# Patient Record
Sex: FEMALE | Race: WHITE | NOT HISPANIC OR LATINO | Employment: UNEMPLOYED | URBAN - METROPOLITAN AREA
[De-identification: names, ages, dates, MRNs, and addresses within clinical notes are randomized per-mention and may not be internally consistent; named-entity substitution may affect disease eponyms.]

---

## 2017-01-16 ENCOUNTER — ALLSCRIPTS OFFICE VISIT (OUTPATIENT)
Dept: OTHER | Facility: OTHER | Age: 47
End: 2017-01-16

## 2017-01-23 ENCOUNTER — ALLSCRIPTS OFFICE VISIT (OUTPATIENT)
Dept: OTHER | Facility: OTHER | Age: 47
End: 2017-01-23

## 2017-01-30 ENCOUNTER — HOSPITAL ENCOUNTER (EMERGENCY)
Facility: HOSPITAL | Age: 47
Discharge: HOME | End: 2017-01-30
Attending: EMERGENCY MEDICINE | Admitting: EMERGENCY MEDICINE
Payer: COMMERCIAL

## 2017-01-30 ENCOUNTER — GENERIC CONVERSION - ENCOUNTER (OUTPATIENT)
Dept: OTHER | Facility: OTHER | Age: 47
End: 2017-01-30

## 2017-01-30 VITALS
WEIGHT: 176.8 LBS | OXYGEN SATURATION: 97 % | DIASTOLIC BLOOD PRESSURE: 70 MMHG | RESPIRATION RATE: 18 BRPM | TEMPERATURE: 98.7 F | SYSTOLIC BLOOD PRESSURE: 119 MMHG | HEART RATE: 93 BPM

## 2017-01-30 DIAGNOSIS — S76.211A INGUINAL STRAIN, RIGHT, INITIAL ENCOUNTER: Primary | ICD-10-CM

## 2017-01-30 PROCEDURE — 99283 EMERGENCY DEPT VISIT LOW MDM: CPT

## 2017-01-30 RX ORDER — CYCLOBENZAPRINE HCL 10 MG
10 TABLET ORAL 3 TIMES DAILY
Qty: 30 TABLET | Refills: 0 | Status: SHIPPED | OUTPATIENT
Start: 2017-01-30 | End: 2018-02-14 | Stop reason: ALTCHOICE

## 2017-01-30 RX ORDER — ONDANSETRON 4 MG/1
4 TABLET, ORALLY DISINTEGRATING ORAL ONCE
Status: COMPLETED | OUTPATIENT
Start: 2017-01-30 | End: 2017-01-30

## 2017-01-30 RX ORDER — CYCLOBENZAPRINE HCL 10 MG
10 TABLET ORAL ONCE
Status: COMPLETED | OUTPATIENT
Start: 2017-01-30 | End: 2017-01-30

## 2017-01-30 RX ORDER — ONDANSETRON 4 MG/1
4 TABLET, FILM COATED ORAL EVERY 6 HOURS
Qty: 12 TABLET | Refills: 0 | Status: SHIPPED | OUTPATIENT
Start: 2017-01-30 | End: 2017-02-06

## 2017-01-30 RX ADMIN — ONDANSETRON 4 MG: 4 TABLET, ORALLY DISINTEGRATING ORAL at 18:49

## 2017-01-30 RX ADMIN — CYCLOBENZAPRINE HYDROCHLORIDE 10 MG: 10 TABLET, FILM COATED ORAL at 18:49

## 2017-02-24 ENCOUNTER — TRANSCRIBE ORDERS (OUTPATIENT)
Dept: ADMINISTRATIVE | Age: 47
End: 2017-02-24

## 2017-02-24 ENCOUNTER — APPOINTMENT (OUTPATIENT)
Dept: LAB | Age: 47
End: 2017-02-24
Payer: COMMERCIAL

## 2017-02-24 DIAGNOSIS — J45.50 UNCOMPLICATED SEVERE PERSISTENT ASTHMA: ICD-10-CM

## 2017-02-24 DIAGNOSIS — E55.9 UNSPECIFIED VITAMIN D DEFICIENCY: Primary | ICD-10-CM

## 2017-02-24 DIAGNOSIS — E55.9 UNSPECIFIED VITAMIN D DEFICIENCY: ICD-10-CM

## 2017-02-24 LAB
25(OH)D3 SERPL-MCNC: 10.7 NG/ML (ref 30–100)
ANION GAP SERPL CALCULATED.3IONS-SCNC: 6 MMOL/L (ref 4–13)
BASOPHILS # BLD MANUAL: 0 THOUSAND/UL (ref 0–0.1)
BASOPHILS NFR MAR MANUAL: 0 % (ref 0–1)
BUN SERPL-MCNC: 17 MG/DL (ref 5–25)
BURR CELLS BLD QL SMEAR: PRESENT
CALCIUM SERPL-MCNC: 8.5 MG/DL (ref 8.3–10.1)
CHLORIDE SERPL-SCNC: 114 MMOL/L (ref 100–108)
CO2 SERPL-SCNC: 24 MMOL/L (ref 21–32)
CREAT SERPL-MCNC: 0.91 MG/DL (ref 0.6–1.3)
EOSINOPHIL # BLD MANUAL: 0 THOUSAND/UL (ref 0–0.4)
EOSINOPHIL NFR BLD MANUAL: 0 % (ref 0–6)
ERYTHROCYTE [DISTWIDTH] IN BLOOD BY AUTOMATED COUNT: 13 % (ref 11.6–15.1)
GFR SERPL CREATININE-BSD FRML MDRD: >60 ML/MIN/1.73SQ M
GLUCOSE SERPL-MCNC: 82 MG/DL (ref 65–140)
HCT VFR BLD AUTO: 37.9 % (ref 34.8–46.1)
HGB BLD-MCNC: 12.3 G/DL (ref 11.5–15.4)
LG PLATELETS BLD QL SMEAR: PRESENT
LYMPHOCYTES # BLD AUTO: 1.26 THOUSAND/UL (ref 0.6–4.47)
LYMPHOCYTES # BLD AUTO: 30 % (ref 14–44)
MAGNESIUM SERPL-MCNC: 2.2 MG/DL (ref 1.6–2.6)
MCH RBC QN AUTO: 30.5 PG (ref 26.8–34.3)
MCHC RBC AUTO-ENTMCNC: 32.5 G/DL (ref 31.4–37.4)
MCV RBC AUTO: 94 FL (ref 82–98)
MONOCYTES # BLD AUTO: 0.63 THOUSAND/UL (ref 0–1.22)
MONOCYTES NFR BLD: 15 % (ref 4–12)
NEUTROPHILS # BLD MANUAL: 2.02 THOUSAND/UL (ref 1.85–7.62)
NEUTS BAND NFR BLD MANUAL: 4 % (ref 0–8)
NEUTS SEG NFR BLD AUTO: 44 % (ref 43–75)
NRBC BLD AUTO-RTO: 0 /100 WBCS
PLATELET # BLD AUTO: 173 THOUSANDS/UL (ref 149–390)
PLATELET BLD QL SMEAR: ADEQUATE
PMV BLD AUTO: 10.7 FL (ref 8.9–12.7)
POIKILOCYTOSIS BLD QL SMEAR: PRESENT
POTASSIUM SERPL-SCNC: 4.4 MMOL/L (ref 3.5–5.3)
RBC # BLD AUTO: 4.03 MILLION/UL (ref 3.81–5.12)
SODIUM SERPL-SCNC: 144 MMOL/L (ref 136–145)
TOTAL CELLS COUNTED SPEC: 100
VARIANT LYMPHS # BLD AUTO: 7 %
WBC # BLD AUTO: 4.2 THOUSAND/UL (ref 4.31–10.16)

## 2017-02-24 PROCEDURE — 85007 BL SMEAR W/DIFF WBC COUNT: CPT

## 2017-02-24 PROCEDURE — 80048 BASIC METABOLIC PNL TOTAL CA: CPT

## 2017-02-24 PROCEDURE — 82306 VITAMIN D 25 HYDROXY: CPT

## 2017-02-24 PROCEDURE — 85027 COMPLETE CBC AUTOMATED: CPT

## 2017-02-24 PROCEDURE — 36415 COLL VENOUS BLD VENIPUNCTURE: CPT

## 2017-02-24 PROCEDURE — 83735 ASSAY OF MAGNESIUM: CPT

## 2017-03-06 ENCOUNTER — ALLSCRIPTS OFFICE VISIT (OUTPATIENT)
Dept: OTHER | Facility: OTHER | Age: 47
End: 2017-03-06

## 2017-03-21 ENCOUNTER — ALLSCRIPTS OFFICE VISIT (OUTPATIENT)
Dept: OTHER | Facility: OTHER | Age: 47
End: 2017-03-21

## 2017-03-28 ENCOUNTER — HOSPITAL ENCOUNTER (EMERGENCY)
Facility: HOSPITAL | Age: 47
Discharge: HOME | End: 2017-03-28
Attending: EMERGENCY MEDICINE
Payer: COMMERCIAL

## 2017-03-28 VITALS
HEART RATE: 88 BPM | DIASTOLIC BLOOD PRESSURE: 66 MMHG | RESPIRATION RATE: 18 BRPM | SYSTOLIC BLOOD PRESSURE: 117 MMHG | WEIGHT: 178.57 LBS | TEMPERATURE: 98 F | OXYGEN SATURATION: 100 %

## 2017-03-28 DIAGNOSIS — G43.909 MIGRAINE: Primary | ICD-10-CM

## 2017-03-28 PROCEDURE — 96361 HYDRATE IV INFUSION ADD-ON: CPT

## 2017-03-28 PROCEDURE — 96374 THER/PROPH/DIAG INJ IV PUSH: CPT

## 2017-03-28 PROCEDURE — 99283 EMERGENCY DEPT VISIT LOW MDM: CPT

## 2017-03-28 PROCEDURE — 96375 TX/PRO/DX INJ NEW DRUG ADDON: CPT

## 2017-03-28 RX ORDER — SUMATRIPTAN 100 MG/1
100 TABLET, FILM COATED ORAL ONCE AS NEEDED
COMMUNITY
End: 2018-04-02 | Stop reason: ALTCHOICE

## 2017-03-28 RX ORDER — SUMATRIPTAN AND NAPROXEN SODIUM 85; 500 MG/1; MG/1
1 TABLET, FILM COATED ORAL EVERY 2 HOUR PRN
COMMUNITY
End: 2018-03-26

## 2017-03-28 RX ORDER — ONDANSETRON 2 MG/ML
4 INJECTION INTRAMUSCULAR; INTRAVENOUS ONCE
Status: COMPLETED | OUTPATIENT
Start: 2017-03-28 | End: 2017-03-28

## 2017-03-28 RX ADMIN — HYDROMORPHONE HYDROCHLORIDE 1 MG: 1 INJECTION, SOLUTION INTRAMUSCULAR; INTRAVENOUS; SUBCUTANEOUS at 17:41

## 2017-03-28 RX ADMIN — ONDANSETRON 4 MG: 2 INJECTION INTRAMUSCULAR; INTRAVENOUS at 17:41

## 2017-03-28 RX ADMIN — SODIUM CHLORIDE 1000 ML: 0.9 INJECTION, SOLUTION INTRAVENOUS at 17:40

## 2017-03-29 ENCOUNTER — HOSPITAL ENCOUNTER (EMERGENCY)
Facility: HOSPITAL | Age: 47
Discharge: HOME | End: 2017-03-29
Attending: EMERGENCY MEDICINE
Payer: COMMERCIAL

## 2017-03-29 VITALS
RESPIRATION RATE: 16 BRPM | TEMPERATURE: 98.4 F | OXYGEN SATURATION: 100 % | DIASTOLIC BLOOD PRESSURE: 69 MMHG | HEART RATE: 67 BPM | WEIGHT: 178.57 LBS | SYSTOLIC BLOOD PRESSURE: 114 MMHG

## 2017-03-29 DIAGNOSIS — G43.909 MIGRAINE HEADACHE: Primary | ICD-10-CM

## 2017-03-29 LAB
ANION GAP BLD CALC-SCNC: 17 MMOL/L (ref 4–13)
BUN BLD-MCNC: 16 MG/DL (ref 5–25)
CA-I BLD-SCNC: 1.2 MMOL/L (ref 1.12–1.32)
CHLORIDE BLD-SCNC: 111 MMOL/L (ref 100–108)
CREAT BLD-MCNC: 0.9 MG/DL (ref 0.6–1.3)
GFR SERPL CREATININE-BSD FRML MDRD: >60 ML/MIN/1.73SQ M
GLUCOSE SERPL-MCNC: 90 MG/DL (ref 65–140)
HCT VFR BLD CALC: 35 % (ref 34.8–46.1)
HGB BLDA-MCNC: 11.9 G/DL (ref 11.5–15.4)
PCO2 BLD: 19 MMOL/L (ref 21–32)
POTASSIUM BLD-SCNC: 3.9 MMOL/L (ref 3.5–5.3)
SODIUM BLD-SCNC: 143 MMOL/L (ref 136–145)
SPECIMEN SOURCE: ABNORMAL

## 2017-03-29 PROCEDURE — 96376 TX/PRO/DX INJ SAME DRUG ADON: CPT

## 2017-03-29 PROCEDURE — 96375 TX/PRO/DX INJ NEW DRUG ADDON: CPT

## 2017-03-29 PROCEDURE — 80047 BASIC METABLC PNL IONIZED CA: CPT

## 2017-03-29 PROCEDURE — 99284 EMERGENCY DEPT VISIT MOD MDM: CPT

## 2017-03-29 PROCEDURE — 96365 THER/PROPH/DIAG IV INF INIT: CPT

## 2017-03-29 PROCEDURE — 96361 HYDRATE IV INFUSION ADD-ON: CPT

## 2017-03-29 PROCEDURE — 85014 HEMATOCRIT: CPT

## 2017-03-29 RX ORDER — BUTALBITAL, ACETAMINOPHEN AND CAFFEINE 50; 325; 40 MG/1; MG/1; MG/1
1 TABLET ORAL ONCE
Status: COMPLETED | OUTPATIENT
Start: 2017-03-29 | End: 2017-03-29

## 2017-03-29 RX ORDER — DIPHENHYDRAMINE HYDROCHLORIDE 50 MG/ML
25 INJECTION INTRAMUSCULAR; INTRAVENOUS ONCE
Status: COMPLETED | OUTPATIENT
Start: 2017-03-29 | End: 2017-03-29

## 2017-03-29 RX ORDER — PROMETHAZINE HYDROCHLORIDE 25 MG/ML
12.5 INJECTION, SOLUTION INTRAMUSCULAR; INTRAVENOUS ONCE
Status: COMPLETED | OUTPATIENT
Start: 2017-03-29 | End: 2017-03-29

## 2017-03-29 RX ORDER — METOCLOPRAMIDE HYDROCHLORIDE 5 MG/ML
10 INJECTION INTRAMUSCULAR; INTRAVENOUS ONCE
Status: COMPLETED | OUTPATIENT
Start: 2017-03-29 | End: 2017-03-29

## 2017-03-29 RX ORDER — KETOROLAC TROMETHAMINE 30 MG/ML
15 INJECTION, SOLUTION INTRAMUSCULAR; INTRAVENOUS ONCE
Status: COMPLETED | OUTPATIENT
Start: 2017-03-29 | End: 2017-03-29

## 2017-03-29 RX ADMIN — SODIUM CHLORIDE 1000 ML: 0.9 INJECTION, SOLUTION INTRAVENOUS at 18:11

## 2017-03-29 RX ADMIN — MAGNESIUM SULFATE HEPTAHYDRATE 1 G: 1 INJECTION, SOLUTION INTRAVENOUS at 19:40

## 2017-03-29 RX ADMIN — KETOROLAC TROMETHAMINE 15 MG: 30 INJECTION, SOLUTION INTRAMUSCULAR at 18:14

## 2017-03-29 RX ADMIN — DIPHENHYDRAMINE HYDROCHLORIDE 25 MG: 50 INJECTION, SOLUTION INTRAMUSCULAR; INTRAVENOUS at 18:13

## 2017-03-29 RX ADMIN — PROMETHAZINE HYDROCHLORIDE 12.5 MG: 25 INJECTION INTRAMUSCULAR; INTRAVENOUS at 19:28

## 2017-03-29 RX ADMIN — METOCLOPRAMIDE 10 MG: 5 INJECTION, SOLUTION INTRAMUSCULAR; INTRAVENOUS at 18:12

## 2017-03-29 RX ADMIN — BUTALBITAL, ACETAMINOPHEN, AND CAFFEINE 1 TABLET: 50; 325; 40 TABLET ORAL at 21:22

## 2017-03-29 RX ADMIN — KETOROLAC TROMETHAMINE 15 MG: 30 INJECTION, SOLUTION INTRAMUSCULAR at 19:25

## 2017-05-01 ENCOUNTER — ALLSCRIPTS OFFICE VISIT (OUTPATIENT)
Dept: OTHER | Facility: OTHER | Age: 47
End: 2017-05-01

## 2017-05-30 ENCOUNTER — ALLSCRIPTS OFFICE VISIT (OUTPATIENT)
Dept: OTHER | Facility: OTHER | Age: 47
End: 2017-05-30

## 2017-07-27 ENCOUNTER — ALLSCRIPTS OFFICE VISIT (OUTPATIENT)
Dept: OTHER | Facility: OTHER | Age: 47
End: 2017-07-27

## 2017-07-27 DIAGNOSIS — E03.9 HYPOTHYROIDISM: ICD-10-CM

## 2017-07-27 DIAGNOSIS — R53.82 CHRONIC FATIGUE: ICD-10-CM

## 2017-09-11 ENCOUNTER — GENERIC CONVERSION - ENCOUNTER (OUTPATIENT)
Dept: BEHAVIORAL HEALTH UNIT | Facility: HOSPITAL | Age: 47
End: 2017-09-11

## 2017-09-21 ENCOUNTER — ALLSCRIPTS OFFICE VISIT (OUTPATIENT)
Dept: OTHER | Facility: OTHER | Age: 47
End: 2017-09-21

## 2017-09-29 ENCOUNTER — ALLSCRIPTS OFFICE VISIT (OUTPATIENT)
Dept: OTHER | Facility: OTHER | Age: 47
End: 2017-09-29

## 2017-10-02 ENCOUNTER — GENERIC CONVERSION - ENCOUNTER (OUTPATIENT)
Dept: OTHER | Facility: OTHER | Age: 47
End: 2017-10-02

## 2017-10-18 ENCOUNTER — TRANSCRIBE ORDERS (OUTPATIENT)
Dept: ADMINISTRATIVE | Facility: HOSPITAL | Age: 47
End: 2017-10-18

## 2017-10-18 ENCOUNTER — GENERIC CONVERSION - ENCOUNTER (OUTPATIENT)
Dept: OTHER | Facility: OTHER | Age: 47
End: 2017-10-18

## 2017-10-18 ENCOUNTER — HOSPITAL ENCOUNTER (OUTPATIENT)
Dept: RADIOLOGY | Facility: HOSPITAL | Age: 47
Discharge: HOME | End: 2017-10-18
Payer: COMMERCIAL

## 2017-10-18 DIAGNOSIS — M25.551 PAIN IN RIGHT HIP: ICD-10-CM

## 2017-10-18 DIAGNOSIS — Z11.4 ENCOUNTER FOR SCREENING FOR HIV: ICD-10-CM

## 2017-10-18 DIAGNOSIS — Z11.3 ENCOUNTER FOR SCREENING FOR INFECTIONS WITH PREDOMINANTLY SEXUAL MODE OF TRANSMISSION: ICD-10-CM

## 2017-10-18 DIAGNOSIS — Z11.59 ENCOUNTER FOR SCREENING FOR OTHER VIRAL DISEASES (CODE): ICD-10-CM

## 2017-10-18 PROCEDURE — 73502 X-RAY EXAM HIP UNI 2-3 VIEWS: CPT

## 2017-10-25 ENCOUNTER — GENERIC CONVERSION - ENCOUNTER (OUTPATIENT)
Dept: OTHER | Facility: OTHER | Age: 47
End: 2017-10-25

## 2017-10-26 NOTE — PROGRESS NOTES
Assessment  1  Trochanteric bursitis of right hip (726 5) (M70 61)   2  Sacroiliitis (720 2) (M46 1)    Plan  Trochanteric bursitis of right hip    · Flector 1 3 % Transdermal Patch; APPLY PATCH TO RIGTH HIP q 12 HOURS   Rx By: Bonnita Ganser; Dispense: 30 Days ; #:60 Patch; Refill: 1;For: Trochanteric bursitis of right hip; CIELO = N; Verified Transmission to [facility] #[id number] Last Updated By: System, SureScripts; 9/21/2017 8:15:54 AM   · Joint Bursa Aspiration &/or Injection Major - POC; Status:Complete;   Done: 75Toj8334   Perform: In Office; Order Comments:right troch bursa; Due:21Sep2018; Ordered; For:Trochanteric bursitis of right hip; Ordered By:Loki Gar; Discussion/Summary    Patient is a 71-year-old female with a history of sacroiliitis and piriformis syndrome, who presents today for a follow-up appointment  The patient is experiencing right thigh pain which is consistent with trochanteric bursitis  To help decrease the inflammatory component of her pain, a right trochanteric bursa injection can be performed under ultrasound guidance  The injection and risks were reviewed the patient and she was scheduled for the procedure  help with the pain until the injection can be performed, I will give her a prescription for Flector patch along with a sample  She was instructed to apply one patch to the right hip and leave in place for 12 hours  She was also given a discount card to make the patches more affordable  The patient has the current Goals: Decreased pain and improved quality of life  The patent has the current Barriers: None  Patient is able to Self-Care  Chief Complaint  1  Leg Pain    History of Present Illness  Patient is a 71-year-old female with a history of sacroiliitis and piriformis syndrome  She was last in the office on August 3 2016, in which she was having new onset of neck pain  She presents today for a followup appointment   At this time, the patient states the neck pain has been stable  She is currently experiencing right thigh pain which occasionally radiates into the hip and down to the knee  She does have low back pain, but it is mild in comparison  The pain is constant and increases with walking, or lying on her side  She describes as pressure-like  She is currently rating her pain a 7/10 on the numeric rating scale  Marylou Calvo presents with complaints of gradual onset of constant episodes of moderate right anterior upper and right posterior upper leg pain  On a scale of 1 to 10, the patient rates the pain as 7  Symptoms are unchanged  Review of Systems    Constitutional: no fever,-- no recent weight gain-- and-- no recent weight loss  Eyes: no double vision-- and-- no blurry vision  Cardiovascular: no chest pain,-- no palpitations-- and-- no lower extremity edema  Respiratory: no complaints of shortness of breath-- and-- no wheezing  Musculoskeletal: difficulty walking, but-- no muscle weakness,-- no joint stiffness,-- no joint swelling,-- no limb swelling,-- no pain in extremity-- and-- no decreased range of motion  Neurological: no dizziness,-- no difficulty swallowing,-- no memory loss,-- no loss of consciousness-- and-- no seizures  Gastrointestinal: no nausea,-- no vomiting,-- no constipation-- and-- no diarrhea  Genitourinary: no difficulty initiating urine stream,-- no genital pain-- and-- no frequent urination  Integumentary: no complaints of skin rash  Psychiatric: no depression  Endocrine: no excessive thirst,-- no adrenal disease,-- no hypothyroidism-- and-- no hyperthyroidism  Hematologic/Lymphatic: no tendency for easy bruising-- and-- no tendency for easy bleeding  Active Problems  1  Akathisia (781 0)   2  Amenorrhea (626 0) (N91 2)   3  Asthma, persistent (493 90) (J97 909)   4  History of Chondromalacia of patella, unspecified laterality (717 7) (M22 40)   5  Chronic fatigue (780 79) (R53 82)   6   Chronic GERD (980 81) (K21 9)   7  Chronic migraine without aura (346 70) (G43 709)   8  Dysmenorrhea (625 3) (N94 6)   9  Encounter for IUD insertion (V25 11) (Z30 430)   10  Encounter for routine gynecological examination (V72 31) (Z01 419)   11  Encounter for screening mammogram for malignant neoplasm of breast (V76 12)    (Z12 31)   12  KYLE (generalized anxiety disorder) (300 02) (F41 1)   13  Hypothyroidism (244 9) (E03 9)   14  Impulse control disorder (312 30) (F63 9)   15  Intractable chronic migraine without aura and without status migrainosus (346 71)    (G43 719)   16  Lumbar radiculopathy (724 4) (M54 16)   17  Major depressive disorder, recurrent severe without psychotic features (296 33) (F33 2)   18  Migraine, unspecified, not intractable, without status migrainosus (346 90) (G43 909)   19  Myofascial pain syndrome (729 1) (M79 1)   20  Neck pain on left side (723 1) (M54 2)   21  Other insomnia (780 52) (G47 09)   22  Panic disorder without agoraphobia (300 01) (F41 0)   23  Piriformis syndrome, unspecified laterality (355 0) (G57 00)   24  Sacroiliitis (720 2) (M46 1)   25  Sciatica (724 3) (M54 30)   26  Spondylosis of lumbar region without myelopathy or radiculopathy (721 3) (M47 816)   27  Vitamin D deficiency (268 9) (E55 9)    Past Medical History  1  History of Anxiety (300 00) (F41 9)   2  History of Asthma (493 90) (J45 909)   3  History of Chondromalacia of patella, unspecified laterality (717 7) (M22 40)   4  History of Chronic migraine (346 70) (G43 709)   5  History of Contraceptives (V25 02)   6  History of Counseling for birth control, intrauterine device (V25 09) (Z30 09)   7  History of Deep vein thrombophlebitis of leg, unspecified laterality (451 19) (I80 209)   8  History of Depression (311) (F32 9)   9  History of DEXA Body Composition Study   10  History of Encounter for contraceptive surveillance (V25 40) (Z30 40)   11  History of candidal vulvovaginitis (V13 29) (Z86 19)   12   History of cystitis (V13 02) (Z87 440)   13  History of gastroesophageal reflux (GERD) (V12 79) (Z87 19)   14  Denied: History of head injury   15  History of human papillomavirus infection (V12 09) (Z86 19)   16  History of hypothyroidism (V12 29) (Z86 39)   17  History of osteopenia (V13 59) (Z87 39)   18  History of thyroid disease (V12 29) (Z86 39)   19  History of Mammogram   20  History of Moderate dysplasia of cervix (ESAU II) (622 12) (N87 1)   21  History of Previously Pregnant With 1  Normal Vaginal Deliveries   22  History of Reported Pap Smear   23  History of Routine Gynecological Exam With Cervical Pap Smear (V72 31)   24  History of Screening for HPV (human papillomavirus) (V73 81) (Z11 51)   25  Denied: History of Seizures   26  History of Summary Of Previous Pregnancies  2  (Total No )   27  History of Vaginal yeast infection (112 1) (B37 3)    The active problems and past medical history were reviewed and updated today  Surgical History  1  History of Cervix Cryosurgery   2  History of  Section   3  History of Colposcopy Cervix With Biopsy(S)   4  History of Complete Colonoscopy   5  History of Contraceptives (V25 02)   6  History of Laparoscopy (Diagnostic)   7  History of Oral Surgery Tooth Extraction    The surgical history was reviewed and updated today  Family History  Mother    1  No family history of mental disorder  Father    2  Family history of Colon Cancer (V16 0)   3  No family history of mental disorder  Maternal Grandfather    4  Family history of Colon Cancer (V16 0)   5  Family history of Prostate Cancer (V16 42)  Maternal Aunt    6  Family history of Colon Cancer (V16 0)  Family History    7  Family history of Lung Cancer (V16 1)    The family history was reviewed and updated today         Social History   · Denied: History of Caffeine Use   · Denied: History of Drug Use (305 90)   · Marital History - Currently    · Never A Smoker   · Occasional alcohol use   · Occupation   · Sedentary Lifestyle (V69 0)   · Sexually Active  The social history was reviewed and updated today  The social history was reviewed and is unchanged  Current Meds   1  ALPRAZolam 1 MG Oral Tablet; TAKE 1 TABLET 3 TIMES DAILY AS NEEDED; Therapy: 39FBF8818 to (Evaluate:25Jan2018); Last Rx:60Svj5689 Ordered   2  ARIPiprazole 30 MG Oral Tablet; TAKE 1 TABLET AT BEDTIME; Therapy: 09MOS5977 to ([phone])  Requested for: 31Xki1531; Last   Rx:98Qzr8956 Ordered   3  Botox 100 UNIT Injection Solution Reconstituted; To be injected by physician as   indicated; Therapy: 44RPB6868 to (Last LY:35TFM4007) Ordered   4  BuPROPion HCl ER (XL) 150 MG Oral Tablet Extended Release 24 Hour; TAKE 1   TABLET DAILY; Therapy: 40Ekg5249 to (Evaluate:09Jan2018)  Requested for: 31Ijx2771; Last   Rx:77Tag6039 Ordered   5  Gabapentin 300 MG Oral Capsule; 2 CAPS 3 TIMES A DAY; Therapy: 95TZX8774 to (Evaluate:28Feb2018)  Requested for: 65Hft3670; Last   Rx:17Igb2595 Ordered   6  Mirena IUD; Therapy: (Recorded:30Jan2017) to Recorded   7  Naltrexone HCl - 50 MG Oral Tablet; Take 1/2 tablet once daily; Therapy: 84Gmb0781 to (Evaluate:09Jan2018)  Requested for: 55Ncz5713; Last   Rx:91Fnn0603 Ordered   8  Naratriptan HCl - 2 5 MG Oral Tablet; TAKE 1 TABLET AT ONSET OF HEADACHE, MAY   REPEAT ONCE IN 2 HOURS (MAX 2TABS/DAY 3DAYS/WK); Therapy: 11WRZ8920 to (Evaluate:43Knp8169)  Requested for: 14AEX8290; Last   Rx:30Axo5895 Ordered   9  Omeprazole 20 MG Oral Capsule Delayed Release; TAKE 1 CAPSULE Daily; Therapy: 96XGR3123 to (RGRDYRMF:78JIJ5731)  Requested for: 62Unx7322 Recorded   10  Ondansetron HCl - 4 MG Oral Tablet; 1 tab q8h prn nausea; Therapy: 50VSZ3157 to (Last BJ:45NOD7952)  Requested for: 31CVP7529 Ordered   11  ProAir  (90 Base) MCG/ACT Inhalation Aerosol Solution; Therapy: 59OHA5787 to (Last Avani Paz)  Requested for: 60Aul2359 Ordered   12   Sertraline HCl - 100 MG Oral Tablet; take 2 tablets at bedtime; Therapy: 15FDG4554 to ([phone])  Requested for: 11Jnh7242; Last    Rx:70Iyh8233 Ordered   13  Spiriva HandiHaler 18 MCG Inhalation Capsule; Therapy: (Recorded:34Tjr2132) to Recorded   14  Symbicort 160-4 5 MCG/ACT Inhalation Aerosol; Therapy: 31JXG9736 to (Last Mari Brake)  Requested for: 95Cgf7248 Ordered   15  Topiramate 100 MG Oral Tablet; 1 BID  Requested for: 18AIN7864; Last BH:10IGU8162    Ordered   16  Venlafaxine HCl  MG Oral Capsule Extended Release 24 Hour; TAKE 2    CAPSULES DAILY; Therapy: 48LWS6226 to ([phone])  Requested for: 19Kdn0606; Last    Rx:87Nbk9815 Ordered   17  Vitamin B-2 100 MG Oral Tablet; Take two in am daily; Therapy: 98MJR5269 to (Last PX:65YVM8294)  Requested for: 28Oct2013 Ordered   18  Vitamin D CAPS; Therapy: (Recorded:08Uvw2095) to Recorded   19  Xopenex 1 25 MG/3ML Inhalation Nebulization Solution; Therapy: 64CZO4054 to (Last Rx:65Bvw5312)  Requested for: 73IVK3032 Ordered   20  Zolpidem Tartrate 10 MG Oral Tablet; TAKE 1 TABLET AT BEDTIME AS NEEDED; Therapy: 12XPV0991 to (Evaluate:25Jan2018); Last Rx:57Jjn6993 Ordered    The medication list was reviewed and updated today  Allergies  1  azithromycin   2  Erythromycin TABS   3  Diflucan TABS  4  Shellfish    Vitals  Vital Signs    Recorded: 62ATH0583 08:04AM   Temperature 97 9 F   Heart Rate 76   Respiration 16   Systolic 934   Diastolic 74   Height 5 ft 6 in   Weight 148 lb    BMI Calculated 23 89   BSA Calculated 1 76   O2 Saturation 98   Pain Scale 7     Physical Exam    Constitutional   General appearance: Well developed, well nourished, alert, in no distress, non-toxic and no overt pain behavior  Eyes   Sclera: anicteric   HEENT   Hearing grossly intact  Neck   Neck: Supple, symmetric, trachea midline, no masses  Pulmonary   Respiratory effort: Even and unlabored        Cardiovascular   Examination of extremities: No edema or pitting edema present  Skin   Skin and subcutaneous tissue: Normal without rashes or lesions, well hydrated  Psychiatric   Mood and affect: Mood and affect appropriate  Musculoskeletal   Gait and station: Normal     Thoracic Spine examination demonstrates Thoracic Spine:   Appearance: Normal     Tenderness:  left paraspinal tenderness-- and-- right paraspinal tenderness  Muscle spasms throughout paraspinal musculature  Lumbar/Sacral Spine examination demonstrates Lumbosacral Spine:   Appearance: Normal  Spinal alignment exhibits normal lordosis  Tenderness: the right sacroiliac joint  Tenderness right troch bursa  Throughout paraspinal muscluature  Lumbosacral Spine Sensory: intact to light touch and pinprick in the lower extremities  ROM Lumbosacral Spine: Full  Flexion was painless  Extension was painless  Foot and ankle strength was normal bilaterally  Knee strength was normal bilaterally  Hip strength was normal bilaterally  Special Tests: negative Iron's Maneuver on right-- and-- negative Iron's Maneuver on lef  Results/Data  Results Free Text Form [facility]:   Results    I personally reviewed the films/images in the office today  MRI:  MRI Lumbar spine dated 8/11/2012Normal alignment of the lumbar spine  No compressionNo spondylolysis or spondylolisthesis  No scoliosis  SIGNAL- Normal marrow signal is identified within the visualizedstructures  No discrete marrow lesion  CORD AND CONUS- Normal size and signal within the distal cordconus  The conus ends at the L1 level  SOFT TISSUES- Paraspinal soft tissues are unremarkable  Normal signal within the sacrum  No evidence of insufficiencystress fracture  THORACIC DISC SPACES- Normal disc height and signal  No disccanal stenosis or foraminal narrowing  DISC SPACES-central annular tear without significant displacement of discsacralization left transverse process  Moderate facet arthrosis  noncompressive degenerative changes   Conus intrinsicallyno intraspinal mass  Attending Note  Collaborating Physician: I discussed the case with the Advanced Practitioner and reviewed the note-- and-- I agree with the Advanced Practitioner note  Future Appointments    Date/Time Provider Specialty Site   11/01/2017 04:30 PM NICKOLAS Pierson   Psychiatry [address] [facility]   12/21/2017 05:30 PM Sami Coleman,  Internal Medicine [facility] INTERNAL MED   10/23/2017 07:00 PM SARAH Carbajal, [facility]     Signatures   Electronically signed by : REBEKA Christopher; Sep 21 2017  8:26AM EST                       (Author)    Electronically signed by : Nadeem Bowden MD; Sep 21 2017  9:31AM EST                       (Author)

## 2017-10-30 ENCOUNTER — TRANSCRIBE ORDERS (OUTPATIENT)
Dept: ADMINISTRATIVE | Facility: HOSPITAL | Age: 47
End: 2017-10-30

## 2017-10-30 DIAGNOSIS — M54.16 LUMBAR RADICULOPATHY: Primary | ICD-10-CM

## 2017-11-01 ENCOUNTER — ALLSCRIPTS OFFICE VISIT (OUTPATIENT)
Dept: OTHER | Facility: OTHER | Age: 47
End: 2017-11-01

## 2017-11-14 ENCOUNTER — HOSPITAL ENCOUNTER (OUTPATIENT)
Dept: MRI IMAGING | Facility: HOSPITAL | Age: 47
Discharge: HOME | End: 2017-11-14
Payer: COMMERCIAL

## 2017-11-14 DIAGNOSIS — M54.16 LUMBAR RADICULOPATHY: ICD-10-CM

## 2017-11-14 PROCEDURE — 72148 MRI LUMBAR SPINE W/O DYE: CPT

## 2017-11-15 ENCOUNTER — APPOINTMENT (OUTPATIENT)
Dept: LAB | Age: 47
End: 2017-11-15
Payer: COMMERCIAL

## 2017-11-15 ENCOUNTER — TRANSCRIBE ORDERS (OUTPATIENT)
Dept: ADMINISTRATIVE | Age: 47
End: 2017-11-15

## 2017-11-15 DIAGNOSIS — Z11.59 ENCOUNTER FOR SCREENING FOR OTHER VIRAL DISEASES (CODE): ICD-10-CM

## 2017-11-15 DIAGNOSIS — E03.9 HYPOTHYROIDISM: ICD-10-CM

## 2017-11-15 DIAGNOSIS — E55.9 AVITAMINOSIS D: Primary | ICD-10-CM

## 2017-11-15 DIAGNOSIS — Z11.4 ENCOUNTER FOR SCREENING FOR HIV: ICD-10-CM

## 2017-11-15 DIAGNOSIS — R53.82 CHRONIC FATIGUE: ICD-10-CM

## 2017-11-15 DIAGNOSIS — E55.9 AVITAMINOSIS D: ICD-10-CM

## 2017-11-15 DIAGNOSIS — Z11.3 ENCOUNTER FOR SCREENING FOR INFECTIONS WITH PREDOMINANTLY SEXUAL MODE OF TRANSMISSION: ICD-10-CM

## 2017-11-15 LAB
HIV 1+2 AB+HIV1 P24 AG SERPL QL IA: NORMAL
HIV1 P24 AG SER QL: NORMAL
TSH SERPL DL<=0.05 MIU/L-ACNC: 3.69 UIU/ML (ref 0.36–3.74)

## 2017-11-15 PROCEDURE — 36415 COLL VENOUS BLD VENIPUNCTURE: CPT

## 2017-11-15 PROCEDURE — 87340 HEPATITIS B SURFACE AG IA: CPT

## 2017-11-15 PROCEDURE — 86803 HEPATITIS C AB TEST: CPT

## 2017-11-15 PROCEDURE — 82306 VITAMIN D 25 HYDROXY: CPT

## 2017-11-15 PROCEDURE — 86695 HERPES SIMPLEX TYPE 1 TEST: CPT

## 2017-11-15 PROCEDURE — 86694 HERPES SIMPLEX NES ANTBDY: CPT

## 2017-11-15 PROCEDURE — 84443 ASSAY THYROID STIM HORMONE: CPT

## 2017-11-15 PROCEDURE — 87806 HIV AG W/HIV1&2 ANTB W/OPTIC: CPT

## 2017-11-15 PROCEDURE — 86696 HERPES SIMPLEX TYPE 2 TEST: CPT

## 2017-11-16 LAB
HBV SURFACE AG SER QL: NORMAL
HCV AB SER QL: NORMAL

## 2017-11-17 LAB
HSV1 IGG SER IA-ACNC: 16.5 INDEX (ref 0–0.9)
HSV2 IGG SER IA-ACNC: <0.91 INDEX (ref 0–0.9)

## 2017-11-18 LAB — HSV1+2 IGM SER IA-ACNC: <0.91 RATIO (ref 0–0.9)

## 2017-11-20 LAB
25(OH)D2 SERPL-MCNC: 32 NG/ML
25(OH)D3 SERPL-MCNC: 57 NG/ML
25(OH)D3+25(OH)D2 SERPL-MCNC: 89 NG/ML

## 2017-11-21 ENCOUNTER — HOSPITAL ENCOUNTER (EMERGENCY)
Facility: HOSPITAL | Age: 47
Discharge: HOME | End: 2017-11-21
Attending: EMERGENCY MEDICINE
Payer: COMMERCIAL

## 2017-11-21 VITALS
SYSTOLIC BLOOD PRESSURE: 122 MMHG | OXYGEN SATURATION: 100 % | HEART RATE: 60 BPM | DIASTOLIC BLOOD PRESSURE: 74 MMHG | WEIGHT: 143.96 LBS | TEMPERATURE: 97.8 F | RESPIRATION RATE: 16 BRPM

## 2017-11-21 DIAGNOSIS — G43.909 MIGRAINE: Primary | ICD-10-CM

## 2017-11-21 PROCEDURE — 99283 EMERGENCY DEPT VISIT LOW MDM: CPT

## 2017-11-21 PROCEDURE — 96375 TX/PRO/DX INJ NEW DRUG ADDON: CPT

## 2017-11-21 PROCEDURE — 96365 THER/PROPH/DIAG IV INF INIT: CPT

## 2017-11-21 RX ORDER — PROMETHAZINE HYDROCHLORIDE 25 MG/ML
25 INJECTION, SOLUTION INTRAMUSCULAR; INTRAVENOUS ONCE
Status: COMPLETED | OUTPATIENT
Start: 2017-11-21 | End: 2017-11-21

## 2017-11-21 RX ORDER — KETOROLAC TROMETHAMINE 30 MG/ML
15 INJECTION, SOLUTION INTRAMUSCULAR; INTRAVENOUS ONCE
Status: COMPLETED | OUTPATIENT
Start: 2017-11-21 | End: 2017-11-21

## 2017-11-21 RX ORDER — DIPHENHYDRAMINE HCL 25 MG
25 TABLET ORAL EVERY 6 HOURS PRN
Qty: 20 TABLET | Refills: 0 | Status: SHIPPED | OUTPATIENT
Start: 2017-11-21 | End: 2019-07-28 | Stop reason: HOSPADM

## 2017-11-21 RX ORDER — DIPHENHYDRAMINE HYDROCHLORIDE 50 MG/ML
25 INJECTION INTRAMUSCULAR; INTRAVENOUS ONCE
Status: COMPLETED | OUTPATIENT
Start: 2017-11-21 | End: 2017-11-21

## 2017-11-21 RX ORDER — MAGNESIUM SULFATE HEPTAHYDRATE 40 MG/ML
2 INJECTION, SOLUTION INTRAVENOUS ONCE
Status: COMPLETED | OUTPATIENT
Start: 2017-11-21 | End: 2017-11-21

## 2017-11-21 RX ORDER — PROMETHAZINE HYDROCHLORIDE 25 MG/1
25 TABLET ORAL EVERY 6 HOURS PRN
Qty: 20 TABLET | Refills: 0 | Status: SHIPPED | OUTPATIENT
Start: 2017-11-21 | End: 2018-03-26

## 2017-11-21 RX ADMIN — MAGNESIUM SULFATE HEPTAHYDRATE 2 G: 40 INJECTION, SOLUTION INTRAVENOUS at 12:30

## 2017-11-21 RX ADMIN — SODIUM CHLORIDE 1000 ML: 0.9 INJECTION, SOLUTION INTRAVENOUS at 12:22

## 2017-11-21 RX ADMIN — PROMETHAZINE HYDROCHLORIDE 25 MG: 25 INJECTION INTRAMUSCULAR; INTRAVENOUS at 12:26

## 2017-11-21 RX ADMIN — KETOROLAC TROMETHAMINE 15 MG: 30 INJECTION, SOLUTION INTRAMUSCULAR at 12:29

## 2017-11-21 RX ADMIN — DIPHENHYDRAMINE HYDROCHLORIDE 25 MG: 50 INJECTION, SOLUTION INTRAMUSCULAR; INTRAVENOUS at 12:27

## 2017-11-21 NOTE — DISCHARGE INSTRUCTIONS
Migraine Headache   WHAT YOU SHOULD KNOW:   A migraine is a severe headache  The pain can be so severe that it interferes with your daily activities  A migraine can last a few hours up to several days  The exact cause of migraines is not known  It may be caused by changes in your body chemicals and extra sensitive nerves in your brain  AFTER YOU LEAVE:   Medicines:  Take medicine as soon as you feel a migraine begin  · Pain medicine: You may need medicine to take away or decrease pain  You may need a doctor's order for this medicine  Do not wait until the pain is severe before you take your medicine  · Migraine medicines: These are used to help prevent a migraine or stop it once it starts  · Antinausea medicine: This medicine may be given to calm your stomach and to help prevent vomiting  They can also help relieve pain  · Take your medicine as directed  Call your healthcare provider if you think your medicine is not helping or if you have side effects  Tell him if you are allergic to any medicine  Keep a list of the medicines, vitamins, and herbs you take  Include the amounts, and when and why you take them  Bring the list or the pill bottles to follow-up visits  Carry your medicine list with you in case of an emergency  Manage your symptoms:   · Rest:  Rest in a dark, quiet room  This will help decrease your pain  · Ice:  Ice helps decrease pain  Use an ice pack or put crushed ice in a plastic bag  Cover the ice pack with a towel and place it on your head where it hurts for 15 to 20 minutes every hour  · Heat:  Heat helps decrease pain and muscle spasms  Use a small towel dampened with warm water or a heating pad, or sit in a warm bath  Apply heat on the area for 20 to 30 minutes every 2 hours  You may alternate heat and ice  Keep a headache diary:  Write down when your migraines start and stop  Include your symptoms and what you were doing when a migraine began   Record what you ate or drank for 24 hours before the migraine started  Describe the pain and where it hurts  Keep track of what you did to treat your migraine and whether it worked  Follow up with your primary healthcare provider or neurologist as directed:  Bring your headache diary with you when you see your primary healthcare provider  Write down your questions so you remember to ask them during your visits  Prevent another migraine:   · Do not smoke: If you smoke, it is never too late to quit  Tobacco smoke can trigger a migraine  It can also cause heart disease, lung disease, cancer, and other health problems  Quitting smoking will improve your health and the health of those around you  If you smoke, ask for information about how to stop  · Do not drink alcohol:  Alcohol can trigger a migraine  It can also interfere with the medicines used to treat your migraine  · Get regular exercise:  Exercise may help prevent migraines  Talk to your primary healthcare provider about the best exercise plan for you  · Manage stress:  Stress may trigger a migraine  Learn new ways to relax, such as deep breathing  · Stick to a sleep schedule:  Go to bed and get up at the same time each day  · Eat regular meals:  Include healthy foods such as include fruit, vegetables, whole-grain breads, low-fat dairy products, beans, lean meat, and fish  Avoid trigger foods like chocolate, hard cheese, and red wine  Foods that contain gluten, nitrates, MSG, or artificial sweeteners may also trigger migraines  Caffeine, which is often used to treat migraines, can also trigger them  Contact your primary healthcare provider or neurologist if:   · You have a fever  · Your migraines interfere with your daily activities  · Your medicines or treatments stop working  · You have questions about your condition or care    Seek care immediately or call 911 if:   · You have a headache that seems different or much worse than your usual migraine headache  · You have a severe headache with a fever or a stiff neck  · You have new problems with speech, vision, balance, or movement  · You feel like you are going to faint, you become confused, or you have a seizure  © 2014 [address] is for End User's use only and may not be sold, redistributed or otherwise used for commercial purposes  All illustrations and images included in CareNotes® are the copyrighted property of A D A M , Inc  or Jose Luis Blanco  The above information is an  only  It is not intended as medical advice for individual conditions or treatments  Talk to your doctor, nurse or pharmacist before following any medical regimen to see if it is safe and effective for you  Migraine Headache, Ambulatory Care   Minerva T: Migraine  In: Cory FJ, ed  The 5-Minute Clinical Consult 2012, 20th ed  [address] [facility] [place], [place], 2012  Aislinn ROSA, Ramy Thornton RB et al[de-identified] Premonitory symptoms in migraine: an electronic diary study    Neurology, 2003; 60:935-40  Nichole Song RS, Donita RM et al (eds):: Trent's Emergency Medicine: Concepts and Clinical Practice, 5th ed  [place], [place], [place] Kash Jeffers; , 2002  Medline Plus Health Information[de-identified] Classic migraine    January 17, 2002  [facility]  Available at: BroadcastRealtime com ee, (cited 12/5/03)  National Guideline Clearinghouse [facility]):: Migraine headache    [place] [address]; July 2002  Available at: Reed martinez, (cited 11/17/03)  Automatic Data of Neurological Disorders and Stroke: : Headache -- hope through research    July 2001  James ([facility])  Available at: [email or web address]/, (cited 11/17/03)  Uday Kent ET (eds):: Samson's Current Therapy, 55 ed  Indira Simmons, 2003    Gini PACKER et al[de-identified] Pharmacologic management of acute attacks of migraine and prevention of migraine headache    Annals of Internal Medicine , 2002; 137(10): A0417704  © 2014 [address] is for End User's use only and may not be sold, redistributed or otherwise used for commercial purposes  All illustrations and images included in CareNotes® are the copyrighted property of A D A M , Inc  or Jose Luis Blanco  The above information is an  only  It is not intended as medical advice for individual conditions or treatments  Talk to your doctor, nurse or pharmacist before following any medical regimen to see if it is safe and effective for you

## 2017-11-21 NOTE — ED PROVIDER NOTES
History  Chief Complaint   Patient presents with    Headache - Recurrent or Known Dx Migraines     c/o frontal migraine with photosensitive and nausea x 3 days  pt stated "I took everything and my migraine medicine and it wont go away"  History provided by:  Patient and spouse  Headache - Recurrent or Known Dx Migraines   Pain location:  Frontal  Quality:  Dull  Radiates to:  Does not radiate  Severity currently:  7/10  Severity at highest:  9/10  Onset quality:  Gradual  Duration:  3 days  Timing:  Constant  Progression:  Worsening  Chronicity:  Recurrent  Similar to prior headaches: yes    Context: bright light    Context: not straining    Relieved by:  Nothing  Worsened by: Activity, light and sound  Ineffective treatments:  NSAIDs and prescription medications  Associated symptoms: no abdominal pain, no back pain, no blurred vision, no congestion, no cough, no diarrhea, no dizziness, no eye pain, no fever, no focal weakness, no nausea, no neck pain, no neck stiffness, no numbness, no sinus pressure, no sore throat, no vomiting and no weakness        Prior to Admission Medications   Prescriptions Last Dose Informant Patient Reported? Taking? ALPRAZolam (XANAX) 1 mg tablet   Yes No   Sig: Take 1 mg by mouth daily at bedtime as needed for anxiety  ARIPiprazole (ABILIFY) 10 mg tablet   Yes No   Sig: Take 50 mg by mouth daily  SUMAtriptan (IMITREX) 100 mg tablet   Yes No   Sig: Take 100 mg by mouth once as needed for migraine   SUMAtriptan-naproxen (TREXIMET)  MG per tablet   Yes No   Sig: Take 1 tablet by mouth every 2 (two) hours as needed for migraine   cyclobenzaprine (FLEXERIL) 10 mg tablet   No No   Sig: Take 1 tablet by mouth 3 (three) times a day for 10 days   gabapentin (NEURONTIN) 300 mg capsule   Yes No   Sig: Take 300 mg by mouth 4 (four) times a day  levocetirizine (XYZAL) 5 MG tablet   Yes No   Sig: Take 5 mg by mouth every evening     ondansetron (ZOFRAN) 4 mg tablet   No No Sig: Take 1 tablet (4 mg total) by mouth every 8 (eight) hours as needed for nausea or vomiting  ondansetron (ZOFRAN) 4 mg tablet   No No   Sig: Take 1 tablet by mouth every 6 (six) hours for 7 days   oxyCODONE-acetaminophen (PERCOCET) 5-325 mg per tablet   No No   Sig: Take 1 tablet by mouth every 4 (four) hours as needed for moderate pain  Max Daily Amount: 6 tablets   venlafaxine (EFFEXOR) 37 5 mg tablet   Yes No   Sig: Take 37 5 mg by mouth daily  Facility-Administered Medications: None       Past Medical History:   Diagnosis Date    Asthma     depression     Migraine        Past Surgical History:   Procedure Laterality Date    LAPAROSCOPIC ENDOMETRIOSIS FULGURATION         History reviewed  No pertinent family history  I have reviewed and agree with the history as documented  Social History   Substance Use Topics    Smoking status: Never Smoker    Smokeless tobacco: Never Used    Alcohol use No        Review of Systems   Constitutional: Negative for activity change, chills, diaphoresis and fever  HENT: Negative for congestion, sinus pressure and sore throat  Eyes: Negative for blurred vision, pain and visual disturbance  Respiratory: Negative for cough, chest tightness, shortness of breath, wheezing and stridor  Cardiovascular: Negative for chest pain and palpitations  Gastrointestinal: Negative for abdominal distention, abdominal pain, constipation, diarrhea, nausea and vomiting  Genitourinary: Negative for dysuria and frequency  Musculoskeletal: Negative for back pain, neck pain and neck stiffness  Skin: Negative for rash  Neurological: Negative for dizziness, focal weakness, speech difficulty, weakness, light-headedness, numbness and headaches         Physical Exam  ED Triage Vitals [11/21/17 1150]   Temperature Pulse Respirations Blood Pressure SpO2   97 8 °F (36 6 °C) 65 17 120/66 100 %      Temp Source Heart Rate Source Patient Position - Orthostatic VS BP Location FiO2 (%)   Oral Monitor Sitting Right arm --      Pain Score       7           Orthostatic Vital Signs  Vitals:    11/21/17 1150 11/21/17 1200 11/21/17 1245   BP: 120/66 120/66    Pulse: 65 66 68   Patient Position - Orthostatic VS: Sitting         Physical Exam   Constitutional: She is oriented to person, place, and time  She appears well-developed  No distress  HENT:   Head: Normocephalic and atraumatic  Eyes: Pupils are equal, round, and reactive to light  Neck: Normal range of motion  Neck supple  No tracheal deviation present  Cardiovascular: Normal rate, regular rhythm, normal heart sounds and intact distal pulses  No murmur heard  Pulmonary/Chest: Effort normal and breath sounds normal  No stridor  No respiratory distress  Abdominal: Soft  She exhibits no distension  There is no tenderness  There is no rebound and no guarding  Musculoskeletal: Normal range of motion  Neurological: She is alert and oriented to person, place, and time  Skin: Skin is warm and dry  She is not diaphoretic  No erythema  No pallor  Psychiatric: She has a normal mood and affect  Vitals reviewed        ED Medications  Medications   sodium chloride 0 9 % bolus 1,000 mL (0 mL Intravenous Stopped 11/21/17 1316)   ketorolac (TORADOL) 30 mg/mL injection 15 mg (15 mg Intravenous Given 11/21/17 1229)   magnesium sulfate 2 g/50 mL IVPB (premix) 2 g (0 g Intravenous Stopped 11/21/17 1316)   promethazine (PHENERGAN) injection 25 mg (25 mg Intravenous Given 11/21/17 1226)   diphenhydrAMINE (BENADRYL) injection 25 mg (25 mg Intravenous Given 11/21/17 1227)       Diagnostic Studies  Results Reviewed     None                 No orders to display              Procedures  Procedures       Phone Contacts  ED Phone Contact    ED Course  ED Course                                MDM  Number of Diagnoses or Management Options  Migraine: new and requires workup     Amount and/or Complexity of Data Reviewed  Decide to obtain previous medical records or to obtain history from someone other than the patient: yes  Obtain history from someone other than the patient: yes  Review and summarize past medical records: yes      CritCare Time    Disposition  Final diagnoses:   Migraine     Time reflects when diagnosis was documented in both MDM as applicable and the Disposition within this note     Time User Action Codes Description Comment    11/21/2017  1:39 PM Allyson Warren Add [G43 909] Migraine       ED Disposition     ED Disposition Condition Comment    Discharge  Darien Yates discharge to home/self care  Condition at discharge: Good        Follow-up Information    None       Patient's Medications   Discharge Prescriptions    DIPHENHYDRAMINE (BENADRYL) 25 MG TABLET    Take 1 tablet by mouth every 6 (six) hours as needed (for headache, take with phenergan)       Start Date: 11/21/2017End Date: --       Order Dose: 25 mg       Quantity: 20 tablet    Refills: 0    PROMETHAZINE (PHENERGAN) 25 MG TABLET    Take 1 tablet by mouth every 6 (six) hours as needed (headache)       Start Date: 11/21/2017End Date: --       Order Dose: 25 mg       Quantity: 20 tablet    Refills: 0     No discharge procedures on file      ED Provider  Electronically Signed by           Kalli Landeros DO  11/21/17 4980

## 2017-12-11 ENCOUNTER — GENERIC CONVERSION - ENCOUNTER (OUTPATIENT)
Dept: OTHER | Facility: OTHER | Age: 47
End: 2017-12-11

## 2017-12-12 ENCOUNTER — GENERIC CONVERSION - ENCOUNTER (OUTPATIENT)
Dept: OTHER | Facility: OTHER | Age: 47
End: 2017-12-12

## 2018-01-09 NOTE — MISCELLANEOUS
Message   Recorded as Task   Date: 06/13/2016 10:05 AM, Created By: Lebron Christianson   Task Name: Medical Complaint Callback   Assigned To: Hiram Melara   Regarding Patient: Michaelyn Phalen, Status: In Progress   Comment:    Olena Herron - 13 Jun 2016 10:05 AM     TASK CREATED  Caller: Self; [phone] (Home)  pt has yeast inf  from medication she was taking please [facility] 10:31 AM     TASK IN PROGRESS   Kate Calabrese - 13 Jun 2016 10:31 AM     TASK IN PROGRESS   Bula Gaucher - 13 Jun 2016 10:34 AM     TASK EDITED  pt just finished augmentin, is having [place] itching, very little discharge, white discharge very little  RX sent [facility] on [address]  Active Problems    1  Akathisia (781 0)   2  Amenorrhea (626 0) (N91 2)   3  History of Chondromalacia of patella, unspecified laterality (717 7) (M22 40)   4  Chronic migraine (346 70) (G43 709)   5  Contraceptives (V25 02)   6  Counseling for birth control, intrauterine device (V25 09) (Z30 09)   7  Dysmenorrhea (625 3) (N94 6)   8  Encounter for IUD insertion (V25 11) (Z30 430)   9  Encounter for routine gynecological examination (V72 31) (Z01 419)   10  Encounter for screening mammogram for malignant neoplasm of breast (V76 12)    (Z12 31)   11  KYLE (generalized anxiety disorder) (300 02) (F41 1)   12  Impulse control disorder (312 30) (F63 9)   13  Insomnia (780 52) (G47 00)   14  Intractable chronic migraine without aura and without status migrainosus (346 71)    (G43 719)   15  Lumbar radiculopathy (724 4) (M54 16)   16  Major depressive disorder, recurrent severe without psychotic features (296 33) (F33 2)   17  Migraine headache (346 90) (G43 909)   18  Myofascial pain syndrome (729 1) (M79 1)   19  Panic disorder without agoraphobia (300 01) (F41 0)   20  Piriformis syndrome, unspecified laterality (355 0) (G57 00)   21   Routine Gynecological Exam With Cervical Pap Smear (V72 31)   22  Sacroiliitis (720 2) (M46 1)   23  Sciatica (724 3) (M54 30)   24  Screening for HPV (human papillomavirus) (V73 81) (Z11 51)   25  Spondylosis of lumbar region without myelopathy or radiculopathy (721 3) (M47 816)   26  Vaginal yeast infection (112 1) (B37 3)    Current Meds   1  ALPRAZolam 1 MG Oral Tablet; Take 1/2 to 1 tablet 3 tmes daily as needed for anxiety; Therapy: 05ZGJ2619 to (Evaluate:92Cmx4235); Last Rx:26May2016 Ordered   2  ARIPiprazole 30 MG Oral Tablet (Abilify); TAKE 1 TABLET AT BEDTIME; Therapy: 56LYA3963 to (Evaluate:23Sep2016)  Requested for: 47DSM2065; Last   Rx:26May2016 Ordered   3  Botox 100 UNIT Injection Solution Reconstituted; To be injected by physician as   indicated; Therapy: 84PWY7229 to (Last QA:40EWH1593) Ordered   4  Gabapentin 300 MG Oral Capsule; take 2 tab in the morning 1 tab in the afternoon and 2   tabs at night x 5 days then 2 tabs tid; Therapy: 78WNJ5706 to (Evaluate:55Utb7884)  Requested for: 30HGT5469; Last   Rx:03Mar2016 Ordered   5  Ibuprofen 800 MG Oral Tablet; Take 1 tablet 3 times daily as needed; Therapy: 66JIU2962 to (Evaluate:11Mar2017)  Requested for: 34AAG3119; Last   Rx:16Mar2016 Ordered   6  MedroxyPROGESTERone Acetate 150 MG/ML Intramuscular Suspension   (Depo-Provera); INJECT 1 ML INTRAMUSCULARLY ONCE EVERY 3   MONTHS; Therapy: 94Dyn7487 to (Jennie Rios)  Requested for: 49WFW5609; Last   Rx:22Jan2015 Ordered   7  MedroxyPROGESTERone Acetate 150 MG/ML Intramuscular Suspension; inject   intramuscularly every 12 weeks as directed; Therapy: 29KQS7685 to (Evaluate:17Jan2016)  Requested for: 63LNU2461; Last   Rx:22Jan2015 Ordered   8  Naproxen 500 MG Oral Tablet; TAKE 1 TABLET AS NEEDED PRN PAIN  (TAKE WITH   IMITREX); Therapy: 87LXY2755 to (Tom Record)  Requested for: 66IYQ4336; Last   Rx:14Jan2015 Ordered   9  OLANZapine 5 MG Oral Tablet; TAKE 1 TABLET AT BEDTIME;    Therapy: 11TME6819 to (Evaluate:20Apr2016) Requested for: 21Mar2016; Last   Rx:21Mar2016 Ordered   10  OLANZapine 7 5 MG Oral Tablet; TAKE 1 TABLET DAILY; Therapy: 52NPR1794 to (Saúllilo Osei)  Requested for: 91SIC7145; Last    Rx:03Mar2016 Ordered   11  Omeprazole 20 MG Oral Capsule Delayed Release; Therapy: 19BRU4463 to (Last Rx:28Jan2011)  Requested for: 46NRX3132 Ordered   12  ProAir  (90 Base) MCG/ACT Inhalation Aerosol Solution; Therapy: 08IPR9416 to (Last [place])  Requested for: 27Apr2011 Ordered   13  Relpax 40 MG Oral Tablet; TAKE 1 TABLET AT ONSET OF MIGRAINE  MAY REPEAT IN     2 HOURS  MAX 2 DOSES/24 HOURS, NO MORE THAN 3 DAYS PER WEEK  9    TABS/MONTHLY; Therapy: 56POJ5045 to (Evaluate:42Qng4437)  Requested for: 41HUS6123; Last    Rx:11Mar2016 Ordered   14  Sertraline HCl - 100 MG Oral Tablet; TAKE 1 AND 1/2 TABLETS DAILY; Therapy: 09DAU2053 to (Evaluate:16Fxm8450)  Requested for: 18CSM7887; Last    Rx:26May2016 Ordered   15  Sulfamethoxazole-Trimethoprim 800-160 MG Oral Tablet (Bactrim DS); Take 1 tablet    twice daily; Therapy: 15SNE5035 to (Kalin Jara)  Requested for: 98QKU5572; Last    Rx:16Mar2016 Ordered   16  SUMAtriptan Succinate 100 MG Oral Tablet (Imitrex); TAKE ONE (1) TABLET(S)at onset    of migraine MAY REPEAT ONCE AFTER 2 HOURS  MAX 2 DOSES IN 24HOURS; Therapy: 60NMM8734 to (Evaluate:11Jun2016)  Requested for: 35PGY2491; Last    Rx:58Rzk5768 Ordered   17  Symbicort 160-4 5 MCG/ACT Inhalation Aerosol; Therapy: 31KQK5339 to (Last [place])  Requested for: 27Apr2011 Ordered   18  Topiramate 25 MG Oral Tablet; 1 tab qhs x 5 days, then 2 tabs qhs x 5 days, then 3    tablets qhs x 5 days, then 4 tab qhs;    Therapy: 87VGZ8435 to (Evaluate:56Fjn5830)  Requested for: 63CKF6733; Last    Rx:26Jan2016 Ordered   19  TraMADol HCl - 50 MG Oral Tablet; Therapy: 21Apr2016 to Recorded   20   Venlafaxine HCl  MG Oral Capsule Extended Release 24 Hour (Effexor XR);    TAKE 2 CAPSULES DAILY; Therapy: 97VCP1931 to (Evaluate:75Kin8723)  Requested for: 76CUA8997; Last    Rx:35Ncg6522 Ordered   21  Vitamin B-2 100 MG Oral Tablet; Take two in am daily; Therapy: 85ROQ8881 to (Last WW:60QMH7053)  Requested for: 28Oct2013 Ordered   22  Xopenex 1 25 MG/3ML Inhalation Nebulization Solution (Levalbuterol HCl); Therapy: 68OTU6418 to (Last Rx:08Oct2010)  Requested for: 74UML6670 Ordered   23  Zolpidem Tartrate 10 MG Oral Tablet; TAKE 1 TABLET AT BEDTIME AS NEEDED; Therapy: 62NAM0986 to (Evaluate:38Kkk0521); Last Rx:58Rzm3066 Ordered    Allergies    1  Erythromycin TABS   2  Diflucan TABS   3  Zithromax TABS    4   Shellfish    Plan  Vaginal yeast infection    · Terconazole 0 4 % Vaginal Cream; INSERT 1 APPLICATORFUL  INTRAVAGINALLY AT BEDTIME    Signatures   Electronically signed by : Avel Favre, LPN; Jun 13 4437 87:56BB EST                       (Author)

## 2018-01-10 NOTE — MISCELLANEOUS
Message   Recorded as Task   Date: 01/30/2017 02:03 PM, Created By: Markos Fernando   Task Name: Medical Complaint Callback   Assigned To: Jo Dupree   Regarding Patient: Lyle Montanez, Status: In Progress   Comment:    Jovanna Barrett - 30 Jan 2017 2:03 PM     TASK CREATED  Caller: Self; Medical Complaint; [phone] (Home)  Pt called w very bad R groin pain  Pt states she had mirena inserted on 04/20/16  Pt made appt w [facility] for 7:20 tomorrow morning (01/31)  Pt would like to speak w a nurse  Pt is @ [address] Jan 2017 2:19 PM     TASK IN PROGRESS   Kapil Ramirez - 30 Jan 2017 2:24 PM     TASK EDITED  Pt has severe groin pain - 7 of 10  Began about 6 last night and she was up all night with it  No lump - nothing there - but deep pain  She is seing you tomorrow AM - I told her if it is that bad - ED  She wanted your advice  Rebecca Montano - 30 Jan 2017 2:29 PM     TASK REPLIED TO: Previously Assigned To Rebecca Montano  this is not related to her IUD  I can see her tomorrow  Will likely order an ultrasound to rule out GYN cause  Kapil Ramirez - 30 Jan 2017 2:33 PM     TASK EDITED  Not sure what pt will do   ED or ov? Active Problems    1  Akathisia (781 0)   2  Amenorrhea (626 0) (N91 2)   3  History of Chondromalacia of patella, unspecified laterality (717 7) (M22 40)   4  Chronic migraine (346 70) (G43 709)   5  Contraceptives (V25 02)   6  Dysmenorrhea (625 3) (N94 6)   7  Encounter for IUD insertion (V25 11) (Z30 430)   8  Encounter for routine gynecological examination (V72 31) (Z01 419)   9  Encounter for screening mammogram for malignant neoplasm of breast (V76 12)   (Z12 31)   10  KYLE (generalized anxiety disorder) (300 02) (F41 1)   11  Impulse control disorder (312 30) (F63 9)   12  Insomnia (780 52) (G47 00)   13  Intractable chronic migraine without aura and without status migrainosus (346 71)    (G43 719)   14   Lumbar radiculopathy (724 4) (M54 16)   15  Major depressive disorder, recurrent severe without psychotic features (296 33) (F33 2)   16  Medication overuse headache (339 3) (G44 40)   17  Migraine, unspecified, not intractable, without status migrainosus (346 90) (G43 909)   18  Myofascial pain syndrome (729 1) (M79 1)   19  Neck pain on left side (723 1) (M54 2)   20  Panic disorder without agoraphobia (300 01) (F41 0)   21  Piriformis syndrome, unspecified laterality (355 0) (G57 00)   22  Sacroiliitis (720 2) (M46 1)   23  Sciatica (724 3) (M54 30)   24  Spondylosis of lumbar region without myelopathy or radiculopathy (721 3) (M47 816)    Current Meds   1  ALPRAZolam 1 MG Oral Tablet; TAKE 1 TABLET 3 TIMES DAILY AS NEEDED; Therapy: 56FLK5457 to (Evaluate:12Jun2017); Last Rx:16Jan2017 Ordered   2  ARIPiprazole 30 MG Oral Tablet (Abilify); TAKE 1 TABLET AT BEDTIME; Therapy: 60IWE3298 to (Evaluate:24Xtb8590)  Requested for: 06DDZ5775; Last   Rx:16Jan2017 Ordered   3  Botox 100 UNIT Injection Solution Reconstituted; To be injected by physician as   indicated; Therapy: 20HRT8561 to (Last MA:48TGY5527) Ordered   4  Dexamethasone 2 MG Oral Tablet; 1 daily x 5 days; Therapy: 17PLG1287 to (Last Rx:10Oct2016)  Requested for: 44LXA6080 Ordered   5  Gabapentin 300 MG Oral Capsule; 2 CAPS IN THE MORNING 1 TAB IN THE   AFTERNOON AND 2 CAPS AT NIGHT X 5 DAYS THEN 2CAPS 3 TIMES A DAY; Therapy: 90VWG9175 to (Evaluate:30Apr2017)  Requested for: 49MJY5880; Last   Rx:01Nov2016 Ordered   6  Omeprazole 20 MG Oral Capsule Delayed Release; Therapy: 23CNA3961 to (Last Rx:28Jan2011)  Requested for: 82OYS5735 Ordered   7  Ondansetron HCl - 4 MG Oral Tablet; 1 tab q8h prn nausea; Therapy: 38HXT2588 to (Last Rx:13Oct2016)  Requested for: 13Oct2016 Ordered   8  ProAir  (90 Base) MCG/ACT Inhalation Aerosol Solution; Therapy: 51IIR0989 to (Last [facility])  Requested for: 27Apr2011 Ordered   9   Sertraline HCl - 100 MG Oral Tablet; TAKE 2 TABLETS DAILY; Therapy: 28EGL3187 to (Evaluate:16May2017)  Requested for: 48LJV5793; Last   Rx:16Jan2017 Ordered   10  SUMAtriptan Succinate 100 MG Oral Tablet (Imitrex); TAKE ONE (1) TABLET(S)at onset    of migraine MAY REPEAT ONCE AFTER 2 HOURS  MAX 2 DOSES IN 24HOURS; Therapy: 20ZUK7400 to (Evaluate:11Mar2017)  Requested for: 50RBG5940; Last    Rx:16Jan2017 Ordered   11  Symbicort 160-4 5 MCG/ACT Inhalation Aerosol; Therapy: 03GCZ0296 to (Last [place] Chalk)  Requested for: 06Wea2618 Ordered   12  Topiramate 100 MG Oral Tablet; 1 BID  Requested for: 06YKD8874; Last Rx:21Oct2016    Ordered   13  Topiramate 50 MG Oral Tablet; 1 tab qhs with 100mg tab x 7 days then increase to 1 tab    bid with 100mg tab if needed; Therapy: 26BFV8474 to (Evaluate:31Mar2017)  Requested for: 38SWG0639; Last    Rx:12Wfo4762 Ordered   14  Venlafaxine HCl  MG Oral Capsule Extended Release 24 Hour (Effexor XR);    TAKE 2 CAPSULES DAILY; Therapy: 00ZTA3698 to (Evaluate:16May2017)  Requested for: 16LAD3250; Last    Rx:16Jan2017 Ordered   15  Vitamin B-2 100 MG Oral Tablet; Take two in am daily; Therapy: 89KOA1622 to (Last WC:37RHL6422)  Requested for: 28Oct2013 Ordered   16  Xopenex 1 25 MG/3ML Inhalation Nebulization Solution (Levalbuterol HCl); Therapy: 25NAF5256 to (Last Rx:08Oct2010)  Requested for: 43DWB4532 Ordered   17  Zolpidem Tartrate 10 MG Oral Tablet; TAKE 1 TABLET AT BEDTIME AS NEEDED; Therapy: 45YJP3146 to (Evaluate:12Jun2017); Last Rx:16Jan2017 Ordered    Allergies    1  Erythromycin TABS   2  Diflucan TABS   3  Zithromax TABS    4  Shellfish    Signatures   Electronically signed by :  Paige Llanos, ; Jan 30 2017  2:33PM EST                       (Author)
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
## 2018-01-10 NOTE — MISCELLANEOUS
Provider Comments  Provider Comments:   1st no show in the last year for neurology departement  No call or message was received  Maranda Da Silva, called and left a message for the patient asking her to return our call so that we can get her rescheduled  I will send out 1st no show letter to the address on file        Signatures   Electronically signed by : Tobi Galarza, [facility]; Jun 7 2016  9:42AM EST                       (Author)    Electronically signed by : Edilberto Singh MD; Jun 7 2016  9:58AM EST                       (Author)

## 2018-01-10 NOTE — MISCELLANEOUS
Message   Recorded as Task   Date: 04/21/2016 08:31 AM, Created By: Panchito Hurtado   Task Name: Follow Up   Assigned To: Panchito Gallagher   Regarding Patient: Avel Guerrero, Status: In Progress   Ajit Devon - 21 Apr 2016 8:31 AM     TASK CREATED  Caller: Self; [phone] (Home); [phone] x,,,,, (Work)  got mirena yesterday,been taking 800 mg ibuprophen but still having bad cramping asking for something stronger to be called into her pharm her # [phone] - 21 Apr 2016 8:32 AM     TASK REASSIGNED: Previously Assigned To Zandra Sparks - 21 Apr 2016 8:48 AM     TASK [address],[facility]  [address] - 21 Apr 2016 8:50 AM     TASK EDITED  Pt said she has severe cramps  Unable to sleep last night  Wants something stronger for pain  Rebecca Rogel - 21 Apr 2016 9:18 AM     TASK REPLIED TO: Previously Assigned To Rebecca Montano  when was she seen last by pain management? She has had med-seeking behavior in the past and this concerns me  I am not able to prescribe narcotics to her  I can give her 10 tramadol 50mg to use one po q 6 hours if she wants it  Princess Cabrera - 21 Apr 2016 9:51 AM     TASK EDITED  lmtcb   Princess Cabrera - 21 Apr 2016 10:58 AM     TASK EDITED  Pt wants the tramadol  She never went to pain management  I called in rx to [facility]  Tramadol 50 mg, #10, sig 1 po q6hrs, prn, pain  No refills        Active Problems    1  Akathisia (781 0)   2  Amenorrhea (626 0) (N91 2)   3  History of Chondromalacia of patella, unspecified laterality (717 7) (M22 40)   4  Chronic migraine (346 70) (G43 709)   5  Contraceptives (V25 02)   6  Counseling for birth control, intrauterine device (V25 09) (Z30 09)   7  Dysmenorrhea (625 3) (N94 6)   8  Encounter for IUD insertion (V25 11) (Z30 430)   9  Encounter for routine gynecological examination (V72 31) (Z01 419)   10   Encounter for screening mammogram for malignant neoplasm of breast (V76 12)    (Z12 31)   11  KYLE (generalized anxiety disorder) (300 02) (F41 1)   12  Impulse control disorder (312 30) (F63 9)   13  Insomnia (780 52) (G47 00)   14  Intractable chronic migraine without aura and without status migrainosus (346 71)    (G43 719)   15  Lumbar radiculopathy (724 4) (M54 16)   16  Major depressive disorder, recurrent severe without psychotic features (296 33) (F33 2)   17  Migraine headache (346 90) (G43 909)   18  Myofascial pain syndrome (729 1) (M79 1)   19  Panic disorder without agoraphobia (300 01) (F41 0)   20  Piriformis syndrome, unspecified laterality (355 0) (G57 00)   21  Routine Gynecological Exam With Cervical Pap Smear (V72 31)   22  Sacroiliitis (720 2) (M46 1)   23  Sciatica (724 3) (M54 30)   24  Screening for HPV (human papillomavirus) (V73 81) (Z11 51)   25  Spondylosis of lumbar region without myelopathy or radiculopathy (721 3) (M47 816)    Current Meds   1  ALPRAZolam 1 MG Oral Tablet; Take 1/2 to 1 tablet 3 tmes daily as needed for anxiety; Therapy: 08OUD4266 to (Evaluate:09Jun2016); Last Rx:10Feb2016 Ordered   2  ARIPiprazole 30 MG Oral Tablet (Abilify); TAKE 1 TABLET AT BEDTIME; Therapy: 83JFR0455 to (Evaluate:09Jun2016)  Requested for: 07KSZ5461; Last   Rx:10Feb2016 Ordered   3  Gabapentin 300 MG Oral Capsule; take 2 tab in the morning 1 tab in the afternoon and 2   tabs at night x 5 days then 2 tabs tid; Therapy: 65HMC6074 to (Evaluate:27Wck9699)  Requested for: 56MNI2723; Last   Rx:03Mar2016 Ordered   4  Ibuprofen 800 MG Oral Tablet; Take 1 tablet 3 times daily as needed; Therapy: 48GHV1746 to (Evaluate:11Mar2017)  Requested for: 48THH7454; Last   Rx:16Mar2016 Ordered   5  MedroxyPROGESTERone Acetate 150 MG/ML Intramuscular Suspension   (Depo-Provera); INJECT 1 ML INTRAMUSCULARLY ONCE EVERY 3   MONTHS; Therapy: 18Apr2012 to (Bess Vang)  Requested for: 20LHR2223; Last   Rx:22Jan2015 Ordered   6   MedroxyPROGESTERone Acetate 150 MG/ML Intramuscular Suspension; inject   intramuscularly every 12 weeks as directed; Therapy: 10WAJ1388 to (Evaluate:17Jan2016)  Requested for: 86QKC1606; Last   Rx:22Jan2015 Ordered   7  Naproxen 500 MG Oral Tablet; TAKE 1 TABLET AS NEEDED PRN PAIN  (TAKE WITH   IMITREX); Therapy: 85PFV3514 to (Patti Jones)  Requested for: 31RYV4595; Last   Rx:14Jan2015 Ordered   8  OLANZapine 5 MG Oral Tablet; TAKE 1 TABLET AT BEDTIME; Therapy: 42GGS2523 to (Evaluate:20Apr2016)  Requested for: 21Mar2016; Last   Rx:21Mar2016 Ordered   9  OLANZapine 7 5 MG Oral Tablet; TAKE 1 TABLET DAILY; Therapy: 18ZSX6302 to (Milagros Deleon)  Requested for: 83KEW4676; Last   Rx:03Mar2016 Ordered   10  Omeprazole 20 MG Oral Capsule Delayed Release; Therapy: 51ZPM7809 to (Last Rx:28Jan2011)  Requested for: 80VOX1474 Ordered   11  ProAir  (90 Base) MCG/ACT Inhalation Aerosol Solution; Therapy: 23ETF8409 to (Last Shakira Grumbling)  Requested for: 27Apr2011 Ordered   12  Relpax 40 MG Oral Tablet; TAKE 1 TABLET AT ONSET OF MIGRAINE  MAY REPEAT IN     2 HOURS  MAX 2 DOSES/24 HOURS, NO MORE THAN 3 DAYS PER WEEK  9    TABS/MONTHLY; Therapy: 63BLL5996 to (Evaluate:10Hhd0862)  Requested for: 86GQR1911; Last    Rx:11Mar2016 Ordered   13  Sertraline HCl - 100 MG Oral Tablet; TAKE 1 TABLET DAILY; Therapy: 83GYL6180 to (Evaluate:29Jun2016)  Requested for: 09AHL4779; Last    Rx:01Mar2016 Ordered   14  Sulfamethoxazole-Trimethoprim 800-160 MG Oral Tablet (Bactrim DS); Take 1 tablet    twice daily; Therapy: 47JUW8655 to (Clearance Armida)  Requested for: 67YOO0843; Last    Rx:16Mar2016 Ordered   15  SUMAtriptan Succinate 100 MG Oral Tablet (Imitrex); TAKE ONE (1) TABLET(S)at onset    of migraine MAY REPEAT ONCE AFTER 2 HOURS  MAX 2 DOSES IN 24HOURS; Therapy: 82VGC5492 to (Evaluate:11Jun2016)  Requested for: 05JPK5231; Last    Rx:19Snq0059 Ordered   16  Symbicort 160-4 5 MCG/ACT Inhalation Aerosol;     Therapy: 27Apr2011 to (Last Grecia Dietrich)  Requested for: 00SFY8779 Ordered   17  Topiramate 25 MG Oral Tablet; 1 tab qhs x 5 days, then 2 tabs qhs x 5 days, then 3    tablets qhs x 5 days, then 4 tab qhs;    Therapy: 93DNP7015 to (Evaluate:34Zlu3767)  Requested for: 62BNE8105; Last    Rx:26Jan2016 Ordered   18  Venlafaxine HCl  MG Oral Capsule Extended Release 24 Hour (Effexor XR);    TAKE 2 CAPSULES DAILY; Therapy: 90Jtk0049 to (Evaluate:09Jun2016)  Requested for: 26MBQ8022; Last    Rx:22Aoc8729 Ordered   19  Vitamin B-2 100 MG Oral Tablet; Take two in am daily; Therapy: 03BUT3948 to (Last SI:26NOO4980)  Requested for: 28Oct2013 Ordered   20  Xopenex 1 25 MG/3ML Inhalation Nebulization Solution (Levalbuterol HCl); Therapy: 20STS0859 to (Last Rx:08Oct2010)  Requested for: 69WXS5238 Ordered   21  Zolpidem Tartrate 10 MG Oral Tablet; TAKE 1 TABLET AT BEDTIME AS NEEDED; Therapy: 14LBJ9398 to (Evaluate:09Jun2016); Last Rx:11Pgs9838 Ordered    Allergies    1  Erythromycin TABS   2  Diflucan TABS   3  Zithromax TABS    4  Shellfish    Signatures   Electronically signed by :  Katy Kingsley, ; Apr 21 2016 11:00AM EST                       (Author)

## 2018-01-11 NOTE — MISCELLANEOUS
Message   Recorded as Task   Date: 10/02/2017 12:16 PM, Created By: Nadege Bowman   Task Name: Miscellaneous   Assigned To: Rufino Castellanos clinical,Team   Regarding Patient: March Search, Status: Active   Comment:    Vivian Lima - 02 Oct 2017 12:16 PM     TASK CREATED  Pt called stating Dr Ayana Solis was going to prescribe a new medication but it was not at the pharmacy (pt uses [facility] on file in [employer])  Pt did not know the name of the medication  She said it starts with a "d "  Pt can be reached at [phone]  Diana 26 Oct 2017 12:28 PM     TASK EDITED  Did you discuss prescribing a new med at pt's inj last Friday  Rice Lines - 04 Oct 2017 2:30 PM     TASK REPLIED TO: Previously Assigned To Xplenty Samm  i'm sorry i'm totally blanking on this   i honestly don't remember discussing a medication with her  can you ask her if it was for muscles spasms or inflammation? Diana 26 Oct 2017 2:47 PM     TASK IN PROGRESS   Clair Gonzalez - 02 Oct 2017 2:59 PM     TASK EDITED  S/W pt she said Dr Ayana Solis was going to prescribe something for the pain she was having all over  She doesn't recall if you said it would be an anti-inflammatory or a muscle relaxer  She thought it started with a "D" When I said diclofenac she thought that might be it  She said she had told you she had tried Tylenol, Aleve and Ibuprofen and non of them had worked  She was having sharp shooting pain going up her Rt buttocks today  [facility] uses [facility], they will text her once RX is ready  I told pt if diclofenac is ordered then she should not take any OTC Aleve, Ibuprofen, Motrin or Advil while on the diclofenac  Pt asked if it was normal to still have pain even though she had the inj Friday  I told pt it takes 3-4 days for the steroid to start to work and up to 2 wks to see full effect  Told pt she will receive a procedure f/u call 7 days after the inj     Rice Lines - 56 Oct 2017 3:44 PM TASK REPLIED TO: Previously Assigned To Loidaadelia Mccurdyroe  yes that's it!  e-rx sent        Active Problems    1  Akathisia (781 0)   2  Amenorrhea (626 0) (N91 2)   3  Asthma, persistent (493 90) (J45 909)   4  History of Chondromalacia of patella, unspecified laterality (717 7) (M22 40)   5  Chronic fatigue (780 79) (R53 82)   6  Chronic GERD (530 81) (K21 9)   7  Chronic migraine without aura (346 70) (G43 709)   8  Dysmenorrhea (625 3) (N94 6)   9  Encounter for IUD insertion (V25 11) (Z30 430)   10  Encounter for routine gynecological examination (V72 31) (Z01 419)   11  Encounter for screening mammogram for malignant neoplasm of breast (V76 12)    (Z12 31)   12  KYLE (generalized anxiety disorder) (300 02) (F41 1)   13  Hypothyroidism (244 9) (E03 9)   14  Impulse control disorder (312 30) (F63 9)   15  Intractable chronic migraine without aura and without status migrainosus (346 71)    (G43 719)   16  Lumbar radiculopathy (724 4) (M54 16)   17  Major depressive disorder, recurrent severe without psychotic features (296 33) (F33 2)   18  Migraine, unspecified, not intractable, without status migrainosus (346 90) (G43 909)   19  Myofascial pain syndrome (729 1) (M79 1)   20  Neck pain on left side (723 1) (M54 2)   21  Other insomnia (780 52) (G47 09)   22  Panic disorder without agoraphobia (300 01) (F41 0)   23  Piriformis syndrome, unspecified laterality (355 0) (G57 00)   24  Sacroiliitis (720 2) (M46 1)   25  Sciatica (724 3) (M54 30)   26  Spondylosis of lumbar region without myelopathy or radiculopathy (721 3) (M47 816)   27  Trochanteric bursitis of right hip (726 5) (M70 61)   28  Vitamin D deficiency (268 9) (E55 9)    Current Meds   1  ALPRAZolam 1 MG Oral Tablet; TAKE 1 TABLET 3 TIMES DAILY AS NEEDED; Therapy: 00ZUX8420 to (Evaluate:25Jan2018); Last Rx:11Sep2017 Ordered   2  ARIPiprazole 30 MG Oral Tablet (Abilify); TAKE 1 TABLET AT BEDTIME;    Therapy: 42JYN7740 to (916 016 404)  Requested for: 71Piw0970; Last   Rx:66Zkj3487 Ordered   3  Botox 100 UNIT Injection Solution Reconstituted; To be injected by physician as   indicated; Therapy: 50PIR8436 to (Last OU:89SDA6241) Ordered   4  BuPROPion HCl ER (XL) 150 MG Oral Tablet Extended Release 24 Hour; TAKE 1   TABLET DAILY; Therapy: 44Bpk3837 to (Evaluate:09Jan2018)  Requested for: 76Wbn4138; Last   Rx:70Mjq9827 Ordered   5  Diclofenac Sodium 75 MG Oral Tablet Delayed Release; TAKE 1 TABLET 2 TIMES DAILY   AFTER MEALS; Therapy: 25CVB5918 to (Andrew Bruno)  Requested for: 07XOB5388; Last   Rx:02Oct2017; Status: ACTIVE - Transmit to Pharmacy - Awaiting Verification Ordered   6  Flector 1 3 % Transdermal Patch; APPLY PATCH TO RIGTH HIP q 12 HOURS; Therapy: 95TZR0660 to (Vasquez Ramires)  Requested for: 03CRK6427; Last   Rx:60Vah9015 Ordered   7  Gabapentin 300 MG Oral Capsule; 2 CAPS 3 TIMES A DAY; Therapy: 46PED8515 to (Evaluate:52Rif1670)  Requested for: 27Fhq5167; Last   Rx:05Zuk0498 Ordered   8  Mirena IUD; Therapy: (Recorded:30Jan2017) to Recorded   9  Naltrexone HCl - 50 MG Oral Tablet; Take 1/2 tablet once daily; Therapy: 36Idf2458 to (Evaluate:09Jan2018)  Requested for: 40Rhl1190; Last   Rx:90Ulz1175 Ordered   10  Naratriptan HCl - 2 5 MG Oral Tablet; TAKE 1 TABLET AT ONSET OF HEADACHE, MAY    REPEAT ONCE IN 2 HOURS (MAX 2TABS/DAY 3DAYS/WK); Therapy: 00BUV8582 to (Evaluate:06Had0148)  Requested for: 99PHD1819; Last    Rx:61Xjz6799 Ordered   11  Omeprazole 20 MG Oral Capsule Delayed Release; TAKE 1 CAPSULE Daily; Therapy: 68DQK3921 to (DIBMYOJM:97HTC1452)  Requested for: 78Cpl8011 Recorded   12  Ondansetron HCl - 4 MG Oral Tablet; 1 tab q8h prn nausea; Therapy: 54HUF8221 to (Last QA:71UPV9070)  Requested for: 10WOO3464 Ordered   13  ProAir  (90 Base) MCG/ACT Inhalation Aerosol Solution; Therapy: 15LHC8053 to (Last Janetta Closs)  Requested for: 27Apr2011 Ordered   14   Sertraline HCl - 100 MG Oral Tablet; take 2 tablets at bedtime; Therapy: 22KHE9257 to (96 983207)  Requested for: 11Fwa0288; Last    Rx:64Nxw7508 Ordered   15  Spiriva HandiHaler 18 MCG Inhalation Capsule; Therapy: (Recorded:22Wuz9895) to Recorded   16  Symbicort 160-4 5 MCG/ACT Inhalation Aerosol; Therapy: 42KMI9120 to (Last Vani Areas)  Requested for: 36Wpj7485 Ordered   17  Topiramate 100 MG Oral Tablet; 1 BID  Requested for: 18ZIV2347; Last GV:35AMO8701    Ordered   18  Venlafaxine HCl  MG Oral Capsule Extended Release 24 Hour (Effexor XR);    TAKE 2 CAPSULES DAILY; Therapy: 13KBY1903 to (96 335216)  Requested for: 91Qhm0722; Last    Rx:79Dsc8423 Ordered   19  Vitamin B-2 100 MG Oral Tablet; Take two in am daily; Therapy: 32BAA6518 to (Last J67ATJ0285)  Requested for: 13Whf6583 Ordered   20  Vitamin D CAPS; Therapy: (Recorded:81Yyt4784) to Recorded   21  Xopenex 1 25 MG/3ML Inhalation Nebulization Solution (Levalbuterol HCl); Therapy: 26BHL9173 to (Last Rx:33Jge6015)  Requested for: 75UAX0859 Ordered   22  Zolpidem Tartrate 10 MG Oral Tablet; TAKE 1 TABLET AT BEDTIME AS NEEDED; Therapy: 20MLR4957 to (Evaluate:2018); Last Rx:74Nzv3938 Ordered    Allergies    1  azithromycin   2  Erythromycin TABS   3  Diflucan TABS    4   Shellfish    Signatures   Electronically signed by : Tri Ayala, ; Oct  2 2017  3:46PM EST                       (Author)

## 2018-01-12 VITALS
TEMPERATURE: 97.9 F | DIASTOLIC BLOOD PRESSURE: 74 MMHG | WEIGHT: 148 LBS | RESPIRATION RATE: 16 BRPM | HEART RATE: 76 BPM | HEIGHT: 66 IN | OXYGEN SATURATION: 98 % | BODY MASS INDEX: 23.78 KG/M2 | SYSTOLIC BLOOD PRESSURE: 114 MMHG
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
## 2018-01-12 NOTE — MISCELLANEOUS
Message   Recorded as Task   Date: 06/13/2016 10:05 AM, Created By: Joann Hampton   Task Name: Medical Complaint Callback   Assigned To: Diane Cisneros   Regarding Patient: Ester Zaidi, Status: In Progress   Comment:    Olena Herron - 13 Jun 2016 10:05 AM     TASK CREATED  Caller: Self; [phone] (Home)  pt has yeast inf  from medication she was taking please advise rx [facility] [place] [employer] in [employer] 13 Jun 2016 10:31 AM     TASK IN PROGRESS   [facility] - 13 Jun 2016 10:31 AM     TASK IN PROGRESS   Kristan Porsche - 13 Jun 2016 10:34 AM     TASK EDITED  pt just finished augmentin, is having Latvian itching, very little discharge, white discharge very little  [facility] on [address]  Active Problems    1  Akathisia (781 0)   2  Amenorrhea (626 0) (N91 2)   3  History of Chondromalacia of patella, unspecified laterality (717 7) (M22 40)   4  Chronic migraine (346 70) (G43 709)   5  Contraceptives (V25 02)   6  Counseling for birth control, intrauterine device (V25 09) (Z30 09)   7  Dysmenorrhea (625 3) (N94 6)   8  Encounter for IUD insertion (V25 11) (Z30 430)   9  Encounter for routine gynecological examination (V72 31) (Z01 419)   10  Encounter for screening mammogram for malignant neoplasm of breast (V76 12)    (Z12 31)   11  KYLE (generalized anxiety disorder) (300 02) (F41 1)   12  Impulse control disorder (312 30) (F63 9)   13  Insomnia (780 52) (G47 00)   14  Intractable chronic migraine without aura and without status migrainosus (346 71)    (G43 719)   15  Lumbar radiculopathy (724 4) (M54 16)   16  Major depressive disorder, recurrent severe without psychotic features (296 33) (F33 2)   17  Migraine headache (346 90) (G43 909)   18  Myofascial pain syndrome (729 1) (M79 1)   19  Panic disorder without agoraphobia (300 01) (F41 0)   20  Piriformis syndrome, unspecified laterality (355 0) (G57 00)   21   Routine Gynecological Exam With Cervical Pap Smear (V72 31)   22  Sacroiliitis (720 2) (M46 1)   23  Sciatica (724 3) (M54 30)   24  Screening for HPV (human papillomavirus) (V73 81) (Z11 51)   25  Spondylosis of lumbar region without myelopathy or radiculopathy (721 3) (M47 816)    Current Meds   1  ALPRAZolam 1 MG Oral Tablet; Take 1/2 to 1 tablet 3 tmes daily as needed for anxiety; Therapy: 66UJK2563 to (Evaluate:23Sep2016); Last Rx:26May2016 Ordered   2  ARIPiprazole 30 MG Oral Tablet (Abilify); TAKE 1 TABLET AT BEDTIME; Therapy: 06JZH7758 to (Evaluate:23Sep2016)  Requested for: 71JYF0270; Last   Rx:26May2016 Ordered   3  Botox 100 UNIT Injection Solution Reconstituted; To be injected by physician as   indicated; Therapy: 67MAH5205 to (Last TW:58ZGT2138) Ordered   4  Gabapentin 300 MG Oral Capsule; take 2 tab in the morning 1 tab in the afternoon and 2   tabs at night x 5 days then 2 tabs tid; Therapy: 72LZQ3226 to (Evaluate:53Jjl3357)  Requested for: 82PDB1091; Last   Rx:03Mar2016 Ordered   5  Ibuprofen 800 MG Oral Tablet; Take 1 tablet 3 times daily as needed; Therapy: 52CQU1037 to (Evaluate:11Mar2017)  Requested for: 36VIA9330; Last   Rx:16Mar2016 Ordered   6  MedroxyPROGESTERone Acetate 150 MG/ML Intramuscular Suspension   (Depo-Provera); INJECT 1 ML INTRAMUSCULARLY ONCE EVERY 3   MONTHS; Therapy: 10Sih4787 to (Ambika Kaba)  Requested for: 21ZJS5564; Last   Rx:22Jan2015 Ordered   7  MedroxyPROGESTERone Acetate 150 MG/ML Intramuscular Suspension; inject   intramuscularly every 12 weeks as directed; Therapy: 57UQG1042 to (Evaluate:17Jan2016)  Requested for: 57BQX9556; Last   Rx:22Jan2015 Ordered   8  Naproxen 500 MG Oral Tablet; TAKE 1 TABLET AS NEEDED PRN PAIN  (TAKE WITH   IMITREX); Therapy: 31RCI4762 to (Bonita Fisher)  Requested for: 76QXQ9545; Last   Rx:14Jan2015 Ordered   9  OLANZapine 5 MG Oral Tablet; TAKE 1 TABLET AT BEDTIME;    Therapy: 17USA6024 to (Evaluate:20Apr2016)  Requested for: 21Mar2016; Last   Rx:21Mar2016 Ordered   10  OLANZapine 7 5 MG Oral Tablet; TAKE 1 TABLET DAILY; Therapy: 68LEL5878 to ([phone])  Requested for: 64KDF9412; Last    Rx:03Mar2016 Ordered   11  Omeprazole 20 MG Oral Capsule Delayed Release; Therapy: 24LPV8980 to (Last Rx:28Jan2011)  Requested for: 75PVB6119 Ordered   12  ProAir  (90 Base) MCG/ACT Inhalation Aerosol Solution; Therapy: 12CBQ7563 to (Last Sabiha Perez)  Requested for: 27Apr2011 Ordered   13  Relpax 40 MG Oral Tablet; TAKE 1 TABLET AT ONSET OF MIGRAINE  MAY REPEAT IN     2 HOURS  MAX 2 DOSES/24 HOURS, NO MORE THAN 3 DAYS PER WEEK  9    TABS/MONTHLY; Therapy: 31SFC6029 to (Evaluate:54Saj6047)  Requested for: 30PTN9390; Last    Rx:11Mar2016 Ordered   14  Sertraline HCl - 100 MG Oral Tablet; TAKE 1 AND 1/2 TABLETS DAILY; Therapy: 32ADX9224 to (Evaluate:33Naw5017)  Requested for: 38DXG5358; Last    Rx:26May2016 Ordered   15  Sulfamethoxazole-Trimethoprim 800-160 MG Oral Tablet (Bactrim DS); Take 1 tablet    twice daily; Therapy: 35NNY0646 to (Lyn Stewart)  Requested for: 07DRY3721; Last    Rx:16Mar2016 Ordered   16  SUMAtriptan Succinate 100 MG Oral Tablet (Imitrex); TAKE ONE (1) TABLET(S)at onset    of migraine MAY REPEAT ONCE AFTER 2 HOURS  MAX 2 DOSES IN 24HOURS; Therapy: 05MKV8698 to (Evaluate:11Jun2016)  Requested for: 39NBB9549; Last    Rx:98Gvh9242 Ordered   17  Symbicort 160-4 5 MCG/ACT Inhalation Aerosol; Therapy: 43VGJ4717 to (Keenan Jamesne Ana)  Requested for: 27Apr2011 Ordered   18  Topiramate 25 MG Oral Tablet; 1 tab qhs x 5 days, then 2 tabs qhs x 5 days, then 3    tablets qhs x 5 days, then 4 tab qhs;    Therapy: 51YMK5087 to (Evaluate:51Pax3832)  Requested for: 98MBV2598; Last    Rx:26Jan2016 Ordered   19  TraMADol HCl - 50 MG Oral Tablet; Therapy: 94Lwr0304 to Recorded   20  Venlafaxine HCl  MG Oral Capsule Extended Release 24 Hour (Effexor XR);    TAKE 2 CAPSULES DAILY;     Therapy: 21LPQ1050 to (Evaluate:23Sep2016) Requested for: ; Last    Rx:97Vel7603 Ordered   21  Vitamin B-2 100 MG Oral Tablet; Take two in am daily; Therapy: 36OQW8411 to (Last T55LSG5721)  Requested for: 2013 Ordered   22  Xopenex 1 25 MG/3ML Inhalation Nebulization Solution (Levalbuterol HCl); Therapy: 48CHD3251 to (Last Rx:2010)  Requested for: 02NKC2045 Ordered   23  Zolpidem Tartrate 10 MG Oral Tablet; TAKE 1 TABLET AT BEDTIME AS NEEDED; Therapy: 14VUF0992 to (Evaluate:73Rvx6003); Last Rx:47Hex7646 Ordered    Allergies    1  Erythromycin TABS   2  Diflucan TABS   3  Zithromax TABS    4   Shellfish    Signatures   Electronically signed by : Preet Antunez LPN; [id number] EST                       (Author)

## 2018-01-13 VITALS
HEIGHT: 66 IN | TEMPERATURE: 98.1 F | OXYGEN SATURATION: 97 % | RESPIRATION RATE: 16 BRPM | SYSTOLIC BLOOD PRESSURE: 105 MMHG | HEART RATE: 88 BPM | WEIGHT: 159 LBS | BODY MASS INDEX: 25.55 KG/M2 | DIASTOLIC BLOOD PRESSURE: 70 MMHG

## 2018-01-13 VITALS
HEART RATE: 82 BPM | BODY MASS INDEX: 23.14 KG/M2 | SYSTOLIC BLOOD PRESSURE: 103 MMHG | DIASTOLIC BLOOD PRESSURE: 68 MMHG | WEIGHT: 144 LBS | HEIGHT: 66 IN

## 2018-01-13 VITALS — DIASTOLIC BLOOD PRESSURE: 64 MMHG | TEMPERATURE: 98.4 F | SYSTOLIC BLOOD PRESSURE: 106 MMHG

## 2018-01-13 NOTE — MISCELLANEOUS
Message   Recorded as Task   Date: 10/20/2017 04:58 PM, Created By: Lorretta Dance   Task Name: Care Coordination   Assigned To: [facility]   Regarding Patient: Mike Alford, Status: Active   Comment:    Tracey Corona - 20 Oct 2017 4:58 PM     TASK CREATED  Can you look at this x-ray? what do you think that small foreign body could be in her right leg? Kamlesh Perez - 22 Oct 2017 9:35 PM     TASK REPLIED TO: Previously Assigned To Kamlesh Perez  i don't know   Tracey Corona - 23 Oct 2017 4:13 PM     TASK REPLIED TO: Previously Assigned To [facility]  I have tried to call this patient on friday and today and her voice mail is full and is not accepting voice mail  I wanted to inform her that her bilateral hip and pelvic x-ray is WNL  She does have a random foreign body noted in her leg that was present on a previous x-ray, can you ask the patient if she had any injury in that right leg, where she could have a remaining foreign body in her right leg  I have ordered a MRI of her Lumbar spine, can you ask her if she would like the prescription mailed or she can pick it up here  Clair Gonzalez - 24 Oct 2017 9:05 AM     TASK EDITED  Left detailed vm on home/cell that that Ines Fortune tried calling Fri and Mon but her mailbox was full  Pls ca/b so we can provide the xray results, we have a questions that Ines Fortune would like us to ask her and Ines Fortune is ordering a MRI that we need to d/w her  C/B # and office hrs provided  Vivian Lima - 24 Oct 2017 11:07 AM     TASK EDITED  Pt returned call and can be reached at [phone]  She said her mailbox is no longer full  Clair Gonzalez - 24 Oct 2017 1:23 PM     TASK EDITED  Left vm on number provided for pt to c/b  Office hrs and c/b # provided  **PLS GET NURSE ON THE PH WHEN PT CALLS BACK  **   Clair Gonzalez - 24 Oct 2017 1:31 PM     TASK EDITED  S/W pt and informed of her xray results    Pt ask where on the leg the foreign was and I told her the Rt proximal thigh  Pt said that would be a piece of pencil lead that she has in her leg  Told pt I would make Tracey aware  Pt would like script for the MRI mailed to her home  Pt told not to schedule MRI until our office calls her with the pre-cert # for the MRI then she can call central scheduling to make appt  Lumbar spine script mailed to pt  Tracey Corona - 25 Oct 2017 2:30 PM     TASK EDITED  Thanks        Active Problems    1  Akathisia (781 0)   2  Amenorrhea (626 0) (N91 2)   3  Asthma, persistent (493 90) (J45 909)   4  History of Chondromalacia of patella, unspecified laterality (717 7) (M22 40)   5  Chronic fatigue (780 79) (R53 82)   6  Chronic GERD (530 81) (K21 9)   7  Chronic migraine without aura (346 70) (G43 709)   8  Dysmenorrhea (625 3) (N94 6)   9  Encounter for IUD insertion (V25 11) (Z30 430)   10  Encounter for routine gynecological examination (V72 31) (Z01 419)   11  Encounter for screening mammogram for malignant neoplasm of breast (V76 12)    (Z12 31)   12  KYLE (generalized anxiety disorder) (300 02) (F41 1)   13  Hypothyroidism (244 9) (E03 9)   14  Impulse control disorder (312 30) (F63 9)   15  Intractable chronic migraine without aura and without status migrainosus (346 71)    (G43 719)   16  Lumbar radiculopathy (724 4) (M54 16)   17  Major depressive disorder, recurrent severe without psychotic features (296 33) (F33 2)   18  Migraine, unspecified, not intractable, without status migrainosus (346 90) (G43 909)   19  Myofascial pain syndrome (729 1) (M79 1)   20  Neck pain on left side (723 1) (M54 2)   21  Other insomnia (780 52) (G47 09)   22  Panic disorder without agoraphobia (300 01) (F41 0)   23  Piriformis syndrome, unspecified laterality (355 0) (G57 00)   24  Right hip pain (719 45) (M25 551)   25  Sacroiliitis (720 2) (M46 1)   26  Sciatica (724 3) (M54 30)   27   Spondylosis of lumbar region without myelopathy or radiculopathy (721 3) (M47 816)   28  Trochanteric bursitis of right hip (726 5) (M70 61)   29  Vitamin D deficiency (268 9) (E55 9)    Current Meds   1  ALPRAZolam 1 MG Oral Tablet; TAKE 1 TABLET 3 TIMES DAILY AS NEEDED; Therapy: 81ZMQ9983 to (Evaluate:25Jan2018); Last Rx:15Zkq1593 Ordered   2  ARIPiprazole 30 MG Oral Tablet (Abilify); TAKE 1 TABLET AT BEDTIME; Therapy: 92OCH7707 to ([phone])  Requested for: 11Sep2017; Last   Rx:73Rlo0431 Ordered   3  Botox 100 UNIT Injection Solution Reconstituted; To be injected by physician as   indicated; Therapy: 00MVC6372 to (Last FS:31IIC6106) Ordered   4  BuPROPion HCl ER (XL) 150 MG Oral Tablet Extended Release 24 Hour; TAKE 1   TABLET DAILY; Therapy: 38Rwa0463 to (Evaluate:09Jan2018)  Requested for: 59Pxr1069; Last   Rx:69Czz1033 Ordered   5  Diclofenac Sodium 75 MG Oral Tablet Delayed Release; TAKE 1 TABLET 2 TIMES DAILY   AFTER MEALS; Therapy: 36KPY4180 to ([employer])  Requested for: 02Oct2017; Last   Rx:02Oct2017 Ordered   6  Gabapentin 300 MG Oral Capsule; 2 CAPS 3 TIMES A DAY; Therapy: 13QQI7661 to (Evaluate:28Feb2018)  Requested for: 01Sep2017; Last   Rx:47Icx7424 Ordered   7  Mirena IUD; Therapy: (Recorded:30Jan2017) to Recorded   8  Naltrexone HCl - 50 MG Oral Tablet; Take 1/2 tablet once daily; Therapy: 14Aef1211 to (Evaluate:09Jan2018)  Requested for: 72Gzm3097; Last   Rx:52Bko2943 Ordered   9  Naratriptan HCl - 2 5 MG Oral Tablet; TAKE 1 TABLET AT ONSET OF HEADACHE, MAY   REPEAT ONCE IN 2 HOURS (MAX 2TABS/DAY 3DAYS/WK); Therapy: 13STP7833 to (Evaluate:45Bxc1366)  Requested for: 82DBG2921; Last   Rx:74Gzw2694 Ordered   10  Omeprazole 20 MG Oral Capsule Delayed Release; TAKE 1 CAPSULE Daily; Therapy: 19QUN7858 to (PFLMSEQM:05AND5695)  Requested for: 26Fxe6897 Recorded   11  Ondansetron HCl - 4 MG Oral Tablet; 1 tab q8h prn nausea; Therapy: 10ZEP2843 to (Last LB:03QVO8000)  Requested for: 59VDL4059 Ordered   12   ProAir  (90 Base) MCG/ACT Inhalation Aerosol Solution; Therapy: 11DNI4076 to (Last Bunny Coad)  Requested for: 65Ftl8234 Ordered   13  Sertraline HCl - 100 MG Oral Tablet; take 2 tablets at bedtime; Therapy: 90YBU2627 to ([phone])  Requested for: 24Oct2017; Last    Rx:80Pgv4093; Status: ACTIVE - Renewal Denied Ordered   14  Spiriva HandiHaler 18 MCG Inhalation Capsule; Therapy: (Recorded:50Chq7196) to Recorded   15  Symbicort 160-4 5 MCG/ACT Inhalation Aerosol; Therapy: 74TCC1770 to (Last Bunny Coad)  Requested for: 33Xfg9199 Ordered   16  Topiramate 100 MG Oral Tablet; 1 BID  Requested for: 57QIR8811; Last OL:66QPV3422    Ordered   17  Venlafaxine HCl  MG Oral Capsule Extended Release 24 Hour (Effexor XR);    TAKE 2 CAPSULES DAILY; Therapy: 17OKZ5944 to ([phone])  Requested for: 10Fnp8838; Last    Rx:46Ggn5475 Ordered   18  Vitamin D CAPS; Therapy: (Recorded:22Sld2731) to Recorded   19  Xopenex 1 25 MG/3ML Inhalation Nebulization Solution (Levalbuterol HCl); Therapy: 17HIA8398 to (Last Rx:08Oct2010)  Requested for: 60EGN2529 Ordered   20  Zolpidem Tartrate 10 MG Oral Tablet; TAKE 1 TABLET AT BEDTIME AS NEEDED; Therapy: 92JCF4998 to (Evaluate:25Jan2018); Last Rx:19Mwx2549 Ordered    Allergies    1  azithromycin   2  Erythromycin TABS   3  Diflucan TABS    4   Shellfish    Signatures   Electronically signed by : Marvin Stafford, ; Oct 25 2017  2:33PM EST                       (Author)
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
## 2018-01-14 VITALS — TEMPERATURE: 98.3 F | SYSTOLIC BLOOD PRESSURE: 105 MMHG | DIASTOLIC BLOOD PRESSURE: 56 MMHG

## 2018-01-14 VITALS
DIASTOLIC BLOOD PRESSURE: 78 MMHG | HEIGHT: 66 IN | BODY MASS INDEX: 26.68 KG/M2 | WEIGHT: 166 LBS | SYSTOLIC BLOOD PRESSURE: 102 MMHG

## 2018-01-14 VITALS
WEIGHT: 170 LBS | DIASTOLIC BLOOD PRESSURE: 77 MMHG | HEART RATE: 83 BPM | SYSTOLIC BLOOD PRESSURE: 116 MMHG | BODY MASS INDEX: 27.32 KG/M2 | HEIGHT: 66 IN
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
## 2018-01-14 NOTE — MISCELLANEOUS
Message   Recorded as Task   Date: 01/28/2016 12:56 PM, Created By: Mckayla Nicholson   Task Name: Med Renewal Request   Assigned To: Jose Notice   Regarding Patient: Guadalupe Hanna, Status: Active   Comment:    Maribeth Leyva - 28 Jan 2016 12:56 PM     TASK CREATED  Caller: Self; [phone] (Home); [phone] x,,,,, (Work)  Needs script ran out    gabapentin 100mg     [facility]    [phone]  Patience Mike also needs to talk with you before you order the gabapentin  Something happened today and she needs to tell you about it before its ordered    [phone]   Spoke with patient - she stated she was feeling too tired on Klonopin 1 mg tid when driving; was asking to be switched to Xanax  Also wanted renewal of past prescription for Neurontin 100 mg tid - but has been already on Neurontin 300 mg bid and 600 mg at bedtime by her neurologist   Plan: patient was instructed not to take Klonopin before driving and not to drive if she felt impaired  Patient was also instructed to decrease Klonopin to 0 5 mg tid; she was informed that I would need to see her in the office before any major medication changes since she has not been seen since June 2015  Patient was informed she should follow up withe her neurologist for Neurontin prescription        Signatures   Electronically signed by : NICKOLAS Tang ; Jan 28 2016  5:15PM EST                       (Author)

## 2018-01-15 ENCOUNTER — ALLSCRIPTS OFFICE VISIT (OUTPATIENT)
Dept: OTHER | Facility: OTHER | Age: 48
End: 2018-01-15

## 2018-01-15 DIAGNOSIS — F63.9 IMPULSE CONTROL DISORDER: ICD-10-CM

## 2018-01-15 DIAGNOSIS — G47.09 OTHER INSOMNIA: ICD-10-CM

## 2018-01-15 DIAGNOSIS — F33.2 MAJOR DEPRESSIVE DISORDER, RECURRENT SEVERE WITHOUT PSYCHOTIC FEATURES (HCC): Primary | ICD-10-CM

## 2018-01-15 DIAGNOSIS — F41.1 GAD (GENERALIZED ANXIETY DISORDER): ICD-10-CM

## 2018-01-15 DIAGNOSIS — F41.0 PANIC DISORDER WITHOUT AGORAPHOBIA: ICD-10-CM
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
## 2018-01-15 NOTE — MISCELLANEOUS
Message   Recorded as Task   Date: 09/14/2016 10:45 AM, Created By: [facility] / [facility]   Task Name: Follow Up   Assigned To: [address] ,Team   Regarding Patient: Jose Gleason, Status: In Progress   Melissa Tilley - 14 Sep 2016 10:45 AM     TASK CREATED  Pt called wanted xray results also wanted to know if doctor can give her medication for her knot in her neck pt can be reached [phone]   Clair Gonzalez - 14 Sep 2016 12:25 PM     TASK EDITED  [facility] for pt to c/b to provide more info  Provided c/b number for [facility] triage line  *Pt's ov for 9/12/16 was bumped b/c the Dr was out and they R/S her povs to 10/10/16 w/ [facility]  *   Clair Gonzalez - 14 Sep 2016 12:25 PM     TASK IN PROGRESS   Clair Gonzalez - 14 Sep 2016 12:55 PM     TASK EDITED  Pt returned our call  Pt said the tizandine 4mg QHS is not helping for her neck asking for something else  She knows she's not suppose to did it but she's been taking her husb Tyl #3 and those seem to help and was going to also ask for a Rx for Tyl #3  Pt told that we do not approve of her taking her 's Tyl #3, we don't usually prescribe it b/c it is very addicting  I asked pt if she is doing PT that was ordered? She said she wasn't going b/c each session would cost $40  She did not go to any PT session at all  Pt asking for c-spine xray results, done 8/12/16, results are in allscripts  Told pt I would forward info to Dr Sarah Bangura then someone would c/b with his rec     Reji Flores - 14 Sep 2016 4:23 PM     TASK REPLIED TO: Previously Assigned To Reji Dry  x-rays showed bone spurs and disc degeneration as well as straightening indicative of muscle spasm    she has to try PT or else MRI won't get approved    will d/c tizanidine and switch her to methocarbamol for the muscle spasms    don't use 's tylenol # 3    will see [facility] next month who will re-eval her   Efrain Graham - 15 Sep 2016 9:25 AM     TASK EDITED    [facility] for pt to return call  Clair Gonzalez - 19 Sep 2016 8:13 AM     TASK EDITED   Augusta Tomi - 19 Sep 2016 9:30 AM     TASK EDITED  2nd attempt to reach pt, left vm on pt's home/cell to c/b and s/w nurse about Dr Shanthi Chen rec and her xray results  Clair Gonzalez - 19 Sep 2016 9:30 AM     TASK IN PROGRESS   Shruthi Campos - 20 Sep 2016 9:27 AM     TASK EDITED  Would you like us to send a can't reach you letter? Analy Cole - 20 Sep 2016 12:08 PM     TASK REPLIED TO: Previously Assigned To Shruthi Ansari - 20 Sep 2016 3:27 PM     TASK EDITED   Letter sent      Active Problems    1  Akathisia (781 0)   2  Amenorrhea (626 0) (N91 2)   3  History of Chondromalacia of patella, unspecified laterality (717 7) (M22 40)   4  Chronic migraine (346 70) (G43 709)   5  Contraceptives (V25 02)   6  Dysmenorrhea (625 3) (N94 6)   7  Encounter for IUD insertion (V25 11) (Z30 430)   8  Encounter for routine gynecological examination (V72 31) (Z01 419)   9  Encounter for screening mammogram for malignant neoplasm of breast (V76 12)   (Z12 31)   10  KYLE (generalized anxiety disorder) (300 02) (F41 1)   11  Impulse control disorder (312 30) (F63 9)   12  Insomnia (780 52) (G47 00)   13  Intractable chronic migraine without aura and without status migrainosus (346 71)    (G43 719)   14  Lumbar radiculopathy (724 4) (M54 16)   15  Major depressive disorder, recurrent severe without psychotic features (296 33) (F33 2)   16  Medication overuse headache (339 3) (G44 40)   17  Migraine headache (346 90) (G43 909)   18  Myofascial pain syndrome (729 1) (M79 1)   19  Neck pain on left side (723 1) (M54 2)   20  Panic disorder without agoraphobia (300 01) (F41 0)   21  Piriformis syndrome, unspecified laterality (355 0) (G57 00)   22  Sacroiliitis (720 2) (M46 1)   23  Sciatica (724 3) (M54 30)   24   Spondylosis of lumbar region without myelopathy or radiculopathy (721 3) (M44 816)    Current Meds 1  ALPRAZolam 1 MG Oral Tablet; Take 1/2 to 1 tablet 3 tmes daily as needed for anxiety; Therapy: 61VHH3905 to (Evaluate:47Oew4302); Last Rx:26May2016 Ordered   2  ARIPiprazole 30 MG Oral Tablet (Abilify); TAKE 1 TABLET AT BEDTIME; Therapy: 78XGQ4327 to (Evaluate:95Rbn3134)  Requested for: 42GWT2868; Last   Rx:26May2016 Ordered   3  Botox 100 UNIT Injection Solution Reconstituted; To be injected by physician as   indicated; Therapy: 68QKB4468 to (Last UA:59QPH8107) Ordered   4  Gabapentin 300 MG Oral Capsule; TAKE 1 CAPSULE BY MOUTH IN THE MORNING, 1   AT NOON, AND 2 CAPS AT NIGHT; Therapy: 79BJM2340 to (Evaluate:26Jan2017)  Requested for: 05Xft8937; Last   Rx:96Nri0044 Ordered   5  Medrol 4 MG Oral Tablet Therapy Pack (MethylPREDNISolone); TAKE AS PRESRIBED; Therapy: 28VBO0251 to (Evaluate:44Xzl4014)  Requested for: 39Awo6416; Last   Rx:11Lez7183 Ordered   6  Methocarbamol 750 MG Oral Tablet; TAKE 1 TABLET Bedtime PRN muscle spasm; Therapy: 21Rin6956 to (Evaluate:95Uez1281)  Requested for: 59Zfn7933; Last   Rx:56Xax3835 Ordered   7  Omeprazole 20 MG Oral Capsule Delayed Release; Therapy: 92GBF3973 to (Last Rx:28Jan2011)  Requested for: 49MVT0077 Ordered   8  Ondansetron HCl - 4 MG Oral Tablet; 1 tab q8h prn nausea; Therapy: 80EDO9339 to (Last [facility])  Requested for: 30XLF3670 Ordered   9  ProAir  (90 Base) MCG/ACT Inhalation Aerosol Solution; Therapy: 12XYJ5744 to (Last [facility])  Requested for: 27Apr2011 Ordered   10  Sertraline HCl - 100 MG Oral Tablet; TAKE 1 AND 1/2 TABLETS DAILY; Therapy: 96NRA4786 to (Evaluate:59Jcw0384)  Requested for: 46Afs3715; Last    Rx:76Cny8329; Status: ACTIVE - Renewal Denied Ordered   11  SUMAtriptan Succinate 100 MG Oral Tablet (Imitrex); TAKE ONE (1) TABLET(S)at onset    of migraine MAY REPEAT ONCE AFTER 2 HOURS  MAX 2 DOSES IN 24HOURS; Therapy: 31CWW7511 to (Evaluate:27Oct2016)  Requested for: 05CWE8177; Last    Rx:29Jun2016 Ordered   12  Symbicort 160-4 5 MCG/ACT Inhalation Aerosol; Therapy: 44ADU5361 to (Last Avani Spicerey)  Requested for: 55Xhn6050 Ordered   13  Terconazole 0 4 % Vaginal Cream; INSERT 1 APPLICATORFUL INTRAVAGINALLY AT    BEDTIME; Therapy: 13NRN1213 to (Evaluate:16Jun2016)  Requested for: 74SZZ9818; Last    Rx:48Ywg1916 Ordered   14  Topiramate 50 MG Oral Tablet; TAKE 1 5 TABLET Twice daily  Requested for: 67SOO0237;    Last Rx:65Xxb1917 Ordered   15  Venlafaxine HCl  MG Oral Capsule Extended Release 24 Hour (Effexor XR);    TAKE 2 CAPSULES DAILY; Therapy: 20Mar2012 to (Evaluate:98Kpy3725)  Requested for: 07Kvv3292; Last    Rx:37Wej1196; Status: ACTIVE - Renewal Denied Ordered   16  Vitamin B-2 100 MG Oral Tablet; Take two in am daily; Therapy: 69YRV1243 to (Last LT:41PGM4054)  Requested for: 28Oct2013 Ordered   17  Xopenex 1 25 MG/3ML Inhalation Nebulization Solution (Levalbuterol HCl); Therapy: 72EAF6260 to (Last Rx:62Wku5707)  Requested for: 01ZIW6802 Ordered   18  Zolpidem Tartrate 10 MG Oral Tablet; TAKE 1 TABLET AT BEDTIME AS NEEDED; Therapy: 33FEM4353 to (Evaluate:86Tgd0672); Last Rx:35Jbz7880 Ordered    Allergies    1  Erythromycin TABS   2  Diflucan TABS   3  Zithromax TABS    4   Shellfish    Signatures   Electronically signed by : Nitin Ford, ; Sep 22 2016 11:39AM EST                       (Author)
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
## 2018-01-16 NOTE — MISCELLANEOUS
Message   Recorded as Task   Date: 01/13/2016 02:20 PM, Created By: Chevy Elizabeth   Task Name: Call Back   Assigned To: Sarina Sánchez   Regarding Patient: Michaelyn Phalen, Status: Active   CommentMason Ambika - 13 Jan 2016 2:20 PM     TASK CREATED  Caller: Self; Other; [phone] (Home); [phone] x,,,,, (Work)  would like to speak with you about her medications  Put on your cancellation list   Spoke with patient - had no insurance and could not come here since June 2015  Has an appointment pending March 1, 2016 as she is getting her insurance back in February  Has been only taking Effexor XR (could not afford other meds)  Run out of Klonopin 2 months ago  Very anxious  Plan: Renew Effexor XR  Restart Klonopin 1 mg tid prn        Plan  Panic disorder without agoraphobia    · From  ClonazePAM 2 MG Oral Tablet TAKE 1 TABLET 3 TIMES DAILY AS  NEEDED To ClonazePAM 1 MG Oral Tablet Take 1 tablet 3 times daily as needed  Recurrent major depression in full remission    · Venlafaxine HCl  MG Oral Capsule Extended Release 24 Hour (Effexor  XR); TAKE 2 CAPSULES DAILY    Signatures   Electronically signed by : NICKOLAS Sullivan ; Jan 14 2016  5:27PM EST                       (Author)

## 2018-01-17 NOTE — MISCELLANEOUS
Message   Recorded as Task   Date: 04/26/2016 01:50 PM, Created By: Jaun Nash   Task Name: Medical Complaint Callback   Assigned To: Jus Ko   Regarding Patient: Guadalupe Hanna, Status: In Progress   Comment:    Melissa Zhang - 26 Apr 2016 1:50 PM     TASK CREATED  Caller: Self; Medical Complaint; [phone] (Home)  had mirena placed 1 wk ago; having bad cramps pain going down into her leg  Doesn't understand why  Has used Tramadol, needs something for pain  Sharon Rcie - 26 Apr 2016 2:13 PM     TASK IN PROGRESS   Kashif Yoon - 27 Apr 2016 8:25 AM     TASK EDITED  lmtcb   Sally Watson - 27 Apr 2016 2:14 PM     TASK EDITED  Arlyn Mathews, but    Pt said since mirena inserted she has lo abd pain and r groin pain - going [facility]  down thigh  She said tramadol no help  She has not been to pain management  Wants this documented on chart also  Do anything? Rebecca Montano - 27 Apr 2016 3:01 PM     TASK REPLIED TO: Previously Assigned To Rebecca Montano  Sounds normal   Can have cramping off and on for over a week post-insertion  Kashif Yoon - 86 OLM 7732 3:08 PM     TASK EDITED  pT Arianne Pinon - 27 Apr 2016 3:08 PM     TASK EDITED  Done by RAMÓN gentile        Active Problems    1  Akathisia (781 0)   2  Amenorrhea (626 0) (N91 2)   3  History of Chondromalacia of patella, unspecified laterality (717 7) (M22 40)   4  Chronic migraine (346 70) (G43 709)   5  Contraceptives (V25 02)   6  Counseling for birth control, intrauterine device (V25 09) (Z30 09)   7  Dysmenorrhea (625 3) (N94 6)   8  Encounter for IUD insertion (V25 11) (Z30 430)   9  Encounter for routine gynecological examination (V72 31) (Z01 419)   10  Encounter for screening mammogram for malignant neoplasm of breast (V76 12)    (Z12 31)   11  KYLE (generalized anxiety disorder) (300 02) (F41 1)   12  Impulse control disorder (312 30) (F63 9)   13  Insomnia (780 52) (G47 00)   14   Intractable chronic migraine without aura and without status migrainosus (346 71)    (G43 719)   15  Lumbar radiculopathy (724 4) (M54 16)   16  Major depressive disorder, recurrent severe without psychotic features (296 33) (F33 2)   17  Migraine headache (346 90) (G43 909)   18  Myofascial pain syndrome (729 1) (M79 1)   19  Panic disorder without agoraphobia (300 01) (F41 0)   20  Piriformis syndrome, unspecified laterality (355 0) (G57 00)   21  Routine Gynecological Exam With Cervical Pap Smear (V72 31)   22  Sacroiliitis (720 2) (M46 1)   23  Sciatica (724 3) (M54 30)   24  Screening for HPV (human papillomavirus) (V73 81) (Z11 51)   25  Spondylosis of lumbar region without myelopathy or radiculopathy (721 3) (M47 816)    Current Meds   1  ALPRAZolam 1 MG Oral Tablet; Take 1/2 to 1 tablet 3 tmes daily as needed for anxiety; Therapy: 68EQR0694 to (Evaluate:09Jun2016); Last Rx:10Feb2016 Ordered   2  ARIPiprazole 30 MG Oral Tablet (Abilify); TAKE 1 TABLET AT BEDTIME; Therapy: 46IAX0240 to (Evaluate:09Jun2016)  Requested for: 61IZY8217; Last   Rx:10Feb2016 Ordered   3  Gabapentin 300 MG Oral Capsule; take 2 tab in the morning 1 tab in the afternoon and 2   tabs at night x 5 days then 2 tabs tid; Therapy: 61KTI9815 to (Evaluate:55Vwy7302)  Requested for: 86VOL3324; Last   Rx:03Mar2016 Ordered   4  Ibuprofen 800 MG Oral Tablet; Take 1 tablet 3 times daily as needed; Therapy: 29YLW4956 to (Evaluate:11Mar2017)  Requested for: 97MPX8562; Last   Rx:16Mar2016 Ordered   5  MedroxyPROGESTERone Acetate 150 MG/ML Intramuscular Suspension   (Depo-Provera); INJECT 1 ML INTRAMUSCULARLY ONCE EVERY 3   MONTHS; Therapy: 18Apr2012 to (Emmanuel Overton)  Requested for: 57MUR8494; Last   Rx:22Jan2015 Ordered   6  MedroxyPROGESTERone Acetate 150 MG/ML Intramuscular Suspension; inject   intramuscularly every 12 weeks as directed; Therapy: 02HWQ5097 to (Evaluate:17Jan2016)  Requested for: 48OAJ2257; Last   Rx:22Jan2015 Ordered   7   Naproxen 500 MG Oral Tablet; TAKE 1 TABLET AS NEEDED PRN PAIN  (TAKE WITH   IMITREX); Therapy: 32VJZ9734 to (Ana Laura Anderson)  Requested for: 56MHE5420; Last   Rx:14Jan2015 Ordered   8  OLANZapine 5 MG Oral Tablet; TAKE 1 TABLET AT BEDTIME; Therapy: 21UJE9262 to (Evaluate:20Apr2016)  Requested for: 21Mar2016; Last   Rx:21Mar2016 Ordered   9  OLANZapine 7 5 MG Oral Tablet; TAKE 1 TABLET DAILY; Therapy: 15NOA2016 to (26 100188)  Requested for: 38XWR1426; Last   Rx:03Mar2016 Ordered   10  Omeprazole 20 MG Oral Capsule Delayed Release; Therapy: 50AAJ3767 to (Last Rx:28Jan2011)  Requested for: 62WCK2165 Ordered   11  ProAir  (90 Base) MCG/ACT Inhalation Aerosol Solution; Therapy: 19PDE9289 to (Last Svetlana Radar)  Requested for: 27Apr2011 Ordered   12  Relpax 40 MG Oral Tablet; TAKE 1 TABLET AT ONSET OF MIGRAINE  MAY REPEAT IN     2 HOURS  MAX 2 DOSES/24 HOURS, NO MORE THAN 3 DAYS PER WEEK  9    TABS/MONTHLY; Therapy: 53DIN0705 to (Evaluate:05Pan2301)  Requested for: 82MJQ6619; Last    Rx:11Mar2016 Ordered   13  Sertraline HCl - 100 MG Oral Tablet; TAKE 1 TABLET DAILY; Therapy: 79KRX1328 to (Evaluate:29Jun2016)  Requested for: 35IGJ5050; Last    Rx:01Mar2016 Ordered   14  Sulfamethoxazole-Trimethoprim 800-160 MG Oral Tablet (Bactrim DS); Take 1 tablet    twice daily; Therapy: 33URP9266 to (Venida Bye)  Requested for: 89OSZ3401; Last    Rx:16Mar2016 Ordered   15  SUMAtriptan Succinate 100 MG Oral Tablet (Imitrex); TAKE ONE (1) TABLET(S)at onset    of migraine MAY REPEAT ONCE AFTER 2 HOURS  MAX 2 DOSES IN 24HOURS; Therapy: 15WFH9801 to (Evaluate:11Jun2016)  Requested for: 46MOO8963; Last    Rx:89Jzk8569 Ordered   16  Symbicort 160-4 5 MCG/ACT Inhalation Aerosol; Therapy: 02QLH0272 to (Last Svetlana Radar)  Requested for: 27Apr2011 Ordered   17   Topiramate 25 MG Oral Tablet; 1 tab qhs x 5 days, then 2 tabs qhs x 5 days, then 3    tablets qhs x 5 days, then 4 tab qhs;    Therapy: 84PBR7174 to (Evaluate:14Mai0895) Requested for: 47TGA5009; Last    Rx:26Jan2016 Ordered   18  TraMADol HCl - 50 MG Oral Tablet; Therapy: 21Apr2016 to Recorded   19  Venlafaxine HCl  MG Oral Capsule Extended Release 24 Hour (Effexor XR);    TAKE 2 CAPSULES DAILY; Therapy: 95XKF4241 to (Evaluate:09Jun2016)  Requested for: 34LZL7036; Last    Rx:38Wpu3229 Ordered   20  Vitamin B-2 100 MG Oral Tablet; Take two in am daily; Therapy: 83PKJ9927 to (Last KT:40PFO2808)  Requested for: 28Oct2013 Ordered   21  Xopenex 1 25 MG/3ML Inhalation Nebulization Solution (Levalbuterol HCl); Therapy: 25TOS8169 to (Last Rx:08Oct2010)  Requested for: 41MYN9520 Ordered   22  Zolpidem Tartrate 10 MG Oral Tablet; TAKE 1 TABLET AT BEDTIME AS NEEDED; Therapy: 81KKU7544 to (Evaluate:09Jun2016); Last Rx:36Loj3145 Ordered    Allergies    1  Erythromycin TABS   2  Diflucan TABS   3  Zithromax TABS    4   Shellfish    Signatures   Electronically signed by : Avel Favre, LPN; Apr 27 9095  5:04JU EST                       (Author)
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
## 2018-01-18 NOTE — PROGRESS NOTES
Discussion/Summary  Counseling Documentation With Imm: total time of encounter was 20 minutes and 19 minutes was spent counseling  Chief Complaint  Chief Complaint Free Text Note Form: Pt is here for string check  History of Present Illness  HPI: 41year old white female here for IUD check, s/p Mirena insertion on 4/20/16  She had significant discomfort post-placement which has since resolved  She had a week of light spotting following insertion but no bleeding since  She is overall very happy with this  Active Problems    1  Akathisia (781 0)   2  Amenorrhea (626 0) (N91 2)   3  History of Chondromalacia of patella, unspecified laterality (717 7) (M22 40)   4  Chronic migraine (346 70) (G43 709)   5  Contraceptives (V25 02)   6  Dysmenorrhea (625 3) (N94 6)   7  Encounter for IUD insertion (V25 11) (Z30 430)   8  Encounter for routine gynecological examination (V72 31) (Z01 419)   9  Encounter for screening mammogram for malignant neoplasm of breast (V76 12)   (Z12 31)   10  KYLE (generalized anxiety disorder) (300 02) (F41 1)   11  Impulse control disorder (312 30) (F63 9)   12  Insomnia (780 52) (G47 00)   13  Intractable chronic migraine without aura and without status migrainosus (346 71)    (G43 719)   14  Lumbar radiculopathy (724 4) (M54 16)   15  Major depressive disorder, recurrent severe without psychotic features (296 33) (F33 2)   16  Medication overuse headache (339 3) (G44 40)   17  Migraine headache (346 90) (G43 909)   18  Myofascial pain syndrome (729 1) (M79 1)   19  Panic disorder without agoraphobia (300 01) (F41 0)   20  Piriformis syndrome, unspecified laterality (355 0) (G57 00)   21  Sacroiliitis (720 2) (M46 1)   22  Sciatica (724 3) (M54 30)   23  Spondylosis of lumbar region without myelopathy or radiculopathy (721 3) (M47 816)    Past Medical History    1  History of Anxiety (300 00) (F41 9)   2  History of Asthma (493 90) (J45 909)   3   History of Chondromalacia of patella, unspecified laterality (717 7) (M22 40)   4  History of Chronic migraine (346 70) (G43 709)   5  History of Deep vein thrombophlebitis of leg, unspecified laterality (451 19) (I80 209)   6  History of Depression (311) (F32 9)   7  History of DEXA Body Composition Study   8  History of Encounter for contraceptive surveillance (V25 40) (Z30 40)   9  History of candidal vulvovaginitis (V13 29) (Z86 19)   10  History of cystitis (V13 02) (Z87 440)   11  History of gastroesophageal reflux (GERD) (V12 79) (Z87 19)   12  Denied: History of head injury   13  History of human papillomavirus infection (V12 09) (Z86 19)   14  History of hypothyroidism (V12 29) (Z86 39)   15  History of osteopenia (V13 59) (Z87 39)   16  History of thyroid disease (V12 29) (Z86 39)   17  History of Mammogram   18  History of Moderate dysplasia of cervix (ESAU II) (622 12) (N87 1)   19  History of Previously Pregnant With 1  Normal Vaginal Deliveries   20  History of Reported Pap Smear   21  Denied: History of Seizures   22  History of Summary Of Previous Pregnancies  2  (Total No )    Surgical History    1  History of Cervix Cryosurgery   2  History of  Section   3  History of Colposcopy Cervix With Biopsy(S)   4  History of Complete Colonoscopy   5  History of Contraceptives (V25 02)   6  Contraceptives (V25 02)   7  History of Laparoscopy (Diagnostic)   8  History of Oral Surgery Tooth Extraction    Family History  Mother    1  No family history of mental disorder  Father    2  Family history of Colon Cancer (V16 0)   3  No family history of mental disorder  Maternal Grandfather    4  Family history of Colon Cancer (V16 0)   5  Family history of Prostate Cancer (V16 42)  Maternal Aunt    6  Family history of Colon Cancer (V16 0)  Family History    7   Family history of Lung Cancer (V16 1)    Social History    · Denied: History of Alcohol Use (History)   · Denied: History of Caffeine Use   · Denied: History of Drug Use (305 90)   · Marital History - Currently    · Never A Smoker   · Sedentary Lifestyle (V69 0)   · Sexually Active    Current Meds   1  ALPRAZolam 1 MG Oral Tablet; Take 1/2 to 1 tablet 3 tmes daily as needed for anxiety; Therapy: 37UZT9861 to (Evaluate:37Xtx1098); Last Rx:26May2016 Ordered   2  ARIPiprazole 30 MG Oral Tablet; TAKE 1 TABLET AT BEDTIME; Therapy: 82MMD7964 to (Evaluate:45Dbt4964)  Requested for: 10DLI9688; Last   Rx:26May2016 Ordered   3  Botox 100 UNIT Injection Solution Reconstituted; To be injected by physician as   indicated; Therapy: 90BBE6154 to (Last ZK:33EAR8652) Ordered   4  Cyclobenzaprine HCl - 10 MG Oral Tablet; TAKE 1 TABLET TWICE DAILY AS NEEDED   FOR MUSCLE SPASM; Therapy: 62Cef7094 to (Evaluate:64Lww3282)  Requested for: 68Ppa6256; Last   Rx:46Gcm5857 Ordered   5  Gabapentin 300 MG Oral Capsule; take 2 tab in the morning 1 tab in the afternoon and 2   tabs at night x 5 days then 2 tabs tid; Therapy: 55MOC6729 to (Evaluate:38Qqu0270)  Requested for: 12Yyu6546; Last   Rx:03Mar2016; Status: ACTIVE - Renewal Denied Ordered   6  Ibuprofen 800 MG Oral Tablet; Take 1 tablet 3 times daily as needed; Therapy: 17ENC0573 to (Evaluate:11Mar2017)  Requested for: 97AVE7242; Last   Rx:16Mar2016 Ordered   7  Naproxen 500 MG Oral Tablet; TAKE 1 TABLET AS NEEDED PRN PAIN  (TAKE WITH   IMITREX); Therapy: 38LKD9070 to (Maryruth Ganser)  Requested for: 69TFP4174; Last   Rx:14Jan2015 Ordered   8  Omeprazole 20 MG Oral Capsule Delayed Release; Therapy: 13TDF6268 to (Last Rx:28Jan2011)  Requested for: 03ONF4123 Ordered   9  Ondansetron HCl - 4 MG Oral Tablet; 1 tab q8h prn nausea; Therapy: 91PRI0098 to (Last Leki Benitez)  Requested for: 02KNB2379 Ordered   10  ProAir  (90 Base) MCG/ACT Inhalation Aerosol Solution; Therapy: 90TEW5033 to (Last Mat Anne)  Requested for: 27Apr2011 Ordered   11  Sertraline HCl - 100 MG Oral Tablet; TAKE 1 AND 1/2 TABLETS DAILY;     Therapy: 65KHB0289 to (Evaluate:08Auk0946)  Requested for: 21Clq9383; Last    Rx:06Hfu0431; Status: ACTIVE - Renewal Denied Ordered   12  SUMAtriptan Succinate 100 MG Oral Tablet; TAKE ONE (1) TABLET(S)at onset of    migraine MAY REPEAT ONCE AFTER 2 HOURS  MAX 2 DOSES IN 24HOURS; Therapy: 45OGL6519 to (Evaluate:27Oct2016)  Requested for: 15NUQ1870; Last    Rx:63Dpd6870 Ordered   13  Symbicort 160-4 5 MCG/ACT Inhalation Aerosol; Therapy: 32BIK1642 to (Last Cathern So)  Requested for: 26Yym7039 Ordered   14  Terconazole 0 4 % Vaginal Cream; INSERT 1 APPLICATORFUL INTRAVAGINALLY AT    BEDTIME; Therapy: 10ECL4470 to (Evaluate:16Jun2016)  Requested for: 39QNH8054; Last    Rx:52Wmb4118 Ordered   15  Topiramate 50 MG Oral Tablet; TAKE 1 5 TABLET Twice daily  Requested for: 24ZUI1566;    Last Rx:48Nfy8527 Ordered   16  TraMADol HCl - 50 MG Oral Tablet; Therapy: 95Afo3925 to Recorded   17  Venlafaxine HCl  MG Oral Capsule Extended Release 24 Hour; TAKE 2    CAPSULES DAILY; Therapy: 34UTO8316 to (Evaluate:43Zed1750)  Requested for: 66WJZ0770; Last    Rx:81Syh7176 Ordered   18  Vitamin B-2 100 MG Oral Tablet; Take two in am daily; Therapy: 51FEC2914 to (Last DE:28LWR9018)  Requested for: 35Icn9729 Ordered   19  Xopenex 1 25 MG/3ML Inhalation Nebulization Solution; Therapy: 69YJE3568 to (Last Rx:08Oct2010)  Requested for: 89HCD9840 Ordered   20  Zolpidem Tartrate 10 MG Oral Tablet; TAKE 1 TABLET AT BEDTIME AS NEEDED; Therapy: 16UVF1070 to (Evaluate:58Hha0848); Last Rx:75Hos9845 Ordered    Allergies    1  Erythromycin TABS   2  Diflucan TABS   3  Zithromax TABS    4  Shellfish    Vitals  Vital Signs    Recorded: 09GSU5436 10:78BR   Systolic 212, RUE, Sitting   Diastolic 70, RUE, Sitting   Height 5 ft 6 in   Weight 188 lb    BMI Calculated 30 34   BSA Calculated 1 95     Physical Exam    Constitutional   General appearance: No acute distress, well appearing and well nourished      Genitourinary   External genitalia: Normal and no lesions appreciated  Vagina: Normal, no lesions or dryness appreciated  Urethra: Normal     Urethral meatus: Normal     Bladder: Normal, soft, non-tender and no prolapse or masses appreciated  Cervix: Normal, no palpable masses  IUD strings are normal       Future Appointments    Date/Time Provider Specialty Site   10/04/2016 07:30 AM [facility] Neurology [facility] NEUROLOGY [facility]   01/16/2017 07:30 AM [facility] Neurology [facility] NEUROLOGY [facility]   09/01/2016 02:40 PM Linda Anne [facility] Obstetrics/Gynecology [facility] OB   08/03/2016 08:00 AM Luisa Gregg MD Pain Management [place] [address]     Signatures  Electronically signed by : Mary Portillo [facility];  Aug  2 2016  3:23PM EST                       (Author)

## 2018-01-22 VITALS
DIASTOLIC BLOOD PRESSURE: 73 MMHG | WEIGHT: 179 LBS | SYSTOLIC BLOOD PRESSURE: 108 MMHG | BODY MASS INDEX: 28.77 KG/M2 | HEIGHT: 66 IN | HEART RATE: 87 BPM

## 2018-01-22 VITALS
HEART RATE: 76 BPM | DIASTOLIC BLOOD PRESSURE: 68 MMHG | WEIGHT: 149 LBS | RESPIRATION RATE: 12 BRPM | BODY MASS INDEX: 23.95 KG/M2 | SYSTOLIC BLOOD PRESSURE: 112 MMHG | HEIGHT: 66 IN

## 2018-01-22 VITALS
SYSTOLIC BLOOD PRESSURE: 112 MMHG | BODY MASS INDEX: 24.27 KG/M2 | WEIGHT: 151 LBS | HEIGHT: 66 IN | DIASTOLIC BLOOD PRESSURE: 72 MMHG | HEART RATE: 77 BPM

## 2018-01-22 VITALS
DIASTOLIC BLOOD PRESSURE: 71 MMHG | SYSTOLIC BLOOD PRESSURE: 111 MMHG | BODY MASS INDEX: 26.52 KG/M2 | WEIGHT: 165 LBS | HEIGHT: 66 IN | HEART RATE: 87 BPM

## 2018-01-23 ENCOUNTER — ALLSCRIPTS OFFICE VISIT (OUTPATIENT)
Dept: OTHER | Facility: OTHER | Age: 48
End: 2018-01-23

## 2018-01-23 VITALS
BODY MASS INDEX: 23.14 KG/M2 | DIASTOLIC BLOOD PRESSURE: 69 MMHG | HEIGHT: 66 IN | SYSTOLIC BLOOD PRESSURE: 106 MMHG | WEIGHT: 144 LBS | HEART RATE: 85 BPM
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
## 2018-01-23 NOTE — MISCELLANEOUS
Message     Recorded as Task   Date: 11/21/2017 09:49 AM, Created By: Aristeo Rogers   Task Name: Miscellaneous   Assigned To: Mindy Paige clinical,Team   Regarding Patient: Alexsander Antonio, Status: In Progress   Comment:    Kortney Harding - 21 Nov 2017 9:49 AM     TASK CREATED  phone call from patient returning your call regarding mri results  unable to reactivate previous task  please call patient at [phone]  per patient she will be available  Dominic Kenney - 21 Nov 2017 4:36 PM     TASK REPLIED TO: Previously Assigned To SPA liset clinical,Team  Called the patient and discussed her lumbar MRI results with her  I offered her an injection  However she would like to think it over and call the office back tomorrow  Clair Gonzalez - 21 Nov 2017 4:45 PM     TASK IN PROGRESS   Awais Blue - 01 Dec 2017 1:48 PM     TASK EDITED  Attempted to reach pt  LVMMOM with c/b #, office hours and location  Claudia Collins - 05 Dec 2017 12:08 PM     TASK EDITED  Haven't heard back from pt  , what injection would we give this pt when I call her back if she would like to schedule an injection? Can not find it in your OV note  thanks  Tracey Corona - 05 Dec 2017 3:51 PM     TASK REPLIED TO: Previously Assigned To Tracey Corona  I called the Patient and left message asking if she would like to move forward with a injection or make a follow up appointment  Claudia Collins - 05 Dec 2017 4:07 PM     TASK EDITED        Active Problems    1  Akathisia (781 0)   2  Amenorrhea (626 0) (N91 2)   3  Asthma, persistent (493 90) (J45 909)   4  History of Chondromalacia of patella, unspecified laterality (717 7) (M22 40)   5  Chronic fatigue (780 79) (R53 82)   6  Chronic GERD (530 81) (K21 9)   7  Chronic migraine without aura (346 70) (G43 709)   8  Dysmenorrhea (625 3) (N94 6)   9  Encounter for IUD insertion (V25 11) (Z30 430)   10  Encounter for routine gynecological examination (V72 31) (Z01 419)   11  Encounter for screening for HIV (V73 89) (Z11 4)   12  Encounter for screening mammogram for malignant neoplasm of breast (V76 12)    (Z12 31)   13  KYLE (generalized anxiety disorder) (300 02) (F41 1)   14  Hypothyroidism (244 9) (E03 9)   15  Impulse control disorder (312 30) (F63 9)   16  Intractable chronic migraine without aura and without status migrainosus (346 71)    (G43 719)   17  Lumbar radiculopathy (724 4) (M54 16)   18  Major depressive disorder, recurrent severe without psychotic features (296 33) (F33 2)   19  Migraine, unspecified, not intractable, without status migrainosus (346 90) (G43 909)   20  Myofascial pain syndrome (729 1) (M79 1)   21  Neck pain on left side (723 1) (M54 2)   22  Need for hepatitis B screening test (V73 89) (Z11 59)   23  Need for hepatitis C screening test (V73 89) (Z11 59)   24  Other insomnia (780 52) (G47 09)   25  Panic disorder without agoraphobia (300 01) (F41 0)   26  Piriformis syndrome, unspecified laterality (355 0) (G57 00)   27  Right hip pain (719 45) (M25 551)   28  Sacroiliitis (720 2) (M46 1)   29  Sciatica (724 3) (M54 30)   30  Screening for STD (sexually transmitted disease) (V74 5) (Z11 3)   31  Spondylosis of lumbar region without myelopathy or radiculopathy (721 3) (M47 816)   32  Trochanteric bursitis of right hip (726 5) (M70 61)   33  Vitamin D deficiency (268 9) (E55 9)    Current Meds   1  ALPRAZolam 1 MG Oral Tablet; TAKE 1 TABLET 3 TIMES DAILY AS NEEDED; Therapy: 09WPH0079 to (Evaluate:04Tky2444); Last Rx:29Pdj9461 Ordered   2  ARIPiprazole 30 MG Oral Tablet (Abilify); TAKE 1 TABLET AT BEDTIME; Therapy: 81GLV0073 to ([phone])  Requested for: 52Jae1383; Last   Rx:26Zmc9143 Ordered   3  Botox 100 UNIT Injection Solution Reconstituted; To be injected by physician as   indicated; Therapy: 93SES0602 to (Last FY:74VXD1633) Ordered   4   BuPROPion HCl ER (XL) 300 MG Oral Tablet Extended Release 24 Hour; TAKE 1   TABLET DAILY; Therapy: 22Jlg1293 to (Evaluate:01Mar2018)  Requested for: 70BXK0482; Last   Rx:01Nov2017 Ordered   5  Diclofenac Sodium 75 MG Oral Tablet Delayed Release; TAKE 1 TABLET 2 TIMES DAILY   AFTER MEALS; Therapy: 52DEB8481 to ([address])  Requested for: 02Oct2017; Last   Rx:02Oct2017 Ordered   6  Gabapentin 300 MG Oral Capsule; 2 CAPS 3 TIMES A DAY; Therapy: 26XBA6833 to (Evaluate:56Xrl6793)  Requested for: 01Sep2017; Last   Rx:01Sep2017 Ordered   7  Mirena IUD; Therapy: (Recorded:30Jan2017) to Recorded   8  Naltrexone HCl - 50 MG Oral Tablet; TAKE 1 TABLET ONCE DAILY; Therapy: 24Ryn4920 to (Evaluate:01Mar2018)  Requested for: 19JCM4058; Last   Rx:01Nov2017 Ordered   9  Naratriptan HCl - 2 5 MG Oral Tablet; TAKE 1 TABLET AT ONSET OF HEADACHE, MAY   REPEAT ONCE IN 2 HOURS (MAX 2TABS/DAY 3DAYS/WK); Therapy: 24QGH9130 to (Evaluate:20Jul2017)  Requested for: 22FSO4364; Last   Rx:08Jul2017 Ordered   10  Omeprazole 20 MG Oral Capsule Delayed Release; TAKE 1 CAPSULE Daily; Therapy: 49ZFH1889 to ([facility]:07LXT8438)  Requested for: 27Jul2017 Recorded   11  Ondansetron HCl - 4 MG Oral Tablet; 1 tab q8h prn nausea; Therapy: 88VBG8651 to (Last KS:27DBD2080)  Requested for: 13UXJ5948 Ordered   12  ProAir  (90 Base) MCG/ACT Inhalation Aerosol Solution; Therapy: 66ONH6527 to (Last Chidi Fus)  Requested for: 27Apr2011 Ordered   13  Sertraline HCl - 100 MG Oral Tablet; take 2 tablets at bedtime; Therapy: 82FFV6521 to ([phone])  Requested for: 24Oct2017; Last    Rx:06Waq8138; Status: ACTIVE - Renewal Denied Ordered   14  Spiriva HandiHaler 18 MCG Inhalation Capsule; Therapy: (Recorded:95Dwh3765) to Recorded   15  SUMAtriptan Succinate 100 MG Oral Tablet (Imitrex); TAKE ONE (1) TABLET(S)at onset    of migraine MAY REPEAT ONCE AFTER 2 HOURS  MAX 2 DOSES IN 24HOURS; Therapy: 96XRI1139 to (Evaluate:86Kpn2920)  Requested for: 50UCJ1743; Last    Rx:21Nov2017 Ordered   16   Symbicort 160-4 5 MCG/ACT Inhalation Aerosol; Therapy: 90LMY1524 to (Last Sanjanarebeca Dai)  Requested for: 85Txr3412 Ordered   17  Topiramate 100 MG Oral Tablet; 1 BID  Requested for: 67OAX7495; Last WC:54VFA2537    Ordered   18  Topiramate 50 MG Oral Tablet; take 1 tab bid with 100mg tablets; Therapy: 84PAZ2804 to (Evaluate:13Gxy2058)  Requested for: 77QNR4737; Last    Rx:20Nov2017 Ordered   19  Venlafaxine HCl  MG Oral Capsule Extended Release 24 Hour (Effexor XR);    TAKE 2 CAPSULES DAILY; Therapy: 25QOH7461 to ([phone])  Requested for: 26Blz3212; Last    Rx:35Ysf3944 Ordered   20  Vitamin D CAPS; Therapy: (Recorded:49Ibs5542) to Recorded   21  Xopenex 1 25 MG/3ML Inhalation Nebulization Solution (Levalbuterol HCl); Therapy: 34EHY5779 to (Last Rx:08Oct2010)  Requested for: 09FJZ2985 Ordered   22  Zolpidem Tartrate 10 MG Oral Tablet; TAKE 1 TABLET AT BEDTIME AS NEEDED; Therapy: 08OFH7955 to (Evaluate:25Jan2018); Last Rx:89Sgs9652 Ordered    Allergies    1  azithromycin   2  Erythromycin TABS   3  Diflucan TABS    4   Shellfish    Signatures   Electronically signed by : Yolanda Boo, ; Dec 11 2017  3:36PM EST                       (Author)

## 2018-01-24 VITALS
BODY MASS INDEX: 22.76 KG/M2 | HEART RATE: 78 BPM | WEIGHT: 141 LBS | DIASTOLIC BLOOD PRESSURE: 66 MMHG | SYSTOLIC BLOOD PRESSURE: 101 MMHG

## 2018-01-24 NOTE — MISCELLANEOUS
Provider Comments  Provider Comments: The patient did not come to her Botox procedure visit today  Signatures   Electronically signed by :  An Kyle, [facility]; Jan 23 2018  4:28PM EST                       (Author)

## 2018-02-01 ENCOUNTER — TELEPHONE (OUTPATIENT)
Dept: NEUROLOGY | Facility: CLINIC | Age: 48
End: 2018-02-01

## 2018-02-01 DIAGNOSIS — G43.709 CHRONIC MIGRAINE WITHOUT AURA WITHOUT STATUS MIGRAINOSUS, NOT INTRACTABLE: Primary | ICD-10-CM

## 2018-02-01 RX ORDER — DEXAMETHASONE 1 MG
1 TABLET ORAL 2 TIMES DAILY WITH MEALS
Qty: 5 TABLET | Refills: 0 | Status: SHIPPED | OUTPATIENT
Start: 2018-02-01 | End: 2018-04-13

## 2018-02-01 RX ORDER — DIVALPROEX SODIUM 250 MG/1
250 TABLET, DELAYED RELEASE ORAL
Qty: 8 TABLET | Refills: 0 | Status: SHIPPED | OUTPATIENT
Start: 2018-02-01 | End: 2018-08-16 | Stop reason: SDUPTHER

## 2018-02-01 NOTE — TELEPHONE ENCOUNTER
Pt called stating she no longer uses [facility]  I called [facility] to cancel both scripts and then called them in to [facility] on file per pt's requests

## 2018-02-01 NOTE — TELEPHONE ENCOUNTER
Decadron 5 days in the am and depakote 5 nights (2 tabs for 3 days and 1 tab for 2 days)  Sent to pharm  No zomig until tomorrow  And no imitrex at all  She needs to pick one or the other  Zofran is going to remain at 10 a month and if she needs more than that she needs to see a GI doctor to determine why she needs 30 pills a month    I discussed with Dr Martine Trotter

## 2018-02-01 NOTE — TELEPHONE ENCOUNTER
pt called  migraine started yesterday morning  she does not have any zomig left and unable to refill until tomommow  she states that she talked to the on call dr and he was going to send in another script   i do not see anything ordered  she was requesting him to send in imitrex and zofran  in the past she has had 30 tabs of zofran and last time she was only given 10 tabs  she is requesting 30 tabs  8/10   + nausea/light sensitivity  gabapentin 300mg 2 tabs tid  topamax 150mg 2 tabs bid  mag 300mg daily  yesterday took excederine, tylenol and ibuprofen and not effective  took decadron and depakote in the past and this was effective    please advise

## 2018-02-14 ENCOUNTER — APPOINTMENT (EMERGENCY)
Dept: CT IMAGING | Facility: HOSPITAL | Age: 48
End: 2018-02-14
Payer: COMMERCIAL

## 2018-02-14 ENCOUNTER — HOSPITAL ENCOUNTER (EMERGENCY)
Facility: HOSPITAL | Age: 48
Discharge: HOME | End: 2018-02-14
Attending: EMERGENCY MEDICINE | Admitting: EMERGENCY MEDICINE
Payer: COMMERCIAL

## 2018-02-14 VITALS
BODY MASS INDEX: 24.69 KG/M2 | OXYGEN SATURATION: 99 % | TEMPERATURE: 98.2 F | DIASTOLIC BLOOD PRESSURE: 59 MMHG | WEIGHT: 153 LBS | RESPIRATION RATE: 16 BRPM | HEART RATE: 92 BPM | SYSTOLIC BLOOD PRESSURE: 109 MMHG

## 2018-02-14 DIAGNOSIS — G43.709 CHRONIC MIGRAINE WITHOUT AURA WITHOUT STATUS MIGRAINOSUS, NOT INTRACTABLE: Primary | ICD-10-CM

## 2018-02-14 DIAGNOSIS — E86.0 MILD DEHYDRATION: ICD-10-CM

## 2018-02-14 DIAGNOSIS — R55 NEAR SYNCOPE: ICD-10-CM

## 2018-02-14 DIAGNOSIS — N39.0 UTI (URINARY TRACT INFECTION): Primary | ICD-10-CM

## 2018-02-14 LAB
ALBUMIN SERPL BCP-MCNC: 3.8 G/DL (ref 3.5–5)
ALP SERPL-CCNC: 61 U/L (ref 46–116)
ALT SERPL W P-5'-P-CCNC: 20 U/L (ref 12–78)
AMPHETAMINES SERPL QL SCN: NEGATIVE
ANION GAP SERPL CALCULATED.3IONS-SCNC: 8 MMOL/L (ref 4–13)
APTT PPP: 26 SECONDS (ref 23–35)
AST SERPL W P-5'-P-CCNC: 12 U/L (ref 5–45)
ATRIAL RATE: 114 BPM
BACTERIA UR QL AUTO: ABNORMAL /HPF
BARBITURATES UR QL: NEGATIVE
BASOPHILS # BLD AUTO: 0.02 THOUSANDS/ΜL (ref 0–0.1)
BASOPHILS NFR BLD AUTO: 0 % (ref 0–1)
BENZODIAZ UR QL: NEGATIVE
BILIRUB SERPL-MCNC: 0.3 MG/DL (ref 0.2–1)
BILIRUB UR QL STRIP: NEGATIVE
BUN SERPL-MCNC: 15 MG/DL (ref 5–25)
CALCIUM SERPL-MCNC: 9.2 MG/DL (ref 8.3–10.1)
CHLORIDE SERPL-SCNC: 106 MMOL/L (ref 100–108)
CLARITY UR: ABNORMAL
CO2 SERPL-SCNC: 29 MMOL/L (ref 21–32)
COCAINE UR QL: NEGATIVE
COLOR UR: YELLOW
CREAT SERPL-MCNC: 1.3 MG/DL (ref 0.6–1.3)
EOSINOPHIL # BLD AUTO: 0.05 THOUSAND/ΜL (ref 0–0.61)
EOSINOPHIL NFR BLD AUTO: 1 % (ref 0–6)
ERYTHROCYTE [DISTWIDTH] IN BLOOD BY AUTOMATED COUNT: 12.3 % (ref 11.6–15.1)
EXT PREG TEST URINE: NEGATIVE
GFR SERPL CREATININE-BSD FRML MDRD: 49 ML/MIN/1.73SQ M
GLUCOSE SERPL-MCNC: 86 MG/DL (ref 65–140)
GLUCOSE UR STRIP-MCNC: NEGATIVE MG/DL
HCT VFR BLD AUTO: 38.6 % (ref 34.8–46.1)
HGB BLD-MCNC: 12.6 G/DL (ref 11.5–15.4)
HGB UR QL STRIP.AUTO: ABNORMAL
INR PPP: 0.97 (ref 0.86–1.16)
KETONES UR STRIP-MCNC: NEGATIVE MG/DL
LEUKOCYTE ESTERASE UR QL STRIP: ABNORMAL
LYMPHOCYTES # BLD AUTO: 1.79 THOUSANDS/ΜL (ref 0.6–4.47)
LYMPHOCYTES NFR BLD AUTO: 22 % (ref 14–44)
MAGNESIUM SERPL-MCNC: 2.3 MG/DL (ref 1.6–2.6)
MCH RBC QN AUTO: 29.9 PG (ref 26.8–34.3)
MCHC RBC AUTO-ENTMCNC: 32.6 G/DL (ref 31.4–37.4)
MCV RBC AUTO: 92 FL (ref 82–98)
METHADONE UR QL: NEGATIVE
MONOCYTES # BLD AUTO: 0.76 THOUSAND/ΜL (ref 0.17–1.22)
MONOCYTES NFR BLD AUTO: 9 % (ref 4–12)
NEUTROPHILS # BLD AUTO: 5.5 THOUSANDS/ΜL (ref 1.85–7.62)
NEUTS SEG NFR BLD AUTO: 68 % (ref 43–75)
NITRITE UR QL STRIP: NEGATIVE
NON-SQ EPI CELLS URNS QL MICRO: ABNORMAL /HPF
OPIATES UR QL SCN: NEGATIVE
P AXIS: 53 DEGREES
PCP UR QL: NEGATIVE
PH UR STRIP.AUTO: 7 [PH] (ref 4.5–8)
PLATELET # BLD AUTO: 207 THOUSANDS/UL (ref 149–390)
PMV BLD AUTO: 9.4 FL (ref 8.9–12.7)
POTASSIUM SERPL-SCNC: 4 MMOL/L (ref 3.5–5.3)
PR INTERVAL: 146 MS
PROT SERPL-MCNC: 6.7 G/DL (ref 6.4–8.2)
PROT UR STRIP-MCNC: NEGATIVE MG/DL
PROTHROMBIN TIME: 13.2 SECONDS (ref 12.1–14.4)
QRS AXIS: 251 DEGREES
QRSD INTERVAL: 98 MS
QT INTERVAL: 342 MS
QTC INTERVAL: 461 MS
RBC # BLD AUTO: 4.22 MILLION/UL (ref 3.81–5.12)
RBC #/AREA URNS AUTO: ABNORMAL /HPF
SODIUM SERPL-SCNC: 143 MMOL/L (ref 136–145)
SP GR UR STRIP.AUTO: 1.02 (ref 1–1.03)
T WAVE AXIS: 60 DEGREES
THC UR QL: NEGATIVE
TSH SERPL DL<=0.05 MIU/L-ACNC: 6.31 UIU/ML (ref 0.36–3.74)
UROBILINOGEN UR QL STRIP.AUTO: 0.2 E.U./DL
VENTRICULAR RATE: 109 BPM
WBC # BLD AUTO: 8.12 THOUSAND/UL (ref 4.31–10.16)
WBC #/AREA URNS AUTO: ABNORMAL /HPF

## 2018-02-14 PROCEDURE — 80053 COMPREHEN METABOLIC PANEL: CPT | Performed by: EMERGENCY MEDICINE

## 2018-02-14 PROCEDURE — 84443 ASSAY THYROID STIM HORMONE: CPT | Performed by: EMERGENCY MEDICINE

## 2018-02-14 PROCEDURE — 99284 EMERGENCY DEPT VISIT MOD MDM: CPT

## 2018-02-14 PROCEDURE — 85025 COMPLETE CBC W/AUTO DIFF WBC: CPT | Performed by: EMERGENCY MEDICINE

## 2018-02-14 PROCEDURE — 93005 ELECTROCARDIOGRAM TRACING: CPT

## 2018-02-14 PROCEDURE — 83735 ASSAY OF MAGNESIUM: CPT | Performed by: EMERGENCY MEDICINE

## 2018-02-14 PROCEDURE — 36415 COLL VENOUS BLD VENIPUNCTURE: CPT | Performed by: EMERGENCY MEDICINE

## 2018-02-14 PROCEDURE — 87086 URINE CULTURE/COLONY COUNT: CPT

## 2018-02-14 PROCEDURE — 96360 HYDRATION IV INFUSION INIT: CPT

## 2018-02-14 PROCEDURE — 81001 URINALYSIS AUTO W/SCOPE: CPT

## 2018-02-14 PROCEDURE — 80307 DRUG TEST PRSMV CHEM ANLYZR: CPT | Performed by: EMERGENCY MEDICINE

## 2018-02-14 PROCEDURE — 70450 CT HEAD/BRAIN W/O DYE: CPT

## 2018-02-14 PROCEDURE — 81025 URINE PREGNANCY TEST: CPT | Performed by: EMERGENCY MEDICINE

## 2018-02-14 PROCEDURE — 85730 THROMBOPLASTIN TIME PARTIAL: CPT | Performed by: EMERGENCY MEDICINE

## 2018-02-14 PROCEDURE — 87077 CULTURE AEROBIC IDENTIFY: CPT

## 2018-02-14 PROCEDURE — 93010 ELECTROCARDIOGRAM REPORT: CPT | Performed by: INTERNAL MEDICINE

## 2018-02-14 PROCEDURE — 96361 HYDRATE IV INFUSION ADD-ON: CPT

## 2018-02-14 PROCEDURE — 85610 PROTHROMBIN TIME: CPT | Performed by: EMERGENCY MEDICINE

## 2018-02-14 PROCEDURE — 87186 SC STD MICRODIL/AGAR DIL: CPT

## 2018-02-14 RX ORDER — SULFAMETHOXAZOLE AND TRIMETHOPRIM 800; 160 MG/1; MG/1
1 TABLET ORAL 2 TIMES DAILY
Qty: 6 TABLET | Refills: 0 | Status: SHIPPED | OUTPATIENT
Start: 2018-02-14 | End: 2018-02-17

## 2018-02-14 RX ORDER — ZOLMITRIPTAN 5 MG/1
TABLET, FILM COATED ORAL
Qty: 8 TABLET | Refills: 0 | Status: SHIPPED | OUTPATIENT
Start: 2018-02-14 | End: 2018-03-15 | Stop reason: SDUPTHER

## 2018-02-14 RX ORDER — ZOLMITRIPTAN 5 MG/1
1 TABLET, FILM COATED ORAL
COMMUNITY
Start: 2017-12-21 | End: 2018-02-14 | Stop reason: SDUPTHER

## 2018-02-14 RX ORDER — SULFAMETHOXAZOLE AND TRIMETHOPRIM 800; 160 MG/1; MG/1
1 TABLET ORAL ONCE
Status: COMPLETED | OUTPATIENT
Start: 2018-02-14 | End: 2018-02-14

## 2018-02-14 RX ADMIN — SULFAMETHOXAZOLE AND TRIMETHOPRIM 1 TABLET: 800; 160 TABLET ORAL at 16:41

## 2018-02-14 RX ADMIN — SODIUM CHLORIDE 1000 ML: 0.9 INJECTION, SOLUTION INTRAVENOUS at 15:46

## 2018-02-14 NOTE — DISCHARGE INSTRUCTIONS
Near Syncope   WHAT YOU NEED TO KNOW:   Near syncope, also called presyncope, is the feeling that you may faint (lose consciousness), but you do not  You can control some health conditions that cause near syncope  Your healthcare providers can help you create a plan to manage near syncope and prevent episodes  DISCHARGE INSTRUCTIONS:   Return to the emergency department if:   · You have sudden chest pain  · You have trouble breathing or shortness of breath  · You have vision changes, are sweating, and have nausea while you are sitting or lying down  · You feel dizzy or flushed and your heart is fluttering  · You lose consciousness  · You cannot use your arm, hand, foot, or leg, or it feels weak  · You have trouble speaking or understanding others when they speak  Contact your healthcare provider if:   · You have new or worsening symptoms  · Your heart beats faster or slower than usual      · You have questions or concerns about your condition or care  Follow up with your healthcare provider as directed: You may need more tests to help find the cause of your near syncope episodes  The tests will help healthcare providers plan the best treatment for you  Write down your questions so you remember to ask them during your visits  Manage near syncope:   · Sit or lie down when needed  This includes when you feel dizzy, your throat is getting tight, and your vision changes  Raise your legs above the level of your heart  · Take slow, deep breaths if you start to breathe faster with anxiety or fear  This can help decrease dizziness and the feeling that you might faint  · Keep a record of your near syncope episodes  Include your symptoms and your activity before and after the episode  The record can help your healthcare provider find the cause of your near syncope and help you manage episodes    Prevent a near syncope episode:   · Move slowly and let yourself get used to one position before you move to another position  This is very important when you change from a lying or sitting position to a standing position  Take some deep breaths before you stand up from a lying position  Stand up slowly  Sudden movements may cause a fainting spell  Sit on the side of the bed or couch for a few minutes before you stand up  If you are on bedrest, try to be upright for about 2 hours each day, or as directed  Do not lock your legs if you are standing for a long period of time  Move your legs and bend your knees to keep blood flowing  · Follow your healthcare provider's recommendations  Your provider may  recommend that you drink more liquids to prevent dehydration  You may also need to have more salt to keep your blood pressure from dropping too low and causing syncope  Your provider will tell you how much liquid and sodium to have each day  · Watch for signs of low blood sugar  These include hunger, nervousness, sweating, and fast or fluttery heartbeats  Talk with your healthcare provider about ways to keep your blood sugar level steady  · Check your blood pressure often  This is important if you take medicine to lower your blood pressure  Check your blood pressure when you are lying down and when you are standing  Ask how often to check during the day  Keep a record of your blood pressure numbers  Your healthcare provider may use the record to help plan your treatment  · Do not strain if you are constipated  You may faint if you strain to have a bowel movement  Walking is the best way to get your bowels moving  Eat foods high in fiber to make it easier to have a bowel movement  Good examples are high-fiber cereals, beans, vegetables, and whole-grain breads  Prune juice may help make bowel movements softer  · Do not exercise outside on a hot day  The combination of physical activity and heat can lead to dehydration  This can cause syncope    © 2017 [employer] [facility] is for End User's use only and may not be sold, redistributed or otherwise used for commercial purposes  All illustrations and images included in CareNotes® are the copyrighted property of [employer]  or [employer]  The above information is an  only  It is not intended as medical advice for individual conditions or treatments  Talk to your doctor, nurse or pharmacist before following any medical regimen to see if it is safe and effective for you  Urinary Tract Infection in Women   WHAT YOU NEED TO KNOW:   A urinary tract infection (UTI) is caused by bacteria that get inside your urinary tract  Most bacteria that enter your urinary tract come out when you urinate  If the bacteria stay in your urinary tract, you may get an infection  Your urinary tract includes your kidneys, ureters, bladder, and urethra  Urine is made in your kidneys, and it flows from the ureters to the bladder  Urine leaves the bladder through the urethra  A UTI is more common in your lower urinary tract, which includes your bladder and urethra  DISCHARGE INSTRUCTIONS:   Return to the emergency department if:   · You are urinating very little or not at all  · You have a high fever with shaking chills  · You have side or back pain that gets worse  Contact your healthcare provider if:   · You have a fever  · You do not feel better after 2 days of taking antibiotics  · You are vomiting  · You have questions or concerns about your condition or care  Medicines:   · Antibiotics  help fight a bacterial infection  · Medicines  may be given to decrease pain and burning when you urinate  They will also help decrease the feeling that you need to urinate often  These medicines will make your urine orange or red  · Take your medicine as directed  Contact your healthcare provider if you think your medicine is not helping or if you have side effects   Tell him or her if you are allergic to any medicine  Keep a list of the medicines, vitamins, and herbs you take  Include the amounts, and when and why you take them  Bring the list or the pill bottles to follow-up visits  Carry your medicine list with you in case of an emergency  Follow up with your healthcare provider as directed:  Write down your questions so you remember to ask them during your visits  Prevent another UTI:   · Empty your bladder often  Urinate and empty your bladder as soon as you feel the need  Do not hold your urine for long periods of time  · Wipe from front to back after you urinate or have a bowel movement  This will help prevent germs from getting into your urinary tract through your urethra  · Drink liquids as directed  Ask how much liquid to drink each day and which liquids are best for you  You may need to drink more liquids than usual to help flush out the bacteria  Do not drink alcohol, caffeine, or citrus juices  These can irritate your bladder and increase your symptoms  Your healthcare provider may recommend cranberry juice to help prevent a UTI  · Urinate after you have sex  This can help flush out bacteria passed during sex  · Do not douche or use feminine deodorants  These can change the chemical balance in your vagina  · Change sanitary pads or tampons often  This will help prevent germs from getting into your urinary tract  · Do pelvic muscle exercises often  Pelvic muscle exercises may help you start and stop urinating  Strong pelvic muscles may help you empty your bladder easier  Squeeze these muscles tightly for 5 seconds like you are trying to hold back urine  Then relax for 5 seconds  Gradually work up to squeezing for 10 seconds  Do 3 sets of 15 repetitions a day, or as directed  © 2017 [address] [employer] Information is for End User's use only and may not be sold, redistributed or otherwise used for commercial purposes   All illustrations and images included in Pool are the copyrighted property of [employer]  or [address]  The above information is an  only  It is not intended as medical advice for individual conditions or treatments  Talk to your doctor, nurse or pharmacist before following any medical regimen to see if it is safe and effective for you

## 2018-02-14 NOTE — ED PROVIDER NOTES
History  Chief Complaint   Patient presents with    Syncope     report that patient "collapsed" at work after having BP taken for unknown reason  stated per EMS "possible seizure with tremors, dilated pupils, and post ictyl"  patient without seizure history  per staff co-workers patient had "recent suicide attempt and is taking new medications"  pt  at bedside  patient w/o complaints  unsure "why i am here, but i know i am at the hospital"  patient tearful  History provided by:  Patient and EMS personnel   used: No    Syncope   Associated symptoms: no dizziness, no fever, no headaches, no nausea, no shortness of breath and no vomiting    57 y/o female sent by EMS for eval after apparent syncopal episode at work  Details unclear  Patient does not recall incident  EMS had limited info  Possible seizure-like activity  Patient denies hx of syncope, feeling unwell recently or prior to work, recent fever, chills, headache, nausea, vomiting  States she feels somewhat numb all over but has no other complaints  Tachycardic on arrival but this resolved spontaneously prior to my evaluation  Patient has normal neurologic exam   Her vitals are otherwise normal  Per records he has a recent history of Ambien/oxycodone overdose requiring intubation at [facility] a few months ago  Had follow-up with Psychiatry and medications were changed  Will attempt to get additional information from the patient's place of employment  Will check EKG, labs, CT head and re-evaluate  --- Discussed with patient's co-worker  Noted that the patient had been eating lunch and she had become limp, unable to speak  States the she never seemed to lose consciousness  Soon after, she was able to blink and follow commands as in squeeze with both her hands and shortly after she began to speak  No apparent tremors  Prior to Admission Medications   Prescriptions Last Dose Informant Patient Reported? Taking? ALPRAZolam (XANAX) 1 mg tablet   Yes No   Sig: Take 1 mg by mouth daily at bedtime as needed for anxiety  ARIPiprazole (ABILIFY) 10 mg tablet   Yes No   Sig: Take 50 mg by mouth daily  SUMAtriptan (IMITREX) 100 mg tablet   Yes No   Sig: Take 100 mg by mouth once as needed for migraine   SUMAtriptan-naproxen (TREXIMET)  MG per tablet   Yes No   Sig: Take 1 tablet by mouth every 2 (two) hours as needed for migraine   ZOLMitriptan (ZOMIG) 5 MG tablet   No No   Sig: Take 1 tab at first sign of headache, may repeat in 2 hours if needed  Max of 2 tabs in 24hrs/3days a week  dexamethasone (DECADRON) 1 mg tablet   No No   Sig: Take 1 tablet (1 mg total) by mouth 2 (two) times a day with meals 1 tab in the am for 5 days   diphenhydrAMINE (BENADRYL) 25 mg tablet   No No   Sig: Take 1 tablet by mouth every 6 (six) hours as needed (for headache, take with phenergan)   divalproex sodium (DEPAKOTE) 250 mg EC tablet   No No   Sig: Take 1 tablet (250 mg total) by mouth daily at bedtime 2 tabs at bedtime for 3 nights and 1 tab at bedtime for 2 nights   gabapentin (NEURONTIN) 300 mg capsule   Yes No   Sig: Take 300 mg by mouth 4 (four) times a day  levocetirizine (XYZAL) 5 MG tablet   Yes No   Sig: Take 5 mg by mouth every evening  ondansetron (ZOFRAN) 4 mg tablet   No No   Sig: Take 1 tablet (4 mg total) by mouth every 8 (eight) hours as needed for nausea or vomiting  promethazine (PHENERGAN) 25 mg tablet   No No   Sig: Take 1 tablet by mouth every 6 (six) hours as needed (headache)   venlafaxine (EFFEXOR) 37 5 mg tablet   Yes No   Sig: Take 37 5 mg by mouth daily  Facility-Administered Medications: None       Past Medical History:   Diagnosis Date    Asthma     depression     "anxiety and depression"     Migraine        Past Surgical History:   Procedure Laterality Date    LAPAROSCOPIC ENDOMETRIOSIS FULGURATION         History reviewed  No pertinent family history    I have reviewed and agree with the history as documented  Social History   Substance Use Topics    Smoking status: Never Smoker    Smokeless tobacco: Never Used    Alcohol use No        Review of Systems   Constitutional: Negative for activity change, appetite change, fatigue and fever  Respiratory: Negative for chest tightness and shortness of breath  Cardiovascular: Positive for syncope  Gastrointestinal: Negative for abdominal pain, nausea and vomiting  Musculoskeletal: Negative for back pain and neck pain  Skin: Negative for color change and pallor  Neurological: Positive for syncope and numbness  Negative for dizziness and headaches  All other systems reviewed and are negative  Physical Exam  ED Triage Vitals [02/14/18 1407]   Temperature Pulse Respirations Blood Pressure SpO2   98 2 °F (36 8 °C) (!) 117 16 117/70 95 %      Temp Source Heart Rate Source Patient Position - Orthostatic VS BP Location FiO2 (%)   Oral Monitor Sitting Right arm --      Pain Score       No Pain           Orthostatic Vital Signs  Vitals:    02/14/18 1641 02/14/18 1645 02/14/18 1700 02/14/18 1715   BP: 109/59 109/59     Pulse: 97 96 96 92   Patient Position - Orthostatic VS:           Physical Exam   Constitutional: She is oriented to person, place, and time  She appears well-developed and well-nourished  No distress  HENT:   Head: Normocephalic  Mouth/Throat: Oropharynx is clear and moist    Eyes: Conjunctivae are normal  Pupils are equal, round, and reactive to light  Neck: Normal range of motion  Neck supple  Cardiovascular: Normal rate and regular rhythm  Exam reveals no friction rub  No murmur heard  Pulmonary/Chest: Effort normal and breath sounds normal    Abdominal: Soft  She exhibits no distension  There is no tenderness  Musculoskeletal: Normal range of motion  She exhibits no edema  Neurological: She is alert and oriented to person, place, and time  No cranial nerve deficit or sensory deficit   She exhibits normal muscle tone  Coordination normal    Skin: Skin is warm and dry  Psychiatric: She has a normal mood and affect  Her behavior is normal    Nursing note and vitals reviewed  ED Medications  Medications   sodium chloride 0 9 % bolus 1,000 mL (1,000 mL Intravenous New Bag 2/14/18 1546)   sulfamethoxazole-trimethoprim (BACTRIM DS) 800-160 mg per tablet 1 tablet (1 tablet Oral Given 2/14/18 1641)       Diagnostic Studies  Results Reviewed     Procedure Component Value Units Date/Time    TSH [[id number]]  (Abnormal) Collected:  02/14/18 1525    Lab Status:  Final result Specimen:  Blood from Arm, Left Updated:  02/14/18 1606     TSH 3RD GENERATON 6 310 (H) uIU/mL     Narrative:         Patients undergoing fluorescein dye angiography may retain small amounts of fluorescein in the body for 48-72 hours post procedure  Samples containing fluorescein can produce falsely depressed TSH values  If the patient had this procedure,a specimen should be resubmitted post fluorescein clearance  The recommended reference ranges for TSH during pregnancy are as follows:  First trimester 0 1 to 2 5 uIU/mL  Second trimester  0 2 to 3 0 uIU/mL  Third trimester 0 3 to 3 0 uIU/m      Magnesium [[id number]]  (Normal) Collected:  02/14/18 1525    Lab Status:  Final result Specimen:  Blood from Arm, Left Updated:  02/14/18 1606     Magnesium 2 3 mg/dL     Urine Microscopic [[id number]]  (Abnormal) Collected:  02/14/18 1542    Lab Status:  Final result Specimen:  Urine from Urine, Clean Catch Updated:  02/14/18 1604     RBC, UA 0-1 (A) /hpf      WBC, UA 10-20 (A) /hpf      Epithelial Cells Occasional /hpf      Bacteria, UA Moderate (A) /hpf     Urine culture [[id number]] Collected:  02/14/18 1542    Lab Status:   In process Specimen:  Urine from Urine, Clean Catch Updated:  02/14/18 1603    Rapid drug screen, urine [[id number]]  (Normal) Collected:  02/14/18 1543    Lab Status:  Final result Specimen:  Urine Updated:  02/14/18 1600 Amph/Meth UR Negative     Barbiturate Ur Negative     Benzodiazepine Urine Negative     Cocaine Urine Negative     Methadone Urine Negative     Opiate Urine Negative     PCP Ur Negative     THC Urine Negative    Narrative:         FOR MEDICAL PURPOSES ONLY  IF CONFIRMATION NEEDED PLEASE CONTACT THE LAB WITHIN 5 DAYS  Drug Screen Cutoff Levels:  AMPHETAMINE/METHAMPHETAMINES  1000 ng/mL  BARBITURATES     200 ng/mL  BENZODIAZEPINES     200 ng/mL  COCAINE      300 ng/mL  METHADONE      300 ng/mL  OPIATES      300 ng/mL  PHENCYCLIDINE     25 ng/mL  THC       50 ng/mL    Comprehensive metabolic panel [[id number]] Collected:  02/14/18 1525    Lab Status:  Final result Specimen:  Blood from Arm, Left Updated:  02/14/18 1554     Sodium 143 mmol/L      Potassium 4 0 mmol/L      Chloride 106 mmol/L      CO2 29 mmol/L      Anion Gap 8 mmol/L      BUN 15 mg/dL      Creatinine 1 30 mg/dL      Glucose 86 mg/dL      Calcium 9 2 mg/dL      AST 12 U/L      ALT 20 U/L      Alkaline Phosphatase 61 U/L      Total Protein 6 7 g/dL      Albumin 3 8 g/dL      Total Bilirubin 0 30 mg/dL      eGFR 49 ml/min/1 73sq m     Narrative:         National Kidney Disease Education Program recommendations are as follows:  GFR calculation is accurate only with a steady state creatinine  Chronic Kidney disease less than 60 ml/min/1 73 sq  meters  Kidney failure less than 15 ml/min/1 73 sq  meters  Rachel Raphael [[id number]]  (Normal) Collected:  02/14/18 1525    Lab Status:  Final result Specimen:  Blood from Arm, Left Updated:  02/14/18 1547     Protime 13 2 seconds      INR 0 97    APTT [[id number]]  (Normal) Collected:  02/14/18 1525    Lab Status:  Final result Specimen:  Blood from Arm, Left Updated:  02/14/18 1547     PTT 26 seconds     Narrative:          Therapeutic Heparin Range = 60-90 seconds    POCT pregnancy, urine [[id number]]  (Normal) Resulted:  02/14/18 1544    Lab Status:  Final result Updated:  02/14/18 1544     EXT PREG TEST UR (Ref: Negative) negative    ED Urine Macroscopic [[id number]]  (Abnormal) Collected:  02/14/18 1542    Lab Status:  Final result Specimen:  Urine Updated:  02/14/18 1542     Color, UA Yellow     Clarity, UA Slightly Cloudy     pH, UA 7 0     Leukocytes, UA Small (A)     Nitrite, UA Negative     Protein, UA Negative mg/dl      Glucose, UA Negative mg/dl      Ketones, UA Negative mg/dl      Urobilinogen, UA 0 2 E U /dl      Bilirubin, UA Negative     Blood, UA Trace (A)     Specific [place], UA 1 020    Narrative:       CLINITEK RESULT    CBC and differential [[id number]]  (Normal) Collected:  02/14/18 1525    Lab Status:  Final result Specimen:  Blood from Arm, Left Updated:  02/14/18 1533     WBC 8 12 Thousand/uL      RBC 4 22 Million/uL      Hemoglobin 12 6 g/dL      Hematocrit 38 6 %      MCV 92 fL      MCH 29 9 pg      MCHC 32 6 g/dL      RDW 12 3 %      MPV 9 4 fL      Platelets 432 Thousands/uL      Neutrophils Relative 68 %      Lymphocytes Relative 22 %      Monocytes Relative 9 %      Eosinophils Relative 1 %      Basophils Relative 0 %      Neutrophils Absolute 5 50 Thousands/µL      Lymphocytes Absolute 1 79 Thousands/µL      Monocytes Absolute 0 76 Thousand/µL      Eosinophils Absolute 0 05 Thousand/µL      Basophils Absolute 0 02 Thousands/µL                  CT head without contrast   Final Result by Anju Garber MD (02/14 1604)      Normal head CT           Workstation performed: [id number]                    Procedures  ECG 12 Lead Documentation  Date/Time: 2/14/2018 2:42 PM  Performed by: Catalina Da Silva  Authorized by: Catalina Da Silva     Indications / Diagnosis:  Syncope  ECG reviewed by me, the ED Provider: yes    Patient location:  ED  Previous ECG:     Previous ECG:  Compared to current    Similarity:  No change  Rate:     ECG rate:  109  Rhythm:     Rhythm: sinus tachycardia    Ectopy:     Ectopy: none    QRS:     QRS axis:  Left  Conduction:     Conduction: abnormal      Abnormal conduction: incomplete RBBB    ST segments:     ST segments:  Normal  T waves:     T waves: normal             Phone Contacts  ED Phone Contact    ED Course  ED Course                                MDM  Number of Diagnoses or Management Options  Mild dehydration:   Near syncope:   UTI (urinary tract infection):   Diagnosis management comments: 71-year-old female sent from work after likely near syncopal episode  Normal vitals on arrival   They had initially reported seizure-like activity but when I discussed with 1 of her coworkers this was not true  Her EKG and labs or remarkable only for mild acute kidney injury  CT head unremarkable  Patient felt a little bit lightheaded and somewhat twitchy   states that she has been under a lot of stress lately  This may be related  Given IV fluids and reassessed and feeling okay  Patient also noted some dysuria over the last 2 days with the positive urine dip  Will discharge on Bactrim and have return if any symptoms worsen  Otherwise follow up with PCP         Amount and/or Complexity of Data Reviewed  Clinical lab tests: ordered and reviewed  Tests in the radiology section of CPT®: ordered and reviewed  Obtain history from someone other than the patient: yes  Discuss the patient with other providers: yes  Independent visualization of images, tracings, or specimens: yes    Patient Progress  Patient progress: improved    CritCare Time    Disposition  Final diagnoses:   UTI (urinary tract infection)   Mild dehydration   Near syncope     Time reflects when diagnosis was documented in both MDM as applicable and the Disposition within this note     Time User Action Codes Description Comment    2/14/2018  4:28 PM Jonathon PHILIP Add [R55] Syncope     2/14/2018  4:28 PM Vicky Chaidez Add [N39 0] UTI (urinary tract infection)     2/14/2018  4:28 PM Vicky Chaidez Add [E86 0] Mild dehydration     2/14/2018  5:39 PM Charles Chaidez Modify [N39 0] UTI (urinary tract infection)     2/14/2018  5:39 PM Yuriy Chaidez Remove [R55] Syncope     2/14/2018  5:40 PM Susan Chaidez Add [R55] Near syncope       ED Disposition     ED Disposition Condition Comment    Discharge  Brooke Falling discharge to home/self care  Condition at discharge: Stable        Follow-up Information     Follow up With Specialties Details Why Contact Info Additional [id number] [facility],  Internal Medicine   [address]  [address] Floor, [address] [facility],[address]  [address] [facility],[address] Emergency Department Emergency Medicine  If symptoms worsen [address] [facility] [address] [phone] [facility] ED, [address], [place], [place], [address]        Patient's Medications   Discharge Prescriptions    SULFAMETHOXAZOLE-TRIMETHOPRIM (BACTRIM DS) 800-160 MG PER TABLET    Take 1 tablet by mouth 2 (two) times a day for 3 days       Start Date: 2/14/2018 End Date: 2/17/2018       Order Dose: 1 tablet       Quantity: 6 tablet    Refills: 0     No discharge procedures on file      ED Provider  Electronically Signed by           Manolo Ogden MD  02/14/18 6741

## 2018-02-16 LAB
BACTERIA UR CULT: ABNORMAL
BACTERIA UR CULT: ABNORMAL

## 2018-03-07 NOTE — PROCEDURES
Procedure    Pre-procedure Diagnosis: Trochanteric Bursitis  Post-procedure Diagnosis: Trochanteric Bursitis  Operation Title(s):  Right trochanteric bursa injection under ultrasound guidance  Attending Surgeon:   Kaya Stevenson MD  Anesthesia:   Local    Indications: The patient is a 40year-old female with a diagnosis of trochanteric bursitis  The patient's history and physical exam were reviewed  The risks, benefits and alternatives to the procedure were discussed, and all questions were answered to the patient's satisfaction  The patient agreed to proceed, and written informed consent was obtained  Procedure in Detail: The patient was brought into the procedure room and placed in the left lateral decubitus position on the fluoroscopy table  The right hip(s) was prepped with chloraprep and draped in a sterile manner  A sterile ultrasound probe cover and gel was placed over a 3 75 MHz curvilinear transducer probe  The probe was placed over the lateral thigh to visualize the peritrochanteric bursal region  Optimal needle path was determined and the skin and subcutaneous tissues in the area was anesthetized with 1% lidocaine  Then, under ultrasound guidance, a 2 inch ultrasound needle was advanced until the needle entered the peritrochanteric bursa region  After visualization of the tip in the target area and negative aspiration for blood, a mixture of bupivacaine 0 25% 3 mL and Depo-Medrol 40 mg/mL 1 mL was injected into hip peritrochanteric bursal region  The patient's hip(s) was cleaned and a bandage was placed over the sites of needle insertion  Disposition: The patient tolerated the procedure well and there were no apparent complications  The patient was taken to the recovery area where written discharge instructions for the procedure were given          Signatures   Electronically signed by : Evan Lundberg MD; Sep 29 2017  2:44PM EST                       (Author)

## 2018-03-14 DIAGNOSIS — G43.709 CHRONIC MIGRAINE WITHOUT AURA WITHOUT STATUS MIGRAINOSUS, NOT INTRACTABLE: Primary | ICD-10-CM

## 2018-03-14 RX ORDER — GABAPENTIN 300 MG/1
CAPSULE ORAL
Qty: 180 CAPSULE | Refills: 5 | Status: SHIPPED | OUTPATIENT
Start: 2018-03-14 | End: 2018-10-10

## 2018-03-15 DIAGNOSIS — G43.709 CHRONIC MIGRAINE WITHOUT AURA WITHOUT STATUS MIGRAINOSUS, NOT INTRACTABLE: ICD-10-CM

## 2018-03-15 RX ORDER — ZOLMITRIPTAN 5 MG/1
TABLET, FILM COATED ORAL
Qty: 8 TABLET | Refills: 0 | Status: SHIPPED | OUTPATIENT
Start: 2018-03-15 | End: 2018-04-02 | Stop reason: SDUPTHER

## 2018-03-26 ENCOUNTER — TELEPHONE (OUTPATIENT)
Dept: OBGYN CLINIC | Facility: CLINIC | Age: 48
End: 2018-03-26

## 2018-03-26 ENCOUNTER — OFFICE VISIT (OUTPATIENT)
Dept: OBGYN CLINIC | Facility: CLINIC | Age: 48
End: 2018-03-26
Payer: COMMERCIAL

## 2018-03-26 VITALS
BODY MASS INDEX: 23.14 KG/M2 | SYSTOLIC BLOOD PRESSURE: 100 MMHG | WEIGHT: 144 LBS | HEIGHT: 66 IN | DIASTOLIC BLOOD PRESSURE: 64 MMHG

## 2018-03-26 DIAGNOSIS — R10.2 PELVIC PAIN: ICD-10-CM

## 2018-03-26 DIAGNOSIS — N76.0 BV (BACTERIAL VAGINOSIS): ICD-10-CM

## 2018-03-26 DIAGNOSIS — Z97.5 IUD (INTRAUTERINE DEVICE) IN PLACE: ICD-10-CM

## 2018-03-26 DIAGNOSIS — B96.89 BV (BACTERIAL VAGINOSIS): ICD-10-CM

## 2018-03-26 DIAGNOSIS — Z12.39 ENCOUNTER FOR SCREENING FOR MALIGNANT NEOPLASM OF BREAST: Primary | ICD-10-CM

## 2018-03-26 PROBLEM — E03.9 HYPOTHYROIDISM: Status: ACTIVE | Noted: 2017-07-27

## 2018-03-26 PROBLEM — K21.9 CHRONIC GERD: Status: ACTIVE | Noted: 2017-07-27

## 2018-03-26 PROBLEM — F33.2 MAJOR DEPRESSIVE DISORDER, RECURRENT SEVERE WITHOUT PSYCHOTIC FEATURES (HCC): Status: ACTIVE | Noted: 2017-09-11

## 2018-03-26 PROBLEM — G43.709 CHRONIC MIGRAINE WITHOUT AURA: Status: ACTIVE | Noted: 2017-05-01

## 2018-03-26 PROBLEM — R53.82 CHRONIC FATIGUE: Status: ACTIVE | Noted: 2017-07-27

## 2018-03-26 PROCEDURE — 99213 OFFICE O/P EST LOW 20 MIN: CPT | Performed by: NURSE PRACTITIONER

## 2018-03-26 RX ORDER — ARIPIPRAZOLE 30 MG/1
30 TABLET ORAL
Refills: 3 | COMMUNITY
Start: 2018-01-24 | End: 2018-04-12 | Stop reason: SDUPTHER

## 2018-03-26 RX ORDER — IBUPROFEN 800 MG/1
800 TABLET ORAL EVERY 8 HOURS PRN
Qty: 30 TABLET | Refills: 1 | Status: SHIPPED | OUTPATIENT
Start: 2018-03-26 | End: 2019-07-28 | Stop reason: HOSPADM

## 2018-03-26 RX ORDER — EPINEPHRINE 0.3 MG/.3ML
INJECTION SUBCUTANEOUS
COMMUNITY
Start: 2010-11-15 | End: 2022-01-05 | Stop reason: SDUPTHER

## 2018-03-26 RX ORDER — TOPIRAMATE 100 MG/1
TABLET, FILM COATED ORAL 2 TIMES DAILY
COMMUNITY
End: 2018-10-10

## 2018-03-26 RX ORDER — MOMETASONE FUROATE 50 UG/1
SPRAY, METERED NASAL
COMMUNITY
Start: 2018-03-12

## 2018-03-26 RX ORDER — OMEPRAZOLE 20 MG/1
1 CAPSULE, DELAYED RELEASE ORAL DAILY
COMMUNITY
Start: 2011-01-28 | End: 2019-11-12 | Stop reason: SDUPTHER

## 2018-03-26 RX ORDER — BUDESONIDE AND FORMOTEROL FUMARATE DIHYDRATE 160; 4.5 UG/1; UG/1
AEROSOL RESPIRATORY (INHALATION)
COMMUNITY
Start: 2011-04-27 | End: 2019-04-15 | Stop reason: HOSPADM

## 2018-03-26 RX ORDER — TOPIRAMATE 50 MG/1
50 TABLET, FILM COATED ORAL 2 TIMES DAILY
COMMUNITY
Start: 2016-12-01 | End: 2018-10-10

## 2018-03-26 RX ORDER — ALPRAZOLAM 1 MG/1
1 TABLET ORAL 3 TIMES DAILY PRN
COMMUNITY
Start: 2016-02-10 | End: 2018-08-16 | Stop reason: SDUPTHER

## 2018-03-26 RX ORDER — METRONIDAZOLE 500 MG/1
500 TABLET ORAL EVERY 8 HOURS SCHEDULED
Qty: 14 TABLET | Refills: 0 | Status: SHIPPED | OUTPATIENT
Start: 2018-03-26 | End: 2018-03-26 | Stop reason: DRUGHIGH

## 2018-03-26 RX ORDER — SERTRALINE HYDROCHLORIDE 100 MG/1
TABLET, FILM COATED ORAL
COMMUNITY
Start: 2018-03-04 | End: 2018-08-16 | Stop reason: SDUPTHER

## 2018-03-26 RX ORDER — ERGOCALCIFEROL 1.25 MG/1
50000 CAPSULE ORAL WEEKLY
Refills: 12 | COMMUNITY
Start: 2018-01-29 | End: 2019-04-05 | Stop reason: SDUPTHER

## 2018-03-26 RX ORDER — NARATRIPTAN 2.5 MG/1
TABLET ORAL
COMMUNITY
Start: 2017-05-01 | End: 2018-04-02 | Stop reason: ALTCHOICE

## 2018-03-26 RX ORDER — NALTREXONE HYDROCHLORIDE 50 MG/1
TABLET, FILM COATED ORAL
COMMUNITY
Start: 2018-03-04 | End: 2018-08-16 | Stop reason: SDUPTHER

## 2018-03-26 RX ORDER — METRONIDAZOLE 500 MG/1
500 TABLET ORAL EVERY 12 HOURS SCHEDULED
Qty: 14 TABLET | Refills: 0 | Status: SHIPPED | OUTPATIENT
Start: 2018-03-26 | End: 2018-04-02

## 2018-03-26 RX ORDER — ZOLPIDEM TARTRATE 10 MG/1
1 TABLET ORAL
COMMUNITY
Start: 2016-02-10 | End: 2018-08-16 | Stop reason: SDUPTHER

## 2018-03-26 RX ORDER — DEXAMETHASONE 2 MG/1
TABLET ORAL DAILY
COMMUNITY
Start: 2017-12-15 | End: 2018-08-16 | Stop reason: ALTCHOICE

## 2018-03-26 RX ORDER — BUPROPION HYDROCHLORIDE 150 MG/1
150 TABLET ORAL DAILY
Refills: 3 | COMMUNITY
Start: 2018-01-25 | End: 2018-04-13

## 2018-03-26 RX ORDER — TIOTROPIUM BROMIDE INHALATION SPRAY 3.12 UG/1
SPRAY, METERED RESPIRATORY (INHALATION)
COMMUNITY
Start: 2018-03-05 | End: 2022-04-27 | Stop reason: SDUPTHER

## 2018-03-26 RX ORDER — DEXAMETHASONE 0.5 MG/1
TABLET ORAL
Refills: 1 | COMMUNITY
Start: 2018-02-01 | End: 2018-03-26 | Stop reason: SDUPTHER

## 2018-03-26 RX ORDER — ALBUTEROL SULFATE 2.5 MG/3ML
SOLUTION RESPIRATORY (INHALATION)
COMMUNITY
Start: 2013-04-19

## 2018-03-26 RX ORDER — LEVALBUTEROL INHALATION SOLUTION 1.25 MG/3ML
SOLUTION RESPIRATORY (INHALATION)
COMMUNITY
Start: 2010-10-08 | End: 2019-07-28 | Stop reason: HOSPADM

## 2018-03-26 RX ORDER — OLOPATADINE HYDROCHLORIDE 1 MG/ML
SOLUTION/ DROPS OPHTHALMIC
COMMUNITY
Start: 2018-03-12 | End: 2019-08-07 | Stop reason: SDUPTHER

## 2018-03-26 RX ORDER — ALBUTEROL SULFATE 90 UG/1
POWDER, METERED RESPIRATORY (INHALATION)
Refills: 3 | COMMUNITY
Start: 2017-12-17 | End: 2019-04-02 | Stop reason: SDUPTHER

## 2018-03-26 RX ORDER — CHOLECALCIFEROL (VITAMIN D3) 125 MCG
5 CAPSULE ORAL
COMMUNITY
Start: 2017-11-27 | End: 2018-11-30 | Stop reason: SDDI

## 2018-03-26 RX ORDER — LORATADINE 10 MG
10 TABLET ORAL DAILY
Refills: 10 | COMMUNITY
Start: 2018-01-29

## 2018-03-26 RX ORDER — DIVALPROEX SODIUM 250 MG/1
TABLET, DELAYED RELEASE ORAL
COMMUNITY
Start: 2017-12-15 | End: 2018-03-26 | Stop reason: SDUPTHER

## 2018-03-26 RX ORDER — BUPROPION HYDROCHLORIDE 300 MG/1
300 TABLET ORAL DAILY
Refills: 3 | COMMUNITY
Start: 2018-01-28 | End: 2018-08-16 | Stop reason: SDUPTHER

## 2018-03-26 NOTE — ASSESSMENT & PLAN NOTE
Exam c/w same  Reviewed natural h/o Bv; discussed prognosis and management recommendations   Advised this may be the cause of her current c/o pelvic pain   Advised Flagyl BID; reviewed risks/benefits/SE's/AE's  Reviewed antabuse effects

## 2018-03-26 NOTE — ASSESSMENT & PLAN NOTE
Mirena IUD strings present x2, measuring ~3cm in length   Exam with d/c consistent with BV - exam is otherwise WNL   Recommended pelvic US to confirm proper IUD position given sudden onset of pelvic pain  Pt agrees to plan

## 2018-03-26 NOTE — PROGRESS NOTES
Assessment/Plan:    IUD (intrauterine device) in place  Mirena IUD strings present x2, measuring ~3cm in length   Exam with d/c consistent with BV - exam is otherwise WNL   Recommended pelvic US to confirm proper IUD position given sudden onset of pelvic pain  Pt agrees to plan     BV (bacterial vaginosis)  Exam c/w same  Reviewed natural h/o Bv; discussed prognosis and management recommendations   Advised this may be the cause of her current c/o pelvic pain   Advised Flagyl BID; reviewed risks/benefits/SE's/AE's  Reviewed antabuse effects  Diagnoses and all orders for this visit:    Encounter for screening for malignant neoplasm of breast  -     Mammo screening bilateral w cad; Future    Pelvic pain  -     US pelvis limited non OB; Future  -     ibuprofen (MOTRIN) 800 mg tablet; Take 1 tablet (800 mg total) by mouth every 8 (eight) hours as needed for moderate pain    BV (bacterial vaginosis)  -     metroNIDAZOLE (FLAGYL) 500 mg tablet; Take 1 tablet (500 mg total) by mouth every 8 (eight) hours for 7 days    IUD (intrauterine device) in place    Other orders  -     albuterol (2 5 mg/3 mL) 0 083 % nebulizer solution; Inhale  -     PROAIR RESPICLICK 891 (90 Base) MCG/ACT AEPB; 2 PUFFS EVERY 4 HOURS AS NEEDED  -     ARIPiprazole (ABILIFY) 30 mg tablet; Take 30 mg by mouth daily at bedtime  -     onabotulinumtoxin A (BOTOX) 100 units; Inject as directed  -     buPROPion (WELLBUTRIN XL) 300 mg 24 hr tablet; Take 300 mg by mouth daily  -     buPROPion (WELLBUTRIN XL) 150 mg 24 hr tablet; Take 150 mg by mouth daily  -     [employer] INDOOR/OUTDOOR ALLERGY RLF 10 MG tablet;  Take 10 mg by mouth daily  -     Discontinue: dexamethasone (DECADRON) 0 5 mg tablet; TAKE 2 TABLETS (1MG) BY MOUTH IN THE MORNING FOR 5 DAYS  -     EPINEPHrine (EPIPEN) 0 3 mg/0 3 mL SOAJ; as directed  -     ergocalciferol (VITAMIN D2) 50,000 units; 50,000 Units once a week  -     BREO ELLIPTA 200-25 MCG/INH inhaler;   -     Melatonin 5 MG TABS; Take 5 mg by mouth  -     levonorgestrel (MIRENA, 52 MG,) 20 MCG/24HR IUD; by Intrauterine route  -     mometasone (NASONEX) 50 mcg/act nasal spray;   -     naltrexone (REVIA) 50 mg tablet;   -     naratriptan (AMERGE) 2 5 MG tablet; Take by mouth  -     olopatadine (PATANOL) 0 1 % ophthalmic solution;   -     omeprazole (PriLOSEC) 20 mg delayed release capsule; Take 1 capsule by mouth daily  -     sertraline (ZOLOFT) 100 mg tablet;   -     dexamethasone (DECADRON) 2 mg tablet; Take by mouth daily  -     Discontinue: divalproex sodium (DEPAKOTE) 250 mg EC tablet; Take by mouth  -     budesonide-formoterol (SYMBICORT) 160-4 5 mcg/act inhaler; Inhale  -     SPIRIVA RESPIMAT 2 5 MCG/ACT AERS;   -     topiramate (TOPAMAX) 100 mg tablet; Take by mouth 2 (two) times a day  -     topiramate (TOPAMAX) 50 MG tablet; Take by mouth  -     levalbuterol (XOPENEX) 1 25 mg/3 mL nebulizer solution; Inhale  -     zolpidem (AMBIEN) 10 mg tablet; Take 1 tablet by mouth  -     ALPRAZolam (XANAX) 1 mg tablet; Take 1 tablet by mouth 3 (three) times a day as needed          Subjective:      Patient ID: Brooke Flores is a 56 y o  female  This patient presents with c/o pelvic pain  Present x3 days  Feels similar to prior dysmenorrhea sx  Mirena IUD present for management of dysmenorrhea per pt  She has been amenorrheic on this product since insertion approx 2 yrs ago  She denies abn bleeding, abn discharge  She does note an odor at times  She has not taken any OTC meds  She requests refill on ibuprofen 800mg  Denies STI concerns   and monog  The following portions of the patient's history were reviewed and updated as appropriate: allergies, current medications, past family history, past medical history, past social history, past surgical history and problem list     Review of Systems   Constitutional: Negative  HENT: Negative  Eyes: Negative  Respiratory: Negative  Cardiovascular: Negative  Gastrointestinal: Negative  Endocrine: Negative  Genitourinary: Positive for pelvic pain and vaginal discharge  Negative for dysuria, frequency and menstrual problem  Musculoskeletal: Negative  Skin: Negative  Allergic/Immunologic: Negative  Neurological: Negative  Hematological: Negative  Psychiatric/Behavioral: Negative  Objective:      /64 (BP Location: Right arm)   Ht 5' 6" (1 676 m)   Wt 65 3 kg (144 lb)   BMI 23 24 kg/m²          Physical Exam   Constitutional: She is oriented to person, place, and time  She appears well-developed and well-nourished  HENT:   Head: Normocephalic and atraumatic  Eyes: Pupils are equal, round, and reactive to light  Neck: Normal range of motion  Pulmonary/Chest: Effort normal    Abdominal: Hernia confirmed negative in the right inguinal area and confirmed negative in the left inguinal area  Genitourinary: Rectum normal and uterus normal        There is no rash, tenderness, lesion or injury on the right labia  There is no rash, tenderness, lesion or injury on the left labia  Cervix exhibits no motion tenderness, no discharge and no friability  Right adnexum displays no mass, no tenderness and no fullness  Left adnexum displays no mass, no tenderness and no fullness  No erythema, tenderness or bleeding in the vagina  Vaginal discharge found  Musculoskeletal: Normal range of motion  Lymphadenopathy:        Right: No inguinal adenopathy present  Left: No inguinal adenopathy present  Neurological: She is alert and oriented to person, place, and time  Skin: Skin is warm and dry  Psychiatric: She has a normal mood and affect   Her behavior is normal  Judgment and thought content normal

## 2018-04-02 ENCOUNTER — TELEPHONE (OUTPATIENT)
Dept: PSYCHIATRY | Facility: CLINIC | Age: 48
End: 2018-04-02

## 2018-04-02 ENCOUNTER — OFFICE VISIT (OUTPATIENT)
Dept: NEUROLOGY | Facility: CLINIC | Age: 48
End: 2018-04-02
Payer: COMMERCIAL

## 2018-04-02 VITALS
WEIGHT: 144 LBS | HEIGHT: 66 IN | HEART RATE: 93 BPM | DIASTOLIC BLOOD PRESSURE: 54 MMHG | BODY MASS INDEX: 23.14 KG/M2 | SYSTOLIC BLOOD PRESSURE: 90 MMHG

## 2018-04-02 DIAGNOSIS — G43.709 CHRONIC MIGRAINE WITHOUT AURA WITHOUT STATUS MIGRAINOSUS, NOT INTRACTABLE: Primary | ICD-10-CM

## 2018-04-02 DIAGNOSIS — R55 SYNCOPE AND COLLAPSE: ICD-10-CM

## 2018-04-02 PROCEDURE — 99215 OFFICE O/P EST HI 40 MIN: CPT | Performed by: PHYSICIAN ASSISTANT

## 2018-04-02 RX ORDER — ONDANSETRON 4 MG/1
4 TABLET, FILM COATED ORAL EVERY 8 HOURS PRN
Qty: 30 TABLET | Refills: 2 | Status: SHIPPED | OUTPATIENT
Start: 2018-04-02 | End: 2018-11-20 | Stop reason: SDUPTHER

## 2018-04-02 RX ORDER — ZOLMITRIPTAN 5 MG/1
TABLET, FILM COATED ORAL
Qty: 8 TABLET | Refills: 2 | Status: SHIPPED | OUTPATIENT
Start: 2018-04-02 | End: 2018-07-20 | Stop reason: SDUPTHER

## 2018-04-02 RX ORDER — BACLOFEN 10 MG/1
TABLET ORAL
Qty: 30 TABLET | Refills: 2 | Status: SHIPPED | OUTPATIENT
Start: 2018-04-02 | End: 2018-04-13 | Stop reason: SDUPTHER

## 2018-04-02 NOTE — PROGRESS NOTES
Patient ID: Germania Siu is a 56 y o  female  Assessment/Plan:    No problem-specific Assessment & Plan notes found for this encounter  Problem List Items Addressed This Visit        Cardiovascular and Mediastinum    Chronic migraine without aura - Primary    Relevant Medications    baclofen 10 mg tablet    ZOLMitriptan (ZOMIG) 5 MG tablet    ondansetron (ZOFRAN) 4 mg tablet    Other Relevant Orders    MRI brain without contrast    Syncope and collapse    Relevant Orders    MRI brain without contrast            She used to get botox injections for chronic migraine q 3 months and this helped significantly  She states she needs to look into the botox on "her end" with the specialty pharmacy and let us know when she does this  She was urged to limit the use of ibuprofen and excedrin migraine and all other OTC analgesics as there is a significant MOH component with her now  She will try acupuncture for migraines  She will try baclofen qhs and up to TID prn headache and back pain  If this does not help, will consider other abortive such as medrol or depakote to break cycle  I am hesitant to prescribe more meds since she is on quite a bit at this time  Continue effexor, gabapentin and topamax which are helpful in prevention of migraines  Continue Zofran and/or Zomig PO at onset of nausea/ migraine, resp , but no more than 1-2 per week to avoid [facility]  Brain MRI ordered and appropriate since she has not had imaging and considering migraines are worsening, and neurological exam slightly abnormal     She had an episode of syncope at work in the setting of worsening anxiety/ depression/ worsening personal stressors, UTI, elevated TSH and recent history of SI  She currently denies SI/ HI  Will order brain MRI to address this and then EEG and carotid usd appropriate after that   She will need to continue to work on eliminating some medications since polypharmacy I suspect is contributing to syncope however is a diagnosis of exclusion  Of note, TSH elevated in February so I asked her to address this with her family provider  Subjective:    DEBORAH     Cat Moss 55 yo female who presents to the office today in neurological follow up of her migraine headaches  She works in a data processing position  She has not been working for several weeks since she had a syncope episode at work  She presented to [facility] ED 2/14/18 for syncopal event which occurred at work  INcidentally she was treated for a UTI while she was there  States she did have urinary sxs and abx did successfully resolve the sxs  She was sitting at her desk eating lunch and just lost consciousness without clear warning   states there were witnesses and she was noted to have possible convulsions for a few seconds  In the record it was noted by a coworker who witnessed the event ( was not there) that she did not actually lose consciousness, just became limp and unable to speak, and several minutes later was able to squeeze hands and blink on command  Several minutes later she was able to speak  No apparent tremors that the coworker saw  She did not have urinary or bowel incontinence  EMS witnessed these sxs and states she was "postictal " She denies seizure history  She has very little recollection of the event, but was conscious and alert on exam in the ED after arrival  It was noted that she had a recent "suicide attempt" and was started on a new medication  Per records she has a recent history of Ambien/oxycodone overdose requiring intubation at [facility] a few months ago  Had follow-up with Psychiatry and medications were changed  She denies SI/HI currently on questioning today  Since last seen headaches are worse  She is overdue for botox   Since starting botox, the patient reports greater than 7 days of migraine relief from baseline, correlated with headache diary, decreased abortive medication use and decreased ER visits  She finds zomig helpful, as well as ibuprofen and excedrin migraine, however she is taking these quite a bit, almost QD  She also continues a combination of prevention meds: gabapentin 600 TID, effexor 150 mg qd and topamax 150 BID  Occur- daily  Last- constant, usually all day  Severity- Average pain level 8-10/10  Location- bifrontal, b/l parietal region, will become diffuse/ global  Quality- throbbing/pounding, dull/nagging  Associated sxs: nausea  Aura- none   Trigger- sunlight  Preventative: Gabapentin, Venlafaxine, Lyrica, Magnesium, Vitmamin B2, verapamil, zoloft, olanzapine, topamax, botox  Abortive: Rizatriptan, Sumatriptan, buprofen, Toradol, Dilaudid, Fioricet, compazine, excedrin (daily use), zomig PO, decadron, depakote taper  ---    The following portions of the patient's history were reviewed and updated as appropriate:   She  has a past medical history of Asthma; depression; and Migraine  She   Patient Active Problem List    Diagnosis Date Noted    Syncope and collapse 2018    Pelvic pain 2018    IUD (intrauterine device) in place 2018    BV (bacterial vaginosis) 2018    [facility], recurrent [facility] 2017    Chronic fatigue 2017    Chronic GERD 2017    Hypothyroidism 2017    Chronic migraine without aura 2017    KYLE (generalized anxiety disorder) 02/10/2016    Allergic rhinitis 2015    Depression 2015    Impulse control disorder 2015    Amenorrhea 2014    Abnormal involuntary movements 2013    Panic disorder without agoraphobia 2013    Sciatica 2013    Asthma 2013    Esophageal reflux 2013    Goiter 06/10/2010     She  has a past surgical history that includes Laparoscopic endometriosis fulguration and  section    Her family history includes Asthma in her daughter and daughter; Heart disease in her mother; Lung cancer in her paternal grandfather  She  reports that she has never smoked  She has never used smokeless tobacco  She reports that she does not drink alcohol or use drugs  Current Outpatient Prescriptions   Medication Sig Dispense Refill    topiramate (TOPAMAX) 100 mg tablet Take by mouth 2 (two) times a day      topiramate (TOPAMAX) 50 MG tablet Take 50 mg by mouth 2 (two) times a day With 100 mg BID dose       venlafaxine (EFFEXOR) 37 5 mg tablet Take 150 mg by mouth daily       albuterol (2 5 mg/3 mL) 0 083 % nebulizer solution Inhale      ALPRAZolam (XANAX) 1 mg tablet Take 1 mg by mouth daily at bedtime as needed for anxiety   ALPRAZolam (XANAX) 1 mg tablet Take 1 tablet by mouth 3 (three) times a day as needed      ARIPiprazole (ABILIFY) 10 mg tablet Take 50 mg by mouth daily   ARIPiprazole (ABILIFY) 30 mg tablet Take 30 mg by mouth daily at bedtime  3    baclofen 10 mg tablet 1 qhs prn pain, and up to TID if tolerated   30 tablet 2    BREO ELLIPTA 200-25 MCG/INH inhaler       budesonide-formoterol (SYMBICORT) 160-4 5 mcg/act inhaler Inhale      buPROPion (WELLBUTRIN XL) 150 mg 24 hr tablet Take 150 mg by mouth daily  3    buPROPion (WELLBUTRIN XL) 300 mg 24 hr tablet Take 300 mg by mouth daily  3    [employer] INDOOR/OUTDOOR ALLERGY RLF 10 MG tablet Take 10 mg by mouth daily  10    dexamethasone (DECADRON) 1 mg tablet Take 1 tablet (1 mg total) by mouth 2 (two) times a day with meals 1 tab in the am for 5 days 5 tablet 0    dexamethasone (DECADRON) 2 mg tablet Take by mouth daily      diphenhydrAMINE (BENADRYL) 25 mg tablet Take 1 tablet by mouth every 6 (six) hours as needed (for headache, take with phenergan) 20 tablet 0    divalproex sodium (DEPAKOTE) 250 mg EC tablet Take 1 tablet (250 mg total) by mouth daily at bedtime 2 tabs at bedtime for 3 nights and 1 tab at bedtime for 2 nights 8 tablet 0    EPINEPHrine (EPIPEN) 0 3 mg/0 3 mL SOAJ as directed      ergocalciferol (VITAMIN D2) 50,000 units 50,000 Units once a week  12    gabapentin (NEURONTIN) 300 mg capsule 2 CAPS 3 TIMES A  capsule 5    ibuprofen (MOTRIN) 800 mg tablet Take 1 tablet (800 mg total) by mouth every 8 (eight) hours as needed for moderate pain 30 tablet 1    levalbuterol (XOPENEX) 1 25 mg/3 mL nebulizer solution Inhale      levocetirizine (XYZAL) 5 MG tablet Take 5 mg by mouth every evening   levonorgestrel (MIRENA, 52 MG,) 20 MCG/24HR IUD by Intrauterine route      Melatonin 5 MG TABS Take 5 mg by mouth      mometasone (NASONEX) 50 mcg/act nasal spray       naltrexone (REVIA) 50 mg tablet       olopatadine (PATANOL) 0 1 % ophthalmic solution       omeprazole (PriLOSEC) 20 mg delayed release capsule Take 1 capsule by mouth daily      onabotulinumtoxin A (BOTOX) 100 units Inject as directed      ondansetron (ZOFRAN) 4 mg tablet Take 1 tablet (4 mg total) by mouth every 8 (eight) hours as needed for nausea or vomiting 30 tablet 2    PROAIR RESPICLICK 772 (90 Base) MCG/ACT AEPB 2 PUFFS EVERY 4 HOURS AS NEEDED  3    sertraline (ZOLOFT) 100 mg tablet       SPIRIVA RESPIMAT 2 5 MCG/ACT AERS       ZOLMitriptan (ZOMIG) 5 MG tablet Take 1 tab at first sign of headache, may repeat in 2 hours if needed  Max of 2 tabs in 24hrs/3days a week  8 tablet 2    zolpidem (AMBIEN) 10 mg tablet Take 1 tablet by mouth       No current facility-administered medications for this visit  Current Outpatient Prescriptions on File Prior to Visit   Medication Sig    topiramate (TOPAMAX) 100 mg tablet Take by mouth 2 (two) times a day    topiramate (TOPAMAX) 50 MG tablet Take 50 mg by mouth 2 (two) times a day With 100 mg BID dose     venlafaxine (EFFEXOR) 37 5 mg tablet Take 150 mg by mouth daily     albuterol (2 5 mg/3 mL) 0 083 % nebulizer solution Inhale    ALPRAZolam (XANAX) 1 mg tablet Take 1 mg by mouth daily at bedtime as needed for anxiety      ALPRAZolam (XANAX) 1 mg tablet Take 1 tablet by mouth 3 (three) times a day as needed    ARIPiprazole (ABILIFY) 10 mg tablet Take 50 mg by mouth daily   ARIPiprazole (ABILIFY) 30 mg tablet Take 30 mg by mouth daily at bedtime    BREO ELLIPTA 200-25 MCG/INH inhaler     budesonide-formoterol (SYMBICORT) 160-4 5 mcg/act inhaler Inhale    buPROPion (WELLBUTRIN XL) 150 mg 24 hr tablet Take 150 mg by mouth daily    buPROPion (WELLBUTRIN XL) 300 mg 24 hr tablet Take 300 mg by mouth daily    [employer]S INDOOR/OUTDOOR ALLERGY RLF 10 MG tablet Take 10 mg by mouth daily    dexamethasone (DECADRON) 1 mg tablet Take 1 tablet (1 mg total) by mouth 2 (two) times a day with meals 1 tab in the am for 5 days    dexamethasone (DECADRON) 2 mg tablet Take by mouth daily    diphenhydrAMINE (BENADRYL) 25 mg tablet Take 1 tablet by mouth every 6 (six) hours as needed (for headache, take with phenergan)    divalproex sodium (DEPAKOTE) 250 mg EC tablet Take 1 tablet (250 mg total) by mouth daily at bedtime 2 tabs at bedtime for 3 nights and 1 tab at bedtime for 2 nights    EPINEPHrine (EPIPEN) 0 3 mg/0 3 mL SOAJ as directed    ergocalciferol (VITAMIN D2) 50,000 units 50,000 Units once a week    gabapentin (NEURONTIN) 300 mg capsule 2 CAPS 3 TIMES A DAY    ibuprofen (MOTRIN) 800 mg tablet Take 1 tablet (800 mg total) by mouth every 8 (eight) hours as needed for moderate pain    levalbuterol (XOPENEX) 1 25 mg/3 mL nebulizer solution Inhale    levocetirizine (XYZAL) 5 MG tablet Take 5 mg by mouth every evening      levonorgestrel (MIRENA, 52 MG,) 20 MCG/24HR IUD by Intrauterine route    Melatonin 5 MG TABS Take 5 mg by mouth    [] metroNIDAZOLE (FLAGYL) 500 mg tablet Take 1 tablet (500 mg total) by mouth every 12 (twelve) hours for 7 days    mometasone (NASONEX) 50 mcg/act nasal spray     naltrexone (REVIA) 50 mg tablet     olopatadine (PATANOL) 0 1 % ophthalmic solution     omeprazole (PriLOSEC) 20 mg delayed release capsule Take 1 capsule by mouth daily    onabotulinumtoxin A (BOTOX) 100 units Inject as directed    PROAIR RESPICLICK 058 (90 Base) MCG/ACT AEPB 2 PUFFS EVERY 4 HOURS AS NEEDED    sertraline (ZOLOFT) 100 mg tablet     SPIRIVA RESPIMAT 2 5 MCG/ACT AERS     zolpidem (AMBIEN) 10 mg tablet Take 1 tablet by mouth     No current facility-administered medications on file prior to visit  She is allergic to nuts; erythromycin; fluconazole; iodine; shellfish allergy; shellfish-derived products; and zithromax [azithromycin]            Objective:    Blood pressure 90/54, pulse 93, height 5' 6" (1 676 m), weight 65 3 kg (144 lb)  Physical Exam   Eyes: EOM are normal  Pupils are equal, round, and reactive to light  Neurological: She has normal strength  Gait normal    Reflex Scores:       Tricep reflexes are 4+ on the right side and 4+ on the left side  Bicep reflexes are 4+ on the right side and 4+ on the left side  Brachioradialis reflexes are 4+ on the right side and 4+ on the left side  Patellar reflexes are 3+ on the right side and 4+ on the left side  Achilles reflexes are 3+ on the right side and 3+ on the left side  Neurological Exam    Mental Status  The patient is alert and oriented to person, place, time, and situation  She has no dysarthria  Her language is fluent with no aphasia  She has normal attention span and concentration  She follows multi-step commands without prompting  She has a normal fund of knowledge  Cranial Nerves  CN I: The patient has not tested  CN II: The patient's visual acuity and visual fields are normal   CN III, IV, VI: The patient's pupils are equally round and reactive to light and ocular movements are normal   CN V: The patient has normal facial sensation  CN VII:  The patient has symmetric facial movement  CN VIII:  The patient's hearing is normal   CN IX, X: The patient has symmetric palate movement and normal gag reflex    CN XI: The patient's shoulder shrug strength is normal   CN XII: The patient's tongue is midline without atrophy or fasciculations  Motor  The patient has normal muscle bulk throughout  There are no fasciculations present  Her overall muscle tone is normal throughout  She has normal movement  Her strength is 5/5 throughout all four extremities  Sensory  The patient's sensation is normal in all four extremities to light touch  Proprioception slightly diminished in the UEs b/l  Reflexes  Deep tendon reflexes are 2+ and symmetric except as noted  Right                     Left  Brachioradialis                      4+                         4+  Biceps                                   4+                         4+  Triceps                                  4+                         4+  Patellar                                 3+                         4+  Achilles                                3+                         3+  Plantar                               Downgoing            Downgoing  She has right ankle clonus present, left ankle clonus present, right crossed adductor reflex present, left crossed adductor reflex present, right suprapatellar reflex present and left suprapatellar reflex present  Gait and Coordination  The patient has normal gait and station  ROS:    Review of Systems   Constitutional: Negative  HENT: Negative  Eyes: Negative  Respiratory: Negative  Cardiovascular: Negative  Gastrointestinal: Negative  Endocrine: Negative  Genitourinary: Negative  Musculoskeletal: Negative  Skin: Negative  Allergic/Immunologic: Negative  Neurological: Positive for syncope  Hematological: Negative  Psychiatric/Behavioral: Negative  Review of systems, Past medical history, Surgical history, Family history, Social history and Medication history were reviewed and otherwise unremarkable from a neurological perspective

## 2018-04-02 NOTE — PATIENT INSTRUCTIONS
botox  Baclofen- every night and up to TID if tolerated  Acupuncture- Judy Estevez, group acupuncture, [facility];  Cami Aguirre,  in [facility] [place]  Try to limit ibuprofen and excedrin migraine  Brain mri  TSH - repeat

## 2018-04-03 PROBLEM — R55 SYNCOPE AND COLLAPSE: Status: ACTIVE | Noted: 2018-04-03

## 2018-04-03 NOTE — TELEPHONE ENCOUNTER
Prior auth completed and submitted to [facility] via [address] [place] [address] phone number [phone]

## 2018-04-03 NOTE — TELEPHONE ENCOUNTER
For Dr Adelia Coffey review:    Sertraline added 06/02/2015 (after venlafaxine)  Need to include previous med trials with first alternative drug  Has Franko Rivera taken other medication for anxiety prior to venlafaxine?

## 2018-04-06 ENCOUNTER — TELEPHONE (OUTPATIENT)
Dept: PSYCHIATRY | Facility: CLINIC | Age: 48
End: 2018-04-06

## 2018-04-06 NOTE — TELEPHONE ENCOUNTER
Please appeal - she has been on that dose for some time as lower dose was not effective  She has severe depression and is likely to decompensate if unable to continue current dose   Please let her know we will try to appeal

## 2018-04-06 NOTE — TELEPHONE ENCOUNTER
Anjali Jacob called to speak to a nurse, her venlafaxine was denied by her insurance company, she would like a callback to discuss alternatives

## 2018-04-06 NOTE — TELEPHONE ENCOUNTER
Letter of denial from [facility] notes the dose exceeds 's recommended dose for diagnosis  MDD was listed as primary diagnosis  Can send appeal    For Dr David Gregory review   (will call patient after review)

## 2018-04-09 ENCOUNTER — HOSPITAL ENCOUNTER (EMERGENCY)
Facility: HOSPITAL | Age: 48
Discharge: HOME | End: 2018-04-09
Attending: EMERGENCY MEDICINE | Admitting: EMERGENCY MEDICINE
Payer: COMMERCIAL

## 2018-04-09 VITALS
TEMPERATURE: 98.5 F | DIASTOLIC BLOOD PRESSURE: 58 MMHG | HEART RATE: 68 BPM | RESPIRATION RATE: 16 BRPM | OXYGEN SATURATION: 100 % | SYSTOLIC BLOOD PRESSURE: 119 MMHG

## 2018-04-09 DIAGNOSIS — G43.909 MIGRAINE HEADACHE: ICD-10-CM

## 2018-04-09 DIAGNOSIS — M54.9 EXACERBATION OF CHRONIC BACK PAIN: Primary | ICD-10-CM

## 2018-04-09 DIAGNOSIS — G89.29 EXACERBATION OF CHRONIC BACK PAIN: Primary | ICD-10-CM

## 2018-04-09 PROCEDURE — 96365 THER/PROPH/DIAG IV INF INIT: CPT

## 2018-04-09 PROCEDURE — 99283 EMERGENCY DEPT VISIT LOW MDM: CPT

## 2018-04-09 PROCEDURE — 96375 TX/PRO/DX INJ NEW DRUG ADDON: CPT

## 2018-04-09 RX ORDER — DIPHENHYDRAMINE HYDROCHLORIDE 50 MG/ML
12.5 INJECTION INTRAMUSCULAR; INTRAVENOUS ONCE
Status: COMPLETED | OUTPATIENT
Start: 2018-04-09 | End: 2018-04-09

## 2018-04-09 RX ORDER — METOCLOPRAMIDE HYDROCHLORIDE 5 MG/ML
10 INJECTION INTRAMUSCULAR; INTRAVENOUS ONCE
Status: COMPLETED | OUTPATIENT
Start: 2018-04-09 | End: 2018-04-09

## 2018-04-09 RX ORDER — KETOROLAC TROMETHAMINE 30 MG/ML
30 INJECTION, SOLUTION INTRAMUSCULAR; INTRAVENOUS ONCE
Status: COMPLETED | OUTPATIENT
Start: 2018-04-09 | End: 2018-04-09

## 2018-04-09 RX ORDER — OXYCODONE HYDROCHLORIDE AND ACETAMINOPHEN 5; 325 MG/1; MG/1
1 TABLET ORAL ONCE
Status: COMPLETED | OUTPATIENT
Start: 2018-04-09 | End: 2018-04-09

## 2018-04-09 RX ORDER — MAGNESIUM SULFATE HEPTAHYDRATE 40 MG/ML
2 INJECTION, SOLUTION INTRAVENOUS ONCE
Status: COMPLETED | OUTPATIENT
Start: 2018-04-09 | End: 2018-04-09

## 2018-04-09 RX ADMIN — KETOROLAC TROMETHAMINE 30 MG: 30 INJECTION, SOLUTION INTRAMUSCULAR at 21:04

## 2018-04-09 RX ADMIN — OXYCODONE HYDROCHLORIDE AND ACETAMINOPHEN 1 TABLET: 5; 325 TABLET ORAL at 23:07

## 2018-04-09 RX ADMIN — HYDROMORPHONE HYDROCHLORIDE 1 MG: 1 INJECTION, SOLUTION INTRAMUSCULAR; INTRAVENOUS; SUBCUTANEOUS at 20:58

## 2018-04-09 RX ADMIN — DIPHENHYDRAMINE HYDROCHLORIDE 12.5 MG: 50 INJECTION, SOLUTION INTRAMUSCULAR; INTRAVENOUS at 20:52

## 2018-04-09 RX ADMIN — METOCLOPRAMIDE 10 MG: 5 INJECTION, SOLUTION INTRAMUSCULAR; INTRAVENOUS at 20:54

## 2018-04-09 RX ADMIN — MAGNESIUM SULFATE HEPTAHYDRATE 2 G: 40 INJECTION, SOLUTION INTRAVENOUS at 21:01

## 2018-04-09 NOTE — ED PROVIDER NOTES
History  Chief Complaint   Patient presents with    Headache - Recurrent or Known Dx Migraines     Pt ambulatory on unit C/O migraine and low back pain for about 3 days  Denies back injury  History provided by:  Patient   used: No     15-year-old female with a history of recurrent migraines as well as chronic back pain presented for a migraine over the last few days as well as lower back pain typical of her usual chronic pain but more intense and radiating across her back  She notes that it radiates down the right leg which is usual   Has been using NSAIDs, baclofen at home without relief  She is currently seeing pain management  She notes having multiple injections in the past, some which have helped and some which have not  No the complaints of neurologic deficits  On exam she has normal strength, sensation in the lower extremities  No fevers  Had an MRI within the last few months which was notable for mostly degenerative changes only without any direct nerve compression  Plan pain control for both migraine and back pain  Will ultimately have follow-up with her pain management physician  She is also following with Neurology for migraines  Prior to Admission Medications   Prescriptions Last Dose Informant Patient Reported? Taking? ALPRAZolam (XANAX) 1 mg tablet  Self Yes No   Sig: Take 1 mg by mouth daily at bedtime as needed for anxiety  ALPRAZolam (XANAX) 1 mg tablet  Self Yes No   Sig: Take 1 tablet by mouth 3 (three) times a day as needed   ARIPiprazole (ABILIFY) 10 mg tablet  Self Yes No   Sig: Take 50 mg by mouth daily     ARIPiprazole (ABILIFY) 30 mg tablet  Self Yes No   Sig: Take 30 mg by mouth daily at bedtime   BREO ELLIPTA 200-25 MCG/INH inhaler  Self Yes No   [employer] INDOOR/OUTDOOR ALLERGY RLF 10 MG tablet  Self Yes No   Sig: Take 10 mg by mouth daily   EPINEPHrine (EPIPEN) 0 3 mg/0 3 mL SOAJ  Self Yes No   Sig: as directed   Melatonin 5 MG TABS  Self Yes No Sig: Take 5 mg by mouth   PROAIR RESPICLICK 087 (90 Base) MCG/ACT AEPB  Self Yes No   Si PUFFS EVERY 4 HOURS AS NEEDED   SPIRIVA RESPIMAT 2 5 MCG/ACT AERS  Self Yes No   ZOLMitriptan (ZOMIG) 5 MG tablet   No No   Sig: Take 1 tab at first sign of headache, may repeat in 2 hours if needed  Max of 2 tabs in 24hrs/3days a week  albuterol (2 5 mg/3 mL) 0 083 % nebulizer solution  Self Yes No   Sig: Inhale   baclofen 10 mg tablet   No No   Si qhs prn pain, and up to TID if tolerated  buPROPion (WELLBUTRIN XL) 150 mg 24 hr tablet  Self Yes No   Sig: Take 150 mg by mouth daily   buPROPion (WELLBUTRIN XL) 300 mg 24 hr tablet  Self Yes No   Sig: Take 300 mg by mouth daily   budesonide-formoterol (SYMBICORT) 160-4 5 mcg/act inhaler  Self Yes No   Sig: Inhale   dexamethasone (DECADRON) 1 mg tablet  Self No No   Sig: Take 1 tablet (1 mg total) by mouth 2 (two) times a day with meals 1 tab in the am for 5 days   dexamethasone (DECADRON) 2 mg tablet  Self Yes No   Sig: Take by mouth daily   diphenhydrAMINE (BENADRYL) 25 mg tablet  Self No No   Sig: Take 1 tablet by mouth every 6 (six) hours as needed (for headache, take with phenergan)   divalproex sodium (DEPAKOTE) 250 mg EC tablet  Self No No   Sig: Take 1 tablet (250 mg total) by mouth daily at bedtime 2 tabs at bedtime for 3 nights and 1 tab at bedtime for 2 nights   ergocalciferol (VITAMIN D2) 50,000 units  Self Yes No   Si,000 Units once a week   gabapentin (NEURONTIN) 300 mg capsule  Self No No   Si CAPS 3 TIMES A DAY   ibuprofen (MOTRIN) 800 mg tablet   No No   Sig: Take 1 tablet (800 mg total) by mouth every 8 (eight) hours as needed for moderate pain   levalbuterol (XOPENEX) 1 25 mg/3 mL nebulizer solution  Self Yes No   Sig: Inhale   levocetirizine (XYZAL) 5 MG tablet  Self Yes No   Sig: Take 5 mg by mouth every evening     levonorgestrel (MIRENA, 52 MG,) 20 MCG/24HR IUD  Self Yes No   Sig: by Intrauterine route   mometasone (NASONEX) 50 mcg/act nasal spray  Self Yes No   naltrexone (REVIA) 50 mg tablet  Self Yes No   olopatadine (PATANOL) 0 1 % ophthalmic solution  Self Yes No   omeprazole (PriLOSEC) 20 mg delayed release capsule  Self Yes No   Sig: Take 1 capsule by mouth daily   onabotulinumtoxin A (BOTOX) 100 units  Self Yes No   Sig: Inject as directed   ondansetron (ZOFRAN) 4 mg tablet   No No   Sig: Take 1 tablet (4 mg total) by mouth every 8 (eight) hours as needed for nausea or vomiting   sertraline (ZOLOFT) 100 mg tablet  Self Yes No   topiramate (TOPAMAX) 100 mg tablet  Self Yes No   Sig: Take by mouth 2 (two) times a day   topiramate (TOPAMAX) 50 MG tablet  Self Yes No   Sig: Take 50 mg by mouth 2 (two) times a day With 100 mg BID dose    venlafaxine (EFFEXOR) 37 5 mg tablet  Self Yes No   Sig: Take 150 mg by mouth daily    zolpidem (AMBIEN) 10 mg tablet  Self Yes No   Sig: Take 1 tablet by mouth      Facility-Administered Medications: None       Past Medical History:   Diagnosis Date    Asthma     depression     "anxiety and depression"     Migraine        Past Surgical History:   Procedure Laterality Date     SECTION      LAPAROSCOPIC ENDOMETRIOSIS FULGURATION         Family History   Problem Relation Age of Onset    Heart disease Mother     Asthma Daughter     Lung cancer Paternal Grandfather     Asthma Daughter      I have reviewed and agree with the history as documented  Social History   Substance Use Topics    Smoking status: Never Smoker    Smokeless tobacco: Never Used    Alcohol use No        Review of Systems   Constitutional: Negative for activity change, appetite change and fever  Respiratory: Negative for chest tightness and shortness of breath  Gastrointestinal: Negative for abdominal pain, nausea and vomiting  Musculoskeletal: Positive for back pain  Negative for neck pain  Skin: Negative for color change  Neurological: Positive for headaches  Negative for dizziness and weakness     All other systems reviewed and are negative  Physical Exam  ED Triage Vitals [04/09/18 1745]   Temperature Pulse Respirations Blood Pressure SpO2   98 5 °F (36 9 °C) 77 18 121/67 100 %      Temp Source Heart Rate Source Patient Position - Orthostatic VS BP Location FiO2 (%)   Oral Monitor Sitting Left arm --      Pain Score       8           Orthostatic Vital Signs  Vitals:    04/09/18 1745 04/09/18 2054 04/09/18 2141 04/09/18 2307   BP: 121/67 122/69 122/72 119/58   Pulse: 77 72 69 68   Patient Position - Orthostatic VS: Sitting Sitting Lying Sitting       Physical Exam   Constitutional: She is oriented to person, place, and time  She appears well-developed and well-nourished  No distress  HENT:   Head: Normocephalic  Mouth/Throat: Oropharynx is clear and moist    Neck: Normal range of motion  Neck supple  Cardiovascular: Normal rate and regular rhythm  Pulmonary/Chest: Effort normal and breath sounds normal    Abdominal: Soft  She exhibits no distension  There is no tenderness  Musculoskeletal: Normal range of motion  She exhibits no edema or deformity  Mild tenderness of the bilateral lumbar musculature  Neurological: She is alert and oriented to person, place, and time  No cranial nerve deficit or sensory deficit  She exhibits normal muscle tone  Coordination normal    Skin: Skin is warm and dry  Psychiatric: She has a normal mood and affect  Her behavior is normal    Nursing note and vitals reviewed        ED Medications  Medications   diphenhydrAMINE (BENADRYL) injection 12 5 mg (12 5 mg Intravenous Given 4/9/18 2052)   metoclopramide (REGLAN) injection 10 mg (10 mg Intravenous Given 4/9/18 2054)   ketorolac (TORADOL) injection 30 mg (30 mg Intravenous Given 4/9/18 2104)   HYDROmorphone (DILAUDID) injection 1 mg (1 mg Intravenous Given 4/9/18 2058)   magnesium sulfate 2 g/50 mL IVPB (premix) 2 g (0 g Intravenous Stopped 4/9/18 2201)   oxyCODONE-acetaminophen (PERCOCET) 5-325 mg per tablet 1 tablet (1 tablet Oral Given 4/9/18 3781)       Diagnostic Studies  Results Reviewed     None                 No orders to display              Procedures  Procedures       Phone Contacts  ED Phone Contact    ED Course  ED Course                                MDM  Number of Diagnoses or Management Options  Exacerbation of chronic back pain:   Migraine headache:   Diagnosis management comments: 69-year-old female presented with exacerbation of chronic lower back pain without any new symptoms as well as a migraine  Pain controlled in the ED  No neurologic deficits on exam   Stable for discharge  To follow up with her pain management physician and her neurologist     Patient Progress  Patient progress: improved    CritCare Time    Disposition  Final diagnoses:   Exacerbation of chronic back pain   Migraine headache     Time reflects when diagnosis was documented in both MDM as applicable and the Disposition within this note     Time User Action Codes Description Comment    4/9/2018 10:18 PM Macon Dollar Add [M54 9,  G89 29] Exacerbation of chronic back pain     4/9/2018 10:18 PM Phil PHILIP Add [G43 909] Migraine headache       ED Disposition     ED Disposition Condition Comment    Discharge  Breanna Talamantes discharge to home/self care  Condition at discharge: Good        Follow-up Information     Follow up With Specialties Details Why Contact Info    Yen Pascual MD Pain Medicine   [address] [place] [address]  [phone]          Discharge Medication List as of 4/9/2018 10:19 PM      CONTINUE these medications which have NOT CHANGED    Details   albuterol (2 5 mg/3 mL) 0 083 % nebulizer solution Inhale, Starting Fri 4/19/2013, Historical Med      !! ALPRAZolam (XANAX) 1 mg tablet Take 1 mg by mouth daily at bedtime as needed for anxiety  , Historical Med      !! ALPRAZolam (XANAX) 1 mg tablet Take 1 tablet by mouth 3 (three) times a day as needed, Starting Wed 2/10/2016, Historical Med !! ARIPiprazole (ABILIFY) 10 mg tablet Take 50 mg by mouth daily  , Historical Med      !! ARIPiprazole (ABILIFY) 30 mg tablet Take 30 mg by mouth daily at bedtime, Starting Wed 1/24/2018, Historical Med      baclofen 10 mg tablet 1 qhs prn pain, and up to TID if tolerated , Normal      BREO ELLIPTA 200-25 MCG/INH inhaler Starting Mon 3/5/2018, Historical Med      budesonide-formoterol (SYMBICORT) 160-4 5 mcg/act inhaler Inhale, Starting Wed 4/27/2011, Historical Med      !! buPROPion (WELLBUTRIN XL) 150 mg 24 hr tablet Take 150 mg by mouth daily, Starting Thu 1/25/2018, Historical Med      !! buPROPion (WELLBUTRIN XL) 300 mg 24 hr tablet Take 300 mg by mouth daily, Starting Sun 1/28/2018, Historical Med      [employer] INDOOR/OUTDOOR ALLERGY RLF 10 MG tablet Take 10 mg by mouth daily, Starting Mon 1/29/2018, Historical Med      !! dexamethasone (DECADRON) 1 mg tablet Take 1 tablet (1 mg total) by mouth 2 (two) times a day with meals 1 tab in the am for 5 days, Starting Thu 2/1/2018, Normal      !! dexamethasone (DECADRON) 2 mg tablet Take by mouth daily, Starting Fri 12/15/2017, Historical Med      diphenhydrAMINE (BENADRYL) 25 mg tablet Take 1 tablet by mouth every 6 (six) hours as needed (for headache, take with phenergan), Starting Tue 11/21/2017, Print      divalproex sodium (DEPAKOTE) 250 mg EC tablet Take 1 tablet (250 mg total) by mouth daily at bedtime 2 tabs at bedtime for 3 nights and 1 tab at bedtime for 2 nights, Starting Thu 2/1/2018, Normal      EPINEPHrine (EPIPEN) 0 3 mg/0 3 mL SOAJ as directed, Historical Med      ergocalciferol (VITAMIN D2) 50,000 units 50,000 Units once a week, Starting Mon 1/29/2018, Historical Med      gabapentin (NEURONTIN) 300 mg capsule 2 CAPS 3 TIMES A DAY, Normal      ibuprofen (MOTRIN) 800 mg tablet Take 1 tablet (800 mg total) by mouth every 8 (eight) hours as needed for moderate pain, Starting Mon 3/26/2018, Normal      levalbuterol (XOPENEX) 1 25 mg/3 mL nebulizer solution Inhale, Starting Fri 10/8/2010, Historical Med      levocetirizine (XYZAL) 5 MG tablet Take 5 mg by mouth every evening , Historical Med      levonorgestrel (MIRENA, 52 MG,) 20 MCG/24HR IUD by Intrauterine route, Historical Med      Melatonin 5 MG TABS Take 5 mg by mouth, Starting Mon 11/27/2017, Historical Med      mometasone (NASONEX) 50 mcg/act nasal spray Starting Mon 3/12/2018, Historical Med      naltrexone (REVIA) 50 mg tablet Starting Sun 3/4/2018, Historical Med      olopatadine (PATANOL) 0 1 % ophthalmic solution Starting Mon 3/12/2018, Historical Med      omeprazole (PriLOSEC) 20 mg delayed release capsule Take 1 capsule by mouth daily, Starting Fri 1/28/2011, Historical Med      onabotulinumtoxin A (BOTOX) 100 units Inject as directed, Starting Fri 5/20/2016, Historical Med      ondansetron (ZOFRAN) 4 mg tablet Take 1 tablet (4 mg total) by mouth every 8 (eight) hours as needed for nausea or vomiting, Starting Mon 4/2/2018, Normal      PROAIR RESPICLICK 148 (90 Base) MCG/ACT AEPB 2 PUFFS EVERY 4 HOURS AS NEEDED, Historical Med      sertraline (ZOLOFT) 100 mg tablet Starting Sun 3/4/2018, Historical Med      SPIRIVA RESPIMAT 2 5 MCG/ACT AERS Starting Mon 3/5/2018, Historical Med      !! topiramate (TOPAMAX) 100 mg tablet Take by mouth 2 (two) times a day, Historical Med      !! topiramate (TOPAMAX) 50 MG tablet Take 50 mg by mouth 2 (two) times a day With 100 mg BID dose , Starting Thu 12/1/2016, Historical Med      venlafaxine (EFFEXOR) 37 5 mg tablet Take 150 mg by mouth daily , Historical Med      ZOLMitriptan (ZOMIG) 5 MG tablet Take 1 tab at first sign of headache, may repeat in 2 hours if needed  Max of 2 tabs in 24hrs/3days a week , Normal      zolpidem (AMBIEN) 10 mg tablet Take 1 tablet by mouth, Starting Wed 2/10/2016, Historical Med       !! - Potential duplicate medications found  Please discuss with provider  No discharge procedures on file      ED Provider  Electronically Signed by           Brandon Martin MD  04/10/18 5597

## 2018-04-10 NOTE — DISCHARGE INSTRUCTIONS
Chronic Back Pain   WHAT YOU NEED TO KNOW:   Chronic back pain is back pain that lasts 3 months or longer  This may include pain that has not been controlled or does not improve with treatment  Your back pain may cause weakness or pain that spreads to your arms or legs  DISCHARGE INSTRUCTIONS:   Return to the emergency department if:   · You have severe pain  · You have new signs of numbness or weakness, especially in your lower back, legs, arms, or genital area  · You lose control of your bladder or bowel movements  · You have a fever or sudden weight loss  Contact your healthcare provider if:   · You have new or worsened pain  · You have questions or concerns about your condition or care  Medicines:   · NSAIDs  help decrease swelling and pain  This medicine can be bought with or without a doctor's order  This medicine can cause stomach bleeding or kidney problems in certain people  If you take blood thinner medicine, always ask your healthcare provider if NSAIDs are safe for you  Always read the medicine label and follow the directions on it before using this medicine  · Acetaminophen  decreases pain  It is available without a doctor's order  Ask how much to take and how often to take it  Follow directions  Acetaminophen can cause liver damage if not taken correctly  · Prescription pain medicine  may also be given to decrease pain  Do not wait until the pain is severe before you take this medicine  · Muscle relaxers  help decrease muscle spasms and back pain  · Take your medicine as directed  Contact your healthcare provider if you think your medicine is not helping or if you have side effects  Tell him if you are allergic to any medicine  Keep a list of the medicines, vitamins, and herbs you take  Include the amounts, and when and why you take them  Bring the list or the pill bottles to follow-up visits  Carry your medicine list with you in case of an emergency    Follow up with your healthcare provider as directed: You may be referred to a sports medicine or spine specialist  Write down your questions so you remember to ask them during your visits  Manage your chronic back pain:   · Heat  helps decrease pain and muscle spasms  Apply heat on your back for 15 to 20 minutes every 2 hours or as often as directed  · Stay active  as much as you can without causing more pain  Ask your healthcare provider what exercises are right for you  Do not sit or lie down for long periods  This could make your back pain worse  Avoid heavy lifting until your pain is gone  · A physical therapist  can teach you exercises to help improve movement and strength, and to decrease pain  © 2017 [facility] Information is for End User's use only and may not be sold, redistributed or otherwise used for commercial purposes  All illustrations and images included in CareNotes® are the copyrighted property of [employer]  or [employer] Blanco  The above information is an  only  It is not intended as medical advice for individual conditions or treatments  Talk to your doctor, nurse or pharmacist before following any medical regimen to see if it is safe and effective for you

## 2018-04-11 ENCOUNTER — TELEPHONE (OUTPATIENT)
Dept: PAIN MEDICINE | Facility: MEDICAL CENTER | Age: 48
End: 2018-04-11

## 2018-04-11 NOTE — TELEPHONE ENCOUNTER
Pt has an appt this Friday but pt states she is in a lot of pain and would like a nurse to give her a call back to see if they can prescribe anything to her for her pain in the meantime   Pt can be reached at [phone]

## 2018-04-11 NOTE — TELEPHONE ENCOUNTER
S/w pt, she has had sever back problems and had an MRI done at the end of the year which showed arthritis, her pain has increased over the past week in her b/l back down to her knee  Pt ended up in the ER on Monday and they gave her IV pain medications which took away her pain temporarily then the severe pain returned  Pt is scheduled to see SHIPMAN on Friday but is requesting something for pain to hold her off until then so she does not end up back in the ER  Pt is currently on Baclofen and Ibuprofen with no relief and has tried multiple medications in the past  Please advise

## 2018-04-11 NOTE — TELEPHONE ENCOUNTER
I talked to the patient and discussed with her multiple medication options including diclofenac, Mobic, Medrol Dosepak, and many additional other options  However, the patient reports that none of these medications helped in the past with her pain and she would not like to try any of these medications at this time  Patient does report that heat has been helping her pain and she is taking baclofen, and 800 mg of ibuprofen  Patient would like follow up on Friday to discuss further options

## 2018-04-12 ENCOUNTER — TELEPHONE (OUTPATIENT)
Dept: NEUROLOGY | Facility: CLINIC | Age: 48
End: 2018-04-12

## 2018-04-12 ENCOUNTER — TELEPHONE (OUTPATIENT)
Dept: PSYCHIATRY | Facility: CLINIC | Age: 48
End: 2018-04-12

## 2018-04-12 ENCOUNTER — HOSPITAL ENCOUNTER (EMERGENCY)
Facility: HOSPITAL | Age: 48
Discharge: HOME | End: 2018-04-13
Attending: EMERGENCY MEDICINE | Admitting: EMERGENCY MEDICINE
Payer: COMMERCIAL

## 2018-04-12 DIAGNOSIS — G43.909 MIGRAINE HEADACHE: Primary | ICD-10-CM

## 2018-04-12 DIAGNOSIS — M54.50 CHRONIC LOW BACK PAIN: ICD-10-CM

## 2018-04-12 DIAGNOSIS — G89.29 EXACERBATION OF CHRONIC BACK PAIN: ICD-10-CM

## 2018-04-12 DIAGNOSIS — G43.709 CHRONIC MIGRAINE WITHOUT AURA WITHOUT STATUS MIGRAINOSUS, NOT INTRACTABLE: ICD-10-CM

## 2018-04-12 DIAGNOSIS — M54.9 EXACERBATION OF CHRONIC BACK PAIN: ICD-10-CM

## 2018-04-12 DIAGNOSIS — G89.29 CHRONIC LOW BACK PAIN: ICD-10-CM

## 2018-04-12 PROCEDURE — 96374 THER/PROPH/DIAG INJ IV PUSH: CPT

## 2018-04-12 PROCEDURE — 96375 TX/PRO/DX INJ NEW DRUG ADDON: CPT

## 2018-04-12 PROCEDURE — 96361 HYDRATE IV INFUSION ADD-ON: CPT

## 2018-04-12 RX ORDER — OXYCODONE HYDROCHLORIDE AND ACETAMINOPHEN 5; 325 MG/1; MG/1
1 TABLET ORAL ONCE
Status: COMPLETED | OUTPATIENT
Start: 2018-04-12 | End: 2018-04-12

## 2018-04-12 RX ORDER — DIPHENHYDRAMINE HYDROCHLORIDE 50 MG/ML
25 INJECTION INTRAMUSCULAR; INTRAVENOUS ONCE
Status: COMPLETED | OUTPATIENT
Start: 2018-04-12 | End: 2018-04-12

## 2018-04-12 RX ORDER — METOCLOPRAMIDE HYDROCHLORIDE 5 MG/ML
10 INJECTION INTRAMUSCULAR; INTRAVENOUS ONCE
Status: COMPLETED | OUTPATIENT
Start: 2018-04-12 | End: 2018-04-12

## 2018-04-12 RX ORDER — KETOROLAC TROMETHAMINE 30 MG/ML
30 INJECTION, SOLUTION INTRAMUSCULAR; INTRAVENOUS ONCE
Status: COMPLETED | OUTPATIENT
Start: 2018-04-12 | End: 2018-04-12

## 2018-04-12 RX ADMIN — DIPHENHYDRAMINE HYDROCHLORIDE 25 MG: 50 INJECTION, SOLUTION INTRAMUSCULAR; INTRAVENOUS at 22:36

## 2018-04-12 RX ADMIN — OXYCODONE HYDROCHLORIDE AND ACETAMINOPHEN 1 TABLET: 5; 325 TABLET ORAL at 23:32

## 2018-04-12 RX ADMIN — METOCLOPRAMIDE 10 MG: 5 INJECTION, SOLUTION INTRAMUSCULAR; INTRAVENOUS at 22:38

## 2018-04-12 RX ADMIN — KETOROLAC TROMETHAMINE 30 MG: 30 INJECTION, SOLUTION INTRAMUSCULAR at 22:37

## 2018-04-12 RX ADMIN — SODIUM CHLORIDE 1000 ML: 0.9 INJECTION, SOLUTION INTRAVENOUS at 22:23

## 2018-04-12 NOTE — TELEPHONE ENCOUNTER
I spoke with Malick Laws at the end of the day, 04/11 and reviewed appeal submitted  Malick Laws has not more venlafaxine  Coud a lower dose be ordered until appeal decision received? For Dr Aicha Chavez to review

## 2018-04-12 NOTE — TELEPHONE ENCOUNTER
Pt called requesting refill of baclofen 10mg  States she has been taking 10mg TID and has run out of meds  Rx filled 4/2  I made pt aware that this is a PRN med w/ 30 day supply  Pt states "I must have been confused " States this med is effective in controlling migraines  Has not need to use any other abortives  Please advise      [phone]

## 2018-04-13 ENCOUNTER — OFFICE VISIT (OUTPATIENT)
Dept: PAIN MEDICINE | Facility: CLINIC | Age: 48
End: 2018-04-13
Payer: COMMERCIAL

## 2018-04-13 VITALS
TEMPERATURE: 98.6 F | WEIGHT: 150 LBS | DIASTOLIC BLOOD PRESSURE: 58 MMHG | BODY MASS INDEX: 27.6 KG/M2 | HEART RATE: 79 BPM | SYSTOLIC BLOOD PRESSURE: 100 MMHG | HEIGHT: 62 IN | RESPIRATION RATE: 18 BRPM

## 2018-04-13 VITALS
HEART RATE: 88 BPM | DIASTOLIC BLOOD PRESSURE: 59 MMHG | HEIGHT: 62 IN | BODY MASS INDEX: 26.5 KG/M2 | OXYGEN SATURATION: 96 % | RESPIRATION RATE: 16 BRPM | TEMPERATURE: 98.5 F | WEIGHT: 144 LBS | SYSTOLIC BLOOD PRESSURE: 100 MMHG

## 2018-04-13 DIAGNOSIS — M51.16 INTERVERTEBRAL DISC DISORDERS WITH RADICULOPATHY, LUMBAR REGION: Primary | ICD-10-CM

## 2018-04-13 PROCEDURE — 96361 HYDRATE IV INFUSION ADD-ON: CPT

## 2018-04-13 PROCEDURE — 99283 EMERGENCY DEPT VISIT LOW MDM: CPT

## 2018-04-13 PROCEDURE — 99214 OFFICE O/P EST MOD 30 MIN: CPT | Performed by: NURSE PRACTITIONER

## 2018-04-13 RX ORDER — BACLOFEN 10 MG/1
TABLET ORAL
Qty: 90 TABLET | Refills: 1 | Status: SHIPPED | OUTPATIENT
Start: 2018-04-13 | End: 2018-12-31 | Stop reason: SDUPTHER

## 2018-04-13 NOTE — PROGRESS NOTES
Assessment:  1  Intervertebral disc disorders with radiculopathy, lumbar region        Plan:  Ida Deal is a 56 y o  female  with a history of sacroiliitis, piriformis syndrome, and greater trochanteric bursa of the right hip  Patient presents today with right-sided low back pain  Patient's exam and symptoms are consistent with right-sided radiculopathy most likely stemming from a bulging annulus, and annulus tear at L4-L5  Therefore at this time I discussed with the patient about moving forward with a right L4-L5 transforaminal epidural steroid injection, to decrease inflammation within her lumbar spine, to reduce pain and radicular symptoms  Patient was educated on procedures most common risks, and was given a brochure on the procedure  Patient verbalized understanding, and would like to proceed with the procedure  Therefore the patient will be scheduled for an upcoming Tuesday or Thursday  For patient's pain Initially  I was planning on prescribing the patient a small prescription of oxycodone/acetminophen 5/325 prior to her undergoing a lumbar epidural injection  I even had the patient sign a opioid contract and completed a baseline urine drug screen  However, after reviewing the patient's chart for evaluation for why the patient is on long term naltrexone  I found a note from [facility] where the patient was admitted for possible overdose on Ambien  The patient was intubated for respiratory depression due to the overdose  During her stay at [facility] on 11/25/2017 another note stated that the patient had "drug overdose with opioids  Her drug screen was positive for oxycodone and negative for benzos" A urine drug screen conducted on 11/25/2017 by [facility] confirmed the patient was positive for Oxycodone and negative for benzodiazepines    I did check the patient's [place] drug monitoring report, and it did not show any prescriptions for oxycodone for the past 2 years   Therefore I discussed with the patient that I would not be able to provide her with any opioids, due to her history of opioid overdose  Patient and her  became very angry and stated that they were going to [facility] for falsely saying that the patient overdosed on Oxycodone, when she overdosed on Ambien  They also stated that they would continue to go to the emergency room to obtain opioids to manage her pain  I did discuss with the patient multiple other medication alternatives such as a  Medrol dosepak, NSAIDs , topical creams, and interventions such as heat and ice or a TENS unit  However, the patient refused and stated that the only thing that will help her pain is oxycodone  The patient will follow up in 4 weeks, or sooner with the worsening of symptoms  Complete risks and benefits including bleeding, infection, tissue reaction, nerve injury and allergic reaction were discussed  The approach was demonstrated using models and literature was provided  Verbal and written consent was obtained  My impressions and treatment recommendations were discussed in detail with the patient who verbalized understanding and had no further questions  Discharge instructions were provided  I personally saw and examined the patient and I agree with the above discussed plan of care  Orders Placed This Encounter   Procedures    FL spine and pain procedure     Standing Status:   Future     Standing Expiration Date:   4/13/2022     Order Specific Question:   Reason for Exam:     Answer:   Right L4-L5     Order Specific Question:   Is the patient pregnant? Answer:   No     Order Specific Question:   Anticoagulant hold needed? Answer:   No     No orders of the defined types were placed in this encounter  History of Present Illness:  Masha Yan is a 56 y o  female  with a history of sacroiliitis, piriformis syndrome, and greater trochanteric bursa of the right hip    Patient was last seen office on 10/18/2017 where she was ordered an MRI of her lumbar spine  Patient presents for a follow up office visit in regards to Back Pain and Leg Pain  The patients current symptoms include right sided low back into her hip and down the lateral aspect of her right thigh  Patient reports that her pain worsened over the last month  she denies predisposing injury or trauma  Patient denies bilateral leg weakness or bowel or bladder issues  She describes  her pain as throbbing, pressure-like pain that is constant nature occurring throughout the day  Patient reports that her pain is symptoms have worsened since last visit  Patient currently rates her pain 8 out 10 numeric pain scale  Current pain medications includes:  Ibuprofen 800 mg 1 tablet q 8 hours     The patient reports that this regimen is providing No pain relief  The patient is reporting no side effects from this pain medication regimen  I have personally reviewed and/or updated the patient's past medical history, past surgical history, family history, social history, current medications, allergies, and vital signs today  Review of Systems   Respiratory: Negative for shortness of breath  Cardiovascular: Negative for chest pain  Gastrointestinal: Negative for constipation, diarrhea, nausea and vomiting  Musculoskeletal: Positive for gait problem (difficulty walking/ decreased range of motion)  Negative for arthralgias, joint swelling and myalgias  Skin: Negative for rash  Neurological: Negative for dizziness, seizures and weakness  All other systems reviewed and are negative        Patient Active Problem List   Diagnosis    Abnormal involuntary movements    Allergic rhinitis    Amenorrhea    Asthma    Chronic fatigue    Chronic GERD    Chronic migraine without aura    Depression    Esophageal reflux    KYLE (generalized anxiety disorder)    Goiter    Hypothyroidism    Impulse control disorder    Major depressive disorder, recurrent severe without psychotic features ([facility] [address] )    Panic disorder without agoraphobia    Sciatica    Pelvic pain    IUD (intrauterine device) in place    BV (bacterial vaginosis)    Syncope and collapse       Past Medical History:   Diagnosis Date    Anxiety     Asthma     depression     "anxiety and depression"     Migraine        Past Surgical History:   Procedure Laterality Date     SECTION      LAPAROSCOPIC ENDOMETRIOSIS FULGURATION         Family History   Problem Relation Age of Onset    Heart disease Mother     Asthma Daughter     Lung cancer Paternal Grandfather     Asthma Daughter        Social History     Occupational History    Not on file  Social History Main Topics    Smoking status: Never Smoker    Smokeless tobacco: Never Used    Alcohol use No    Drug use: No    Sexual activity: Yes     Partners: Male     Birth control/ protection: IUD       Current Outpatient Prescriptions on File Prior to Visit   Medication Sig    albuterol (2 5 mg/3 mL) 0 083 % nebulizer solution Inhale    ALPRAZolam (XANAX) 1 mg tablet Take 1 tablet by mouth 3 (three) times a day as needed    ARIPiprazole (ABILIFY) 10 mg tablet Take 50 mg by mouth daily   baclofen 10 mg tablet 1 qhs prn pain, and up to TID if tolerated      BREO ELLIPTA 200-25 MCG/INH inhaler     budesonide-formoterol (SYMBICORT) 160-4 5 mcg/act inhaler Inhale    buPROPion (WELLBUTRIN XL) 300 mg 24 hr tablet Take 300 mg by mouth daily    [employer] INDOOR/OUTDOOR ALLERGY RLF 10 MG tablet Take 10 mg by mouth daily    dexamethasone (DECADRON) 2 mg tablet Take by mouth daily    diphenhydrAMINE (BENADRYL) 25 mg tablet Take 1 tablet by mouth every 6 (six) hours as needed (for headache, take with phenergan)    divalproex sodium (DEPAKOTE) 250 mg EC tablet Take 1 tablet (250 mg total) by mouth daily at bedtime 2 tabs at bedtime for 3 nights and 1 tab at bedtime for 2 nights    EPINEPHrine (EPIPEN) 0 3 mg/0 3 mL SOAJ as directed    ergocalciferol (VITAMIN D2) 50,000 units 50,000 Units once a week    gabapentin (NEURONTIN) 300 mg capsule 2 CAPS 3 TIMES A DAY    ibuprofen (MOTRIN) 800 mg tablet Take 1 tablet (800 mg total) by mouth every 8 (eight) hours as needed for moderate pain    levalbuterol (XOPENEX) 1 25 mg/3 mL nebulizer solution Inhale    levonorgestrel (MIRENA, 52 MG,) 20 MCG/24HR IUD by Intrauterine route    Melatonin 5 MG TABS Take 5 mg by mouth    mometasone (NASONEX) 50 mcg/act nasal spray     naltrexone (REVIA) 50 mg tablet     olopatadine (PATANOL) 0 1 % ophthalmic solution     omeprazole (PriLOSEC) 20 mg delayed release capsule Take 1 capsule by mouth daily    onabotulinumtoxin A (BOTOX) 100 units Inject as directed    ondansetron (ZOFRAN) 4 mg tablet Take 1 tablet (4 mg total) by mouth every 8 (eight) hours as needed for nausea or vomiting    PROAIR RESPICLICK 943 (90 Base) MCG/ACT AEPB 2 PUFFS EVERY 4 HOURS AS NEEDED    sertraline (ZOLOFT) 100 mg tablet     SPIRIVA RESPIMAT 2 5 MCG/ACT AERS     topiramate (TOPAMAX) 100 mg tablet Take by mouth 2 (two) times a day    topiramate (TOPAMAX) 50 MG tablet Take 50 mg by mouth 2 (two) times a day With 100 mg BID dose     venlafaxine (EFFEXOR) 37 5 mg tablet Take 150 mg by mouth daily     ZOLMitriptan (ZOMIG) 5 MG tablet Take 1 tab at first sign of headache, may repeat in 2 hours if needed  Max of 2 tabs in 24hrs/3days a week   zolpidem (AMBIEN) 10 mg tablet Take 1 tablet by mouth    [DISCONTINUED] ALPRAZolam (XANAX) 1 mg tablet Take 1 mg by mouth daily at bedtime as needed for anxiety   [DISCONTINUED] buPROPion (WELLBUTRIN XL) 150 mg 24 hr tablet Take 150 mg by mouth daily    [DISCONTINUED] dexamethasone (DECADRON) 1 mg tablet Take 1 tablet (1 mg total) by mouth 2 (two) times a day with meals 1 tab in the am for 5 days    [DISCONTINUED] levocetirizine (XYZAL) 5 MG tablet Take 5 mg by mouth every evening      [DISCONTINUED] baclofen 10 mg tablet 1 qhs prn pain, and up to TID if tolerated  Current Facility-Administered Medications on File Prior to Visit   Medication    [COMPLETED] diphenhydrAMINE (BENADRYL) injection 25 mg    [COMPLETED] ketorolac (TORADOL) injection 30 mg    [COMPLETED] metoclopramide (REGLAN) injection 10 mg    [COMPLETED] oxyCODONE-acetaminophen (PERCOCET) 5-325 mg per tablet 1 tablet    [COMPLETED] sodium chloride 0 9 % bolus 1,000 mL       Allergies   Allergen Reactions    Nuts      Other reaction(s): WALNUTS    Erythromycin Diarrhea, GI Intolerance and Vomiting    Fluconazole      Annotation - [id number]: makes her yeast infection worse    Iodine Hives    Shellfish Allergy     Shellfish-Derived Products     Zithromax [Azithromycin] Diarrhea     Annotation - [id number]: can take brand name  Other reaction(s): Nausea/vomiting/diarrhea       Physical Exam:    /58 (BP Location: Right arm, Patient Position: Sitting, Cuff Size: Standard)   Pulse 79   Temp 98 6 °F (37 °C) (Oral)   Resp 18   Ht 5' 2" (1 575 m)   Wt 68 kg (150 lb)   BMI 27 44 kg/m²     Constitutional:normal, well developed, well nourished, alert, in no distress and non-toxic and no overt pain behavior  Eyes:anicteric  HEENT:grossly intact  Neck:supple, symmetric, trachea midline and no masses   Pulmonary:even and unlabored  Cardiovascular:No edema or pitting edema present  Skin:Normal without rashes or lesions and well hydrated  Psychiatric:Mood and affect appropriate  Neurologic:Cranial Nerves II-XII grossly intact  Musculoskeletal:normal  Lumbar Spine Exam    Appearance:  Normal lordosis  Palpation/Tenderness:  left lumbar paraspinal tenderness  right lumbar paraspinal tenderness  Sensory:  no sensory deficits noted  Range of Motion:  Flexion:   Moderately limited  with pain  Extension:  Moderately limited  with pain  Lateral Flexion - Left:  Moderately limited  with pain  Lateral Flexion - Right:  Moderately limited  with pain  Rotation - Left:  Moderately limited  with pain  Rotation - Right:  Moderately limited  with pain  Motor Strength:  Left hip flexion:  5/5  Right hip flexion:  5/5  Left knee extension:  5/5  Right knee extension:  5/5  Left foot dorsiflexion:  5/5  Left foot plantar flexion:  5/5  Right foot dorsiflexion:  5/5  Right foot plantar flexion:  5/5      Imaging  MRI LUMBAR SPINE WITHOUT CONTRAST     INDICATION:  45-year-old female, progressive low back pain, right leg pain     COMPARISON:  8/11/2012 MRI     TECHNIQUE:  Sagittal T1, sagittal T2, sagittal inversion recovery, axial T1 and axial T2, coronal T2        IMAGE QUALITY:  Diagnostic     FINDINGS:     ALIGNMENT:    No compression fracture  No spondylolysis or spondylolisthesis  Mild levoscoliosis, apex L3-4, unchanged      MARROW SIGNAL:  Normal marrow signal is identified within the visualized bony structures  No discrete marrow lesion      DISTAL CORD AND CONUS:  Normal size and signal within the distal cord and conus  The conus ends at the L1 level      PARASPINAL SOFT TISSUES:  Persistent right extrarenal pelvis      SACRUM:  Normal signal within the sacrum  No evidence of insufficiency or stress fracture      LOWER THORACIC DISC SPACES:  Normal disc height and signal   No disc herniation, canal stenosis or foraminal narrowing      LUMBAR DISC SPACES:          L1-L2:  Normal      L2-L3:  Normal      L3-L4:  Development of mild degenerative disc and facet disease, no stenosis     L4-L5:  Persistent mild degenerative disc and facet disease, bulging annulus, central annular tear, no overt neural element impingement      L5-S1:  Partially sacralized left transverse process  Moderate degenerative facet arthrosis    No neural element impingement      IMPRESSION:  Mild multilevel degenerative spondylosis, mild levoscoliosis     Bulging annulus, central annular tear L4-5     Transitional lumbosacral junction configuration     No neural element impingement identified     Similar to previous study

## 2018-04-13 NOTE — TELEPHONE ENCOUNTER
Expedited appeal for Venlafaxine ER total dose of 300mg (150mg ER capsules) was denied  Comment received from [facility] fax said in addition to the information we submitted they need a documentation of treatment failure of Venlafaxine ER 225mg capsule/tablet  How would you like to proceed?

## 2018-04-13 NOTE — DISCHARGE INSTRUCTIONS
Chronic Back Pain   WHAT YOU NEED TO KNOW:   Chronic back pain is back pain that lasts 3 months or longer  This may include pain that has not been controlled or does not improve with treatment  Your back pain may cause weakness or pain that spreads to your arms or legs  DISCHARGE INSTRUCTIONS:   Return to the emergency department if:   · You have severe pain  · You have new signs of numbness or weakness, especially in your lower back, legs, arms, or genital area  · You lose control of your bladder or bowel movements  · You have a fever or sudden weight loss  Contact your healthcare provider if:   · You have new or worsened pain  · You have questions or concerns about your condition or care  Medicines:   · NSAIDs  help decrease swelling and pain  This medicine can be bought with or without a doctor's order  This medicine can cause stomach bleeding or kidney problems in certain people  If you take blood thinner medicine, always ask your healthcare provider if NSAIDs are safe for you  Always read the medicine label and follow the directions on it before using this medicine  · Acetaminophen  decreases pain  It is available without a doctor's order  Ask how much to take and how often to take it  Follow directions  Acetaminophen can cause liver damage if not taken correctly  · Prescription pain medicine  may also be given to decrease pain  Do not wait until the pain is severe before you take this medicine  · Muscle relaxers  help decrease muscle spasms and back pain  · Take your medicine as directed  Contact your healthcare provider if you think your medicine is not helping or if you have side effects  Tell him if you are allergic to any medicine  Keep a list of the medicines, vitamins, and herbs you take  Include the amounts, and when and why you take them  Bring the list or the pill bottles to follow-up visits  Carry your medicine list with you in case of an emergency    Follow up with your healthcare provider as directed: You may be referred to a sports medicine or spine specialist  Write down your questions so you remember to ask them during your visits  Manage your chronic back pain:   · Heat  helps decrease pain and muscle spasms  Apply heat on your back for 15 to 20 minutes every 2 hours or as often as directed  · Stay active  as much as you can without causing more pain  Ask your healthcare provider what exercises are right for you  Do not sit or lie down for long periods  This could make your back pain worse  Avoid heavy lifting until your pain is gone  · A physical therapist  can teach you exercises to help improve movement and strength, and to decrease pain  © 2017 [address]  Information is for End User's use only and may not be sold, redistributed or otherwise used for commercial purposes  All illustrations and images included in CareNotes® are the copyrighted property of [employer]  or [address]  The above information is an  only  It is not intended as medical advice for individual conditions or treatments  Talk to your doctor, nurse or pharmacist before following any medical regimen to see if it is safe and effective for you  Migraine Headache   WHAT YOU NEED TO KNOW:   A migraine is a severe headache  The pain can be so severe that it interferes with your daily activities  A migraine can last a few hours up to several days  The exact cause of migraines is not known  DISCHARGE INSTRUCTIONS:   Return to the emergency department if:   · You have a headache that seems different or much worse than your usual migraine headache  · You have a severe headache with a fever or a stiff neck  · You have new problems with speech, vision, balance, or movement  · You feel like you are going to faint, you become confused, or you have a seizure    Contact your healthcare provider or neurologist if:   · Your migraines interfere with your daily activities  · Your medicines or treatments stop working  · You have questions or concerns about your condition or care  Medicines: You may need any of the following  Take medicine as soon as you feel a migraine begin  · Prescription pain medicine  may be given  Do not wait until the pain is severe before you take your medicine  · Migraine medicines  are used to help prevent a migraine or stop it once it starts  · Antinausea medicine  may be given to calm your stomach and to help prevent vomiting  This medicine can also help relieve pain  · Take your medicine as directed  Contact your healthcare provider if you think your medicine is not helping or if you have side effects  Tell him or her if you are allergic to any medicine  Keep a list of the medicines, vitamins, and herbs you take  Include the amounts, and when and why you take them  Bring the list or the pill bottles to follow-up visits  Carry your medicine list with you in case of an emergency  Manage your symptoms:   · Rest in a dark, quiet room  This will help decrease your pain  Sleep may also help relieve the pain  · Apply ice to decrease pain  Use an ice pack, or put crushed ice in a plastic bag  Cover the ice pack with a towel and place it on your head  Apply ice for 15 to 20 minutes every hour  · Apply heat to decrease pain and muscle spasms  Use a small towel dampened with warm water or a heating pad, or sit in a warm bath  Apply heat on the area for 20 to 30 minutes every 2 hours  You may alternate heat and ice  · Keep a migraine record  Write down when your migraines start and stop  Include your symptoms and what you were doing when a migraine began  Record what you ate or drank for 24 hours before the migraine started  Keep track of what you did to treat your migraine and if it worked  Bring the migraine record with you to visits with your healthcare provider    Follow up with your healthcare provider or neurologist as directed:  Bring your migraine record with you  Write down your questions so you remember to ask them during your visits  Prevent another migraine:   · Do not smoke  Nicotine and other chemicals in cigarettes and cigars can trigger a migraine or make it worse  Ask your healthcare provider for information if you currently smoke and need help to quit  E-cigarettes or smokeless tobacco still contain nicotine  Talk to your healthcare provider before you use these products  · Do not drink alcohol  Alcohol can trigger a migraine  It can also keep medicines used to treat your migraines from working  · Get regular exercise  Exercise may help prevent migraines  Talk to your healthcare provider about the best exercise plan for you  Try to get at least 30 minutes of exercise on most days  · Manage stress  Stress may trigger a migraine  Learn new ways to relax, such as deep breathing  · Create a sleep schedule  Go to bed and get up at the same times each day  Do not watch television before bed  · Eat regular meals  Include healthy foods such as include fruit, vegetables, whole-grain breads, low-fat dairy products, beans, lean meat, and fish  Do not have food or drinks that trigger your migraines  © 2017 [address] [facility] Information is for End User's use only and may not be sold, redistributed or otherwise used for commercial purposes  All illustrations and images included in CareNotes® are the copyrighted property of [employer]  or [employer]  The above information is an  only  It is not intended as medical advice for individual conditions or treatments  Talk to your doctor, nurse or pharmacist before following any medical regimen to see if it is safe and effective for you

## 2018-04-13 NOTE — TELEPHONE ENCOUNTER
I am ok with her using it TID  Prefer she use only if needed, but if she needs it TID that is ok  Will refill for that #  Thanks

## 2018-04-13 NOTE — ED PROVIDER NOTES
History  Chief Complaint   Patient presents with    Headache - Recurrent or Known Dx Migraines     Pt  c/o migraine for two days with chronic lower back pain that radiates to right hip and knee  80-year-old female presents today with multiple complaints, primarily an exacerbation of her chronic low back pain, as well as a migraine for 2 days  States she ran out of her Percocet, and is due to see her spine doctor tomorrow for refill, but needs something to get her through the night tonight  Also states she was seen and evaluated here 4 days ago for the same complaints and would ever they gave her that night helped  History provided by:  Patient  Headache - Recurrent or Known Dx Migraines   Pain location:  Generalized  Quality:  Dull  Radiates to:  Does not radiate  Severity currently:  8/10  Severity at highest:  8/10  Onset quality:  Gradual  Duration:  2 days  Timing:  Constant  Chronicity:  Recurrent  Similar to prior headaches: yes    Context comment:  Patient has a history of migraines  Relieved by:  None tried  Worsened by:  Nothing  Ineffective treatments:  None tried  Associated symptoms: back pain (Complains of an exacerbation of her chronic low back pain )    Associated symptoms: no abdominal pain, no blurred vision, no congestion, no cough, no diarrhea, no dizziness, no drainage, no ear pain, no eye pain, no facial pain, no fatigue, no fever, no focal weakness, no hearing loss, no loss of balance, no myalgias, no nausea, no near-syncope, no neck pain, no neck stiffness, no numbness, no paresthesias, no photophobia, no seizures, no sinus pressure, no sore throat, no swollen glands, no syncope, no tingling, no URI, no visual change, no vomiting and no weakness    Risk factors: no anger, no family hx of SAH, does not have insomnia and lifestyle not sedentary        Prior to Admission Medications   Prescriptions Last Dose Informant Patient Reported? Taking?    ALPRAZolam (XANAX) 1 mg tablet 2018 at Unknown time Self Yes Yes   Sig: Take 1 mg by mouth daily at bedtime as needed for anxiety  ALPRAZolam (XANAX) 1 mg tablet 2018 at Unknown time Self Yes Yes   Sig: Take 1 tablet by mouth 3 (three) times a day as needed   ARIPiprazole (ABILIFY) 10 mg tablet 2018 at Unknown time Self Yes Yes   Sig: Take 50 mg by mouth daily  BREO ELLIPTA 200-25 MCG/INH inhaler 2018 at Unknown time Self Yes Yes   [employer] INDOOR/OUTDOOR ALLERGY RLF 10 MG tablet 2018 at Unknown time Self Yes Yes   Sig: Take 10 mg by mouth daily   EPINEPHrine (EPIPEN) 0 3 mg/0 3 mL SOAJ Unknown at Unknown time Self Yes No   Sig: as directed   Melatonin 5 MG TABS 2018 at Unknown time Self Yes Yes   Sig: Take 5 mg by mouth   PROAIR RESPICLICK 096 (90 Base) MCG/ACT AEPB Unknown at Unknown time Self Yes No   Si PUFFS EVERY 4 HOURS AS NEEDED   SPIRIVA RESPIMAT 2 5 MCG/ACT AERS 2018 at Unknown time Self Yes Yes   ZOLMitriptan (ZOMIG) 5 MG tablet 2018 at Unknown time  No Yes   Sig: Take 1 tab at first sign of headache, may repeat in 2 hours if needed  Max of 2 tabs in 24hrs/3days a week  albuterol (2 5 mg/3 mL) 0 083 % nebulizer solution 2018 at Unknown time Self Yes Yes   Sig: Inhale   baclofen 10 mg tablet 2018 at Unknown time  No Yes   Si qhs prn pain, and up to TID if tolerated     buPROPion (WELLBUTRIN XL) 150 mg 24 hr tablet 2018 at Unknown time Self Yes Yes   Sig: Take 150 mg by mouth daily   buPROPion (WELLBUTRIN XL) 300 mg 24 hr tablet 2018 at Unknown time Self Yes Yes   Sig: Take 300 mg by mouth daily   budesonide-formoterol (SYMBICORT) 160-4 5 mcg/act inhaler 2018 at Unknown time Self Yes Yes   Sig: Inhale   dexamethasone (DECADRON) 1 mg tablet Past Month at Unknown time Self No Yes   Sig: Take 1 tablet (1 mg total) by mouth 2 (two) times a day with meals 1 tab in the am for 5 days   dexamethasone (DECADRON) 2 mg tablet Past Month at Unknown time Self Yes Yes   Sig: Take by mouth daily   diphenhydrAMINE (BENADRYL) 25 mg tablet Past Month at Unknown time Self No Yes   Sig: Take 1 tablet by mouth every 6 (six) hours as needed (for headache, take with phenergan)   divalproex sodium (DEPAKOTE) 250 mg EC tablet Past Month at Unknown time Self No Yes   Sig: Take 1 tablet (250 mg total) by mouth daily at bedtime 2 tabs at bedtime for 3 nights and 1 tab at bedtime for 2 nights   ergocalciferol (VITAMIN D2) 50,000 units 2018 at Unknown time Self Yes Yes   Si,000 Units once a week   gabapentin (NEURONTIN) 300 mg capsule 2018 at Unknown time Self No Yes   Si CAPS 3 TIMES A DAY   ibuprofen (MOTRIN) 800 mg tablet Past Week at Unknown time  No Yes   Sig: Take 1 tablet (800 mg total) by mouth every 8 (eight) hours as needed for moderate pain   levalbuterol (XOPENEX) 1 25 mg/3 mL nebulizer solution 2018 at Unknown time Self Yes Yes   Sig: Inhale   levocetirizine (XYZAL) 5 MG tablet 2018 at Unknown time Self Yes Yes   Sig: Take 5 mg by mouth every evening     levonorgestrel (MIRENA, 52 MG,) 20 MCG/24HR IUD 2018 at Unknown time Self Yes Yes   Sig: by Intrauterine route   mometasone (NASONEX) 50 mcg/act nasal spray 2018 at Unknown time Self Yes Yes   naltrexone (REVIA) 50 mg tablet 2018 at Unknown time Self Yes Yes   olopatadine (PATANOL) 0 1 % ophthalmic solution 2018 at Unknown time Self Yes Yes   omeprazole (PriLOSEC) 20 mg delayed release capsule 2018 at Unknown time Self Yes Yes   Sig: Take 1 capsule by mouth daily   onabotulinumtoxin A (BOTOX) 100 units Unknown at Unknown time Self Yes No   Sig: Inject as directed   ondansetron (ZOFRAN) 4 mg tablet Past Month at Unknown time  No Yes   Sig: Take 1 tablet (4 mg total) by mouth every 8 (eight) hours as needed for nausea or vomiting   sertraline (ZOLOFT) 100 mg tablet 2018 at Unknown time Self Yes Yes   topiramate (TOPAMAX) 100 mg tablet 2018 at Unknown time Self Yes Yes   Sig: Take by mouth 2 (two) times a day   topiramate (TOPAMAX) 50 MG tablet 2018 at Unknown time Self Yes Yes   Sig: Take 50 mg by mouth 2 (two) times a day With 100 mg BID dose    venlafaxine (EFFEXOR) 37 5 mg tablet 2018 at Unknown time Self Yes Yes   Sig: Take 150 mg by mouth daily    zolpidem (AMBIEN) 10 mg tablet Unknown at Unknown time Self Yes No   Sig: Take 1 tablet by mouth      Facility-Administered Medications: None       Past Medical History:   Diagnosis Date    Anxiety     Asthma     depression     "anxiety and depression"     Migraine        Past Surgical History:   Procedure Laterality Date     SECTION      LAPAROSCOPIC ENDOMETRIOSIS FULGURATION         Family History   Problem Relation Age of Onset    Heart disease Mother     Asthma Daughter     Lung cancer Paternal Grandfather     Asthma Daughter      I have reviewed and agree with the history as documented  Social History   Substance Use Topics    Smoking status: Never Smoker    Smokeless tobacco: Never Used    Alcohol use No        Review of Systems   Constitutional: Negative for fatigue and fever  HENT: Negative for congestion, ear pain, hearing loss, postnasal drip, sinus pressure and sore throat  Eyes: Negative for blurred vision, photophobia and pain  Respiratory: Negative for cough  Cardiovascular: Negative for syncope and near-syncope  Gastrointestinal: Negative for abdominal pain, diarrhea, nausea and vomiting  Genitourinary: Negative for difficulty urinating  Musculoskeletal: Positive for back pain (Complains of an exacerbation of her chronic low back pain  )  Negative for myalgias, neck pain and neck stiffness  Skin: Negative for pallor, rash and wound  Neurological: Positive for headaches  Negative for dizziness, focal weakness, seizures, weakness, numbness, paresthesias and loss of balance  Psychiatric/Behavioral: Negative for confusion         Physical Exam  ED Triage Vitals   Temperature Pulse Respirations Blood Pressure SpO2   04/12/18 2110 04/12/18 2110 04/12/18 2110 04/12/18 2110 04/12/18 2110   98 5 °F (36 9 °C) 84 20 119/63 98 %      Temp Source Heart Rate Source Patient Position - Orthostatic VS BP Location FiO2 (%)   04/12/18 2110 04/12/18 2320 04/12/18 2320 04/12/18 2320 --   Oral Monitor Lying Left arm       Pain Score       04/12/18 2110       8           Orthostatic Vital Signs  Vitals:    04/12/18 2110 04/12/18 2320 04/12/18 2333 04/13/18 0124   BP: 119/63 102/56 107/59 100/59   Pulse: 84 86 86 88   Patient Position - Orthostatic VS:  Lying Lying Lying       Physical Exam   Constitutional: She is oriented to person, place, and time  She appears well-developed and well-nourished  HENT:   Head: Normocephalic and atraumatic  Mouth/Throat: Uvula is midline, oropharynx is clear and moist and mucous membranes are normal  No tonsillar exudate  Eyes: Pupils are equal, round, and reactive to light  Neck: Normal range of motion  Neck supple  Cardiovascular: Normal rate and regular rhythm  Pulmonary/Chest: Effort normal and breath sounds normal    Abdominal: Soft  Bowel sounds are normal  There is no tenderness  There is no rebound and no guarding  Musculoskeletal: Normal range of motion  Neurological: She is alert and oriented to person, place, and time  Patient moving all extremities equally, no focal neuro deficits noted  Skin: Skin is warm and dry  Psychiatric: She has a normal mood and affect  Nursing note and vitals reviewed        ED Medications  Medications   diphenhydrAMINE (BENADRYL) injection 25 mg (25 mg Intravenous Given 4/12/18 2236)   metoclopramide (REGLAN) injection 10 mg (10 mg Intravenous Given 4/12/18 2238)   ketorolac (TORADOL) injection 30 mg (30 mg Intravenous Given 4/12/18 2237)   sodium chloride 0 9 % bolus 1,000 mL (0 mL Intravenous Stopped 4/13/18 0119)   oxyCODONE-acetaminophen (PERCOCET) 5-325 mg per tablet 1 tablet (1 tablet Oral Given 4/12/18 0082)       Diagnostic Studies  Results Reviewed     None                 No orders to display              Procedures  Procedures       Phone Contacts  ED Phone Contact    ED Course  ED Course                                MDM  Number of Diagnoses or Management Options  Chronic low back pain: established and worsening  Exacerbation of chronic back pain:   Migraine headache: established and worsening  Diagnosis management comments: 10:28 PM  Patient presents today with complaints of an exacerbation of her chronic pain  States 'whatever they gave me on Monday really helped  '  She received the migraine cocktail as well as IV dilaudid  I told her that I do not prescribe narcotics for migraine headaches but I would be happy to give her the migraine cocktail again  With regards to her chronic back pain, I offered to give her one percocet here upon discharge but she needs to see her spine doctor tomorrow because we do not manage chronic pain in the emergency department  I believe this patient is displaying drug seeking behavior  She states she is unable to sit, stand or lie down, however during our exam she was sitting comfortably until she started asking for pain medications  11:27 PM  Patient states she feels no better after the medication  I told her that I would give her a percocet PO but I won't be ordering any IV narcotics  1:28 AM  States she feels better and is requesting discharge          Amount and/or Complexity of Data Reviewed  Review and summarize past medical records: yes    Risk of Complications, Morbidity, and/or Mortality  Presenting problems: moderate  Diagnostic procedures: moderate  Management options: moderate      CritCare Time    Disposition  Final diagnoses:   Migraine headache   Chronic low back pain   Exacerbation of chronic back pain     Time reflects when diagnosis was documented in both MDM as applicable and the Disposition within this note     Time User Action Codes Description Comment    4/12/2018 10:36 PM [facility] Add [D82 615] Migraine headache     4/12/2018 10:36 PM Dewar, Darol Olszewski Add [M54 5,  G89 29] Chronic low back pain     4/12/2018 11:51 PM Dewar, Darol Olszewski Add [M54 9,  G89 29] Exacerbation of chronic back pain       ED Disposition     ED Disposition Condition Comment    Discharge  Germaniaalexis Siu discharge to home/self care  Condition at discharge: Stable        Follow-up Information     Follow up With Specialties Details Why 2605 Breana Christianson DO Internal Medicine Schedule an appointment as soon as possible for a visit  [address]  [address] [place] [address], [address] [place] [address],[address]  1432 REBEKA Gupta Pain Medicine, Nurse Practitioner In 1 day  [address]  [phone]          Patient's Medications   Discharge Prescriptions    No medications on file     No discharge procedures on file      ED Provider  Electronically Signed by           Terrance Nance DO  04/13/18 0128

## 2018-04-15 ENCOUNTER — HOSPITAL ENCOUNTER (EMERGENCY)
Facility: HOSPITAL | Age: 48
Discharge: HOME | End: 2018-04-15
Attending: EMERGENCY MEDICINE | Admitting: EMERGENCY MEDICINE
Payer: COMMERCIAL

## 2018-04-15 VITALS
WEIGHT: 149.8 LBS | DIASTOLIC BLOOD PRESSURE: 61 MMHG | HEART RATE: 80 BPM | HEIGHT: 62 IN | OXYGEN SATURATION: 100 % | BODY MASS INDEX: 27.57 KG/M2 | RESPIRATION RATE: 18 BRPM | SYSTOLIC BLOOD PRESSURE: 111 MMHG | TEMPERATURE: 98.6 F

## 2018-04-15 DIAGNOSIS — Z76.5 DRUG-SEEKING BEHAVIOR: ICD-10-CM

## 2018-04-15 DIAGNOSIS — M54.9 CHRONIC BACK PAIN: Primary | ICD-10-CM

## 2018-04-15 DIAGNOSIS — G89.29 CHRONIC BACK PAIN: Primary | ICD-10-CM

## 2018-04-15 PROCEDURE — 99283 EMERGENCY DEPT VISIT LOW MDM: CPT

## 2018-04-15 RX ORDER — LIDOCAINE 50 MG/G
1 PATCH TOPICAL ONCE
Status: DISCONTINUED | OUTPATIENT
Start: 2018-04-15 | End: 2018-04-16 | Stop reason: HOSPADM

## 2018-04-15 RX ORDER — METHOCARBAMOL 500 MG/1
500 TABLET, FILM COATED ORAL ONCE
Status: DISCONTINUED | OUTPATIENT
Start: 2018-04-15 | End: 2018-04-16 | Stop reason: HOSPADM

## 2018-04-15 RX ORDER — KETOROLAC TROMETHAMINE 30 MG/ML
15 INJECTION, SOLUTION INTRAMUSCULAR; INTRAVENOUS ONCE
Status: DISCONTINUED | OUTPATIENT
Start: 2018-04-15 | End: 2018-04-16 | Stop reason: HOSPADM

## 2018-04-16 ENCOUNTER — HOSPITAL ENCOUNTER (OUTPATIENT)
Dept: MRI IMAGING | Facility: HOSPITAL | Age: 48
Discharge: HOME | End: 2018-04-16
Payer: COMMERCIAL

## 2018-04-16 ENCOUNTER — TELEPHONE (OUTPATIENT)
Dept: PAIN MEDICINE | Facility: MEDICAL CENTER | Age: 48
End: 2018-04-16

## 2018-04-16 DIAGNOSIS — R55 SYNCOPE AND COLLAPSE: ICD-10-CM

## 2018-04-16 DIAGNOSIS — G43.709 CHRONIC MIGRAINE WITHOUT AURA WITHOUT STATUS MIGRAINOSUS, NOT INTRACTABLE: ICD-10-CM

## 2018-04-16 DIAGNOSIS — M51.16 INTERVERTEBRAL DISC DISORDER WITH RADICULOPATHY OF LUMBAR REGION: Primary | ICD-10-CM

## 2018-04-16 PROCEDURE — 70551 MRI BRAIN STEM W/O DYE: CPT

## 2018-04-16 NOTE — DISCHARGE INSTRUCTIONS
Acute Low Back Pain   WHAT YOU NEED TO KNOW:   Acute low back pain is sudden discomfort in your lower back area that lasts for up to 6 weeks  The discomfort makes it difficult to tolerate activity  DISCHARGE INSTRUCTIONS:   Return to the emergency department if:   · You have severe pain  · You have sudden stiffness and heaviness on both buttocks down to both legs  · You have numbness or weakness in one leg, or pain in both legs  · You have numbness in your genital area or across your lower back  · You cannot control your urine or bowel movements  Contact your healthcare provider if:   · You have a fever  · You have pain at night or when you rest     · Your pain does not get better with treatment  · You have pain that worsens when you cough or sneeze  · You suddenly feel something pop or snap in your back  · You have questions or concerns about your condition or care  Medicines: The following medicines may be ordered by your healthcare provider:  · Acetaminophen  decreases pain  It is available without a doctor's order  Ask how much to take and how often to take it  Follow directions  Acetaminophen can cause liver damage if not taken correctly  · NSAIDs  help decrease swelling and pain  This medicine is available with or without a doctor's order  NSAIDs can cause stomach bleeding or kidney problems in certain people  If you take blood thinner medicine, always ask your healthcare provider if NSAIDs are safe for you  Always read the medicine label and follow directions  · Prescription pain medicine  may be given  Ask your healthcare provider how to take this medicine safely  · Muscle relaxers  decrease pain by relaxing the muscles in your lower spine  · Take your medicine as directed  Contact your healthcare provider if you think your medicine is not helping or if you have side effects  Tell him of her if you are allergic to any medicine   Keep a list of the medicines, vitamins, and herbs you take  Include the amounts, and when and why you take them  Bring the list or the pill bottles to follow-up visits  Carry your medicine list with you in case of an emergency  Self-care:   · Stay active  as much as you can without causing more pain  Bed rest could make your back pain worse  Start with some light exercises such as walking  Avoid heavy lifting until your pain is gone  Ask for more information about the activities or exercises that are right for you  · Ice  helps decrease swelling, pain, and muscle spams  Put crushed ice in a plastic bag  Cover it with a towel  Place it on your lower back for 20 to 30 minutes every 2 hours  Do this for about 2 to 3 days after your pain starts, or as directed  · Heat  helps decrease pain and muscle spasms  Start to use heat after treatment with ice has stopped  Use a small towel dampened with warm water or a heating pad, or sit in a warm bath  Apply heat on the area for 20 to 30 minutes every 2 hours for as many days as directed  Alternate heat and ice  Prevent acute low back pain:   · Use proper body mechanics  ¨ Bend at the hips and knees when you  objects  Do not bend from the waist  Use your leg muscles as you lift the load  Do not use your back  Keep the object close to your chest as you lift it  Try not to twist or lift anything above your waist     ¨ Change your position often when you stand for long periods of time  Rest one foot on a small box or footrest, and then switch to the other foot often  ¨ Try not to sit for long periods of time  When you do, sit in a straight-backed chair with your feet flat on the floor  Never reach, pull, or push while you are sitting  · Do exercises that strengthen your back muscles  Warm up before you exercise  Ask your healthcare provider the best exercises for you  · Maintain a healthy weight  Ask your healthcare provider how much you should weigh   Ask him to help you create a weight loss plan if you are overweight  Follow up with your healthcare provider as directed:  Return for a follow-up visit if you still have pain after 1 to 3 weeks of treatment  You may need to visit an orthopedist if your back pain lasts more than 12 weeks  Write down your questions so you remember to ask them during your visits  © 2017 [employer]  Information is for End User's use only and may not be sold, redistributed or otherwise used for commercial purposes  All illustrations and images included in CareNotes® are the copyrighted property of [employer]  or [employer]  The above information is an  only  It is not intended as medical advice for individual conditions or treatments  Talk to your doctor, nurse or pharmacist before following any medical regimen to see if it is safe and effective for you

## 2018-04-16 NOTE — TELEPHONE ENCOUNTER
Patient overdosed and had to be intubated so we can't prescribe her any opiates   ever  Can try her on diclofenac 75mg BID

## 2018-04-16 NOTE — TELEPHONE ENCOUNTER
S/W pt who said she wanted to speak w/ Dr Raj Estrella b/c she has a big problem! I explained that he has pt's the rest of the afternoon and asked if I could take a message  Pt was angry through out the entire conversation  She said she was in a lot of pain and "I want something to hold me over until the inj on 4/26  I can't function and it's not fair to my family " She said she's not even optimistic that the inj is going to even help  Pt said she was seen on Friday by Addison Rabago, it was "a horrible visit " She said that Addison Rabago was stereotyping and asking her what rehab she was in and what she was in for  "Who is she to accuse her of this!"    She was in a lot of pain and Addison Rabago was going to give her something that wasn't even going to work and she told Addison Rabago that  Addison Rabago was going to prescribe a small amt of Percocet, 2 per day to take until her inj and she had to do a UDS  She said she doesn't know if she passed or failed the UDS but if she failed it's b/c she was given a Percocet in the ER the night before and Addison Rabago knew that  She said she was in the ER again last night and they also wanted to give her stuff that doesn't work  She said there was a mix up in her medical records from another hospital and she is working on getting that corrected but that will take some time  Addison Rabago was going to order a patch and steroid pack but she doesn't want them b/c they don't work! Pt stated "she does not like Tracey, I hate her, I'm furious with her and she is biased!" Pt doesn't want to see her ever again  Pt said she has no problem with the other [facility] walter or Dr Raj Estrella  Pt said this is why I wanted to speak only with Dr Raj Estrella  Pt said this needs to be corrected and she needs something for her pain  Told pt I would forward all this info to Dr Raj Estrella

## 2018-04-16 NOTE — ED NOTES
Provider aware pt refused meds stating "That shit does not work for me  Percocet works for me " No new orders at this time       Augustina Reeves RN  04/15/18 5917

## 2018-04-16 NOTE — ED PROVIDER NOTES
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  Pain is worse on the right side  Her pain is 10/10  She denies any bowel or bladder incontinence, saddle anesthesias, total leg numbness weakness  No fevers or chills chest pain or shortness of breath or urinary symptoms  She has not taken anything for symptoms  She is seen by pain management, was recently last Friday on 4/13 and she states that the appointment "did not go well " She is scheduled for injections in a few weeks  She is currently not on any prescribed pain medications or pain regimen  Patient states she was seen in the emergency department approximately 1 week ago and was given a medicine through the IV which very much helped her back pain  She states she came again a few days later for back pain and did not get that see medication therefore her pain was unrelieved  No history of kidney stones  Prior to Admission Medications   Prescriptions Last Dose Informant Patient Reported? Taking? ALPRAZolam (XANAX) 1 mg tablet  Self Yes Yes   Sig: Take 1 tablet by mouth 3 (three) times a day as needed   ARIPiprazole (ABILIFY) 10 mg tablet  Self Yes Yes   Sig: Take 50 mg by mouth daily     BREO ELLIPTA 200-25 MCG/INH inhaler  Self Yes Yes   [facility] INDOOR/OUTDOOR ALLERGY RLF 10 MG tablet  Self Yes Yes   Sig: Take 10 mg by mouth daily   EPINEPHrine (EPIPEN) 0 3 mg/0 3 mL SOAJ  Self Yes Yes   Sig: as directed   Melatonin 5 MG TABS  Self Yes Yes   Sig: Take 5 mg by mouth daily at bedtime as needed     PROAIR RESPICLICK 808 (90 Base) MCG/ACT AEPB  Self Yes Yes   Si PUFFS EVERY 4 HOURS AS NEEDED   SPIRIVA RESPIMAT 2 5 MCG/ACT AERS  Self Yes Yes   ZOLMitriptan (ZOMIG) 5 MG tablet   No Yes   Sig: Take 1 tab at first sign of headache, may repeat in 2 hours if needed  Max of 2 tabs in 24hrs/3days a week  albuterol (2 5 mg/3 mL) 0 083 % nebulizer solution  Self Yes Yes   Sig: Inhale   baclofen 10 mg tablet   No Yes   Si qhs prn pain, and up to TID if tolerated     buPROPion (WELLBUTRIN XL) 300 mg 24 hr tablet  Self Yes Yes   Sig: Take 300 mg by mouth daily   budesonide-formoterol (SYMBICORT) 160-4 5 mcg/act inhaler  Self Yes Yes   Sig: Inhale   dexamethasone (DECADRON) 2 mg tablet  Self Yes Yes   Sig: Take by mouth daily   diphenhydrAMINE (BENADRYL) 25 mg tablet  Self No Yes   Sig: Take 1 tablet by mouth every 6 (six) hours as needed (for headache, take with phenergan)   divalproex sodium (DEPAKOTE) 250 mg EC tablet  Self No Yes   Sig: Take 1 tablet (250 mg total) by mouth daily at bedtime 2 tabs at bedtime for 3 nights and 1 tab at bedtime for 2 nights   Patient taking differently: Take 250 mg by mouth daily at bedtime as needed 2 tabs at bedtime for 3 nights and 1 tab at bedtime for 2 nights    ergocalciferol (VITAMIN D2) 50,000 units  Self Yes Yes   Si,000 Units once a week   gabapentin (NEURONTIN) 300 mg capsule  Self No Yes   Si CAPS 3 TIMES A DAY   ibuprofen (MOTRIN) 800 mg tablet   No Yes   Sig: Take 1 tablet (800 mg total) by mouth every 8 (eight) hours as needed for moderate pain   levalbuterol (XOPENEX) 1 25 mg/3 mL nebulizer solution  Self Yes Yes   Sig: Inhale   levonorgestrel (MIRENA, 52 MG,) 20 MCG/24HR IUD  Self Yes Yes   Sig: by Intrauterine route   mometasone (NASONEX) 50 mcg/act nasal spray  Self Yes Yes   naltrexone (REVIA) 50 mg tablet  Self Yes Yes   olopatadine (PATANOL) 0 1 % ophthalmic solution  Self Yes Yes   omeprazole (PriLOSEC) 20 mg delayed release capsule  Self Yes Yes   Sig: Take 1 capsule by mouth daily   onabotulinumtoxin A (BOTOX) 100 units  Self Yes Yes   Sig: Inject as directed   ondansetron (ZOFRAN) 4 mg tablet   No Yes   Sig: Take 1 tablet (4 mg total) by mouth every 8 (eight) hours as needed for nausea or vomiting   sertraline (ZOLOFT) 100 mg tablet  Self Yes Yes   topiramate (TOPAMAX) 100 mg tablet  Self Yes Yes   Sig: Take by mouth 2 (two) times a day   topiramate (TOPAMAX) 50 MG tablet  Self Yes Yes   Sig: Take 50 mg by mouth 2 (two) times a day With 100 mg BID dose    venlafaxine (EFFEXOR) 37 5 mg tablet  Self Yes Yes   Sig: Take 150 mg by mouth daily    zolpidem (AMBIEN) 10 mg tablet  Self Yes Yes   Sig: Take 1 tablet by mouth      Facility-Administered Medications: None       Past Medical History:   Diagnosis Date    Anxiety     Asthma     Back pain     depression     "anxiety and depression"     Migraine        Past Surgical History:   Procedure Laterality Date     SECTION      LAPAROSCOPIC ENDOMETRIOSIS FULGURATION         Family History   Problem Relation Age of Onset    Heart disease Mother     Asthma Daughter     Lung cancer Paternal Grandfather     Asthma Daughter      I have reviewed and agree with the history as documented  Social History   Substance Use Topics    Smoking status: Never Smoker    Smokeless tobacco: Never Used    Alcohol use No        Review of Systems   Constitutional: Negative for chills and fever  HENT: Negative for congestion and sore throat  Respiratory: Negative for cough, shortness of breath and wheezing  Cardiovascular: Negative for chest pain and palpitations  Gastrointestinal: Negative for abdominal pain, diarrhea, nausea and vomiting  Genitourinary: Negative for dysuria, frequency, hematuria and urgency  Musculoskeletal: Positive for back pain  Skin: Negative for rash     Neurological: Negative for dizziness, weakness, light-headedness, numbness and headaches  Physical Exam  ED Triage Vitals [04/15/18 1934]   Temperature Pulse Respirations Blood Pressure SpO2   98 6 °F (37 °C) 81 18 121/72 100 %      Temp Source Heart Rate Source Patient Position - Orthostatic VS BP Location FiO2 (%)   Oral Monitor Sitting Left arm --      Pain Score       8           Orthostatic Vital Signs  Vitals:    04/15/18 1934 04/15/18 1945 04/15/18 2117   BP: 121/72 111/72 111/61   Pulse: 81 82 80   Patient Position - Orthostatic VS: Sitting  Lying       Physical Exam   Constitutional: She is oriented to person, place, and time  She appears well-developed and well-nourished  Non-toxic appearance  She does not have a sickly appearance  She does not appear ill  No distress  HENT:   Head: Normocephalic and atraumatic  Right Ear: External ear normal    Left Ear: External ear normal    Nose: Nose normal    Mouth/Throat: Oropharynx is clear and moist    Eyes: Conjunctivae and EOM are normal  Pupils are equal, round, and reactive to light  Neck: Normal range of motion  Neck supple  Cardiovascular: Normal rate, regular rhythm and normal heart sounds  Exam reveals no gallop and no friction rub  No murmur heard  Pulmonary/Chest: Effort normal and breath sounds normal  No respiratory distress  She has no decreased breath sounds  She has no wheezes  She has no rhonchi  She has no rales  Musculoskeletal: Normal range of motion  Lumbar back: She exhibits tenderness and pain  She exhibits normal range of motion, no bony tenderness, no swelling, no edema, no deformity, no laceration, no spasm and normal pulse  Back:    Normal ROM of lower extremities with 5/5 strength, normal DF/PF  Distal sensation and circulation is intact and equal bilaterally  Neg SLR bilaterally  Patellar reflexes 2+ bilaterally  Neurological: She is alert and oriented to person, place, and time     Reflex Scores:       Patellar reflexes are 2+ on the right side and 2+ on the left side  Skin: Skin is warm and dry  Capillary refill takes less than 2 seconds  She is not diaphoretic  Psychiatric: She has a normal mood and affect  Nursing note and vitals reviewed  ED Medications  Medications   methocarbamol (ROBAXIN) tablet 500 mg (500 mg Oral Not Given 4/15/18 2121)   lidocaine (LIDODERM) 5 % patch 1 patch (0 patches Transdermal Hold 4/15/18 2121)   ketorolac (TORADOL) injection 15 mg (15 mg Intramuscular Not Given 4/15/18 2124)       Diagnostic Studies  Results Reviewed     None                 No orders to display              Procedures  Procedures       Phone Contacts  ED Phone Contact    ED Course  ED Course                                MDM  Number of Diagnoses or Management Options  Chronic back pain: established and worsening  Drug-seeking behavior:   Diagnosis management comments:   55 yo F with acute on chronic low back pain  She continues to reference a medication that worked well for her when she was seen here one week ago - upon review of patients chart this was dilaudid  She was here 3 days after and states they did not give her that medicine again and her back pain was not relieved  Patient is seen by pain management, was seen by them last Friday on 4/13/18 and states the appointment "did not go well" and they are scheduling her for injections  This note was reviewed, see chart review, and per the note: "I discussed with the patient that I would not be able to provide her with any opioids, due to her history of opioid overdose  Patient and her  became very angry and stated that they were going to [facility] for falsely saying that the patient overdosed on Oxycodone, when she overdosed on Ambien   They also stated that they would continue to go to the emergency room to obtain opioids to manage her pain "  Alternative pain regimens were discussed with patient to which per pain management noted, "the patient refused and stated that the only thing that will help her pain is oxycodone "    Patient was informed that we do not treat chronic pain in the ED with narcotics  She is refusing any other pain medications and states none of them work for her  She continues to make references to dilaudid and that it seemed to help with her pain  Patient was instructed to f/u with her PCP or pain management for her back pain  Upon discharge patient is very angry and states "I hope to god you suffer like I do " Patient flagged for DSB  Amount and/or Complexity of Data Reviewed  Decide to obtain previous medical records or to obtain history from someone other than the patient: yes  Review and summarize past medical records: yes    Patient Progress  Patient progress: stable    CritCare Time    Disposition  Final diagnoses:   Chronic back pain     Time reflects when diagnosis was documented in both MDM as applicable and the Disposition within this note     Time User Action Codes Description Comment    4/15/2018  9:39 PM Jose Roberto Allison Add [M54 9,  G89 29] Chronic back pain       ED Disposition     ED Disposition Condition Comment    Discharge  Breanna Talamantes discharge to home/self care      Condition at discharge: Good        Follow-up Information     Follow up With Specialties Details Why Contact Info Additional Information    Cait Patiño DO Internal Medicine Schedule an appointment as soon as possible for a visit  [address]  [address] Floor, [address] Emergency Department Emergency Medicine  If symptoms worsen - incontinence or urin or stool, numbness in groin, total leg numbness or weakness [address] [facility] [address], [address], [place], [place], [address]        Discharge Medication List as of 4/15/2018  9:40 PM      CONTINUE these medications which have NOT CHANGED    Details   albuterol (2 5 mg/3 mL) 0 083 % nebulizer solution Inhale, Starting Fri 4/19/2013, Historical Med      ALPRAZolam (XANAX) 1 mg tablet Take 1 tablet by mouth 3 (three) times a day as needed, Starting Wed 2/10/2016, Historical Med      ARIPiprazole (ABILIFY) 10 mg tablet Take 50 mg by mouth daily  , Historical Med      baclofen 10 mg tablet 1 qhs prn pain, and up to TID if tolerated , Normal      BREO ELLIPTA 200-25 MCG/INH inhaler Starting Mon 3/5/2018, Historical Med      budesonide-formoterol (SYMBICORT) 160-4 5 mcg/act inhaler Inhale, Starting Wed 4/27/2011, Historical Med      buPROPion (WELLBUTRIN XL) 300 mg 24 hr tablet Take 300 mg by mouth daily, Starting Sun 1/28/2018, Historical Med      [facility] INDOOR/OUTDOOR ALLERGY RLF 10 MG tablet Take 10 mg by mouth daily, Starting Mon 1/29/2018, Historical Med      dexamethasone (DECADRON) 2 mg tablet Take by mouth daily, Starting Fri 12/15/2017, Historical Med      diphenhydrAMINE (BENADRYL) 25 mg tablet Take 1 tablet by mouth every 6 (six) hours as needed (for headache, take with phenergan), Starting Tue 11/21/2017, Print      divalproex sodium (DEPAKOTE) 250 mg EC tablet Take 1 tablet (250 mg total) by mouth daily at bedtime 2 tabs at bedtime for 3 nights and 1 tab at bedtime for 2 nights, Starting Thu 2/1/2018, Normal      EPINEPHrine (EPIPEN) 0 3 mg/0 3 mL SOAJ as directed, Historical Med      ergocalciferol (VITAMIN D2) 50,000 units 50,000 Units once a week, Starting Mon 1/29/2018, Historical Med      gabapentin (NEURONTIN) 300 mg capsule 2 CAPS 3 TIMES A DAY, Normal      ibuprofen (MOTRIN) 800 mg tablet Take 1 tablet (800 mg total) by mouth every 8 (eight) hours as needed for moderate pain, Starting Mon 3/26/2018, Normal      levalbuterol (XOPENEX) 1 25 mg/3 mL nebulizer solution Inhale, Starting Fri 10/8/2010, Historical Med      levonorgestrel (MIRENA, 52 MG,) 20 MCG/24HR IUD by Intrauterine route, Historical Med      Melatonin 5 MG TABS Take 5 mg by mouth daily at bedtime as needed  , Starting Mon 11/27/2017, Historical Med      mometasone (NASONEX) 50 mcg/act nasal spray Starting Mon 3/12/2018, Historical Med      naltrexone (REVIA) 50 mg tablet Starting Sun 3/4/2018, Historical Med      olopatadine (PATANOL) 0 1 % ophthalmic solution Starting Mon 3/12/2018, Historical Med      omeprazole (PriLOSEC) 20 mg delayed release capsule Take 1 capsule by mouth daily, Starting Fri 1/28/2011, Historical Med      onabotulinumtoxin A (BOTOX) 100 units Inject as directed, Starting Fri 5/20/2016, Historical Med      ondansetron (ZOFRAN) 4 mg tablet Take 1 tablet (4 mg total) by mouth every 8 (eight) hours as needed for nausea or vomiting, Starting Mon 4/2/2018, Normal      PROAIR RESPICLICK 218 (90 Base) MCG/ACT AEPB 2 PUFFS EVERY 4 HOURS AS NEEDED, Historical Med      sertraline (ZOLOFT) 100 mg tablet Starting Sun 3/4/2018, Historical Med      SPIRIVA RESPIMAT 2 5 MCG/ACT AERS Starting Mon 3/5/2018, Historical Med      !! topiramate (TOPAMAX) 100 mg tablet Take by mouth 2 (two) times a day, Historical Med      !! topiramate (TOPAMAX) 50 MG tablet Take 50 mg by mouth 2 (two) times a day With 100 mg BID dose , Starting Thu 12/1/2016, Historical Med      venlafaxine (EFFEXOR) 37 5 mg tablet Take 150 mg by mouth daily , Historical Med      ZOLMitriptan (ZOMIG) 5 MG tablet Take 1 tab at first sign of headache, may repeat in 2 hours if needed  Max of 2 tabs in 24hrs/3days a week , Normal      zolpidem (AMBIEN) 10 mg tablet Take 1 tablet by mouth, Starting Wed 2/10/2016, Historical Med       !! - Potential duplicate medications found  Please discuss with provider  No discharge procedures on file      ED Provider  Electronically Signed by           Jose Hansen PA-C  04/17/18 0694

## 2018-04-16 NOTE — TELEPHONE ENCOUNTER
As I indicated last week, she needs to schedule a follow up appointment first and I will escript lower dose  She has no appointment pending

## 2018-04-17 PROBLEM — M51.16 INTERVERTEBRAL DISC DISORDER WITH RADICULOPATHY OF LUMBAR REGION: Status: ACTIVE | Noted: 2018-04-17

## 2018-04-17 RX ORDER — DICLOFENAC SODIUM 75 MG/1
75 TABLET, DELAYED RELEASE ORAL 2 TIMES DAILY
Qty: 60 TABLET | Refills: 0 | Status: SHIPPED | OUTPATIENT
Start: 2018-04-17 | End: 2018-08-16 | Stop reason: ALTCHOICE

## 2018-04-17 NOTE — TELEPHONE ENCOUNTER
S/W patient and advised that FQ can try her on diclofenac 75 mg BID  Pt said, "what is this going to do for me?"  Advised pt it is a nonsteroidal anti-inflammatory drug that can help reduce inflammation and help with her pain  Pt verbalized understanding and states she would like to try it  [facility] [place] [place]  Advised will make FQ aware

## 2018-04-17 NOTE — TELEPHONE ENCOUNTER
Left voicemail message again in regard to this, left office number for callback to schedule appointment

## 2018-04-17 NOTE — TELEPHONE ENCOUNTER
FYI -  S/W pt  Advised pt that [facility]  Pt verbalized understanding  Pt states, "I never ever want to see SHIPMAN again  She is cold hearted and her bedside manner is horrible  I only want to see the other PA or FQ  Pt proceeded to say that she was upset because she felt like the nurse she talked to yesterday was keeping her from speaking directly to [address] [place] [address] Road  Advised pt that the nurse was correct in what she did  Advised pt that our job is to triage the patient and relay the messages to FQ and in return FQ will give his recommendations  Advised pt that FQ was seeing patients all day yesterday and FQ is doing procedures today

## 2018-04-18 ENCOUNTER — TELEPHONE (OUTPATIENT)
Dept: BEHAVIORAL/MENTAL HEALTH CLINIC | Facility: CLINIC | Age: 48
End: 2018-04-18

## 2018-04-18 DIAGNOSIS — F41.1 GAD (GENERALIZED ANXIETY DISORDER): Chronic | ICD-10-CM

## 2018-04-18 DIAGNOSIS — F33.2 MAJOR DEPRESSIVE DISORDER, RECURRENT SEVERE WITHOUT PSYCHOTIC FEATURES (HCC): Primary | ICD-10-CM

## 2018-04-18 RX ORDER — VENLAFAXINE HYDROCHLORIDE 75 MG/1
75 CAPSULE, EXTENDED RELEASE ORAL DAILY
Qty: 30 CAPSULE | Refills: 3 | Status: SHIPPED | OUTPATIENT
Start: 2018-04-18 | End: 2018-08-16 | Stop reason: SDUPTHER

## 2018-04-18 RX ORDER — VENLAFAXINE HYDROCHLORIDE 150 MG/1
150 CAPSULE, EXTENDED RELEASE ORAL DAILY
Qty: 30 CAPSULE | Refills: 3 | Status: SHIPPED | OUTPATIENT
Start: 2018-04-18 | End: 2018-08-16 | Stop reason: SDUPTHER

## 2018-04-18 NOTE — TELEPHONE ENCOUNTER
Spoke with Stacy Rodriguez - she will try lower Effexor dose 225 mg daily as insurance would not cover higher dose (appeal was denied)  Effexor  mg daily escripted for 30 days with 3 RF

## 2018-04-18 NOTE — TELEPHONE ENCOUNTER
Pt called to make appt today- she had questions about her medication, Venlafaxine 37 5 mg as her appointment will be in August  She was hoping to get a call

## 2018-04-20 ENCOUNTER — TELEPHONE (OUTPATIENT)
Dept: PAIN MEDICINE | Facility: CLINIC | Age: 48
End: 2018-04-20

## 2018-04-20 NOTE — TELEPHONE ENCOUNTER
Following up on an email patient wrote making us aware of her unhappiness with her recent visit I called and left a detailed voice mail message on her personal cell phone stating that a decision had been made based on her behavior at that visit to discharge her from our practice  Patient was verbally abusive to Nurse Practitioner and nursing staff when calling in  Letter to be mailed with discharge information  Stated that we were cancelling her 4/26 injection and her 5/16 follow up visit

## 2018-05-11 ENCOUNTER — TELEPHONE (OUTPATIENT)
Dept: PAIN MEDICINE | Facility: CLINIC | Age: 48
End: 2018-05-11

## 2018-06-01 ENCOUNTER — OFFICE VISIT (OUTPATIENT)
Dept: OBGYN CLINIC | Facility: CLINIC | Age: 48
End: 2018-06-01
Payer: COMMERCIAL

## 2018-06-01 ENCOUNTER — HOSPITAL ENCOUNTER (OUTPATIENT)
Dept: RADIOLOGY | Age: 48
Discharge: HOME | End: 2018-06-01
Payer: COMMERCIAL

## 2018-06-01 VITALS — WEIGHT: 137 LBS | BODY MASS INDEX: 25.06 KG/M2

## 2018-06-01 DIAGNOSIS — B96.89 BACTERIAL VAGINOSIS: ICD-10-CM

## 2018-06-01 DIAGNOSIS — N76.0 BACTERIAL VAGINOSIS: ICD-10-CM

## 2018-06-01 DIAGNOSIS — Z12.39 ENCOUNTER FOR SCREENING FOR MALIGNANT NEOPLASM OF BREAST: ICD-10-CM

## 2018-06-01 DIAGNOSIS — N93.0 POSTCOITAL BLEEDING: Primary | ICD-10-CM

## 2018-06-01 PROCEDURE — 87591 N.GONORRHOEAE DNA AMP PROB: CPT | Performed by: PHYSICIAN ASSISTANT

## 2018-06-01 PROCEDURE — 99214 OFFICE O/P EST MOD 30 MIN: CPT | Performed by: PHYSICIAN ASSISTANT

## 2018-06-01 PROCEDURE — 77067 SCR MAMMO BI INCL CAD: CPT

## 2018-06-01 PROCEDURE — 87624 HPV HI-RISK TYP POOLED RSLT: CPT | Performed by: PHYSICIAN ASSISTANT

## 2018-06-01 PROCEDURE — 87491 CHLMYD TRACH DNA AMP PROBE: CPT | Performed by: PHYSICIAN ASSISTANT

## 2018-06-01 PROCEDURE — 77063 BREAST TOMOSYNTHESIS BI: CPT

## 2018-06-01 PROCEDURE — G0145 SCR C/V CYTO,THINLAYER,RESCR: HCPCS | Performed by: PATHOLOGY

## 2018-06-01 PROCEDURE — G0124 SCREEN C/V THIN LAYER BY MD: HCPCS | Performed by: PATHOLOGY

## 2018-06-01 RX ORDER — METRONIDAZOLE 500 MG/1
500 TABLET ORAL EVERY 12 HOURS SCHEDULED
Qty: 14 TABLET | Refills: 0 | Status: SHIPPED | OUTPATIENT
Start: 2018-06-01 | End: 2018-06-08

## 2018-06-01 NOTE — PROGRESS NOTES
Laura Nidia  1970    S:  56 y o  female here for a problem visit  She is here with her   About a month ago she had some spotting with intercourse which did not recur until Wednesday when she had a lot of bleeding with intercourse  She had some cramping during and after intercourse  This is new for her  She is amenorrheic with her Mirena  Past Medical History:   Diagnosis Date    Anxiety     Asthma     Back pain     Migraine      Family History   Problem Relation Age of Onset    Heart disease Mother     Asthma Daughter     Lung cancer Paternal Grandfather     Asthma Daughter      Social History     Social History    Marital status: /Civil Union     Spouse name: N/A    Number of children: N/A    Years of education: N/A     Social History Main Topics    Smoking status: Never Smoker    Smokeless tobacco: Never Used    Alcohol use No    Drug use: No    Sexual activity: Yes     Partners: Male     Birth control/ protection: IUD     Other Topics Concern    None     Social History Narrative    None       O:  Wt 62 1 kg (137 lb)   BMI 25 06 kg/m²   She appears well and is in no distress  Abdomen is soft and nontender  External genitals are normal without lesions or rashes  Vagina has copious thin yellow-white discharge  Cervix is normal, no lesions or discharge  IUD strings are normal    Wet mount reveals many clue cells, neg yeast, neg trich, pH 5 5, pos whiff    A/P:  BV, postcoital bleeding  Pap, HPV, GC/chlamydia obtained today  Flagyl 500mg po BID x 7 days, call in one week if not resolved

## 2018-06-05 LAB
CHLAMYDIA DNA CVX QL NAA+PROBE: NORMAL
HPV RRNA GENITAL QL NAA+PROBE: NORMAL
N GONORRHOEA DNA GENITAL QL NAA+PROBE: NORMAL

## 2018-06-07 ENCOUNTER — TELEPHONE (OUTPATIENT)
Dept: OBGYN CLINIC | Facility: CLINIC | Age: 48
End: 2018-06-07

## 2018-06-07 LAB
LAB AP GYN PRIMARY INTERPRETATION: NORMAL
Lab: NORMAL
PATH INTERP SPEC-IMP: NORMAL

## 2018-06-07 NOTE — TELEPHONE ENCOUNTER
----- Message from Chris Flores PA-C sent at 6/7/2018 10:42 AM EDT -----  Please let Stacey Johanne know that her Pap is ASCUS but they cannot rule out a high grade lesion - she needs a colpo to better look at her cervix  Her STD cultures and HPV tests are both negative

## 2018-06-07 NOTE — TELEPHONE ENCOUNTER
Left voicemail message today @ [phone] for Pt to call back office regarding Pap result and scheduling of colposcopy

## 2018-06-07 NOTE — TELEPHONE ENCOUNTER
Patient returned call - she is aware of pap results and that she needs a colpo  Explained what a colpo was and how it is done  Advised to take 2-3 Ibuprofen an hour before hand and to eat something  Patient is awalso aware both STD and HPV was negative  Scheduled patient colpo for 07/13 with [facility] at 3:40pm in [place]

## 2018-06-08 ENCOUNTER — TELEPHONE (OUTPATIENT)
Dept: OBGYN CLINIC | Facility: CLINIC | Age: 48
End: 2018-06-08

## 2018-06-08 DIAGNOSIS — R92.8 ABNORMAL MAMMOGRAM: Primary | ICD-10-CM

## 2018-06-08 NOTE — TELEPHONE ENCOUNTER
----- Message from Alissa Silverio, [address]  sent at 6/8/2018  8:52 AM EDT -----  Further imaging of the right breast indicated  Please provide orders for diagnostic right mammo and right breast ultrasound   Please notify and confirm she is aware of plan

## 2018-06-11 ENCOUNTER — TELEPHONE (OUTPATIENT)
Dept: OBGYN CLINIC | Facility: CLINIC | Age: 48
End: 2018-06-11

## 2018-06-11 NOTE — TELEPHONE ENCOUNTER
Pt confused, said she got a card in mail indicating her pap was normal, but got a call stating it was not please call her

## 2018-06-11 NOTE — TELEPHONE ENCOUNTER
Patient called - Pt confused, said she got a card in mail indicating her pap was normal, but got a call stating it was not  She is scheduled for colpo with CT[facility] on 07/13  Please advise  Thanks!

## 2018-06-12 ENCOUNTER — TELEPHONE (OUTPATIENT)
Dept: NEUROLOGY | Facility: CLINIC | Age: 48
End: 2018-06-12

## 2018-06-12 NOTE — TELEPHONE ENCOUNTER
Spoke with pt - advised her that the card should have just stated HPV and the cultures were neg  Explained that the PAP is ASC-H and that she does need the colpo  She understood  Advised if she has nay questions to call us back

## 2018-06-12 NOTE — TELEPHONE ENCOUNTER
Not sure - but her cultures and HPV test were negative but her Pap is ASC-H so she does need the colpo

## 2018-06-19 ENCOUNTER — TELEPHONE (OUTPATIENT)
Dept: NEUROLOGY | Facility: CLINIC | Age: 48
End: 2018-06-19

## 2018-07-03 ENCOUNTER — HOSPITAL ENCOUNTER (OUTPATIENT)
Dept: ULTRASOUND IMAGING | Facility: CLINIC | Age: 48
Discharge: HOME | End: 2018-07-03
Payer: COMMERCIAL

## 2018-07-03 ENCOUNTER — HOSPITAL ENCOUNTER (OUTPATIENT)
Dept: MAMMOGRAPHY | Facility: CLINIC | Age: 48
Discharge: HOME | End: 2018-07-03
Payer: COMMERCIAL

## 2018-07-03 ENCOUNTER — TRANSCRIBE ORDERS (OUTPATIENT)
Dept: MAMMOGRAPHY | Facility: CLINIC | Age: 48
End: 2018-07-03

## 2018-07-03 DIAGNOSIS — Z09 FOLLOW-UP EXAM, 3-6 MONTHS SINCE PREVIOUS EXAM: Primary | ICD-10-CM

## 2018-07-03 DIAGNOSIS — R92.8 ABNORMAL MAMMOGRAM: ICD-10-CM

## 2018-07-03 PROCEDURE — 76642 ULTRASOUND BREAST LIMITED: CPT

## 2018-07-03 PROCEDURE — 77065 DX MAMMO INCL CAD UNI: CPT

## 2018-07-03 PROCEDURE — G0279 TOMOSYNTHESIS, MAMMO: HCPCS

## 2018-07-06 ENCOUNTER — TELEPHONE (OUTPATIENT)
Dept: OBGYN CLINIC | Facility: CLINIC | Age: 48
End: 2018-07-06

## 2018-07-06 DIAGNOSIS — R92.2 DENSE BREAST TISSUE: ICD-10-CM

## 2018-07-06 DIAGNOSIS — R92.8 ABNORMAL MAMMOGRAM: Primary | ICD-10-CM

## 2018-07-06 NOTE — TELEPHONE ENCOUNTER
----- Message from Suellen Gunter, [address] sent at 7/6/2018 12:28 PM EDT -----  Benign findings on diagnostic imaging of the right breast  There are small, benign appearing nodules in the upper right, for which f/u US in 6 mos is recommended   Please confirm that the patient is aware of recommendations and provide order slip for the US if needed

## 2018-07-09 NOTE — TELEPHONE ENCOUNTER
Patient returned call - she is aware of results  - she has scheduled the 6 mnth  Patient stated she thinks she may be going through menopause - having night sweats and hot flashes  She would like to have b/w done to test her hormones  Ok to order? Please advise  Thanks!

## 2018-07-10 NOTE — TELEPHONE ENCOUNTER
There are no blood tests available to tell whether or not someone is going through menopause    If she is having trouble with symptoms, she can come in to discuss treatment options

## 2018-07-10 NOTE — TELEPHONE ENCOUNTER
Pt informed of terrell's message, she will decide if she wants to come in and discuss     Order placed in epic for 6 month  F/u rt br u/s  Slip for u/s sent to pt

## 2018-07-12 ENCOUNTER — PROCEDURE VISIT (OUTPATIENT)
Dept: OBGYN CLINIC | Facility: CLINIC | Age: 48
End: 2018-07-12
Payer: COMMERCIAL

## 2018-07-12 VITALS — WEIGHT: 140.8 LBS | BODY MASS INDEX: 25.75 KG/M2 | DIASTOLIC BLOOD PRESSURE: 56 MMHG | SYSTOLIC BLOOD PRESSURE: 112 MMHG

## 2018-07-12 DIAGNOSIS — R87.611 PAP SMEAR OF CERVIX WITH ASCUS, CANNOT EXCLUDE HGSIL: Primary | ICD-10-CM

## 2018-07-12 PROCEDURE — 88305 TISSUE EXAM BY PATHOLOGIST: CPT | Performed by: PATHOLOGY

## 2018-07-12 PROCEDURE — 57454 BX/CURETT OF CERVIX W/SCOPE: CPT | Performed by: OBSTETRICS & GYNECOLOGY

## 2018-07-12 PROCEDURE — 88344 IMHCHEM/IMCYTCHM EA MLT ANTB: CPT | Performed by: PATHOLOGY

## 2018-07-12 NOTE — PROGRESS NOTES
Colposcopy  Date/Time: 7/12/2018 3:51 PM  Performed by: Gaylen Nageotte  Authorized by: Gaylen Nageotte     Consent:     Consent obtained:  Verbal and written    Consent given by:  Patient    Patient questions answered: yes      Patient agrees, verbalizes understanding, and wants to proceed: yes    Pre-procedure:     Premeds:  Ibuprofen    Prepped with: acetic acid    Indication:     Indication:  ASC-H (HPV negative)  Procedure:     Procedure: Colposcopy w/ cervical biopsy and ECC      Berwyn speculum was placed in the vagina: yes      Under colposcopic examination the transition zone was seen in entirety: no      Endocervix was curetted using a Kevorkian curette: yes      Cervical biopsy performed with a cervical biopsy punch: yes      Monsel's solution was applied: yes      Biopsy(s): yes      Location:  9    Specimen to pathology: yes    Post-procedure:     Findings: Bleeding      Impression comment:  No clear lesions identified, small amt friability 9 o'clock    Patient tolerance of procedure:   Tolerated with difficulty  Comments:      /56 (BP Location: Left arm, Patient Position: Sitting, Cuff Size: Standard)   Wt 63 9 kg (140 lb 12 8 oz)   BMI 25 75 kg/m²

## 2018-07-17 ENCOUNTER — TELEPHONE (OUTPATIENT)
Dept: OBGYN CLINIC | Facility: CLINIC | Age: 48
End: 2018-07-17

## 2018-07-17 ENCOUNTER — OFFICE VISIT (OUTPATIENT)
Dept: NEUROLOGY | Facility: CLINIC | Age: 48
End: 2018-07-17

## 2018-07-17 DIAGNOSIS — G43.709 CHRONIC MIGRAINE WITHOUT AURA WITHOUT STATUS MIGRAINOSUS, NOT INTRACTABLE: Primary | ICD-10-CM

## 2018-07-17 PROCEDURE — NOSHOW: Performed by: PHYSICIAN ASSISTANT

## 2018-07-17 NOTE — TELEPHONE ENCOUNTER
----- Message from Teri Gonzalez MD sent at 7/17/2018  8:13 AM EDT -----  Can you please let Jesus Chavez know that she had mild changes on her colposcopy biopsies and we should repeat pap smear and HPV in 1 year

## 2018-07-20 DIAGNOSIS — G43.709 CHRONIC MIGRAINE WITHOUT AURA WITHOUT STATUS MIGRAINOSUS, NOT INTRACTABLE: ICD-10-CM

## 2018-07-23 RX ORDER — ZOLMITRIPTAN 5 MG/1
TABLET, FILM COATED ORAL
Qty: 8 TABLET | Refills: 0 | Status: SHIPPED | OUTPATIENT
Start: 2018-07-23 | End: 2018-11-20 | Stop reason: ALTCHOICE

## 2018-08-16 ENCOUNTER — OFFICE VISIT (OUTPATIENT)
Dept: PSYCHIATRY | Facility: CLINIC | Age: 48
End: 2018-08-16

## 2018-08-16 VITALS
HEIGHT: 62 IN | HEART RATE: 97 BPM | DIASTOLIC BLOOD PRESSURE: 72 MMHG | BODY MASS INDEX: 26.5 KG/M2 | WEIGHT: 144 LBS | SYSTOLIC BLOOD PRESSURE: 108 MMHG

## 2018-08-16 DIAGNOSIS — F41.0 PANIC DISORDER WITHOUT AGORAPHOBIA: Chronic | ICD-10-CM

## 2018-08-16 DIAGNOSIS — G47.09 OTHER INSOMNIA: Chronic | ICD-10-CM

## 2018-08-16 DIAGNOSIS — F41.1 GAD (GENERALIZED ANXIETY DISORDER): Chronic | ICD-10-CM

## 2018-08-16 DIAGNOSIS — F63.9 IMPULSE CONTROL DISORDER: Chronic | ICD-10-CM

## 2018-08-16 DIAGNOSIS — F33.2 MAJOR DEPRESSIVE DISORDER, RECURRENT SEVERE WITHOUT PSYCHOTIC FEATURES (HCC): Primary | Chronic | ICD-10-CM

## 2018-08-16 PROCEDURE — 90833 PSYTX W PT W E/M 30 MIN: CPT | Performed by: PSYCHIATRY & NEUROLOGY

## 2018-08-16 PROCEDURE — 99214 OFFICE O/P EST MOD 30 MIN: CPT | Performed by: PSYCHIATRY & NEUROLOGY

## 2018-08-16 RX ORDER — VENLAFAXINE HYDROCHLORIDE 75 MG/1
75 CAPSULE, EXTENDED RELEASE ORAL DAILY
Qty: 30 CAPSULE | Refills: 3 | Status: SHIPPED | OUTPATIENT
Start: 2018-08-16 | End: 2018-11-30 | Stop reason: SDUPTHER

## 2018-08-16 RX ORDER — ARIPIPRAZOLE 30 MG/1
30 TABLET ORAL
Qty: 30 TABLET | Refills: 3 | Status: SHIPPED | OUTPATIENT
Start: 2018-08-16 | End: 2018-10-10

## 2018-08-16 RX ORDER — BUPROPION HYDROCHLORIDE 300 MG/1
300 TABLET ORAL DAILY
Qty: 30 TABLET | Refills: 3 | Status: SHIPPED | OUTPATIENT
Start: 2018-08-16 | End: 2018-11-30 | Stop reason: SDUPTHER

## 2018-08-16 RX ORDER — BUPROPION HYDROCHLORIDE 150 MG/1
150 TABLET ORAL DAILY
Qty: 30 TABLET | Refills: 3 | Status: SHIPPED | OUTPATIENT
Start: 2018-08-16 | End: 2018-11-30 | Stop reason: SDUPTHER

## 2018-08-16 RX ORDER — SERTRALINE HYDROCHLORIDE 100 MG/1
200 TABLET, FILM COATED ORAL
Qty: 60 TABLET | Refills: 3 | Status: SHIPPED | OUTPATIENT
Start: 2018-08-16 | End: 2018-10-10

## 2018-08-16 RX ORDER — NALTREXONE HYDROCHLORIDE 50 MG/1
50 TABLET, FILM COATED ORAL DAILY
Qty: 30 TABLET | Refills: 3 | Status: SHIPPED | OUTPATIENT
Start: 2018-08-16 | End: 2018-11-30 | Stop reason: SDUPTHER

## 2018-08-16 RX ORDER — ALPRAZOLAM 1 MG/1
1 TABLET ORAL 3 TIMES DAILY PRN
Qty: 90 TABLET | Refills: 3 | Status: SHIPPED | OUTPATIENT
Start: 2018-08-16 | End: 2018-11-30 | Stop reason: SDUPTHER

## 2018-08-16 RX ORDER — BUPROPION HYDROCHLORIDE 150 MG/1
150 TABLET ORAL DAILY
COMMUNITY
Start: 2018-08-06 | End: 2018-08-16 | Stop reason: SDUPTHER

## 2018-08-16 RX ORDER — VENLAFAXINE HYDROCHLORIDE 150 MG/1
150 CAPSULE, EXTENDED RELEASE ORAL DAILY
Qty: 30 CAPSULE | Refills: 3 | Status: SHIPPED | OUTPATIENT
Start: 2018-08-16 | End: 2018-11-30 | Stop reason: SDUPTHER

## 2018-08-16 RX ORDER — ZOLPIDEM TARTRATE 10 MG/1
10 TABLET ORAL
Qty: 30 TABLET | Refills: 3 | Status: SHIPPED | OUTPATIENT
Start: 2018-08-16 | End: 2018-11-30 | Stop reason: SDUPTHER

## 2018-08-16 RX ORDER — DIVALPROEX SODIUM 250 MG/1
250 TABLET, DELAYED RELEASE ORAL 3 TIMES DAILY
Qty: 90 TABLET | Refills: 3 | Status: SHIPPED | OUTPATIENT
Start: 2018-08-16 | End: 2019-02-18 | Stop reason: SDUPTHER
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
## 2018-08-16 NOTE — PSYCH
TREATMENT PLAN (Medication Management Only)        [facility]    Name/Date of Birth/MRN:  Ida Deal 56 y o  1970 MRN: [id number]  Date of Treatment Plan: August 16, 2018  Diagnosis/Diagnoses:   1  Major depressive disorder, recurrent severe without psychotic features ([facility] )    2  KYLE (generalized anxiety disorder)    3  Panic disorder without agoraphobia    4  Impulse control disorder    5  Other insomnia      Strengths/Personal Resources for Self-Care: "stable job, support from , able to take medications"  Area/Areas of need (in own words): anxiety, depression  1  Long Term Goal: improve control of anxiety  Target Date: 6 weeks - 9/27/2018  Person/Persons responsible for completion of goal: Beryle Dolores and   2  Short Term Objective (s) - How will we reach this goal?:   A  Provider new recommended medication/dosage changes and/or continue medication(s): add Depakote, continue all other medications (Effexor XR, Wellbutrin XL, Abilify, Xanax, Ambien and Naltrexone)  B   N/A   C   N/A  Target Date: 6 weeks - 9/27/2018  Person/Persons Responsible for Completion of Goal: Beryle Dolores and    Progress Towards Goals: limited progress  Treatment Modality: medication management every 6 weeks, referral for individual psychotherapy  Review due 90 to 120 days from date of this plan: 4 months - 12/16/2018  Expected length of service: ongoing treatment  My Physician/PA/NP and I have developed this plan together and I agree to work on the goals and objectives  I understand the treatment goals that were developed for my treatment    Signature:       Date and time:  Signature of parent/guardian if under age of 15 years: Date and time:  Signature of provider:      Date and time:  Signature of Supervising Physician:    Date and time: 8/16/2018      Rafa Salazar MD

## 2018-10-10 ENCOUNTER — OFFICE VISIT (OUTPATIENT)
Dept: INTERNAL MEDICINE CLINIC | Facility: CLINIC | Age: 48
End: 2018-10-10
Payer: COMMERCIAL

## 2018-10-10 VITALS
BODY MASS INDEX: 27.05 KG/M2 | SYSTOLIC BLOOD PRESSURE: 120 MMHG | RESPIRATION RATE: 16 BRPM | TEMPERATURE: 98.2 F | OXYGEN SATURATION: 98 % | DIASTOLIC BLOOD PRESSURE: 70 MMHG | HEART RATE: 100 BPM | WEIGHT: 147 LBS | HEIGHT: 62 IN

## 2018-10-10 DIAGNOSIS — N30.01 ACUTE CYSTITIS WITH HEMATURIA: Primary | ICD-10-CM

## 2018-10-10 PROBLEM — N76.0 BV (BACTERIAL VAGINOSIS): Status: RESOLVED | Noted: 2018-03-26 | Resolved: 2018-10-10

## 2018-10-10 PROBLEM — B96.89 BV (BACTERIAL VAGINOSIS): Status: RESOLVED | Noted: 2018-03-26 | Resolved: 2018-10-10

## 2018-10-10 PROCEDURE — 99213 OFFICE O/P EST LOW 20 MIN: CPT | Performed by: INTERNAL MEDICINE

## 2018-10-10 RX ORDER — SULFAMETHOXAZOLE AND TRIMETHOPRIM 800; 160 MG/1; MG/1
1 TABLET ORAL EVERY 12 HOURS SCHEDULED
Qty: 14 TABLET | Refills: 0 | Status: SHIPPED | OUTPATIENT
Start: 2018-10-10 | End: 2018-10-17

## 2018-10-10 RX ORDER — PHENAZOPYRIDINE HYDROCHLORIDE 100 MG/1
100 TABLET, FILM COATED ORAL 3 TIMES DAILY PRN
Qty: 6 TABLET | Refills: 0 | Status: SHIPPED | OUTPATIENT
Start: 2018-10-10 | End: 2018-10-31 | Stop reason: SDUPTHER

## 2018-10-10 NOTE — ASSESSMENT & PLAN NOTE
Patient took azo, unable to obtain urine dip  Will empirically start on Bactrim as patient recently was on abx recently  She is to call if symptoms do not improved  Encouraged to increase fluid intake

## 2018-10-10 NOTE — PROGRESS NOTES
Assessment/Plan:    Acute cystitis with hematuria  Patient took azo, unable to obtain urine dip  Will empirically start on Bactrim as patient recently was on abx recently  She is to call if symptoms do not improved  Encouraged to increase fluid intake  1  Acute cystitis with hematuria  sulfamethoxazole-trimethoprim (BACTRIM DS) 800-160 mg per tablet    phenazopyridine (PYRIDIUM) 100 mg tablet     Subjective:      Patient ID: Syed Wilson is a 56 y o  female  HPI  54yo female with hypothyroidism, GERD, asthma here for evaluation of urinary complaints  She is accompanied by her daughter, Salina Avalos  She reports dysuria, urinary frequency and hematuria  Took azo  Has bladder pressure  Denies fever  She has chronic nausea which she takes pepto bismol  Last abx use was Augmentin two weeks ago for bronchitis  The following portions of the patient's history were reviewed and updated as appropriate: allergies, current medications, past family history, past medical history, past social history, past surgical history and problem list     Current Outpatient Prescriptions:     albuterol (2 5 mg/3 mL) 0 083 % nebulizer solution, Inhale, Disp: , Rfl:     ALPRAZolam (XANAX) 1 mg tablet, Take 1 tablet (1 mg total) by mouth 3 (three) times a day as needed for anxiety for up to 120 days, Disp: 90 tablet, Rfl: 3    baclofen 10 mg tablet, 1 qhs prn pain, and up to TID if tolerated  , Disp: 90 tablet, Rfl: 1    BREO ELLIPTA 200-25 MCG/INH inhaler, , Disp: , Rfl:     budesonide-formoterol (SYMBICORT) 160-4 5 mcg/act inhaler, Inhale, Disp: , Rfl:     buPROPion (WELLBUTRIN XL) 150 mg 24 hr tablet, Take 1 tablet (150 mg total) by mouth daily for 120 days, Disp: 30 tablet, Rfl: 3    buPROPion (WELLBUTRIN XL) 300 mg 24 hr tablet, Take 1 tablet (300 mg total) by mouth daily for 120 days Total Wellbutrin XL dose is 450 mg daily, Disp: 30 tablet, Rfl: 3    [employer] INDOOR/OUTDOOR ALLERGY RLF 10 MG tablet, Take 10 mg by mouth daily, Disp: , Rfl: 10    diphenhydrAMINE (BENADRYL) 25 mg tablet, Take 1 tablet by mouth every 6 (six) hours as needed (for headache, take with phenergan), Disp: 20 tablet, Rfl: 0    divalproex sodium (DEPAKOTE) 250 mg EC tablet, Take 1 tablet (250 mg total) by mouth 3 (three) times a day for 120 days, Disp: 90 tablet, Rfl: 3    EPINEPHrine (EPIPEN) 0 3 mg/0 3 mL SOAJ, as directed, Disp: , Rfl:     ergocalciferol (VITAMIN D2) 50,000 units, 50,000 Units once a week, Disp: , Rfl: 12    ibuprofen (MOTRIN) 800 mg tablet, Take 1 tablet (800 mg total) by mouth every 8 (eight) hours as needed for moderate pain, Disp: 30 tablet, Rfl: 1    levalbuterol (XOPENEX) 1 25 mg/3 mL nebulizer solution, Inhale, Disp: , Rfl:     levonorgestrel (MIRENA, 52 MG,) 20 MCG/24HR IUD, by Intrauterine route, Disp: , Rfl:     Melatonin 5 MG TABS, Take 5 mg by mouth daily at bedtime as needed  , Disp: , Rfl:     mometasone (NASONEX) 50 mcg/act nasal spray, , Disp: , Rfl:     naltrexone (REVIA) 50 mg tablet, Take 1 tablet (50 mg total) by mouth daily for 120 days, Disp: 30 tablet, Rfl: 3    olopatadine (PATANOL) 0 1 % ophthalmic solution, , Disp: , Rfl:     omeprazole (PriLOSEC) 20 mg delayed release capsule, Take 1 capsule by mouth daily, Disp: , Rfl:     ondansetron (ZOFRAN) 4 mg tablet, Take 1 tablet (4 mg total) by mouth every 8 (eight) hours as needed for nausea or vomiting, Disp: 30 tablet, Rfl: 2    PROAIR RESPICLICK 855 (90 Base) MCG/ACT AEPB, 2 PUFFS EVERY 4 HOURS AS NEEDED, Disp: , Rfl: 3    SPIRIVA RESPIMAT 2 5 MCG/ACT AERS, , Disp: , Rfl:     venlafaxine (EFFEXOR-XR) 150 mg 24 hr capsule, Take 1 capsule (150 mg total) by mouth daily for 120 days Total Effexor XR dose is 225 mg daily, Disp: 30 capsule, Rfl: 3    venlafaxine (EFFEXOR-XR) 75 mg 24 hr capsule, Take 1 capsule (75 mg total) by mouth daily for 120 days Total Effexor XR dose is 225 mg daily, Disp: 30 capsule, Rfl: 3    ZOLMitriptan (ZOMIG) 5 MG tablet, Take 1 tab at first sign of headache, may repeat in 2 hours if needed  Max of 2 tabs in 24hrs/3days a week , Disp: 8 tablet, Rfl: 0    zolpidem (AMBIEN) 10 mg tablet, Take 1 tablet (10 mg total) by mouth daily at bedtime as needed for sleep for up to 120 days, Disp: 30 tablet, Rfl: 3    phenazopyridine (PYRIDIUM) 100 mg tablet, Take 1 tablet (100 mg total) by mouth 3 (three) times a day as needed for bladder spasms, Disp: 6 tablet, Rfl: 0    sulfamethoxazole-trimethoprim (BACTRIM DS) 800-160 mg per tablet, Take 1 tablet by mouth every 12 (twelve) hours for 7 days, Disp: 14 tablet, Rfl: 0      Review of Systems   Constitutional: Negative for fever  Gastrointestinal: Positive for nausea  Genitourinary: Positive for dysuria, frequency, hematuria and pelvic pain  Objective:    /70 (BP Location: Left arm, Patient Position: Sitting)   Pulse 100   Temp 98 2 °F (36 8 °C)   Resp 16   Ht 5' 2" (1 575 m)   Wt 66 7 kg (147 lb)   SpO2 98%   BMI 26 89 kg/m²          Physical Exam   Constitutional: She appears well-developed and well-nourished  Cardiovascular: Normal rate, regular rhythm and normal heart sounds  Pulmonary/Chest: Effort normal and breath sounds normal  No respiratory distress  She has no wheezes  Abdominal: Soft  Bowel sounds are normal  She exhibits no distension  There is no tenderness  There is no CVA tenderness  Neurological: She is alert  Vitals reviewed

## 2018-10-11 ENCOUNTER — TELEPHONE (OUTPATIENT)
Dept: PSYCHIATRY | Facility: CLINIC | Age: 48
End: 2018-10-11

## 2018-10-15 ENCOUNTER — TELEPHONE (OUTPATIENT)
Dept: PSYCHIATRY | Facility: CLINIC | Age: 48
End: 2018-10-15

## 2018-10-15 NOTE — TELEPHONE ENCOUNTER
Phamracy called/stated Zolpidem 10mg requires a prior auth  Pharmacist stated ins company gave phone # to contact them,  [phone]

## 2018-10-17 ENCOUNTER — TELEPHONE (OUTPATIENT)
Dept: PSYCHIATRY | Facility: CLINIC | Age: 48
End: 2018-10-17

## 2018-10-17 NOTE — TELEPHONE ENCOUNTER
Dorothea Farrar called back stating the last time she got her refill on her Ambien was 8/16/18 she stated it was send to the [facility] pharmacy on [facility]   She transferred pharmacy's so now she would like all Rx refills to be send to [facility]

## 2018-10-17 NOTE — TELEPHONE ENCOUNTER
I called Isabel Purvis and left a VM to call us back to clarify the Ambien request from pharmacy  I can see she had it rx on 8/16/18 for 3 refills  When I called [facility] they said the script was transferred to which seems according to the [phone], to [facility]  Will wait for Isabel Purvis to call back

## 2018-10-18 ENCOUNTER — DOCUMENTATION (OUTPATIENT)
Dept: PSYCHIATRY | Facility: CLINIC | Age: 48
End: 2018-10-18

## 2018-10-18 NOTE — PROGRESS NOTES
Sent prior auth via [employer] to [facility] for Zolpidem 10 mg

## 2018-10-18 NOTE — PROGRESS NOTES
Received a faxed form from [facility] for additional information  Requested information from Dr Joe Ryan

## 2018-10-22 NOTE — PROGRESS NOTES
[facility] called the office and I spoke with a representative  Information provided by Dr Ross Castleman reviewed and Zolpidem was approved through 12/21/2039  I reviewed with Marylen Estelle and pharmacist at [place]

## 2018-10-31 ENCOUNTER — OFFICE VISIT (OUTPATIENT)
Dept: INTERNAL MEDICINE CLINIC | Facility: CLINIC | Age: 48
End: 2018-10-31
Payer: COMMERCIAL

## 2018-10-31 VITALS
OXYGEN SATURATION: 98 % | BODY MASS INDEX: 26.87 KG/M2 | SYSTOLIC BLOOD PRESSURE: 118 MMHG | HEIGHT: 62 IN | TEMPERATURE: 97.8 F | WEIGHT: 146 LBS | HEART RATE: 81 BPM | RESPIRATION RATE: 16 BRPM | DIASTOLIC BLOOD PRESSURE: 70 MMHG

## 2018-10-31 DIAGNOSIS — N30.01 ACUTE CYSTITIS WITH HEMATURIA: ICD-10-CM

## 2018-10-31 DIAGNOSIS — B37.3 VAGINAL YEAST INFECTION: Primary | ICD-10-CM

## 2018-10-31 PROBLEM — B37.31 VAGINAL YEAST INFECTION: Status: ACTIVE | Noted: 2018-10-31

## 2018-10-31 LAB
BACTERIA UR QL AUTO: ABNORMAL /HPF
BILIRUB UR QL STRIP: NEGATIVE
CLARITY UR: CLEAR
COLOR UR: YELLOW
GLUCOSE UR STRIP-MCNC: NEGATIVE MG/DL
HGB UR QL STRIP.AUTO: NEGATIVE
HYALINE CASTS #/AREA URNS LPF: ABNORMAL /LPF
KETONES UR STRIP-MCNC: NEGATIVE MG/DL
LEUKOCYTE ESTERASE UR QL STRIP: ABNORMAL
NITRITE UR QL STRIP: NEGATIVE
NON-SQ EPI CELLS URNS QL MICRO: ABNORMAL /HPF
PH UR STRIP.AUTO: 6.5 [PH] (ref 4.5–8)
PROT UR STRIP-MCNC: NEGATIVE MG/DL
RBC #/AREA URNS AUTO: ABNORMAL /HPF
SL AMB  POCT GLUCOSE, UA: NORMAL
SL AMB LEUKOCYTE ESTERASE,UA: ABNORMAL
SL AMB POCT BILIRUBIN,UA: ABNORMAL
SL AMB POCT BLOOD,UA: ABNORMAL
SL AMB POCT CLARITY,UA: ABNORMAL
SL AMB POCT COLOR,UA: YELLOW
SL AMB POCT KETONES,UA: ABNORMAL
SL AMB POCT NITRITE,UA: ABNORMAL
SL AMB POCT PH,UA: 5
SL AMB POCT SPECIFIC GRAVITY,UA: 1020
SL AMB POCT URINE PROTEIN: ABNORMAL
SL AMB POCT UROBILINOGEN: NORMAL
SP GR UR STRIP.AUTO: 1.01 (ref 1–1.03)
UROBILINOGEN UR QL STRIP.AUTO: 0.2 E.U./DL
WBC #/AREA URNS AUTO: ABNORMAL /HPF

## 2018-10-31 PROCEDURE — 81001 URINALYSIS AUTO W/SCOPE: CPT | Performed by: NURSE PRACTITIONER

## 2018-10-31 PROCEDURE — 81002 URINALYSIS NONAUTO W/O SCOPE: CPT | Performed by: NURSE PRACTITIONER

## 2018-10-31 PROCEDURE — 99213 OFFICE O/P EST LOW 20 MIN: CPT | Performed by: NURSE PRACTITIONER

## 2018-10-31 PROCEDURE — 3008F BODY MASS INDEX DOCD: CPT | Performed by: NURSE PRACTITIONER

## 2018-10-31 RX ORDER — FLUCONAZOLE 150 MG/1
150 TABLET ORAL ONCE
Qty: 1 TABLET | Refills: 0 | Status: SHIPPED | OUTPATIENT
Start: 2018-10-31 | End: 2018-10-31

## 2018-10-31 RX ORDER — PHENAZOPYRIDINE HYDROCHLORIDE 100 MG/1
100 TABLET, FILM COATED ORAL 3 TIMES DAILY PRN
Qty: 6 TABLET | Refills: 0 | Status: SHIPPED | OUTPATIENT
Start: 2018-10-31 | End: 2019-01-12 | Stop reason: ALTCHOICE

## 2018-10-31 RX ORDER — GABAPENTIN 300 MG/1
CAPSULE ORAL
Refills: 3 | COMMUNITY
Start: 2018-10-28 | End: 2018-12-31 | Stop reason: SDUPTHER

## 2018-10-31 RX ORDER — CIPROFLOXACIN 500 MG/1
500 TABLET, FILM COATED ORAL EVERY 12 HOURS SCHEDULED
Qty: 10 TABLET | Refills: 0 | Status: SHIPPED | OUTPATIENT
Start: 2018-10-31 | End: 2018-11-05

## 2018-10-31 NOTE — PROGRESS NOTES
Assessment/Plan:    Acute cystitis with hematuria  Sample insufficient to culture but is positive for leukocytes and nitrites and symptoms worsening over the past 24 hours  Pt treated with bactrim for uti earlier this month  No culture taken at that time either  Pt is started on cipro and given an rx for pyridium  Encouraged fluids and cranberry juice specifically  Pt instructed to call for reevaluation if sx worsen or persist       Vaginal yeast infection  Diflucan prescribed because pt is also symptomatic of yeast infection following use of bactrim earlier this month  Diagnoses and all orders for this visit:    Vaginal yeast infection  -     fluconazole (DIFLUCAN) 150 mg tablet; Take 1 tablet (150 mg total) by mouth once for 1 dose  -     UA w Reflex to Microscopic w Reflex to Culture - Clinic Collect  -     Urine Microscopic    Acute cystitis with hematuria  -     phenazopyridine (PYRIDIUM) 100 mg tablet; Take 1 tablet (100 mg total) by mouth 3 (three) times a day as needed for bladder spasms  -     ciprofloxacin (CIPRO) 500 mg tablet; Take 1 tablet (500 mg total) by mouth every 12 (twelve) hours for 5 days  -     POCT urine dip  -     UA w Reflex to Microscopic w Reflex to Culture - Clinic Collect  -     Urine Microscopic    Other orders  -     gabapentin (NEURONTIN) 300 mg capsule; TK 2 CS PO TID          Subjective:      Patient ID: Liliana Noguera is a 56 y o  female  Pt is a 56 y o  y/o female who is seen today for evaluation of UTI symptoms  Symptoms started yesterday and include dysuria, frequency, hesitancy, and pelvic pain  She denies fever, chills, vaginal discharge, flank pain, nausea/vomiting  Appetite is normal   She trying to make sure she stays hydrated but her symptoms are worsening            The following portions of the patient's history were reviewed and updated as appropriate: allergies, current medications, past family history, past medical history, past social history, past surgical history and problem list     Review of Systems   Constitutional: Negative for appetite change, chills and fever  Respiratory: Negative for shortness of breath  Cardiovascular: Negative for chest pain, palpitations and leg swelling  Gastrointestinal: Negative for abdominal pain, diarrhea, nausea and vomiting  Genitourinary: Positive for difficulty urinating, dysuria, frequency, pelvic pain and urgency  Negative for decreased urine volume, flank pain, hematuria and vaginal discharge  Neurological: Negative for dizziness and headaches  Psychiatric/Behavioral: Negative for confusion  Objective:      /70 (BP Location: Left arm, Patient Position: Sitting)   Pulse 81   Temp 97 8 °F (36 6 °C)   Resp 16   Ht 5' 2" (1 575 m)   Wt 66 2 kg (146 lb)   SpO2 98%   BMI 26 70 kg/m²          Physical Exam   Constitutional: She is oriented to person, place, and time  Vital signs are normal  She appears well-developed and well-nourished  She is cooperative  HENT:   Right Ear: External ear normal    Left Ear: External ear normal    Cardiovascular: Normal rate, regular rhythm, normal heart sounds and intact distal pulses  Pulmonary/Chest: Effort normal and breath sounds normal    Abdominal: Soft  Normal appearance and bowel sounds are normal  There is tenderness in the suprapubic area  There is no CVA tenderness  Genitourinary:   Genitourinary Comments: Urine dip positive for leukocytes and nitritie   Musculoskeletal: She exhibits no edema  Neurological: She is alert and oriented to person, place, and time  Skin: Skin is warm, dry and intact  Psychiatric: She has a normal mood and affect   Her speech is normal and behavior is normal  Judgment and thought content normal  Cognition and memory are normal

## 2018-11-01 NOTE — ASSESSMENT & PLAN NOTE
Diflucan prescribed because pt is also symptomatic of yeast infection following use of bactrim earlier this month

## 2018-11-01 NOTE — ASSESSMENT & PLAN NOTE
Sample insufficient to culture but is positive for leukocytes and nitrites and symptoms worsening over the past 24 hours  Pt treated with bactrim for uti earlier this month  No culture taken at that time either  Pt is started on cipro and given an rx for pyridium  Encouraged fluids and cranberry juice specifically    Pt instructed to call for reevaluation if sx worsen or persist

## 2018-11-02 ENCOUNTER — TELEPHONE (OUTPATIENT)
Dept: INTERNAL MEDICINE CLINIC | Facility: CLINIC | Age: 48
End: 2018-11-02

## 2018-11-02 NOTE — TELEPHONE ENCOUNTER
Patient took the one pill you gave her for her yeast infection on Wednesday but she is still having a lot of itching, would you call more in or what should she do?   She cannot take anything over the counter due to her allergies

## 2018-11-02 NOTE — TELEPHONE ENCOUNTER
Pt advised that the diflucan can take up to 3 days to alleviate symptoms and in the mean time she can use ice to help with itch and leave her skin open to air as much as possible  Pt to call back Monday if she has not had any improvement

## 2018-11-20 DIAGNOSIS — G43.709 CHRONIC MIGRAINE WITHOUT AURA WITHOUT STATUS MIGRAINOSUS, NOT INTRACTABLE: ICD-10-CM

## 2018-11-20 RX ORDER — ZOLMITRIPTAN 5 MG/1
TABLET, ORALLY DISINTEGRATING ORAL
Qty: 9 TABLET | Refills: 0 | Status: SHIPPED | OUTPATIENT
Start: 2018-11-20 | End: 2018-12-31 | Stop reason: SDUPTHER

## 2018-11-20 RX ORDER — ONDANSETRON 4 MG/1
4 TABLET, FILM COATED ORAL EVERY 8 HOURS PRN
Qty: 30 TABLET | Refills: 0 | Status: SHIPPED | OUTPATIENT
Start: 2018-11-20 | End: 2018-12-31 | Stop reason: SDUPTHER

## 2018-11-20 NOTE — TELEPHONE ENCOUNTER
Pt calling asking for refill of zofran and zolmitriptan  Pt is requesting the disintegrating tablet of zolmitriptan
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
## 2018-11-29 ENCOUNTER — TELEPHONE (OUTPATIENT)
Dept: INTERNAL MEDICINE CLINIC | Facility: CLINIC | Age: 48
End: 2018-11-29

## 2018-11-30 ENCOUNTER — OFFICE VISIT (OUTPATIENT)
Dept: PSYCHIATRY | Facility: CLINIC | Age: 48
End: 2018-11-30
Payer: COMMERCIAL

## 2018-11-30 VITALS
DIASTOLIC BLOOD PRESSURE: 77 MMHG | HEART RATE: 94 BPM | WEIGHT: 147 LBS | HEIGHT: 62 IN | BODY MASS INDEX: 27.05 KG/M2 | SYSTOLIC BLOOD PRESSURE: 108 MMHG

## 2018-11-30 DIAGNOSIS — F41.1 GAD (GENERALIZED ANXIETY DISORDER): Chronic | ICD-10-CM

## 2018-11-30 DIAGNOSIS — F41.0 PANIC DISORDER WITHOUT AGORAPHOBIA: Chronic | ICD-10-CM

## 2018-11-30 DIAGNOSIS — F63.9 IMPULSE CONTROL DISORDER: Chronic | ICD-10-CM

## 2018-11-30 DIAGNOSIS — F33.2 MAJOR DEPRESSIVE DISORDER, RECURRENT SEVERE WITHOUT PSYCHOTIC FEATURES (HCC): Primary | Chronic | ICD-10-CM

## 2018-11-30 DIAGNOSIS — G47.09 OTHER INSOMNIA: Chronic | ICD-10-CM

## 2018-11-30 DIAGNOSIS — Z79.899 LONG-TERM USE OF HIGH-RISK MEDICATION: Chronic | ICD-10-CM

## 2018-11-30 PROCEDURE — 99213 OFFICE O/P EST LOW 20 MIN: CPT | Performed by: PSYCHIATRY & NEUROLOGY

## 2018-11-30 PROCEDURE — 90833 PSYTX W PT W E/M 30 MIN: CPT | Performed by: PSYCHIATRY & NEUROLOGY

## 2018-11-30 RX ORDER — SERTRALINE HYDROCHLORIDE 100 MG/1
200 TABLET, FILM COATED ORAL
Qty: 60 TABLET | Refills: 3 | Status: SHIPPED | OUTPATIENT
Start: 2018-11-30 | End: 2019-02-18 | Stop reason: SDUPTHER

## 2018-11-30 RX ORDER — ARIPIPRAZOLE 30 MG/1
30 TABLET ORAL
COMMUNITY
End: 2018-11-30 | Stop reason: SDUPTHER

## 2018-11-30 RX ORDER — ALPRAZOLAM 1 MG/1
1 TABLET ORAL 3 TIMES DAILY PRN
Qty: 90 TABLET | Refills: 3 | Status: SHIPPED | OUTPATIENT
Start: 2018-12-14 | End: 2019-01-21 | Stop reason: ALTCHOICE

## 2018-11-30 RX ORDER — BUPROPION HYDROCHLORIDE 300 MG/1
300 TABLET ORAL DAILY
Qty: 30 TABLET | Refills: 3 | Status: SHIPPED | OUTPATIENT
Start: 2018-11-30 | End: 2019-02-18 | Stop reason: SDUPTHER

## 2018-11-30 RX ORDER — ARIPIPRAZOLE 30 MG/1
30 TABLET ORAL
Qty: 30 TABLET | Refills: 3 | Status: SHIPPED | OUTPATIENT
Start: 2018-11-30 | End: 2019-02-18 | Stop reason: SDUPTHER

## 2018-11-30 RX ORDER — SERTRALINE HYDROCHLORIDE 100 MG/1
200 TABLET, FILM COATED ORAL
COMMUNITY
End: 2018-11-30 | Stop reason: SDUPTHER

## 2018-11-30 RX ORDER — VENLAFAXINE HYDROCHLORIDE 75 MG/1
75 CAPSULE, EXTENDED RELEASE ORAL DAILY
Qty: 30 CAPSULE | Refills: 3 | Status: SHIPPED | OUTPATIENT
Start: 2018-11-30 | End: 2019-01-07 | Stop reason: ALTCHOICE

## 2018-11-30 RX ORDER — ZOLPIDEM TARTRATE 10 MG/1
10 TABLET ORAL
Qty: 30 TABLET | Refills: 3 | Status: SHIPPED | OUTPATIENT
Start: 2018-12-14 | End: 2019-05-28 | Stop reason: SDUPTHER

## 2018-11-30 RX ORDER — VENLAFAXINE HYDROCHLORIDE 150 MG/1
150 CAPSULE, EXTENDED RELEASE ORAL DAILY
Qty: 30 CAPSULE | Refills: 3 | Status: SHIPPED | OUTPATIENT
Start: 2018-11-30 | End: 2019-01-07 | Stop reason: SDUPTHER

## 2018-11-30 RX ORDER — BUPROPION HYDROCHLORIDE 150 MG/1
150 TABLET ORAL DAILY
Qty: 30 TABLET | Refills: 3 | Status: SHIPPED | OUTPATIENT
Start: 2018-11-30 | End: 2019-02-18 | Stop reason: SDUPTHER

## 2018-11-30 RX ORDER — NALTREXONE HYDROCHLORIDE 50 MG/1
50 TABLET, FILM COATED ORAL DAILY
Qty: 30 TABLET | Refills: 3 | Status: SHIPPED | OUTPATIENT
Start: 2018-11-30 | End: 2019-02-18 | Stop reason: SDUPTHER

## 2018-11-30 NOTE — PSYCH
TREATMENT PLAN (Medication Management Only)        [facility]    Name/Date of Birth/MRN:  Cathy Rouse 56 y o  1970 MRN: [id number]  Date of Treatment Plan: November 30, 2018  Diagnosis/Diagnoses:   1  Major depressive disorder, recurrent severe without psychotic features ([facility] [address] )    2  KYLE (generalized anxiety disorder)    3  Panic disorder without agoraphobia    4  Impulse control disorder    5  Other insomnia    6  Long-term use of high-risk medication      Strengths/Personal Resources for Self-Care: "big, kind heart, mild personality, no conflicts"  Area/Areas of need (in own words): "stop depression and skin picking"  1  Long Term Goal: improve control of depression  Target Date: 5 weeks - 1/4/2019  Person/Persons responsible for completion of goal: Milagro Rosario  2  Short Term Objective (s) - How will we reach this goal?:   A  Provider new recommended medication/dosage changes and/or continue medication(s): start Depakote, continue all other medications (Zoloft, Effexor XR, Wellbutrin XL, Abilify, Klonopin and Ambien)  B   N/A   C   N/A  Target Date: 5 weeks - 1/4/2019  Person/Persons Responsible for Completion of Goal: Milagro Rosario   Progress Towards Goals: limited progress  Treatment Modality: medication management every 5 weeks, referral for individual psychotherapy  Review due 90 to 120 days from date of this plan: 4 months - 3/30/2019  Expected length of service: ongoing treatment  My Physician/PA/NP and I have developed this plan together and I agree to work on the goals and objectives  I understand the treatment goals that were developed for my treatment    Signature:       Date and time:  Signature of parent/guardian if under age of 15 years: Date and time:  Signature of provider:      Date and time:  Signature of Supervising Physician:    Date and time: 11/30/2018      Loren Edmondson MD

## 2018-12-26 ENCOUNTER — TRANSCRIBE ORDERS (OUTPATIENT)
Dept: NEUROLOGY | Facility: CLINIC | Age: 48
End: 2018-12-26

## 2018-12-26 DIAGNOSIS — F41.1 GAD (GENERALIZED ANXIETY DISORDER): Chronic | ICD-10-CM

## 2018-12-26 DIAGNOSIS — R55 SYNCOPE AND COLLAPSE: ICD-10-CM

## 2018-12-26 DIAGNOSIS — F33.2 MAJOR DEPRESSIVE DISORDER, RECURRENT SEVERE WITHOUT PSYCHOTIC FEATURES (HCC): Chronic | ICD-10-CM

## 2018-12-26 DIAGNOSIS — G43.709 CHRONIC MIGRAINE WITHOUT AURA WITHOUT STATUS MIGRAINOSUS, NOT INTRACTABLE: Primary | ICD-10-CM

## 2018-12-26 RX ORDER — BUPROPION HYDROCHLORIDE 300 MG/1
TABLET ORAL
Qty: 30 TABLET | Refills: 0 | OUTPATIENT
Start: 2018-12-26

## 2018-12-26 RX ORDER — ALPRAZOLAM 1 MG/1
TABLET ORAL
Qty: 90 TABLET | Refills: 0 | OUTPATIENT
Start: 2018-12-26

## 2018-12-28 DIAGNOSIS — F33.2 MAJOR DEPRESSIVE DISORDER, RECURRENT SEVERE WITHOUT PSYCHOTIC FEATURES (HCC): Chronic | ICD-10-CM

## 2018-12-31 ENCOUNTER — OFFICE VISIT (OUTPATIENT)
Dept: NEUROLOGY | Facility: CLINIC | Age: 48
End: 2018-12-31
Payer: COMMERCIAL

## 2018-12-31 VITALS — SYSTOLIC BLOOD PRESSURE: 114 MMHG | DIASTOLIC BLOOD PRESSURE: 64 MMHG | HEART RATE: 79 BPM

## 2018-12-31 DIAGNOSIS — R55 SYNCOPE AND COLLAPSE: ICD-10-CM

## 2018-12-31 DIAGNOSIS — G43.709 CHRONIC MIGRAINE WITHOUT AURA WITHOUT STATUS MIGRAINOSUS, NOT INTRACTABLE: ICD-10-CM

## 2018-12-31 PROCEDURE — 99214 OFFICE O/P EST MOD 30 MIN: CPT | Performed by: PHYSICIAN ASSISTANT

## 2018-12-31 RX ORDER — ONDANSETRON 4 MG/1
4 TABLET, FILM COATED ORAL EVERY 8 HOURS PRN
Qty: 30 TABLET | Refills: 2 | Status: SHIPPED | OUTPATIENT
Start: 2018-12-31 | End: 2019-04-01 | Stop reason: SDUPTHER

## 2018-12-31 RX ORDER — GABAPENTIN 300 MG/1
300 CAPSULE ORAL 3 TIMES DAILY
Qty: 90 CAPSULE | Refills: 2 | Status: SHIPPED | OUTPATIENT
Start: 2018-12-31 | End: 2019-02-04 | Stop reason: SDUPTHER

## 2018-12-31 RX ORDER — BACLOFEN 10 MG/1
TABLET ORAL
Qty: 90 TABLET | Refills: 2 | Status: SHIPPED | OUTPATIENT
Start: 2018-12-31 | End: 2019-04-15 | Stop reason: HOSPADM

## 2018-12-31 RX ORDER — ZOLMITRIPTAN 5 MG/1
TABLET, ORALLY DISINTEGRATING ORAL
Qty: 9 TABLET | Refills: 0 | Status: SHIPPED | OUTPATIENT
Start: 2018-12-31 | End: 2019-01-28 | Stop reason: SDUPTHER

## 2018-12-31 RX ORDER — BUPROPION HYDROCHLORIDE 300 MG/1
TABLET ORAL
Qty: 30 TABLET | Refills: 0 | OUTPATIENT
Start: 2018-12-31

## 2018-12-31 RX ORDER — ALBUTEROL SULFATE 90 UG/1
AEROSOL, METERED RESPIRATORY (INHALATION)
COMMUNITY
Start: 2013-04-19 | End: 2019-04-02

## 2018-12-31 NOTE — PROGRESS NOTES
Patient ID: Raul Black is a 53 y o  female  Assessment/Plan:       Problem List Items Addressed This Visit        Cardiovascular and Mediastinum    Chronic migraine without aura    Relevant Medications    Erenumab-aooe 70 MG/ML SOAJ    baclofen 10 mg tablet    gabapentin (NEURONTIN) 300 mg capsule    ondansetron (ZOFRAN) 4 mg tablet    ZOLMitriptan (ZOMIG-ZMT) 5 MG disintegrating tablet    Other Relevant Orders    Chemodenervation    Syncope and collapse           Will try botox again, she has tried and failed several different migraine preventive medications in the past as noted below, and continues to have migraines on a daily basis, greater than 15 days out of the month and lasting greater than 4 hours each time  She has benefitted from Botox in the past moreover, and wants to retry this  In addition she has agreed to trial Aimovig injectable Q 30 days, side effects reviewed, continue gabapentin as scheduled  Baclofen p r n  neck pain as noted  For abortive treatment she takes Zomig + Zofran at the onset of migraine  Subjective:    HPI    Dayanara Mariscal 54 yo female who presents to the office today in neurological follow up of her migraine headaches  She works in a data processing position  Her  and daughter are here today  No further episodes of syncope since last seen      She presented to [facility] ED 2/14/18 for syncopal event which occurred at work  INcidentally she was treated for a UTI while she was there  States she did have urinary sxs and abx did successfully resolve the sxs  She was sitting at her desk eating lunch and just lost consciousness without clear warning   states there were witnesses and she was noted to have possible convulsions for a few seconds   In the record it was noted by a coworker who witnessed the event ( was not there) that she did not actually lose consciousness, just became limp and unable to speak, and several minutes later was able to squeeze hands and blink on command  Several minutes later she was able to speak  No apparent tremors that the coworker saw      She did not have urinary or bowel incontinence  EMS witnessed these sxs and states she was "postictal " She denies seizure history  She has very little recollection of the event, but was conscious and alert on exam in the ED after arrival  It was noted that she had a recent "suicide attempt" and was started on a new medication      Per records she has a recent history of Ambien/oxycodone overdose requiring intubation at [address] a few months ago  Shirin Neal follow-up with Psychiatry and medications were changed      She denies SI/HI currently on questioning today      Since last seen headaches are worse  She is overdue for botox  Since starting botox, the patient reports greater than 7 days of migraine relief from baseline, correlated with headache diary, decreased abortive medication use and decreased ER visits      She finds zomig helpful, as well as ibuprofen and excedrin migraine, however she is taking these quite a bit, almost QD  She also continues a combination of prevention meds: gabapentin 600 TID, effexor 150 mg qd and topamax 150 BID      Occur- daily  Last- constant, usually all day  Severity- Average pain level 8-10/10  Location- bifrontal, b/l parietal region, will become diffuse/ global  Quality- throbbing/pounding, dull/nagging  Associated sxs: nausea  Aura- none   Trigger- sunlight  Preventative: Gabapentin, Venlafaxine, Lyrica, Magnesium, Vitmamin B2, verapamil, zoloft, olanzapine, topamax, botox  Abortive: Rizatriptan, Sumatriptan, buprofen, Toradol, Dilaudid, Fioricet, compazine, excedrin (daily use), zomig PO, decadron, depakote taper    Objective:    Blood pressure 114/64, pulse 79  Physical Exam    Neurological Exam  Vital signs reviewed  Well developed, well nourished    Head: Normocephalic, atraumatic  CN 6-44: intact and symmetric, including EOMs which are normal b/l and PERRL  Fundi b/l are normal to crude ophthalmological examination  MSK: 5/5 t/o  ROM normal x all 4 extr  Sensation: Inact to LT x4 extr  Romberg negative  Reflexes: brisk t/o  Coordination: Nml x4 extr  Gait: Steady normal gait, tandem gait is steady  ROS:    Review of Systems   Constitutional: Positive for fatigue  HENT: Negative  Eyes: Negative  Respiratory: Negative  Cardiovascular: Negative  Gastrointestinal: Negative  Endocrine: Negative  Genitourinary: Negative  Musculoskeletal: Negative  Skin: Negative  Allergic/Immunologic: Negative  Neurological: Positive for headaches  Hematological: Bruises/bleeds easily  Psychiatric/Behavioral: Positive for sleep disturbance  The following portions of the patient's history were reviewed and updated as appropriate: allergies, current medications/ medication history, past family history, past medical history, past social history, past surgical history and problem list     Review of systems was reviewed and otherwise unremarkable from a neurological perspective
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
## 2019-01-03 ENCOUNTER — TRANSCRIBE ORDERS (OUTPATIENT)
Dept: MAMMOGRAPHY | Facility: CLINIC | Age: 49
End: 2019-01-03

## 2019-01-03 ENCOUNTER — HOSPITAL ENCOUNTER (OUTPATIENT)
Dept: ULTRASOUND IMAGING | Facility: CLINIC | Age: 49
Discharge: HOME | End: 2019-01-03
Payer: COMMERCIAL

## 2019-01-03 VITALS — BODY MASS INDEX: 27.05 KG/M2 | HEIGHT: 62 IN | WEIGHT: 147 LBS

## 2019-01-03 DIAGNOSIS — Z12.39 SCREENING BREAST EXAMINATION: Primary | ICD-10-CM

## 2019-01-03 DIAGNOSIS — R92.8 ABNORMAL MAMMOGRAM: ICD-10-CM

## 2019-01-03 DIAGNOSIS — N63.0 LUMP OR MASS IN BREAST: ICD-10-CM

## 2019-01-03 PROCEDURE — 76642 ULTRASOUND BREAST LIMITED: CPT

## 2019-01-04 ENCOUNTER — TELEPHONE (OUTPATIENT)
Dept: OBGYN CLINIC | Facility: CLINIC | Age: 49
End: 2019-01-04

## 2019-01-04 NOTE — TELEPHONE ENCOUNTER
----- Message from Rosalinda Gross MD sent at 1/4/2019 12:34 PM EST -----  Please call the patient regarding her right breast ultrasound    Her right breast ultrasound showed stable cyst in the right breast   The recommendation is and 6 months repeat a bilateral 3D screening mammogram and ultrasound of the right breast to re-evaluate this cyst

## 2019-01-04 NOTE — TELEPHONE ENCOUNTER
----- Message from Paz Nicole MD sent at 1/4/2019 12:34 PM EST -----  Please call the patient regarding her right breast ultrasound    Her right breast ultrasound showed stable cyst in the right breast   The recommendation is and 6 months repeat a bilateral 3D screening mammogram and ultrasound of the right breast to re-evaluate this cyst

## 2019-01-04 NOTE — TELEPHONE ENCOUNTER
----- Message from Keith Johnson MD sent at 1/4/2019 12:34 PM EST -----  Please call the patient regarding her right breast ultrasound    Her right breast ultrasound showed stable cyst in the right breast   The recommendation is and 6 months repeat a bilateral 3D screening mammogram and ultrasound of the right breast to re-evaluate this cyst

## 2019-01-07 ENCOUNTER — OFFICE VISIT (OUTPATIENT)
Dept: PSYCHIATRY | Facility: CLINIC | Age: 49
End: 2019-01-07
Payer: COMMERCIAL

## 2019-01-07 VITALS
BODY MASS INDEX: 27.97 KG/M2 | SYSTOLIC BLOOD PRESSURE: 103 MMHG | WEIGHT: 152 LBS | DIASTOLIC BLOOD PRESSURE: 65 MMHG | HEART RATE: 78 BPM | HEIGHT: 62 IN

## 2019-01-07 DIAGNOSIS — G47.09 OTHER INSOMNIA: Chronic | ICD-10-CM

## 2019-01-07 DIAGNOSIS — F41.1 GAD (GENERALIZED ANXIETY DISORDER): Chronic | ICD-10-CM

## 2019-01-07 DIAGNOSIS — F33.2 MAJOR DEPRESSIVE DISORDER, RECURRENT SEVERE WITHOUT PSYCHOTIC FEATURES (HCC): Primary | Chronic | ICD-10-CM

## 2019-01-07 DIAGNOSIS — F42.9 OBSESSIVE-COMPULSIVE DISORDER, UNSPECIFIED TYPE: Chronic | ICD-10-CM

## 2019-01-07 DIAGNOSIS — F63.9 IMPULSE CONTROL DISORDER: Chronic | ICD-10-CM

## 2019-01-07 DIAGNOSIS — F41.0 PANIC DISORDER WITHOUT AGORAPHOBIA: Chronic | ICD-10-CM

## 2019-01-07 PROCEDURE — 90833 PSYTX W PT W E/M 30 MIN: CPT | Performed by: PSYCHIATRY & NEUROLOGY

## 2019-01-07 PROCEDURE — 99213 OFFICE O/P EST LOW 20 MIN: CPT | Performed by: PSYCHIATRY & NEUROLOGY

## 2019-01-07 RX ORDER — VENLAFAXINE HYDROCHLORIDE 150 MG/1
300 CAPSULE, EXTENDED RELEASE ORAL DAILY
Qty: 60 CAPSULE | Refills: 3 | Status: SHIPPED | OUTPATIENT
Start: 2019-01-07 | End: 2019-05-29 | Stop reason: SDUPTHER

## 2019-01-07 NOTE — PSYCH
TREATMENT PLAN (Medication Management Only)        [facility]    Name/Date of Birth/MRN:  Anil Rodriguez 53 y o  1970 MRN: [id number]  Date of Treatment Plan: January 7, 2019  Diagnosis/Diagnoses:   1  Major depressive disorder, recurrent severe without psychotic features ([facility] )    2  KYLE (generalized anxiety disorder)    3  Panic disorder without agoraphobia    4  Impulse control disorder    5  Other insomnia    6  Obsessive-compulsive disorder, unspecified type      Strengths/Personal Resources for Self-Care: "kind hearted - mother and father"  Area/Areas of need (in own words): "depression, anxiety, OCD and skin picking"  1  Long Term Goal: improve control of depression  Target Date: 6 weeks - 2/18/2019  Person/Persons responsible for completion of goal: [facility]  2  Short Term Objective (s) - How will we reach this goal?:   A  Provider new recommended medication/dosage changes and/or continue medication(s): increase Effexor XR, continue all other medications (Zoloft, Wellbutrin XL, Depakote, Abilify, Xanax and Ambien)  B   N/A   C   N/A  Target Date: 6 weeks - 2/18/2019  Person/Persons Responsible for Completion of Goal: [facility]   Progress Towards Goals: limited progress  Treatment Modality: medication management every 6 weeks  Review due 90 to 120 days from date of this plan: 4 months - 5/7/2019  Expected length of service: maintenance  My Physician/PA/NP and I have developed this plan together and I agree to work on the goals and objectives  I understand the treatment goals that were developed for my treatment    Signature:       Date and time:  Signature of parent/guardian if under age of 15 years: Date and time:  Signature of provider:      Date and time:  Signature of Supervising Physician:    Date and time: 1/7/2019      Mendel Chase, MD

## 2019-01-12 ENCOUNTER — OFFICE VISIT (OUTPATIENT)
Dept: URGENT CARE | Age: 49
End: 2019-01-12
Payer: COMMERCIAL

## 2019-01-12 ENCOUNTER — TELEPHONE (OUTPATIENT)
Dept: OTHER | Facility: OTHER | Age: 49
End: 2019-01-12

## 2019-01-12 VITALS
HEART RATE: 72 BPM | DIASTOLIC BLOOD PRESSURE: 74 MMHG | RESPIRATION RATE: 16 BRPM | SYSTOLIC BLOOD PRESSURE: 117 MMHG | TEMPERATURE: 98.4 F | WEIGHT: 145 LBS | BODY MASS INDEX: 26.68 KG/M2 | OXYGEN SATURATION: 99 % | HEIGHT: 62 IN

## 2019-01-12 DIAGNOSIS — R35.0 FREQUENCY OF URINATION: ICD-10-CM

## 2019-01-12 DIAGNOSIS — N39.0 ACUTE UTI: Primary | ICD-10-CM

## 2019-01-12 LAB
SL AMB  POCT GLUCOSE, UA: NEGATIVE
SL AMB LEUKOCYTE ESTERASE,UA: ABNORMAL
SL AMB POCT BILIRUBIN,UA: NEGATIVE
SL AMB POCT BLOOD,UA: ABNORMAL
SL AMB POCT CLARITY,UA: CLEAR
SL AMB POCT COLOR,UA: YELLOW
SL AMB POCT KETONES,UA: NEGATIVE
SL AMB POCT NITRITE,UA: NEGATIVE
SL AMB POCT PH,UA: 7
SL AMB POCT SPECIFIC GRAVITY,UA: 1.02
SL AMB POCT URINE PROTEIN: NEGATIVE
SL AMB POCT UROBILINOGEN: 0.2

## 2019-01-12 PROCEDURE — 99213 OFFICE O/P EST LOW 20 MIN: CPT | Performed by: FAMILY MEDICINE

## 2019-01-12 PROCEDURE — 87077 CULTURE AEROBIC IDENTIFY: CPT | Performed by: PHYSICIAN ASSISTANT

## 2019-01-12 PROCEDURE — 87086 URINE CULTURE/COLONY COUNT: CPT | Performed by: PHYSICIAN ASSISTANT

## 2019-01-12 PROCEDURE — 81002 URINALYSIS NONAUTO W/O SCOPE: CPT | Performed by: FAMILY MEDICINE

## 2019-01-12 PROCEDURE — 87186 SC STD MICRODIL/AGAR DIL: CPT | Performed by: PHYSICIAN ASSISTANT

## 2019-01-12 PROCEDURE — 87147 CULTURE TYPE IMMUNOLOGIC: CPT | Performed by: PHYSICIAN ASSISTANT

## 2019-01-12 RX ORDER — PHENAZOPYRIDINE HYDROCHLORIDE 100 MG/1
100 TABLET, FILM COATED ORAL 3 TIMES DAILY PRN
Qty: 3 TABLET | Refills: 0 | Status: SHIPPED | OUTPATIENT
Start: 2019-01-12 | End: 2019-01-13

## 2019-01-12 RX ORDER — CEPHALEXIN 500 MG/1
500 CAPSULE ORAL EVERY 12 HOURS SCHEDULED
Qty: 10 CAPSULE | Refills: 0 | Status: SHIPPED | OUTPATIENT
Start: 2019-01-12 | End: 2019-01-17

## 2019-01-12 NOTE — TELEPHONE ENCOUNTER
Luisa Goldberg 1970  CONFIDENTIALTY NOTICE: This fax transmission is intended only for the addressee  It contains information that is legally privileged,  confidential or otherwise protected from use or disclosure  If you are not the intended recipient, you are strictly prohibited from reviewing,  disclosing, copying using or disseminating any of this information or taking any action in reliance on or regarding this information  If you have  received this fax in error, please notify us immediately by telephone so that we can arrange for its return to us  Page: 1 of 3  Call Id: [id number]  Health Call  Standard Call Report  Health Call  Patient Name: Luisa Goldberg  Gender: Female  : 1970  Age: 53 Y 1 M 8 D  Return Phone  Number: [phone] (Home)  Address: [address] [place] [address]/State/Zip: [address] [place] [address]  Practice Name: [facility]  [id number] [place] [address]  Practice Charged:  Physician:  [facility] Name:  Relationship To  Patient: Self  Return Phone Number: [phone] (Home)  Presenting Problem: "I think I have a UTI "  Service Type: Triage  Charged Service 1: [facility]  Pharmacy Name and  Number:  Nurse Assessment  Nurse: Jose Stephens Date/Time: 2019 10:47:58 AM  Type of assessment required:  ---General (Adult or Child)  Duration of Current S/S  ---Today early morning  Location/Radiation  ---  Temperature (F) and route:  ---Denies fever  Symptom Specific Meds (Dose/Time):  ---None  Other S/S  ---Burning sensation when urinating  Did notice urine was blood tinged on and off  today  Denies large amount of blood or blood clotting  No back or flank pain  Pain Scale on scale of 1-10, 10 being the worst:  ---Denies pain  Symptom progression:  ---same  Intake and Output  ---Drinking normally / Emptying bladder completely but having lots of frequency  LMP/ Pregnancy:  Luisa Ulrichivana 1970  CONFIDENTIALTY NOTICE: This fax transmission is intended only for the addressee   It contains information that is legally privileged,  confidential or otherwise protected from use or disclosure  If you are not the intended recipient, you are strictly prohibited from reviewing,  disclosing, copying using or disseminating any of this information or taking any action in reliance on or regarding this information  If you have  received this fax in error, please notify us immediately by telephone so that we can arrange for its return to us  Page: 2 of 3  Call Id: [id number]  Nurse Assessment  ---LMP: No more period  Breastfeeding  ---No  Last Exam/Treatment:  ---Seen last with Psychiatrist on Monday  Protocols  Protocol Title Nurse Date/Time  Urination Pain - Female Grant Arnold RN, Georgi Jasso 1/12/2019 10:50:09 AM  Question Caller Affirmed  Disp  Time Disposition Final User  1/12/2019 10:56:24 AM [facility], Georgi [facility]  1/12/2019 10:56:48 AM RN Triaged Yes Grant Arnold RN, [facility] Advice Given Per Protocol  SEE PHYSICIAN WITHIN 24 HOURS: * IF OFFICE WILL BE CLOSED AND NO PCP TRIAGE: You need to be seen within  the next 24 hours  An urgent care center is often a good source of care if your doctor's office is closed  REASSURANCE: This could  be an urinary tract infection  You should see your PCP to be examined and tested  FLUIDS: Drink extra fluids  Drink 8-10 glasses of  liquids a day (Reason: to produce a dilute, non-irritating urine)  CAUTION - FLUIDS: * Increased fluid intake may be contraindicated  in adults with renal failure or heart failure  * You can discuss this with your doctor  CRANBERRY JUICE: * Some people think that  drinking cranberry juice may help in fighting urinary tract infections  However, there is no good research that has ever proved this  *  Dosage Cranberry Juice Cocktail: 8 oz (240 ml) twice a day  * Dosage 100% Cranberry Juice: 1 oz (30 ml) twice a day   CAUTION -  CRANBERRY JUICE: * Do not drink more than 16 oz (480 ml) of cranberry juice cocktail per day (Reason: too much cranberry juice  can also be irritating to the bladder)  * There have been a couple cases reported of interactions between cranberry juice and Coumadin  (warfarin)  In these cases the INR level increased for a period of days while the person was drinking cranberry juice  The INR is a test  that is used to determine if a person is taking the right amount of Coumadin  At higher INR levels there is an increased risk of bleeding  * Remember, antibiotics are needed to treat a urine infection! WARM SALINE SITZ BATHS TO REDUCE PAIN: Sit in a warm saline  bath for 20 minutes to cleanse the area and to reduce pain  Add 2 oz  of table salt or baking soda to a tub of water  PAIN MEDICINES:  IBUPROFEN (E G , MOTRIN, ADVIL): * Take 400 mg (two 200 mg pills) by mouth every 6 hours as needed  * Another choice is  to take 600 mg (three 200 mg pills) by mouth every 8 hours as needed  * The most you should take each day is 1,200 mg (six 200 mg  pills a day), unless your doctor has told you to take more  CAUTION - NSAIDS (E G , IBUPROFEN, NAPROXEN): * Do not take  nonsteroidal anti-inflammatory drugs (NSAIDs) if you have stomach problems, kidney disease, heart failure, or other contraindications  to using this type of medication  * Do not take NSAID medications for over 7 days without consulting your PCP  * Do not take NSAID  medications if you are pregnant  * You may take this medicine with or without food  Taking it with food or milk may lessen the chance  the drug will upset your stomach  * GASTROINTESTINAL RISK: There is an increased risk of stomach ulcers, GI bleeding, perforation  * CARDIOVASCULAR RISK: There may be an increased risk of heart attack and stroke  CALL BACK IF: * Fever or back pain occurs  * You become worse  CARE ADVICE given per Urination Pain - Female (Adult) guideline    Caller Understands: Yes  Caller Disagree/Comply: Comply  PreDisposition: Justin Baxter 1970  CONFIDENTIALTY NOTICE: This fax transmission is intended only for the addressee  It contains information that is legally privileged,  confidential or otherwise protected from use or disclosure  If you are not the intended recipient, you are strictly prohibited from reviewing,  disclosing, copying using or disseminating any of this information or taking any action in reliance on or regarding this information  If you have  received this fax in error, please notify us immediately by telephone so that we can arrange for its return to us  Page: 3 of 3  Call Id: [id number]  Comments  User: Erlinda Diaz RN Date/Time: 1/12/2019 10:57:15 AM  Since patients office is closed tomorrow patient will be going to [facility] [place]

## 2019-01-12 NOTE — PATIENT INSTRUCTIONS
Take all antibiotics as prescribed  Please take probiotics  Drink lots of clear fluids  Call us in 2 days for urine culture results  Follow-up with PCP, OB/Gyn or Urology in the next few days for reexamination and to ensure resolution of symptoms     Call info link - [phone]  Go to ER if worsening symptoms, fevers, back pain, nausea, vomiting or other concerning symptoms

## 2019-01-12 NOTE — PROGRESS NOTES
[facility]        NAME: Anil Rodriguez is a 53 y o  female  : 1970    MRN: [id number]  DATE: 2019  TIME: 12:20 PM    Assessment and Plan   Acute UTI [N39 0]  1  Acute UTI  cephalexin (KEFLEX) 500 mg capsule    phenazopyridine (PYRIDIUM) 100 mg tablet    Urine culture   2  Frequency of urination  POCT urine dip     Urine dip- large leuks, moderate blood, negative nitrites  Culture pending    Patient Instructions     Take all antibiotics as prescribed  Please take probiotics  Drink lots of clear fluids  Call us in 2 days for urine culture results  Follow-up with PCP, OB/Gyn or Urology in the next few days for reexamination and to ensure resolution of symptoms  Call info link - [phone]  Go to ER if worsening symptoms, fevers, back pain, nausea, vomiting or other concerning symptoms     Chief Complaint     Chief Complaint   Patient presents with    Urinary Tract Infection     pt c/o urinary burning, frequency and traces of blood since early this morning  Denies any fevers  History of Present Illness       52-year-old female presents with UTI symptoms since this morning  States she woke and has burning with urination, feels like she has to pee but then only little comes out", frequency  States it feels like prior urinary tract infections  States antibiotics and Pyridium always helps her  She denies any abdominal pain, flank pain, back pain, nausea, vomiting, fevers, vaginal bleeding, STD risk or other complaints  Denies pregnancy risk  Denies any history of kidney stones        Review of Systems   Review of Systems   Constitutional: Negative for fever  Respiratory: Negative for shortness of breath  Cardiovascular: Negative for chest pain  Gastrointestinal: Negative for abdominal pain, diarrhea, nausea and vomiting  Genitourinary: Positive for dysuria, frequency and urgency   Negative for decreased urine volume, flank pain, genital sores, pelvic pain, vaginal bleeding, vaginal discharge and vaginal pain  Musculoskeletal: Negative for back pain and myalgias  Neurological: Negative for dizziness, weakness and light-headedness  Current Medications       Current Outpatient Prescriptions:     albuterol (2 5 mg/3 mL) 0 083 % nebulizer solution, Inhale, Disp: , Rfl:     albuterol (PROAIR HFA) 90 mcg/act inhaler, Inhale, Disp: , Rfl:     ALPRAZolam (XANAX) 1 mg tablet, Take 1 tablet (1 mg total) by mouth 3 (three) times a day as needed for anxiety for up to 120 days To be filled on or after 12/14/18, Disp: 90 tablet, Rfl: 3    ARIPiprazole (ABILIFY) 30 mg tablet, Take 1 tablet (30 mg total) by mouth daily at bedtime for 120 days, Disp: 30 tablet, Rfl: 3    baclofen 10 mg tablet, 1 qhs prn pain, and up to TID if tolerated  , Disp: 90 tablet, Rfl: 2    BREO ELLIPTA 200-25 MCG/INH inhaler, , Disp: , Rfl:     budesonide-formoterol (SYMBICORT) 160-4 5 mcg/act inhaler, Inhale, Disp: , Rfl:     buPROPion (WELLBUTRIN XL) 150 mg 24 hr tablet, Take 1 tablet (150 mg total) by mouth daily for 120 days, Disp: 30 tablet, Rfl: 3    buPROPion (WELLBUTRIN XL) 300 mg 24 hr tablet, Take 1 tablet (300 mg total) by mouth daily for 120 days Total Wellbutrin XL dose is 450 mg daily, Disp: 30 tablet, Rfl: 3    [employer] INDOOR/OUTDOOR ALLERGY RLF 10 MG tablet, Take 10 mg by mouth daily, Disp: , Rfl: 10    diphenhydrAMINE (BENADRYL) 25 mg tablet, Take 1 tablet by mouth every 6 (six) hours as needed (for headache, take with phenergan), Disp: 20 tablet, Rfl: 0    EPINEPHrine (EPIPEN) 0 3 mg/0 3 mL SOAJ, as directed, Disp: , Rfl:     Erenumab-aooe 70 MG/ML SOAJ, Inject 70 mg under the skin every 30 (thirty) days, Disp: 1 pen, Rfl: 2    ergocalciferol (VITAMIN D2) 50,000 units, 50,000 Units once a week, Disp: , Rfl: 12    gabapentin (NEURONTIN) 300 mg capsule, Take 1 capsule (300 mg total) by mouth 3 (three) times a day, Disp: 90 capsule, Rfl: 2    ibuprofen (MOTRIN) 800 mg tablet, Take 1 tablet (800 mg total) by mouth every 8 (eight) hours as needed for moderate pain, Disp: 30 tablet, Rfl: 1    levalbuterol (XOPENEX) 1 25 mg/3 mL nebulizer solution, Inhale, Disp: , Rfl:     levonorgestrel (MIRENA, 52 MG,) 20 MCG/24HR IUD, by Intrauterine route, Disp: , Rfl:     mometasone (NASONEX) 50 mcg/act nasal spray, , Disp: , Rfl:     naltrexone (REVIA) 50 mg tablet, Take 1 tablet (50 mg total) by mouth daily for 120 days, Disp: 30 tablet, Rfl: 3    olopatadine (PATANOL) 0 1 % ophthalmic solution, , Disp: , Rfl:     omeprazole (PriLOSEC) 20 mg delayed release capsule, Take 1 capsule by mouth daily, Disp: , Rfl:     ondansetron (ZOFRAN) 4 mg tablet, Take 1 tablet (4 mg total) by mouth every 8 (eight) hours as needed for nausea or vomiting, Disp: 30 tablet, Rfl: 2    PROAIR RESPICLICK 477 (90 Base) MCG/ACT AEPB, 2 PUFFS EVERY 4 HOURS AS NEEDED, Disp: , Rfl: 3    sertraline (ZOLOFT) 100 mg tablet, Take 2 tablets (200 mg total) by mouth daily at bedtime for 120 days, Disp: 60 tablet, Rfl: 3    SPIRIVA RESPIMAT 2 5 MCG/ACT AERS, , Disp: , Rfl:     venlafaxine (EFFEXOR-XR) 150 mg 24 hr capsule, Take 2 capsules (300 mg total) by mouth daily for 120 days Total Effexor XR dose is 225 mg daily, Disp: 60 capsule, Rfl: 3    ZOLMitriptan (ZOMIG-ZMT) 5 MG disintegrating tablet, Take 1 tab at first sign of headache, may repeat in 2 hours if needed   Max of 2 tabs in 24hrs/ 3 days a week , Disp: 9 tablet, Rfl: 0    zolpidem (AMBIEN) 10 mg tablet, Take 1 tablet (10 mg total) by mouth daily at bedtime as needed for sleep for up to 120 days To be filled on or after 12/14/18, Disp: 30 tablet, Rfl: 3    cephalexin (KEFLEX) 500 mg capsule, Take 1 capsule (500 mg total) by mouth every 12 (twelve) hours for 5 days, Disp: 10 capsule, Rfl: 0    divalproex sodium (DEPAKOTE) 250 mg EC tablet, Take 1 tablet (250 mg total) by mouth 3 (three) times a day for 120 days (Patient not taking: Reported on 1/7/2019 ), Disp: 90 tablet, Rfl: 3    phenazopyridine (PYRIDIUM) 100 mg tablet, Take 1 tablet (100 mg total) by mouth 3 (three) times a day as needed for bladder spasms for up to 1 day, Disp: 3 tablet, Rfl: 0    Current Allergies     Allergies as of 2019 - Reviewed 2019   Allergen Reaction Noted    Nuts      Erythromycin Diarrhea, GI Intolerance, and Vomiting 2013    Iodine Hives     Shellfish allergy  2013    Shellfish-derived products  2015    Zithromax [azithromycin] Diarrhea 2016            The following portions of the patient's history were reviewed and updated as appropriate: allergies, current medications, past family history, past medical history, past social history, past surgical history and problem list      Past Medical History:   Diagnosis Date    Amenorrhea     Anxiety     Asthma     Back pain     Chondromalacia of patella     Chronic fatigue     Deep vein thrombophlebitis of leg (HCC)     Depression     GERD (gastroesophageal reflux disease)     HPV (human papilloma virus) infection     Hypothyroidism     Lumbar radiculopathy     Migraine     Myofascial pain syndrome     Osteopenia     Piriformis syndrome     Sacroiliitis ([address] )     Sciatica     Spondylosis of lumbar spine     Trochanteric bursitis of right hip     Vitamin D deficiency        Past Surgical History:   Procedure Laterality Date    CERVIX LESION DESTRUCTION       SECTION      COLONOSCOPY      COLPOSCOPY W/ BIOPSY / CURETTAGE      Colposcopy cervix with biopsy    LAPAROSCOPIC ENDOMETRIOSIS FULGURATION      LAPAROSCOPY      TOOTH EXTRACTION         Family History   Problem Relation Age of Onset    Heart disease Mother     Asthma Daughter     Lung cancer Paternal Grandfather     Asthma Daughter     Colon cancer Father     Colon cancer Maternal Grandfather     Prostate cancer Maternal Grandfather     Colon cancer Maternal Aunt     Psychiatric Illness Neg Hx Medications have been verified  Objective   /74 (BP Location: Left arm, Patient Position: Sitting, Cuff Size: Standard)   Pulse 72   Temp 98 4 °F (36 9 °C) (Temporal)   Resp 16   Ht 5' 2" (1 575 m)   Wt 65 8 kg (145 lb)   LMP  (LMP Unknown)   SpO2 99%   Breastfeeding? No   BMI 26 52 kg/m²        Physical Exam     Physical Exam   Constitutional: She is oriented to person, place, and time  She appears well-developed and well-nourished  No distress  HENT:   Head: Normocephalic and atraumatic  Mouth/Throat: Oropharynx is clear and moist    Eyes: Pupils are equal, round, and reactive to light  Conjunctivae are normal    Neck: Normal range of motion  Neck supple  Cardiovascular: Normal rate, regular rhythm and normal heart sounds  Pulmonary/Chest: Effort normal and breath sounds normal  She has no wheezes  Abdominal: Soft  Bowel sounds are normal  There is no tenderness  There is no rebound  No CVA tenderness   Neurological: She is alert and oriented to person, place, and time  Skin: Skin is warm and dry  Psychiatric: She has a normal mood and affect  Her behavior is normal    Nursing note and vitals reviewed

## 2019-01-13 LAB
ALBUMIN SERPL-MCNC: 4.1 G/DL (ref 3.5–5.5)
ALBUMIN/GLOB SERPL: 1.9 {RATIO} (ref 1.2–2.2)
ALP SERPL-CCNC: 86 IU/L (ref 39–117)
ALT SERPL-CCNC: 18 IU/L (ref 0–32)
AST SERPL-CCNC: 20 IU/L (ref 0–40)
B-HCG SERPL QL: NEGATIVE MIU/ML
BASOPHILS # BLD AUTO: 0 X10E3/UL (ref 0–0.2)
BASOPHILS NFR BLD AUTO: 0 %
BILIRUB SERPL-MCNC: 0.3 MG/DL (ref 0–1.2)
BUN SERPL-MCNC: 17 MG/DL (ref 6–24)
BUN/CREAT SERPL: 20 (ref 9–23)
CALCIUM SERPL-MCNC: 9.3 MG/DL (ref 8.7–10.2)
CHLORIDE SERPL-SCNC: 105 MMOL/L (ref 96–106)
CO2 SERPL-SCNC: 23 MMOL/L (ref 20–29)
CREAT SERPL-MCNC: 0.84 MG/DL (ref 0.57–1)
EOSINOPHIL # BLD AUTO: 0.1 X10E3/UL (ref 0–0.4)
EOSINOPHIL NFR BLD AUTO: 1 %
ERYTHROCYTE [DISTWIDTH] IN BLOOD BY AUTOMATED COUNT: 12.6 % (ref 12.3–15.4)
GLOBULIN SER-MCNC: 2.2 G/DL (ref 1.5–4.5)
GLUCOSE SERPL-MCNC: 87 MG/DL (ref 65–99)
HCT VFR BLD AUTO: 37.8 % (ref 34–46.6)
HGB BLD-MCNC: 12.8 G/DL (ref 11.1–15.9)
IMM GRANULOCYTES # BLD: 0 X10E3/UL (ref 0–0.1)
IMM GRANULOCYTES NFR BLD: 0 %
LYMPHOCYTES # BLD AUTO: 2 X10E3/UL (ref 0.7–3.1)
LYMPHOCYTES NFR BLD AUTO: 23 %
MCH RBC QN AUTO: 29.8 PG (ref 26.6–33)
MCHC RBC AUTO-ENTMCNC: 33.9 G/DL (ref 31.5–35.7)
MCV RBC AUTO: 88 FL (ref 79–97)
MONOCYTES # BLD AUTO: 0.7 X10E3/UL (ref 0.1–0.9)
MONOCYTES NFR BLD AUTO: 8 %
NEUTROPHILS # BLD AUTO: 5.8 X10E3/UL (ref 1.4–7)
NEUTROPHILS NFR BLD AUTO: 68 %
PLATELET # BLD AUTO: 262 X10E3/UL (ref 150–379)
POTASSIUM SERPL-SCNC: 4.2 MMOL/L (ref 3.5–5.2)
PROT SERPL-MCNC: 6.3 G/DL (ref 6–8.5)
RBC # BLD AUTO: 4.29 X10E6/UL (ref 3.77–5.28)
SL AMB EGFR AFRICAN AMERICAN: 96 ML/MIN/1.73
SL AMB EGFR NON AFRICAN AMERICAN: 83 ML/MIN/1.73
SODIUM SERPL-SCNC: 147 MMOL/L (ref 134–144)
VALPROATE SERPL-MCNC: <4 UG/ML (ref 50–100)
WBC # BLD AUTO: 8.6 X10E3/UL (ref 3.4–10.8)

## 2019-01-14 LAB
BACTERIA UR CULT: ABNORMAL
BACTERIA UR CULT: ABNORMAL

## 2019-01-16 ENCOUNTER — TELEPHONE (OUTPATIENT)
Dept: BEHAVIORAL/MENTAL HEALTH CLINIC | Facility: CLINIC | Age: 49
End: 2019-01-16

## 2019-01-16 ENCOUNTER — HOSPITAL ENCOUNTER (EMERGENCY)
Facility: HOSPITAL | Age: 49
Discharge: HOME | End: 2019-01-16
Attending: EMERGENCY MEDICINE
Payer: COMMERCIAL

## 2019-01-16 VITALS
BODY MASS INDEX: 27.98 KG/M2 | DIASTOLIC BLOOD PRESSURE: 70 MMHG | WEIGHT: 153 LBS | HEART RATE: 88 BPM | RESPIRATION RATE: 18 BRPM | SYSTOLIC BLOOD PRESSURE: 132 MMHG | OXYGEN SATURATION: 99 % | TEMPERATURE: 98 F

## 2019-01-16 DIAGNOSIS — G43.909 MIGRAINE: Primary | ICD-10-CM

## 2019-01-16 DIAGNOSIS — F41.1 GAD (GENERALIZED ANXIETY DISORDER): Primary | Chronic | ICD-10-CM

## 2019-01-16 LAB
ALBUMIN SERPL BCP-MCNC: 3.6 G/DL (ref 3.5–5)
ALP SERPL-CCNC: 85 U/L (ref 46–116)
ALT SERPL W P-5'-P-CCNC: 25 U/L (ref 12–78)
ANION GAP SERPL CALCULATED.3IONS-SCNC: 9 MMOL/L (ref 4–13)
AST SERPL W P-5'-P-CCNC: 12 U/L (ref 5–45)
BASOPHILS # BLD AUTO: 0.03 THOUSANDS/ΜL (ref 0–0.1)
BASOPHILS NFR BLD AUTO: 1 % (ref 0–1)
BILIRUB SERPL-MCNC: 0.2 MG/DL (ref 0.2–1)
BUN SERPL-MCNC: 22 MG/DL (ref 5–25)
CALCIUM SERPL-MCNC: 8.8 MG/DL (ref 8.3–10.1)
CHLORIDE SERPL-SCNC: 106 MMOL/L (ref 100–108)
CO2 SERPL-SCNC: 27 MMOL/L (ref 21–32)
CREAT SERPL-MCNC: 0.76 MG/DL (ref 0.6–1.3)
EOSINOPHIL # BLD AUTO: 0.04 THOUSAND/ΜL (ref 0–0.61)
EOSINOPHIL NFR BLD AUTO: 1 % (ref 0–6)
ERYTHROCYTE [DISTWIDTH] IN BLOOD BY AUTOMATED COUNT: 12 % (ref 11.6–15.1)
GFR SERPL CREATININE-BSD FRML MDRD: 94 ML/MIN/1.73SQ M
GLUCOSE SERPL-MCNC: 85 MG/DL (ref 65–140)
HCT VFR BLD AUTO: 37.4 % (ref 34.8–46.1)
HGB BLD-MCNC: 12.3 G/DL (ref 11.5–15.4)
IMM GRANULOCYTES # BLD AUTO: 0.01 THOUSAND/UL (ref 0–0.2)
IMM GRANULOCYTES NFR BLD AUTO: 0 % (ref 0–2)
LYMPHOCYTES # BLD AUTO: 2.55 THOUSANDS/ΜL (ref 0.6–4.47)
LYMPHOCYTES NFR BLD AUTO: 41 % (ref 14–44)
MCH RBC QN AUTO: 30.1 PG (ref 26.8–34.3)
MCHC RBC AUTO-ENTMCNC: 32.9 G/DL (ref 31.4–37.4)
MCV RBC AUTO: 91 FL (ref 82–98)
MONOCYTES # BLD AUTO: 0.65 THOUSAND/ΜL (ref 0.17–1.22)
MONOCYTES NFR BLD AUTO: 10 % (ref 4–12)
NEUTROPHILS # BLD AUTO: 2.99 THOUSANDS/ΜL (ref 1.85–7.62)
NEUTS SEG NFR BLD AUTO: 47 % (ref 43–75)
NRBC BLD AUTO-RTO: 0 /100 WBCS
PLATELET # BLD AUTO: 251 THOUSANDS/UL (ref 149–390)
PMV BLD AUTO: 9.3 FL (ref 8.9–12.7)
POTASSIUM SERPL-SCNC: 3.9 MMOL/L (ref 3.5–5.3)
PROT SERPL-MCNC: 6.5 G/DL (ref 6.4–8.2)
RBC # BLD AUTO: 4.09 MILLION/UL (ref 3.81–5.12)
SODIUM SERPL-SCNC: 142 MMOL/L (ref 136–145)
WBC # BLD AUTO: 6.27 THOUSAND/UL (ref 4.31–10.16)

## 2019-01-16 PROCEDURE — 96376 TX/PRO/DX INJ SAME DRUG ADON: CPT

## 2019-01-16 PROCEDURE — 96375 TX/PRO/DX INJ NEW DRUG ADDON: CPT

## 2019-01-16 PROCEDURE — 80053 COMPREHEN METABOLIC PANEL: CPT | Performed by: EMERGENCY MEDICINE

## 2019-01-16 PROCEDURE — 99283 EMERGENCY DEPT VISIT LOW MDM: CPT

## 2019-01-16 PROCEDURE — 96365 THER/PROPH/DIAG IV INF INIT: CPT

## 2019-01-16 PROCEDURE — 36415 COLL VENOUS BLD VENIPUNCTURE: CPT | Performed by: EMERGENCY MEDICINE

## 2019-01-16 PROCEDURE — 85025 COMPLETE CBC W/AUTO DIFF WBC: CPT | Performed by: EMERGENCY MEDICINE

## 2019-01-16 RX ORDER — METOCLOPRAMIDE HYDROCHLORIDE 5 MG/ML
10 INJECTION INTRAMUSCULAR; INTRAVENOUS ONCE
Status: COMPLETED | OUTPATIENT
Start: 2019-01-16 | End: 2019-01-16

## 2019-01-16 RX ORDER — MAGNESIUM SULFATE HEPTAHYDRATE 40 MG/ML
2 INJECTION, SOLUTION INTRAVENOUS ONCE
Status: COMPLETED | OUTPATIENT
Start: 2019-01-16 | End: 2019-01-16

## 2019-01-16 RX ORDER — DIHYDROERGOTAMINE MESYLATE 1 MG/ML
1 INJECTION, SOLUTION INTRAMUSCULAR; INTRAVENOUS; SUBCUTANEOUS ONCE
Status: COMPLETED | OUTPATIENT
Start: 2019-01-16 | End: 2019-01-16

## 2019-01-16 RX ORDER — DIPHENHYDRAMINE HYDROCHLORIDE 50 MG/ML
25 INJECTION INTRAMUSCULAR; INTRAVENOUS ONCE
Status: COMPLETED | OUTPATIENT
Start: 2019-01-16 | End: 2019-01-16

## 2019-01-16 RX ORDER — LORAZEPAM 2 MG/ML
1 INJECTION INTRAMUSCULAR ONCE
Status: COMPLETED | OUTPATIENT
Start: 2019-01-16 | End: 2019-01-16

## 2019-01-16 RX ORDER — LORAZEPAM 2 MG/ML
1 INJECTION INTRAMUSCULAR ONCE AS NEEDED
Status: COMPLETED | OUTPATIENT
Start: 2019-01-16 | End: 2019-01-16

## 2019-01-16 RX ORDER — ONDANSETRON 2 MG/ML
4 INJECTION INTRAMUSCULAR; INTRAVENOUS ONCE
Status: COMPLETED | OUTPATIENT
Start: 2019-01-16 | End: 2019-01-16

## 2019-01-16 RX ORDER — KETOROLAC TROMETHAMINE 30 MG/ML
30 INJECTION, SOLUTION INTRAMUSCULAR; INTRAVENOUS ONCE
Status: COMPLETED | OUTPATIENT
Start: 2019-01-16 | End: 2019-01-16

## 2019-01-16 RX ADMIN — LORAZEPAM 1 MG: 2 INJECTION INTRAMUSCULAR; INTRAVENOUS at 20:37

## 2019-01-16 RX ADMIN — METOCLOPRAMIDE 10 MG: 5 INJECTION, SOLUTION INTRAMUSCULAR; INTRAVENOUS at 19:20

## 2019-01-16 RX ADMIN — SODIUM CHLORIDE 1000 ML: 0.9 INJECTION, SOLUTION INTRAVENOUS at 19:21

## 2019-01-16 RX ADMIN — KETOROLAC TROMETHAMINE 30 MG: 30 INJECTION, SOLUTION INTRAMUSCULAR at 19:20

## 2019-01-16 RX ADMIN — LORAZEPAM 1 MG: 2 INJECTION INTRAMUSCULAR; INTRAVENOUS at 19:19

## 2019-01-16 RX ADMIN — ONDANSETRON 4 MG: 2 INJECTION INTRAMUSCULAR; INTRAVENOUS at 20:10

## 2019-01-16 RX ADMIN — DIPHENHYDRAMINE HYDROCHLORIDE 25 MG: 50 INJECTION, SOLUTION INTRAMUSCULAR; INTRAVENOUS at 19:20

## 2019-01-16 RX ADMIN — MAGNESIUM SULFATE IN WATER 2 G: 40 INJECTION, SOLUTION INTRAVENOUS at 19:20

## 2019-01-16 RX ADMIN — DIHYDROERGOTAMINE MESYLATE 1 MG: 1 INJECTION, SOLUTION INTRAMUSCULAR; INTRAVENOUS; SUBCUTANEOUS at 20:36

## 2019-01-16 NOTE — TELEPHONE ENCOUNTER
Patient called stating her anxiety is out of control she is biting her fingernails and kicking people out of her house she is on xanax and states you wouldn't even be able to tell she's taking it with how she is acting she is also having trouble sleeping so her medication for that is no longer working as well she was asking for advice on what the ER would do but I told her that I'm not clinical so I could not tell her and that they probably wouldn't be able to tell her anything without her being seen first would you suggest her being seen in the ER for her symptoms?

## 2019-01-16 NOTE — TELEPHONE ENCOUNTER
Spoke with Tony Xiong - she is feeling more depressed and more anxious since the last visit on 1/7/19  Not suicidal, but has been picking more on her skin and nails "I completely ripped my nails out"  She declined Partial Program  Advised to go to ED

## 2019-01-16 NOTE — ED PROVIDER NOTES
History  Chief Complaint   Patient presents with    Migraine     Pt reports migraine since Friday in the back bottom of the head, sees neurology, reports pain has worsened and "my anxiety is through the roof and I take xanax and its not helping" pt reports "I can't sit still, i'm pacing, im clencing my teeth" Pt reports nausea and sensivity to light     60-year-old female comes in for evaluation of migraine  Patient has a history of migraines at home for them including ibuprofen Imitrex and Zofran  Patient took these medications without relief  Patient is also concerned because since she has had the migraine is made her more anxious and now her Xanax is not working to calm her and she feels like her Ambien is not helping her sleep        History provided by:  Patient   used: No    Migraine   Location:  Generalized  Quality:  Occipital  Severity:  Moderate  Onset quality:  Gradual  Duration:  5 days  Timing:  Constant  Progression:  Worsening  Chronicity:  Recurrent  Context:  History of migraine  Ineffective treatments:  Ibuprofen Zofran Imitrex  Associated symptoms: nausea    Associated symptoms: no abdominal pain, no chest pain, no congestion, no cough, no diarrhea, no ear pain, no fatigue, no fever, no headaches, no rash, no shortness of breath, no sore throat and no wheezing        Prior to Admission Medications   Prescriptions Last Dose Informant Patient Reported? Taking?    ALPRAZolam (XANAX) 1 mg tablet   No No   Sig: Take 1 tablet (1 mg total) by mouth 3 (three) times a day as needed for anxiety for up to 120 days To be filled on or after 12/14/18   ARIPiprazole (ABILIFY) 30 mg tablet   No No   Sig: Take 1 tablet (30 mg total) by mouth daily at bedtime for 120 days   BREO ELLIPTA 200-25 MCG/INH inhaler  Self Yes No   [employer] [facility] RLF 10 MG tablet  Self Yes No   Sig: Take 10 mg by mouth daily   EPINEPHrine (EPIPEN) 0 3 mg/0 3 mL SOAJ  Self Yes No   Sig: as directed Erenumab-aooe 70 MG/ML SOAJ   No No   Sig: Inject 70 mg under the skin every 30 (thirty) days   PROAIR RESPICLICK 907 (90 Base) MCG/ACT AEPB  Self Yes No   Si PUFFS EVERY 4 HOURS AS NEEDED   SPIRIVA RESPIMAT 2 5 MCG/ACT AERS  Self Yes No   ZOLMitriptan (ZOMIG-ZMT) 5 MG disintegrating tablet   No No   Sig: Take 1 tab at first sign of headache, may repeat in 2 hours if needed  Max of 2 tabs in 24hrs/ 3 days a week  albuterol (2 5 mg/3 mL) 0 083 % nebulizer solution  Self Yes No   Sig: Inhale   albuterol (PROAIR HFA) 90 mcg/act inhaler   Yes No   Sig: Inhale   baclofen 10 mg tablet   No No   Si qhs prn pain, and up to TID if tolerated     buPROPion (WELLBUTRIN XL) 150 mg 24 hr tablet   No No   Sig: Take 1 tablet (150 mg total) by mouth daily for 120 days   buPROPion (WELLBUTRIN XL) 300 mg 24 hr tablet   No No   Sig: Take 1 tablet (300 mg total) by mouth daily for 120 days Total Wellbutrin XL dose is 450 mg daily   budesonide-formoterol (SYMBICORT) 160-4 5 mcg/act inhaler  Self Yes No   Sig: Inhale   cephalexin (KEFLEX) 500 mg capsule   No No   Sig: Take 1 capsule (500 mg total) by mouth every 12 (twelve) hours for 5 days   diphenhydrAMINE (BENADRYL) 25 mg tablet  Self No No   Sig: Take 1 tablet by mouth every 6 (six) hours as needed (for headache, take with phenergan)   divalproex sodium (DEPAKOTE) 250 mg EC tablet   No No   Sig: Take 1 tablet (250 mg total) by mouth 3 (three) times a day for 120 days   Patient not taking: Reported on 2019    ergocalciferol (VITAMIN D2) 50,000 units  Self Yes No   Si,000 Units once a week   gabapentin (NEURONTIN) 300 mg capsule   No No   Sig: Take 1 capsule (300 mg total) by mouth 3 (three) times a day   ibuprofen (MOTRIN) 800 mg tablet   No No   Sig: Take 1 tablet (800 mg total) by mouth every 8 (eight) hours as needed for moderate pain   levalbuterol (XOPENEX) 1 25 mg/3 mL nebulizer solution  Self Yes No   Sig: Inhale   levonorgestrel (MIRENA, 52 MG,) 20 MCG/24HR IUD  Self Yes No   Sig: by Intrauterine route   mometasone (NASONEX) 50 mcg/act nasal spray  Self Yes No   naltrexone (REVIA) 50 mg tablet   No No   Sig: Take 1 tablet (50 mg total) by mouth daily for 120 days   olopatadine (PATANOL) 0 1 % ophthalmic solution  Self Yes No   omeprazole (PriLOSEC) 20 mg delayed release capsule  Self Yes No   Sig: Take 1 capsule by mouth daily   ondansetron (ZOFRAN) 4 mg tablet   No No   Sig: Take 1 tablet (4 mg total) by mouth every 8 (eight) hours as needed for nausea or vomiting   sertraline (ZOLOFT) 100 mg tablet   No No   Sig: Take 2 tablets (200 mg total) by mouth daily at bedtime for 120 days   venlafaxine (EFFEXOR-XR) 150 mg 24 hr capsule   No No   Sig: Take 2 capsules (300 mg total) by mouth daily for 120 days Total Effexor XR dose is 225 mg daily   zolpidem (AMBIEN) 10 mg tablet   No No   Sig: Take 1 tablet (10 mg total) by mouth daily at bedtime as needed for sleep for up to 120 days To be filled on or after 18      Facility-Administered Medications: None       Past Medical History:   Diagnosis Date    Amenorrhea     Anxiety     Asthma     Back pain     Chondromalacia of patella     Chronic fatigue     Deep vein thrombophlebitis of leg (HCC)     Depression     GERD (gastroesophageal reflux disease)     HPV (human papilloma virus) infection     Hypothyroidism     Lumbar radiculopathy     Migraine     Myofascial pain syndrome     Osteopenia     Piriformis syndrome     Sacroiliitis (HCC)     Sciatica     Spondylosis of lumbar spine     Trochanteric bursitis of right hip     Vitamin D deficiency        Past Surgical History:   Procedure Laterality Date    CERVIX LESION DESTRUCTION       SECTION      COLONOSCOPY      COLPOSCOPY W/ BIOPSY / CURETTAGE      Colposcopy cervix with biopsy    LAPAROSCOPIC ENDOMETRIOSIS FULGURATION      LAPAROSCOPY      TOOTH EXTRACTION         Family History   Problem Relation Age of Onset    Heart disease Mother     Asthma Daughter     Lung cancer Paternal Grandfather     Asthma Daughter     Colon cancer Father     Colon cancer Maternal Grandfather     Prostate cancer Maternal Grandfather     Colon cancer Maternal Aunt     Psychiatric Illness Neg Hx      I have reviewed and agree with the history as documented  Social History   Substance Use Topics    Smoking status: Never Smoker    Smokeless tobacco: Never Used    Alcohol use No      Comment: Per [employer]; Occasional use        Review of Systems   Constitutional: Negative for fatigue and fever  HENT: Negative for congestion, ear pain and sore throat  Eyes: Negative for discharge and redness  Respiratory: Negative for apnea, cough, shortness of breath and wheezing  Cardiovascular: Negative for chest pain  Gastrointestinal: Positive for nausea  Negative for abdominal pain and diarrhea  Endocrine: Negative for cold intolerance and polydipsia  Genitourinary: Negative for difficulty urinating and hematuria  Musculoskeletal: Negative for arthralgias and back pain  Skin: Negative for color change and rash  Allergic/Immunologic: Negative for environmental allergies and immunocompromised state  Neurological: Negative for numbness and headaches  Hematological: Negative for adenopathy  Does not bruise/bleed easily  Psychiatric/Behavioral: Negative for agitation and behavioral problems  Physical Exam  Physical Exam   Constitutional: She is oriented to person, place, and time  Vital signs are normal  She appears well-developed and well-nourished  Non-toxic appearance  HENT:   Head: Normocephalic and atraumatic  Right Ear: Tympanic membrane and external ear normal    Left Ear: Tympanic membrane and external ear normal    Nose: Nose normal  No rhinorrhea, sinus tenderness or nasal deformity  Mouth/Throat: Uvula is midline and oropharynx is clear and moist  Normal dentition     Eyes: Pupils are equal, round, and reactive to light  Conjunctivae, EOM and lids are normal  Right eye exhibits no discharge  Left eye exhibits no discharge  Neck: Trachea normal and normal range of motion  Neck supple  No JVD present  Carotid bruit is not present  Cardiovascular: Normal rate, regular rhythm, intact distal pulses and normal pulses  No extrasystoles are present  PMI is not displaced  Pulmonary/Chest: Effort normal and breath sounds normal  No accessory muscle usage  No respiratory distress  She has no wheezes  She has no rhonchi  She has no rales  Abdominal: Soft  Normal appearance and bowel sounds are normal  She exhibits no mass  There is no tenderness  There is no rigidity, no rebound and no guarding  Musculoskeletal:        Right shoulder: She exhibits normal range of motion, no bony tenderness, no swelling and no deformity  Cervical back: Normal  She exhibits normal range of motion, no tenderness, no bony tenderness and no deformity  Lymphadenopathy:     She has no cervical adenopathy  She has no axillary adenopathy  Neurological: She is alert and oriented to person, place, and time  She has normal strength and normal reflexes  No cranial nerve deficit or sensory deficit  GCS eye subscore is 4  GCS verbal subscore is 5  GCS motor subscore is 6  Skin: Skin is warm and dry  No rash noted  Psychiatric: She has a normal mood and affect  Her speech is normal and behavior is normal    Nursing note and vitals reviewed        Vital Signs  ED Triage Vitals [01/16/19 1845]   Temperature Pulse Respirations Blood Pressure SpO2   98 °F (36 7 °C) 86 18 134/73 99 %      Temp Source Heart Rate Source Patient Position - Orthostatic VS BP Location FiO2 (%)   Oral Monitor Sitting Right arm --      Pain Score       8           Vitals:    01/16/19 1845   BP: 134/73   Pulse: 86   Patient Position - Orthostatic VS: Sitting       Visual Acuity  Visual Acuity      Most Recent Value   L Pupil Size (mm)  2   R Pupil Size (mm)  2 ED Medications  Medications   LORazepam (ATIVAN) 2 mg/mL injection 1 mg (1 mg Intravenous Given 1/16/19 1919)   sodium chloride 0 9 % bolus 1,000 mL (0 mL Intravenous Stopped 1/16/19 2021)   diphenhydrAMINE (BENADRYL) injection 25 mg (25 mg Intravenous Given 1/16/19 1920)   metoclopramide (REGLAN) injection 10 mg (10 mg Intravenous Given 1/16/19 1920)   ketorolac (TORADOL) injection 30 mg (30 mg Intravenous Given 1/16/19 1920)   magnesium sulfate 2 g/50 mL IVPB (premix) 2 g (0 g Intravenous Stopped 1/16/19 2020)   ondansetron (ZOFRAN) injection 4 mg (4 mg Intravenous Given 1/16/19 2010)   dihydroergotamine (DHE) injection 1 mg (1 mg Intravenous Given 1/16/19 2036)   LORazepam (ATIVAN) 2 mg/mL injection 1 mg (1 mg Intravenous Given 1/16/19 2037)       Diagnostic Studies  Results Reviewed     Procedure Component Value Units Date/Time    Comprehensive metabolic panel [[id number]] Collected:  01/16/19 1919    Lab Status:  Final result Specimen:  Blood from Arm, Right Updated:  01/16/19 1943     Sodium 142 mmol/L      Potassium 3 9 mmol/L      Chloride 106 mmol/L      CO2 27 mmol/L      ANION GAP 9 mmol/L      BUN 22 mg/dL      Creatinine 0 76 mg/dL      Glucose 85 mg/dL      Calcium 8 8 mg/dL      AST 12 U/L      ALT 25 U/L      Alkaline Phosphatase 85 U/L      Total Protein 6 5 g/dL      Albumin 3 6 g/dL      Total Bilirubin 0 20 mg/dL      eGFR 94 ml/min/1 73sq m     Narrative:         National Kid[employer] Disease Education Program recommendations are as follows:  GFR calculation is accurate only with a steady state creatinine  Chronic Kidney disease less than 60 ml/min/1 73 sq  meters  Kidney failure less than 15 ml/min/1 73 sq  meters      CBC and differential [[id number]] Collected:  01/16/19 1919    Lab Status:  Final result Specimen:  Blood from Arm, Right Updated:  01/16/19 1927     WBC 6 27 Thousand/uL      RBC 4 09 Million/uL      Hemoglobin 12 3 g/dL      Hematocrit 37 4 %      MCV 91 fL      MCH 30 1 pg MCHC 32 9 g/dL      RDW 12 0 %      MPV 9 3 fL      Platelets 892 Thousands/uL      nRBC 0 /100 WBCs      Neutrophils Relative 47 %      Immat GRANS % 0 %      Lymphocytes Relative 41 %      Monocytes Relative 10 %      Eosinophils Relative 1 %      Basophils Relative 1 %      Neutrophils Absolute 2 99 Thousands/µL      Immature Grans Absolute 0 01 Thousand/uL      Lymphocytes Absolute 2 55 Thousands/µL      Monocytes Absolute 0 65 Thousand/µL      Eosinophils Absolute 0 04 Thousand/µL      Basophils Absolute 0 03 Thousands/µL                  No orders to display              Procedures  Procedures       Phone Contacts  ED Phone Contact    ED Course                               MDM  Number of Diagnoses or Management Options  Migraine: new and requires workup     Amount and/or Complexity of Data Reviewed  Clinical lab tests: ordered and reviewed  Tests in the radiology section of CPT®: ordered and reviewed  Tests in the medicine section of CPT®: ordered and reviewed  Review and summarize past medical records: yes    Risk of Complications, Morbidity, and/or Mortality  General comments: Patient now feeling better after 2nd round of migraine medications  Will call her neurologist in the morning    Patient Progress  Patient progress: improved    CritCare Time    Disposition  Final diagnoses:   Migraine     Time reflects when diagnosis was documented in both MDM as applicable and the Disposition within this note     Time User Action Codes Description Comment    1/16/2019  8:56 PM Virginia Holliday Add [G43 [id number]] Migraine       ED Disposition     ED Disposition Condition Comment    Discharge  Brooke Falling discharge to home/self care      Condition at discharge: Good        Follow-up Information     Follow up With Specialties Details Why Contact Info    Naomy Velasquez MD Neurology Schedule an appointment as soon as possible for a visit  [facility] [address]  [phone]            Patient's Medications Discharge Prescriptions    No medications on file     No discharge procedures on file      ED Provider  Electronically Signed by           Darrell Guerra DO  01/16/19 7708

## 2019-01-17 ENCOUNTER — HOSPITAL ENCOUNTER (EMERGENCY)
Facility: HOSPITAL | Age: 49
Discharge: HOME | End: 2019-01-17
Attending: EMERGENCY MEDICINE
Payer: COMMERCIAL

## 2019-01-17 VITALS
RESPIRATION RATE: 18 BRPM | WEIGHT: 151.24 LBS | SYSTOLIC BLOOD PRESSURE: 118 MMHG | HEART RATE: 70 BPM | OXYGEN SATURATION: 100 % | DIASTOLIC BLOOD PRESSURE: 72 MMHG | BODY MASS INDEX: 27.66 KG/M2

## 2019-01-17 DIAGNOSIS — R51.9 HEADACHE: ICD-10-CM

## 2019-01-17 DIAGNOSIS — F41.9 ANXIETY: Primary | ICD-10-CM

## 2019-01-17 PROCEDURE — 96365 THER/PROPH/DIAG IV INF INIT: CPT

## 2019-01-17 PROCEDURE — 96375 TX/PRO/DX INJ NEW DRUG ADDON: CPT

## 2019-01-17 PROCEDURE — 99283 EMERGENCY DEPT VISIT LOW MDM: CPT

## 2019-01-17 RX ORDER — DIPHENHYDRAMINE HYDROCHLORIDE 50 MG/ML
12.5 INJECTION INTRAMUSCULAR; INTRAVENOUS ONCE
Status: COMPLETED | OUTPATIENT
Start: 2019-01-17 | End: 2019-01-17

## 2019-01-17 RX ORDER — MAGNESIUM SULFATE HEPTAHYDRATE 40 MG/ML
2 INJECTION, SOLUTION INTRAVENOUS ONCE
Status: COMPLETED | OUTPATIENT
Start: 2019-01-17 | End: 2019-01-17

## 2019-01-17 RX ORDER — METOCLOPRAMIDE HYDROCHLORIDE 5 MG/ML
10 INJECTION INTRAMUSCULAR; INTRAVENOUS ONCE
Status: COMPLETED | OUTPATIENT
Start: 2019-01-17 | End: 2019-01-17

## 2019-01-17 RX ORDER — HYDROXYZINE HYDROCHLORIDE 25 MG/1
25 TABLET, FILM COATED ORAL 3 TIMES DAILY PRN
Qty: 90 TABLET | Refills: 2 | Status: SHIPPED | OUTPATIENT
Start: 2019-01-17 | End: 2019-02-18 | Stop reason: SDUPTHER

## 2019-01-17 RX ORDER — HYDROXYZINE HYDROCHLORIDE 25 MG/1
25 TABLET, FILM COATED ORAL ONCE
Status: COMPLETED | OUTPATIENT
Start: 2019-01-17 | End: 2019-01-17

## 2019-01-17 RX ORDER — KETOROLAC TROMETHAMINE 30 MG/ML
15 INJECTION, SOLUTION INTRAMUSCULAR; INTRAVENOUS ONCE
Status: COMPLETED | OUTPATIENT
Start: 2019-01-17 | End: 2019-01-17

## 2019-01-17 RX ADMIN — METOCLOPRAMIDE 10 MG: 5 INJECTION, SOLUTION INTRAMUSCULAR; INTRAVENOUS at 17:56

## 2019-01-17 RX ADMIN — HYDROXYZINE HYDROCHLORIDE 25 MG: 25 TABLET ORAL at 17:51

## 2019-01-17 RX ADMIN — SODIUM CHLORIDE 1000 ML: 0.9 INJECTION, SOLUTION INTRAVENOUS at 17:56

## 2019-01-17 RX ADMIN — KETOROLAC TROMETHAMINE 15 MG: 30 INJECTION, SOLUTION INTRAMUSCULAR at 17:56

## 2019-01-17 RX ADMIN — MAGNESIUM SULFATE IN WATER 2 G: 40 INJECTION, SOLUTION INTRAVENOUS at 18:02

## 2019-01-17 RX ADMIN — DIPHENHYDRAMINE HYDROCHLORIDE 12.5 MG: 50 INJECTION, SOLUTION INTRAMUSCULAR; INTRAVENOUS at 17:56

## 2019-01-17 NOTE — ED PROVIDER NOTES
History  Chief Complaint   Patient presents with    Anxiety     pt c/o anxiety and symptoms associated with anxiety does take xanax but doesn't feel like it helps  now c/o migraine from grinding teeth (symptom of anxiety for her) started last friday/saturday  HPI     43-year-old female with history of anxiety and depression followed by psychiatry and migraines followed by neurology who presents for evaluation of headache and anxiety  Patient was seen here in the emergency department yesterday for similar symptoms  Felt better after Ativan and migraine cocktail and was discharged home  She spoke with her psychiatrist's office, who called her in a prescription for Atarax today but she has not picked this up yet because of a delay from her insurance company  She states that she is continuing to have persistent anxiety because I have a lot of stressors at home    Endorses picking her nails, biting the skin around her nails, and feeling jittery  Denies chest pain or shortness of breath  States she has been grinding her teeth a lot due to her anxiety, which makes her migraines worse  Denies nausea, vomiting, or vision changes  No neck pain or stiffness  Prior to Admission Medications   Prescriptions Last Dose Informant Patient Reported? Taking?    ALPRAZolam (XANAX) 1 mg tablet   No No   Sig: Take 1 tablet (1 mg total) by mouth 3 (three) times a day as needed for anxiety for up to 120 days To be filled on or after 12/14/18   ARIPiprazole (ABILIFY) 30 mg tablet   No No   Sig: Take 1 tablet (30 mg total) by mouth daily at bedtime for 120 days   BREO ELLIPTA 200-25 MCG/INH inhaler  Self Yes No   [employer] INDOOR/OUTDOOR ALLERGY RLF 10 MG tablet  Self Yes No   Sig: Take 10 mg by mouth daily   EPINEPHrine (EPIPEN) 0 3 mg/0 3 mL SOAJ  Self Yes No   Sig: as directed   Erenumab-aooe 70 MG/ML SOAJ   No No   Sig: Inject 70 mg under the skin every 30 (thirty) days   PROAIR RESPICLICK 794 (90 Base) MCG/ACT AEPB  Self Yes No Si PUFFS EVERY 4 HOURS AS NEEDED   SPIRIVA RESPIMAT 2 5 MCG/ACT AERS  Self Yes No   ZOLMitriptan (ZOMIG-ZMT) 5 MG disintegrating tablet   No No   Sig: Take 1 tab at first sign of headache, may repeat in 2 hours if needed  Max of 2 tabs in 24hrs/ 3 days a week  albuterol (2 5 mg/3 mL) 0 083 % nebulizer solution  Self Yes No   Sig: Inhale   albuterol (PROAIR HFA) 90 mcg/act inhaler   Yes No   Sig: Inhale   baclofen 10 mg tablet   No No   Si qhs prn pain, and up to TID if tolerated     buPROPion (WELLBUTRIN XL) 150 mg 24 hr tablet   No No   Sig: Take 1 tablet (150 mg total) by mouth daily for 120 days   buPROPion (WELLBUTRIN XL) 300 mg 24 hr tablet   No No   Sig: Take 1 tablet (300 mg total) by mouth daily for 120 days Total Wellbutrin XL dose is 450 mg daily   budesonide-formoterol (SYMBICORT) 160-4 5 mcg/act inhaler  Self Yes No   Sig: Inhale   cephalexin (KEFLEX) 500 mg capsule   No No   Sig: Take 1 capsule (500 mg total) by mouth every 12 (twelve) hours for 5 days   diphenhydrAMINE (BENADRYL) 25 mg tablet  Self No No   Sig: Take 1 tablet by mouth every 6 (six) hours as needed (for headache, take with phenergan)   divalproex sodium (DEPAKOTE) 250 mg EC tablet   No No   Sig: Take 1 tablet (250 mg total) by mouth 3 (three) times a day for 120 days   Patient not taking: Reported on 2019    ergocalciferol (VITAMIN D2) 50,000 units  Self Yes No   Si,000 Units once a week   gabapentin (NEURONTIN) 300 mg capsule   No No   Sig: Take 1 capsule (300 mg total) by mouth 3 (three) times a day   hydrOXYzine HCL (ATARAX) 25 mg tablet   No No   Sig: Take 1 tablet (25 mg total) by mouth 3 (three) times a day as needed for anxiety for up to 90 days   ibuprofen (MOTRIN) 800 mg tablet   No No   Sig: Take 1 tablet (800 mg total) by mouth every 8 (eight) hours as needed for moderate pain   levalbuterol (XOPENEX) 1 25 mg/3 mL nebulizer solution  Self Yes No   Sig: Inhale   levonorgestrel (MIRENA, 52 MG,) 20 MCG/24HR IUD  Self Yes No   Sig: by Intrauterine route   mometasone (NASONEX) 50 mcg/act nasal spray  Self Yes No   naltrexone (REVIA) 50 mg tablet   No No   Sig: Take 1 tablet (50 mg total) by mouth daily for 120 days   olopatadine (PATANOL) 0 1 % ophthalmic solution  Self Yes No   omeprazole (PriLOSEC) 20 mg delayed release capsule  Self Yes No   Sig: Take 1 capsule by mouth daily   ondansetron (ZOFRAN) 4 mg tablet   No No   Sig: Take 1 tablet (4 mg total) by mouth every 8 (eight) hours as needed for nausea or vomiting   sertraline (ZOLOFT) 100 mg tablet   No No   Sig: Take 2 tablets (200 mg total) by mouth daily at bedtime for 120 days   venlafaxine (EFFEXOR-XR) 150 mg 24 hr capsule   No No   Sig: Take 2 capsules (300 mg total) by mouth daily for 120 days Total Effexor XR dose is 225 mg daily   zolpidem (AMBIEN) 10 mg tablet   No No   Sig: Take 1 tablet (10 mg total) by mouth daily at bedtime as needed for sleep for up to 120 days To be filled on or after 18      Facility-Administered Medications: None       Past Medical History:   Diagnosis Date    Amenorrhea     Anxiety     Asthma     Back pain     Chondromalacia of patella     Chronic fatigue     Deep vein thrombophlebitis of leg (HCC)     Depression     GERD (gastroesophageal reflux disease)     HPV (human papilloma virus) infection     Hypothyroidism     Lumbar radiculopathy     Migraine     Myofascial pain syndrome     Osteopenia     Piriformis syndrome     Sacroiliitis (HCC)     Sciatica     Spondylosis of lumbar spine     Trochanteric bursitis of right hip     Vitamin D deficiency        Past Surgical History:   Procedure Laterality Date    CERVIX LESION DESTRUCTION       SECTION      COLONOSCOPY      COLPOSCOPY W/ BIOPSY / CURETTAGE      Colposcopy cervix with biopsy    LAPAROSCOPIC ENDOMETRIOSIS FULGURATION      LAPAROSCOPY      TOOTH EXTRACTION         Family History   Problem Relation Age of Onset    Heart disease Mother     Asthma Daughter     Lung cancer Paternal Grandfather     Asthma Daughter     Colon cancer Father     Colon cancer Maternal Grandfather     Prostate cancer Maternal Grandfather     Colon cancer Maternal Aunt     Psychiatric Illness Neg Hx      I have reviewed and agree with the history as documented  Social History   Substance Use Topics    Smoking status: Never Smoker    Smokeless tobacco: Never Used    Alcohol use No      Comment: Per [employer]; Occasional use        Review of Systems   Constitutional: Negative for chills and fever  HENT: Negative for congestion  Eyes: Negative for visual disturbance  Respiratory: Negative for cough and shortness of breath  Cardiovascular: Negative for chest pain and leg swelling  Gastrointestinal: Negative for abdominal pain, diarrhea, nausea and vomiting  Genitourinary: Negative for dysuria and frequency  Musculoskeletal: Negative for arthralgias, back pain, neck pain and neck stiffness  Skin: Negative for rash  Neurological: Positive for headaches  Negative for dizziness, facial asymmetry, speech difficulty, weakness, light-headedness and numbness  Psychiatric/Behavioral: Negative for agitation, behavioral problems, confusion, dysphoric mood, hallucinations and suicidal ideas  The patient is nervous/anxious  Physical Exam  Physical Exam   Constitutional: She is oriented to person, place, and time  She appears well-developed and well-nourished  No distress  HENT:   Head: Normocephalic and atraumatic  Right Ear: External ear normal    Left Ear: External ear normal    Nose: Nose normal    Mouth/Throat: Oropharynx is clear and moist    Eyes: Conjunctivae are normal    Neck: Normal range of motion  Neck supple  Cardiovascular: Normal rate, regular rhythm and normal heart sounds  Exam reveals no gallop and no friction rub  No murmur heard  Pulmonary/Chest: Effort normal and breath sounds normal  No respiratory distress  She has no wheezes  She has no rales  Abdominal: Soft  Bowel sounds are normal  She exhibits no distension  There is no tenderness  There is no guarding  Musculoskeletal: Normal range of motion  She exhibits no edema or deformity  Neurological: She is alert and oriented to person, place, and time  She exhibits normal muscle tone  Face symmetric, tongue midline, 5/5 strength in the proximal and distal upper and lower extremities bilaterally with intact sensation to light touch throughout  CN II-XII intact  Normal finger-to-nose, rapid alternating movements, and heel-to-shin bilaterally  Normal speech, normal gait  No pronator drift  Skin: Skin is warm and dry  She is not diaphoretic  Psychiatric:   Appears mildly anxious  Denies suicidal ideation, homicidal ideation, or audiovisual hallucinations         Vital Signs  ED Triage Vitals [01/17/19 1707]   Temp Pulse Respirations Blood Pressure SpO2   -- 77 18 122/76 100 %      Temp src Heart Rate Source Patient Position - Orthostatic VS BP Location FiO2 (%)   -- Monitor Sitting Right arm --      Pain Score       8           Vitals:    01/17/19 1707 01/17/19 1922   BP: 122/76 118/72   Pulse: 77 70   Patient Position - Orthostatic VS: Sitting        Visual Acuity      ED Medications  Medications   hydrOXYzine HCL (ATARAX) tablet 25 mg (25 mg Oral Given 1/17/19 1751)   diphenhydrAMINE (BENADRYL) injection 12 5 mg (12 5 mg Intravenous Given 1/17/19 1756)   metoclopramide (REGLAN) injection 10 mg (10 mg Intravenous Given 1/17/19 1756)   ketorolac (TORADOL) injection 15 mg (15 mg Intravenous Given 1/17/19 1756)   magnesium sulfate 2 g/50 mL IVPB (premix) 2 g (0 g Intravenous Stopped 1/17/19 1850)   sodium chloride 0 9 % bolus 1,000 mL (0 mL Intravenous Stopped 1/17/19 1850)       Diagnostic Studies  Results Reviewed     None                 No orders to display              Procedures  Procedures       Phone Contacts  ED Phone Contact    ED Course MDM  Number of Diagnoses or Management Options  Anxiety: established and worsening  Headache:   Diagnosis management comments: Generally well appearing  Afebrile and hemodynamically stable  Patient endorses anxiety, denies suicidal ideation, homicidal ideation, or audiovisual hallucinations  No chest pain or shortness of breath  Physical exam unremarkable as above, with nonfocal neurologic exam   No tenderness to palpation to the temples  Patient given migraine cocktail with complete resolution of her headache  She was given a dose of Atarax with improvement in her anxiety  Recommend follow up with her psychiatrist for further management of her anxiety  She was discharged in good condition with return precautions discussed for SI or new or concerning symptoms  Amount and/or Complexity of Data Reviewed  Review and summarize past medical records: yes    Patient Progress  Patient progress: improved    CritCare Time         Disposition  Final diagnoses:   Anxiety   Headache     Time reflects when diagnosis was documented in both MDM as applicable and the Disposition within this note     Time User Action Codes Description Comment    1/17/2019  7:04 PM Bee Lo [F41 [id number]] Anxiety     1/17/2019  7:04 PM Radha Miller [R51] Headache       ED Disposition     ED Disposition Condition Comment    Discharge  Zeno Moritz discharge to home/self care  Condition at discharge: Good        Follow-up Information     Follow up With Specialties Details Why Contact Info    Your psychiatrist   For further management of your anxiety              Discharge Medication List as of 1/17/2019  7:05 PM      CONTINUE these medications which have NOT CHANGED    Details   albuterol (2 5 mg/3 mL) 0 083 % nebulizer solution Inhale, Starting Fri 4/19/2013, Historical Med      albuterol (PROAIR HFA) 90 mcg/act inhaler Inhale, Starting Fri 4/19/2013, Historical Med      ALPRAZolam (XANAX) 1 mg tablet Take 1 tablet (1 mg total) by mouth 3 (three) times a day as needed for anxiety for up to 120 days To be filled on or after 12/14/18, Starting Fri 12/14/2018, Until Sat 4/13/2019, Normal      ARIPiprazole (ABILIFY) 30 mg tablet Take 1 tablet (30 mg total) by mouth daily at bedtime for 120 days, Starting Fri 11/30/2018, Until Sat 3/30/2019, Normal      baclofen 10 mg tablet 1 qhs prn pain, and up to TID if tolerated , Normal      BREO ELLIPTA 200-25 MCG/INH inhaler Starting Mon 3/5/2018, Historical Med      budesonide-formoterol (SYMBICORT) 160-4 5 mcg/act inhaler Inhale, Starting Wed 4/27/2011, Historical Med      !! buPROPion (WELLBUTRIN XL) 150 mg 24 hr tablet Take 1 tablet (150 mg total) by mouth daily for 120 days, Starting Fri 11/30/2018, Until Sat 3/30/2019, Normal      !!  buPROPion (WELLBUTRIN XL) 300 mg 24 hr tablet Take 1 tablet (300 mg total) by mouth daily for 120 days Total Wellbutrin XL dose is 450 mg daily, Starting Fri 11/30/2018, Until Sat 3/30/2019, Normal      cephalexin (KEFLEX) 500 mg capsule Take 1 capsule (500 mg total) by mouth every 12 (twelve) hours for 5 days, Starting Sat 1/12/2019, Until Thu 1/17/2019, Normal      [employer] INDOOR/OUTDOOR ALLERGY RLF 10 MG tablet Take 10 mg by mouth daily, Starting Mon 1/29/2018, Historical Med      diphenhydrAMINE (BENADRYL) 25 mg tablet Take 1 tablet by mouth every 6 (six) hours as needed (for headache, take with phenergan), Starting Tue 11/21/2017, Print      divalproex sodium (DEPAKOTE) 250 mg EC tablet Take 1 tablet (250 mg total) by mouth 3 (three) times a day for 120 days, Starting Thu 8/16/2018, Until Mon 1/7/2019, Normal      EPINEPHrine (EPIPEN) 0 3 mg/0 3 mL SOAJ as directed, Historical Med      Erenumab-aooe 70 MG/ML SOAJ Inject 70 mg under the skin every 30 (thirty) days, Starting Mon 12/31/2018, Normal      ergocalciferol (VITAMIN D2) 50,000 units 50,000 Units once a week, Starting Mon 1/29/2018, Historical Med      gabapentin (NEURONTIN) 300 mg capsule Take 1 capsule (300 mg total) by mouth 3 (three) times a day, Starting Mon 12/31/2018, Normal      hydrOXYzine HCL (ATARAX) 25 mg tablet Take 1 tablet (25 mg total) by mouth 3 (three) times a day as needed for anxiety for up to 90 days, Starting Thu 1/17/2019, Until Wed 4/17/2019, Normal      ibuprofen (MOTRIN) 800 mg tablet Take 1 tablet (800 mg total) by mouth every 8 (eight) hours as needed for moderate pain, Starting Mon 3/26/2018, Normal      levalbuterol (XOPENEX) 1 25 mg/3 mL nebulizer solution Inhale, Starting Fri 10/8/2010, Historical Med      levonorgestrel (MIRENA, 52 MG,) 20 MCG/24HR IUD by Intrauterine route, Historical Med      mometasone (NASONEX) 50 mcg/act nasal spray Starting Mon 3/12/2018, Historical Med      naltrexone (REVIA) 50 mg tablet Take 1 tablet (50 mg total) by mouth daily for 120 days, Starting Fri 11/30/2018, Until Sat 3/30/2019, Normal      olopatadine (PATANOL) 0 1 % ophthalmic solution Starting Mon 3/12/2018, Historical Med      omeprazole (PriLOSEC) 20 mg delayed release capsule Take 1 capsule by mouth daily, Starting Fri 1/28/2011, Historical Med      ondansetron (ZOFRAN) 4 mg tablet Take 1 tablet (4 mg total) by mouth every 8 (eight) hours as needed for nausea or vomiting, Starting Mon 12/31/2018, Normal      PROAIR RESPICLICK 574 (90 Base) MCG/ACT AEPB 2 PUFFS EVERY 4 HOURS AS NEEDED, Historical Med      sertraline (ZOLOFT) 100 mg tablet Take 2 tablets (200 mg total) by mouth daily at bedtime for 120 days, Starting Fri 11/30/2018, Until Sat 3/30/2019, Normal      SPIRIVA RESPIMAT 2 5 MCG/ACT AERS Starting Mon 3/5/2018, Historical Med      venlafaxine (EFFEXOR-XR) 150 mg 24 hr capsule Take 2 capsules (300 mg total) by mouth daily for 120 days Total Effexor XR dose is 225 mg daily, Starting Mon 1/7/2019, Until Tue 5/7/2019, Normal      ZOLMitriptan (ZOMIG-ZMT) 5 MG disintegrating tablet Take 1 tab at first sign of headache, may repeat in 2 hours if needed   Max of 2 tabs in 24hrs/ 3 days a week , Normal      zolpidem (AMBIEN) 10 mg tablet Take 1 tablet (10 mg total) by mouth daily at bedtime as needed for sleep for up to 120 days To be filled on or after 12/14/18, Starting Fri 12/14/2018, Until Sat 4/13/2019, Normal       !! - Potential duplicate medications found  Please discuss with provider  No discharge procedures on file      ED Provider  Electronically Signed by           Andrea Dobbins MD  01/18/19 5542

## 2019-01-17 NOTE — TELEPHONE ENCOUNTER
Patient was seen in the ED information is in chart they gave her two doses of ativan within an hour apart while there she was feeling better then today she's back to feeling anxious asking what to do from here

## 2019-01-17 NOTE — TELEPHONE ENCOUNTER
Spoke with Mckayla Nettles - she was treated in ED yesterday for migraines  She felt better this morning with her anxiety as she received also Ativan and Benadryl in ED, but later during the day she became anxious again  She is concerned that tomorrow she may feel worse and may need to return to ED  She feels safe at home at this time      Plan: start Atarax 25 mg TID PRN - escripted for 30 days with 2 RF

## 2019-01-18 ENCOUNTER — TELEPHONE (OUTPATIENT)
Dept: NEUROLOGY | Facility: CLINIC | Age: 49
End: 2019-01-18

## 2019-01-18 NOTE — DISCHARGE INSTRUCTIONS
Anxiety   WHAT YOU SHOULD KNOW:   Anxiety is a condition that causes you to feel excessive worry, uneasiness, or fear  Family or work stress, smoking, caffeine, and alcohol can increase your risk for anxiety  Certain medicines or health conditions can also increase your risk  Anxiety may begin gradually, and can become a long-term condition if it is not managed or treated  AFTER YOU LEAVE:   Medicines:   · Medicines  can help you feel more calm and relaxed, and decrease your symptoms  · Take your medicine as directed  Contact your healthcare provider if you think your medicine is not helping or if you have side effects  Tell him if you are allergic to any medicine  Keep a list of the medicines, vitamins, and herbs you take  Include the amounts, and when and why you take them  Bring the list or the pill bottles to follow-up visits  Carry your medicine list with you in case of an emergency  Follow up with your healthcare provider within 2 weeks or as directed:  Write down your questions so you remember to ask them during your visits  Manage anxiety:   · Go to counseling as directed  Cognitive behavioral therapy can help you understand and change how you react to events that trigger your symptoms  · Find ways to manage your symptoms  Activities such as exercise, meditation, or listening to music can help you relax  · Practice deep breathing  Breathing can change how your body reacts to stress  Focus on taking slow, deep breaths several times a day, or during an anxiety attack  Breathe in through your nose, and out through your mouth  · Avoid caffeine  Caffeine can make your symptoms worse  Avoid foods or drinks that are meant to increase your energy level  · Limit or avoid alcohol  Ask your healthcare provider if alcohol is safe for you  You may not be able to drink alcohol if you take certain anxiety or depression medicines  Limit alcohol to 1 drink per day if you are a woman   Limit alcohol to 2 drinks per day if you are a man  A drink of alcohol is 12 ounces of beer, 5 ounces of wine, or 1½ ounces of liquor  Contact your healthcare provider if:   · Your symptoms get worse or do not get better with treatment  · You think your medicine may be causing side effects  · Your anxiety keeps you from doing your regular daily activities  · You have new symptoms since your last visit  · You have questions or concerns about your condition or care  Seek care immediately or call 911 if:   · You have chest pain, tightness, or heaviness that may spread to your shoulders, arms, jaw, neck, or back  · You feel like hurting yourself or someone else  · You feel dizzy, lightheaded, or faint  © 2014 [employer] is for End User's use only and may not be sold, redistributed or otherwise used for commercial purposes  All illustrations and images included in CareNotes® are the copyrighted property of A D A M [employer]  or [employer]  The above information is an  only  It is not intended as medical advice for individual conditions or treatments  Talk to your doctor, nurse or pharmacist before following any medical regimen to see if it is safe and effective for you

## 2019-01-18 NOTE — TELEPHONE ENCOUNTER
----- Message from Aveillant sent at 1/17/2019 11:39 AM EST -----  Regarding: FW: isabela Henry,    Please see below  Thank you,  Jerel Schwarz :)      ----- Message -----  From: Roshan Peter MA  Sent: 1/17/2019  10:51 AM  To: Aveillant  Subject: RE: isabela                                         [facility], it has not been exactly 2 years yet so she would not be considered a new start  Thank you!   ----- Message -----  From: Aveillant  Sent: 1/16/2019   8:55 AM  To: Roshan Peter MA  Subject: isabela                                             Good Morning Tonya,    Please schedule patient with Dr Liliana Bowens for a new start botox  Specialty pharmacy      Please advise    Thank you,   Lissy :)

## 2019-01-18 NOTE — TELEPHONE ENCOUNTER
lmom awaiting a call back  When patient calls back please transfer the patient to me to discuss Botox  I need to confirm who the patient will be receiving Botox with

## 2019-01-21 ENCOUNTER — TELEPHONE (OUTPATIENT)
Dept: NEUROLOGY | Facility: CLINIC | Age: 49
End: 2019-01-21

## 2019-01-21 ENCOUNTER — TELEPHONE (OUTPATIENT)
Dept: PSYCHIATRY | Facility: CLINIC | Age: 49
End: 2019-01-21

## 2019-01-21 DIAGNOSIS — F41.0 PANIC DISORDER WITHOUT AGORAPHOBIA: Chronic | ICD-10-CM

## 2019-01-21 DIAGNOSIS — F41.1 GAD (GENERALIZED ANXIETY DISORDER): Primary | Chronic | ICD-10-CM

## 2019-01-21 RX ORDER — CLONAZEPAM 1 MG/1
.5-1 TABLET ORAL 3 TIMES DAILY
Qty: 90 TABLET | Refills: 1 | Status: SHIPPED | OUTPATIENT
Start: 2019-01-21 | End: 2019-02-18 | Stop reason: SDUPTHER

## 2019-01-21 NOTE — TELEPHONE ENCOUNTER
uriah from [facility] called to verify if we received PA request for aimovig    this was faxed on 1/16   i do not see that we received this request   will complete PA on [facility]

## 2019-01-21 NOTE — TELEPHONE ENCOUNTER
Patient called back- she is asking for an appointment for Botox after 3 pm because she works until then  I made the patient aware that I do not have any spots available at that time- she took an appointment with Hosie Klinefelter on 2/8/19 at 2 pm for now  We will contact her if we have any later appointments that become available- Leobardo Gomez has been made aware as well  China Hyde made aware of Botox add on- Botox already in fridge from previous Botox appointment that she missed  Will use that Botox for upcoming appointment and order from speciality next time

## 2019-01-21 NOTE — TELEPHONE ENCOUNTER
Luis Miguel calling to check if we received the PA request for aimovig  Informed her we did and we have initiated one today

## 2019-01-21 NOTE — TELEPHONE ENCOUNTER
Patient left a message stating that she feels Xanax is ineffective for her  She asked if you would consider switching her to Librium

## 2019-01-21 NOTE — TELEPHONE ENCOUNTER
lmom awaiting a call back- RE: scheduling Botox/ Confirm what location/ Dr she will be scheduling with

## 2019-01-21 NOTE — TELEPHONE ENCOUNTER
Spoke with Doug Jeong - she continues to feel anxious  Informed that Librium would not be a good choice for anxiety control instead of Xanax  She agreed to try Klonopin instead  Plan:  Start Klonopin 0 5 to 1 mg tid - Rx escripted for 30 days with 1 RF   Previous Rx for Xanax cancelled with Luis Miguel on the phone

## 2019-01-22 DIAGNOSIS — G43.709 CHRONIC MIGRAINE WITHOUT AURA WITHOUT STATUS MIGRAINOSUS, NOT INTRACTABLE: ICD-10-CM

## 2019-01-28 RX ORDER — ZOLMITRIPTAN 5 MG/1
TABLET, ORALLY DISINTEGRATING ORAL
Qty: 9 TABLET | Refills: 0 | Status: SHIPPED | OUTPATIENT
Start: 2019-01-28 | End: 2019-02-08 | Stop reason: SDUPTHER

## 2019-02-01 DIAGNOSIS — G43.709 CHRONIC MIGRAINE WITHOUT AURA WITHOUT STATUS MIGRAINOSUS, NOT INTRACTABLE: ICD-10-CM

## 2019-02-01 NOTE — TELEPHONE ENCOUNTER
Can we go up gradually? So first take 300/300/600 for one week and see how she does, and if needed will increase further  Thanks

## 2019-02-01 NOTE — TELEPHONE ENCOUNTER
Pt calls to state that she would like to go back on the higher dose of gabapentin 600mg TID  Pt now taking gabapentin 300mg TID since December  Pt is having a migraine daily  On the increased dose pt states that she was having migraines 2-3 times weekly  If agreeable to increase pt will need refill sent to pharmacy  Please advise  Pt has upcoming appt scheduled 2/8/19

## 2019-02-04 ENCOUNTER — TELEPHONE (OUTPATIENT)
Dept: NEUROLOGY | Facility: CLINIC | Age: 49
End: 2019-02-04

## 2019-02-04 RX ORDER — GABAPENTIN 300 MG/1
CAPSULE ORAL
Qty: 120 CAPSULE | Refills: 2 | Status: SHIPPED | OUTPATIENT
Start: 2019-02-04 | End: 2019-02-08 | Stop reason: SDUPTHER

## 2019-02-04 NOTE — TELEPHONE ENCOUNTER
Received notice that patient needed a botox appt time after 3 PM  Patient currently has an appt with Carl Seaman 2/8/19 at 2 PM  I called patient to offer a botox appt with [facility] on Monday 2/11/19 at 4:30 PM  No answer; LMOM  4:30 PM appt slot on hold in case patient Isabel Purvis accepts appointment

## 2019-02-06 NOTE — TELEPHONE ENCOUNTER
Spoke to patient Dorothea Farrar; states she no longer needs an appointment after 3 PM and will be able to make her 2:00 appointment with [facility] on Friday 2/8/19

## 2019-02-08 ENCOUNTER — PROCEDURE VISIT (OUTPATIENT)
Dept: NEUROLOGY | Facility: CLINIC | Age: 49
End: 2019-02-08
Payer: COMMERCIAL

## 2019-02-08 VITALS — DIASTOLIC BLOOD PRESSURE: 62 MMHG | HEART RATE: 89 BPM | SYSTOLIC BLOOD PRESSURE: 126 MMHG | TEMPERATURE: 98.4 F

## 2019-02-08 DIAGNOSIS — G43.709 CHRONIC MIGRAINE WITHOUT AURA WITHOUT STATUS MIGRAINOSUS, NOT INTRACTABLE: Primary | ICD-10-CM

## 2019-02-08 PROCEDURE — 64615 CHEMODENERV MUSC MIGRAINE: CPT | Performed by: PHYSICIAN ASSISTANT

## 2019-02-08 RX ORDER — ZOLMITRIPTAN 5 MG/1
TABLET, ORALLY DISINTEGRATING ORAL
Qty: 9 TABLET | Refills: 2 | Status: SHIPPED | OUTPATIENT
Start: 2019-02-08 | End: 2019-02-21 | Stop reason: SDUPTHER

## 2019-02-08 RX ORDER — GABAPENTIN 300 MG/1
CAPSULE ORAL
Qty: 150 CAPSULE | Refills: 0 | Status: SHIPPED | OUTPATIENT
Start: 2019-02-08 | End: 2019-03-08 | Stop reason: SDUPTHER

## 2019-02-08 NOTE — PROGRESS NOTES
Chemodenervation  Date/Time: 2/8/2019 2:06 PM  Performed by: Galilea Iraheta  Authorized by: Galilea Iraheta     Pre-procedure details:     Prepped With: Alcohol    Procedure details:     Position:  Upright  Botox:     Botox Type:  Type A    Brand:  Botox    mL's of Botulinum Toxin:  165    Final Concentration per CC:  50 units    Needle Gauge:  30 G 2 5 inch  Procedures:     Botox Procedures: chronic headache      Indications: migraines    Injection Location:     Head / Face:  L , R , L frontalis, R frontalis, R inferior cervical paraspinal, L inferior cervical paraspinal, L medial occipitalis, R medial occipitalis, L lateral occipitalis, R lateral occipitalis, procerus, L temporalis, R temporalis, R superior trapezius and L superior trapezius    L  injection amount:  5 unit(s)    R  injection amount:  5 unit(s)    L lateral frontalis:  5 unit(s)    R lateral frontalis:  5 unit(s)    L medial frontalis:  5 unit(s)    R medial frontalis:  5 unit(s)    L temporalis injection amount:  20 unit(s)    R temporalis injection amount:  20 unit(s)    Procerus injection amount:  5 unit(s)    L lateral occipitalis injection amount:  5 unit(s)    R lateral occipitalis injection amount:  5 unit(s)    L medial occipitalis injection amount:  10 unit(s)    R medial occipitalis injection amount:  10 unit(s)    L inferior cervical paraspinal injection amount:  10 unit(s)    R inferior cervical paraspinal injection amount:  10 unit(s)    L superior trapezius injection amount:  15 unit(s)    R superior trapezius injection amount:  15 unit(s)  Total Units:     Total units used:  165    Total units discarded:  35  Post-procedure details:     Chemodenervation:  Chronic migraine    Facial Nerve Location[de-identified]  Bilateral facial nerve    Patient tolerance of procedure:   Tolerated well, no immediate complications  Comments:      5 extra units in each temporalis muscle, anteriorly, medically necessary  Vitals:    02/08/19 1409   BP: 126/62   Pulse: 89   Temp: 98 4 °F (36 9 °C)       She wants to increase her gabapentin again  We will increase to 300/600/600 mg, but I told her that it is best to watch kidney function at least every 3-6 months  Last kidney function is normal 1/16/2019  Will discuss this again at follow-up  Hopefully she will be able to back off of gabapentin in the future with both Aimovig and Botox on board  I refilled Zomig per patient request, but asked the pharmacy to hold refill until 2/27/2019 and no earlier  She will add Benadryl to the Zomig at the onset of a migraine, and I told her she can also add baclofen and Aleve as needed if the migraine is severe  She should hold off on no more than 2-3 doses per week of any migraine rescue medication to prevent MOH  Will continue Aimovig 70 mg SQ injections on the 1st of every month

## 2019-02-15 DIAGNOSIS — F33.2 MAJOR DEPRESSIVE DISORDER, RECURRENT SEVERE WITHOUT PSYCHOTIC FEATURES (HCC): Chronic | ICD-10-CM

## 2019-02-15 RX ORDER — BUPROPION HYDROCHLORIDE 150 MG/1
TABLET ORAL
Qty: 30 TABLET | Refills: 0 | OUTPATIENT
Start: 2019-02-15

## 2019-02-18 ENCOUNTER — OFFICE VISIT (OUTPATIENT)
Dept: PSYCHIATRY | Facility: CLINIC | Age: 49
End: 2019-02-18
Payer: COMMERCIAL

## 2019-02-18 VITALS
WEIGHT: 152 LBS | BODY MASS INDEX: 27.97 KG/M2 | HEIGHT: 62 IN | DIASTOLIC BLOOD PRESSURE: 72 MMHG | HEART RATE: 96 BPM | SYSTOLIC BLOOD PRESSURE: 110 MMHG

## 2019-02-18 DIAGNOSIS — Z79.899 LONG-TERM USE OF HIGH-RISK MEDICATION: Chronic | ICD-10-CM

## 2019-02-18 DIAGNOSIS — F41.0 PANIC DISORDER WITHOUT AGORAPHOBIA: Chronic | ICD-10-CM

## 2019-02-18 DIAGNOSIS — F63.9 IMPULSE CONTROL DISORDER: Chronic | ICD-10-CM

## 2019-02-18 DIAGNOSIS — G47.09 OTHER INSOMNIA: Chronic | ICD-10-CM

## 2019-02-18 DIAGNOSIS — F33.2 MAJOR DEPRESSIVE DISORDER, RECURRENT SEVERE WITHOUT PSYCHOTIC FEATURES (HCC): Primary | Chronic | ICD-10-CM

## 2019-02-18 DIAGNOSIS — F42.9 OBSESSIVE-COMPULSIVE DISORDER, UNSPECIFIED TYPE: Chronic | ICD-10-CM

## 2019-02-18 DIAGNOSIS — F41.1 GAD (GENERALIZED ANXIETY DISORDER): Chronic | ICD-10-CM

## 2019-02-18 PROCEDURE — 99214 OFFICE O/P EST MOD 30 MIN: CPT | Performed by: PSYCHIATRY & NEUROLOGY

## 2019-02-18 PROCEDURE — 90833 PSYTX W PT W E/M 30 MIN: CPT | Performed by: PSYCHIATRY & NEUROLOGY

## 2019-02-18 RX ORDER — HYDROXYZINE 50 MG/1
50 TABLET, FILM COATED ORAL 3 TIMES DAILY PRN
Qty: 90 TABLET | Refills: 3 | Status: SHIPPED | OUTPATIENT
Start: 2019-02-18 | End: 2019-06-26 | Stop reason: SDUPTHER

## 2019-02-18 RX ORDER — BUPROPION HYDROCHLORIDE 150 MG/1
150 TABLET ORAL DAILY
Qty: 30 TABLET | Refills: 3 | Status: SHIPPED | OUTPATIENT
Start: 2019-02-18 | End: 2019-04-15 | Stop reason: HOSPADM

## 2019-02-18 RX ORDER — NALTREXONE HYDROCHLORIDE 50 MG/1
50 TABLET, FILM COATED ORAL DAILY
Qty: 30 TABLET | Refills: 3 | Status: SHIPPED | OUTPATIENT
Start: 2019-02-18 | End: 2019-06-26 | Stop reason: SDUPTHER

## 2019-02-18 RX ORDER — SERTRALINE HYDROCHLORIDE 100 MG/1
200 TABLET, FILM COATED ORAL
Qty: 60 TABLET | Refills: 3 | Status: SHIPPED | OUTPATIENT
Start: 2019-02-18 | End: 2019-06-26 | Stop reason: SDUPTHER

## 2019-02-18 RX ORDER — BUPROPION HYDROCHLORIDE 300 MG/1
300 TABLET ORAL DAILY
Qty: 30 TABLET | Refills: 3 | Status: SHIPPED | OUTPATIENT
Start: 2019-02-18 | End: 2019-06-26 | Stop reason: SDUPTHER

## 2019-02-18 RX ORDER — DIVALPROEX SODIUM 500 MG/1
500 TABLET, DELAYED RELEASE ORAL 2 TIMES DAILY
Qty: 60 TABLET | Refills: 3 | Status: SHIPPED | OUTPATIENT
Start: 2019-02-18 | End: 2019-06-26 | Stop reason: ALTCHOICE

## 2019-02-18 RX ORDER — CLONAZEPAM 1 MG/1
.5-1 TABLET ORAL 3 TIMES DAILY
Qty: 90 TABLET | Refills: 3 | Status: SHIPPED | OUTPATIENT
Start: 2019-03-22 | End: 2019-06-26 | Stop reason: SDUPTHER

## 2019-02-18 RX ORDER — ARIPIPRAZOLE 30 MG/1
30 TABLET ORAL
Qty: 30 TABLET | Refills: 3 | Status: SHIPPED | OUTPATIENT
Start: 2019-02-18 | End: 2019-06-13

## 2019-02-18 NOTE — TELEPHONE ENCOUNTER
Patient lala been having problems with clenching her teeth, feeling jittery  Would like to change her medications to ramictal and tetamine?  She's due to come in on the 27th but she states her symptoms are "through the roof"
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
## 2019-02-18 NOTE — PSYCH
TREATMENT PLAN (Medication Management Only)        [facility]    Name/Date of Birth/MRN:  Malik Boucher 53 y o  1970 MRN: [id number]  Date of Treatment Plan: February 18, 2019  Diagnosis/Diagnoses:   1  Major depressive disorder, recurrent severe without psychotic features ([facility] )    2  KYLE (generalized anxiety disorder)    3  Panic disorder without agoraphobia    4  Impulse control disorder    5  Obsessive-compulsive disorder, unspecified type    6  Other insomnia    7  Long-term use of high-risk medication      Strengths/Personal Resources for Self-Care: "kind heart"  Area/Areas of need (in own words): "anxiety, depression"  1  Long Term Goal: improve control of anxiety  Target Date: 5 weeks - 3/25/2019  Person/Persons responsible for completion of goal: Taryn Dominguez  2  Short Term Objective (s) - How will we reach this goal?:   A  Provider new recommended medication/dosage changes and/or continue medication(s): increase Depakote and Atarax, continue all other medications (Zoloft, Effexor XR, Wellbutrin XL, Depakote, Abilify, Klonopin and Ambien)  B   N/A   C   N/A  Target Date: 5 weeks - 3/25/2019  Person/Persons Responsible for Completion of Goal: Taryn Dominguez   Progress Towards Goals: minimal progress  Treatment Modality: medication management every 5 weeks, referral for individual psychotherapy  Review due 90 to 120 days from date of this plan: 4 months - 6/18/2019  Expected length of service: ongoing treatment  My Physician/PA/NP and I have developed this plan together and I agree to work on the goals and objectives  I understand the treatment goals that were developed for my treatment    Signature:       Date and time:  Signature of parent/guardian if under age of 15 years: Date and time:  Signature of provider:      Date and time:  Signature of Supervising Physician:    Date and time: 2/18/2019      Holden Luong MD

## 2019-02-18 NOTE — TELEPHONE ENCOUNTER
She can come today at 3 PM, 3:30 PM or 4:30 PM  She needs to be seen in order to review medications  She can also call daily for cancellations, if she cannot come today

## 2019-02-21 DIAGNOSIS — G43.709 CHRONIC MIGRAINE WITHOUT AURA WITHOUT STATUS MIGRAINOSUS, NOT INTRACTABLE: ICD-10-CM

## 2019-02-21 RX ORDER — ZOLMITRIPTAN 5 MG/1
TABLET, ORALLY DISINTEGRATING ORAL
Qty: 12 TABLET | Refills: 0 | Status: SHIPPED | OUTPATIENT
Start: 2019-02-21 | End: 2019-03-20 | Stop reason: SDUPTHER

## 2019-02-22 ENCOUNTER — OFFICE VISIT (OUTPATIENT)
Dept: INTERNAL MEDICINE CLINIC | Facility: CLINIC | Age: 49
End: 2019-02-22
Payer: COMMERCIAL

## 2019-02-22 VITALS
BODY MASS INDEX: 27.94 KG/M2 | OXYGEN SATURATION: 98 % | RESPIRATION RATE: 16 BRPM | HEIGHT: 62 IN | TEMPERATURE: 97.3 F | WEIGHT: 151.8 LBS | DIASTOLIC BLOOD PRESSURE: 80 MMHG | HEART RATE: 85 BPM | SYSTOLIC BLOOD PRESSURE: 114 MMHG

## 2019-02-22 DIAGNOSIS — R39.9 URINARY TRACT INFECTION SYMPTOMS: Primary | ICD-10-CM

## 2019-02-22 PROBLEM — N30.01 ACUTE CYSTITIS WITH HEMATURIA: Status: RESOLVED | Noted: 2018-10-10 | Resolved: 2019-02-22

## 2019-02-22 PROBLEM — B37.31 VAGINAL YEAST INFECTION: Status: RESOLVED | Noted: 2018-10-31 | Resolved: 2019-02-22

## 2019-02-22 PROBLEM — B37.3 VAGINAL YEAST INFECTION: Status: RESOLVED | Noted: 2018-10-31 | Resolved: 2019-02-22

## 2019-02-22 LAB
BACTERIA UR QL AUTO: ABNORMAL /HPF
BILIRUB UR QL STRIP: NEGATIVE
CLARITY UR: CLEAR
COLOR UR: ABNORMAL
GLUCOSE UR STRIP-MCNC: NEGATIVE MG/DL
HGB UR QL STRIP.AUTO: ABNORMAL
HYALINE CASTS #/AREA URNS LPF: ABNORMAL /LPF
KETONES UR STRIP-MCNC: NEGATIVE MG/DL
LEUKOCYTE ESTERASE UR QL STRIP: NEGATIVE
NITRITE UR QL STRIP: NEGATIVE
NON-SQ EPI CELLS URNS QL MICRO: ABNORMAL /HPF
PH UR STRIP.AUTO: 7 [PH] (ref 4.5–8)
PROT UR STRIP-MCNC: NEGATIVE MG/DL
RBC #/AREA URNS AUTO: ABNORMAL /HPF
SL AMB  POCT GLUCOSE, UA: ABNORMAL
SL AMB LEUKOCYTE ESTERASE,UA: ABNORMAL
SL AMB POCT BILIRUBIN,UA: ABNORMAL
SL AMB POCT BLOOD,UA: ABNORMAL
SL AMB POCT CLARITY,UA: ABNORMAL
SL AMB POCT COLOR,UA: YELLOW
SL AMB POCT KETONES,UA: ABNORMAL
SL AMB POCT NITRITE,UA: ABNORMAL
SL AMB POCT PH,UA: 6.5
SL AMB POCT SPECIFIC GRAVITY,UA: 1015
SL AMB POCT URINE PROTEIN: ABNORMAL
SL AMB POCT UROBILINOGEN: 0.2
SP GR UR STRIP.AUTO: 1.03 (ref 1–1.03)
UROBILINOGEN UR QL STRIP.AUTO: 1 E.U./DL
WBC #/AREA URNS AUTO: ABNORMAL /HPF

## 2019-02-22 PROCEDURE — 3008F BODY MASS INDEX DOCD: CPT | Performed by: INTERNAL MEDICINE

## 2019-02-22 PROCEDURE — 99213 OFFICE O/P EST LOW 20 MIN: CPT | Performed by: INTERNAL MEDICINE

## 2019-02-22 PROCEDURE — 81002 URINALYSIS NONAUTO W/O SCOPE: CPT | Performed by: INTERNAL MEDICINE

## 2019-02-22 PROCEDURE — 1036F TOBACCO NON-USER: CPT | Performed by: INTERNAL MEDICINE

## 2019-02-22 PROCEDURE — 81001 URINALYSIS AUTO W/SCOPE: CPT | Performed by: INTERNAL MEDICINE

## 2019-02-22 RX ORDER — CEPHALEXIN 500 MG/1
500 CAPSULE ORAL EVERY 12 HOURS SCHEDULED
Qty: 14 CAPSULE | Refills: 0 | Status: SHIPPED | OUTPATIENT
Start: 2019-02-22 | End: 2019-03-01

## 2019-02-22 RX ORDER — PHENAZOPYRIDINE HYDROCHLORIDE 100 MG/1
100 TABLET, FILM COATED ORAL 3 TIMES DAILY PRN
Qty: 3 TABLET | Refills: 0 | Status: SHIPPED | OUTPATIENT
Start: 2019-02-22 | End: 2019-10-29 | Stop reason: HOSPADM

## 2019-02-22 NOTE — ASSESSMENT & PLAN NOTE
She has clinical symptoms of urinary tract infection in the urine dip is not very impressive  Will send for urine culture  If she is symptomatic, will place on empiric treatment with Keflex and Pyridium  Will call with results of UC

## 2019-02-22 NOTE — PROGRESS NOTES
Assessment/Plan:    Urinary tract infection symptoms  She has clinical symptoms of urinary tract infection in the urine dip is not very impressive  Will send for urine culture  If she is symptomatic, will place on empiric treatment with Keflex and Pyridium  Will call with results of UC     1  Urinary tract infection symptoms  POCT urine dip    UA w Reflex to Microscopic w Reflex to Culture - Clinic Collect    cephalexin (KEFLEX) 500 mg capsule    phenazopyridine (PYRIDIUM) 100 mg tablet       Subjective:      Patient ID: Jayashree Antonio is a 53 y o  female  HPI  56yo female here for evaluation of urinary symptoms  Two days ago she woke up with a sharp pelvic pain  Has urinary frequency, has dysuria at end of voiding  She denies nausea, fever or hematuria  She took azo the other day  She has had multiple UTIs in the past and reports same feeling  Last episode 1/2019, given keflex  UC were negative  The following portions of the patient's history were reviewed and updated as appropriate: allergies, current medications, past family history, past medical history, past social history, past surgical history and problem list     Current Outpatient Medications:     albuterol (2 5 mg/3 mL) 0 083 % nebulizer solution, Inhale, Disp: , Rfl:     albuterol (PROAIR HFA) 90 mcg/act inhaler, Inhale, Disp: , Rfl:     ARIPiprazole (ABILIFY) 30 mg tablet, Take 1 tablet (30 mg total) by mouth daily at bedtime for 120 days, Disp: 30 tablet, Rfl: 3    baclofen 10 mg tablet, 1 qhs prn pain, and up to TID if tolerated  , Disp: 90 tablet, Rfl: 2    BREO ELLIPTA 200-25 MCG/INH inhaler, , Disp: , Rfl:     budesonide-formoterol (SYMBICORT) 160-4 5 mcg/act inhaler, Inhale, Disp: , Rfl:     buPROPion (WELLBUTRIN XL) 150 mg 24 hr tablet, Take 1 tablet (150 mg total) by mouth daily for 120 days, Disp: 30 tablet, Rfl: 3    buPROPion (WELLBUTRIN XL) 300 mg 24 hr tablet, Take 1 tablet (300 mg total) by mouth daily for 120 days Total Wellbutrin XL dose is 450 mg daily, Disp: 30 tablet, Rfl: 3    [START ON 3/22/2019] clonazePAM (KlonoPIN) 1 mg tablet, Take 0 5-1 tablets (0 5-1 mg total) by mouth 3 (three) times a day for 120 days To be filled on or after 3/22/19, Disp: 90 tablet, Rfl: 3    [employer] INDOOR/OUTDOOR ALLERGY RLF 10 MG tablet, Take 10 mg by mouth daily, Disp: , Rfl: 10    diphenhydrAMINE (BENADRYL) 25 mg tablet, Take 1 tablet by mouth every 6 (six) hours as needed (for headache, take with phenergan), Disp: 20 tablet, Rfl: 0    divalproex sodium (DEPAKOTE) 500 mg EC tablet, Take 1 tablet (500 mg total) by mouth 2 (two) times a day for 120 days, Disp: 60 tablet, Rfl: 3    EPINEPHrine (EPIPEN) 0 3 mg/0 3 mL SOAJ, as directed, Disp: , Rfl:     Erenumab-aooe 70 MG/ML SOAJ, Inject 70 mg under the skin every 30 (thirty) days, Disp: 1 pen, Rfl: 2    ergocalciferol (VITAMIN D2) 50,000 units, 50,000 Units once a week, Disp: , Rfl: 12    gabapentin (NEURONTIN) 300 mg capsule, 1 capsule in am, 2 capsules at noon, and 2 capsules in the evening , Disp: 150 capsule, Rfl: 0    hydrOXYzine HCL (ATARAX) 50 mg tablet, Take 1 tablet (50 mg total) by mouth 3 (three) times a day as needed for anxiety for up to 120 days, Disp: 90 tablet, Rfl: 3    ibuprofen (MOTRIN) 800 mg tablet, Take 1 tablet (800 mg total) by mouth every 8 (eight) hours as needed for moderate pain, Disp: 30 tablet, Rfl: 1    levalbuterol (XOPENEX) 1 25 mg/3 mL nebulizer solution, Inhale, Disp: , Rfl:     levonorgestrel (MIRENA, 52 MG,) 20 MCG/24HR IUD, by Intrauterine route, Disp: , Rfl:     mometasone (NASONEX) 50 mcg/act nasal spray, , Disp: , Rfl:     naltrexone (REVIA) 50 mg tablet, Take 1 tablet (50 mg total) by mouth daily for 120 days, Disp: 30 tablet, Rfl: 3    olopatadine (PATANOL) 0 1 % ophthalmic solution, , Disp: , Rfl:     omeprazole (PriLOSEC) 20 mg delayed release capsule, Take 1 capsule by mouth daily, Disp: , Rfl:     ondansetron (ZOFRAN) 4 mg tablet, Take 1 tablet (4 mg total) by mouth every 8 (eight) hours as needed for nausea or vomiting, Disp: 30 tablet, Rfl: 2    PROAIR RESPICLICK 484 (90 Base) MCG/ACT AEPB, 2 PUFFS EVERY 4 HOURS AS NEEDED, Disp: , Rfl: 3    sertraline (ZOLOFT) 100 mg tablet, Take 2 tablets (200 mg total) by mouth daily at bedtime for 120 days, Disp: 60 tablet, Rfl: 3    SPIRIVA RESPIMAT 2 5 MCG/ACT AERS, , Disp: , Rfl:     venlafaxine (EFFEXOR-XR) 150 mg 24 hr capsule, Take 2 capsules (300 mg total) by mouth daily for 120 days Total Effexor XR dose is 225 mg daily, Disp: 60 capsule, Rfl: 3    ZOLMitriptan (ZOMIG-ZMT) 5 MG disintegrating tablet, TAKE 1 TABLET BY MOUTH AT ONSET OF HEADACHE, MAY REPEAT AFTER 2 HOURS AS NEEDED  MAX 2 TABLETS EVERY DAY, Disp: 12 tablet, Rfl: 0    cephalexin (KEFLEX) 500 mg capsule, Take 1 capsule (500 mg total) by mouth every 12 (twelve) hours for 7 days, Disp: 14 capsule, Rfl: 0    phenazopyridine (PYRIDIUM) 100 mg tablet, Take 1 tablet (100 mg total) by mouth 3 (three) times a day as needed for bladder spasms, Disp: 3 tablet, Rfl: 0    zolpidem (AMBIEN) 10 mg tablet, Take 1 tablet (10 mg total) by mouth daily at bedtime as needed for sleep for up to 120 days To be filled on or after 12/14/18, Disp: 30 tablet, Rfl: 3    Review of Systems   Constitutional: Negative for fever  Gastrointestinal: Negative for diarrhea  Suprapubic pain   Genitourinary: Positive for dysuria, frequency, pelvic pain and urgency  Negative for hematuria  Objective:    /80 (BP Location: Left arm, Patient Position: Sitting)   Pulse 85   Temp (!) 97 3 °F (36 3 °C)   Resp 16   Ht 5' 2" (1 575 m)   Wt 68 9 kg (151 lb 12 8 oz)   SpO2 98%   BMI 27 76 kg/m²      Physical Exam   Constitutional: She appears well-developed and well-nourished  No distress  Abdominal: Soft  Bowel sounds are normal  There is tenderness in the suprapubic area  There is no CVA tenderness  Neurological: She is alert     Vitals reviewed        Recent Results (from the past 168 hour(s))   POCT urine dip    Collection Time: 02/22/19 10:12 AM   Result Value Ref Range    LEUKOCYTE ESTERASE,UA -     NITRITE,UA -     SL AMB POCT UROBILINOGEN 0 2     POCT URINE PROTEIN +      PH,UA 6 5     BLOOD,UA +++     SPECIFIC GRAVITY,UA 1,015     KETONES,UA -     BILIRUBIN,UA -     GLUCOSE, UA -      COLOR,UA yellow     CLARITY,UA transparent

## 2019-03-08 DIAGNOSIS — G43.709 CHRONIC MIGRAINE WITHOUT AURA WITHOUT STATUS MIGRAINOSUS, NOT INTRACTABLE: ICD-10-CM

## 2019-03-08 NOTE — TELEPHONE ENCOUNTER
Pt called and states that her gabapentin 300 mg was increased to 2 caps tid  Requesting updated rx be sent to [place]  Rx entered   If agreeable, pls sign off     thanks

## 2019-03-11 RX ORDER — GABAPENTIN 300 MG/1
CAPSULE ORAL
Qty: 150 CAPSULE | Refills: 2 | Status: SHIPPED | OUTPATIENT
Start: 2019-03-11 | End: 2019-07-29 | Stop reason: SDUPTHER

## 2019-03-20 DIAGNOSIS — G43.709 CHRONIC MIGRAINE WITHOUT AURA WITHOUT STATUS MIGRAINOSUS, NOT INTRACTABLE: ICD-10-CM

## 2019-03-20 RX ORDER — ZOLMITRIPTAN 5 MG/1
TABLET, ORALLY DISINTEGRATING ORAL
Qty: 12 TABLET | Refills: 0 | Status: SHIPPED | OUTPATIENT
Start: 2019-03-20 | End: 2019-04-19 | Stop reason: SDUPTHER

## 2019-03-22 ENCOUNTER — DOCUMENTATION (OUTPATIENT)
Dept: PSYCHIATRY | Facility: CLINIC | Age: 49
End: 2019-03-22

## 2019-03-22 NOTE — PROGRESS NOTES
Treatment Plan not completed within required time limits due to: cancelled appointment  on 3/18/2019

## 2019-03-26 ENCOUNTER — TRANSCRIBE ORDERS (OUTPATIENT)
Dept: NEUROLOGY | Facility: CLINIC | Age: 49
End: 2019-03-26

## 2019-03-26 DIAGNOSIS — G43.709 CHRONIC MIGRAINE WITHOUT AURA WITHOUT STATUS MIGRAINOSUS, NOT INTRACTABLE: Primary | ICD-10-CM

## 2019-03-26 DIAGNOSIS — R55 SYNCOPE AND COLLAPSE: ICD-10-CM

## 2019-04-01 ENCOUNTER — HOSPITAL ENCOUNTER (EMERGENCY)
Facility: HOSPITAL | Age: 49
Discharge: HOME | End: 2019-04-02
Attending: EMERGENCY MEDICINE | Admitting: EMERGENCY MEDICINE
Payer: COMMERCIAL

## 2019-04-01 ENCOUNTER — OFFICE VISIT (OUTPATIENT)
Dept: NEUROLOGY | Facility: CLINIC | Age: 49
End: 2019-04-01
Payer: COMMERCIAL

## 2019-04-01 VITALS
WEIGHT: 155 LBS | HEART RATE: 66 BPM | BODY MASS INDEX: 28.35 KG/M2 | SYSTOLIC BLOOD PRESSURE: 132 MMHG | DIASTOLIC BLOOD PRESSURE: 82 MMHG

## 2019-04-01 DIAGNOSIS — G43.909 MIGRAINE: Primary | ICD-10-CM

## 2019-04-01 DIAGNOSIS — G43.709 CHRONIC MIGRAINE WITHOUT AURA WITHOUT STATUS MIGRAINOSUS, NOT INTRACTABLE: ICD-10-CM

## 2019-04-01 PROCEDURE — 96375 TX/PRO/DX INJ NEW DRUG ADDON: CPT

## 2019-04-01 PROCEDURE — 99215 OFFICE O/P EST HI 40 MIN: CPT | Performed by: PHYSICIAN ASSISTANT

## 2019-04-01 PROCEDURE — 96374 THER/PROPH/DIAG INJ IV PUSH: CPT

## 2019-04-01 PROCEDURE — 96361 HYDRATE IV INFUSION ADD-ON: CPT

## 2019-04-01 PROCEDURE — 99283 EMERGENCY DEPT VISIT LOW MDM: CPT

## 2019-04-01 PROCEDURE — 99284 EMERGENCY DEPT VISIT MOD MDM: CPT | Performed by: EMERGENCY MEDICINE

## 2019-04-01 PROCEDURE — 96372 THER/PROPH/DIAG INJ SC/IM: CPT | Performed by: PHYSICIAN ASSISTANT

## 2019-04-01 RX ORDER — KETOROLAC TROMETHAMINE 30 MG/ML
30 INJECTION, SOLUTION INTRAMUSCULAR; INTRAVENOUS ONCE
Status: COMPLETED | OUTPATIENT
Start: 2019-04-01 | End: 2019-04-01

## 2019-04-01 RX ORDER — KETOROLAC TROMETHAMINE 10 MG/1
10 TABLET, FILM COATED ORAL EVERY 6 HOURS PRN
Qty: 10 TABLET | Refills: 0 | Status: SHIPPED | OUTPATIENT
Start: 2019-04-01 | End: 2019-07-28 | Stop reason: HOSPADM

## 2019-04-01 RX ORDER — DIPHENHYDRAMINE HYDROCHLORIDE 50 MG/ML
25 INJECTION INTRAMUSCULAR; INTRAVENOUS ONCE
Status: COMPLETED | OUTPATIENT
Start: 2019-04-01 | End: 2019-04-01

## 2019-04-01 RX ORDER — KETOROLAC TROMETHAMINE 30 MG/ML
60 INJECTION, SOLUTION INTRAMUSCULAR; INTRAVENOUS ONCE
Status: COMPLETED | OUTPATIENT
Start: 2019-04-01 | End: 2019-04-01

## 2019-04-01 RX ORDER — METOCLOPRAMIDE HYDROCHLORIDE 5 MG/ML
10 INJECTION INTRAMUSCULAR; INTRAVENOUS ONCE
Status: COMPLETED | OUTPATIENT
Start: 2019-04-01 | End: 2019-04-01

## 2019-04-01 RX ORDER — ONDANSETRON 2 MG/ML
4 INJECTION INTRAMUSCULAR; INTRAVENOUS ONCE
Status: COMPLETED | OUTPATIENT
Start: 2019-04-01 | End: 2019-04-01

## 2019-04-01 RX ORDER — HYDROMORPHONE HCL/PF 1 MG/ML
0.5 SYRINGE (ML) INJECTION ONCE
Status: COMPLETED | OUTPATIENT
Start: 2019-04-01 | End: 2019-04-01

## 2019-04-01 RX ORDER — ONDANSETRON 4 MG/1
4 TABLET, FILM COATED ORAL EVERY 8 HOURS PRN
Qty: 30 TABLET | Refills: 2 | Status: SHIPPED | OUTPATIENT
Start: 2019-04-01 | End: 2019-07-28 | Stop reason: HOSPADM

## 2019-04-01 RX ADMIN — SODIUM CHLORIDE 1000 ML: 0.9 INJECTION, SOLUTION INTRAVENOUS at 21:15

## 2019-04-01 RX ADMIN — METOCLOPRAMIDE 10 MG: 5 INJECTION, SOLUTION INTRAMUSCULAR; INTRAVENOUS at 21:12

## 2019-04-01 RX ADMIN — ONDANSETRON 4 MG: 2 INJECTION INTRAMUSCULAR; INTRAVENOUS at 21:40

## 2019-04-01 RX ADMIN — DIPHENHYDRAMINE HYDROCHLORIDE 25 MG: 50 INJECTION, SOLUTION INTRAMUSCULAR; INTRAVENOUS at 21:12

## 2019-04-01 RX ADMIN — KETOROLAC TROMETHAMINE 30 MG: 30 INJECTION, SOLUTION INTRAMUSCULAR; INTRAVENOUS at 21:12

## 2019-04-01 RX ADMIN — HYDROMORPHONE HYDROCHLORIDE 0.5 MG: 1 INJECTION, SOLUTION INTRAMUSCULAR; INTRAVENOUS; SUBCUTANEOUS at 22:47

## 2019-04-01 RX ADMIN — KETOROLAC TROMETHAMINE 60 MG: 30 INJECTION, SOLUTION INTRAMUSCULAR; INTRAVENOUS at 16:27

## 2019-04-02 ENCOUNTER — TELEPHONE (OUTPATIENT)
Dept: NEUROLOGY | Facility: CLINIC | Age: 49
End: 2019-04-02

## 2019-04-02 VITALS
RESPIRATION RATE: 16 BRPM | SYSTOLIC BLOOD PRESSURE: 123 MMHG | HEART RATE: 70 BPM | TEMPERATURE: 98.3 F | OXYGEN SATURATION: 95 % | DIASTOLIC BLOOD PRESSURE: 71 MMHG

## 2019-04-02 PROCEDURE — 96376 TX/PRO/DX INJ SAME DRUG ADON: CPT

## 2019-04-02 PROCEDURE — 96361 HYDRATE IV INFUSION ADD-ON: CPT

## 2019-04-02 RX ORDER — HYDROMORPHONE HCL/PF 1 MG/ML
0.5 SYRINGE (ML) INJECTION ONCE
Status: COMPLETED | OUTPATIENT
Start: 2019-04-02 | End: 2019-04-02

## 2019-04-02 RX ADMIN — HYDROMORPHONE HYDROCHLORIDE 0.5 MG: 1 INJECTION, SOLUTION INTRAMUSCULAR; INTRAVENOUS; SUBCUTANEOUS at 00:48

## 2019-04-09 ENCOUNTER — TELEPHONE (OUTPATIENT)
Dept: NEUROLOGY | Facility: CLINIC | Age: 49
End: 2019-04-09

## 2019-04-09 ENCOUNTER — HOSPITAL ENCOUNTER (EMERGENCY)
Facility: HOSPITAL | Age: 49
Discharge: HOME | End: 2019-04-09
Attending: EMERGENCY MEDICINE | Admitting: EMERGENCY MEDICINE
Payer: COMMERCIAL

## 2019-04-09 VITALS
DIASTOLIC BLOOD PRESSURE: 61 MMHG | HEART RATE: 73 BPM | BODY MASS INDEX: 28.35 KG/M2 | OXYGEN SATURATION: 97 % | RESPIRATION RATE: 18 BRPM | WEIGHT: 155 LBS | TEMPERATURE: 98.2 F | SYSTOLIC BLOOD PRESSURE: 104 MMHG

## 2019-04-09 DIAGNOSIS — G43.711 INTRACTABLE CHRONIC MIGRAINE WITHOUT AURA AND WITH STATUS MIGRAINOSUS: Primary | ICD-10-CM

## 2019-04-09 DIAGNOSIS — G43.909 MIGRAINE HEADACHE: Primary | ICD-10-CM

## 2019-04-09 PROCEDURE — 96365 THER/PROPH/DIAG IV INF INIT: CPT

## 2019-04-09 PROCEDURE — 96375 TX/PRO/DX INJ NEW DRUG ADDON: CPT

## 2019-04-09 PROCEDURE — 99283 EMERGENCY DEPT VISIT LOW MDM: CPT

## 2019-04-09 PROCEDURE — 96366 THER/PROPH/DIAG IV INF ADDON: CPT

## 2019-04-09 PROCEDURE — 99284 EMERGENCY DEPT VISIT MOD MDM: CPT | Performed by: EMERGENCY MEDICINE

## 2019-04-09 RX ORDER — METOCLOPRAMIDE HYDROCHLORIDE 5 MG/ML
10 INJECTION INTRAMUSCULAR; INTRAVENOUS ONCE
Status: COMPLETED | OUTPATIENT
Start: 2019-04-09 | End: 2019-04-09

## 2019-04-09 RX ORDER — MAGNESIUM SULFATE HEPTAHYDRATE 40 MG/ML
2 INJECTION, SOLUTION INTRAVENOUS ONCE
Status: COMPLETED | OUTPATIENT
Start: 2019-04-09 | End: 2019-04-09

## 2019-04-09 RX ORDER — KETOROLAC TROMETHAMINE 30 MG/ML
30 INJECTION, SOLUTION INTRAMUSCULAR; INTRAVENOUS ONCE
Status: COMPLETED | OUTPATIENT
Start: 2019-04-09 | End: 2019-04-09

## 2019-04-09 RX ORDER — HYDROMORPHONE HCL/PF 1 MG/ML
0.5 SYRINGE (ML) INJECTION ONCE
Status: COMPLETED | OUTPATIENT
Start: 2019-04-09 | End: 2019-04-09

## 2019-04-09 RX ORDER — DEXAMETHASONE 2 MG/1
2 TABLET ORAL 2 TIMES DAILY WITH MEALS
Qty: 10 TABLET | Refills: 0 | Status: SHIPPED | OUTPATIENT
Start: 2019-04-09 | End: 2019-04-15 | Stop reason: HOSPADM

## 2019-04-09 RX ORDER — DIPHENHYDRAMINE HYDROCHLORIDE 50 MG/ML
25 INJECTION INTRAMUSCULAR; INTRAVENOUS ONCE
Status: COMPLETED | OUTPATIENT
Start: 2019-04-09 | End: 2019-04-09

## 2019-04-09 RX ADMIN — DIPHENHYDRAMINE HYDROCHLORIDE 25 MG: 50 INJECTION INTRAMUSCULAR; INTRAVENOUS at 20:38

## 2019-04-09 RX ADMIN — KETOROLAC TROMETHAMINE 30 MG: 30 INJECTION, SOLUTION INTRAMUSCULAR at 20:39

## 2019-04-09 RX ADMIN — METOCLOPRAMIDE 10 MG: 5 INJECTION, SOLUTION INTRAMUSCULAR; INTRAVENOUS at 20:40

## 2019-04-09 RX ADMIN — HYDROMORPHONE HYDROCHLORIDE 0.5 MG: 1 INJECTION, SOLUTION INTRAMUSCULAR; INTRAVENOUS; SUBCUTANEOUS at 22:22

## 2019-04-09 RX ADMIN — MAGNESIUM SULFATE HEPTAHYDRATE 2 G: 40 INJECTION, SOLUTION INTRAVENOUS at 20:40

## 2019-04-09 RX ADMIN — SODIUM CHLORIDE 1000 ML: 0.9 INJECTION, SOLUTION INTRAVENOUS at 20:36

## 2019-04-10 RX ORDER — OLANZAPINE 2.5 MG/1
TABLET ORAL
Qty: 5 TABLET | Refills: 0 | Status: SHIPPED | OUTPATIENT
Start: 2019-04-10 | End: 2019-10-04 | Stop reason: HOSPADM

## 2019-04-12 ENCOUNTER — HOSPITAL ENCOUNTER (INPATIENT)
Facility: HOSPITAL | Age: 49
LOS: 3 days | Discharge: HOME | DRG: 103 | End: 2019-04-15
Attending: INTERNAL MEDICINE | Admitting: INTERNAL MEDICINE
Payer: COMMERCIAL

## 2019-04-12 DIAGNOSIS — F41.0 PANIC DISORDER WITHOUT AGORAPHOBIA: Chronic | ICD-10-CM

## 2019-04-12 DIAGNOSIS — G43.919 REFRACTORY MIGRAINE: ICD-10-CM

## 2019-04-12 DIAGNOSIS — F33.2 MAJOR DEPRESSIVE DISORDER, RECURRENT SEVERE WITHOUT PSYCHOTIC FEATURES (HCC): Chronic | ICD-10-CM

## 2019-04-12 DIAGNOSIS — Z79.899 POLYPHARMACY: ICD-10-CM

## 2019-04-12 DIAGNOSIS — G43.711 INTRACTABLE CHRONIC MIGRAINE WITHOUT AURA AND WITH STATUS MIGRAINOSUS: Primary | ICD-10-CM

## 2019-04-12 DIAGNOSIS — F41.1 GAD (GENERALIZED ANXIETY DISORDER): Chronic | ICD-10-CM

## 2019-04-12 PROBLEM — G43.719 HEADACHE, CHRONIC MIGRAINE WITHOUT AURA, INTRACTABLE: Status: ACTIVE | Noted: 2019-04-12

## 2019-04-12 PROCEDURE — 99223 1ST HOSP IP/OBS HIGH 75: CPT | Performed by: INTERNAL MEDICINE

## 2019-04-12 RX ORDER — KETOROLAC TROMETHAMINE 30 MG/ML
30 INJECTION, SOLUTION INTRAMUSCULAR; INTRAVENOUS ONCE
Status: DISCONTINUED | OUTPATIENT
Start: 2019-04-12 | End: 2019-04-12

## 2019-04-12 RX ORDER — FLUTICASONE FUROATE AND VILANTEROL 200; 25 UG/1; UG/1
1 POWDER RESPIRATORY (INHALATION) DAILY
Status: DISCONTINUED | OUTPATIENT
Start: 2019-04-13 | End: 2019-04-15 | Stop reason: HOSPADM

## 2019-04-12 RX ORDER — SERTRALINE HYDROCHLORIDE 100 MG/1
200 TABLET, FILM COATED ORAL
Status: DISCONTINUED | OUTPATIENT
Start: 2019-04-12 | End: 2019-04-15 | Stop reason: HOSPADM

## 2019-04-12 RX ORDER — METOCLOPRAMIDE HYDROCHLORIDE 5 MG/ML
10 INJECTION INTRAMUSCULAR; INTRAVENOUS EVERY 8 HOURS SCHEDULED
Status: DISCONTINUED | OUTPATIENT
Start: 2019-04-12 | End: 2019-04-15 | Stop reason: HOSPADM

## 2019-04-12 RX ORDER — PANTOPRAZOLE SODIUM 40 MG/1
40 TABLET, DELAYED RELEASE ORAL
Status: DISCONTINUED | OUTPATIENT
Start: 2019-04-13 | End: 2019-04-15 | Stop reason: HOSPADM

## 2019-04-12 RX ORDER — LORATADINE 10 MG/1
10 TABLET ORAL DAILY
Status: DISCONTINUED | OUTPATIENT
Start: 2019-04-13 | End: 2019-04-15 | Stop reason: HOSPADM

## 2019-04-12 RX ORDER — ALBUTEROL SULFATE 90 UG/1
2 AEROSOL, METERED RESPIRATORY (INHALATION)
Status: DISCONTINUED | OUTPATIENT
Start: 2019-04-13 | End: 2019-04-15 | Stop reason: HOSPADM

## 2019-04-12 RX ORDER — BUPROPION HYDROCHLORIDE 150 MG/1
150 TABLET ORAL DAILY
Status: CANCELLED | OUTPATIENT
Start: 2019-04-13

## 2019-04-12 RX ORDER — FLUTICASONE PROPIONATE 50 MCG
1 SPRAY, SUSPENSION (ML) NASAL 2 TIMES DAILY
Status: DISCONTINUED | OUTPATIENT
Start: 2019-04-12 | End: 2019-04-15 | Stop reason: HOSPADM

## 2019-04-12 RX ORDER — GABAPENTIN 100 MG/1
200 CAPSULE ORAL 3 TIMES DAILY
Status: DISCONTINUED | OUTPATIENT
Start: 2019-04-12 | End: 2019-04-15 | Stop reason: HOSPADM

## 2019-04-12 RX ORDER — ARIPIPRAZOLE 10 MG/1
30 TABLET ORAL
Status: DISCONTINUED | OUTPATIENT
Start: 2019-04-12 | End: 2019-04-15 | Stop reason: HOSPADM

## 2019-04-12 RX ORDER — VENLAFAXINE HYDROCHLORIDE 150 MG/1
300 CAPSULE, EXTENDED RELEASE ORAL DAILY
Status: CANCELLED | OUTPATIENT
Start: 2019-04-13

## 2019-04-12 RX ORDER — MAGNESIUM SULFATE HEPTAHYDRATE 40 MG/ML
2 INJECTION, SOLUTION INTRAVENOUS
Status: COMPLETED | OUTPATIENT
Start: 2019-04-12 | End: 2019-04-14

## 2019-04-12 RX ORDER — ONDANSETRON 2 MG/ML
4 INJECTION INTRAMUSCULAR; INTRAVENOUS EVERY 6 HOURS PRN
Status: DISCONTINUED | OUTPATIENT
Start: 2019-04-12 | End: 2019-04-15 | Stop reason: HOSPADM

## 2019-04-12 RX ORDER — DIVALPROEX SODIUM 500 MG/1
500 TABLET, DELAYED RELEASE ORAL 2 TIMES DAILY
Status: CANCELLED | OUTPATIENT
Start: 2019-04-12

## 2019-04-12 RX ORDER — MONTELUKAST SODIUM 10 MG/1
10 TABLET ORAL
Status: CANCELLED | OUTPATIENT
Start: 2019-04-12

## 2019-04-12 RX ORDER — DIPHENHYDRAMINE HYDROCHLORIDE 50 MG/ML
25 INJECTION INTRAMUSCULAR; INTRAVENOUS EVERY 8 HOURS PRN
Status: DISCONTINUED | OUTPATIENT
Start: 2019-04-12 | End: 2019-04-15 | Stop reason: HOSPADM

## 2019-04-12 RX ORDER — VENLAFAXINE HYDROCHLORIDE 150 MG/1
300 CAPSULE, EXTENDED RELEASE ORAL DAILY
Status: DISCONTINUED | OUTPATIENT
Start: 2019-04-13 | End: 2019-04-15 | Stop reason: HOSPADM

## 2019-04-12 RX ORDER — MONTELUKAST SODIUM 10 MG/1
10 TABLET ORAL
Status: DISCONTINUED | OUTPATIENT
Start: 2019-04-12 | End: 2019-04-15 | Stop reason: HOSPADM

## 2019-04-12 RX ORDER — BACLOFEN 10 MG/1
10 TABLET ORAL 3 TIMES DAILY
Status: DISCONTINUED | OUTPATIENT
Start: 2019-04-12 | End: 2019-04-15 | Stop reason: HOSPADM

## 2019-04-12 RX ORDER — ARIPIPRAZOLE 10 MG/1
30 TABLET ORAL
Status: CANCELLED | OUTPATIENT
Start: 2019-04-12

## 2019-04-12 RX ORDER — PANTOPRAZOLE SODIUM 40 MG/1
40 TABLET, DELAYED RELEASE ORAL
Status: CANCELLED | OUTPATIENT
Start: 2019-04-13

## 2019-04-12 RX ORDER — CLONAZEPAM 0.5 MG/1
0.5 TABLET ORAL 3 TIMES DAILY PRN
Status: DISCONTINUED | OUTPATIENT
Start: 2019-04-12 | End: 2019-04-15 | Stop reason: HOSPADM

## 2019-04-12 RX ORDER — ZOLPIDEM TARTRATE 5 MG/1
10 TABLET ORAL
Status: DISCONTINUED | OUTPATIENT
Start: 2019-04-12 | End: 2019-04-15 | Stop reason: HOSPADM

## 2019-04-12 RX ORDER — KETOROLAC TROMETHAMINE 30 MG/ML
30 INJECTION, SOLUTION INTRAMUSCULAR; INTRAVENOUS EVERY 12 HOURS
Status: DISCONTINUED | OUTPATIENT
Start: 2019-04-12 | End: 2019-04-12

## 2019-04-12 RX ORDER — DIHYDROERGOTAMINE MESYLATE 1 MG/ML
1 INJECTION, SOLUTION INTRAMUSCULAR; INTRAVENOUS; SUBCUTANEOUS EVERY 8 HOURS SCHEDULED
Status: COMPLETED | OUTPATIENT
Start: 2019-04-12 | End: 2019-04-14

## 2019-04-12 RX ORDER — DIVALPROEX SODIUM 500 MG/1
500 TABLET, DELAYED RELEASE ORAL 2 TIMES DAILY
Status: DISCONTINUED | OUTPATIENT
Start: 2019-04-12 | End: 2019-04-12

## 2019-04-12 RX ORDER — ALBUTEROL SULFATE 2.5 MG/3ML
2.5 SOLUTION RESPIRATORY (INHALATION) EVERY 4 HOURS PRN
Status: DISCONTINUED | OUTPATIENT
Start: 2019-04-12 | End: 2019-04-12

## 2019-04-12 RX ORDER — ALBUTEROL SULFATE 90 UG/1
2 AEROSOL, METERED RESPIRATORY (INHALATION) EVERY 4 HOURS PRN
Status: DISCONTINUED | OUTPATIENT
Start: 2019-04-12 | End: 2019-04-15 | Stop reason: HOSPADM

## 2019-04-12 RX ORDER — SODIUM CHLORIDE 9 MG/ML
50 INJECTION, SOLUTION INTRAVENOUS CONTINUOUS
Status: DISCONTINUED | OUTPATIENT
Start: 2019-04-12 | End: 2019-04-15

## 2019-04-12 RX ORDER — LORATADINE 10 MG/1
10 TABLET ORAL DAILY
Status: CANCELLED | OUTPATIENT
Start: 2019-04-13

## 2019-04-12 RX ORDER — SERTRALINE HYDROCHLORIDE 100 MG/1
200 TABLET, FILM COATED ORAL
Status: CANCELLED | OUTPATIENT
Start: 2019-04-12

## 2019-04-12 RX ORDER — BUPROPION HYDROCHLORIDE 150 MG/1
300 TABLET ORAL DAILY
Status: DISCONTINUED | OUTPATIENT
Start: 2019-04-13 | End: 2019-04-15 | Stop reason: HOSPADM

## 2019-04-12 RX ORDER — BUPROPION HYDROCHLORIDE 150 MG/1
150 TABLET ORAL DAILY
Status: DISCONTINUED | OUTPATIENT
Start: 2019-04-13 | End: 2019-04-13

## 2019-04-12 RX ORDER — ALBUTEROL SULFATE 90 UG/1
1 AEROSOL, METERED RESPIRATORY (INHALATION) EVERY 4 HOURS PRN
Status: DISCONTINUED | OUTPATIENT
Start: 2019-04-12 | End: 2019-04-12

## 2019-04-12 RX ADMIN — BACLOFEN 10 MG: 10 TABLET ORAL at 20:51

## 2019-04-12 RX ADMIN — MAGNESIUM SULFATE HEPTAHYDRATE 2 G: 40 INJECTION, SOLUTION INTRAVENOUS at 20:41

## 2019-04-12 RX ADMIN — VALPROATE SODIUM 1000 MG: 100 INJECTION, SOLUTION INTRAVENOUS at 22:38

## 2019-04-12 RX ADMIN — ARIPIPRAZOLE 30 MG: 10 TABLET ORAL at 21:31

## 2019-04-12 RX ADMIN — FLUTICASONE PROPIONATE 1 SPRAY: 50 SPRAY, METERED NASAL at 21:31

## 2019-04-12 RX ADMIN — MONTELUKAST SODIUM 10 MG: 10 TABLET, FILM COATED ORAL at 21:30

## 2019-04-12 RX ADMIN — DIHYDROERGOTAMINE MESYLATE 1 MG: 1 INJECTION INTRAMUSCULAR; INTRAVENOUS; SUBCUTANEOUS at 22:00

## 2019-04-12 RX ADMIN — CLONAZEPAM 0.5 MG: 0.5 TABLET ORAL at 18:58

## 2019-04-12 RX ADMIN — GABAPENTIN 200 MG: 100 CAPSULE ORAL at 20:51

## 2019-04-12 RX ADMIN — METOCLOPRAMIDE 10 MG: 5 INJECTION, SOLUTION INTRAMUSCULAR; INTRAVENOUS at 21:28

## 2019-04-12 RX ADMIN — ONDANSETRON 4 MG: 2 INJECTION INTRAMUSCULAR; INTRAVENOUS at 20:35

## 2019-04-12 RX ADMIN — SERTRALINE HYDROCHLORIDE 200 MG: 100 TABLET ORAL at 21:32

## 2019-04-13 LAB
ALBUMIN SERPL BCP-MCNC: 3.6 G/DL (ref 3.5–5)
ALP SERPL-CCNC: 77 U/L (ref 46–116)
ALT SERPL W P-5'-P-CCNC: 16 U/L (ref 12–78)
ANION GAP SERPL CALCULATED.3IONS-SCNC: 4 MMOL/L (ref 4–13)
AST SERPL W P-5'-P-CCNC: 9 U/L (ref 5–45)
BASOPHILS # BLD AUTO: 0.02 THOUSANDS/ΜL (ref 0–0.1)
BASOPHILS NFR BLD AUTO: 0 % (ref 0–1)
BILIRUB SERPL-MCNC: 0.43 MG/DL (ref 0.2–1)
BUN SERPL-MCNC: 15 MG/DL (ref 5–25)
CALCIUM SERPL-MCNC: 8.5 MG/DL (ref 8.3–10.1)
CHLORIDE SERPL-SCNC: 110 MMOL/L (ref 100–108)
CO2 SERPL-SCNC: 26 MMOL/L (ref 21–32)
CREAT SERPL-MCNC: 0.99 MG/DL (ref 0.6–1.3)
EOSINOPHIL # BLD AUTO: 0.06 THOUSAND/ΜL (ref 0–0.61)
EOSINOPHIL NFR BLD AUTO: 1 % (ref 0–6)
ERYTHROCYTE [DISTWIDTH] IN BLOOD BY AUTOMATED COUNT: 12.5 % (ref 11.6–15.1)
GFR SERPL CREATININE-BSD FRML MDRD: 68 ML/MIN/1.73SQ M
GLUCOSE SERPL-MCNC: 78 MG/DL (ref 65–140)
HCT VFR BLD AUTO: 37.8 % (ref 34.8–46.1)
HGB BLD-MCNC: 11.9 G/DL (ref 11.5–15.4)
IMM GRANULOCYTES # BLD AUTO: 0.02 THOUSAND/UL (ref 0–0.2)
IMM GRANULOCYTES NFR BLD AUTO: 0 % (ref 0–2)
LYMPHOCYTES # BLD AUTO: 1.88 THOUSANDS/ΜL (ref 0.6–4.47)
LYMPHOCYTES NFR BLD AUTO: 31 % (ref 14–44)
MAGNESIUM SERPL-MCNC: 2.6 MG/DL (ref 1.6–2.6)
MCH RBC QN AUTO: 29.2 PG (ref 26.8–34.3)
MCHC RBC AUTO-ENTMCNC: 31.5 G/DL (ref 31.4–37.4)
MCV RBC AUTO: 93 FL (ref 82–98)
MONOCYTES # BLD AUTO: 0.68 THOUSAND/ΜL (ref 0.17–1.22)
MONOCYTES NFR BLD AUTO: 11 % (ref 4–12)
NEUTROPHILS # BLD AUTO: 3.37 THOUSANDS/ΜL (ref 1.85–7.62)
NEUTS SEG NFR BLD AUTO: 57 % (ref 43–75)
NRBC BLD AUTO-RTO: 0 /100 WBCS
PHOSPHATE SERPL-MCNC: 3.4 MG/DL (ref 2.7–4.5)
PLATELET # BLD AUTO: 185 THOUSANDS/UL (ref 149–390)
PMV BLD AUTO: 9.9 FL (ref 8.9–12.7)
POTASSIUM SERPL-SCNC: 4.4 MMOL/L (ref 3.5–5.3)
PROT SERPL-MCNC: 6.7 G/DL (ref 6.4–8.2)
RBC # BLD AUTO: 4.08 MILLION/UL (ref 3.81–5.12)
SODIUM SERPL-SCNC: 140 MMOL/L (ref 136–145)
TSH SERPL DL<=0.05 MIU/L-ACNC: 3.96 UIU/ML (ref 0.36–3.74)
WBC # BLD AUTO: 6.03 THOUSAND/UL (ref 4.31–10.16)

## 2019-04-13 PROCEDURE — 84443 ASSAY THYROID STIM HORMONE: CPT | Performed by: INTERNAL MEDICINE

## 2019-04-13 PROCEDURE — 84100 ASSAY OF PHOSPHORUS: CPT | Performed by: INTERNAL MEDICINE

## 2019-04-13 PROCEDURE — 99232 SBSQ HOSP IP/OBS MODERATE 35: CPT | Performed by: INTERNAL MEDICINE

## 2019-04-13 PROCEDURE — 99223 1ST HOSP IP/OBS HIGH 75: CPT | Performed by: PSYCHIATRY & NEUROLOGY

## 2019-04-13 PROCEDURE — 85025 COMPLETE CBC W/AUTO DIFF WBC: CPT | Performed by: INTERNAL MEDICINE

## 2019-04-13 PROCEDURE — 80053 COMPREHEN METABOLIC PANEL: CPT | Performed by: INTERNAL MEDICINE

## 2019-04-13 PROCEDURE — 99253 IP/OBS CNSLTJ NEW/EST LOW 45: CPT | Performed by: PSYCHIATRY & NEUROLOGY

## 2019-04-13 PROCEDURE — 83735 ASSAY OF MAGNESIUM: CPT | Performed by: INTERNAL MEDICINE

## 2019-04-13 RX ORDER — OLANZAPINE 10 MG/1
10 TABLET ORAL
Status: DISCONTINUED | OUTPATIENT
Start: 2019-04-13 | End: 2019-04-15 | Stop reason: HOSPADM

## 2019-04-13 RX ADMIN — BUPROPION HYDROCHLORIDE 300 MG: 150 TABLET, FILM COATED, EXTENDED RELEASE ORAL at 09:49

## 2019-04-13 RX ADMIN — DIHYDROERGOTAMINE MESYLATE 1 MG: 1 INJECTION INTRAMUSCULAR; INTRAVENOUS; SUBCUTANEOUS at 22:06

## 2019-04-13 RX ADMIN — FLUTICASONE FUROATE AND VILANTEROL TRIFENATATE 1 PUFF: 200; 25 POWDER RESPIRATORY (INHALATION) at 09:47

## 2019-04-13 RX ADMIN — VENLAFAXINE HYDROCHLORIDE 300 MG: 150 CAPSULE, EXTENDED RELEASE ORAL at 09:48

## 2019-04-13 RX ADMIN — GABAPENTIN 200 MG: 100 CAPSULE ORAL at 09:47

## 2019-04-13 RX ADMIN — LORATADINE 10 MG: 10 TABLET ORAL at 09:49

## 2019-04-13 RX ADMIN — PANTOPRAZOLE SODIUM 40 MG: 40 TABLET, DELAYED RELEASE ORAL at 06:14

## 2019-04-13 RX ADMIN — SERTRALINE HYDROCHLORIDE 200 MG: 100 TABLET ORAL at 21:45

## 2019-04-13 RX ADMIN — MONTELUKAST SODIUM 10 MG: 10 TABLET, FILM COATED ORAL at 21:44

## 2019-04-13 RX ADMIN — ONDANSETRON 4 MG: 2 INJECTION INTRAMUSCULAR; INTRAVENOUS at 18:07

## 2019-04-13 RX ADMIN — ONDANSETRON 4 MG: 2 INJECTION INTRAMUSCULAR; INTRAVENOUS at 11:49

## 2019-04-13 RX ADMIN — SODIUM CHLORIDE 100 ML/HR: 0.9 INJECTION, SOLUTION INTRAVENOUS at 07:37

## 2019-04-13 RX ADMIN — METOCLOPRAMIDE 10 MG: 5 INJECTION, SOLUTION INTRAMUSCULAR; INTRAVENOUS at 21:39

## 2019-04-13 RX ADMIN — SODIUM CHLORIDE 500 MG: 0.9 INJECTION, SOLUTION INTRAVENOUS at 09:47

## 2019-04-13 RX ADMIN — METOCLOPRAMIDE 10 MG: 5 INJECTION, SOLUTION INTRAMUSCULAR; INTRAVENOUS at 13:24

## 2019-04-13 RX ADMIN — ALBUTEROL SULFATE 2 PUFF: 90 AEROSOL, METERED RESPIRATORY (INHALATION) at 09:47

## 2019-04-13 RX ADMIN — FLUTICASONE PROPIONATE 1 SPRAY: 50 SPRAY, METERED NASAL at 09:47

## 2019-04-13 RX ADMIN — GABAPENTIN 200 MG: 100 CAPSULE ORAL at 21:43

## 2019-04-13 RX ADMIN — BACLOFEN 10 MG: 10 TABLET ORAL at 16:23

## 2019-04-13 RX ADMIN — VALPROATE SODIUM 1000 MG: 100 INJECTION, SOLUTION INTRAVENOUS at 06:17

## 2019-04-13 RX ADMIN — BACLOFEN 10 MG: 10 TABLET ORAL at 09:48

## 2019-04-13 RX ADMIN — BUPROPION HYDROCHLORIDE 150 MG: 150 TABLET, FILM COATED, EXTENDED RELEASE ORAL at 09:50

## 2019-04-13 RX ADMIN — TIOTROPIUM BROMIDE 18 MCG: 18 CAPSULE ORAL; RESPIRATORY (INHALATION) at 09:47

## 2019-04-13 RX ADMIN — GABAPENTIN 200 MG: 100 CAPSULE ORAL at 16:23

## 2019-04-13 RX ADMIN — ZOLPIDEM TARTRATE 10 MG: 5 TABLET, FILM COATED ORAL at 01:45

## 2019-04-13 RX ADMIN — VALPROATE SODIUM 1000 MG: 100 INJECTION, SOLUTION INTRAVENOUS at 13:24

## 2019-04-13 RX ADMIN — MAGNESIUM SULFATE HEPTAHYDRATE 2 G: 40 INJECTION, SOLUTION INTRAVENOUS at 11:49

## 2019-04-13 RX ADMIN — BACLOFEN 10 MG: 10 TABLET ORAL at 21:46

## 2019-04-13 RX ADMIN — DIHYDROERGOTAMINE MESYLATE 1 MG: 1 INJECTION INTRAMUSCULAR; INTRAVENOUS; SUBCUTANEOUS at 13:24

## 2019-04-13 RX ADMIN — VALPROATE SODIUM 1000 MG: 100 INJECTION, SOLUTION INTRAVENOUS at 21:50

## 2019-04-13 RX ADMIN — ARIPIPRAZOLE 30 MG: 10 TABLET ORAL at 21:38

## 2019-04-13 RX ADMIN — DIHYDROERGOTAMINE MESYLATE 1 MG: 1 INJECTION INTRAMUSCULAR; INTRAVENOUS; SUBCUTANEOUS at 06:17

## 2019-04-13 RX ADMIN — FLUTICASONE PROPIONATE 1 SPRAY: 50 SPRAY, METERED NASAL at 18:07

## 2019-04-13 RX ADMIN — ONDANSETRON 4 MG: 2 INJECTION INTRAMUSCULAR; INTRAVENOUS at 03:35

## 2019-04-13 RX ADMIN — METOCLOPRAMIDE 10 MG: 5 INJECTION, SOLUTION INTRAMUSCULAR; INTRAVENOUS at 06:14

## 2019-04-14 PROBLEM — G43.901 STATUS MIGRAINOSUS: Status: ACTIVE | Noted: 2019-04-14

## 2019-04-14 PROBLEM — F32.A ANXIETY AND DEPRESSION: Status: ACTIVE | Noted: 2019-04-14

## 2019-04-14 PROBLEM — F41.9 ANXIETY AND DEPRESSION: Status: ACTIVE | Noted: 2019-04-14

## 2019-04-14 PROCEDURE — 99232 SBSQ HOSP IP/OBS MODERATE 35: CPT | Performed by: INTERNAL MEDICINE

## 2019-04-14 PROCEDURE — 99232 SBSQ HOSP IP/OBS MODERATE 35: CPT | Performed by: PSYCHIATRY & NEUROLOGY

## 2019-04-14 RX ADMIN — TIOTROPIUM BROMIDE 18 MCG: 18 CAPSULE ORAL; RESPIRATORY (INHALATION) at 08:59

## 2019-04-14 RX ADMIN — BACLOFEN 10 MG: 10 TABLET ORAL at 20:59

## 2019-04-14 RX ADMIN — OLANZAPINE 10 MG: 10 TABLET, FILM COATED ORAL at 01:44

## 2019-04-14 RX ADMIN — ZOLPIDEM TARTRATE 10 MG: 5 TABLET, FILM COATED ORAL at 01:44

## 2019-04-14 RX ADMIN — BACLOFEN 10 MG: 10 TABLET ORAL at 09:01

## 2019-04-14 RX ADMIN — VENLAFAXINE HYDROCHLORIDE 300 MG: 150 CAPSULE, EXTENDED RELEASE ORAL at 08:59

## 2019-04-14 RX ADMIN — GABAPENTIN 200 MG: 100 CAPSULE ORAL at 20:59

## 2019-04-14 RX ADMIN — METOCLOPRAMIDE 10 MG: 5 INJECTION, SOLUTION INTRAMUSCULAR; INTRAVENOUS at 06:34

## 2019-04-14 RX ADMIN — MONTELUKAST SODIUM 10 MG: 10 TABLET, FILM COATED ORAL at 21:00

## 2019-04-14 RX ADMIN — FLUTICASONE PROPIONATE 1 SPRAY: 50 SPRAY, METERED NASAL at 17:14

## 2019-04-14 RX ADMIN — ONDANSETRON 4 MG: 2 INJECTION INTRAMUSCULAR; INTRAVENOUS at 01:45

## 2019-04-14 RX ADMIN — MAGNESIUM SULFATE HEPTAHYDRATE 2 G: 40 INJECTION, SOLUTION INTRAVENOUS at 13:33

## 2019-04-14 RX ADMIN — SODIUM CHLORIDE 500 MG: 0.9 INJECTION, SOLUTION INTRAVENOUS at 08:59

## 2019-04-14 RX ADMIN — LORATADINE 10 MG: 10 TABLET ORAL at 09:01

## 2019-04-14 RX ADMIN — BUPROPION HYDROCHLORIDE 300 MG: 150 TABLET, FILM COATED, EXTENDED RELEASE ORAL at 09:01

## 2019-04-14 RX ADMIN — ALBUTEROL SULFATE 2 PUFF: 90 AEROSOL, METERED RESPIRATORY (INHALATION) at 09:02

## 2019-04-14 RX ADMIN — DIHYDROERGOTAMINE MESYLATE 1 MG: 1 INJECTION INTRAMUSCULAR; INTRAVENOUS; SUBCUTANEOUS at 06:42

## 2019-04-14 RX ADMIN — ARIPIPRAZOLE 30 MG: 10 TABLET ORAL at 21:00

## 2019-04-14 RX ADMIN — METOCLOPRAMIDE 10 MG: 5 INJECTION, SOLUTION INTRAMUSCULAR; INTRAVENOUS at 21:01

## 2019-04-14 RX ADMIN — GABAPENTIN 200 MG: 100 CAPSULE ORAL at 17:13

## 2019-04-14 RX ADMIN — BACLOFEN 10 MG: 10 TABLET ORAL at 17:13

## 2019-04-14 RX ADMIN — METOCLOPRAMIDE 10 MG: 5 INJECTION, SOLUTION INTRAMUSCULAR; INTRAVENOUS at 13:33

## 2019-04-14 RX ADMIN — FLUTICASONE PROPIONATE 1 SPRAY: 50 SPRAY, METERED NASAL at 08:58

## 2019-04-14 RX ADMIN — DIHYDROERGOTAMINE MESYLATE 1 MG: 1 INJECTION INTRAMUSCULAR; INTRAVENOUS; SUBCUTANEOUS at 13:33

## 2019-04-14 RX ADMIN — FLUTICASONE FUROATE AND VILANTEROL TRIFENATATE 1 PUFF: 200; 25 POWDER RESPIRATORY (INHALATION) at 08:58

## 2019-04-14 RX ADMIN — VALPROATE SODIUM 1000 MG: 100 INJECTION, SOLUTION INTRAVENOUS at 06:39

## 2019-04-14 RX ADMIN — PANTOPRAZOLE SODIUM 40 MG: 40 TABLET, DELAYED RELEASE ORAL at 06:34

## 2019-04-14 RX ADMIN — VALPROATE SODIUM 1000 MG: 100 INJECTION, SOLUTION INTRAVENOUS at 13:37

## 2019-04-14 RX ADMIN — SODIUM CHLORIDE 100 ML/HR: 0.9 INJECTION, SOLUTION INTRAVENOUS at 08:58

## 2019-04-14 RX ADMIN — GABAPENTIN 200 MG: 100 CAPSULE ORAL at 08:59

## 2019-04-14 RX ADMIN — SERTRALINE HYDROCHLORIDE 200 MG: 100 TABLET ORAL at 21:00

## 2019-04-15 VITALS
TEMPERATURE: 98.8 F | RESPIRATION RATE: 20 BRPM | HEIGHT: 62 IN | HEART RATE: 91 BPM | WEIGHT: 149.8 LBS | BODY MASS INDEX: 27.57 KG/M2 | OXYGEN SATURATION: 98 % | SYSTOLIC BLOOD PRESSURE: 109 MMHG | DIASTOLIC BLOOD PRESSURE: 57 MMHG

## 2019-04-15 LAB
ANION GAP SERPL CALCULATED.3IONS-SCNC: 7 MMOL/L (ref 4–13)
BUN SERPL-MCNC: 26 MG/DL (ref 5–25)
CALCIUM SERPL-MCNC: 8.2 MG/DL (ref 8.3–10.1)
CHLORIDE SERPL-SCNC: 115 MMOL/L (ref 100–108)
CO2 SERPL-SCNC: 25 MMOL/L (ref 21–32)
CREAT SERPL-MCNC: 0.73 MG/DL (ref 0.6–1.3)
GFR SERPL CREATININE-BSD FRML MDRD: 98 ML/MIN/1.73SQ M
GLUCOSE SERPL-MCNC: 89 MG/DL (ref 65–140)
MAGNESIUM SERPL-MCNC: 2.2 MG/DL (ref 1.6–2.6)
POTASSIUM SERPL-SCNC: 4.2 MMOL/L (ref 3.5–5.3)
SODIUM SERPL-SCNC: 147 MMOL/L (ref 136–145)

## 2019-04-15 PROCEDURE — 99233 SBSQ HOSP IP/OBS HIGH 50: CPT | Performed by: PSYCHIATRY & NEUROLOGY

## 2019-04-15 PROCEDURE — 80048 BASIC METABOLIC PNL TOTAL CA: CPT | Performed by: INTERNAL MEDICINE

## 2019-04-15 PROCEDURE — 99239 HOSP IP/OBS DSCHRG MGMT >30: CPT | Performed by: INTERNAL MEDICINE

## 2019-04-15 PROCEDURE — 83735 ASSAY OF MAGNESIUM: CPT | Performed by: INTERNAL MEDICINE

## 2019-04-15 RX ORDER — BACLOFEN 10 MG/1
10 TABLET ORAL
Qty: 30 TABLET | Refills: 0 | Status: SHIPPED | OUTPATIENT
Start: 2019-04-15 | End: 2020-03-03

## 2019-04-15 RX ADMIN — LORATADINE 10 MG: 10 TABLET ORAL at 09:02

## 2019-04-15 RX ADMIN — BACLOFEN 10 MG: 10 TABLET ORAL at 09:02

## 2019-04-15 RX ADMIN — METOCLOPRAMIDE 10 MG: 5 INJECTION, SOLUTION INTRAMUSCULAR; INTRAVENOUS at 05:47

## 2019-04-15 RX ADMIN — TIOTROPIUM BROMIDE 18 MCG: 18 CAPSULE ORAL; RESPIRATORY (INHALATION) at 09:06

## 2019-04-15 RX ADMIN — SODIUM CHLORIDE 500 MG: 0.9 INJECTION, SOLUTION INTRAVENOUS at 09:02

## 2019-04-15 RX ADMIN — BUPROPION HYDROCHLORIDE 300 MG: 150 TABLET, FILM COATED, EXTENDED RELEASE ORAL at 09:02

## 2019-04-15 RX ADMIN — BACLOFEN 10 MG: 10 TABLET ORAL at 15:52

## 2019-04-15 RX ADMIN — SODIUM CHLORIDE 50 ML/HR: 0.9 INJECTION, SOLUTION INTRAVENOUS at 09:03

## 2019-04-15 RX ADMIN — PANTOPRAZOLE SODIUM 40 MG: 40 TABLET, DELAYED RELEASE ORAL at 05:47

## 2019-04-15 RX ADMIN — ONDANSETRON 4 MG: 2 INJECTION INTRAMUSCULAR; INTRAVENOUS at 15:52

## 2019-04-15 RX ADMIN — GABAPENTIN 200 MG: 100 CAPSULE ORAL at 09:02

## 2019-04-15 RX ADMIN — VENLAFAXINE HYDROCHLORIDE 300 MG: 150 CAPSULE, EXTENDED RELEASE ORAL at 09:02

## 2019-04-15 RX ADMIN — GABAPENTIN 200 MG: 100 CAPSULE ORAL at 15:52

## 2019-04-15 RX ADMIN — ALBUTEROL SULFATE 2 PUFF: 90 AEROSOL, METERED RESPIRATORY (INHALATION) at 09:06

## 2019-04-15 RX ADMIN — FLUTICASONE FUROATE AND VILANTEROL TRIFENATATE 1 PUFF: 200; 25 POWDER RESPIRATORY (INHALATION) at 09:06

## 2019-04-15 RX ADMIN — FLUTICASONE PROPIONATE 1 SPRAY: 50 SPRAY, METERED NASAL at 09:05

## 2019-04-16 ENCOUNTER — TELEPHONE (OUTPATIENT)
Dept: NEUROLOGY | Facility: CLINIC | Age: 49
End: 2019-04-16

## 2019-04-16 ENCOUNTER — TRANSITIONAL CARE MANAGEMENT (OUTPATIENT)
Dept: INTERNAL MEDICINE CLINIC | Facility: CLINIC | Age: 49
End: 2019-04-16

## 2019-04-19 ENCOUNTER — OFFICE VISIT (OUTPATIENT)
Dept: INTERNAL MEDICINE CLINIC | Facility: CLINIC | Age: 49
End: 2019-04-19
Payer: COMMERCIAL

## 2019-04-19 VITALS
BODY MASS INDEX: 28.71 KG/M2 | HEART RATE: 85 BPM | DIASTOLIC BLOOD PRESSURE: 80 MMHG | SYSTOLIC BLOOD PRESSURE: 116 MMHG | RESPIRATION RATE: 16 BRPM | OXYGEN SATURATION: 95 % | TEMPERATURE: 98.3 F | HEIGHT: 62 IN | WEIGHT: 156 LBS

## 2019-04-19 DIAGNOSIS — G43.709 CHRONIC MIGRAINE WITHOUT AURA WITHOUT STATUS MIGRAINOSUS, NOT INTRACTABLE: ICD-10-CM

## 2019-04-19 DIAGNOSIS — Z13.6 SCREENING FOR CARDIOVASCULAR CONDITION: ICD-10-CM

## 2019-04-19 DIAGNOSIS — E66.3 OVERWEIGHT (BMI 25.0-29.9): ICD-10-CM

## 2019-04-19 DIAGNOSIS — R79.89 ABNORMAL TSH: ICD-10-CM

## 2019-04-19 DIAGNOSIS — R06.81 WITNESSED EPISODE OF APNEA: ICD-10-CM

## 2019-04-19 DIAGNOSIS — G47.09 OTHER INSOMNIA: Chronic | ICD-10-CM

## 2019-04-19 DIAGNOSIS — R06.83 SNORING: ICD-10-CM

## 2019-04-19 DIAGNOSIS — G43.711 INTRACTABLE CHRONIC MIGRAINE WITHOUT AURA AND WITH STATUS MIGRAINOSUS: Primary | ICD-10-CM

## 2019-04-19 PROBLEM — R39.9 URINARY TRACT INFECTION SYMPTOMS: Status: RESOLVED | Noted: 2019-02-22 | Resolved: 2019-04-19

## 2019-04-19 PROCEDURE — 1111F DSCHRG MED/CURRENT MED MERGE: CPT | Performed by: INTERNAL MEDICINE

## 2019-04-19 PROCEDURE — 99496 TRANSJ CARE MGMT HIGH F2F 7D: CPT | Performed by: INTERNAL MEDICINE

## 2019-04-19 RX ORDER — ZOLMITRIPTAN 5 MG/1
TABLET, ORALLY DISINTEGRATING ORAL
Qty: 12 TABLET | Refills: 0 | Status: ON HOLD | OUTPATIENT
Start: 2019-04-19 | End: 2019-07-28 | Stop reason: SDUPTHER

## 2019-05-01 ENCOUNTER — TELEPHONE (OUTPATIENT)
Dept: NEUROLOGY | Facility: CLINIC | Age: 49
End: 2019-05-01

## 2019-05-06 ENCOUNTER — TELEPHONE (OUTPATIENT)
Dept: INTERNAL MEDICINE CLINIC | Facility: CLINIC | Age: 49
End: 2019-05-06

## 2019-05-06 DIAGNOSIS — M25.512 CHRONIC LEFT SHOULDER PAIN: Primary | ICD-10-CM

## 2019-05-06 DIAGNOSIS — G89.29 CHRONIC LEFT SHOULDER PAIN: Primary | ICD-10-CM

## 2019-05-07 ENCOUNTER — TELEPHONE (OUTPATIENT)
Dept: NEUROLOGY | Facility: CLINIC | Age: 49
End: 2019-05-07

## 2019-05-07 ENCOUNTER — APPOINTMENT (OUTPATIENT)
Dept: RADIOLOGY | Age: 49
End: 2019-05-07
Payer: COMMERCIAL

## 2019-05-07 DIAGNOSIS — M25.512 CHRONIC LEFT SHOULDER PAIN: ICD-10-CM

## 2019-05-07 DIAGNOSIS — G89.29 CHRONIC LEFT SHOULDER PAIN: ICD-10-CM

## 2019-05-07 PROCEDURE — 73030 X-RAY EXAM OF SHOULDER: CPT

## 2019-05-10 ENCOUNTER — TELEPHONE (OUTPATIENT)
Dept: INTERNAL MEDICINE CLINIC | Facility: CLINIC | Age: 49
End: 2019-05-10

## 2019-05-15 ENCOUNTER — TELEPHONE (OUTPATIENT)
Dept: SLEEP CENTER | Facility: CLINIC | Age: 49
End: 2019-05-15

## 2019-05-28 DIAGNOSIS — G47.09 OTHER INSOMNIA: Primary | Chronic | ICD-10-CM

## 2019-05-28 RX ORDER — ZOLPIDEM TARTRATE 10 MG/1
10 TABLET ORAL
Qty: 30 TABLET | Refills: 0 | Status: SHIPPED | OUTPATIENT
Start: 2019-05-28 | End: 2019-06-26 | Stop reason: SDUPTHER

## 2019-05-29 DIAGNOSIS — F33.2 MAJOR DEPRESSIVE DISORDER, RECURRENT SEVERE WITHOUT PSYCHOTIC FEATURES (HCC): Primary | Chronic | ICD-10-CM

## 2019-05-29 DIAGNOSIS — F41.1 GAD (GENERALIZED ANXIETY DISORDER): Chronic | ICD-10-CM

## 2019-05-29 RX ORDER — VENLAFAXINE HYDROCHLORIDE 150 MG/1
300 CAPSULE, EXTENDED RELEASE ORAL DAILY
Qty: 60 CAPSULE | Refills: 0 | Status: SHIPPED | OUTPATIENT
Start: 2019-05-29 | End: 2019-06-26 | Stop reason: SDUPTHER

## 2019-06-03 DIAGNOSIS — F41.1 GAD (GENERALIZED ANXIETY DISORDER): Chronic | ICD-10-CM

## 2019-06-03 DIAGNOSIS — F33.2 MAJOR DEPRESSIVE DISORDER, RECURRENT SEVERE WITHOUT PSYCHOTIC FEATURES (HCC): Chronic | ICD-10-CM

## 2019-06-03 RX ORDER — VENLAFAXINE HYDROCHLORIDE 150 MG/1
300 CAPSULE, EXTENDED RELEASE ORAL DAILY
Qty: 60 CAPSULE | Refills: 0 | OUTPATIENT
Start: 2019-06-03

## 2019-06-04 LAB
BUN SERPL-MCNC: 18 MG/DL (ref 6–24)
BUN/CREAT SERPL: 19 (ref 9–23)
CALCIUM SERPL-MCNC: 9.8 MG/DL (ref 8.7–10.2)
CHLORIDE SERPL-SCNC: 106 MMOL/L (ref 96–106)
CHOLEST SERPL-MCNC: 254 MG/DL (ref 100–199)
CHOLEST/HDLC SERPL: 4.2 RATIO (ref 0–4.4)
CO2 SERPL-SCNC: 24 MMOL/L (ref 20–29)
CREAT SERPL-MCNC: 0.96 MG/DL (ref 0.57–1)
GLUCOSE SERPL-MCNC: 70 MG/DL (ref 65–99)
HDLC SERPL-MCNC: 61 MG/DL
LDLC SERPL CALC-MCNC: 168 MG/DL (ref 0–99)
POTASSIUM SERPL-SCNC: 4.5 MMOL/L (ref 3.5–5.2)
SL AMB EGFR AFRICAN AMERICAN: 81 ML/MIN/1.73
SL AMB EGFR NON AFRICAN AMERICAN: 71 ML/MIN/1.73
SL AMB T4, FREE (DIRECT): 0.98 NG/DL (ref 0.82–1.77)
SL AMB VLDL CHOLESTEROL CALC: 25 MG/DL (ref 5–40)
SODIUM SERPL-SCNC: 144 MMOL/L (ref 134–144)
TRIGL SERPL-MCNC: 123 MG/DL (ref 0–149)
TSH SERPL DL<=0.005 MIU/L-ACNC: 6.25 UIU/ML (ref 0.45–4.5)

## 2019-06-05 ENCOUNTER — HOSPITAL ENCOUNTER (OUTPATIENT)
Dept: SLEEP CENTER | Facility: CLINIC | Age: 49
Discharge: HOME | End: 2019-06-05
Payer: COMMERCIAL

## 2019-06-05 DIAGNOSIS — G47.09 OTHER INSOMNIA: Chronic | ICD-10-CM

## 2019-06-05 DIAGNOSIS — G43.711 INTRACTABLE CHRONIC MIGRAINE WITHOUT AURA AND WITH STATUS MIGRAINOSUS: ICD-10-CM

## 2019-06-05 DIAGNOSIS — R06.83 SNORING: ICD-10-CM

## 2019-06-05 DIAGNOSIS — R06.81 WITNESSED EPISODE OF APNEA: ICD-10-CM

## 2019-06-05 PROCEDURE — G0399 HOME SLEEP TEST/TYPE 3 PORTA: HCPCS

## 2019-06-06 ENCOUNTER — OFFICE VISIT (OUTPATIENT)
Dept: INTERNAL MEDICINE CLINIC | Facility: CLINIC | Age: 49
End: 2019-06-06
Payer: COMMERCIAL

## 2019-06-06 VITALS
BODY MASS INDEX: 27.53 KG/M2 | RESPIRATION RATE: 16 BRPM | SYSTOLIC BLOOD PRESSURE: 126 MMHG | HEART RATE: 86 BPM | DIASTOLIC BLOOD PRESSURE: 80 MMHG | WEIGHT: 149.6 LBS | TEMPERATURE: 98.4 F | OXYGEN SATURATION: 98 % | HEIGHT: 62 IN

## 2019-06-06 DIAGNOSIS — R79.89 ABNORMAL TSH: ICD-10-CM

## 2019-06-06 DIAGNOSIS — Z00.00 ANNUAL PHYSICAL EXAM: Primary | ICD-10-CM

## 2019-06-06 DIAGNOSIS — G89.29 CHRONIC LEFT SHOULDER PAIN: ICD-10-CM

## 2019-06-06 DIAGNOSIS — Z23 ENCOUNTER FOR IMMUNIZATION: ICD-10-CM

## 2019-06-06 DIAGNOSIS — G47.33 OSA (OBSTRUCTIVE SLEEP APNEA): Primary | ICD-10-CM

## 2019-06-06 DIAGNOSIS — E66.3 OVERWEIGHT (BMI 25.0-29.9): ICD-10-CM

## 2019-06-06 DIAGNOSIS — M25.512 CHRONIC LEFT SHOULDER PAIN: ICD-10-CM

## 2019-06-06 PROBLEM — E03.9 HYPOTHYROIDISM: Status: RESOLVED | Noted: 2017-07-27 | Resolved: 2019-06-06

## 2019-06-06 PROBLEM — E78.2 MIXED HYPERLIPIDEMIA: Status: ACTIVE | Noted: 2019-06-06

## 2019-06-06 PROCEDURE — 99396 PREV VISIT EST AGE 40-64: CPT | Performed by: INTERNAL MEDICINE

## 2019-06-06 PROCEDURE — 90471 IMMUNIZATION ADMIN: CPT | Performed by: INTERNAL MEDICINE

## 2019-06-06 PROCEDURE — 90715 TDAP VACCINE 7 YRS/> IM: CPT | Performed by: INTERNAL MEDICINE

## 2019-06-06 RX ORDER — SUMATRIPTAN 100 MG/1
TABLET, FILM COATED ORAL
COMMUNITY
Start: 2015-07-21 | End: 2019-06-26 | Stop reason: ALTCHOICE

## 2019-06-07 ENCOUNTER — TELEPHONE (OUTPATIENT)
Dept: SLEEP CENTER | Facility: CLINIC | Age: 49
End: 2019-06-07

## 2019-06-13 ENCOUNTER — CONSULT (OUTPATIENT)
Dept: OBGYN CLINIC | Facility: OTHER | Age: 49
End: 2019-06-13
Payer: COMMERCIAL

## 2019-06-13 VITALS
BODY MASS INDEX: 27.05 KG/M2 | HEIGHT: 62 IN | HEART RATE: 87 BPM | WEIGHT: 147 LBS | DIASTOLIC BLOOD PRESSURE: 60 MMHG | SYSTOLIC BLOOD PRESSURE: 102 MMHG

## 2019-06-13 DIAGNOSIS — M75.42 SUBACROMIAL IMPINGEMENT OF LEFT SHOULDER: Primary | ICD-10-CM

## 2019-06-13 PROCEDURE — 99243 OFF/OP CNSLTJ NEW/EST LOW 30: CPT | Performed by: ORTHOPAEDIC SURGERY

## 2019-06-13 PROCEDURE — 20610 DRAIN/INJ JOINT/BURSA W/O US: CPT | Performed by: ORTHOPAEDIC SURGERY

## 2019-06-13 RX ORDER — BUPIVACAINE HYDROCHLORIDE 5 MG/ML
2 INJECTION, SOLUTION EPIDURAL; INTRACAUDAL
Status: COMPLETED | OUTPATIENT
Start: 2019-06-13 | End: 2019-06-13

## 2019-06-13 RX ORDER — BETAMETHASONE SODIUM PHOSPHATE AND BETAMETHASONE ACETATE 3; 3 MG/ML; MG/ML
6 INJECTION, SUSPENSION INTRA-ARTICULAR; INTRALESIONAL; INTRAMUSCULAR; SOFT TISSUE
Status: COMPLETED | OUTPATIENT
Start: 2019-06-13 | End: 2019-06-13

## 2019-06-13 RX ADMIN — BETAMETHASONE SODIUM PHOSPHATE AND BETAMETHASONE ACETATE 6 MG: 3; 3 INJECTION, SUSPENSION INTRA-ARTICULAR; INTRALESIONAL; INTRAMUSCULAR; SOFT TISSUE at 15:34

## 2019-06-13 RX ADMIN — BUPIVACAINE HYDROCHLORIDE 2 ML: 5 INJECTION, SOLUTION EPIDURAL; INTRACAUDAL at 15:34

## 2019-06-14 DIAGNOSIS — F41.0 PANIC DISORDER WITHOUT AGORAPHOBIA: Chronic | ICD-10-CM

## 2019-06-14 DIAGNOSIS — F41.1 GAD (GENERALIZED ANXIETY DISORDER): Chronic | ICD-10-CM

## 2019-06-14 DIAGNOSIS — F33.2 MAJOR DEPRESSIVE DISORDER, RECURRENT SEVERE WITHOUT PSYCHOTIC FEATURES (HCC): Chronic | ICD-10-CM

## 2019-06-14 NOTE — TELEPHONE ENCOUNTER
Nursing has not received a return call from Татьяна Fallon but did speak with her pharmacist at [facility] and he stated that they did have refills for Татьяна Fallon so no additional scripts need to be sent  Kaylah's next appointment with Dr Fracisco Pineda is 6/26/19

## 2019-06-18 ENCOUNTER — TRANSCRIBE ORDERS (OUTPATIENT)
Dept: OBGYN CLINIC | Facility: OTHER | Age: 49
End: 2019-06-18

## 2019-06-25 RX ORDER — CLONAZEPAM 1 MG/1
.5-1 TABLET ORAL 3 TIMES DAILY
Qty: 90 TABLET | Refills: 3 | OUTPATIENT
Start: 2019-06-25 | End: 2019-10-23

## 2019-06-25 RX ORDER — BUPROPION HYDROCHLORIDE 300 MG/1
300 TABLET ORAL DAILY
Qty: 30 TABLET | Refills: 3 | OUTPATIENT
Start: 2019-06-25 | End: 2019-10-23

## 2019-06-26 ENCOUNTER — OFFICE VISIT (OUTPATIENT)
Dept: PSYCHIATRY | Facility: CLINIC | Age: 49
End: 2019-06-26
Payer: COMMERCIAL

## 2019-06-26 VITALS
SYSTOLIC BLOOD PRESSURE: 119 MMHG | DIASTOLIC BLOOD PRESSURE: 71 MMHG | HEIGHT: 63 IN | WEIGHT: 148 LBS | BODY MASS INDEX: 26.22 KG/M2 | HEART RATE: 83 BPM

## 2019-06-26 DIAGNOSIS — F42.9 OBSESSIVE-COMPULSIVE DISORDER, UNSPECIFIED TYPE: Chronic | ICD-10-CM

## 2019-06-26 DIAGNOSIS — F41.0 PANIC DISORDER WITHOUT AGORAPHOBIA: Chronic | ICD-10-CM

## 2019-06-26 DIAGNOSIS — F63.9 IMPULSE CONTROL DISORDER: Chronic | ICD-10-CM

## 2019-06-26 DIAGNOSIS — F33.2 MAJOR DEPRESSIVE DISORDER, RECURRENT SEVERE WITHOUT PSYCHOTIC FEATURES (HCC): Primary | Chronic | ICD-10-CM

## 2019-06-26 DIAGNOSIS — G47.09 OTHER INSOMNIA: Chronic | ICD-10-CM

## 2019-06-26 DIAGNOSIS — F41.1 GAD (GENERALIZED ANXIETY DISORDER): Chronic | ICD-10-CM

## 2019-06-26 PROCEDURE — 99214 OFFICE O/P EST MOD 30 MIN: CPT | Performed by: PSYCHIATRY & NEUROLOGY

## 2019-06-26 PROCEDURE — 90833 PSYTX W PT W E/M 30 MIN: CPT | Performed by: PSYCHIATRY & NEUROLOGY

## 2019-06-26 RX ORDER — SERTRALINE HYDROCHLORIDE 100 MG/1
200 TABLET, FILM COATED ORAL
Qty: 60 TABLET | Refills: 3 | Status: SHIPPED | OUTPATIENT
Start: 2019-06-26 | End: 2019-10-04 | Stop reason: HOSPADM

## 2019-06-26 RX ORDER — CLONAZEPAM 1 MG/1
.5-1 TABLET ORAL 3 TIMES DAILY
Qty: 90 TABLET | Refills: 3 | Status: SHIPPED | OUTPATIENT
Start: 2019-07-20 | End: 2019-10-04 | Stop reason: HOSPADM

## 2019-06-26 RX ORDER — HYDROXYZINE 50 MG/1
50 TABLET, FILM COATED ORAL 3 TIMES DAILY PRN
Qty: 90 TABLET | Refills: 3 | Status: SHIPPED | OUTPATIENT
Start: 2019-06-26 | End: 2019-07-28 | Stop reason: HOSPADM

## 2019-06-26 RX ORDER — BUPROPION HYDROCHLORIDE 150 MG/1
150 TABLET ORAL DAILY
Qty: 30 TABLET | Refills: 3 | Status: SHIPPED | OUTPATIENT
Start: 2019-06-26 | End: 2019-07-28 | Stop reason: HOSPADM

## 2019-06-26 RX ORDER — NALTREXONE HYDROCHLORIDE 50 MG/1
50 TABLET, FILM COATED ORAL DAILY
Qty: 30 TABLET | Refills: 3 | Status: SHIPPED | OUTPATIENT
Start: 2019-06-26 | End: 2019-10-14 | Stop reason: SDUPTHER

## 2019-06-26 RX ORDER — BUPROPION HYDROCHLORIDE 300 MG/1
300 TABLET ORAL DAILY
Qty: 30 TABLET | Refills: 3 | Status: SHIPPED | OUTPATIENT
Start: 2019-06-26 | End: 2019-10-14 | Stop reason: SDUPTHER

## 2019-06-26 RX ORDER — VENLAFAXINE HYDROCHLORIDE 150 MG/1
300 CAPSULE, EXTENDED RELEASE ORAL DAILY
Qty: 60 CAPSULE | Refills: 3 | Status: SHIPPED | OUTPATIENT
Start: 2019-06-26 | End: 2019-10-04 | Stop reason: HOSPADM

## 2019-06-26 RX ORDER — ARIPIPRAZOLE 30 MG/1
30 TABLET ORAL
Qty: 30 TABLET | Refills: 3 | Status: SHIPPED | OUTPATIENT
Start: 2019-06-26 | End: 2019-10-04 | Stop reason: HOSPADM

## 2019-06-26 RX ORDER — ZOLPIDEM TARTRATE 10 MG/1
10 TABLET ORAL
Qty: 30 TABLET | Refills: 3 | Status: SHIPPED | OUTPATIENT
Start: 2019-06-27 | End: 2019-10-04 | Stop reason: HOSPADM

## 2019-07-03 ENCOUNTER — HOSPITAL ENCOUNTER (OUTPATIENT)
Dept: MAMMOGRAPHY | Facility: CLINIC | Age: 49
Discharge: HOME | End: 2019-07-03
Payer: COMMERCIAL

## 2019-07-03 ENCOUNTER — HOSPITAL ENCOUNTER (OUTPATIENT)
Dept: ULTRASOUND IMAGING | Facility: CLINIC | Age: 49
Discharge: HOME | End: 2019-07-03

## 2019-07-03 ENCOUNTER — HOSPITAL ENCOUNTER (OUTPATIENT)
Dept: ULTRASOUND IMAGING | Facility: CLINIC | Age: 49
Discharge: HOME | End: 2019-07-03
Payer: COMMERCIAL

## 2019-07-03 ENCOUNTER — TELEPHONE (OUTPATIENT)
Dept: OBGYN CLINIC | Facility: CLINIC | Age: 49
End: 2019-07-03

## 2019-07-03 ENCOUNTER — TRANSCRIBE ORDERS (OUTPATIENT)
Dept: ADMINISTRATIVE | Facility: HOSPITAL | Age: 49
End: 2019-07-03

## 2019-07-03 VITALS — HEIGHT: 63 IN | BODY MASS INDEX: 26.22 KG/M2 | WEIGHT: 148 LBS

## 2019-07-03 DIAGNOSIS — N63.0 LUMP OR MASS IN BREAST: ICD-10-CM

## 2019-07-03 DIAGNOSIS — R92.8 ABNORMAL MAMMOGRAM: ICD-10-CM

## 2019-07-03 DIAGNOSIS — R92.2 DENSE BREAST TISSUE ON MAMMOGRAM: Primary | ICD-10-CM

## 2019-07-03 DIAGNOSIS — R92.8 ABNORMAL FINDING ON BREAST IMAGING: Primary | ICD-10-CM

## 2019-07-03 DIAGNOSIS — Z12.39 SCREENING BREAST EXAMINATION: ICD-10-CM

## 2019-07-03 PROCEDURE — 77065 DX MAMMO INCL CAD UNI: CPT

## 2019-07-03 PROCEDURE — 76642 ULTRASOUND BREAST LIMITED: CPT

## 2019-07-03 PROCEDURE — G0279 TOMOSYNTHESIS, MAMMO: HCPCS

## 2019-07-03 PROCEDURE — 77063 BREAST TOMOSYNTHESIS BI: CPT

## 2019-07-03 PROCEDURE — 77067 SCR MAMMO BI INCL CAD: CPT

## 2019-07-03 NOTE — TELEPHONE ENCOUNTER
----- Message from Soni Finn 10 Darius Whipple sent at 7/3/2019  4:39 PM EDT -----  Diagnostic imaging indicated   Please contact the patient to confirm she is aware of recommendations   Orders have been entered

## 2019-07-05 NOTE — TELEPHONE ENCOUNTER
Pt contacted advised as directed  Pt stated she was made aware of results she scheduled additional testing for 01/2020

## 2019-07-09 ENCOUNTER — APPOINTMENT (EMERGENCY)
Dept: RADIOLOGY | Facility: HOSPITAL | Age: 49
End: 2019-07-09
Payer: COMMERCIAL

## 2019-07-09 ENCOUNTER — TELEPHONE (OUTPATIENT)
Dept: DERMATOLOGY | Facility: CLINIC | Age: 49
End: 2019-07-09

## 2019-07-09 ENCOUNTER — HOSPITAL ENCOUNTER (EMERGENCY)
Facility: HOSPITAL | Age: 49
Discharge: HOME | End: 2019-07-09
Attending: EMERGENCY MEDICINE | Admitting: EMERGENCY MEDICINE
Payer: COMMERCIAL

## 2019-07-09 VITALS
SYSTOLIC BLOOD PRESSURE: 118 MMHG | OXYGEN SATURATION: 99 % | TEMPERATURE: 99.4 F | WEIGHT: 147.93 LBS | BODY MASS INDEX: 26.2 KG/M2 | DIASTOLIC BLOOD PRESSURE: 63 MMHG | RESPIRATION RATE: 18 BRPM | HEART RATE: 91 BPM

## 2019-07-09 DIAGNOSIS — M54.12 CERVICAL RADICULOPATHY AT C5: Primary | ICD-10-CM

## 2019-07-09 DIAGNOSIS — M75.42 IMPINGEMENT SYNDROME OF LEFT SHOULDER: ICD-10-CM

## 2019-07-09 PROCEDURE — 99283 EMERGENCY DEPT VISIT LOW MDM: CPT

## 2019-07-09 PROCEDURE — 99283 EMERGENCY DEPT VISIT LOW MDM: CPT | Performed by: PHYSICIAN ASSISTANT

## 2019-07-09 PROCEDURE — 72040 X-RAY EXAM NECK SPINE 2-3 VW: CPT

## 2019-07-09 RX ORDER — CYCLOBENZAPRINE HCL 10 MG
10 TABLET ORAL 3 TIMES DAILY PRN
Qty: 9 TABLET | Refills: 0 | Status: SHIPPED | OUTPATIENT
Start: 2019-07-09 | End: 2019-07-28 | Stop reason: HOSPADM

## 2019-07-09 NOTE — ED PROVIDER NOTES
History  Chief Complaint   Patient presents with    Shoulder Pain     Pt c/o left shoulder pain x2 weeks  Pt dx with arthritis in that same shoulder  History provided by:  Patient  Shoulder Pain   Location:  Shoulder  Shoulder location:  L shoulder  Injury: no    Pain details:     Quality:  Aching    Radiates to:  L arm    Severity:  Moderate    Onset quality:  Gradual    Duration:  3 months    Timing:  Constant    Progression:  Worsening  Handedness:  Right-handed  Dislocation: no    Prior injury to area:  No  Relieved by:  Nothing  Worsened by: Movement  Ineffective treatments: cortisone injection  Associated symptoms: decreased range of motion, neck pain and stiffness    Associated symptoms: no back pain, no fatigue, no fever, no muscle weakness, no numbness, no swelling and no tingling    Risk factors: no concern for non-accidental trauma, no known bone disorder, no frequent fractures and no recent illness    Neck Pain   Associated symptoms: no chest pain and no fever        Prior to Admission Medications   Prescriptions Last Dose Informant Patient Reported? Taking? ARIPiprazole (ABILIFY) 30 mg tablet   No Yes   Sig: Take 1 tablet (30 mg total) by mouth daily at bedtime for 120 days   Aspirin-Acetaminophen-Caffeine (MIGRAINE FORMULA PO)   Yes Yes   Sig: Take by mouth   BREO ELLIPTA 200-25 MCG/INH inhaler  Self Yes Yes   [employer] INDOOR/OUTDOOR ALLERGY RLF 10 MG tablet  Self Yes Yes   Sig: Take 10 mg by mouth daily   EPINEPHrine (EPIPEN) 0 3 mg/0 3 mL SOAJ  Self Yes Yes   Sig: as directed   OLANZapine (ZyPREXA) 2 5 mg tablet   No Yes   Sig: Take 1 tab qhs prn migraine  PROAIR RESPICLICK 660 (90 Base) MCG/ACT AEPB   No Yes   Sig: Inhale 2 puffs every 6 (six) hours as needed (wheezing)   SPIRIVA RESPIMAT 2 5 MCG/ACT AERS  Self Yes Yes   ZOLMitriptan (ZOMIG-ZMT) 5 MG disintegrating tablet   No Yes   Sig: TAKE 1 TABLET BY MOUTH AT ONSET OF HEADACHE, MAY REPEAT AFTER 2 HOURS AS NEEDED   MAX 2 TABLETS EVERY DAY albuterol (2 5 mg/3 mL) 0 083 % nebulizer solution  Self Yes Yes   Sig: Inhale   baclofen 10 mg tablet   No Yes   Sig: Take 1 tablet (10 mg total) by mouth daily at bedtime   buPROPion (WELLBUTRIN XL) 150 mg 24 hr tablet   No Yes   Sig: Take 1 tablet (150 mg total) by mouth daily for 120 days Total Wellbutrin XL dose is 450 mg daily   buPROPion (WELLBUTRIN XL) 300 mg 24 hr tablet   No Yes   Sig: Take 1 tablet (300 mg total) by mouth daily for 120 days Total Wellbutrin XL dose is 450 mg daily   clonazePAM (KlonoPIN) 1 mg tablet   No Yes   Sig: Take 0 5-1 tablets (0 5-1 mg total) by mouth 3 (three) times a day for 120 days To be filled on or after 19   diphenhydrAMINE (BENADRYL) 25 mg tablet  Self No Yes   Sig: Take 1 tablet by mouth every 6 (six) hours as needed (for headache, take with phenergan)   gabapentin (NEURONTIN) 300 mg capsule   No Yes   Si capsules in am, 2 capsules at noon, and 2 capsules in the evening    hydrOXYzine HCL (ATARAX) 50 mg tablet   No Yes   Sig: Take 1 tablet (50 mg total) by mouth 3 (three) times a day as needed for anxiety for up to 120 days   ibuprofen (MOTRIN) 800 mg tablet   No Yes   Sig: Take 1 tablet (800 mg total) by mouth every 8 (eight) hours as needed for moderate pain   ketorolac (TORADOL) 10 mg tablet   No Yes   Sig: Take 1 tablet (10 mg total) by mouth every 6 (six) hours as needed (migraine)   levalbuterol (XOPENEX) 1 25 mg/3 mL nebulizer solution  Self Yes Yes   Sig: Inhale   levonorgestrel (MIRENA, 52 MG,) 20 MCG/24HR IUD  Self Yes Yes   Sig: by Intrauterine route   mometasone (NASONEX) 50 mcg/act nasal spray  Self Yes Yes   naltrexone (REVIA) 50 mg tablet   No Yes   Sig: Take 1 tablet (50 mg total) by mouth daily for 120 days   olopatadine (PATANOL) 0 1 % ophthalmic solution  Self Yes Yes   omeprazole (PriLOSEC) 20 mg delayed release capsule  Self Yes Yes   Sig: Take 1 capsule by mouth daily   ondansetron (ZOFRAN) 4 mg tablet   No Yes   Sig: Take 1 tablet (4 mg total) by mouth every 8 (eight) hours as needed for nausea or vomiting   phenazopyridine (PYRIDIUM) 100 mg tablet   No Yes   Sig: Take 1 tablet (100 mg total) by mouth 3 (three) times a day as needed for bladder spasms   sertraline (ZOLOFT) 100 mg tablet   No Yes   Sig: Take 2 tablets (200 mg total) by mouth daily at bedtime for 120 days   venlafaxine (EFFEXOR-XR) 150 mg 24 hr capsule   No Yes   Sig: Take 2 capsules (300 mg total) by mouth daily for 120 days   zolpidem (AMBIEN) 10 mg tablet   No Yes   Sig: Take 1 tablet (10 mg total) by mouth daily at bedtime as needed for sleep for up to 120 days To be filled on or after 19      Facility-Administered Medications: None       Past Medical History:   Diagnosis Date    Amenorrhea     Anxiety     Asthma     Back pain     Chondromalacia of patella     Chronic fatigue     Deep vein thrombophlebitis of leg (HCC)     Depression     GERD (gastroesophageal reflux disease)     HPV (human papilloma virus) infection     Hypothyroidism     Lumbar radiculopathy     Migraine     Myofascial pain syndrome     Osteopenia     Piriformis syndrome     Sacroiliitis (HCC)     Sciatica     Sleep apnea     Spondylosis of lumbar spine     Trochanteric bursitis of right hip     Vitamin D deficiency        Past Surgical History:   Procedure Laterality Date    CERVIX LESION DESTRUCTION       SECTION      COLONOSCOPY      COLPOSCOPY W/ BIOPSY / CURETTAGE      Colposcopy cervix with biopsy    LAPAROSCOPIC ENDOMETRIOSIS FULGURATION      LAPAROSCOPY      TOOTH EXTRACTION         Family History   Problem Relation Age of Onset    Heart disease Mother     Asthma Daughter     Lung cancer Paternal Grandfather     Asthma Daughter     Colon cancer Father     Colon cancer Maternal Grandfather     Prostate cancer Maternal Grandfather     Colon cancer Maternal Aunt     No Known Problems Maternal Grandmother     No Known Problems Paternal Grandmother     No Known Problems Maternal Aunt     No Known Problems Maternal Aunt     No Known Problems Maternal Aunt     No Known Problems Paternal Aunt     Psychiatric Illness Neg Hx      I have reviewed and agree with the history as documented  Social History     Tobacco Use    Smoking status: Never Smoker    Smokeless tobacco: Never Used   Substance Use Topics    Alcohol use: Not Currently     Comment: Per [employer]; Occasional use    Drug use: Not Currently        Review of Systems   Constitutional: Positive for activity change  Negative for fatigue and fever  Respiratory: Negative for chest tightness, shortness of breath and wheezing  Cardiovascular: Negative for chest pain and palpitations  Musculoskeletal: Positive for arthralgias, neck pain and stiffness  Negative for back pain  Skin: Negative for color change, pallor and rash  Psychiatric/Behavioral: Negative for confusion  All other systems reviewed and are negative  Physical Exam  Physical Exam   Constitutional: She appears well-developed and well-nourished  No distress  HENT:   Head: Normocephalic and atraumatic  Right Ear: External ear normal    Left Ear: External ear normal    Nose: Nose normal    Eyes: Conjunctivae are normal    Pulmonary/Chest: Effort normal    Musculoskeletal:   C Spine-terness over mid cervical area with left paravertebral spasm  Patient lacks terminal ROM secondary to pain and spasm  Reflexes are +2/sym  Left shoulder- positive impingement sign  Decreased ROM seondary to pain and guarding  Neurological: She is alert  Skin: Skin is warm  Capillary refill takes less than 2 seconds  She is not diaphoretic  Psychiatric: She has a normal mood and affect  Her behavior is normal  Judgment and thought content normal    Nursing note and vitals reviewed        Vital Signs  ED Triage Vitals [07/09/19 1914]   Temperature Pulse Respirations Blood Pressure SpO2   99 4 °F (37 4 °C) 91 18 118/63 99 %      Temp Source Heart Rate Source Patient Position - Orthostatic VS BP Location FiO2 (%)   Oral Monitor Sitting Left arm --      Pain Score       7           Vitals:    07/09/19 1914   BP: 118/63   Pulse: 91   Patient Position - Orthostatic VS: Sitting         Visual Acuity      ED Medications  Medications - No data to display    Diagnostic Studies  Results Reviewed     None                 XR spine cervical 2 or 3 vw injury   ED Interpretation by Emmy Seip, PA-C (07/09 2002)   DDD C4-5,5-6,6-7, degenerative arthritis                 Procedures  Procedures       ED Course                               MDM  Number of Diagnoses or Management Options  Cervical radiculopathy at C5: new and requires workup  Impingement syndrome of left shoulder: new and requires workup     Amount and/or Complexity of Data Reviewed  Tests in the radiology section of CPT®: ordered and reviewed  Tests in the medicine section of CPT®: ordered and reviewed    Risk of Complications, Morbidity, and/or Mortality  Presenting problems: moderate  Diagnostic procedures: moderate  Management options: moderate  General comments: Patient c/o neck and left shoulder pain  She was seen and diagnosed with a cervical radiculopathy  X-rays were taken and were reviewed  Patient was DC with NSAIDS and muscle relaxants  Follow up with Orthopedics      Patient Progress  Patient progress: stable      Disposition  Final diagnoses:   Cervical radiculopathy at C5 - Degenerative arthritis/Degenerative disc disease C 4-5  5-6, 6-7   Impingement syndrome of left shoulder     Time reflects when diagnosis was documented in both MDM as applicable and the Disposition within this note     Time User Action Codes Description Comment    7/9/2019  8:03 PM Ky Miller [[id number]] Cervical radiculopathy at C5     7/9/2019  8:03 PM Rajan Chowdhury [[id number]] Cervical radiculopathy at C5 Degenerative arthritis/Degenerative disc disease C 4-5  5-6, 6-7    7/9/2019  8:06 PM Shelia Nelson Add [[id number]] Impingement syndrome of left shoulder       ED Disposition     ED Disposition Condition Date/Time Comment    Discharge Stable Tue Jul 9, 2019  8:02 PM Lila Gold discharge to home/self care  Follow-up Information     Follow up With Specialties Details Why Contact Info    your Orthopedic Physician  In 1 week            Discharge Medication List as of 7/9/2019  8:06 PM      START taking these medications    Details   cyclobenzaprine (FLEXERIL) 10 mg tablet Take 1 tablet (10 mg total) by mouth 3 (three) times a day as needed for muscle spasms for up to 9 days, Starting Tue 7/9/2019, Until Thu 7/18/2019, Normal      diclofenac sodium (VOLTAREN) 50 mg EC tablet Take 1 tablet (50 mg total) by mouth 2 (two) times a day for 7 days With food, Starting Tue 7/9/2019, Until Tue 7/16/2019, Normal         CONTINUE these medications which have NOT CHANGED    Details   albuterol (2 5 mg/3 mL) 0 083 % nebulizer solution Inhale, Starting Fri 4/19/2013, Historical Med      ARIPiprazole (ABILIFY) 30 mg tablet Take 1 tablet (30 mg total) by mouth daily at bedtime for 120 days, Starting Wed 6/26/2019, Until Thu 10/24/2019, Normal      Aspirin-Acetaminophen-Caffeine (MIGRAINE FORMULA PO) Take by mouth, Historical Med      baclofen 10 mg tablet Take 1 tablet (10 mg total) by mouth daily at bedtime, Starting Mon 4/15/2019, Print      BREO ELLIPTA 200-25 MCG/INH inhaler Starting Mon 3/5/2018, Historical Med      !! buPROPion (WELLBUTRIN XL) 150 mg 24 hr tablet Take 1 tablet (150 mg total) by mouth daily for 120 days Total Wellbutrin XL dose is 450 mg daily, Starting Wed 6/26/2019, Until Thu 10/24/2019, Normal      !!  buPROPion (WELLBUTRIN XL) 300 mg 24 hr tablet Take 1 tablet (300 mg total) by mouth daily for 120 days Total Wellbutrin XL dose is 450 mg daily, Starting Wed 6/26/2019, Until Thu 10/24/2019, Normal      clonazePAM (KlonoPIN) 1 mg tablet Take 0 5-1 tablets (0 5-1 mg total) by mouth 3 (three) times a day for 120 days To be filled on or after 7/20/19, Starting Sat 7/20/2019, Until Sun 11/17/2019, Normal      [employer] INDOOR/OUTDOOR ALLERGY RLF 10 MG tablet Take 10 mg by mouth daily, Starting Mon 1/29/2018, Historical Med      diphenhydrAMINE (BENADRYL) 25 mg tablet Take 1 tablet by mouth every 6 (six) hours as needed (for headache, take with phenergan), Starting Tue 11/21/2017, Print      EPINEPHrine (EPIPEN) 0 3 mg/0 3 mL SOAJ as directed, Historical Med      gabapentin (NEURONTIN) 300 mg capsule 2 capsules in am, 2 capsules at noon, and 2 capsules in the evening , Normal      hydrOXYzine HCL (ATARAX) 50 mg tablet Take 1 tablet (50 mg total) by mouth 3 (three) times a day as needed for anxiety for up to 120 days, Starting Wed 6/26/2019, Until Thu 10/24/2019, Normal      ibuprofen (MOTRIN) 800 mg tablet Take 1 tablet (800 mg total) by mouth every 8 (eight) hours as needed for moderate pain, Starting Mon 3/26/2018, Normal      ketorolac (TORADOL) 10 mg tablet Take 1 tablet (10 mg total) by mouth every 6 (six) hours as needed (migraine), Starting Mon 4/1/2019, Normal      levalbuterol (XOPENEX) 1 25 mg/3 mL nebulizer solution Inhale, Starting Fri 10/8/2010, Historical Med      levonorgestrel (MIRENA, 52 MG,) 20 MCG/24HR IUD by Intrauterine route, Historical Med      mometasone (NASONEX) 50 mcg/act nasal spray Starting Mon 3/12/2018, Historical Med      naltrexone (REVIA) 50 mg tablet Take 1 tablet (50 mg total) by mouth daily for 120 days, Starting Wed 6/26/2019, Until Thu 10/24/2019, Normal      OLANZapine (ZyPREXA) 2 5 mg tablet Take 1 tab qhs prn migraine , Normal      olopatadine (PATANOL) 0 1 % ophthalmic solution Starting Mon 3/12/2018, Historical Med      omeprazole (PriLOSEC) 20 mg delayed release capsule Take 1 capsule by mouth daily, Starting Fri 1/28/2011, Historical Med      ondansetron (ZOFRAN) 4 mg tablet Take 1 tablet (4 mg total) by mouth every 8 (eight) hours as needed for nausea or vomiting, Starting Mon 4/1/2019, Normal      phenazopyridine (PYRIDIUM) 100 mg tablet Take 1 tablet (100 mg total) by mouth 3 (three) times a day as needed for bladder spasms, Starting Fri 2/22/2019, Normal      PROAIR RESPICLICK 766 (90 Base) MCG/ACT AEPB Inhale 2 puffs every 6 (six) hours as needed (wheezing), Starting Tue 4/2/2019, Until Wed 4/1/2020, Normal      sertraline (ZOLOFT) 100 mg tablet Take 2 tablets (200 mg total) by mouth daily at bedtime for 120 days, Starting Wed 6/26/2019, Until Thu 10/24/2019, Normal      SPIRIVA RESPIMAT 2 5 MCG/ACT AERS Starting Mon 3/5/2018, Historical Med      venlafaxine (EFFEXOR-XR) 150 mg 24 hr capsule Take 2 capsules (300 mg total) by mouth daily for 120 days, Starting Wed 6/26/2019, Until Thu 10/24/2019, Normal      ZOLMitriptan (ZOMIG-ZMT) 5 MG disintegrating tablet TAKE 1 TABLET BY MOUTH AT ONSET OF HEADACHE, MAY REPEAT AFTER 2 HOURS AS NEEDED  MAX 2 TABLETS EVERY DAY, Normal      zolpidem (AMBIEN) 10 mg tablet Take 1 tablet (10 mg total) by mouth daily at bedtime as needed for sleep for up to 120 days To be filled on or after 6/27/19, Starting Thu 6/27/2019, Until Fri 10/25/2019, Normal       !! - Potential duplicate medications found  Please discuss with provider  No discharge procedures on file      ED Provider  Electronically Signed by           Suzanne Rinne, PA-C  07/09/19 1219

## 2019-07-10 NOTE — ED NOTES
ANNIKA provided discharge instructions and paperwork  Discharged to home in stable condition offering no c/o's        Sharyn Gilford, RN  07/09/19 2011

## 2019-07-11 ENCOUNTER — OFFICE VISIT (OUTPATIENT)
Dept: OBGYN CLINIC | Facility: OTHER | Age: 49
End: 2019-07-11
Payer: COMMERCIAL

## 2019-07-11 VITALS
WEIGHT: 151.6 LBS | BODY MASS INDEX: 27.9 KG/M2 | DIASTOLIC BLOOD PRESSURE: 70 MMHG | HEIGHT: 62 IN | SYSTOLIC BLOOD PRESSURE: 109 MMHG | HEART RATE: 81 BPM

## 2019-07-11 DIAGNOSIS — M54.12 RADICULOPATHY, CERVICAL REGION: Primary | ICD-10-CM

## 2019-07-11 PROCEDURE — 99213 OFFICE O/P EST LOW 20 MIN: CPT | Performed by: PHYSICIAN ASSISTANT

## 2019-07-11 RX ORDER — ERGOCALCIFEROL 1.25 MG/1
CAPSULE ORAL
Refills: 0 | COMMUNITY
Start: 2019-07-10 | End: 2020-07-30 | Stop reason: ALTCHOICE

## 2019-07-11 RX ORDER — METHYLPREDNISOLONE 4 MG/1
TABLET ORAL
Qty: 1 EACH | Refills: 0 | Status: SHIPPED | OUTPATIENT
Start: 2019-07-11 | End: 2019-07-28 | Stop reason: HOSPADM

## 2019-07-11 RX ORDER — IBUPROFEN 600 MG/1
TABLET ORAL
Refills: 0 | COMMUNITY
Start: 2019-07-02 | End: 2019-07-28 | Stop reason: HOSPADM

## 2019-07-11 NOTE — PROGRESS NOTES
Assessment  Diagnoses and all orders for this visit:    Radiculopathy, cervical region  -     Ambulatory referral to Physical Therapy; Future  -     Ambulatory referral to Pain Management; Future  -     methylPREDNISolone 4 MG tablet therapy pack; Use as directed on package    Discussion and Plan:    Kaylah's symptoms are consistent with cervical radiculopathy and referral to spine center was made today  She was also give a Medrol Dose pack for her neck complaints  A referral to therapy for shoulder and neck was made as well  She will keep her scheduled follow up with me next month to check progress of the left shoulder with therapy  Subjective:   Patient ID: Laura Jacob is a 53 y o  female      [place] Gabe presents with worsening left arm pain  She states the injection did not last long for the left shoulder and shortly after the injection she started getting neck pain with shooting pain into her left hand  She admits to numbness and tingling of the left hand as well  She has pain with motion of the neck  She has pain with motion of the left shoulder  She denies new injury or trauma  [facility] but not for her neck  /70   Pulse 81   Ht 5' 2" (1 575 m)   Wt 68 8 kg (151 lb 9 6 oz)   BMI 27 73 kg/m²     The following portions of the patient's history were reviewed and updated as appropriate: allergies, current medications, past family history, past medical history, past social history, past surgical history and problem list     Review of Systems   Constitutional: Negative for chills and fever  HENT: Negative for hearing loss  Eyes: Negative for visual disturbance  Respiratory: Negative for shortness of breath  Cardiovascular: Negative for chest pain  Gastrointestinal: Negative for abdominal pain  Musculoskeletal:        As reviewed in the HPI   Skin: Negative for rash     Neurological:        As reviewed in the HPI   Psychiatric/Behavioral: Negative for agitation  Objective:  Left Shoulder Exam     Tenderness   Left shoulder tenderness location: anterior deltoid  Range of Motion   The patient has normal left shoulder ROM  Muscle Strength   External rotation: 4/5   Supraspinatus: 4/5     Tests   Snyder test: positive  Impingement: positive  Drop arm: negative    Other   Erythema: absent  Scars: absent  Sensation: normal  Pulse: present     Comments:  +spurling's on the left    Neck: pain with lateral bend and rotation to left            Physical Exam   Constitutional: She is oriented to person, place, and time  She appears well-developed and well-nourished  HENT:   Head: Normocephalic  Eyes: EOM are normal    Neck: Normal range of motion  Pulmonary/Chest: Breath sounds normal  She has no wheezes  Neurological: She is alert and oriented to person, place, and time  Skin: Skin is warm and dry  Psychiatric: She has a normal mood and affect  Her behavior is normal  Judgment and thought content normal          I have personally reviewed pertinent films in PACS and my interpretation is as follows      xrays of c-spine from ER reviewed: multilevel DDD

## 2019-07-19 ENCOUNTER — DOCUMENTATION (OUTPATIENT)
Dept: NEUROLOGY | Facility: CLINIC | Age: 49
End: 2019-07-19

## 2019-07-19 ENCOUNTER — TELEPHONE (OUTPATIENT)
Dept: NEUROLOGY | Facility: CLINIC | Age: 49
End: 2019-07-19

## 2019-07-19 NOTE — TELEPHONE ENCOUNTER
james from [employer] called regarding aimovig PA   they need to know if aimovig is effective and if pt has had a    50 % reduction in headaches  i made her aware that we last saw pt in april and have not seen pt since  next appt in aug   she will make note of this   i did make her aware that this is a continuation of therapy  she will send additional info to pharmacist for reivew

## 2019-07-19 NOTE — PROGRESS NOTES
Received fax stating Ravi PAULA is expiring  Submitted PA for 2 pens per month per office note  Gagnon: Glenn Frank

## 2019-07-22 NOTE — TELEPHONE ENCOUNTER
[facility]  KIRAN from [employer] called re: Alcira APULA  asking if pt has had a 50% reduction in headaches? Aleyda Landaverde of the below and verbalized understanding  Awaiting determination

## 2019-07-24 ENCOUNTER — HOSPITAL ENCOUNTER (EMERGENCY)
Facility: HOSPITAL | Age: 49
Discharge: HOME | End: 2019-07-24
Attending: EMERGENCY MEDICINE
Payer: COMMERCIAL

## 2019-07-24 ENCOUNTER — APPOINTMENT (EMERGENCY)
Dept: CT IMAGING | Facility: HOSPITAL | Age: 49
End: 2019-07-24
Payer: COMMERCIAL

## 2019-07-24 VITALS
RESPIRATION RATE: 16 BRPM | BODY MASS INDEX: 27.87 KG/M2 | SYSTOLIC BLOOD PRESSURE: 120 MMHG | OXYGEN SATURATION: 99 % | HEART RATE: 67 BPM | WEIGHT: 151.46 LBS | TEMPERATURE: 98 F | HEIGHT: 62 IN | DIASTOLIC BLOOD PRESSURE: 74 MMHG

## 2019-07-24 DIAGNOSIS — R42 DIZZINESS: Primary | ICD-10-CM

## 2019-07-24 LAB
ALBUMIN SERPL BCP-MCNC: 3.9 G/DL (ref 3.5–5)
ALP SERPL-CCNC: 72 U/L (ref 46–116)
ALT SERPL W P-5'-P-CCNC: 24 U/L (ref 12–78)
ANION GAP SERPL CALCULATED.3IONS-SCNC: 7 MMOL/L (ref 4–13)
AST SERPL W P-5'-P-CCNC: 15 U/L (ref 5–45)
ATRIAL RATE: 63 BPM
BACTERIA UR QL AUTO: ABNORMAL /HPF
BASOPHILS # BLD AUTO: 0.02 THOUSANDS/ΜL (ref 0–0.1)
BASOPHILS NFR BLD AUTO: 0 % (ref 0–1)
BILIRUB SERPL-MCNC: 0.4 MG/DL (ref 0.2–1)
BILIRUB UR QL STRIP: NEGATIVE
BUN SERPL-MCNC: 21 MG/DL (ref 5–25)
CALCIUM SERPL-MCNC: 8.8 MG/DL (ref 8.3–10.1)
CHLORIDE SERPL-SCNC: 105 MMOL/L (ref 100–108)
CLARITY UR: CLEAR
CO2 SERPL-SCNC: 29 MMOL/L (ref 21–32)
COLOR UR: YELLOW
CREAT SERPL-MCNC: 0.8 MG/DL (ref 0.6–1.3)
EOSINOPHIL # BLD AUTO: 0.03 THOUSAND/ΜL (ref 0–0.61)
EOSINOPHIL NFR BLD AUTO: 1 % (ref 0–6)
ERYTHROCYTE [DISTWIDTH] IN BLOOD BY AUTOMATED COUNT: 12.2 % (ref 11.6–15.1)
EXT PREG TEST URINE: NEGATIVE
EXT. CONTROL ED NAV: NEGATIVE
GFR SERPL CREATININE-BSD FRML MDRD: 88 ML/MIN/1.73SQ M
GLUCOSE SERPL-MCNC: 82 MG/DL (ref 65–140)
GLUCOSE UR STRIP-MCNC: NEGATIVE MG/DL
HCT VFR BLD AUTO: 38.8 % (ref 34.8–46.1)
HGB BLD-MCNC: 12.7 G/DL (ref 11.5–15.4)
HGB UR QL STRIP.AUTO: NEGATIVE
IMM GRANULOCYTES # BLD AUTO: 0.03 THOUSAND/UL (ref 0–0.2)
IMM GRANULOCYTES NFR BLD AUTO: 1 % (ref 0–2)
KETONES UR STRIP-MCNC: NEGATIVE MG/DL
LEUKOCYTE ESTERASE UR QL STRIP: ABNORMAL
LYMPHOCYTES # BLD AUTO: 2.11 THOUSANDS/ΜL (ref 0.6–4.47)
LYMPHOCYTES NFR BLD AUTO: 33 % (ref 14–44)
MCH RBC QN AUTO: 30.3 PG (ref 26.8–34.3)
MCHC RBC AUTO-ENTMCNC: 32.7 G/DL (ref 31.4–37.4)
MCV RBC AUTO: 93 FL (ref 82–98)
MONOCYTES # BLD AUTO: 0.63 THOUSAND/ΜL (ref 0.17–1.22)
MONOCYTES NFR BLD AUTO: 10 % (ref 4–12)
NEUTROPHILS # BLD AUTO: 3.65 THOUSANDS/ΜL (ref 1.85–7.62)
NEUTS SEG NFR BLD AUTO: 55 % (ref 43–75)
NITRITE UR QL STRIP: NEGATIVE
NON-SQ EPI CELLS URNS QL MICRO: ABNORMAL /HPF
NRBC BLD AUTO-RTO: 0 /100 WBCS
P AXIS: 70 DEGREES
PH UR STRIP.AUTO: 7.5 [PH]
PLATELET # BLD AUTO: 225 THOUSANDS/UL (ref 149–390)
PMV BLD AUTO: 9.1 FL (ref 8.9–12.7)
POTASSIUM SERPL-SCNC: 4.3 MMOL/L (ref 3.5–5.3)
PR INTERVAL: 136 MS
PROT SERPL-MCNC: 6.8 G/DL (ref 6.4–8.2)
PROT UR STRIP-MCNC: NEGATIVE MG/DL
QRS AXIS: -83 DEGREES
QRSD INTERVAL: 84 MS
QT INTERVAL: 410 MS
QTC INTERVAL: 419 MS
RBC # BLD AUTO: 4.19 MILLION/UL (ref 3.81–5.12)
RBC #/AREA URNS AUTO: ABNORMAL /HPF
SODIUM SERPL-SCNC: 141 MMOL/L (ref 136–145)
SP GR UR STRIP.AUTO: 1.01 (ref 1–1.03)
T WAVE AXIS: 61 DEGREES
T4 FREE SERPL-MCNC: 0.93 NG/DL (ref 0.76–1.46)
TSH SERPL DL<=0.05 MIU/L-ACNC: 4.39 UIU/ML (ref 0.36–3.74)
UROBILINOGEN UR QL STRIP.AUTO: 0.2 E.U./DL
VENTRICULAR RATE: 63 BPM
WBC # BLD AUTO: 6.47 THOUSAND/UL (ref 4.31–10.16)
WBC #/AREA URNS AUTO: ABNORMAL /HPF

## 2019-07-24 PROCEDURE — 99284 EMERGENCY DEPT VISIT MOD MDM: CPT | Performed by: EMERGENCY MEDICINE

## 2019-07-24 PROCEDURE — 93010 ELECTROCARDIOGRAM REPORT: CPT | Performed by: INTERNAL MEDICINE

## 2019-07-24 PROCEDURE — 84439 ASSAY OF FREE THYROXINE: CPT | Performed by: EMERGENCY MEDICINE

## 2019-07-24 PROCEDURE — 70450 CT HEAD/BRAIN W/O DYE: CPT

## 2019-07-24 PROCEDURE — 99284 EMERGENCY DEPT VISIT MOD MDM: CPT

## 2019-07-24 PROCEDURE — 84443 ASSAY THYROID STIM HORMONE: CPT | Performed by: PSYCHIATRY & NEUROLOGY

## 2019-07-24 PROCEDURE — 96375 TX/PRO/DX INJ NEW DRUG ADDON: CPT

## 2019-07-24 PROCEDURE — 80053 COMPREHEN METABOLIC PANEL: CPT | Performed by: EMERGENCY MEDICINE

## 2019-07-24 PROCEDURE — 96376 TX/PRO/DX INJ SAME DRUG ADON: CPT

## 2019-07-24 PROCEDURE — 36415 COLL VENOUS BLD VENIPUNCTURE: CPT | Performed by: EMERGENCY MEDICINE

## 2019-07-24 PROCEDURE — 96361 HYDRATE IV INFUSION ADD-ON: CPT

## 2019-07-24 PROCEDURE — 85025 COMPLETE CBC W/AUTO DIFF WBC: CPT | Performed by: EMERGENCY MEDICINE

## 2019-07-24 PROCEDURE — 81001 URINALYSIS AUTO W/SCOPE: CPT | Performed by: PSYCHIATRY & NEUROLOGY

## 2019-07-24 PROCEDURE — 96374 THER/PROPH/DIAG INJ IV PUSH: CPT

## 2019-07-24 PROCEDURE — 93005 ELECTROCARDIOGRAM TRACING: CPT

## 2019-07-24 PROCEDURE — 81025 URINE PREGNANCY TEST: CPT | Performed by: PSYCHIATRY & NEUROLOGY

## 2019-07-24 RX ORDER — ONDANSETRON 2 MG/ML
4 INJECTION INTRAMUSCULAR; INTRAVENOUS ONCE
Status: COMPLETED | OUTPATIENT
Start: 2019-07-24 | End: 2019-07-24

## 2019-07-24 RX ORDER — MECLIZINE HYDROCHLORIDE 25 MG/1
25 TABLET ORAL 3 TIMES DAILY PRN
Qty: 30 TABLET | Refills: 0 | Status: SHIPPED | OUTPATIENT
Start: 2019-07-24 | End: 2019-08-06

## 2019-07-24 RX ORDER — MECLIZINE HCL 12.5 MG/1
25 TABLET ORAL ONCE
Status: COMPLETED | OUTPATIENT
Start: 2019-07-24 | End: 2019-07-24

## 2019-07-24 RX ORDER — MECLIZINE HYDROCHLORIDE 25 MG/1
25 TABLET ORAL 3 TIMES DAILY PRN
Qty: 30 TABLET | Refills: 0 | Status: SHIPPED | OUTPATIENT
Start: 2019-07-24 | End: 2019-07-24 | Stop reason: SDUPTHER

## 2019-07-24 RX ORDER — ACETAMINOPHEN 325 MG/1
650 TABLET ORAL ONCE
Status: COMPLETED | OUTPATIENT
Start: 2019-07-24 | End: 2019-07-24

## 2019-07-24 RX ORDER — KETOROLAC TROMETHAMINE 30 MG/ML
15 INJECTION, SOLUTION INTRAMUSCULAR; INTRAVENOUS ONCE
Status: COMPLETED | OUTPATIENT
Start: 2019-07-24 | End: 2019-07-24

## 2019-07-24 RX ADMIN — ONDANSETRON 4 MG: 2 INJECTION INTRAMUSCULAR; INTRAVENOUS at 11:02

## 2019-07-24 RX ADMIN — SODIUM CHLORIDE 1000 ML: 0.9 INJECTION, SOLUTION INTRAVENOUS at 12:46

## 2019-07-24 RX ADMIN — ACETAMINOPHEN 650 MG: 325 TABLET, FILM COATED ORAL at 15:36

## 2019-07-24 RX ADMIN — ONDANSETRON 4 MG: 2 INJECTION INTRAMUSCULAR; INTRAVENOUS at 13:49

## 2019-07-24 RX ADMIN — MECLIZINE 25 MG: 12.5 TABLET ORAL at 15:07

## 2019-07-24 RX ADMIN — KETOROLAC TROMETHAMINE 15 MG: 30 INJECTION, SOLUTION INTRAMUSCULAR at 13:50

## 2019-07-24 NOTE — ED ATTENDING ATTESTATION
Tabatha Allen DO, saw and evaluated the patient  I have discussed the patient with the resident/non-physician practitioner and agree with the resident's/non-physician practitioner's findings, Plan of Care, and MDM as documented in the resident's/non-physician practitioner's note, except where noted  All available labs and Radiology studies were reviewed  I was present for key portions of any procedure(s) performed by the resident/non-physician practitioner and I was immediately available to provide assistance  At this point I agree with the current assessment done in the Emergency Department  I have conducted an independent evaluation of this patient a history and physical is as follows:    Patient is a 45-year-old female history cervical radiculopathy, migraines and anxiety who presents with chief complaint dizziness  Patient describes the dizziness as a combination lightheadedness and room spinning  She states the symptoms are exacerbated by movement and position change  She also admits to gradual onset headache  She states this feels similar to previous migraines  She admits to associated extremity paresthesias  Paresthesias are worse on the left side which is where typical for her cervical radiculopathy  She denies visual changes, speech difficulty, unilateral weakness or other concerns  On exam, patient has decreased sensation to light touch in the left upper and lower extremities  She has no other focal deficits  Sensory deficit is chronic secondary to cervical and lumbar radiculopathy  Cerebellar exam normal   Patient was treated with IV fluids and meclizine with resolution of symptoms  Patient ambulated with a steady gait  She is comfortable with discharge in outpatient follow-up  Will treat for peripheral vertigo  Advised to return to ED sooner if symptoms worsen or she develops visual changes, speech difficulty, weakness or other concerns      Critical Care Time  Procedures

## 2019-07-24 NOTE — TELEPHONE ENCOUNTER
PA denied bec no documentation of response to therapy as defined by 50% reduction in headache days per month (defined as at least 4 hours duration and moderate intensity)       Pls see denial letter (media tab)

## 2019-07-24 NOTE — TELEPHONE ENCOUNTER
If she wants to continue the aimovig, would she need a f/u to document improvement in migraines? You can make a f/u if she is agreeable  Thanks

## 2019-07-24 NOTE — ED PROVIDER NOTES
History  Chief Complaint   Patient presents with    Dizziness     pt reports nausea since   states when she woke up this morning, she felt off balance and like she was going to fall down the stairs  states later she felt "like the room was spinning "     Ismael Hansen is a 54 yo f who presents to the ED with nausea since  and dizziness since 07 this morning  Says she woke up and felt off-balance like she was going to fall down the stairs    Further states today felt like the room was spinning and like she might pass out while on the way to work and to the hospital   No falling, no loss of consciousness  States headaches started at 0900  Reports that she got lost on her way to the hospital, 20 minutes for an 8 minutes commute  Patient has baseline numbness and tingling on her left upper extremity  Endorses bilateral numbness and tingling today, still worse on the left  Has a history of migraines but states that this feels different  Last migraine in April  Says that standing, walking, movement make dizziness worse  Laying and immobility make dizziness better  Drink water and hot chocolate this morning without relief  Took her Zofran with minor relief  Past medical history significant for migraine, GERD, sciatica, anxiety, asthma, hypothyroidism, spondylosis, DVT, depression, lumbar radiculopathy, cervical radiculopathy, sacroiliitis, trochanteric bursitis, HPV, and osteopenia  Surgical history includes endometriosis fulguration, , and colposcopy  Allergy to nuts (anaphylaxis), erythromycin (diarrhea), iodine (hives), shellfish, azithromycin (diarrhea)  Denies tobacco, alcohol, drug use  Patient is on many medicines, polypharmacy, no changes  States that she thinks she is going through menopause and has appointment next week to follow up  Admits to trouble ambulating, dizziness, headache, nausea, visual changes, numbness, tingling, and hot flashes    Denies vomiting, chills, diaphoresis, constipation, urinary difficulties, diarrhea, hearing changes, shortness of breath, chest pain, palpitations, sore throat, fatigue, or weakness  Prior to Admission Medications   Prescriptions Last Dose Informant Patient Reported? Taking? ARIPiprazole (ABILIFY) 30 mg tablet   No No   Sig: Take 1 tablet (30 mg total) by mouth daily at bedtime for 120 days   Aspirin-Acetaminophen-Caffeine (MIGRAINE FORMULA PO)   Yes No   Sig: Take by mouth   BREO ELLIPTA 200-25 MCG/INH inhaler  Self Yes No   [employer] INDOOR[employer]OUTDO[employer]ERGY RLF 10 MG tablet  Self Yes No   Sig: Take 10 mg by mouth daily   EPINEPHrine (EPIPEN) 0 3 mg/0 3 mL SOAJ  Self Yes No   Sig: as directed   OLANZapine (ZyPREXA) 2 5 mg tablet   No No   Sig: Take 1 tab qhs prn migraine  PROAIR RESPICLICK 457 (90 Base) MCG/ACT AEPB   No No   Sig: Inhale 2 puffs every 6 (six) hours as needed (wheezing)   SPIRIVA RESPIMAT 2 5 MCG/ACT AERS  Self Yes No   ZOLMitriptan (ZOMIG-ZMT) 5 MG disintegrating tablet   No No   Sig: TAKE 1 TABLET BY MOUTH AT ONSET OF HEADACHE, MAY REPEAT AFTER 2 HOURS AS NEEDED   MAX 2 TABLETS EVERY DAY   albuterol (2 5 mg/3 mL) 0 083 % nebulizer solution  Self Yes No   Sig: Inhale   baclofen 10 mg tablet   No No   Sig: Take 1 tablet (10 mg total) by mouth daily at bedtime   buPROPion (WELLBUTRIN XL) 150 mg 24 hr tablet   No No   Sig: Take 1 tablet (150 mg total) by mouth daily for 120 days Total Wellbutrin XL dose is 450 mg daily   buPROPion (WELLBUTRIN XL) 300 mg 24 hr tablet   No No   Sig: Take 1 tablet (300 mg total) by mouth daily for 120 days Total Wellbutrin XL dose is 450 mg daily   clonazePAM (KlonoPIN) 1 mg tablet   No No   Sig: Take 0 5-1 tablets (0 5-1 mg total) by mouth 3 (three) times a day for 120 days To be filled on or after 7/20/19   cyclobenzaprine (FLEXERIL) 10 mg tablet   No No   Sig: Take 1 tablet (10 mg total) by mouth 3 (three) times a day as needed for muscle spasms for up to 9 days   diclofenac sodium (VOLTAREN) 50 mg EC tablet   No No   Sig: Take 1 tablet (50 mg total) by mouth 2 (two) times a day for 7 days With food   diphenhydrAMINE (BENADRYL) 25 mg tablet  Self No No   Sig: Take 1 tablet by mouth every 6 (six) hours as needed (for headache, take with phenergan)   ergocalciferol (VITAMIN D2) 50,000 units   Yes No   Sig: TK 1 C WEEKLY   gabapentin (NEURONTIN) 300 mg capsule   No No   Si capsules in am, 2 capsules at noon, and 2 capsules in the evening    hydrOXYzine HCL (ATARAX) 50 mg tablet   No No   Sig: Take 1 tablet (50 mg total) by mouth 3 (three) times a day as needed for anxiety for up to 120 days   ibuprofen (MOTRIN) 600 mg tablet   Yes No   ibuprofen (MOTRIN) 800 mg tablet   No No   Sig: Take 1 tablet (800 mg total) by mouth every 8 (eight) hours as needed for moderate pain   ketorolac (TORADOL) 10 mg tablet   No No   Sig: Take 1 tablet (10 mg total) by mouth every 6 (six) hours as needed (migraine)   levalbuterol (XOPENEX) 1 25 mg/3 mL nebulizer solution  Self Yes No   Sig: Inhale   levonorgestrel (MIRENA, 52 MG,) 20 MCG/24HR IUD  Self Yes No   Sig: by Intrauterine route   methylPREDNISolone 4 MG tablet therapy pack   No No   Sig: Use as directed on package   mometasone (NASONEX) 50 mcg/act nasal spray  Self Yes No   naltrexone (REVIA) 50 mg tablet   No No   Sig: Take 1 tablet (50 mg total) by mouth daily for 120 days   olopatadine (PATANOL) 0 1 % ophthalmic solution  Self Yes No   omeprazole (PriLOSEC) 20 mg delayed release capsule  Self Yes No   Sig: Take 1 capsule by mouth daily   ondansetron (ZOFRAN) 4 mg tablet   No No   Sig: Take 1 tablet (4 mg total) by mouth every 8 (eight) hours as needed for nausea or vomiting   phenazopyridine (PYRIDIUM) 100 mg tablet   No No   Sig: Take 1 tablet (100 mg total) by mouth 3 (three) times a day as needed for bladder spasms   sertraline (ZOLOFT) 100 mg tablet   No No   Sig: Take 2 tablets (200 mg total) by mouth daily at bedtime for 120 days   venlafaxine (EFFEXOR-XR) 150 mg 24 hr capsule   No No   Sig: Take 2 capsules (300 mg total) by mouth daily for 120 days   zolpidem (AMBIEN) 10 mg tablet   No No   Sig: Take 1 tablet (10 mg total) by mouth daily at bedtime as needed for sleep for up to 120 days To be filled on or after 19      Facility-Administered Medications: None       Past Medical History:   Diagnosis Date    Amenorrhea     Anxiety     Asthma     Back pain     Chondromalacia of patella     Chronic fatigue     Deep vein thrombophlebitis of leg (HCC)     Depression     GERD (gastroesophageal reflux disease)     HPV (human papilloma virus) infection     Hypothyroidism     Lumbar radiculopathy     Migraine     Myofascial pain syndrome     Osteopenia     Piriformis syndrome     Sacroiliitis (HCC)     Sciatica     Sleep apnea     Spondylosis of lumbar spine     Trochanteric bursitis of right hip     Vitamin D deficiency        Past Surgical History:   Procedure Laterality Date    CERVIX LESION DESTRUCTION       SECTION      COLONOSCOPY      COLPOSCOPY W/ BIOPSY / CURETTAGE      Colposcopy cervix with biopsy    LAPAROSCOPIC ENDOMETRIOSIS FULGURATION      LAPAROSCOPY      TOOTH EXTRACTION         Family History   Problem Relation Age of Onset    Heart disease Mother     Asthma Daughter     Lung cancer Paternal Grandfather     Asthma Daughter     Colon cancer Father     Colon cancer Maternal Grandfather     Prostate cancer Maternal Grandfather     Colon cancer Maternal Aunt     No Known Problems Maternal Grandmother     No Known Problems Paternal Grandmother     No Known Problems Maternal Aunt     No Known Problems Maternal Aunt     No Known Problems Maternal Aunt     No Known Problems Paternal Aunt     Psychiatric Illness Neg Hx      I have reviewed and agree with the history as documented      Social History     Tobacco Use    Smoking status: Never Smoker    Smokeless tobacco: Never Used   Substance Use Topics    Alcohol use: Not Currently     Comment: Per [employer]; Occasional use    Drug use: Not Currently        Review of Systems   Constitutional: Negative for chills, diaphoresis and fatigue  HENT: Negative for hearing loss and sore throat  Eyes: Positive for visual disturbance  Respiratory: Negative for shortness of breath  Cardiovascular: Negative for chest pain and palpitations  Gastrointestinal: Positive for nausea  Negative for abdominal pain, constipation, diarrhea and vomiting  Genitourinary: Negative for difficulty urinating  Musculoskeletal: Positive for gait problem  Neurological: Positive for dizziness, numbness and headaches  Negative for weakness  All other systems reviewed and are negative  Physical Exam  ED Triage Vitals [07/24/19 0948]   Temperature Pulse Respirations Blood Pressure SpO2   98 °F (36 7 °C) 73 18 127/80 97 %      Temp Source Heart Rate Source Patient Position - Orthostatic VS BP Location FiO2 (%)   Oral Monitor -- -- --      Pain Score       No Pain             Orthostatic Vital Signs  Vitals:    07/24/19 0948 07/24/19 1130   BP: 127/80    Pulse: 73 70       Physical Exam   Constitutional: She is oriented to person, place, and time  She appears well-developed and well-nourished  No distress  HENT:   Head: Normocephalic  Right Ear: External ear normal    Left Ear: External ear normal    Nose: Nose normal    Mouth/Throat: Oropharynx is clear and moist    Eyes: Pupils are equal, round, and reactive to light  Conjunctivae and EOM are normal  Right eye exhibits no discharge  Left eye exhibits no discharge  No scleral icterus  Neck: Normal range of motion  Neck supple  No JVD present  No thyromegaly present  Cardiovascular: Normal rate, regular rhythm, normal heart sounds and intact distal pulses  Exam reveals no gallop and no friction rub  No murmur heard  Pulmonary/Chest: Breath sounds normal  No stridor  No respiratory distress   She has no wheezes  She has no rales  She exhibits no tenderness  Abdominal: Soft  Bowel sounds are normal  She exhibits no distension and no mass  There is no tenderness  There is no rebound and no guarding  No hernia  Musculoskeletal: Normal range of motion  She exhibits no edema or tenderness  Neurological: She is alert and oriented to person, place, and time  A sensory deficit is present  Numbness in b/l UE, R>L   Skin: Skin is warm and dry  No rash noted  She is not diaphoretic  No erythema  Psychiatric: She has a normal mood and affect  Judgment and thought content normal        ED Medications  Medications   sodium chloride 0 9 % bolus 1,000 mL (has no administration in time range)   ondansetron (ZOFRAN) injection 4 mg (4 mg Intravenous Given 7/24/19 1102)       Diagnostic Studies  Results Reviewed     Procedure Component Value Units Date/Time    T4, free [[id number]]     Lab Status:  No result Specimen:  Blood     TSH [[id number]]  (Abnormal) Collected:  07/24/19 1037    Lab Status:  Final result Specimen:  Blood from Arm, Right Updated:  07/24/19 1235     TSH [address] [place] [address] 4 393 uIU/mL     Narrative:       Patients undergoing fluorescein dye angiography may retain small amounts of fluorescein in the body for 48-72 hours post procedure  Samples containing fluorescein can produce falsely depressed TSH values  If the patient had this procedure,a specimen should be resubmitted post fluorescein clearance        UA w Reflex to Microscopic [[id number]]     Lab Status:  No result Specimen:  Urine     POCT pregnancy, urine [[id number]]     Lab Status:  No result     Comprehensive metabolic panel [[id number]] Collected:  07/24/19 1037    Lab Status:  Final result Specimen:  Blood from Arm, Right Updated:  07/24/19 1109     Sodium 141 mmol/L      Potassium 4 3 mmol/L      Chloride 105 mmol/L      CO2 29 mmol/L      ANION GAP 7 mmol/L      BUN 21 mg/dL      Creatinine 0 80 mg/dL      Glucose 82 mg/dL      Calcium 8 8 mg/dL AST 15 U/L      ALT 24 U/L      Alkaline Phosphatase 72 U/L      Total Protein 6 8 g/dL      Albumin 3 9 g/dL      Total Bilirubin 0 40 mg/dL      eGFR 88 ml/min/1 73sq m     Narrative:       [place] guidelines for Chronic Kidney Disease (CKD):     Stage 1 with normal or high GFR (GFR > 90 mL/min/1 73 square meters)    Stage 2 Mild CKD (GFR = 60-89 mL/min/1 73 square meters)    Stage 3A Moderate CKD (GFR = 45-59 mL/min/1 73 square meters)    Stage 3B Moderate CKD (GFR = 30-44 mL/min/1 73 square meters)    Stage 4 Severe CKD (GFR = 15-29 mL/min/1 73 square meters)    Stage 5 End Stage CKD (GFR <15 mL/min/1 73 square meters)  Note: GFR calculation is accurate only with a steady state creatinine    CBC and differential [[id number]] Collected:  07/24/19 1037    Lab Status:  Final result Specimen:  Blood from Arm, Right Updated:  07/24/19 1044     WBC 6 47 Thousand/uL      RBC 4 19 Million/uL      Hemoglobin 12 7 g/dL      Hematocrit 38 8 %      MCV 93 fL      MCH 30 3 pg      MCHC 32 7 g/dL      RDW 12 2 %      MPV 9 1 fL      Platelets 227 Thousands/uL      nRBC 0 /100 WBCs      Neutrophils Relative 55 %      Immat GRANS % 1 %      Lymphocytes Relative 33 %      Monocytes Relative 10 %      Eosinophils Relative 1 %      Basophils Relative 0 %      Neutrophils Absolute 3 65 Thousands/µL      Immature Grans Absolute 0 03 Thousand/uL      Lymphocytes Absolute 2 11 Thousands/µL      Monocytes Absolute 0 63 Thousand/µL      Eosinophils Absolute 0 03 Thousand/µL      Basophils Absolute 0 02 Thousands/µL                  CT head without contrast    (Results Pending)         Procedures  ECG 12 Lead Documentation Only  Date/Time: 7/24/2019 12:42 PM  Performed by: Jean Carlos Cabello DO  Authorized by: Jean Carlos Cabello DO     ECG reviewed by me, the ED Provider: yes    Patient location:  ED  Previous ECG:     Previous ECG:  Compared to current    Comparison ECG info:   Tachycardia on previous, resolved now Similarity:  Changes noted  Interpretation:     Interpretation: normal    Rate:     ECG rate assessment: normal    Rhythm:     Rhythm: sinus rhythm    ST segments:     ST segments:  Normal          MDM  Number of Diagnoses or Management Options  Dizziness:   Diagnosis management comments: Pt presents with a mixed picture of vertigo and syncopal symptoms to explain dizziness and instability  UA, CBC, BMP, TSH, and pregnancy test, EKG, and non-contrast head CT ordered  Pregnancy test negative  TSH was mildly elevated consistent with PMHx of hypothyroidism, not concerning  UA showed trace leukocytes, but otherwise unremarkable  CBC and CMP wnl  EKG showed normal sinus rhythm  Non-contrast CT showed no acute intracranial abnormalities  Pt given 4mg zofran for continued nausua, 15mg Tordol for headaches, and 1L NS  Pt stated after treatment course that nausea had subsided but there was no change in headaches or dizziness  Pt given 25mg Meclazine and 650mg Tylenol with good relief of headache and dizziness  Able to ambulate without difficulty prior to d/c  Pt told to follow up with ENT and her PCP  Told to return to ED with new or worsening symptoms  Disposition  Final diagnoses:   None     ED Disposition     None      Follow-up Information    None         Patient's Medications   Discharge Prescriptions    No medications on file     No discharge procedures on file  ED Provider  Attending physically available and evaluated Lila Gold I managed the patient along with the ED Attending      Electronically Signed by         Aron Coker DO  07/24/19 8552

## 2019-07-25 ENCOUNTER — HOSPITAL ENCOUNTER (INPATIENT)
Facility: HOSPITAL | Age: 49
LOS: 2 days | Discharge: HOME | DRG: 103 | End: 2019-07-28
Attending: EMERGENCY MEDICINE | Admitting: FAMILY MEDICINE
Payer: COMMERCIAL

## 2019-07-25 DIAGNOSIS — R51.9 HEADACHE: ICD-10-CM

## 2019-07-25 DIAGNOSIS — G43.709 CHRONIC MIGRAINE WITHOUT AURA WITHOUT STATUS MIGRAINOSUS, NOT INTRACTABLE: ICD-10-CM

## 2019-07-25 DIAGNOSIS — R42 VERTIGO: Primary | ICD-10-CM

## 2019-07-25 PROBLEM — Z86.711 HISTORY OF PULMONARY EMBOLISM: Status: ACTIVE | Noted: 2019-07-25

## 2019-07-25 PROCEDURE — 99284 EMERGENCY DEPT VISIT MOD MDM: CPT | Performed by: EMERGENCY MEDICINE

## 2019-07-25 PROCEDURE — 99284 EMERGENCY DEPT VISIT MOD MDM: CPT

## 2019-07-25 PROCEDURE — 93005 ELECTROCARDIOGRAM TRACING: CPT

## 2019-07-25 PROCEDURE — 96374 THER/PROPH/DIAG INJ IV PUSH: CPT

## 2019-07-25 PROCEDURE — 99220 PR INITIAL OBSERVATION CARE/DAY 70 MINUTES: CPT | Performed by: NURSE PRACTITIONER

## 2019-07-25 PROCEDURE — 96375 TX/PRO/DX INJ NEW DRUG ADDON: CPT

## 2019-07-25 RX ORDER — GABAPENTIN 300 MG/1
600 CAPSULE ORAL 3 TIMES DAILY
Status: DISCONTINUED | OUTPATIENT
Start: 2019-07-25 | End: 2019-07-28 | Stop reason: HOSPADM

## 2019-07-25 RX ORDER — VENLAFAXINE HYDROCHLORIDE 150 MG/1
300 CAPSULE, EXTENDED RELEASE ORAL DAILY
Status: DISCONTINUED | OUTPATIENT
Start: 2019-07-26 | End: 2019-07-28 | Stop reason: HOSPADM

## 2019-07-25 RX ORDER — DIAZEPAM 5 MG/ML
5 INJECTION, SOLUTION INTRAMUSCULAR; INTRAVENOUS ONCE
Status: COMPLETED | OUTPATIENT
Start: 2019-07-25 | End: 2019-07-25

## 2019-07-25 RX ORDER — ONDANSETRON 2 MG/ML
4 INJECTION INTRAMUSCULAR; INTRAVENOUS ONCE
Status: COMPLETED | OUTPATIENT
Start: 2019-07-25 | End: 2019-07-25

## 2019-07-25 RX ORDER — MAGNESIUM SULFATE HEPTAHYDRATE 40 MG/ML
2 INJECTION, SOLUTION INTRAVENOUS ONCE
Status: COMPLETED | OUTPATIENT
Start: 2019-07-25 | End: 2019-07-26

## 2019-07-25 RX ORDER — METOCLOPRAMIDE HYDROCHLORIDE 5 MG/ML
10 INJECTION INTRAMUSCULAR; INTRAVENOUS ONCE
Status: DISCONTINUED | OUTPATIENT
Start: 2019-07-25 | End: 2019-07-25

## 2019-07-25 RX ORDER — ZOLPIDEM TARTRATE 5 MG/1
10 TABLET ORAL
Status: DISCONTINUED | OUTPATIENT
Start: 2019-07-25 | End: 2019-07-28 | Stop reason: HOSPADM

## 2019-07-25 RX ORDER — KETOROLAC TROMETHAMINE 30 MG/ML
30 INJECTION, SOLUTION INTRAMUSCULAR; INTRAVENOUS ONCE
Status: COMPLETED | OUTPATIENT
Start: 2019-07-25 | End: 2019-07-25

## 2019-07-25 RX ORDER — DIPHENHYDRAMINE HYDROCHLORIDE 50 MG/ML
25 INJECTION INTRAMUSCULAR; INTRAVENOUS ONCE
Status: COMPLETED | OUTPATIENT
Start: 2019-07-25 | End: 2019-07-25

## 2019-07-25 RX ORDER — BACLOFEN 10 MG/1
10 TABLET ORAL
Status: DISCONTINUED | OUTPATIENT
Start: 2019-07-25 | End: 2019-07-28 | Stop reason: HOSPADM

## 2019-07-25 RX ORDER — NALTREXONE HYDROCHLORIDE 50 MG/1
50 TABLET, FILM COATED ORAL DAILY
Status: DISCONTINUED | OUTPATIENT
Start: 2019-07-26 | End: 2019-07-28 | Stop reason: HOSPADM

## 2019-07-25 RX ORDER — PANTOPRAZOLE SODIUM 40 MG/1
40 TABLET, DELAYED RELEASE ORAL
Status: DISCONTINUED | OUTPATIENT
Start: 2019-07-26 | End: 2019-07-28 | Stop reason: HOSPADM

## 2019-07-25 RX ORDER — BUPROPION HYDROCHLORIDE 150 MG/1
450 TABLET ORAL DAILY
Status: DISCONTINUED | OUTPATIENT
Start: 2019-07-26 | End: 2019-07-28 | Stop reason: HOSPADM

## 2019-07-25 RX ORDER — ARIPIPRAZOLE 10 MG/1
30 TABLET ORAL
Status: DISCONTINUED | OUTPATIENT
Start: 2019-07-26 | End: 2019-07-28 | Stop reason: HOSPADM

## 2019-07-25 RX ORDER — BUPROPION HYDROCHLORIDE 150 MG/1
150 TABLET ORAL DAILY
Status: DISCONTINUED | OUTPATIENT
Start: 2019-07-26 | End: 2019-07-25 | Stop reason: DRUGHIGH

## 2019-07-25 RX ORDER — SERTRALINE HYDROCHLORIDE 100 MG/1
200 TABLET, FILM COATED ORAL
Status: DISCONTINUED | OUTPATIENT
Start: 2019-07-25 | End: 2019-07-28 | Stop reason: HOSPADM

## 2019-07-25 RX ADMIN — DIAZEPAM 5 MG: 5 INJECTION, SOLUTION INTRAMUSCULAR; INTRAVENOUS at 22:18

## 2019-07-25 RX ADMIN — KETOROLAC TROMETHAMINE 30 MG: 30 INJECTION, SOLUTION INTRAMUSCULAR at 22:16

## 2019-07-25 RX ADMIN — DIPHENHYDRAMINE HYDROCHLORIDE 25 MG: 50 INJECTION INTRAMUSCULAR; INTRAVENOUS at 22:11

## 2019-07-25 RX ADMIN — MAGNESIUM SULFATE HEPTAHYDRATE 2 G: 40 INJECTION, SOLUTION INTRAVENOUS at 22:21

## 2019-07-25 RX ADMIN — ONDANSETRON 4 MG: 2 INJECTION INTRAMUSCULAR; INTRAVENOUS at 22:27

## 2019-07-25 RX ADMIN — SODIUM CHLORIDE 1000 ML: 0.9 INJECTION, SOLUTION INTRAVENOUS at 22:18

## 2019-07-25 NOTE — LETTER
[facility]  FLOOR MED SURG UNIT  [address] [facility] [address]  Dept: [phone]    July 28, 2019     Patient: Shannon Moss   YOB: 1970   Date of Visit: 7/25/2019       To Whom it May Concern:    Adwoa Muir is under my professional care  She was seen in the hospital from 7/25/2019   to 07/28/19  She may return to work on 8/22/19 without limitations  If you have any questions or concerns, please don't hesitate to call           Sincerely,          Susanna Buckley MD

## 2019-07-26 PROCEDURE — 99225 PR SBSQ OBSERVATION CARE/DAY 25 MINUTES: CPT | Performed by: FAMILY MEDICINE

## 2019-07-26 PROCEDURE — 99254 IP/OBS CNSLTJ NEW/EST MOD 60: CPT | Performed by: PSYCHIATRY & NEUROLOGY

## 2019-07-26 RX ORDER — ONDANSETRON 2 MG/ML
4 INJECTION INTRAMUSCULAR; INTRAVENOUS EVERY 6 HOURS PRN
Status: DISCONTINUED | OUTPATIENT
Start: 2019-07-26 | End: 2019-07-28 | Stop reason: HOSPADM

## 2019-07-26 RX ORDER — ONDANSETRON 2 MG/ML
4 INJECTION INTRAMUSCULAR; INTRAVENOUS EVERY 8 HOURS
Status: DISCONTINUED | OUTPATIENT
Start: 2019-07-26 | End: 2019-07-28 | Stop reason: HOSPADM

## 2019-07-26 RX ORDER — DIHYDROERGOTAMINE MESYLATE 1 MG/ML
1 INJECTION, SOLUTION INTRAMUSCULAR; INTRAVENOUS; SUBCUTANEOUS EVERY 8 HOURS SCHEDULED
Status: DISCONTINUED | OUTPATIENT
Start: 2019-07-26 | End: 2019-07-28 | Stop reason: HOSPADM

## 2019-07-26 RX ORDER — DIPHENHYDRAMINE HYDROCHLORIDE 50 MG/ML
25 INJECTION INTRAMUSCULAR; INTRAVENOUS EVERY 6 HOURS PRN
Status: DISCONTINUED | OUTPATIENT
Start: 2019-07-26 | End: 2019-07-28 | Stop reason: HOSPADM

## 2019-07-26 RX ORDER — DIHYDROERGOTAMINE MESYLATE 1 MG/ML
0.5 INJECTION, SOLUTION INTRAMUSCULAR; INTRAVENOUS; SUBCUTANEOUS ONCE
Status: COMPLETED | OUTPATIENT
Start: 2019-07-26 | End: 2019-07-26

## 2019-07-26 RX ORDER — PROMETHAZINE HYDROCHLORIDE 25 MG/ML
25 INJECTION, SOLUTION INTRAMUSCULAR; INTRAVENOUS EVERY 6 HOURS PRN
Status: DISCONTINUED | OUTPATIENT
Start: 2019-07-26 | End: 2019-07-26

## 2019-07-26 RX ORDER — ZOLMITRIPTAN 5 MG/1
5 TABLET, ORALLY DISINTEGRATING ORAL EVERY 2 HOUR PRN
Status: DISCONTINUED | OUTPATIENT
Start: 2019-07-26 | End: 2019-07-26

## 2019-07-26 RX ORDER — PROCHLORPERAZINE MALEATE 10 MG
5 TABLET ORAL EVERY 6 HOURS PRN
Status: DISCONTINUED | OUTPATIENT
Start: 2019-07-26 | End: 2019-07-28 | Stop reason: HOSPADM

## 2019-07-26 RX ORDER — SODIUM CHLORIDE 9 MG/ML
100 INJECTION, SOLUTION INTRAVENOUS CONTINUOUS
Status: DISCONTINUED | OUTPATIENT
Start: 2019-07-26 | End: 2019-07-28 | Stop reason: HOSPADM

## 2019-07-26 RX ADMIN — ONDANSETRON 4 MG: 2 INJECTION INTRAMUSCULAR; INTRAVENOUS at 15:26

## 2019-07-26 RX ADMIN — VENLAFAXINE HYDROCHLORIDE 300 MG: 150 CAPSULE, EXTENDED RELEASE ORAL at 08:32

## 2019-07-26 RX ADMIN — SODIUM CHLORIDE 100 ML/HR: 0.9 INJECTION, SOLUTION INTRAVENOUS at 00:45

## 2019-07-26 RX ADMIN — DIHYDROERGOTAMINE MESYLATE 0.5 MG: 1 INJECTION, SOLUTION INTRAMUSCULAR; INTRAVENOUS; SUBCUTANEOUS at 15:26

## 2019-07-26 RX ADMIN — ONDANSETRON 4 MG: 2 INJECTION INTRAMUSCULAR; INTRAVENOUS at 00:37

## 2019-07-26 RX ADMIN — NALTREXONE HYDROCHLORIDE 50 MG: 50 TABLET, FILM COATED ORAL at 08:31

## 2019-07-26 RX ADMIN — BACLOFEN 10 MG: 10 TABLET ORAL at 21:02

## 2019-07-26 RX ADMIN — ARIPIPRAZOLE 30 MG: 10 TABLET ORAL at 21:03

## 2019-07-26 RX ADMIN — GABAPENTIN 600 MG: 300 CAPSULE ORAL at 21:02

## 2019-07-26 RX ADMIN — PANTOPRAZOLE SODIUM 40 MG: 40 TABLET, DELAYED RELEASE ORAL at 06:25

## 2019-07-26 RX ADMIN — BUPROPION HYDROCHLORIDE 450 MG: 150 TABLET, FILM COATED, EXTENDED RELEASE ORAL at 08:31

## 2019-07-26 RX ADMIN — SERTRALINE HYDROCHLORIDE 200 MG: 100 TABLET ORAL at 21:02

## 2019-07-26 RX ADMIN — DIHYDROERGOTAMINE MESYLATE 0.5 MG: 1 INJECTION, SOLUTION INTRAMUSCULAR; INTRAVENOUS; SUBCUTANEOUS at 14:12

## 2019-07-26 RX ADMIN — ONDANSETRON 4 MG: 2 INJECTION INTRAMUSCULAR; INTRAVENOUS at 13:39

## 2019-07-26 RX ADMIN — ENOXAPARIN SODIUM 40 MG: 40 INJECTION SUBCUTANEOUS at 08:32

## 2019-07-26 RX ADMIN — DIHYDROERGOTAMINE MESYLATE 1 MG: 1 INJECTION, SOLUTION INTRAMUSCULAR; INTRAVENOUS; SUBCUTANEOUS at 22:49

## 2019-07-26 RX ADMIN — GABAPENTIN 600 MG: 300 CAPSULE ORAL at 08:32

## 2019-07-26 RX ADMIN — ZOLPIDEM TARTRATE 10 MG: 5 TABLET, FILM COATED ORAL at 00:36

## 2019-07-26 RX ADMIN — ONDANSETRON 4 MG: 2 INJECTION INTRAMUSCULAR; INTRAVENOUS at 08:31

## 2019-07-26 RX ADMIN — ONDANSETRON 4 MG: 2 INJECTION INTRAMUSCULAR; INTRAVENOUS at 21:02

## 2019-07-26 RX ADMIN — GABAPENTIN 600 MG: 300 CAPSULE ORAL at 15:29

## 2019-07-26 NOTE — UTILIZATION REVIEW
Initial Clinical Review    Admission: Date/Time/Statement:  admit  OBS status on  7/25  @  2222    Orders Placed This Encounter   Procedures    Place in Observation     Standing Status:   Standing     Number of Occurrences:   1     Order Specific Question:   Admitting Physician     Answer:   Amee Langley [[id number]]     Order Specific Question:   Level of Care     Answer:   Med Surg [[id number]]     ED Arrival Information     Expected Arrival Acuity Means of Arrival Escorted By Service Admission Type    - 7/25/2019 20:46 Urgent Walk-In Self General Medicine Urgent    [address] [facility]        Chief Complaint   Patient presents with    Vertigo - Recurrent     pt was here yesterday for same symptoms and was dx w/ vertigo and sent home with meclizine  pt states it has not helped her and began with dizziness and nausea again today around 430 pm  pt last took meclizine at approx 5 pm with no relief  Assessment/Plan:   54 yo female,  To ED from home,  Admitted OBS status to [facility] level of care,  For treatment of migraine/vertigo  H/o migraines & depression  Yesterday, pt came to ED w/c/o dizziness that was "different than the dizziness that usually comes w/migraines"   Given meclizine and dc home  Returns to ER today w/continued dizziness , worsened by movement, relieved by lying down  Accompanied by nausea (unrelieved by zofran at home)  And some "blurry" vision  On exam, no lateral nystagmus noted, normal strength, no sensory deficit  Given migraine cocktail in ER  Will  Consult neurology, cont home meds for HA and chronic conditions,  Control nausea w/prn IV zofran       7/26  Internal Medicine   Patient is still having significant migraine and nausea  She also has vertigo which she states she has never had before  Will start back her Zomig  Patient states that migraine cocktail does not ever help  Continue Zofran for nausea    Will add on Phenergan this patient has nausea between doses of Zofran  Patient states that Dilaudid has previously helped her migraines  We will see how Zomig helps her before considering Dilaudid  Neurology to see   Jeremiah Earing Vertigo, no improvement  DC'ed meclizine as it is ineffective  This is the 1st time patient has had vertigo with her migraine  Neurology to see patient  I am suspecting that her vertigo is related to her migraine    GIVEN DHE 1 MG IV today,  IV zofran given  0037,  0831, 1339 and 1526  IVF continue at 100 cc/hr       ED Triage Vitals [07/25/19 2053]   Temperature Pulse Respirations Blood Pressure SpO2   98 °F (36 7 °C) 79 16 120/75 94 %      Temp Source Heart Rate Source Patient Position - Orthostatic VS BP Location FiO2 (%)   Oral Monitor Sitting Right arm --      Pain Score       5        Wt Readings from Last 1 Encounters:   07/25/19 68 5 kg (151 lb)     Additional Vital Signs:   07/26/19 0659  97 9 °F (36 6 °C)  71  16  101/65  95 %   07/25/19 2341  98 1 °F (36 7 °C)  71  16  117/58  98 %       Pertinent Labs/Diagnostic Test Results:   7/25  EKG:  Sinus rhythm rate of 69 with no ectopy or ST changes      Results from last 7 days   Lab Units 07/24/19  1037   WBC Thousand/uL 6 47   HEMOGLOBIN g/dL 12 7   HEMATOCRIT % 38 8   PLATELETS Thousands/uL 225   NEUTROS ABS Thousands/µL 3 65         Results from last 7 days   Lab Units 07/24/19  1037   SODIUM mmol/L 141   POTASSIUM mmol/L 4 3   CHLORIDE mmol/L 105   CO2 mmol/L 29   ANION GAP mmol/L 7   BUN mg/dL 21   CREATININE mg/dL 0 80   EGFR ml/min/1 73sq m 88   CALCIUM mg/dL 8 8     Results from last 7 days   Lab Units 07/24/19  1037   AST U/L 15   ALT U/L 24   ALK PHOS U/L 72   TOTAL PROTEIN g/dL 6 8   ALBUMIN g/dL 3 9   TOTAL BILIRUBIN mg/dL 0 40         Results from last 7 days   Lab Units 07/24/19  1037   GLUCOSE RANDOM mg/dL 82         Results from last 7 days   Lab Units 07/24/19  1246   CLARITY UA  Clear   COLOR UA  Yellow   SPEC GRAV UA  1 015   PH UA  7 5   GLUCOSE UA mg/dl Negative   KETONES UA mg/dl Negative   BLOOD UA  Negative   PROTEIN UA mg/dl Negative   NITRITE UA  Negative   BILIRUBIN UA  Negative   UROBILINOGEN UA E U /dl 0 2   LEUKOCYTES UA  Trace*   WBC UA /hpf 0-1*   RBC UA /hpf None Seen   BACTERIA UA /hpf Occasional   EPITHELIAL CELLS WET PREP /hpf Occasional         ED Treatment:   Medication Administration from 07/25/2019 2046 to 07/25/2019 2339       Date/Time Order Dose Route Action     07/25/2019 2218 sodium chloride 0 9 % bolus 1,000 mL 1,000 mL Intravenous New Bag     07/25/2019 2211 diphenhydrAMINE (BENADRYL) injection 25 mg 25 mg Intravenous Given     07/25/2019 2216 ketorolac (TORADOL) injection 30 mg 30 mg Intravenous Given     07/25/2019 2221 magnesium sulfate 2 g/50 mL IVPB (premix) 2 g 2 g Intravenous New Bag     07/25/2019 2218 diazepam (VALIUM) injection 5 mg 5 mg Intravenous Given     07/25/2019 2227 ondansetron (ZOFRAN) injection 4 mg 4 mg Intravenous Given        Past Medical History:   Diagnosis Date    Amenorrhea     Anxiety     Asthma     Back pain     Chondromalacia of patella     Chronic fatigue     Deep vein thrombophlebitis of leg (HCC)     Depression     GERD (gastroesophageal reflux disease)     HPV (human papilloma virus) infection     Hypothyroidism     Lumbar radiculopathy     Migraine     Myofascial pain syndrome     Osteopenia     Piriformis syndrome     Sacroiliitis (HCC)     Sciatica     Sleep apnea     Spondylosis of lumbar spine     Trochanteric bursitis of right hip     Vertigo     Vitamin D deficiency      Present on Admission:   Anxiety and depression   Abnormal TSH   Asthma   Chronic GERD   Headache, chronic migraine without aura, intractable      Admitting Diagnosis: Dizziness [R42]  Vertigo [R42]  Headache [R51]  Age/Sex: 53 y o  female  Admission Orders:   SCD's;  Consult neurology;  IVF NS @  100    Current Facility-Administered Medications:  ARIPiprazole 30 mg Oral HS   baclofen 10 mg Oral HS   buPROPion 450 mg Oral Daily   enoxaparin 40 mg Subcutaneous Daily   gabapentin 600 mg Oral TID   naltrexone 50 mg Oral Daily   ondansetron 4 mg Intravenous Q6H PRN       GIVEN @ 0037 & 0831   pantoprazole 40 mg Oral Early Morning   sertraline 200 mg Oral HS   sodium chloride 100 mL/hr Intravenous Continuous   venlafaxine 300 mg Oral Daily   zolpidem 10 mg Oral HS PRN          GIVEN @ [address] [facility] Utilization Review Department  Phone: [phone]; Fax [phone]  [email or web address]  ATTENTION: Please call with any questions or concerns to [phone]  and carefully listen to the prompts so that you are directed to the right person  Send all requests for admission clinical reviews, approved or denied determinations and any other requests to fax [phone]   All voicemails are confidential

## 2019-07-26 NOTE — ASSESSMENT & PLAN NOTE
· Currently asymptomatic  · Has had suicidal ideation in past  · Continue home meds including Klonopin, Effexor, Wellbutrin, and naltrexone

## 2019-07-26 NOTE — ASSESSMENT & PLAN NOTE
· Last hospitalization for migraine was in April 2019 at [facility]  · Continue home medication

## 2019-07-26 NOTE — ED PROVIDER NOTES
History  Chief Complaint   Patient presents with    Vertigo - Recurrent     pt was here yesterday for same symptoms and was dx w/ vertigo and sent home with meclizine  pt states it has not helped her and began with dizziness and nausea again today around 430 pm  pt last took meclizine at approx 5 pm with no relief  53 y o  Female returns to the emergency department with chief complaint of continued dizziness and headache  Patient was seen yesterday and had an extensive workup and was diagnosed with presumed bppv after improvement with meclizine  However after going home her symptoms returned and were not amenable to meclizine  History provided by:  Patient and medical records   used: No    Dizziness   Quality:  Head spinning and room spinning  Severity:  Moderate  Onset quality:  Gradual  Duration:  2 days  Timing:  Constant  Progression:  Unchanged  Chronicity:  Recurrent  Relieved by:  Nothing  Worsened by:  Nothing  Ineffective treatments:  None tried  Associated symptoms: headaches and nausea    Associated symptoms: no chest pain, no diarrhea, no palpitations, no shortness of breath, no vision changes and no vomiting    Risk factors: hx of vertigo        Prior to Admission Medications   Prescriptions Last Dose Informant Patient Reported? Taking?    ARIPiprazole (ABILIFY) 30 mg tablet 7/24/2019 at Unknown time  No Yes   Sig: Take 1 tablet (30 mg total) by mouth daily at bedtime for 120 days   Aspirin-Acetaminophen-Caffeine (MIGRAINE FORMULA PO) Not Taking at Unknown time  Yes No   Sig: Take by mouth   BREO ELLIPTA 200-25 MCG/INH inhaler Not Taking at Unknown time Self Yes No   [employer] INDOOR/OUTDOOR ALLERGY RLF 10 MG tablet 7/24/2019 at Unknown time Self Yes Yes   Sig: Take 10 mg by mouth daily   EPINEPHrine (EPIPEN) 0 3 mg/0 3 mL SOAJ Not Taking at Unknown time Self Yes No   Sig: as directed   OLANZapine (ZyPREXA) 2 5 mg tablet More than a month at Unknown time  No No   Sig: Take 1 tab qhs prn migraine  [place] [address] (80 Base) MCG/ACT AEPB Not Taking at Unknown time  No No   Sig: Inhale 2 puffs every 6 (six) hours as needed (wheezing)   Patient not taking: Reported on 2019   SPIRIVA RESPIMAT 2 5 MCG/ACT AERS Not Taking at Unknown time Self Yes No   ZOLMitriptan (ZOMIG-ZMT) 5 MG disintegrating tablet Past Month at Unknown time  No Yes   Sig: TAKE 1 TABLET BY MOUTH AT ONSET OF HEADACHE, MAY REPEAT AFTER 2 HOURS AS NEEDED   MAX 2 TABLETS EVERY DAY   albuterol (2 5 mg/3 mL) 0 083 % nebulizer solution Not Taking at Unknown time Self Yes No   Sig: Inhale   baclofen 10 mg tablet 2019 at Unknown time  No Yes   Sig: Take 1 tablet (10 mg total) by mouth daily at bedtime   buPROPion (WELLBUTRIN XL) 150 mg 24 hr tablet 2019 at Unknown time  No Yes   Sig: Take 1 tablet (150 mg total) by mouth daily for 120 days Total Wellbutrin XL dose is 450 mg daily   buPROPion (WELLBUTRIN XL) 300 mg 24 hr tablet 2019 at Unknown time  No Yes   Sig: Take 1 tablet (300 mg total) by mouth daily for 120 days Total Wellbutrin XL dose is 450 mg daily   clonazePAM (KlonoPIN) 1 mg tablet Past Week at Unknown time  No Yes   Sig: Take 0 5-1 tablets (0 5-1 mg total) by mouth 3 (three) times a day for 120 days To be filled on or after 19   cyclobenzaprine (FLEXERIL) 10 mg tablet   No No   Sig: Take 1 tablet (10 mg total) by mouth 3 (three) times a day as needed for muscle spasms for up to 9 days   diclofenac sodium (VOLTAREN) 50 mg EC tablet   No No   Sig: Take 1 tablet (50 mg total) by mouth 2 (two) times a day for 7 days With food   diphenhydrAMINE (BENADRYL) 25 mg tablet Unknown at Unknown time Self No No   Sig: Take 1 tablet by mouth every 6 (six) hours as needed (for headache, take with phenergan)   ergocalciferol (VITAMIN D2) 50,000 units 2019 at Unknown time  Yes Yes   Sig: TK 1 C WEEKLY   gabapentin (NEURONTIN) 300 mg capsule 2019 at Unknown time  No Yes   Si capsules in am, 2 capsules at noon, and 2 capsules in the evening    hydrOXYzine HCL (ATARAX) 50 mg tablet Not Taking at Unknown time  No No   Sig: Take 1 tablet (50 mg total) by mouth 3 (three) times a day as needed for anxiety for up to 120 days   Patient not taking: Reported on 7/25/2019   ibuprofen (MOTRIN) 600 mg tablet Not Taking at Unknown time  Yes No   ibuprofen (MOTRIN) 800 mg tablet Unknown at Unknown time  No No   Sig: Take 1 tablet (800 mg total) by mouth every 8 (eight) hours as needed for moderate pain   ketorolac (TORADOL) 10 mg tablet Not Taking at Unknown time  No No   Sig: Take 1 tablet (10 mg total) by mouth every 6 (six) hours as needed (migraine)   Patient not taking: Reported on 7/25/2019   levalbuterol (XOPENEX) 1 25 mg/3 mL nebulizer solution  Self Yes No   Sig: Inhale   levonorgestrel (MIRENA, 52 MG,) 20 MCG/24HR IUD 7/25/2019 at Unknown time Self Yes Yes   Sig: by Intrauterine route   meclizine (ANTIVERT) 25 mg tablet 7/25/2019 at Unknown time  No Yes   Sig: Take 1 tablet (25 mg total) by mouth 3 (three) times a day as needed for dizziness   methylPREDNISolone 4 MG tablet therapy pack Not Taking at Unknown time  No No   Sig: Use as directed on package   Patient not taking: Reported on 7/25/2019   mometasone (NASONEX) 50 mcg/act nasal spray 7/25/2019 at Unknown time Self Yes Yes   naltrexone (REVIA) 50 mg tablet 7/25/2019 at Unknown time  No Yes   Sig: Take 1 tablet (50 mg total) by mouth daily for 120 days   olopatadine (PATANOL) 0 1 % ophthalmic solution 7/24/2019 at Unknown time Self Yes Yes   omeprazole (PriLOSEC) 20 mg delayed release capsule 7/25/2019 at Unknown time Self Yes Yes   Sig: Take 1 capsule by mouth daily   ondansetron (ZOFRAN) 4 mg tablet 7/25/2019 at Unknown time  No Yes   Sig: Take 1 tablet (4 mg total) by mouth every 8 (eight) hours as needed for nausea or vomiting   phenazopyridine (PYRIDIUM) 100 mg tablet Unknown at Unknown time  No No   Sig: Take 1 tablet (100 mg total) by mouth 3 (three) times a day as needed for bladder spasms   sertraline (ZOLOFT) 100 mg tablet 2019 at Unknown time  No Yes   Sig: Take 2 tablets (200 mg total) by mouth daily at bedtime for 120 days   venlafaxine (EFFEXOR-XR) 150 mg 24 hr capsule 2019 at Unknown time  No Yes   Sig: Take 2 capsules (300 mg total) by mouth daily for 120 days   zolpidem (AMBIEN) 10 mg tablet 2019 at Unknown time  No Yes   Sig: Take 1 tablet (10 mg total) by mouth daily at bedtime as needed for sleep for up to 120 days To be filled on or after 19      Facility-Administered Medications: None       Past Medical History:   Diagnosis Date    Amenorrhea     Anxiety     Asthma     Back pain     Chondromalacia of patella     Chronic fatigue     Deep vein thrombophlebitis of leg (HCC)     Depression     GERD (gastroesophageal reflux disease)     HPV (human papilloma virus) infection     Hypothyroidism     Lumbar radiculopathy     Migraine     Myofascial pain syndrome     Osteopenia     Piriformis syndrome     Sacroiliitis (HCC)     Sciatica     Sleep apnea     Spondylosis of lumbar spine     Trochanteric bursitis of right hip     Vertigo     Vitamin D deficiency        Past Surgical History:   Procedure Laterality Date    CERVIX LESION DESTRUCTION       SECTION      COLONOSCOPY      COLPOSCOPY W/ BIOPSY / CURETTAGE      Colposcopy cervix with biopsy    LAPAROSCOPIC ENDOMETRIOSIS FULGURATION      LAPAROSCOPY      TOOTH EXTRACTION         Family History   Problem Relation Age of Onset    Heart disease Mother     Asthma Daughter     Lung cancer Paternal Grandfather     Asthma Daughter     Colon cancer Father     Colon cancer Maternal Grandfather     Prostate cancer Maternal Grandfather     Colon cancer Maternal Aunt     No Known Problems Maternal Grandmother     No Known Problems Paternal Grandmother     No Known Problems Maternal Aunt     No Known Problems Maternal Aunt     No Known Problems Maternal Aunt     No Known Problems Paternal Aunt     Psychiatric Illness Neg Hx      I have reviewed and agree with the history as documented  Social History     Tobacco Use    Smoking status: Never Smoker    Smokeless tobacco: Never Used   Substance Use Topics    Alcohol use: Not Currently     Comment: Per [employer]; Occasional use    Drug use: Not Currently        Review of Systems   Constitutional: Negative for chills, diaphoresis and fever  Respiratory: Negative for shortness of breath  Cardiovascular: Negative for chest pain and palpitations  Gastrointestinal: Positive for nausea  Negative for diarrhea and vomiting  Genitourinary: Negative for dysuria and frequency  Skin: Negative for rash  Neurological: Positive for dizziness and headaches  All other systems reviewed and are negative  Physical Exam  Physical Exam   Constitutional: She is oriented to person, place, and time  She appears well-developed and well-nourished  She appears distressed  HENT:   Head: Normocephalic and atraumatic  Eyes: Pupils are equal, round, and reactive to light  EOM are normal    Neck: Normal range of motion  No JVD present  Cardiovascular: Normal rate, regular rhythm, normal heart sounds and intact distal pulses  Exam reveals no gallop and no friction rub  No murmur heard  Pulmonary/Chest: Effort normal and breath sounds normal  No respiratory distress  She has no wheezes  She has no rales  She exhibits no tenderness  Musculoskeletal: Normal range of motion  She exhibits no tenderness  Neurological: She is alert and oriented to person, place, and time  Skin: Skin is warm and dry  Psychiatric: She has a normal mood and affect  Her behavior is normal  Judgment and thought content normal    Nursing note and vitals reviewed        Vital Signs  ED Triage Vitals [07/25/19 2053]   Temperature Pulse Respirations Blood Pressure SpO2   98 °F (36 7 °C) 79 16 120/75 94 %      Temp Source Heart Rate Source Patient Position - Orthostatic VS BP Location FiO2 (%)   Oral Monitor Sitting Right arm --      Pain Score       5           Vitals:    07/25/19 2053 07/25/19 2341 07/26/19 0659   BP: 120/75 117/58 101/65   Pulse: 79 71 71   Patient Position - Orthostatic VS: Sitting Lying Lying         Visual Acuity  Visual Acuity      Most Recent Value   L Pupil Size (mm)  3   R Pupil Size (mm)  3          ED Medications  Medications   ARIPiprazole (ABILIFY) tablet 30 mg (30 mg Oral Refused 7/26/19 0040)   baclofen tablet 10 mg (10 mg Oral Refused 7/26/19 0040)   buPROPion (WELLBUTRIN XL) 24 hr tablet 450 mg (450 mg Oral Given 7/26/19 0831)   gabapentin (NEURONTIN) capsule 600 mg (600 mg Oral Given 7/26/19 0832)   naltrexone (REVIA) tablet 50 mg (50 mg Oral Given 7/26/19 0831)   pantoprazole (PROTONIX) EC tablet 40 mg (40 mg Oral Given 7/26/19 0625)   sertraline (ZOLOFT) tablet 200 mg (200 mg Oral Refused 7/26/19 0041)   venlafaxine (EFFEXOR-XR) 24 hr capsule 300 mg (300 mg Oral Given 7/26/19 0832)   zolpidem (AMBIEN) tablet 10 mg (10 mg Oral Given 7/26/19 0036)   sodium chloride 0 9 % infusion (100 mL/hr Intravenous New Bag 7/26/19 0045)   ondansetron (ZOFRAN) injection 4 mg (4 mg Intravenous Given 7/26/19 0831)   enoxaparin (LOVENOX) subcutaneous injection 40 mg (40 mg Subcutaneous Given 7/26/19 0832)   sodium chloride 0 9 % bolus 1,000 mL (1,000 mL Intravenous New Bag 7/25/19 2218)   diphenhydrAMINE (BENADRYL) injection 25 mg (25 mg Intravenous Given 7/25/19 2211)   ketorolac (TORADOL) injection 30 mg (30 mg Intravenous Given 7/25/19 2216)   magnesium sulfate 2 g/50 mL IVPB (premix) 2 g (2 g Intravenous New Bag 7/25/19 2221)   diazepam (VALIUM) injection 5 mg (5 mg Intravenous Given 7/25/19 2218)   ondansetron (ZOFRAN) injection 4 mg (4 mg Intravenous Given 7/25/19 2227)       Diagnostic Studies  Results Reviewed     None                 No orders to display              Procedures  ECG 12 Lead Documentation Only  Date/Time: 7/25/2019 9:51 PM  Performed by: Titus Glasgow MD  Authorized by: Titus Glasgow MD     Indications / Diagnosis:  Dizziness  ECG reviewed by me, the ED Provider: yes    Patient location:  ED  Previous ECG:     Previous ECG:  Compared to current    Comparison ECG info:  Yesterday    Similarity:  No change  Interpretation:     Interpretation: abnormal    Rate:     ECG rate:  69    ECG rate assessment: normal    Rhythm:     Rhythm: sinus rhythm    Ectopy:     Ectopy: none    QRS:     QRS axis:  Left  Conduction:     Conduction: abnormal      Abnormal conduction: incomplete RBBB    ST segments:     ST segments:  Normal  T waves:     T waves: normal             ED Course                               MDM  Number of Diagnoses or Management Options  Headache: new and does not require workup  Vertigo: new and does not require workup  Diagnosis management comments: Will admit for symptomatic treatment, will give migraine cocktail and valium here  Risk of Complications, Morbidity, and/or Mortality  Management options: high    Patient Progress  Patient progress: stable      Disposition  Final diagnoses:   Vertigo   Headache     Time reflects when diagnosis was documented in both MDM as applicable and the Disposition within this note     Time User Action Codes Description Comment    7/25/2019 10:22 PM Hawa Pulse Add [R42] Vertigo     7/25/2019 10:22 PM Hawa Pulse Add [R51] Headache       ED Disposition     ED Disposition Condition Date/Time Comment    Admit Stable u Jul 25, 2019 10:22 PM Case was discussed with Cathi BHAT and the patient's admission status was agreed to be Admission Status: observation status to the service of Dr Charis Sexton           Follow-up Information    None         Current Discharge Medication List      CONTINUE these medications which have NOT CHANGED    Details   ARIPiprazole (ABILIFY) 30 mg tablet Take 1 tablet (30 mg total) by mouth daily at bedtime for 120 days  Qty: 30 tablet, Refills: 3    Associated Diagnoses: Major depressive disorder, recurrent severe without psychotic features (HCC)      baclofen 10 mg tablet Take 1 tablet (10 mg total) by mouth daily at bedtime  Qty: 30 tablet, Refills: 0    Comments: Take 10 mg q h s, and daily p r n  For severe pain  Associated Diagnoses: Intractable chronic migraine without aura and with status migrainosus      !! buPROPion (WELLBUTRIN XL) 150 mg 24 hr tablet Take 1 tablet (150 mg total) by mouth daily for 120 days Total Wellbutrin XL dose is 450 mg daily  Qty: 30 tablet, Refills: 3    Associated Diagnoses: Major depressive disorder, recurrent severe without psychotic features ([facility] [address] )      ! ! buPROPion (WELLBUTRIN XL) 300 mg 24 hr tablet Take 1 tablet (300 mg total) by mouth daily for 120 days Total Wellbutrin XL dose is 450 mg daily  Qty: 30 tablet, Refills: 3    Associated Diagnoses: Major depressive disorder, recurrent severe without psychotic features (HCC)      clonazePAM (KlonoPIN) 1 mg tablet Take 0 5-1 tablets (0 5-1 mg total) by mouth 3 (three) times a day for 120 days To be filled on or after 7/20/19  Qty: 90 tablet, Refills: 3    Associated Diagnoses: KYLE (generalized anxiety disorder); Panic disorder without agoraphobia      [employer] INDOOR/OUTDOOR ALLERGY RLF 10 MG tablet Take 10 mg by mouth daily  Refills: 10      ergocalciferol (VITAMIN D2) 50,000 units TK 1 C WEEKLY  Refills: 0      gabapentin (NEURONTIN) 300 mg capsule 2 capsules in am, 2 capsules at noon, and 2 capsules in the evening    Qty: 150 capsule, Refills: 2    Associated Diagnoses: Chronic migraine without aura without status migrainosus, not intractable      levonorgestrel (MIRENA, 52 MG,) 20 MCG/24HR IUD by Intrauterine route      meclizine (ANTIVERT) 25 mg tablet Take 1 tablet (25 mg total) by mouth 3 (three) times a day as needed for dizziness  Qty: 30 tablet, Refills: 0    Associated Diagnoses: Dizziness      mometasone (NASONEX) 50 mcg/act nasal spray naltrexone (REVIA) 50 mg tablet Take 1 tablet (50 mg total) by mouth daily for 120 days  Qty: 30 tablet, Refills: 3    Associated Diagnoses: Impulse control disorder      olopatadine (PATANOL) 0 1 % ophthalmic solution       omeprazole (PriLOSEC) 20 mg delayed release capsule Take 1 capsule by mouth daily      ondansetron (ZOFRAN) 4 mg tablet Take 1 tablet (4 mg total) by mouth every 8 (eight) hours as needed for nausea or vomiting  Qty: 30 tablet, Refills: 2    Associated Diagnoses: Chronic migraine without aura without status migrainosus, not intractable      sertraline (ZOLOFT) 100 mg tablet Take 2 tablets (200 mg total) by mouth daily at bedtime for 120 days  Qty: 60 tablet, Refills: 3    Associated Diagnoses: Major depressive disorder, recurrent severe without psychotic features (HCC)      venlafaxine (EFFEXOR-XR) 150 mg 24 hr capsule Take 2 capsules (300 mg total) by mouth daily for 120 days  Qty: 60 capsule, Refills: 3    Associated Diagnoses: Major depressive disorder, recurrent severe without psychotic features (HCC); KYLE (generalized anxiety disorder)      ZOLMitriptan (ZOMIG-ZMT) 5 MG disintegrating tablet TAKE 1 TABLET BY MOUTH AT ONSET OF HEADACHE, MAY REPEAT AFTER 2 HOURS AS NEEDED   MAX 2 TABLETS EVERY DAY  Qty: 12 tablet, Refills: 0    Associated Diagnoses: Chronic migraine without aura without status migrainosus, not intractable      zolpidem (AMBIEN) 10 mg tablet Take 1 tablet (10 mg total) by mouth daily at bedtime as needed for sleep for up to 120 days To be filled on or after 6/27/19  Qty: 30 tablet, Refills: 3    Associated Diagnoses: Other insomnia      albuterol (2 5 mg/3 mL) 0 083 % nebulizer solution Inhale      Aspirin-Acetaminophen-Caffeine (MIGRAINE FORMULA PO) Take by mouth      BREO ELLIPTA 200-25 MCG/INH inhaler       cyclobenzaprine (FLEXERIL) 10 mg tablet Take 1 tablet (10 mg total) by mouth 3 (three) times a day as needed for muscle spasms for up to 9 days  Qty: 9 tablet, Refills: 0    Associated Diagnoses: Cervical radiculopathy at C5      diclofenac sodium (VOLTAREN) 50 mg EC tablet Take 1 tablet (50 mg total) by mouth 2 (two) times a day for 7 days With food  Qty: 14 tablet, Refills: 0    Associated Diagnoses: Cervical radiculopathy at C5      diphenhydrAMINE (BENADRYL) 25 mg tablet Take 1 tablet by mouth every 6 (six) hours as needed (for headache, take with phenergan)  Qty: 20 tablet, Refills: 0      EPINEPHrine (EPIPEN) 0 3 mg/0 3 mL SOAJ as directed      hydrOXYzine HCL (ATARAX) 50 mg tablet Take 1 tablet (50 mg total) by mouth 3 (three) times a day as needed for anxiety for up to 120 days  Qty: 90 tablet, Refills: 3    Associated Diagnoses: KYLE (generalized anxiety disorder)      !! ibuprofen (MOTRIN) 600 mg tablet Refills: 0      !! ibuprofen (MOTRIN) 800 mg tablet Take 1 tablet (800 mg total) by mouth every 8 (eight) hours as needed for moderate pain  Qty: 30 tablet, Refills: 1    Associated Diagnoses: Pelvic pain      ketorolac (TORADOL) 10 mg tablet Take 1 tablet (10 mg total) by mouth every 6 (six) hours as needed (migraine)  Qty: 10 tablet, Refills: 0    Associated Diagnoses: Chronic migraine without aura without status migrainosus, not intractable      levalbuterol (XOPENEX) 1 25 mg/3 mL nebulizer solution Inhale      methylPREDNISolone 4 MG tablet therapy pack Use as directed on package  Qty: 1 each, Refills: 0    Associated Diagnoses: Radiculopathy, cervical region      OLANZapine (ZyPREXA) 2 5 mg tablet Take 1 tab qhs prn migraine    Qty: 5 tablet, Refills: 0    Associated Diagnoses: Intractable chronic migraine without aura and with status migrainosus      phenazopyridine (PYRIDIUM) 100 mg tablet Take 1 tablet (100 mg total) by mouth 3 (three) times a day as needed for bladder spasms  Qty: 3 tablet, Refills: 0    Associated Diagnoses: Urinary tract infection symptoms      PROAIR RESPICLICK 647 (90 Base) MCG/ACT AEPB Inhale 2 puffs every 6 (six) hours as needed (wheezing)  Qty: 1 each, Refills: 3    Associated Diagnoses: Uncomplicated asthma, unspecified asthma severity, unspecified whether persistent      SPIRIVA RESPIMAT 2 5 MCG/ACT AERS        ! ! - Potential duplicate medications found  Please discuss with provider  No discharge procedures on file      ED Provider  Electronically Signed by           Tamera Stock MD  07/26/19 1934

## 2019-07-26 NOTE — H&P
[address] [facility]    H&P- Frank Wong 1970, 53 y o  female MRN: [id number]    Unit/Bed#: -01 Encounter: [id number]    Primary Care Provider: Dayami Romero DO   Date and time admitted to hospital: 2019  8:50 PM        * Vertigo  Assessment & Plan  · Started yesterday, was seen for dizziness here, d/c'd with meclizine, not working  · Received migraine cocktail in the ER, although she says this is not her typical migraine  · Neurology consult      History of pulmonary embolism  Assessment & Plan  · History of PE in 2008 after    · DVT prophylaxis    Abnormal TSH  Assessment & Plan  · TSH mildly elevated  · Outpatient follow-up with primary care    Anxiety and depression  Assessment & Plan  · Currently asymptomatic  · Has had suicidal ideation in past  · Continue home meds including Klonopin, Effexor, Wellbutrin, and naltrexone      Headache, chronic migraine without aura, intractable  Assessment & Plan  · Last hospitalization for migraine was in 2019 at [facility]  · Continue home medication  Chronic GERD  Assessment & Plan  · Currently controlled  · Continue home meds      Asthma  Assessment & Plan  · Not currently in exacerbation  · P r n  Nebulizers if becomes symptomatic        VTE Prophylaxis: Enoxaparin (Lovenox)  / sequential compression device   Code Status:  Full  POLST: POLST form is not discussed and not completed at this time  Discussion with family:  Patient    Anticipated Length of Stay:  Patient will be admitted on an Observation basis with an anticipated length of stay of  less than 2 midnights  Justification for Hospital Stay:  Neurology consult for dizziness    Total Time for Visit, including Counseling / Coordination of Care: 30 minutes  Greater than 50% of this total time spent on direct patient counseling and coordination of care  Chief Complaint:   Spinning/vertigo  History of Present Illness:    Frank Wong is a 53 y o  female with a history of migraine, radiculopathy, anxiety and depression who presents with dizziness since yesterday which she describes as different than her recurring migraines  She says that she woke up sat on the edge of the bed and felt like the room was spinning  She grabbed on to the night stand to keep from passing out  She did not fall and she did not lose consciousness  Despite this, she said she was able to drive [employer] her work as an  because [address] [place] [address] know the [place] really well She says the headache started shortly after but was not  Her usual migraine  She says the dizziness is worsened by movement and walking  She says the dizziness is relieved by laying down and being still  She describes a visual disturbance as being blurry both with and without her glasses  She also reports nausea which was unrelieved by Zofran that she has at home  She has a history of chronic back pain with radiculopathy for which she takes medication  Review of Systems:    Review of Systems   Constitutional: Negative for chills and fever  HENT: Negative for ear pain, hearing loss, sinus pressure, sinus pain, sore throat, tinnitus and trouble swallowing  Eyes: Positive for visual disturbance  Respiratory: Negative for cough and shortness of breath  Cardiovascular: Negative for chest pain  Gastrointestinal: Positive for nausea  Negative for abdominal distention, abdominal pain, diarrhea and vomiting  Genitourinary: Negative for dysuria  Musculoskeletal: Positive for back pain  Negative for arthralgias, joint swelling, myalgias and neck stiffness  Skin: Negative for rash  Neurological: Positive for dizziness, light-headedness and headaches  Negative for syncope  Psychiatric/Behavioral: Negative for self-injury and suicidal ideas         Past Medical and Surgical History:     Past Medical History:   Diagnosis Date    Amenorrhea     Anxiety     Asthma     Back pain     Chondromalacia of patella     Chronic fatigue     Deep vein thrombophlebitis of leg (HCC)     Depression     GERD (gastroesophageal reflux disease)     HPV (human papilloma virus) infection     Hypothyroidism     Lumbar radiculopathy     Migraine     Myofascial pain syndrome     Osteopenia     Piriformis syndrome     Sacroiliitis (HCC)     Sciatica     Sleep apnea     Spondylosis of lumbar spine     Trochanteric bursitis of right hip     Vertigo     Vitamin D deficiency        Past Surgical History:   Procedure Laterality Date    CERVIX LESION DESTRUCTION       SECTION      COLONOSCOPY      COLPOSCOPY W/ BIOPSY / CURETTAGE      Colposcopy cervix with biopsy    LAPAROSCOPIC ENDOMETRIOSIS FULGURATION      LAPAROSCOPY      TOOTH EXTRACTION         Meds/Allergies:    Prior to Admission medications    Medication Sig Start Date End Date Taking?  Authorizing Provider   ARIPiprazole (ABILIFY) 30 mg tablet Take 1 tablet (30 mg total) by mouth daily at bedtime for 120 days 6/26/19 10/24/19 Yes Leeanne Shone, MD   baclofen 10 mg tablet Take 1 tablet (10 mg total) by mouth daily at bedtime 4/15/19  Yes Violette Vargas DO   buPROPion (WELLBUTRIN XL) 150 mg 24 hr tablet Take 1 tablet (150 mg total) by mouth daily for 120 days Total Wellbutrin XL dose is 450 mg daily 6/26/19 10/24/19 Yes Leeanne Shone, MD   buPROPion (WELLBUTRIN XL) 300 mg 24 hr tablet Take 1 tablet (300 mg total) by mouth daily for 120 days Total Wellbutrin XL dose is 450 mg daily 6/26/19 10/24/19 Yes Leeanne Shone, MD   clonazePAM (KlonoPIN) 1 mg tablet Take 0 5-1 tablets (0 5-1 mg total) by mouth 3 (three) times a day for 120 days To be filled on or after 19 Yes Leeanne Shone, MD   [facility] INDO[facility]OUTDO[facility]ERGY RLF 10 MG tablet Take 10 mg by mouth daily 18  Yes Historical Provider, MD   ergocalciferol (VITAMIN D2) 50,000 units TK 1 C WEEKLY 7/10/19  Yes Historical Provider, MD   gabapentin (NEURONTIN) 300 mg capsule 2 capsules in am, 2 capsules at noon, and 2 capsules in the evening  3/11/19  Yes Yoni Gilmore PA-C   levonorgestrel (MIRENA, 52 MG,) 20 MCG/24HR IUD by Intrauterine route   Yes Historical Provider, MD   meclizine (ANTIVERT) 25 mg tablet Take 1 tablet (25 mg total) by mouth 3 (three) times a day as needed for dizziness 7/24/19  Yes Nerissa Rosa DO   mometasone (NASONEX) 50 mcg/act nasal spray  3/12/18  Yes Historical Provider, MD   naltrexone (REVIA) 50 mg tablet Take 1 tablet (50 mg total) by mouth daily for 120 days 6/26/19 10/24/19 Yes Faye Aquino MD   olopatadine (PATANOL) 0 1 % ophthalmic solution  3/12/18  Yes Historical Provider, MD   omeprazole (PriLOSEC) 20 mg delayed release capsule Take 1 capsule by mouth daily 1/28/11  Yes Historical Provider, MD   ondansetron (ZOFRAN) 4 mg tablet Take 1 tablet (4 mg total) by mouth every 8 (eight) hours as needed for nausea or vomiting 4/1/19  Yes Yoni Gilmore PA-C   sertraline (ZOLOFT) 100 mg tablet Take 2 tablets (200 mg total) by mouth daily at bedtime for 120 days 6/26/19 10/24/19 Yes Faye Aquino MD   venlafaxine (EFFEXOR-XR) 150 mg 24 hr capsule Take 2 capsules (300 mg total) by mouth daily for 120 days 6/26/19 10/24/19 Yes Faye Aquino MD   ZOLMitriptan (ZOMIG-ZMT) 5 MG disintegrating tablet TAKE 1 TABLET BY MOUTH AT ONSET OF HEADACHE, MAY REPEAT AFTER 2 HOURS AS NEEDED   MAX 2 TABLETS EVERY DAY 4/19/19  Yes Yoni Gilmore PA-C   zolpidem (AMBIEN) 10 mg tablet Take 1 tablet (10 mg total) by mouth daily at bedtime as needed for sleep for up to 120 days To be filled on or after 6/27/19 6/27/19 10/25/19 Yes Faye Aquino MD   albuterol (2 5 mg/3 mL) 0 083 % nebulizer solution Inhale 4/19/13   Historical Provider, MD   Aspirin-Acetaminophen-Caffeine (MIGRAINE FORMULA PO) Take by mouth    Historical Provider, MD Raul Ridley 200-25 MCG/INH inhaler  3/5/18   Historical Provider, MD   cyclobenzaprine (FLEXERIL) 10 mg tablet Take 1 tablet (10 mg total) by mouth 3 (three) times a day as needed for muscle spasms for up to 9 days 7/9/19 7/18/19  Emmy Seip, PA-C   diclofenac sodium (VOLTAREN) 50 mg EC tablet Take 1 tablet (50 mg total) by mouth 2 (two) times a day for 7 days With food 7/9/19 7/16/19  Emmy Seip, PA-C   diphenhydrAMINE (BENADRYL) 25 mg tablet Take 1 tablet by mouth every 6 (six) hours as needed (for headache, take with phenergan) 11/21/17   Gustavo Joe Flatten,    EPINEPHrine (EPIPEN) 0 3 mg/0 3 mL SOAJ as directed 11/15/10   Historical Provider, MD   hydrOXYzine HCL (ATARAX) 50 mg tablet Take 1 tablet (50 mg total) by mouth 3 (three) times a day as needed for anxiety for up to 120 days  Patient not taking: Reported on 7/25/2019 6/26/19 10/24/19  Annett Peabody, MD   ibuprofen (MOTRIN) 600 mg tablet  7/2/19   Historical Provider, MD   ibuprofen (MOTRIN) 800 mg tablet Take 1 tablet (800 mg total) by mouth every 8 (eight) hours as needed for moderate pain 3/26/18   REBEKA Miguel   ketorolac (TORADOL) 10 mg tablet Take 1 tablet (10 mg total) by mouth every 6 (six) hours as needed (migraine)  Patient not taking: Reported on 7/25/2019 4/1/19   Jus Downing PA-C   levalbuterol Wanna Clyde) 1 25 mg/3 mL nebulizer solution Inhale 10/8/10   Historical Provider, MD   methylPREDNISolone 4 MG tablet therapy pack Use as directed on package  Patient not taking: Reported on 7/25/2019 7/11/19   Shantel Aldrich PA-C   OLANZapine (ZyPREXA) 2 5 mg tablet Take 1 tab qhs prn migraine   4/10/19   Jus Downing PA-C   phenazopyridine (PYRIDIUM) 100 mg tablet Take 1 tablet (100 mg total) by mouth 3 (three) times a day as needed for bladder spasms 2/22/19   Jayme Gasca DO   PROAIR RESPICLICK 343 (13 Base) MCG/ACT AEPB Inhale 2 puffs every 6 (six) hours as needed (wheezing)  Patient not taking: Reported on 7/25/2019 4/2/19 4/1/20  Desi Martines, DO   SPIRIVA RESPIMAT 2 5 MCG/ACT AERS  3/5/18   Historical Provider, MD     I have reviewed home medications with patient personally  Allergies: Allergies   Allergen Reactions    Nuts      Other reaction(s): WALNUTS    Erythromycin Diarrhea, GI Intolerance and Vomiting    Iodine Hives    Other      lobster    Shellfish Allergy     Shellfish-Derived Products     Zithromax [Azithromycin] Diarrhea     Can take name brand  Annotation - [id number]: can take brand name  Other reaction(s): Nausea/vomiting/diarrhea       Social History:     Marital Status: /Civil Union   Occupation: administrative assisitant  Patient Pre-hospital Living Situation:  Home  Patient Pre-hospital Level of Mobility:  Independent  Patient Pre-hospital Diet Restrictions:  None  Substance Use History:   Social History     Substance and Sexual Activity   Alcohol Use Not Currently    Comment: Per Allscripts;  Occasional use     Social History     Tobacco Use   Smoking Status Never Smoker   Smokeless Tobacco Never Used     Social History     Substance and Sexual Activity   Drug Use Not Currently       Family History:    Family History   Problem Relation Age of Onset    Heart disease Mother     Asthma Daughter     Lung cancer Paternal Grandfather     Asthma Daughter     Colon cancer Father     Colon cancer Maternal Grandfather     Prostate cancer Maternal Grandfather     Colon cancer Maternal Aunt     No Known Problems Maternal Grandmother     No Known Problems Paternal Grandmother     No Known Problems Maternal Aunt     No Known Problems Maternal Aunt     No Known Problems Maternal Aunt     No Known Problems Paternal Aunt     Psychiatric Illness Neg Hx        Physical Exam:     Vitals:   Blood Pressure: 117/58 (07/25/19 2341)  Pulse: 71 (07/25/19 2341)  Temperature: 98 1 °F (36 7 °C) (07/25/19 2341)  Temp Source: Oral (07/25/19 2341)  Respirations: 16 (07/25/19 2341)  Height: 5' 3" (160 cm) (07/25/19 2341)  Weight - Scale: 68 5 kg (151 lb) (07/25/19 2341)  SpO2: 98 % (07/25/19 2341)    Physical Exam   Constitutional: She is oriented to person, place, and time  She appears well-developed and well-nourished  HENT:   Head: Normocephalic and atraumatic  Mouth/Throat: Oropharynx is clear and moist    Eyes: Pupils are equal, round, and reactive to light  EOM are normal    Neck: Normal range of motion  Neck supple  Cardiovascular: Normal rate, regular rhythm, normal heart sounds and intact distal pulses  Exam reveals no gallop and no friction rub  No murmur heard  Pulmonary/Chest: Effort normal and breath sounds normal  No respiratory distress  Abdominal: Soft  Bowel sounds are normal  She exhibits no distension and no mass  There is no tenderness  There is no guarding  Musculoskeletal: Normal range of motion  She exhibits no edema, tenderness or deformity  Neurological: She is alert and oriented to person, place, and time  She has normal strength  No cranial nerve deficit or sensory deficit  No lateral nystagmus noted   Skin: Skin is warm and dry  Capillary refill takes less than 2 seconds  Psychiatric: She has a normal mood and affect  Nursing note and vitals reviewed  Additional Data:     Lab Results: I have personally reviewed pertinent reports  Results from last 7 days   Lab Units 07/24/19  1037   WBC Thousand/uL 6 47   HEMOGLOBIN g/dL 12 7   HEMATOCRIT % 38 8   PLATELETS Thousands/uL 225   NEUTROS PCT % 55   LYMPHS PCT % 33   MONOS PCT % 10   EOS PCT % 1     Results from last 7 days   Lab Units 07/24/19  1037   SODIUM mmol/L 141   POTASSIUM mmol/L 4 3   CHLORIDE mmol/L 105   CO2 mmol/L 29   BUN mg/dL 21   CREATININE mg/dL 0 80   ANION GAP mmol/L 7   CALCIUM mg/dL 8 8   ALBUMIN g/dL 3 9   TOTAL BILIRUBIN mg/dL 0 40   ALK PHOS U/L 72   ALT U/L 24   AST U/L 15   GLUCOSE RANDOM mg/dL 82                       Imaging: I have personally reviewed pertinent reports        No orders to display       EKG, Pathology, and Other Studies Reviewed on Admission:   · EKG: Sinus rhythm rate of 69 with no ectopy or ST changes  Allscripts / Epic Records Reviewed: Yes     ** Please Note: This note has been constructed using a voice recognition system   **

## 2019-07-26 NOTE — ASSESSMENT & PLAN NOTE
· Last hospitalization for migraine was in April 2019 at [facility]  · Patient is still having significant migraine and nausea  She also has vertigo which she states she has never had before  Will start back her Zomig  Patient states that migraine cocktail does not ever help  Continue Zofran for nausea  Will add on Phenergan this patient has nausea between doses of Zofran  Patient states that Dilaudid has previously helped her migraines  We will see how Zomig helps her before considering Dilaudid  Neurology to see

## 2019-07-26 NOTE — TELEPHONE ENCOUNTER
Please see if you can bring patient in sooner then scheduled appt on 8/14/19  Patient needs new P  MARIETTA for her Aimovig, thanks!

## 2019-07-26 NOTE — ASSESSMENT & PLAN NOTE
· Started yesterday, was seen for dizziness here, d/c'd with meclizine, not working  · Received migraine cocktail in the ER, although she says this is not her typical migraine  · Neurology consult

## 2019-07-26 NOTE — PROGRESS NOTES
Progress Note - Laura Nidia 1970, 53 y o  female MRN: [id number]    Unit/Bed#: -01 Encounter: [id number]    Primary Care Provider: Bridgett Greco DO   Date and time admitted to hospital: 2019  8:50 PM        History of pulmonary embolism  Assessment & Plan  · History of PE in 2008 after    · DVT prophylaxis    Abnormal TSH  Assessment & Plan  · TSH mildly elevated  · Outpatient follow-up with primary care    Anxiety and depression  Assessment & Plan  · Currently asymptomatic  · Has had suicidal ideation in past  · Continue home meds including Klonopin, Effexor, Wellbutrin, and naltrexone      Headache, chronic migraine without aura, intractable  Assessment & Plan  · Last hospitalization for migraine was in 2019 at [facility]  · Patient is still having significant migraine and nausea  She also has vertigo which she states she has never had before  Will start back her Zomig  Patient states that migraine cocktail does not ever help  Continue Zofran for nausea  Will add on Phenergan this patient has nausea between doses of Zofran  Patient states that Dilaudid has previously helped her migraines  We will see how Zomig helps her before considering Dilaudid  Neurology to see  Chronic GERD  Assessment & Plan  · Currently controlled  · Continue home meds      Asthma  Assessment & Plan  · Not currently in exacerbation  · P r n  Nebulizers if becomes symptomatic    * Vertigo  Assessment & Plan  · Started , was seen for dizziness here, d/c'd with meclizine, not working  This is the 1st time patient has had vertigo with her migraine  Neurology to see patient  I am suspecting that her vertigo is related to her migraine  Subjective/Objective     Subjective:   Patient seen and examined this morning with daughter and  at bedside  She is still having a migraine and is still having nausea    She is also still having a vertigo which she has never had before with her migraine  She states the migraine cocktail she received in the emergency room last night did not help and never really helps  Aside from some left shoulder pain which she attributes to lying still she reports no other issues  Objective:  Vitals: Blood pressure 101/65, pulse 71, temperature 97 9 °F (36 6 °C), temperature source Oral, resp  rate 16, height 5' 3" (1 6 m), weight 68 5 kg (151 lb), SpO2 95 %, not currently breastfeeding  ,Body mass index is 26 75 kg/m²  No intake or output data in the 24 hours ending 07/26/19 1245    Invasive Devices     Peripheral Intravenous Line            Peripheral IV 07/25/19 Left Antecubital less than 1 day                Physical Exam: /65 (BP Location: Left arm)   Pulse 71   Temp 97 9 °F (36 6 °C) (Oral)   Resp 16   Ht 5' 3" (1 6 m)   Wt 68 5 kg (151 lb)   SpO2 95%   BMI 26 75 kg/m²   General appearance: alert and oriented, in no acute distress  Head: Normocephalic, without obvious abnormality, atraumatic  Lungs: clear to auscultation bilaterally  Heart: regular rate and rhythm, S1, S2 normal, no murmur, click, rub or gallop  Extremities: extremities normal, warm and well-perfused; no cyanosis, clubbing, or edema  Neurologic: Grossly normal    Lab, Imaging and other studies: I have personally reviewed pertinent reports      VTE Pharmacologic Prophylaxis: Enoxaparin (Lovenox)  VTE Mechanical Prophylaxis: sequential compression device

## 2019-07-26 NOTE — PLAN OF CARE
Problem: PAIN - ADULT  Goal: Verbalizes/displays adequate comfort level or baseline comfort level  Description  Interventions:  - Encourage patient to monitor pain and request assistance  - Assess pain using appropriate pain scale  - Administer analgesics based on type and severity of pain and evaluate response  - Implement non-pharmacological measures as appropriate and evaluate response  - Consider cultural and social influences on pain and pain management  - Notify physician/advanced practitioner if interventions unsuccessful or patient reports new pain  Outcome: Progressing     Problem: INFECTION - ADULT  Goal: Absence or prevention of progression during hospitalization  Description  INTERVENTIONS:  - Assess and monitor for signs and symptoms of infection  - Monitor lab/diagnostic results  - Monitor all insertion sites, i e  indwelling lines, tubes, and drains  - Monitor endotracheal (as able) and nasal secretions for changes in amount and color  - [place] appropriate cooling/warming therapies per order  - Administer medications as ordered  - Instruct and encourage patient and family to use good hand hygiene technique  - Identify and instruct in appropriate isolation precautions for identified infection/condition  Outcome: Progressing  Goal: Absence of fever/infection during neutropenic period  Description  INTERVENTIONS:  - Monitor WBC  - Implement neutropenic guidelines  Outcome: Progressing     Problem: SAFETY ADULT  Goal: Patient will remain free of falls  Description  INTERVENTIONS:  - Assess patient frequently for physical needs  -  Identify cognitive and physical deficits and behaviors that affect risk of falls    -  [place] fall precautions as indicated by assessment   - Educate patient/family on patient safety including physical limitations  - Instruct patient to call for assistance with activity based on assessment  - Modify environment to reduce risk of injury  - Consider OT/PT consult to assist with strengthening/mobility  Outcome: Progressing  Goal: Maintain or return to baseline ADL function  Description  INTERVENTIONS:  -  Assess patient's ability to carry out ADLs; assess patient's baseline for ADL function and identify physical deficits which impact ability to perform ADLs (bathing, care of mouth/teeth, toileting, grooming, dressing, etc )  - Assess/evaluate cause of self-care deficits   - Assess range of motion  - Assess patient's mobility; develop plan if impaired  - Assess patient's need for assistive devices and provide as appropriate  - Encourage maximum independence but intervene and supervise when necessary  ¯ Involve family in performance of ADLs  ¯ Assess for home care needs following discharge   ¯ Request OT consult to assist with ADL evaluation and planning for discharge  ¯ Provide patient education as appropriate  Outcome: Progressing  Goal: Maintain or return mobility status to optimal level  Description  INTERVENTIONS:  - Assess patient's baseline mobility status (ambulation, transfers, stairs, etc )    - Identify cognitive and physical deficits and behaviors that affect mobility  - Identify mobility aids required to assist with transfers and/or ambulation (gait belt, sit-to-stand, lift, walker, cane, etc )  - [place] fall precautions as indicated by assessment  - Record patient progress and toleration of activity level on Mobility SBAR; progress patient to next Phase/Stage  - Instruct patient to call for assistance with activity based on assessment  - Request Rehabilitation consult to assist with strengthening/weightbearing, etc   Outcome: Progressing     Problem: DISCHARGE PLANNING  Goal: Discharge to home or other facility with appropriate resources  Description  INTERVENTIONS:  - Identify barriers to discharge w/patient and caregiver  - Arrange for needed discharge resources and transportation as appropriate  - Identify discharge learning needs (meds, wound care, etc )  - Arrange for interpretive services to assist at discharge as needed  - Refer to Case Management Department for coordinating discharge planning if the patient needs post-hospital services based on physician/advanced practitioner order or complex needs related to functional status, cognitive ability, or social support system  Outcome: Progressing     Problem: Knowledge Deficit  Goal: Patient/family/caregiver demonstrates understanding of disease process, treatment plan, medications, and discharge instructions  Description  Complete learning assessment and assess knowledge base  Interventions:  - Provide teaching at level of understanding  - Provide teaching via preferred learning methods  Outcome: Progressing     Problem: NEUROSENSORY - ADULT  Goal: Achieves stable or improved neurological status  Description  INTERVENTIONS  - Monitor and report changes in neurological status  - Initiate measures to prevent increased intracranial pressure  - Maintain blood pressure and fluid volume within ordered parameters to optimize cerebral perfusion  - Monitor temperature, glucose, and sodium or any other associated labs   Initiate appropriate interventions as ordered  - Monitor for seizure activity   - Administer anti-seizure medications as ordered  Outcome: Progressing  Goal: Achieves maximal functionality and self care  Description  INTERVENTIONS  - Monitor swallowing and airway patency with patient fatigue and changes in neurological status  - Encourage and assist patient to increase activity and self care with guidance from rehab services  - Encourage visually impaired, hearing impaired and aphasic patients to use assistive/communication devices  Outcome: Progressing     Problem: METABOLIC, FLUID AND ELECTROLYTES - ADULT  Goal: Electrolytes maintained within normal limits  Description  INTERVENTIONS:  - Monitor labs and assess patient for signs and symptoms of electrolyte imbalances  - Administer electrolyte replacement as ordered  - Monitor response to electrolyte replacements, including repeat lab results as appropriate  - Instruct patient on fluid and nutrition as appropriate  Outcome: Progressing  Goal: Fluid balance maintained  Description  INTERVENTIONS:  - Monitor labs and assess for signs and symptoms of volume excess or deficit  - Monitor I/O and WT  - Instruct patient on fluid and nutrition as appropriate  Outcome: Progressing

## 2019-07-26 NOTE — ASSESSMENT & PLAN NOTE
· Started 7/24, was seen for dizziness here, d/c'd with meclizine, not working  This is the 1st time patient has had vertigo with her migraine  Neurology to see patient  I am suspecting that her vertigo is related to her migraine

## 2019-07-26 NOTE — CONSULTS
PATIENT NAME: Princess Richardson OF BIRTH: 1970   MEDICAL RECORD NUMBER: [id number]  Chief Complaint/Reason for Consult: nausea/dizziness/headache    HPI: Lea Zhang is a 53 y o  female with history of multiple pain generators, chronic migraine with Admission in April for status migrainosus, see's Piedad Hernández as an outpatient who presented to the hospital with a 5-6 day history of severe nausea with a headache  She is on Aimovig and botox amongst other medications for headache prevention   She took Excedrin migraine which helped but not completely and did help the nausea at the time, and has had nausea non stop since Sunday but didn't go away  She took zofran at home which didn't really help but has had some relief in house with IV Zofran  She didn't try reglan but it hasn't worked in the past  She always gets nausea with her migraines  On Wednesday she woke up at 7 AM and when she went to walk she had a spinning sensation which she has never had  "It's almost like your drunk but your not drunk and you can't control it, and the vision is obstructed"  She just feels blurry  With IV zofran the nausea goes away but has been coming back since the zofran wears off  She gets hand numbness chronically and says she has a "pinched nerve" in her neck  Importantly 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  She has no family history of clotting or anyone in her family having clotting issues         PAST MEDICAL HISTORY  Past Medical History:   Diagnosis Date    Amenorrhea     Anxiety     Asthma     Back pain     Chondromalacia of patella     Chronic fatigue     Deep vein thrombophlebitis of leg (HCC)     Depression     GERD (gastroesophageal reflux disease)     HPV (human papilloma virus) infection     Hypothyroidism     Lumbar radiculopathy     Migraine     Myofascial pain syndrome     Osteopenia     Piriformis syndrome     Sacroiliitis (HCC)     Sciatica     Sleep apnea     Spondylosis of lumbar spine     Trochanteric bursitis of right hip     Vertigo     Vitamin D deficiency         PAST SURGICAL HISTORY  Past Surgical History:   Procedure Laterality Date    CERVIX LESION DESTRUCTION       SECTION      COLONOSCOPY      COLPOSCOPY W/ BIOPSY / CURETTAGE      Colposcopy cervix with biopsy    LAPAROSCOPIC ENDOMETRIOSIS FULGURATION      LAPAROSCOPY      TOOTH EXTRACTION          ALLERGIES: Nuts; Erythromycin; Iodine; Other; Shellfish allergy; Shellfish-derived products; and Zithromax [azithromycin]     CURRENT MEDICATIONS  Scheduled Meds:  Current Facility-Administered Medications:  ARIPiprazole 30 mg Oral HS Mat Gut, CRNP    baclofen 10 mg Oral HS Mat Gut, CRNP    buPROPion 450 mg Oral Daily Mat Gut, CRNP    enoxaparin 40 mg Subcutaneous Daily Mat Gut, CRNP    gabapentin 600 mg Oral TID Mat Gut, CRNP    naltrexone 50 mg Oral Daily Mat Gut, CRNP    ondansetron 4 mg Intravenous Q6H PRN Mat Gut, CRNP    pantoprazole 40 mg Oral Early Morning Mat Gut, CRNP    promethazine 25 mg Intramuscular Q6H PRN Lanelle Handing Jahoor, DO    sertraline 200 mg Oral HS Mat Gut, CRNP    sodium chloride 100 mL/hr Intravenous Continuous Mat Gut, CRNP Last Rate: 100 mL/hr (19)   venlafaxine 300 mg Oral Daily Mat Gut, CRNP    ZOLMitriptan 5 mg Oral Q2H PRN Lanelle Handing Jahoor, DO    zolpidem 10 mg Oral HS PRN Mat Gut, CRNP      Continuous Infusions:  sodium chloride 100 mL/hr Last Rate: 100 mL/hr (19)     PRN Meds: ondansetron    promethazine    ZOLMitriptan    zolpidem     SOCIAL HISTORY   reports that she has never smoked  She has never used smokeless tobacco  She reports that she drank alcohol  She reports that she has current or past drug history       FAMILY HISTORY  Family History   Problem Relation Age of Onset    Heart disease Mother     Asthma Daughter     Lung cancer Paternal Grandfather  Asthma Daughter     Colon cancer Father     Colon cancer Maternal Grandfather     Prostate cancer Maternal Grandfather     Colon cancer Maternal Aunt     No Known Problems Maternal Grandmother     No Known Problems Paternal Grandmother     No Known Problems Maternal Aunt     No Known Problems Maternal Aunt     No Known Problems Maternal Aunt     No Known Problems Paternal Aunt     Psychiatric Illness Neg Hx         REVIEW OF SYSTEMS   Constitutional: Negative for fever, chills, diaphoresis, activity change and appetite change  HEENT: Negative for hearing loss, ear pain, facial swelling, neck pain, neck stiffness, tinnitus and ear discharge  Negative for ocular pain, discharge, redness and itching  Respiratory: Negative for apnea, chest tightness, shortness of breath, wheezing and stridor  Cardiovascular: Negative for chest pain, palpitations and leg swelling  Gastrointestinal: Negative for diarrhea, constipation and abdominal distention  Endocrine: Negative for cold intolerance and heat intolerance  Genitourinary: Negative for dysuria, urgency, frequency, hematuria, flank pain and difficulty urinating  Neurological: Negative for dizziness, seizures, syncope, facial asymmetry, speech difficulty, light-headedness, numbness and headaches  Hematological: Negative for adenopathy  Does not bruise/bleed easily  Psychiatric/Behavioral: Negative for behavioral problems and agitation  Otherwise complete review of systems is negative aside from what is mentioned above and in the HPI  PHYSICAL EXAMINATION  Temp:  [97 9 °F (36 6 °C)-98 1 °F (36 7 °C)] 97 9 °F (36 6 °C)  HR:  [71-79] 71  Resp:  [16] 16  BP: (101-120)/(58-75) 101/65   General Examination: In no apparent distress, well developed and well nourished, and cooperative   HEENT: Normocephalic, Atraumatic  Moist mucus membranes  Anicteric  PPC    CVS: Regular rate and rhythm  S1 S2 noted  No audible murmurs  No carotids bruits   Peripheral pulses palpable throughout   Lungs: Clear to auscultation bilaterally  No rales, rhonchi, wheezing  Abdomen: Bowel sounds positive  Non- tender  Non-distended  No organomegaly  Ext: No edema   Psych: Thought content - No VH/AH  No delusions  Though Process - logical    Skin - No rash    Neurological Examination:   Mental Status: The patient was awake, alert, attentive, oriented to person, place, and time  Recent and remote memory intact to conversation with no evidence of language dysfunction  Satisfactory fund of knowledge  Normal attention span and concentration  Cranial Nerves:   I: smell Not tested   II: visual fields Full to confrontation  Pupils equal, round, reactive to light with normal accomodation  Fundus: benign fundus  III,IV,VI: extraocular muscles EOMI, no nystagmus   V: masseter and pterygoid strength full  Sensation in the V1 through V3 distributions intact to pinprick and light touch bilaterally  VII: Face is symmetric with no weakness noted  VIII: Audition intact to finger rub bilaterally  IX/X: Uvula midline  Soft palate elevation symmetric  XI: Trapezius and SCM strength 5/5 B/L  XII: Tongue midline with no atrophy or fasciculations with appropriate movement  Motor Examination:   No pronator drift  Bulk: Normal  No atrophy Tone: Normal  Fasciculations: None        Deltoid Biceps Triceps WE   WF   FF IO     Right        5         5          5         5      5      5   5        Left           5        5          5          5      5     5   5                       IP        Quad   Ham     TA       Gastroc   Right      5            5          5         5                5  Left         5            5         5         5                5       Reflexes:                   Biceps Brachioradialis Triceps Patella Achilles Plantars   Right          2+            2+                  2+        2+       2+         Down   Left            2+             2+                 2+         2+ 2+         Down     Clonus: None    Pathological Reflexes:  Hoffmans: negative  Babinsky: negative  Jaw Jerk: negative    Coordination: Patient able to perform normal finger-to-nose and heel to shin appropriately  Normal rapid alternating movements  Sensory: Normal sensation to light touch, pin prick and vibratory sensation throughout  Gait:deferred due to nausea  Labs:  No results found for this or any previous visit (from the past 24 hour(s))  panel  Results from last 7 days   Lab Units 07/24/19  1037   POTASSIUM mmol/L 4 3   CHLORIDE mmol/L 105   BUN mg/dL 21   CREATININE mg/dL 0 80    Results from last 7 days   Lab Units 07/24/19  1246 07/24/19  1037   HEMATOCRIT %  --  38 8   HEMOGLOBIN g/dL  --  12 7   MCH pg  --  30 3   MCHC g/dL  --  32 7   MCV fL  --  93   MPV fL  --  9 1   PLATELETS Thousands/uL  --  225   RDW %  --  12 2   WBC UA /hpf 0-1*  --    WBC Thousand/uL  --  6 47          Invalid input(s): LABPT Results from last 7 days   Lab Units 07/24/19  1037   SODIUM mmol/L 141   CO2 mmol/L 29   BUN mg/dL 21   CREATININE mg/dL 0 80    Lab Results   Component Value Date    ALT 24 07/24/2019    AST 15 07/24/2019    ALKPHOS 72 07/24/2019    TBILI 0 40 07/24/2019          Invalid input(s): TRIGLYCERIDE No results found for: HGBA1C TSH i: Lab Results   Component Value Date    TSH 6 250 (H) 06/01/2019    Imaging: CT head         ASSESSMENT/PLAN   54 yo female with nausea and dizziness in setting of status migrainousus     Susptect migraine with visual aura  Grossly non focal    Will start DHE protocol, compazine as well to tx migraine, if symptoms dont improve with headache improvemetn will consider MRI  Started dhe 0 5 mg x 1, then repeat in an hour, then 1 mg q 8 premedicated with zofran  Compazine started  We will continue to follow  Counseled extensively on preventive strategies

## 2019-07-27 PROCEDURE — 99232 SBSQ HOSP IP/OBS MODERATE 35: CPT | Performed by: INTERNAL MEDICINE

## 2019-07-27 PROCEDURE — 99232 SBSQ HOSP IP/OBS MODERATE 35: CPT | Performed by: PSYCHIATRY & NEUROLOGY

## 2019-07-27 RX ORDER — HYDROMORPHONE HCL 110MG/55ML
2 PATIENT CONTROLLED ANALGESIA SYRINGE INTRAVENOUS ONCE
Status: COMPLETED | OUTPATIENT
Start: 2019-07-27 | End: 2019-07-27

## 2019-07-27 RX ADMIN — ZOLPIDEM TARTRATE 10 MG: 5 TABLET, FILM COATED ORAL at 22:32

## 2019-07-27 RX ADMIN — GABAPENTIN 600 MG: 300 CAPSULE ORAL at 08:17

## 2019-07-27 RX ADMIN — ONDANSETRON 4 MG: 2 INJECTION INTRAMUSCULAR; INTRAVENOUS at 21:03

## 2019-07-27 RX ADMIN — BACLOFEN 10 MG: 10 TABLET ORAL at 22:08

## 2019-07-27 RX ADMIN — HYDROMORPHONE HYDROCHLORIDE 1 MG: 2 INJECTION INTRAMUSCULAR; INTRAVENOUS; SUBCUTANEOUS at 12:36

## 2019-07-27 RX ADMIN — VENLAFAXINE HYDROCHLORIDE 300 MG: 150 CAPSULE, EXTENDED RELEASE ORAL at 08:17

## 2019-07-27 RX ADMIN — PROCHLORPERAZINE MALEATE 5 MG: 10 TABLET ORAL at 12:19

## 2019-07-27 RX ADMIN — DIHYDROERGOTAMINE MESYLATE 1 MG: 1 INJECTION, SOLUTION INTRAMUSCULAR; INTRAVENOUS; SUBCUTANEOUS at 16:38

## 2019-07-27 RX ADMIN — SERTRALINE HYDROCHLORIDE 200 MG: 100 TABLET ORAL at 22:08

## 2019-07-27 RX ADMIN — PANTOPRAZOLE SODIUM 40 MG: 40 TABLET, DELAYED RELEASE ORAL at 07:00

## 2019-07-27 RX ADMIN — ONDANSETRON 4 MG: 2 INJECTION INTRAMUSCULAR; INTRAVENOUS at 07:00

## 2019-07-27 RX ADMIN — DIHYDROERGOTAMINE MESYLATE 1 MG: 1 INJECTION, SOLUTION INTRAMUSCULAR; INTRAVENOUS; SUBCUTANEOUS at 22:32

## 2019-07-27 RX ADMIN — SODIUM CHLORIDE 100 ML/HR: 0.9 INJECTION, SOLUTION INTRAVENOUS at 08:16

## 2019-07-27 RX ADMIN — ONDANSETRON 4 MG: 2 INJECTION INTRAMUSCULAR; INTRAVENOUS at 11:54

## 2019-07-27 RX ADMIN — NALTREXONE HYDROCHLORIDE 50 MG: 50 TABLET, FILM COATED ORAL at 08:18

## 2019-07-27 RX ADMIN — GABAPENTIN 600 MG: 300 CAPSULE ORAL at 21:03

## 2019-07-27 RX ADMIN — ONDANSETRON 4 MG: 2 INJECTION INTRAMUSCULAR; INTRAVENOUS at 16:03

## 2019-07-27 RX ADMIN — ARIPIPRAZOLE 30 MG: 10 TABLET ORAL at 22:09

## 2019-07-27 RX ADMIN — BUPROPION HYDROCHLORIDE 450 MG: 150 TABLET, FILM COATED, EXTENDED RELEASE ORAL at 08:17

## 2019-07-27 RX ADMIN — DIPHENHYDRAMINE HYDROCHLORIDE 25 MG: 50 INJECTION, SOLUTION INTRAMUSCULAR; INTRAVENOUS at 12:21

## 2019-07-27 RX ADMIN — ENOXAPARIN SODIUM 40 MG: 40 INJECTION SUBCUTANEOUS at 08:17

## 2019-07-27 RX ADMIN — PROCHLORPERAZINE MALEATE 5 MG: 10 TABLET ORAL at 22:08

## 2019-07-27 RX ADMIN — DIPHENHYDRAMINE HYDROCHLORIDE 25 MG: 50 INJECTION, SOLUTION INTRAMUSCULAR; INTRAVENOUS at 22:08

## 2019-07-27 RX ADMIN — SODIUM CHLORIDE 100 ML/HR: 0.9 INJECTION, SOLUTION INTRAVENOUS at 21:29

## 2019-07-27 RX ADMIN — GABAPENTIN 600 MG: 300 CAPSULE ORAL at 16:38

## 2019-07-27 RX ADMIN — DIHYDROERGOTAMINE MESYLATE 1 MG: 1 INJECTION, SOLUTION INTRAMUSCULAR; INTRAVENOUS; SUBCUTANEOUS at 08:17

## 2019-07-27 NOTE — PROGRESS NOTES
Patient received 1 mg out of 2 mg dose of dilaudid, as ordered by neurology    FLACC 0    Patient did not require repeat dose of dilaudid 1 mg

## 2019-07-27 NOTE — UTILIZATION REVIEW
Continued Stay Review  OBSERVATION 07/25/2019 @ 2222, CONVERTED TO INPATIENT ADMISSION 07/26/2019 @ 1538, DUE TO FURTHER DIAGNOSTIC WORKUP, REQUIRING AT LEAST 2 MIDNIGHT STAY  Date: 07/26/2019 07/26/19 1538  Inpatient Admission Once     Transfer Service: General Medicine       Question Answer Comment   Admitting Physician Colton FRANCIS    Level of Care Med Surg    Estimated length of stay More than 2 Midnights    Certification I certify that inpatient services are medically necessary for this patient for a duration of greater than two midnights  See H&P and MD Progress Notes for additional information about the patient's course of treatment  Current Patient Class:  INPATIENT  Current Level of Care: Med/Surg    HPI:48 y o  female initially admitted on 07/25/2019     Assessment/Plan: Migraine continues with nausea > using IV zofran & dihydroergotamine   07/26/2019  Consult neuro:  52 yo female with nausea and dizziness in setting of status migrainousus  Susptect migraine with visual aura  Grossly non focal    Will start DHE protocol, compazine as well to tx migraine, if symptoms dont improve with headache improvemetn will consider MRI  Started dhe 0 5 mg x 1, then repeat in an hour, then 1 mg q 8 premedicated with zofran  Compazine started  We will continue to follow  Counseled extensively on preventive strategies         Pertinent Labs/Diagnostic Results:   Results from last 7 days   Lab Units 07/24/19  1037   WBC Thousand/uL 6 47   HEMOGLOBIN g/dL 12 7   HEMATOCRIT % 38 8   PLATELETS Thousands/uL 225   NEUTROS ABS Thousands/µL 3 65     Results from last 7 days   Lab Units 07/24/19  1037   SODIUM mmol/L 141   POTASSIUM mmol/L 4 3   CHLORIDE mmol/L 105   CO2 mmol/L 29   ANION GAP mmol/L 7   BUN mg/dL 21   CREATININE mg/dL 0 80   EGFR ml/min/1 73sq m 88   CALCIUM mg/dL 8 8     Results from last 7 days   Lab Units 07/24/19  1037   AST U/L 15   ALT U/L 24   ALK PHOS U/L 72 TOTAL PROTEIN g/dL 6 8   ALBUMIN g/dL 3 9   TOTAL BILIRUBIN mg/dL 0 40     Results from last 7 days   Lab Units 07/24/19  1037   GLUCOSE RANDOM mg/dL 82     Results from last 7 days   Lab Units 07/24/19  1037   TSH 3RD GENERATON uIU/mL 4 393*     Results from last 7 days   Lab Units 07/24/19  1246   CLARITY UA  Clear   COLOR UA  Yellow   SPEC GRAV UA  1 015   PH UA  7 5   GLUCOSE UA mg/dl Negative   KETONES UA mg/dl Negative   BLOOD UA  Negative   PROTEIN UA mg/dl Negative   NITRITE UA  Negative   BILIRUBIN UA  Negative   UROBILINOGEN UA E U /dl 0 2   LEUKOCYTES UA  Trace*   WBC UA /hpf 0-1*   RBC UA /hpf None Seen   BACTERIA UA /hpf Occasional   EPITHELIAL CELLS WET PREP /hpf Occasional     Vital Signs:   07/26/19 2200  98 5 °F (36 9 °C)  71  18  108/70  84  96 %  None (Room air)  Lying   07/26/19 1500  98 3 °F (36 8 °C)  78  18  105/57    94 %  None (Room air)  Lying   07/26/19 0815              None (Room air)     07/26/19 0659  97 9 °F (36 6 °C)  71  16  101/65    95 %  None (Room air)  Lying   07/25/19 2341  98 1 °F (36 7 °C)  71  16  117/58    98 %  None (Room air)  Lying       Medications:   Scheduled Meds:   Current Facility-Administered Medications:  ARIPiprazole 30 mg Oral HS   baclofen 10 mg Oral HS   buPROPion 450 mg Oral Daily   dihydroergotamine 1 mg Intravenous Q8H [facility]   diphenhydrAMINE 25 mg Intravenous Q6H PRN   enoxaparin 40 mg Subcutaneous Daily   gabapentin 600 mg Oral TID   naltrexone 50 mg Oral Daily   ondansetron 4 mg Intravenous Q6H PRN   ondansetron 4 mg Intravenous Q8H   pantoprazole 40 mg Oral Early Morning   prochlorperazine 5 mg Oral Q6H PRN   sertraline 200 mg Oral HS   sodium chloride 100 mL/hr Intravenous Continuous   venlafaxine 300 mg Oral Daily   zolpidem 10 mg Oral HS PRN       Discharge Plan: TBD    Network Utilization Review Department  Phone: [phone]; Fax [phone]  [email or web address]  ATTENTION: Please call with any questions or concerns to [phone]  and carefully listen to the prompts so that you are directed to the right person  Send all requests for admission clinical reviews, approved or denied determinations and any other requests to fax [phone]   All voicemails are confidential

## 2019-07-27 NOTE — PLAN OF CARE
Problem: PAIN - ADULT  Goal: Verbalizes/displays adequate comfort level or baseline comfort level  Description  Interventions:  - Encourage patient to monitor pain and request assistance  - Assess pain using appropriate pain scale  - Administer analgesics based on type and severity of pain and evaluate response  - Implement non-pharmacological measures as appropriate and evaluate response  - Consider cultural and social influences on pain and pain management  - Notify physician/advanced practitioner if interventions unsuccessful or patient reports new pain  Outcome: Progressing     Problem: INFECTION - ADULT  Goal: Absence or prevention of progression during hospitalization  Description  INTERVENTIONS:  - Assess and monitor for signs and symptoms of infection  - Monitor lab/diagnostic results  - Monitor all insertion sites, i e  indwelling lines, tubes, and drains  - Monitor endotracheal (as able) and nasal secretions for changes in amount and color  - [place] appropriate cooling/warming therapies per order  - Administer medications as ordered  - Instruct and encourage patient and family to use good hand hygiene technique  - Identify and instruct in appropriate isolation precautions for identified infection/condition  Outcome: Progressing  Goal: Absence of fever/infection during neutropenic period  Description  INTERVENTIONS:  - Monitor WBC  - Implement neutropenic guidelines  Outcome: Progressing     Problem: SAFETY ADULT  Goal: Patient will remain free of falls  Description  INTERVENTIONS:  - Assess patient frequently for physical needs  -  Identify cognitive and physical deficits and behaviors that affect risk of falls    -  [place] fall precautions as indicated by assessment   - Educate patient/family on patient safety including physical limitations  - Instruct patient to call for assistance with activity based on assessment  - Modify environment to reduce risk of injury  - Consider OT/PT consult to assist with strengthening/mobility  Outcome: Progressing  Goal: Maintain or return to baseline ADL function  Description  INTERVENTIONS:  -  Assess patient's ability to carry out ADLs; assess patient's baseline for ADL function and identify physical deficits which impact ability to perform ADLs (bathing, care of mouth/teeth, toileting, grooming, dressing, etc )  - Assess/evaluate cause of self-care deficits   - Assess range of motion  - Assess patient's mobility; develop plan if impaired  - Assess patient's need for assistive devices and provide as appropriate  - Encourage maximum independence but intervene and supervise when necessary  ¯ Involve family in performance of ADLs  ¯ Assess for home care needs following discharge   ¯ Request OT consult to assist with ADL evaluation and planning for discharge  ¯ Provide patient education as appropriate  Outcome: Progressing  Goal: Maintain or return mobility status to optimal level  Description  INTERVENTIONS:  - Assess patient's baseline mobility status (ambulation, transfers, stairs, etc )    - Identify cognitive and physical deficits and behaviors that affect mobility  - Identify mobility aids required to assist with transfers and/or ambulation (gait belt, sit-to-stand, lift, walker, cane, etc )  - [place] fall precautions as indicated by assessment  - Record patient progress and toleration of activity level on Mobility SBAR; progress patient to next Phase/Stage  - Instruct patient to call for assistance with activity based on assessment  - Request Rehabilitation consult to assist with strengthening/weightbearing, etc   Outcome: Progressing     Problem: DISCHARGE PLANNING  Goal: Discharge to home or other facility with appropriate resources  Description  INTERVENTIONS:  - Identify barriers to discharge w/patient and caregiver  - Arrange for needed discharge resources and transportation as appropriate  - Identify discharge learning needs (meds, wound care, etc )  - Arrange for interpretive services to assist at discharge as needed  - Refer to Case Management Department for coordinating discharge planning if the patient needs post-hospital services based on physician/advanced practitioner order or complex needs related to functional status, cognitive ability, or social support system  Outcome: Progressing     Problem: Knowledge Deficit  Goal: Patient/family/caregiver demonstrates understanding of disease process, treatment plan, medications, and discharge instructions  Description  Complete learning assessment and assess knowledge base  Interventions:  - Provide teaching at level of understanding  - Provide teaching via preferred learning methods  Outcome: Progressing     Problem: NEUROSENSORY - ADULT  Goal: Achieves stable or improved neurological status  Description  INTERVENTIONS  - Monitor and report changes in neurological status  - Initiate measures to prevent increased intracranial pressure  - Maintain blood pressure and fluid volume within ordered parameters to optimize cerebral perfusion  - Monitor temperature, glucose, and sodium or any other associated labs   Initiate appropriate interventions as ordered  - Monitor for seizure activity   - Administer anti-seizure medications as ordered  Outcome: Progressing  Goal: Achieves maximal functionality and self care  Description  INTERVENTIONS  - Monitor swallowing and airway patency with patient fatigue and changes in neurological status  - Encourage and assist patient to increase activity and self care with guidance from rehab services  - Encourage visually impaired, hearing impaired and aphasic patients to use assistive/communication devices  Outcome: Progressing     Problem: METABOLIC, FLUID AND ELECTROLYTES - ADULT  Goal: Electrolytes maintained within normal limits  Description  INTERVENTIONS:  - Monitor labs and assess patient for signs and symptoms of electrolyte imbalances  - Administer electrolyte replacement as ordered  - Monitor response to electrolyte replacements, including repeat lab results as appropriate  - Instruct patient on fluid and nutrition as appropriate  Outcome: Progressing  Goal: Fluid balance maintained  Description  INTERVENTIONS:  - Monitor labs and assess for signs and symptoms of volume excess or deficit  - Monitor I/O and WT  - Instruct patient on fluid and nutrition as appropriate  Outcome: Progressing

## 2019-07-27 NOTE — PROGRESS NOTES
PATIENT NAME: Aleta Prado,  YOB: 1970  MEDICAL RECORD NUMBER: [id number]  Neurology Follow up Note     Following patient for: status migrainosus     Overnight Events: None    Subjective: Patient feels less nausea but more headache today  12 point ROS negative for any changes otherwise  Scheduled Meds:  Current Facility-Administered Medications:  ARIPiprazole 30 mg Oral HS Margi Donerica, CRNP    baclofen 10 mg Oral HS Margi Donath, CRNP    buPROPion 450 mg Oral Daily Margi Marilia, THOMASNP    dihydroergotamine 1 mg Intravenous [facility] Jason Shirley MD    diphenhydrAMINE 25 mg Intravenous Q6H PRN Jason Shirley MD    enoxaparin 40 mg Subcutaneous Daily Margi Capellan, THOMASNP    gabapentin 600 mg Oral TID Margi Capellan, REBEKA    HYDROmorphone 2 mg Intravenous Once Jason Shirley MD    naltrexone 50 mg Oral Daily Margi Marilia, REBEKA    ondansetron 4 mg Intravenous Q6H PRN Margi Capellan, REBEKA    ondansetron 4 mg Intravenous Q8H Jason Shirley MD    pantoprazole 40 mg Oral Early Morning Margi Marilia, THOMASNP    prochlorperazine 5 mg Oral Q6H PRN Jason Shirley MD    sertraline 200 mg Oral HS Margi Marilia, THOMASNP    sodium chloride 100 mL/hr Intravenous Continuous Magri THOMAS CapellanNP Last Rate: 100 mL/hr (07/27/19 0816)   venlafaxine 300 mg Oral Daily Margi Marilia, THOMASNP    zolpidem 10 mg Oral HS PRN Margi Marilia CRNP      Continuous Infusions:  sodium chloride 100 mL/hr Last Rate: 100 mL/hr (07/27/19 0816)     PRN Meds: diphenhydrAMINE    ondansetron    prochlorperazine    zolpidem      Objective  BP 96/55 (BP Location: Right arm)   Pulse 69   Temp 98 2 °F (36 8 °C) (Oral)   Resp 18   Ht 5' 3" (1 6 m)   Wt 68 5 kg (151 lb)   SpO2 95%   BMI 26 75 kg/m²   GEN: Comfortable, NAD  HEENT: NC/AT  Anicteric  PPC  MMM   CVS: RRR  S1S2  No M/R/G    LUNGS: B/L entry, no wheezes, rhonchi or rales  EXT: B/L pulses, no edema  Mental Status:  The patient was awake, alert, attentive, oriented to person, place, and time  Recent and remote memory intact to conversation with no evidence of language dysfunction  Satisfactory fund of knowledge  Cranial Nerves:   I: smell Not tested   II: visual fields Full to confrontation  Pupils equal, round, reactive to light with normal accomodation  III,IV,VI: extraocular muscles EOMI, no nystagmus   V: masseter and pterygoid strength  Sensation in the V1 through V3 distributions intact to pinprick and light touch bilaterally  VII: Face is symmetric with no weakness noted  VIII: Audition intact to finger rub bilaterally  IX/X: Uvula midline  Soft palate elevation symmetric  XI: Trapezius and SCM strength 5/5 B/L  XII: Tongue midline with no atrophy or fasciculations with appropriate movement  Motor Examination:   Bulk: Normal  No atrophy  Tone: Normal   Fasciculations: None  Shoulder Shoulder Elbow    Elbow Wrist    Wrist    Intrinsics   Abduction Adduction Flexion    Ext  Flexion    Ext  Right 5  5  5    5  5    5    5    Left 5  5  5    5  5    5    5            Hip   Hip Knee    Knee      Plantar Dorsi   Flexion   Ext  Flexion    Ext  Flexion Flexion   Right   5   5 5    5      5  5  Left   5   5 5    5      5  5         Reflexes:                   Biceps Brachioradialis Triceps Patella Achilles Plantars   Right          2+            2+                  2+        2+       2+         Down   Left            2+             2+                 2+         2+       2+         Down       Coordination: Patient able to perform normal finger-to-nose and heel to shin appropriately  Normal rapid alternating movements  Sensory: Normal sensation to light touch, pin prick and vibratory sensation throughout  No results found for this or any previous visit (from the past 24 hour(s))  A/P  53 y o  female with status migrainosus  Dizziness better, best in 5 days, tolerating DHE     Headache however worse, suspect she is able to focus on it more with less dizziness  Continue DHE protocol  Diluadid 1 mg may repeat in 30 minutes  No PRN Dosing  She says she responds to the dilaudid and headache abates for up to to a month or two  Very atypical but does not seem drug seeking and has been fairly transparent an dlooks to be on appropriate meds as outpatient so single dose worth trial at this point

## 2019-07-27 NOTE — PLAN OF CARE
Problem: PAIN - ADULT  Goal: Verbalizes/displays adequate comfort level or baseline comfort level  Description  Interventions:  - Encourage patient to monitor pain and request assistance  - Assess pain using appropriate pain scale  - Administer analgesics based on type and severity of pain and evaluate response  - Implement non-pharmacological measures as appropriate and evaluate response  - Consider cultural and social influences on pain and pain management  - Notify physician/advanced practitioner if interventions unsuccessful or patient reports new pain  Outcome: Progressing     Problem: INFECTION - ADULT  Goal: Absence or prevention of progression during hospitalization  Description  INTERVENTIONS:  - Assess and monitor for signs and symptoms of infection  - Monitor lab/diagnostic results  - Monitor all insertion sites, i e  indwelling lines, tubes, and drains  - Monitor endotracheal (as able) and nasal secretions for changes in amount and color  - [place] appropriate cooling/warming therapies per order  - Administer medications as ordered  - Instruct and encourage patient and family to use good hand hygiene technique  - Identify and instruct in appropriate isolation precautions for identified infection/condition  Outcome: Progressing  Goal: Absence of fever/infection during neutropenic period  Description  INTERVENTIONS:  - Monitor WBC  - Implement neutropenic guidelines  Outcome: Progressing     Problem: SAFETY ADULT  Goal: Patient will remain free of falls  Description  INTERVENTIONS:  - Assess patient frequently for physical needs  -  Identify cognitive and physical deficits and behaviors that affect risk of falls    -  [place] fall precautions as indicated by assessment   - Educate patient/family on patient safety including physical limitations  - Instruct patient to call for assistance with activity based on assessment  - Modify environment to reduce risk of injury  - Consider OT/PT consult to assist with strengthening/mobility  Outcome: Progressing  Goal: Maintain or return to baseline ADL function  Description  INTERVENTIONS:  -  Assess patient's ability to carry out ADLs; assess patient's baseline for ADL function and identify physical deficits which impact ability to perform ADLs (bathing, care of mouth/teeth, toileting, grooming, dressing, etc )  - Assess/evaluate cause of self-care deficits   - Assess range of motion  - Assess patient's mobility; develop plan if impaired  - Assess patient's need for assistive devices and provide as appropriate  - Encourage maximum independence but intervene and supervise when necessary  ¯ Involve family in performance of ADLs  ¯ Assess for home care needs following discharge   ¯ Request OT consult to assist with ADL evaluation and planning for discharge  ¯ Provide patient education as appropriate  Outcome: Progressing  Goal: Maintain or return mobility status to optimal level  Description  INTERVENTIONS:  - Assess patient's baseline mobility status (ambulation, transfers, stairs, etc )    - Identify cognitive and physical deficits and behaviors that affect mobility  - Identify mobility aids required to assist with transfers and/or ambulation (gait belt, sit-to-stand, lift, walker, cane, etc )  - [place] fall precautions as indicated by assessment  - Record patient progress and toleration of activity level on Mobility SBAR; progress patient to next Phase/Stage  - Instruct patient to call for assistance with activity based on assessment  - Request Rehabilitation consult to assist with strengthening/weightbearing, etc   Outcome: Progressing     Problem: DISCHARGE PLANNING  Goal: Discharge to home or other facility with appropriate resources  Description  INTERVENTIONS:  - Identify barriers to discharge w/patient and caregiver  - Arrange for needed discharge resources and transportation as appropriate  - Identify discharge learning needs (meds, wound care, etc )  - Arrange for interpretive services to assist at discharge as needed  - Refer to Case Management Department for coordinating discharge planning if the patient needs post-hospital services based on physician/advanced practitioner order or complex needs related to functional status, cognitive ability, or social support system  Outcome: Progressing     Problem: Knowledge Deficit  Goal: Patient/family/caregiver demonstrates understanding of disease process, treatment plan, medications, and discharge instructions  Description  Complete learning assessment and assess knowledge base  Interventions:  - Provide teaching at level of understanding  - Provide teaching via preferred learning methods  Outcome: Progressing     Problem: NEUROSENSORY - ADULT  Goal: Achieves stable or improved neurological status  Description  INTERVENTIONS  - Monitor and report changes in neurological status  - Initiate measures to prevent increased intracranial pressure  - Maintain blood pressure and fluid volume within ordered parameters to optimize cerebral perfusion  - Monitor temperature, glucose, and sodium or any other associated labs   Initiate appropriate interventions as ordered  - Monitor for seizure activity   - Administer anti-seizure medications as ordered  Outcome: Progressing  Goal: Achieves maximal functionality and self care  Description  INTERVENTIONS  - Monitor swallowing and airway patency with patient fatigue and changes in neurological status  - Encourage and assist patient to increase activity and self care with guidance from rehab services  - Encourage visually impaired, hearing impaired and aphasic patients to use assistive/communication devices  Outcome: Progressing     Problem: METABOLIC, FLUID AND ELECTROLYTES - ADULT  Goal: Electrolytes maintained within normal limits  Description  INTERVENTIONS:  - Monitor labs and assess patient for signs and symptoms of electrolyte imbalances  - Administer electrolyte replacement as ordered  - Monitor response to electrolyte replacements, including repeat lab results as appropriate  - Instruct patient on fluid and nutrition as appropriate  Outcome: Progressing  Goal: Fluid balance maintained  Description  INTERVENTIONS:  - Monitor labs and assess for signs and symptoms of volume excess or deficit  - Monitor I/O and WT  - Instruct patient on fluid and nutrition as appropriate  Outcome: Progressing

## 2019-07-28 VITALS
RESPIRATION RATE: 18 BRPM | BODY MASS INDEX: 26.75 KG/M2 | TEMPERATURE: 98.2 F | OXYGEN SATURATION: 97 % | DIASTOLIC BLOOD PRESSURE: 58 MMHG | SYSTOLIC BLOOD PRESSURE: 106 MMHG | HEART RATE: 71 BPM | HEIGHT: 63 IN | WEIGHT: 151 LBS

## 2019-07-28 PROBLEM — R42 VERTIGO: Status: RESOLVED | Noted: 2019-07-25 | Resolved: 2019-07-28

## 2019-07-28 LAB
ATRIAL RATE: 69 BPM
P AXIS: 70 DEGREES
PR INTERVAL: 130 MS
QRS AXIS: -81 DEGREES
QRSD INTERVAL: 88 MS
QT INTERVAL: 410 MS
QTC INTERVAL: 439 MS
T WAVE AXIS: 69 DEGREES
VENTRICULAR RATE: 69 BPM

## 2019-07-28 PROCEDURE — 93010 ELECTROCARDIOGRAM REPORT: CPT | Performed by: INTERNAL MEDICINE

## 2019-07-28 PROCEDURE — 99239 HOSP IP/OBS DSCHRG MGMT >30: CPT | Performed by: INTERNAL MEDICINE

## 2019-07-28 RX ORDER — ZOLMITRIPTAN 5 MG/1
TABLET, ORALLY DISINTEGRATING ORAL
Qty: 12 TABLET | Refills: 0 | Status: SHIPPED | OUTPATIENT
Start: 2019-07-28 | End: 2019-07-30 | Stop reason: SDUPTHER

## 2019-07-28 RX ADMIN — PROCHLORPERAZINE MALEATE 5 MG: 10 TABLET ORAL at 08:02

## 2019-07-28 RX ADMIN — NALTREXONE HYDROCHLORIDE 50 MG: 50 TABLET, FILM COATED ORAL at 08:03

## 2019-07-28 RX ADMIN — ONDANSETRON 4 MG: 2 INJECTION INTRAMUSCULAR; INTRAVENOUS at 05:09

## 2019-07-28 RX ADMIN — PANTOPRAZOLE SODIUM 40 MG: 40 TABLET, DELAYED RELEASE ORAL at 05:09

## 2019-07-28 RX ADMIN — ENOXAPARIN SODIUM 40 MG: 40 INJECTION SUBCUTANEOUS at 08:02

## 2019-07-28 RX ADMIN — DIPHENHYDRAMINE HYDROCHLORIDE 25 MG: 50 INJECTION, SOLUTION INTRAMUSCULAR; INTRAVENOUS at 08:02

## 2019-07-28 RX ADMIN — DIHYDROERGOTAMINE MESYLATE 1 MG: 1 INJECTION, SOLUTION INTRAMUSCULAR; INTRAVENOUS; SUBCUTANEOUS at 05:47

## 2019-07-28 RX ADMIN — GABAPENTIN 600 MG: 300 CAPSULE ORAL at 08:02

## 2019-07-28 RX ADMIN — SODIUM CHLORIDE 100 ML/HR: 0.9 INJECTION, SOLUTION INTRAVENOUS at 08:09

## 2019-07-28 RX ADMIN — VENLAFAXINE HYDROCHLORIDE 300 MG: 150 CAPSULE, EXTENDED RELEASE ORAL at 08:02

## 2019-07-28 RX ADMIN — BUPROPION HYDROCHLORIDE 450 MG: 150 TABLET, FILM COATED, EXTENDED RELEASE ORAL at 08:02

## 2019-07-28 NOTE — PROGRESS NOTES
Progress Note - Fabio Dent 1970, 53 y o  female MRN: [id number]    Unit/Bed#: -01 Encounter: [id number]    Primary Care Provider: Christian Infante DO   Date and time admitted to hospital: 2019  8:50 PM    Anxiety and depression  Assessment & Plan  · Currently asymptomatic  · Has had suicidal ideation in past  · Continue home meds including Klonopin, Effexor, Wellbutrin, and naltrexone      Headache, chronic migraine without aura, intractable  Assessment & Plan  · Last hospitalization for migraine was in 2019 at [facility]  · Intractable HA reported  · Migraine meds ongoing including DHE  · Neurology following          VTE Pharmacologic Prophylaxis: Enoxaparin (Lovenox)  Mechanical: Mechanical VTE prophylaxis in place  Patient Centered Rounds: I have performed bedside rounds with nursing staff today  Discussions with Specialists or Other Care Team Provider:     Education and Discussions with Family / Patient:     Time Spent for Care: More than 50% of total time spent on counseling and coordination of care as described above  Current Length of Stay: 2 day(s)    Current Patient Status: Inpatient   Certification Statement: The patient will continue to require additional inpatient hospital stay due to as above    Discharge Plan:    Code Status: Level 1 - Full Code      Subjective: HA+    Objective:     Vitals:   Temp (24hrs), Av 3 °F (36 8 °C), Min:97 9 °F (36 6 °C), Max:98 7 °F (37 1 °C)    Temp:  [97 9 °F (36 6 °C)-98 7 °F (37 1 °C)] 98 2 °F (36 8 °C)  HR:  [71-73] 71  Resp:  [12-18] 18  BP: (103-111)/(58-63) 106/58  SpO2:  [96 %-97 %] 97 %  Body mass index is 26 75 kg/m²  Input and Output Summary (last 24 hours): Intake/Output Summary (Last 24 hours) at 2019 0926  Last data filed at 2019 0809  Gross per 24 hour   Intake 2388 33 ml   Output    Net 2388 33 ml       Physical Exam   HENT:   Head: Normocephalic     Mouth/Throat: Oropharynx is clear and moist    Eyes: Pupils are equal, round, and reactive to light  Cardiovascular: Normal rate  Neurological:   Sleeping after dilaudid           Additional Data:     Labs:    Results from last 7 days   Lab Units 07/24/19  1037   WBC Thousand/uL 6 47   HEMOGLOBIN g/dL 12 7   HEMATOCRIT % 38 8   PLATELETS Thousands/uL 225   NEUTROS PCT % 55   LYMPHS PCT % 33   MONOS PCT % 10   EOS PCT % 1     Results from last 7 days   Lab Units 07/24/19  1037   POTASSIUM mmol/L 4 3   CHLORIDE mmol/L 105   CO2 mmol/L 29   BUN mg/dL 21   CREATININE mg/dL 0 80   CALCIUM mg/dL 8 8   ALK PHOS U/L 72   ALT U/L 24   AST U/L 15           * I Have Reviewed All Lab Data Listed Above      Imaging:  Imaging Personally Reviewed by Myself Includes:     Cultures:   Blood Culture: No results found for: BLOODCX  Urine Culture:   Lab Results   Component Value Date    URINECX (A) 01/12/2019     70,000-79,000 cfu/ml Beta Hemolytic Streptococcus Group B    URINECX 30,000-39,000 cfu/ml Klebsiella pneumoniae (A) 01/12/2019    URINECX 80,000-89,000 cfu/ml Escherichia coli (A) 02/14/2018    URINECX 10,000-19,000 cfu/ml  02/14/2018     Sputum Culture: No components found for: SPUTUMCX  Wound Culture: No results found for: WOUNDCULT    Last 24 Hours Medication List:     Current Facility-Administered Medications:  ARIPiprazole 30 mg Oral HS Denton Pack, CRNP    baclofen 10 mg Oral HS Denton Pack, CRNP    buPROPion 450 mg Oral Daily Denton Pack, CRNP    dihydroergotamine 1 mg Intravenous [facility] Nicola Dc MD    diphenhydrAMINE 25 mg Intravenous Q6H PRN Nicola Dc MD    enoxaparin 40 mg Subcutaneous Daily Denton Pack, CRNP    gabapentin 600 mg Oral TID Denton Pack, CRNP    naltrexone 50 mg Oral Daily Denton Pack, CRNP    ondansetron 4 mg Intravenous Q6H PRN Denton Pack, CRNP    ondansetron 4 mg Intravenous Q8H Nicola Dc MD    pantoprazole 40 mg Oral Early Morning Denton Pack, CRNP    prochlorperazine 5 mg Oral Q6H PRN Ebenezer Ramires Baylee Rolle MD    sertraline 200 mg Oral HS REBEKA Ferro    sodium chloride 100 mL/hr Intravenous Continuous REBEKA Ferro Last Rate: 100 mL/hr (07/28/19 0809)   venlafaxine 300 mg Oral Daily REBEKA Ferro    zolpidem 10 mg Oral HS PRN REBEKA Ferro         Today, Patient Was Seen By: Emelina Torre MD    ** Please Note: Dragon 360 Dictation voice to text software may have been used in the creation of this document   **

## 2019-07-28 NOTE — PLAN OF CARE
Problem: PAIN - ADULT  Goal: Verbalizes/displays adequate comfort level or baseline comfort level  Description  Interventions:  - Encourage patient to monitor pain and request assistance  - Assess pain using appropriate pain scale  - Administer analgesics based on type and severity of pain and evaluate response  - Implement non-pharmacological measures as appropriate and evaluate response  - Consider cultural and social influences on pain and pain management  - Notify physician/advanced practitioner if interventions unsuccessful or patient reports new pain  Outcome: Progressing     Problem: NEUROSENSORY - ADULT  Goal: Achieves stable or improved neurological status  Description  INTERVENTIONS  - Monitor and report changes in neurological status  - Initiate measures to prevent increased intracranial pressure  - Maintain blood pressure and fluid volume within ordered parameters to optimize cerebral perfusion  - Monitor temperature, glucose, and sodium or any other associated labs   Initiate appropriate interventions as ordered  - Monitor for seizure activity   - Administer anti-seizure medications as ordered  Outcome: Progressing  Goal: Achieves maximal functionality and self care  Description  INTERVENTIONS  - Monitor swallowing and airway patency with patient fatigue and changes in neurological status  - Encourage and assist patient to increase activity and self care with guidance from rehab services  - Encourage visually impaired, hearing impaired and aphasic patients to use assistive/communication devices  Outcome: Progressing     Problem: METABOLIC, FLUID AND ELECTROLYTES - ADULT  Goal: Electrolytes maintained within normal limits  Description  INTERVENTIONS:  - Monitor labs and assess patient for signs and symptoms of electrolyte imbalances  - Administer electrolyte replacement as ordered  - Monitor response to electrolyte replacements, including repeat lab results as appropriate  - Instruct patient on fluid and nutrition as appropriate  Outcome: Progressing  Goal: Fluid balance maintained  Description  INTERVENTIONS:  - Monitor labs and assess for signs and symptoms of volume excess or deficit  - Monitor I/O and WT  - Instruct patient on fluid and nutrition as appropriate  Outcome: Progressing     Problem: INFECTION - ADULT  Goal: Absence or prevention of progression during hospitalization  Description  INTERVENTIONS:  - Assess and monitor for signs and symptoms of infection  - Monitor lab/diagnostic results  - Monitor all insertion sites, i e  indwelling lines, tubes, and drains  - Monitor endotracheal (as able) and nasal secretions for changes in amount and color  - [place] appropriate cooling/warming therapies per order  - Administer medications as ordered  - Instruct and encourage patient and family to use good hand hygiene technique  - Identify and instruct in appropriate isolation precautions for identified infection/condition  Outcome: Adequate for Discharge  Goal: Absence of fever/infection during neutropenic period  Description  INTERVENTIONS:  - Monitor WBC  - Implement neutropenic guidelines  Outcome: Adequate for Discharge     Problem: SAFETY ADULT  Goal: Patient will remain free of falls  Description  INTERVENTIONS:  - Assess patient frequently for physical needs  -  Identify cognitive and physical deficits and behaviors that affect risk of falls    -  [place] fall precautions as indicated by assessment   - Educate patient/family on patient safety including physical limitations  - Instruct patient to call for assistance with activity based on assessment  - Modify environment to reduce risk of injury  - Consider OT/PT consult to assist with strengthening/mobility  Outcome: Adequate for Discharge  Goal: Maintain or return to baseline ADL function  Description  INTERVENTIONS:  -  Assess patient's ability to carry out ADLs; assess patient's baseline for ADL function and identify physical deficits which impact ability to perform ADLs (bathing, care of mouth/teeth, toileting, grooming, dressing, etc )  - Assess/evaluate cause of self-care deficits   - Assess range of motion  - Assess patient's mobility; develop plan if impaired  - Assess patient's need for assistive devices and provide as appropriate  - Encourage maximum independence but intervene and supervise when necessary  ¯ Involve family in performance of ADLs  ¯ Assess for home care needs following discharge   ¯ Request OT consult to assist with ADL evaluation and planning for discharge  ¯ Provide patient education as appropriate  Outcome: Adequate for Discharge  Goal: Maintain or return mobility status to optimal level  Description  INTERVENTIONS:  - Assess patient's baseline mobility status (ambulation, transfers, stairs, etc )    - Identify cognitive and physical deficits and behaviors that affect mobility  - Identify mobility aids required to assist with transfers and/or ambulation (gait belt, sit-to-stand, lift, walker, cane, etc )  - [place] fall precautions as indicated by assessment  - Record patient progress and toleration of activity level on Mobility SBAR; progress patient to next Phase/Stage  - Instruct patient to call for assistance with activity based on assessment  - Request Rehabilitation consult to assist with strengthening/weightbearing, etc   Outcome: Adequate for Discharge     Problem: DISCHARGE PLANNING  Goal: Discharge to home or other facility with appropriate resources  Description  INTERVENTIONS:  - Identify barriers to discharge w/patient and caregiver  - Arrange for needed discharge resources and transportation as appropriate  - Identify discharge learning needs (meds, wound care, etc )  - Arrange for interpretive services to assist at discharge as needed  - Refer to Case Management Department for coordinating discharge planning if the patient needs post-hospital services based on physician/advanced practitioner order or complex needs related to functional status, cognitive ability, or social support system  Outcome: Adequate for Discharge     Problem: Knowledge Deficit  Goal: Patient/family/caregiver demonstrates understanding of disease process, treatment plan, medications, and discharge instructions  Description  Complete learning assessment and assess knowledge base    Interventions:  - Provide teaching at level of understanding  - Provide teaching via preferred learning methods  Outcome: Adequate for Discharge

## 2019-07-28 NOTE — DISCHARGE SUMMARY
Discharge Summary - [facility] Internal Medicine    Patient Information: Abena Paul 53 y o  female MRN: [id number]  Unit/Bed#: -01 Encounter: [id number]    Discharging Physician / Practitioner: Anselmo Naranjo MD  PCP: [place] DO  Admission Date: 7/25/2019  Discharge Date: 07/28/19    Reason for Admission: HA    Discharge Diagnoses:     Principal Problem (Resolved):    Vertigo  Active Problems:    Asthma    Chronic GERD    Headache, chronic migraine without aura, intractable    Anxiety and depression    Abnormal TSH    History of pulmonary embolism      Consultations During Hospital Stay:  · Dr Candy Parks Neurology    Procedures Performed:     · CT Head - nil acute      Incidental Findings:   · none    Test Results Pending at Discharge (will require follow up):   · none     Outpatient Tests Requested:  · none    Complications: none    Hospital Course:     Abena Paul is a 53 y o  female patient who originally presented to the hospital on 7/25/2019 due to intractable occipito - frontal HA over several days associated with suspected Migraine attack  She did not respond to her usual Zomig and therefore presented to hospital  She was treated with HA regimen with DHE and improved over a course of 48 hrs  She reports significant personal stress that could be the driving force behind her headaches  She was recommended time off work and self help methods and possible counseling  Stable at discharge  Condition at Discharge: fair     Discharge Day Visit / Exam:     Vitals: Blood Pressure: 106/58 (07/28/19 0657)  Pulse: 71 (07/28/19 0657)  Temperature: 98 2 °F (36 8 °C) (07/28/19 0657)  Temp Source: Oral (07/28/19 0657)  Respirations: 18 (07/28/19 0657)  Height: 5' 3" (160 cm) (07/25/19 2341)  Weight - Scale: 68 5 kg (151 lb) (07/25/19 2341)  SpO2: 97 % (07/28/19 0657)  Exam:   Physical Exam   Constitutional: She is oriented to person, place, and time  HENT:   Head: Normocephalic     Eyes: Pupils are equal, round, and reactive to light  Cardiovascular: Normal rate and normal heart sounds  Pulmonary/Chest: Effort normal and breath sounds normal    Abdominal: Soft  Bowel sounds are normal    Neurological: She is alert and oriented to person, place, and time  No focal neuro deficit   Skin: There is pallor  Discharge instructions/Information to patient and family:   See after visit summary for information provided to patient and family  Provisions for Follow-Up Care:  See after visit summary for information related to follow-up care and any pertinent home health orders  Disposition: Home    Discharge Statement:  I spent 35 minutes discharging the patient  This time was spent on the day of discharge  I had direct contact with the patient on the day of discharge  Greater than 50% of the total time was spent examining patient, answering all patient questions, arranging and discussing plan of care with patient as well as directly providing post-discharge instructions  Additional time then spent on discharge activities  Discharge Medications:  See after visit summary for reconciled discharge medications provided to patient and family  ** Please Note: Dragon 360 Dictation voice to text software may have been used in the creation of this document   **

## 2019-07-28 NOTE — ASSESSMENT & PLAN NOTE
· Last hospitalization for migraine was in April 2019 at [facility][note 3]  · Intractable HA reported  · Migraine meds ongoing including DHE  · Neurology following

## 2019-07-29 DIAGNOSIS — G43.709 CHRONIC MIGRAINE WITHOUT AURA WITHOUT STATUS MIGRAINOSUS, NOT INTRACTABLE: ICD-10-CM

## 2019-07-29 RX ORDER — GABAPENTIN 300 MG/1
CAPSULE ORAL
Qty: 180 CAPSULE | Refills: 2 | Status: SHIPPED | OUTPATIENT
Start: 2019-07-29 | End: 2019-10-04 | Stop reason: HOSPADM

## 2019-07-29 NOTE — TELEPHONE ENCOUNTER
Pt called Rx line for med refill for her gabapentin 300 mg to be [facility] to [facility]  Pt last ov 4/1/19

## 2019-07-30 ENCOUNTER — TRANSITIONAL CARE MANAGEMENT (OUTPATIENT)
Dept: INTERNAL MEDICINE CLINIC | Facility: CLINIC | Age: 49
End: 2019-07-30

## 2019-07-30 ENCOUNTER — TELEPHONE (OUTPATIENT)
Dept: NEUROLOGY | Facility: CLINIC | Age: 49
End: 2019-07-30

## 2019-07-30 DIAGNOSIS — G43.709 CHRONIC MIGRAINE WITHOUT AURA WITHOUT STATUS MIGRAINOSUS, NOT INTRACTABLE: ICD-10-CM

## 2019-07-30 RX ORDER — ZOLMITRIPTAN 5 MG/1
TABLET, ORALLY DISINTEGRATING ORAL
Qty: 12 TABLET | Refills: 0 | Status: SHIPPED | OUTPATIENT
Start: 2019-07-30 | End: 2019-10-04 | Stop reason: SDUPTHER

## 2019-07-30 NOTE — TELEPHONE ENCOUNTER
Regarding: Non-Urgent Medical Question  ----- Message from Tamara Fine RN sent at 7/30/2019 11:43 AM EDT -----       ----- Message from Colton Freire to Pietro Nichole PA-C sent at 7/29/2019  3:36 PM -----   [place] afternoon Michelle Agent! I was just released from the [facility] late yesterday afternoon for a migraine with different effects this time around  The doctor who released me wrote that I could return to work on 08/22/2019  I really feel I well enough to return to work a lot sooner than he had stated  Is there any way that you could provide me with a different note showing that I can return to work sooner than he stated? Also, he wrote me a prescription for Charolotte Warner since I'm currently out, I took it to get filled & my pharmacy had stated that it needs to be given authorization through the hospital & my insurance company, I can't get it filled  Can you call in a script for it, please? I did see one of your staff on the in-patient team if that helps any  If you feel it's better to discuss this matter over the phone, please call me at [phone]

## 2019-07-30 NOTE — TELEPHONE ENCOUNTER
Called patient LVm for patient to call back about getting Aimovig  PA done  Patient is also on Botox and need to know if she want to stop Botox and do Aimovig

## 2019-07-30 NOTE — LETTER
July 30, 2019     Patient: James Dutton   YOB: 1970   Date of Visit: 7/30/2019       To Whom It May Concern: It is my medical opinion that Tiffanie Malik may return to work on 8/5/2019 or earlier, depending on when she is ready  This is pending her improvements made after hospital discharge  If she feels that she needs more time off she will let her neurology office know  We will continue to follow her as scheduled  If you have any questions or concerns, please don't hesitate to call           Sincerely,        Mignon Bauman PA-C    CC: Sharlene Patricia,

## 2019-07-31 DIAGNOSIS — G43.709 CHRONIC MIGRAINE WITHOUT AURA WITHOUT STATUS MIGRAINOSUS, NOT INTRACTABLE: Primary | ICD-10-CM

## 2019-08-01 RX ORDER — ONDANSETRON 4 MG/1
4 TABLET, ORALLY DISINTEGRATING ORAL EVERY 6 HOURS PRN
Qty: 20 TABLET | Refills: 0 | Status: SHIPPED | OUTPATIENT
Start: 2019-08-01 | End: 2020-06-29 | Stop reason: SDUPTHER

## 2019-08-01 NOTE — TELEPHONE ENCOUNTER
Spoke with the patient regarding [facility] patient states she is still interested and would like to continue  Explained to the patient that she needs a f/u appt to be able to get auth approved  I was unable to find an appt sooner than the 14 with [address], patient would really like to see you for a second opinion   Would it be ok to add this patient on 8/9/19 to see you instead of 8/14/19      Please advice

## 2019-08-01 NOTE — TELEPHONE ENCOUNTER
Pt called regarding appt as she did not hear back  I made her aware that message was sent to  and awaiting response      Dr leger- please advise on stevo's message and have stevo call pt back   [phone]

## 2019-08-02 NOTE — TELEPHONE ENCOUNTER
Called and spoke with the patient- she is aware she is not to be taking the zofran daily  Patient verbally understood

## 2019-08-06 ENCOUNTER — ANNUAL EXAM (OUTPATIENT)
Dept: OBGYN CLINIC | Facility: CLINIC | Age: 49
End: 2019-08-06
Payer: COMMERCIAL

## 2019-08-06 ENCOUNTER — TELEPHONE (OUTPATIENT)
Dept: OBGYN CLINIC | Facility: CLINIC | Age: 49
End: 2019-08-06

## 2019-08-06 VITALS
WEIGHT: 152.6 LBS | DIASTOLIC BLOOD PRESSURE: 66 MMHG | BODY MASS INDEX: 27.04 KG/M2 | HEIGHT: 63 IN | SYSTOLIC BLOOD PRESSURE: 112 MMHG

## 2019-08-06 DIAGNOSIS — Z01.419 ENCOUNTER FOR GYNECOLOGICAL EXAMINATION: Primary | ICD-10-CM

## 2019-08-06 DIAGNOSIS — N94.6 DYSMENORRHEA: ICD-10-CM

## 2019-08-06 PROCEDURE — 1111F DSCHRG MED/CURRENT MED MERGE: CPT | Performed by: PHYSICIAN ASSISTANT

## 2019-08-06 PROCEDURE — 87624 HPV HI-RISK TYP POOLED RSLT: CPT | Performed by: PHYSICIAN ASSISTANT

## 2019-08-06 PROCEDURE — G0145 SCR C/V CYTO,THINLAYER,RESCR: HCPCS | Performed by: PHYSICIAN ASSISTANT

## 2019-08-06 PROCEDURE — S0612 ANNUAL GYNECOLOGICAL EXAMINA: HCPCS | Performed by: PHYSICIAN ASSISTANT

## 2019-08-06 RX ORDER — BUPROPION HYDROCHLORIDE 150 MG/1
TABLET ORAL
COMMUNITY
Start: 2019-08-04 | End: 2019-10-14 | Stop reason: SDUPTHER

## 2019-08-06 RX ORDER — IBUPROFEN 800 MG/1
800 TABLET ORAL EVERY 6 HOURS PRN
Qty: 90 TABLET | Refills: 3 | Status: SHIPPED | OUTPATIENT
Start: 2019-08-06 | End: 2021-05-19 | Stop reason: SDUPTHER

## 2019-08-06 NOTE — TELEPHONE ENCOUNTER
Pt was seen this afternoon w/ terrell  They dicussed that patient was unable to go on any medication for hot flashes due to her history of blood clots  Pt was doing some research and saw that adderall can help   Pt would like to know if this can be prescribed please advise

## 2019-08-06 NOTE — TELEPHONE ENCOUNTER
Received tiger text message from Dr Frank Mcgarry to schedule pt on 8/9/19  Mary Jones made aware  States that she will call the pt and schedule pt w/ Dr Ofe Bell on 8/9/19
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
## 2019-08-06 NOTE — TELEPHONE ENCOUNTER
Pt called asking if we heard back from Dr Dennis Venegas re: scheduling an appt      Tiger text Dr Dennis Venegas                  [phone]

## 2019-08-07 ENCOUNTER — TELEPHONE (OUTPATIENT)
Dept: INTERNAL MEDICINE CLINIC | Facility: CLINIC | Age: 49
End: 2019-08-07

## 2019-08-07 PROBLEM — L50.1 CHRONIC IDIOPATHIC URTICARIA: Status: ACTIVE | Noted: 2019-08-07

## 2019-08-07 PROBLEM — K21.00 GASTROESOPHAGEAL REFLUX DISEASE WITH ESOPHAGITIS: Status: ACTIVE | Noted: 2019-08-07

## 2019-08-07 PROBLEM — E55.9 VITAMIN D DEFICIENCY: Status: ACTIVE | Noted: 2019-08-07

## 2019-08-07 PROBLEM — H10.13 ALLERGIC CONJUNCTIVITIS, BILATERAL: Status: ACTIVE | Noted: 2019-08-07

## 2019-08-07 PROBLEM — Z91.013 SHELLFISH ALLERGY: Status: ACTIVE | Noted: 2019-08-07

## 2019-08-07 LAB
HPV HR 12 DNA CVX QL NAA+PROBE: NEGATIVE
HPV16 DNA CVX QL NAA+PROBE: NEGATIVE
HPV18 DNA CVX QL NAA+PROBE: NEGATIVE

## 2019-08-07 NOTE — TELEPHONE ENCOUNTER
Patient called, she saw David Johnson [facility] [place] who confirmed that she is going through menopause, patient called their office requesting something for the hotflashes  Patient was instructed to call our  office for the rx for adderall

## 2019-08-07 NOTE — TELEPHONE ENCOUNTER
No, adderall is a controlled substance  Also, it may impact her treatment for other psychiatric conditions she has

## 2019-08-08 ENCOUNTER — TELEPHONE (OUTPATIENT)
Dept: OBGYN CLINIC | Facility: CLINIC | Age: 49
End: 2019-08-08

## 2019-08-08 LAB
LAB AP GYN PRIMARY INTERPRETATION: NORMAL
Lab: NORMAL

## 2019-08-08 NOTE — TELEPHONE ENCOUNTER
Patient has a history of blood clots and is going through menopause and cannot take any medicine for this but patient found an all natural supplement on line called Sugey Dennis it has no soy in it and wanted to know can she take this

## 2019-08-09 ENCOUNTER — OFFICE VISIT (OUTPATIENT)
Dept: NEUROLOGY | Facility: CLINIC | Age: 49
End: 2019-08-09
Payer: COMMERCIAL

## 2019-08-09 ENCOUNTER — TRANSCRIBE ORDERS (OUTPATIENT)
Dept: NEUROLOGY | Facility: CLINIC | Age: 49
End: 2019-08-09

## 2019-08-09 VITALS
HEART RATE: 83 BPM | DIASTOLIC BLOOD PRESSURE: 58 MMHG | SYSTOLIC BLOOD PRESSURE: 129 MMHG | WEIGHT: 147 LBS | BODY MASS INDEX: 26.05 KG/M2 | HEIGHT: 63 IN

## 2019-08-09 DIAGNOSIS — G43.709 CHRONIC MIGRAINE WITHOUT AURA WITHOUT STATUS MIGRAINOSUS, NOT INTRACTABLE: Primary | ICD-10-CM

## 2019-08-09 DIAGNOSIS — G43.709 CHRONIC MIGRAINE WITHOUT AURA WITHOUT STATUS MIGRAINOSUS, NOT INTRACTABLE: ICD-10-CM

## 2019-08-09 DIAGNOSIS — Z82.49 FHX: CEREBRAL ANEURYSM: ICD-10-CM

## 2019-08-09 DIAGNOSIS — R42 VERTIGO: Primary | ICD-10-CM

## 2019-08-09 PROCEDURE — 99215 OFFICE O/P EST HI 40 MIN: CPT | Performed by: PSYCHIATRY & NEUROLOGY

## 2019-08-09 NOTE — PROGRESS NOTES
Patient ID: Ayaka Gomez is a 53 y o  female  Assessment/Plan:    No problem-specific Assessment & Plan notes found for this encounter  Diagnoses and all orders for this visit:    Chronic migraine without aura without status migrainosus, not intractable-  - Improved recently with OTC vitamin supplementation however has had multiple hospitalizations for status migrainosus over the year in the absence of Botox and aimovig which she could not afford due to financial reasons   - She has had 30/30 migraine days monthly with 20/30 migraine days if she takes Botox only and 20/30 migraine days if she takes Aimovig only and an additive benefit  With a combination thus should continue both for migraine prevention and hopefully prevent rehospitalization  - Gave her a script for Aimovig 140 mg/ml today q monthly  Denies med a/e  - She states she can now financially afford Botox thus told our Botox coordinator team to renew this and will schedule for next available Botox    Vertigo-  Peripheral vs r/o central etiology  ? Atypical migraine variant  At least 24 hours with a minimal headache- atypical migraine variant  Would like to further look at posterior circulation to make sure no significant stenosis or other vascular etiology  She has family hx of cerebral aneurysm at secondary cerebral hemorrhage in maternal aunt    CTA H/N w w o contrast     She asked me if she okay to take natural supplement Amberen for perimenopausal symptoms- I reviewed the ingredient list of this- has MSG- discussed significant risk of worsening headaches and thus recommend avoiding this, she is agreeable    total face-to-face time with the patient at least 40 minutes and that greater than 50% of that time was spent counseling or coordinating care  Will follow up in three to four months time     She prefers [facility] location per her  Subjective:    HPI    Mrs Dieudonne Cohen is a pleasant 54 yo female seen in follow up (first time by me) for chronic refractory migraines with recent in patient hospitalization for this 7/27/19 where she responded with 1 tab of dilaudid and reported that typically has benefit for 1-2 months with a single dosing  Migraine frequency now once or twice a month aborted with excedrin or zomig  She failed DHE  Since last seen she tells me she is doing much better with natural migraine supplement which is a combination of riboflavin , magnesium, coq10, feverfew, butterbur, b6 among other herbal supplements (she brought me the bottle) in addition to her current medications  States 7/27 when she was hospitalized had sudden nausea and dizziness with a mild headache-different than her normal headache with no inciting etiology  States next morning when she woke up almost fell down as she had significant room spinning and afterwards light headed sensation  States she felt like she was walking like a drunk  Denies any head or neck trauma, viral illness preceding this, ear pain tinnitus or other focal weakness facial droop, sensory loss     States went to ED CT head no contrast 7/24/19 not acute  States sent home with meclizine with no benefit  States any position change worsened her vertigo with significant worse symptoms at night time thus went to ED again  States minimal non disruptive headache with this  States never had this with migraines in the past  States dilaudid one tab resolved her headache and dizziness  States typically dilaudid resolves her severe migraines for months  Has other less severe migraines and also tension type headaches otherwise almost daily    States since Botox her migraines went from 30/30 days to 20/30 days  States with Rolf Torres went from 30/30 to 20/30  States with the new vitamin supplement for the first time has had further reduced migraine severity, frequency   Tells me however given significant frequency of migraines until recently prefers to stay on aimovig and botox for preventative benefit (recently skipped both d/t financial issues and thus had hospitalizations for status migrainosus and likely transformed migraine )    Duration of migraine: > 4 hours  Headache severity 6-10/10  Headache days typically: 20-30/30 days  Age on onset of headaches- decades  Location- frontal parietal  Descriptions, throbbing pounding dull achy  Time of day typically in the evening 5-6 pm  Associated symptoms photophobia phonophobia nausea emesis, recently dizziness      Since discharge states no further dizziness,  severe migraines  Per prior chart review when last seen in office by Dennis Zimmerman PA-C patient reported migraine abortive benefit with "PO Toradol + Zomig ODT + Zofran +/- baclofen for tension"  Zomig is helpful for her migraines  "She also continues a combination of prevention meds: gabapentin 600 TID, effexor 150 mg qd and topamax 150 BID "  "Meds tried:  Preventative: Gabapentin, Venlafaxine, Lyrica, Magnesium, Vitmamin B2, verapamil, zoloft, olanzapine, topamax, botox, aimovig   Abortive: Rizatriptan, Sumatriptan, buprofen, Toradol, Dilaudid, Fioricet, compazine, excedrin (daily use), zomig PO, decadron, depakote taper"    Neuroimaging last normal mri brain 4/2018  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 (no family hx PE- hypercoagulable state as far as he is aware)  Maternal aunt with cerebral aneurysm noted at a young age    The following portions of the patient's history were reviewed and updated as appropriate: allergies, current medications, past family history, past medical history, past social history, past surgical history and problem list  And ROS and neuroimaging in PACS       Objective:    Height 5' 3" (1 6 m), weight 66 7 kg (147 lb), not currently breastfeeding  Physical Exam   Constitutional: She is oriented to person, place, and time  She appears well-developed and well-nourished  HENT:   Head: Normocephalic and atraumatic     Eyes: Pupils are equal, round, and reactive to light  Neck: Normal range of motion  Cardiovascular: Normal rate and regular rhythm  Pulmonary/Chest: Effort normal    Abdominal: Soft  Musculoskeletal: Normal range of motion  She exhibits no tenderness  Neurological: She is alert and oriented to person, place, and time  She has normal strength  Gait normal    Reflex Scores:       Tricep reflexes are 2+ on the right side  Bicep reflexes are 2+ on the right side  Patellar reflexes are 3+ on the right side and 3+ on the left side  Achilles reflexes are 2+ on the right side and 2+ on the left side  Nursing note and vitals reviewed  Neurological Exam  Mental Status  Alert  Oriented to person, place, time and situation  Recent and remote memory are intact  no dysarthria present  Language is fluent with no aphasia  Attention and concentration are normal  Fund of knowledge is appropriate for level of education  Cranial Nerves  CN II: Visual fields full to confrontation  Right funduscopic exam: not visualized  Left funduscopic exam: not visualized  CN III, IV, VI: Extraocular movements intact bilaterally  Pupils equal round and reactive to light bilaterally  CN V: Facial sensation is normal   CN VII: Full and symmetric facial movement  CN VIII: Hearing is normal   CN IX, X: Palate elevates symmetrically  Normal gag reflex  CN XI: Shoulder shrug strength is normal   CN XII: Tongue midline without atrophy or fasciculations  Motor   Normal muscle tone  Strength is 5/5 throughout all four extremities  Sensory  Light touch is normal in upper and lower extremities  Temperature is normal in upper and lower extremities  No right-sided hemispatial neglect  No left-sided hemispatial neglect  Reflexes  Deep tendon reflexes are 2+ and symmetric except as noted                                             Right                      Left  Biceps                                2+                          Triceps 2+                          Hamstring                            3+                         3+  Patellar                                3+                         3+  Achilles                                2+                         2+  Plantar                           Downgoing                Downgoing    Right pathological reflexes: Abigail's absent  Tromner absent  Left pathological reflexes: Abigail's absent  Tromner absent  Coordination  Right: Finger-to-nose normal   Left: Finger-to-nose normal     Gait  Casual gait: Wide stance  Normal stride length  Normal gait  Normal right arm swing  Normal left arm swing  Tandem gait abnormality: Romberg is absent  Mildly abnormal tandem gait-- slow cautious but can complete it  ROS:    Review of Systems   Constitutional: Negative  Negative for appetite change and fever  HENT: Negative  Negative for hearing loss, tinnitus, trouble swallowing and voice change  Eyes: Negative  Negative for photophobia and pain  Respiratory: Negative  Negative for shortness of breath  Cardiovascular: Negative  Negative for palpitations  Gastrointestinal: Negative  Negative for nausea and vomiting  Endocrine: Negative  Negative for cold intolerance and heat intolerance  Genitourinary: Negative  Negative for dysuria, frequency and urgency  Musculoskeletal: Negative  Negative for myalgias and neck pain  Skin: Negative  Negative for rash  Allergic/Immunologic: Negative  Neurological: Negative  Negative for dizziness, tremors, seizures, syncope, facial asymmetry, speech difficulty, weakness, light-headedness, numbness and headaches  Hematological: Negative  Does not bruise/bleed easily  Psychiatric/Behavioral: Negative  Negative for confusion, hallucinations and sleep disturbance

## 2019-08-12 ENCOUNTER — TELEPHONE (OUTPATIENT)
Dept: NEUROLOGY | Facility: CLINIC | Age: 49
End: 2019-08-12

## 2019-08-12 NOTE — TELEPHONE ENCOUNTER
Please schedule pt for botox apt, this will be specialty pharmacy  Please let me know once the apt is scheduled so I can attach the referral     Called pt's insurance and spoke to Jewel - she informed me that there is an active auth on file for pt: CASE- [id number] 4 visits, AV 01/08/19 till 01/07/20  Per Davide Sin per insurance police letter cannot be faxed again because [facility] has confirmation that our office received the fax on 1/9/19  Call ref# [id number] 8/12/19 4:27PM

## 2019-08-12 NOTE — TELEPHONE ENCOUNTER
----- Message from Tanna Peters sent at 8/12/2019  9:58 AM EDT -----  Regarding: FW: botox  Per our discussion- please initiate a refill for patient's Botox order  Thank you! Kassandra Solis   ----- Message -----  From: Yusuf Espitia DO  Sent: 8/9/2019  10:29 AM EDT  To: Maryellen Kulkarni  Subject: botox                                            Hello!     Please help schedule when Botox approved    Thanks

## 2019-08-13 ENCOUNTER — OFFICE VISIT (OUTPATIENT)
Dept: OBGYN CLINIC | Facility: OTHER | Age: 49
End: 2019-08-13
Payer: COMMERCIAL

## 2019-08-13 VITALS
WEIGHT: 148.8 LBS | DIASTOLIC BLOOD PRESSURE: 76 MMHG | SYSTOLIC BLOOD PRESSURE: 123 MMHG | BODY MASS INDEX: 26.36 KG/M2 | HEIGHT: 63 IN | HEART RATE: 84 BPM

## 2019-08-13 DIAGNOSIS — M75.42 SUBACROMIAL IMPINGEMENT OF LEFT SHOULDER: Primary | ICD-10-CM

## 2019-08-13 DIAGNOSIS — M54.12 RADICULOPATHY, CERVICAL REGION: ICD-10-CM

## 2019-08-13 PROCEDURE — 99214 OFFICE O/P EST MOD 30 MIN: CPT | Performed by: ORTHOPAEDIC SURGERY

## 2019-08-13 NOTE — PROGRESS NOTES
Assessment  Diagnoses and all orders for this visit:    Subacromial impingement of left shoulder  -     MRI shoulder left wo contrast; Future    Radiculopathy, cervical region        Discussion and Plan:    Due to the continued pain for greater than 8 months in the left shoulder consistant with left shoulder subacromial impingement we will order MRI of the left shoulder for further evaluation  Pt did see a chiropractor and take OTC and prescribed NSAIDS without significant pain relief  Pt was also previously provided with left subacromeal bursa injection that did not provide relief  Pt will follow up in the office after MRI to discuss the next treatment options  Pt was advised to follow up with spine and pain or her current neurologist for treatment of cervical radiculopathy  Subjective:   Patient ID: Rosemary Dunn is a 53 y o  female      Pt is here for a 6 week follow up apt for cervical radiculopathy and left shoulder subacromial impingement syndrome  Pt has been having pain for greater than 8 months  Pt was provided with a Medrol dose pack at last visit to help reduce inflammation that was causing her pain  Pt was also provided with an order for Physical therapy and referral to spine and pain  Pt states that she did take the medrol dose pack which did not provide her with pain relief  Pt  continues to have pain in the superior and anterior aspect of her shoulder that increases with overhead activities such as reaching and lifting  Pt has not gone to therapy or seen spine and pain although she did  see a chiropractor for both her neck and her left shoulder but it did not provide any relief  Pt states that she is taking 800 mg Ibuprofen that does not provide relief  Pt states that she does get some relief when she places her left hand behind her head and when she applies ice to her left shoulder at rest and at bed time       Pt previously had a left shoulder subacromial bursa  CSI  On 6/13/19 that did not offer her relief  The following portions of the patient's history were reviewed and updated as appropriate: allergies, current medications, past family history, past medical history, past social history, past surgical history and problem list     Review of Systems   Constitutional: Negative for chills, fever and unexpected weight change  HENT: Negative for hearing loss, nosebleeds and sore throat  Eyes: Negative for pain, redness and visual disturbance  Respiratory: Negative for cough, shortness of breath and wheezing  Cardiovascular: Negative for chest pain, palpitations and leg swelling  Gastrointestinal: Negative for abdominal pain, nausea and vomiting  Endocrine: Negative for polydipsia and polyuria  Genitourinary: Negative for dysuria and hematuria  Skin: Negative for rash and wound  Neurological: Negative for dizziness, numbness and headaches  Psychiatric/Behavioral: Negative for decreased concentration, dysphoric mood and suicidal ideas  The patient is not nervous/anxious  Objective:  Left Shoulder Exam     Range of Motion   The patient has normal left shoulder ROM  Muscle Strength   Left shoulder normal muscle strength: pain with resisted abduction and ER   Abduction: 3/5   External rotation: 3/5             Physical Exam   Constitutional: She is oriented to person, place, and time  She appears well-developed and well-nourished  HENT:   Head: Normocephalic  Neck: Normal range of motion  Pulmonary/Chest: Effort normal and breath sounds normal    Neurological: She is alert and oriented to person, place, and time  Skin: Skin is warm and dry  Psychiatric: She has a normal mood and affect  Vitals reviewed  I have personally reviewed pertinent films in PACS and my interpretation is as follows    X-rays three views left shoulder from May 7, 2019 reviewed and they show no evidence of glenohumeral abnormality      Scribe Attestation    I,: Fara Novoa am acting as a scribe while in the presence of the attending physician :        I,:   Mary Flores MD personally performed the services described in this documentation    as scribed in my presence :

## 2019-08-14 ENCOUNTER — TRANSCRIBE ORDERS (OUTPATIENT)
Dept: SLEEP CENTER | Facility: CLINIC | Age: 49
End: 2019-08-14

## 2019-08-14 DIAGNOSIS — G47.09 OTHER INSOMNIA: Primary | ICD-10-CM

## 2019-08-20 ENCOUNTER — TELEPHONE (OUTPATIENT)
Dept: NEUROLOGY | Facility: CLINIC | Age: 49
End: 2019-08-20

## 2019-08-20 NOTE — TELEPHONE ENCOUNTER
CT head and neck to be done at [facility] denied by insurance  Per staff, other imaging needs to be done prior to this study being approved  This study is only used as a follow up  They were unable to provide me with additional recommended studies  If you feel this study is necessary YOU are required to speak to a physician reviewer at [phone] in order to get the study reconsidered   (They are unable to accept calls from other staff members )

## 2019-08-26 ENCOUNTER — TELEPHONE (OUTPATIENT)
Dept: NEUROLOGY | Facility: CLINIC | Age: 49
End: 2019-08-26

## 2019-08-26 NOTE — TELEPHONE ENCOUNTER
Peer to peer done today and approved  #[id number]  Valid within the next 30 days  Dr Sophie Hernandez  Please reschedule  Thanks

## 2019-08-26 NOTE — TELEPHONE ENCOUNTER
pharm called and states that aimovig 140mg needs PA     [id number]  [id number]  [id number]

## 2019-08-29 NOTE — TELEPHONE ENCOUNTER
2nd attempt - Tried to schedule CTA head and neck - spoke to spouse, Jem Goins who stated that patient was not home to schedule the test  He was questioning the test  I offered to have spouseJem speak to Clinical Team to further clarify questions regarding the test  He declined and stated that the patient will be home later this afternoon and asked for the office to try again at a later time

## 2019-08-30 NOTE — TELEPHONE ENCOUNTER
3rd attempt - LMOM to schedule CTA head and neck - give office # and central scheduling #  Letter mailed

## 2019-09-09 ENCOUNTER — TELEPHONE (OUTPATIENT)
Dept: DERMATOLOGY | Facility: CLINIC | Age: 49
End: 2019-09-09

## 2019-09-13 ENCOUNTER — TELEPHONE (OUTPATIENT)
Dept: PSYCHIATRY | Facility: CLINIC | Age: 49
End: 2019-09-13

## 2019-09-13 NOTE — TELEPHONE ENCOUNTER
Kamala Shaw called today to mention her klonopin is making her drowsy, would like to go back on xanax to help with her anxiety

## 2019-09-13 NOTE — TELEPHONE ENCOUNTER
Those are controlled medications - she has to be seen in order to change it  Has an appointment on 9/24/19 - she can call for a cancellation appointment if she wanted to be seen sooner  Please let her know

## 2019-09-20 ENCOUNTER — HOSPITAL ENCOUNTER (INPATIENT)
Facility: HOSPITAL | Age: 49
LOS: 2 days | DRG: 918 | End: 2019-09-26
Attending: EMERGENCY MEDICINE | Admitting: INTERNAL MEDICINE
Payer: COMMERCIAL

## 2019-09-20 DIAGNOSIS — F33.2 MAJOR DEPRESSIVE DISORDER, RECURRENT SEVERE WITHOUT PSYCHOTIC FEATURES (HCC): Chronic | ICD-10-CM

## 2019-09-20 DIAGNOSIS — T14.91XA SUICIDE ATTEMPT (HCC): ICD-10-CM

## 2019-09-20 DIAGNOSIS — T42.4X1A BENZODIAZEPINE (TRANQUILIZER) OVERDOSE: Primary | ICD-10-CM

## 2019-09-20 DIAGNOSIS — F32.A DEPRESSION: ICD-10-CM

## 2019-09-20 DIAGNOSIS — T50.902A INTENTIONAL DRUG OVERDOSE, INITIAL ENCOUNTER (HCC): ICD-10-CM

## 2019-09-20 DIAGNOSIS — F41.9 ANXIETY: ICD-10-CM

## 2019-09-20 DIAGNOSIS — G47.00 INSOMNIA, UNSPECIFIED TYPE: ICD-10-CM

## 2019-09-20 LAB
ALBUMIN SERPL BCP-MCNC: 3.9 G/DL (ref 3.5–5)
ALP SERPL-CCNC: 63 U/L (ref 46–116)
ALT SERPL W P-5'-P-CCNC: 17 U/L (ref 12–78)
AMPHETAMINES SERPL QL SCN: NEGATIVE
ANION GAP SERPL CALCULATED.3IONS-SCNC: 4 MMOL/L (ref 4–13)
APAP SERPL-MCNC: <2 UG/ML (ref 10–20)
AST SERPL W P-5'-P-CCNC: 11 U/L (ref 5–45)
BARBITURATES UR QL: NEGATIVE
BENZODIAZ UR QL: NEGATIVE
BILIRUB SERPL-MCNC: 0.28 MG/DL (ref 0.2–1)
BUN SERPL-MCNC: 20 MG/DL (ref 5–25)
CALCIUM SERPL-MCNC: 8.9 MG/DL (ref 8.3–10.1)
CHLORIDE SERPL-SCNC: 106 MMOL/L (ref 100–108)
CO2 SERPL-SCNC: 26 MMOL/L (ref 21–32)
COCAINE UR QL: NEGATIVE
CREAT SERPL-MCNC: 0.88 MG/DL (ref 0.6–1.3)
ETHANOL SERPL-MCNC: <3 MG/DL (ref 0–3)
GFR SERPL CREATININE-BSD FRML MDRD: 78 ML/MIN/1.73SQ M
GLUCOSE SERPL-MCNC: 73 MG/DL (ref 65–140)
METHADONE UR QL: NEGATIVE
OPIATES UR QL SCN: NEGATIVE
PCP UR QL: NEGATIVE
POTASSIUM SERPL-SCNC: 3.9 MMOL/L (ref 3.5–5.3)
PROT SERPL-MCNC: 6.5 G/DL (ref 6.4–8.2)
SALICYLATES SERPL-MCNC: <3 MG/DL (ref 3–20)
SODIUM SERPL-SCNC: 136 MMOL/L (ref 136–145)
THC UR QL: NEGATIVE

## 2019-09-20 PROCEDURE — 80329 ANALGESICS NON-OPIOID 1 OR 2: CPT | Performed by: EMERGENCY MEDICINE

## 2019-09-20 PROCEDURE — 99220 PR INITIAL OBSERVATION CARE/DAY 70 MINUTES: CPT | Performed by: INTERNAL MEDICINE

## 2019-09-20 PROCEDURE — 93005 ELECTROCARDIOGRAM TRACING: CPT

## 2019-09-20 PROCEDURE — 80307 DRUG TEST PRSMV CHEM ANLYZR: CPT | Performed by: EMERGENCY MEDICINE

## 2019-09-20 PROCEDURE — 99285 EMERGENCY DEPT VISIT HI MDM: CPT | Performed by: EMERGENCY MEDICINE

## 2019-09-20 PROCEDURE — 80320 DRUG SCREEN QUANTALCOHOLS: CPT | Performed by: EMERGENCY MEDICINE

## 2019-09-20 PROCEDURE — 99285 EMERGENCY DEPT VISIT HI MDM: CPT

## 2019-09-20 PROCEDURE — 80053 COMPREHEN METABOLIC PANEL: CPT | Performed by: EMERGENCY MEDICINE

## 2019-09-20 PROCEDURE — 36415 COLL VENOUS BLD VENIPUNCTURE: CPT | Performed by: EMERGENCY MEDICINE

## 2019-09-20 RX ORDER — ALBUTEROL SULFATE 90 UG/1
1 AEROSOL, METERED RESPIRATORY (INHALATION) EVERY 4 HOURS PRN
Status: DISCONTINUED | OUTPATIENT
Start: 2019-09-20 | End: 2019-09-21

## 2019-09-20 RX ORDER — FLUTICASONE FUROATE AND VILANTEROL 200; 25 UG/1; UG/1
1 POWDER RESPIRATORY (INHALATION) DAILY
Status: DISCONTINUED | OUTPATIENT
Start: 2019-09-21 | End: 2019-09-26 | Stop reason: HOSPADM

## 2019-09-20 RX ORDER — OLANZAPINE 2.5 MG/1
2.5 TABLET ORAL DAILY PRN
Status: DISCONTINUED | OUTPATIENT
Start: 2019-09-20 | End: 2019-09-26 | Stop reason: HOSPADM

## 2019-09-20 RX ORDER — VENLAFAXINE HYDROCHLORIDE 150 MG/1
300 CAPSULE, EXTENDED RELEASE ORAL DAILY
Status: DISCONTINUED | OUTPATIENT
Start: 2019-09-21 | End: 2019-09-26 | Stop reason: HOSPADM

## 2019-09-20 RX ORDER — PANTOPRAZOLE SODIUM 40 MG/1
40 TABLET, DELAYED RELEASE ORAL
Status: DISCONTINUED | OUTPATIENT
Start: 2019-09-21 | End: 2019-09-26 | Stop reason: HOSPADM

## 2019-09-20 RX ORDER — FLUTICASONE PROPIONATE 50 MCG
2 SPRAY, SUSPENSION (ML) NASAL DAILY
Status: DISCONTINUED | OUTPATIENT
Start: 2019-09-21 | End: 2019-09-26 | Stop reason: HOSPADM

## 2019-09-20 RX ORDER — BUPROPION HYDROCHLORIDE 150 MG/1
300 TABLET ORAL DAILY
Status: DISCONTINUED | OUTPATIENT
Start: 2019-09-21 | End: 2019-09-26 | Stop reason: HOSPADM

## 2019-09-20 RX ORDER — NALTREXONE HYDROCHLORIDE 50 MG/1
50 TABLET, FILM COATED ORAL DAILY
Status: DISCONTINUED | OUTPATIENT
Start: 2019-09-21 | End: 2019-09-26 | Stop reason: HOSPADM

## 2019-09-20 RX ORDER — PHENAZOPYRIDINE HYDROCHLORIDE 100 MG/1
100 TABLET, FILM COATED ORAL 3 TIMES DAILY PRN
Status: DISCONTINUED | OUTPATIENT
Start: 2019-09-20 | End: 2019-09-26 | Stop reason: HOSPADM

## 2019-09-20 RX ORDER — LORATADINE 10 MG/1
10 TABLET ORAL DAILY
Status: DISCONTINUED | OUTPATIENT
Start: 2019-09-21 | End: 2019-09-26 | Stop reason: HOSPADM

## 2019-09-20 RX ORDER — BUPROPION HYDROCHLORIDE 150 MG/1
150 TABLET ORAL DAILY
Status: DISCONTINUED | OUTPATIENT
Start: 2019-09-21 | End: 2019-09-26 | Stop reason: HOSPADM

## 2019-09-20 RX ORDER — SERTRALINE HYDROCHLORIDE 100 MG/1
200 TABLET, FILM COATED ORAL
Status: DISCONTINUED | OUTPATIENT
Start: 2019-09-20 | End: 2019-09-26 | Stop reason: HOSPADM

## 2019-09-20 NOTE — LETTER
[facility] [address]  [address]  [place] [address]  Dept: [phone]    September 23, 2019     Patient: Ma Single   YOB: 1970   Date of Visit: 9/20/2019       To Whom it May Concern:    Michaela Tipton is under my professional care  She was admitted to the hospital on 9/20/9 and remains under my professional care for now  If you have any questions or concerns, please don't hesitate to call        Sincerely,          Jonathon Harding MD

## 2019-09-21 LAB
ANION GAP SERPL CALCULATED.3IONS-SCNC: 3 MMOL/L (ref 4–13)
BUN SERPL-MCNC: 18 MG/DL (ref 5–25)
CALCIUM SERPL-MCNC: 8.8 MG/DL (ref 8.3–10.1)
CHLORIDE SERPL-SCNC: 108 MMOL/L (ref 100–108)
CO2 SERPL-SCNC: 27 MMOL/L (ref 21–32)
CREAT SERPL-MCNC: 0.92 MG/DL (ref 0.6–1.3)
ERYTHROCYTE [DISTWIDTH] IN BLOOD BY AUTOMATED COUNT: 12 % (ref 11.6–15.1)
GFR SERPL CREATININE-BSD FRML MDRD: 74 ML/MIN/1.73SQ M
GLUCOSE SERPL-MCNC: 70 MG/DL (ref 65–140)
HCT VFR BLD AUTO: 35 % (ref 34.8–46.1)
HGB BLD-MCNC: 11.3 G/DL (ref 11.5–15.4)
MCH RBC QN AUTO: 29.6 PG (ref 26.8–34.3)
MCHC RBC AUTO-ENTMCNC: 32.3 G/DL (ref 31.4–37.4)
MCV RBC AUTO: 92 FL (ref 82–98)
PLATELET # BLD AUTO: 188 THOUSANDS/UL (ref 149–390)
PMV BLD AUTO: 9.8 FL (ref 8.9–12.7)
POTASSIUM SERPL-SCNC: 3.8 MMOL/L (ref 3.5–5.3)
RBC # BLD AUTO: 3.82 MILLION/UL (ref 3.81–5.12)
SODIUM SERPL-SCNC: 138 MMOL/L (ref 136–145)
T4 FREE SERPL-MCNC: 0.73 NG/DL (ref 0.76–1.46)
TSH SERPL DL<=0.05 MIU/L-ACNC: 7.76 UIU/ML (ref 0.36–3.74)
WBC # BLD AUTO: 4.61 THOUSAND/UL (ref 4.31–10.16)

## 2019-09-21 PROCEDURE — 85027 COMPLETE CBC AUTOMATED: CPT | Performed by: INTERNAL MEDICINE

## 2019-09-21 PROCEDURE — 80048 BASIC METABOLIC PNL TOTAL CA: CPT | Performed by: INTERNAL MEDICINE

## 2019-09-21 PROCEDURE — 94760 N-INVAS EAR/PLS OXIMETRY 1: CPT

## 2019-09-21 PROCEDURE — 84443 ASSAY THYROID STIM HORMONE: CPT | Performed by: INTERNAL MEDICINE

## 2019-09-21 PROCEDURE — 99449 NTRPROF PH1/NTRNET/EHR 31/>: CPT | Performed by: EMERGENCY MEDICINE

## 2019-09-21 PROCEDURE — 99232 SBSQ HOSP IP/OBS MODERATE 35: CPT | Performed by: INTERNAL MEDICINE

## 2019-09-21 PROCEDURE — NC001 PR NO CHARGE: Performed by: EMERGENCY MEDICINE

## 2019-09-21 PROCEDURE — 84439 ASSAY OF FREE THYROXINE: CPT | Performed by: INTERNAL MEDICINE

## 2019-09-21 RX ORDER — ALBUTEROL SULFATE 90 UG/1
2 AEROSOL, METERED RESPIRATORY (INHALATION) EVERY 4 HOURS PRN
Status: DISCONTINUED | OUTPATIENT
Start: 2019-09-21 | End: 2019-09-26 | Stop reason: HOSPADM

## 2019-09-21 RX ORDER — SODIUM CHLORIDE, SODIUM GLUCONATE, SODIUM ACETATE, POTASSIUM CHLORIDE, MAGNESIUM CHLORIDE, SODIUM PHOSPHATE, DIBASIC, AND POTASSIUM PHOSPHATE .53; .5; .37; .037; .03; .012; .00082 G/100ML; G/100ML; G/100ML; G/100ML; G/100ML; G/100ML; G/100ML
100 INJECTION, SOLUTION INTRAVENOUS ONCE
Status: COMPLETED | OUTPATIENT
Start: 2019-09-21 | End: 2019-09-22

## 2019-09-21 RX ORDER — IBUPROFEN 600 MG/1
600 TABLET ORAL ONCE
Status: COMPLETED | OUTPATIENT
Start: 2019-09-21 | End: 2019-09-21

## 2019-09-21 RX ORDER — CLONAZEPAM 0.5 MG/1
0.5 TABLET ORAL 2 TIMES DAILY PRN
Status: DISCONTINUED | OUTPATIENT
Start: 2019-09-21 | End: 2019-09-22

## 2019-09-21 RX ADMIN — LORATADINE 10 MG: 10 TABLET ORAL at 09:45

## 2019-09-21 RX ADMIN — IBUPROFEN 600 MG: 600 TABLET, FILM COATED ORAL at 20:43

## 2019-09-21 RX ADMIN — BUPROPION HYDROCHLORIDE 300 MG: 150 TABLET, FILM COATED, EXTENDED RELEASE ORAL at 09:46

## 2019-09-21 RX ADMIN — SODIUM CHLORIDE, SODIUM GLUCONATE, SODIUM ACETATE, POTASSIUM CHLORIDE, MAGNESIUM CHLORIDE, SODIUM PHOSPHATE, DIBASIC, AND POTASSIUM PHOSPHATE 100 ML/HR: .53; .5; .37; .037; .03; .012; .00082 INJECTION, SOLUTION INTRAVENOUS at 17:35

## 2019-09-21 RX ADMIN — ENOXAPARIN SODIUM 40 MG: 40 INJECTION SUBCUTANEOUS at 09:45

## 2019-09-21 RX ADMIN — SODIUM CHLORIDE 500 ML: 0.9 INJECTION, SOLUTION INTRAVENOUS at 12:12

## 2019-09-21 RX ADMIN — SERTRALINE HYDROCHLORIDE 200 MG: 100 TABLET ORAL at 21:47

## 2019-09-21 RX ADMIN — TIOTROPIUM BROMIDE 18 MCG: 18 CAPSULE ORAL; RESPIRATORY (INHALATION) at 09:45

## 2019-09-21 RX ADMIN — VENLAFAXINE HYDROCHLORIDE 300 MG: 150 CAPSULE, EXTENDED RELEASE ORAL at 09:46

## 2019-09-21 RX ADMIN — FLUTICASONE PROPIONATE 2 SPRAY: 50 SPRAY, METERED NASAL at 09:45

## 2019-09-21 RX ADMIN — NALTREXONE HYDROCHLORIDE 50 MG: 50 TABLET, FILM COATED ORAL at 09:48

## 2019-09-21 RX ADMIN — BUPROPION HYDROCHLORIDE 150 MG: 150 TABLET, FILM COATED, EXTENDED RELEASE ORAL at 10:01

## 2019-09-21 RX ADMIN — PANTOPRAZOLE SODIUM 40 MG: 40 TABLET, DELAYED RELEASE ORAL at 05:22
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
## 2019-09-21 NOTE — H&P
H&P- Lila Gold 1970, 53 y o  female MRN: [id number]    Unit/Bed#: EMILY Encounter: [id number]    Primary Care Provider: Isidra Edwards DO   Date and time admitted to hospital: 9/20/2019  5:52 PM        * Intentional drug overdose ([facility] [address] )  Assessment & Plan  · Had taken a handful (unclear number) of Klonopin 1 mg pills plus reportedly extra gabapentin in setting of marital difficulties after phone conversation with significant other  · She is awake and alert to my conversation and without respiratory distress on room air  · Needs toxicology evaluation, Psychiatry consultation  · Continue one-to-one observation  · Telemetry monitoring in setting of drug overdose, continuous pulse oximetry  · UDS was ordered by ED physician and is currently pending; blood alcohol, salicylate, and acetaminophen levels are negative  · Avoid and hold sedating medications  · Close monitoring for evidence of toxidrome    Gastroesophageal reflux disease with esophagitis  Assessment & Plan  · Continue PPI    Anxiety and depression  Assessment & Plan  · Psychiatry consultation as above  · Continue psychiatric medications for this except for sedating medications, can resume when okay with Toxicology and Psychiatry    Asthma  Assessment & Plan  · Not in acute exacerbation  · Continue home inhalers; add p r n  Nebulizers if shortness of breath and wheezing should develop        VTE Prophylaxis: Enoxaparin (Lovenox)  / sequential compression device   Code Status: Full code  POLST: POLST form is not discussed and not completed at this time  Anticipated Length of Stay:  Patient will be admitted on an Observation basis with an anticipated length of stay of  less than 2 midnights  Justification for Hospital Stay: Please see detailed plans noted above      Chief Complaint:     Intentional drug overdose  History of Present Illness:  Lila Gold is a 53 y o  female who has a past medical history significant for major depressive disorder, anxiety disorder, hypothyroidism, asthma, GERD  She presents from home after an intentional drug overdose or patient states she took multiple Klonopin 1 mg pills (per patient possibly up to 1/2 a bottle but she is uncertain of the exact amount) and extra gabapentin  She states she did this due to marital problems with her significant other, and after and angry conversation over the phone with significant other took the overdose  She denies using any other drugs at the time of overdose save for the aforementioned Klonopin and gabapentin, denies use of alcohol or illicit substances with the overdose  She states she took it to alleviate the pain related to her marital troubles  After taking overdose of, she contacted her parents, who contacted EMS  Currently, patient is lying in bed, and has no acute complaints at this time, specifically denying any fevers/chills, shortness of breath, headache, or dizziness/lightheadedness  She is agreeable to psychiatric evaluation and potential psychiatric inpatient hospitalization, however evaluation by Toxicology is apparently required prior to this      Review of Systems:    Constitutional:  Denies fever or chills   Eyes:  Denies change in visual acuity   HENT:  Denies nasal congestion or sore throat   Respiratory:  Denies cough or shortness of breath   Cardiovascular:  Denies chest pain or edema   GI:  Denies abdominal pain, nausea, vomiting, bloody stools or diarrhea   :  Denies dysuria   Musculoskeletal:  Denies back pain or joint pain   Integument:  Denies rash   Neurologic:  Denies headache, focal weakness or sensory changes   Endocrine:  Denies polyuria or polydipsia   Lymphatic:  Denies swollen glands   Psychiatric:  Endorses depression and anxiety    Past Medical and Surgical History:   Past Medical History:   Diagnosis Date    Amenorrhea     Anxiety     Asthma     Back pain     Chondromalacia of patella     Chronic fatigue     Deep vein thrombophlebitis of leg ([address] )     Depression     GERD (gastroesophageal reflux disease)     HPV (human papilloma virus) infection     Hypothyroidism     Lumbar radiculopathy     Migraine     Myofascial pain syndrome     Osteopenia     Piriformis syndrome     Sacroiliitis (HCC)     Sciatica     Sleep apnea     Spondylosis of lumbar spine     Trochanteric bursitis of right hip     Vertigo     Vitamin D deficiency      Past Surgical History:   Procedure Laterality Date    CERVIX LESION DESTRUCTION       SECTION      COLONOSCOPY      COLPOSCOPY W/ BIOPSY / CURETTAGE      Colposcopy cervix with biopsy    LAPAROSCOPIC ENDOMETRIOSIS FULGURATION      LAPAROSCOPY      TOOTH EXTRACTION         Meds/Allergies:    (Not in a hospital admission)    Allergies: Allergies   Allergen Reactions    Nuts      Other reaction(s): WALNUTS    Erythromycin Diarrhea, GI Intolerance and Vomiting    Iodine Hives    Other      lobster    Shellfish Allergy     Shellfish-Derived Products     Zithromax [Azithromycin] Diarrhea     Can take name brand  Annotation - [id number]: can take brand name  Other reaction(s): Nausea/vomiting/diarrhea     History:  Marital Status: /Civil Union     Substance Use History:   Social History     Substance and Sexual Activity   Alcohol Use Not Currently    Comment: Per [employer];  Occasional use     Social History     Tobacco Use   Smoking Status Never Smoker   Smokeless Tobacco Never Used     Social History     Substance and Sexual Activity   Drug Use Not Currently       Family History:  Family History   Problem Relation Age of Onset    Heart disease Mother     Asthma Daughter    Josefina Silence Lung cancer Paternal Grandfather     Asthma Daughter     Colon cancer Father     Colon cancer Maternal Grandfather     Prostate cancer Maternal Grandfather     Colon cancer Maternal Aunt     No Known Problems Maternal Grandmother     No Known Problems Paternal Grandmother     No Known Problems Maternal Aunt     No Known Problems Maternal Aunt     No Known Problems Maternal Aunt     No Known Problems Paternal Aunt     Psychiatric Illness Neg Hx        Physical Exam:     Vitals:   Blood Pressure: 115/75 (09/20/19 2100)  Pulse: 74 (09/20/19 2100)  Temperature: 97 9 °F (36 6 °C) (09/20/19 1758)  Temp Source: Oral (09/20/19 1758)  Respirations: 20 (09/20/19 2100)  Height: 5' 3" (160 cm) (09/20/19 1758)  Weight - Scale: 70 4 kg (155 lb 3 3 oz) (09/20/19 1758)  SpO2: 97 % (09/20/19 2100)    Constitutional:  Well developed, well nourished, no acute distress, non-toxic appearance   Eyes:  PERRL, conjunctiva normal   HENT:  Atraumatic, external ears normal, nose normal, oropharynx moist, no pharyngeal exudates  Neck- normal range of motion, no tenderness, supple   Respiratory:  No respiratory distress, normal breath sounds, no rales, no wheezing   Cardiovascular:  Normal rate, normal rhythm, no murmurs, no gallops, no rubs   GI:  Soft, nondistended, normal bowel sounds, nontender, no organomegaly, no mass, no rebound, no guarding   :  No costovertebral angle tenderness   Musculoskeletal:  No edema, no tenderness, no deformities  Back- no tenderness  Integument:  Well hydrated, no rash   Lymphatic:  No lymphadenopathy noted   Neurologic:  Alert &awake, communicative, CN 2-12 normal, normal motor function, normal sensory function, no focal deficits noted   Psychiatric:  Speech and behavior appropriate       Lab Results: I have personally reviewed pertinent reports  Results from last 7 days   Lab Units 09/20/19  1848   POTASSIUM mmol/L 3 9   CHLORIDE mmol/L 106   CO2 mmol/L 26   BUN mg/dL 20   CREATININE mg/dL 0 88   CALCIUM mg/dL 8 9   ALK PHOS U/L 63   ALT U/L 17   AST U/L 11           EKG:  Normal sinus rhythm, LAFB      ** Please Note: Dragon 360 Dictation voice to text software was used in the creation of this document   **

## 2019-09-21 NOTE — ASSESSMENT & PLAN NOTE
· Had taken a handful (unclear number) of Klonopin 1 mg pills plus reportedly extra gabapentin in setting of marital difficulties after phone conversation with significant other  · She is awake and alert to my conversation and without respiratory distress on room air  · rapid Urine drug screen negative  · Patient reports to me that this  Overdose not was suicidal   she was not sure how much she should take  · She denies taking any other medications  PLAN:  · Await toxicology and Psychiatry input    · Continue one-to-one observation  · Telemetry monitoring in setting of drug overdose, continuous pulse oximetry  · Avoid and hold sedating medications
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
## 2019-09-21 NOTE — UTILIZATION REVIEW
Initial Clinical Review    Admission: Date/Time/Statement:  9/20/19 @ 2101 -- OBS  Orders Placed This Encounter   Procedures    Place in Observation     Standing Status:   Standing     Number of Occurrences:   1     Order Specific Question:   Admitting Physician     Answer:   Levi Weiss [[id number]]     Order Specific Question:   Level of Care     Answer:   Med Surg [16]     ED Arrival Information     Expected Arrival Acuity Means of Arrival Escorted By Service Admission Type    - 9/20/2019 17:52 [facility]    Arrival Complaint    overdose        Chief Complaint   Patient presents with    Overdose - Intentional     took several klonopin and some extra gabapentin because of marital issues     Assessment/Plan: 53 y o  female with PMHx significant for major depressive d/o, anxiety d/o, hypothyroidism, asthma, GERD  She presents from home after an intentional drug overdose  Patient states she took multiple Klonopin 1 mg pills (per patient possibly up to 1/2 a bottle but she is uncertain of the exact amount) and extra gabapentin  She states she did this due to marital problems with her significant other, and after an angry conversation over the phone with SO took the overdose  She denies using any other drugs at the time of overdose save for the aforementioned Klonopin and gabapentin, denies use of alcohol or illicit substances with the overdose  She states she took it to alleviate the pain related to her marital troubles  After taking overdose of, she contacted her parents, who contacted EMS  Currently, patient is lying in bed, and has no acute complaints at this time, specifically denying any fevers/chills, shortness of breath, headache, or dizziness/lightheadedness    She is agreeable to psychiatric evaluation and potential psychiatric inpatient hospitalization, will have Toxicology eval   Intentional drug overdose   Assessment & Plan  · Had taken a handful (unclear number) of Klonopin 1 mg pills plus reportedly extra gabapentin in setting of marital difficulties after phone conversation with significant other  · She is awake and alert to my conversation and without respiratory distress on room air  · Needs toxicology evaluation, Psychiatry consultation  · Continue one-to-one observation  · Telemetry monitoring in setting of drug overdose, continuous pulse oximetry  · UDS was ordered by ED physician and is currently pending; blood alcohol, salicylate, and acetaminophen levels are negative  · Avoid and hold sedating medications  · Close monitoring for evidence of toxidrome      ED Triage Vitals [09/20/19 1758]   Temperature Pulse Respirations Blood Pressure SpO2   97 9 °F (36 6 °C) 75 12 107/67 99 %      Temp Source Heart Rate Source Patient Position - Orthostatic VS BP Location FiO2 (%)   Oral Monitor Sitting Right arm --      Pain Score       2        Wt Readings from Last 1 Encounters:   09/20/19 67 5 kg (148 lb 13 oz)     Additional Vital Signs:   Date/Time  Temp  Pulse  Resp  BP  MAP (mmHg)  SpO2  O2 Device   09/21/19 0850      15        None (Room air)   09/21/19 06:57:37  98 °F (36 7 °C)  66  16  102/63  76  100 %     09/21/19 03:11:07  97 4 °F (36 3 °C)Abnormal   70  16  91/58  69  98 %     09/20/19 23:03:14  97 8 °F (36 6 °C)  69  15  91/58  69  98 %     09/20/19 2212              None (Room air)   09/20/19 22:05:35  97 8 °F (36 6 °C)  72  18  116/72  87  99 %     09/20/19 2100    74  20  115/75    97 %  None (Room air)   09/20/19 2049    73  18  111/71    100 %  None (Room air)   09/20/19 1909    66  20  116/70    100 %  None (Room air)   09/20/19 1758  97 9 °F (36 6 °C)  75  12  107/67    99 %  None (Room air)       Pertinent Labs/Diagnostic Test Results:   Results from last 7 days   Lab Units 09/21/19  0447   WBC Thousand/uL 4 61   HEMOGLOBIN g/dL 11 3*   HEMATOCRIT % 35 0   PLATELETS Thousands/uL 188     Results from last 7 days   Lab Units 09/21/19  0447 09/20/19  1848   SODIUM mmol/L 138 136   POTASSIUM mmol/L 3 8 3 9   CHLORIDE mmol/L 108 106   CO2 mmol/L 27 26   ANION GAP mmol/L 3* 4   BUN mg/dL 18 20   CREATININE mg/dL 0 92 0 88   EGFR ml/min/1 73sq m 74 78   CALCIUM mg/dL 8 8 8 9     Results from last 7 days   Lab Units 09/20/19  1848   AST U/L 11   ALT U/L 17   ALK PHOS U/L 63   TOTAL PROTEIN g/dL 6 5   ALBUMIN g/dL 3 9   TOTAL BILIRUBIN mg/dL 0 28     Results from last 7 days   Lab Units 09/21/19  0447 09/20/19  1848   GLUCOSE RANDOM mg/dL 70 73     Results from last 7 days   Lab Units 09/20/19  2157   AMPH/METH  Negative   BARBITURATE UR  Negative   BENZODIAZEPINE UR  Negative   COCAINE UR  Negative   METHADONE URINE  Negative   OPIATE UR  Negative   PCP UR  Negative   THC UR  Negative     Results from last 7 days   Lab Units 09/20/19  1848   ETHANOL LVL mg/dL <3   ACETAMINOPHEN LVL ug/mL <2*   SALICYLATE LVL mg/dL <3*     EKG -- NSR    ED Treatment:   Medication Administration from 09/20/2019 1752 to 09/20/2019 2148     None        Past Medical History:   Diagnosis Date    Amenorrhea     Anxiety     Asthma     Back pain     Chondromalacia of patella     Chronic fatigue     Deep vein thrombophlebitis of leg (HCC)     Depression     GERD (gastroesophageal reflux disease)     HPV (human papilloma virus) infection     Hypothyroidism     Lumbar radiculopathy     Migraine     Myofascial pain syndrome     Osteopenia     Piriformis syndrome     Sacroiliitis (HCC)     Sciatica     Sleep apnea     Spondylosis of lumbar spine     Trochanteric bursitis of right hip     Vertigo     Vitamin D deficiency      Present on Admission:   Intentional drug overdose ([facility] [address] )   Anxiety and depression   Gastroesophageal reflux disease with esophagitis   Asthma      Admitting Diagnosis:  Anxiety [F41 9]  Overdose [T50 901A]  Depression [F32 9]  Benzodiazepine (tranquilizer) overdose [T42 4X1A]  Intentional drug overdose, initial encounter ([facility] [address] ) Sarah Arango  Age/Sex: 53 y o  female  Admission Orders:  Current Facility-Administered Medications:  albuterol 2 puff Inhalation Q4H PRN   buPROPion 150 mg Oral Daily   buPROPion 300 mg Oral Daily   enoxaparin 40 mg Subcutaneous Daily   fluticasone 2 spray Each Nare Daily   fluticasone-vilanterol 1 puff Inhalation Daily   loratadine 10 mg Oral Daily   naltrexone 50 mg Oral Daily   OLANZapine 2 5 mg Oral Daily PRN   pantoprazole 40 mg Oral Early Morning   phenazopyridine 100 mg Oral TID PRN   sertraline 200 mg Oral HS   tiotropium 18 mcg Inhalation Daily   venlafaxine 300 mg Oral Daily     Tele  Reg diet  Cont pox  1:1 continuous watch      IP CONSULT TO TOXICOLOGY  IP CONSULT TO PSYCHIATRY  IP CONSULT TO CASE MANAGEMENT    Network Utilization Review Department  Phone: [phone]; Fax [phone]  [email or web address]  ATTENTION: Please call with any questions or concerns to [phone]  and carefully listen to the prompts so that you are directed to the right person  Send all requests for admission clinical reviews, approved or denied determinations and any other requests to fax [phone]   All voicemails are confidential

## 2019-09-21 NOTE — ED NOTES
Pt's parents Bee Sousa and Blas Pham requesting updates with plan of care and any transfer information   [facility] Deb Dunlap RN  09/20/19 2001

## 2019-09-21 NOTE — ASSESSMENT & PLAN NOTE
· Had taken a handful (unclear number) of Klonopin 1 mg pills plus reportedly extra gabapentin in setting of marital difficulties after phone conversation with significant other  · She is awake and alert to my conversation and without respiratory distress on room air  · Needs toxicology evaluation, Psychiatry consultation  · Continue one-to-one observation  · Telemetry monitoring in setting of drug overdose, continuous pulse oximetry  · UDS was ordered by ED physician and is currently pending; blood alcohol, salicylate, and acetaminophen levels are negative  · Avoid and hold sedating medications  · Close monitoring for evidence of toxidrome

## 2019-09-21 NOTE — ASSESSMENT & PLAN NOTE
· Psychiatry consultation as above  · Continue psychiatric medications for this except for sedating medications, can resume when okay with Toxicology and Psychiatry  · Although will re add klonopin at lower doses prn

## 2019-09-21 NOTE — PLAN OF CARE
Problem: PAIN - ADULT  Goal: Verbalizes/displays adequate comfort level or baseline comfort level  Description  Interventions:  - Encourage patient to monitor pain and request assistance  - Assess pain using appropriate pain scale  - Administer analgesics based on type and severity of pain and evaluate response  - Implement non-pharmacological measures as appropriate and evaluate response  - Consider cultural and social influences on pain and pain management  - Notify physician/advanced practitioner if interventions unsuccessful or patient reports new pain  Outcome: Progressing     Problem: SAFETY ADULT  Goal: Patient will remain free of falls  Description  INTERVENTIONS:  - Assess patient frequently for physical needs  -  Identify cognitive and physical deficits and behaviors that affect risk of falls    -  [place] fall precautions as indicated by assessment   - Educate patient/family on patient safety including physical limitations  - Instruct patient to call for assistance with activity based on assessment  - Modify environment to reduce risk of injury  - Consider OT/PT consult to assist with strengthening/mobility  Outcome: Progressing  Goal: Maintain or return to baseline ADL function  Description  INTERVENTIONS:  -  Assess patient's ability to carry out ADLs; assess patient's baseline for ADL function and identify physical deficits which impact ability to perform ADLs (bathing, care of mouth/teeth, toileting, grooming, dressing, etc )  - Assess/evaluate cause of self-care deficits   - Assess range of motion  - Assess patient's mobility; develop plan if impaired  - Assess patient's need for assistive devices and provide as appropriate  - Encourage maximum independence but intervene and supervise when necessary  - Involve family in performance of ADLs  - Assess for home care needs following discharge   - Consider OT consult to assist with ADL evaluation and planning for discharge  - Provide patient education as appropriate  Outcome: Progressing  Goal: Maintain or return mobility status to optimal level  Description  INTERVENTIONS:  - Assess patient's baseline mobility status (ambulation, transfers, stairs, etc )    - Identify cognitive and physical deficits and behaviors that affect mobility  - Identify mobility aids required to assist with transfers and/or ambulation (gait belt, sit-to-stand, lift, walker, cane, etc )  - [place] fall precautions as indicated by assessment  - Record patient progress and toleration of activity level on Mobility SBAR; progress patient to next Phase/Stage  - Instruct patient to call for assistance with activity based on assessment  - Consider rehabilitation consult to assist with strengthening/weightbearing, etc   Outcome: Progressing     Problem: DISCHARGE PLANNING  Goal: Discharge to home or other facility with appropriate resources  Description  INTERVENTIONS:  - Identify barriers to discharge w/patient and caregiver  - Arrange for needed discharge resources and transportation as appropriate  - Identify discharge learning needs (meds, wound care, etc )  - Arrange for interpretive services to assist at discharge as needed  - Refer to Case Management Department for coordinating discharge planning if the patient needs post-hospital services based on physician/advanced practitioner order or complex needs related to functional status, cognitive ability, or social support system  Outcome: Progressing     Problem: Knowledge Deficit  Goal: Patient/family/caregiver demonstrates understanding of disease process, treatment plan, medications, and discharge instructions  Description  Complete learning assessment and assess knowledge base    Interventions:  - Provide teaching at level of understanding  - Provide teaching via preferred learning methods  Outcome: Progressing

## 2019-09-21 NOTE — ED NOTES
Pt's medications sent to pharmacy, tag number [id number]   Includes 27 1 mg Clonazepam tablets      Bee Gastelum RN  09/20/19 4994

## 2019-09-21 NOTE — CONSULTS
PHONE [facility] 1979 Toxicology  Eugene Glez 53 y o  female MRN: [id number]  Unit/Bed#: [facility] 808-01 Encounter: [id number]      Reason for Consult / Principal Problem: Medication overdose  Consults  09/21/19      ASSESSMENT:  1) Benzodiazepine overdose  2) Gabapentin overdose  3) Hypotension    DISCUSSION/ RECOMMENDATIONS:  The patient presented yesterday after benzodiazepine and gabapentin overdose  She was drowsy at that time and I recommended overnight admission with patient on continuous pulse ox  Per my discussion with admitting physician today mental status has improved  There have not been any respiratory issues  I do not have concerns for any further toxicity developing from the patient's overdose, and she can be medically cleared from a toxicology perspective  Of note the patient has developed some mild hypotension this afternoon, however I would not expect this to be related to the overdose  IV fluids can be continued until blood pressure improves and if there is persistent hypotension, non-toxicological causes should then be considered  For further questions, please call [facility]  Service or Patient Access Center to reach the medical  on call  Hx and PE limited by the dynamics of a phone consultation  I have not personally interviewed or evaluated the patient, but only advised based on the information provided to me  Primary provider is responsible for all clinical decisions  Pertinent history, physical exam and clinical findings and course discussed: Eugene Glez is a 53y o  year old female who presented after clonazepam and gabapentin overdose  The patient was drowsy but responsive and did not have any respiratory issues  She had denied any other ingestions  She was admitted overnight is reported to be doing well today overall, although she has developed some mild hypotension this afternoon      Review of systems and physical exam not performed by me     Historical Information   Past Medical History:   Diagnosis Date    Amenorrhea     Anxiety     Asthma     Back pain     Chondromalacia of patella     Chronic fatigue     Deep vein thrombophlebitis of leg (HCC)     Depression     GERD (gastroesophageal reflux disease)     HPV (human papilloma virus) infection     Hypothyroidism     Lumbar radiculopathy     Migraine     Myofascial pain syndrome     Osteopenia     Piriformis syndrome     Sacroiliitis (HCC)     Sciatica     Sleep apnea     Spondylosis of lumbar spine     Trochanteric bursitis of right hip     Vertigo     Vitamin D deficiency      Past Surgical History:   Procedure Laterality Date    CERVIX LESION DESTRUCTION       SECTION      COLONOSCOPY      COLPOSCOPY W/ BIOPSY / CURETTAGE      Colposcopy cervix with biopsy    LAPAROSCOPIC ENDOMETRIOSIS FULGURATION      LAPAROSCOPY      TOOTH EXTRACTION       Social History   Social History     Substance and Sexual Activity   Alcohol Use Not Currently    Comment: Per [employer]; Occasional use     Social History     Substance and Sexual Activity   Drug Use Not Currently     Social History     Tobacco Use   Smoking Status Never Smoker   Smokeless Tobacco Never Used     Family History   Problem Relation Age of Onset    Heart disease Mother     Asthma Daughter     Lung cancer Paternal Grandfather     Asthma Daughter     Colon cancer Father     Colon cancer Maternal Grandfather     Prostate cancer Maternal Grandfather     Colon cancer Maternal Aunt     No Known Problems Maternal Grandmother     No Known Problems Paternal Grandmother     No Known Problems Maternal Aunt     No Known Problems Maternal Aunt     No Known Problems Maternal Aunt     No Known Problems Paternal Aunt     Psychiatric Illness Neg Hx         Prior to Admission medications    Medication Sig Start Date End Date Taking?  Authorizing Provider   ARIPiprazole (ABILIFY) 30 mg tablet Take 1 tablet (30 mg total) by mouth daily at bedtime for 120 days 6/26/19 10/24/19 Yes Ofe Ruby MD   Aspirin-Acetaminophen-Caffeine (MIGRAINE FORMULA PO) Take by mouth   Yes Historical Provider, MD [place] 200-25 MCG/INH inhaler  3/5/18  Yes Historical Provider, MD   buPROPion (WELLBUTRIN XL) 150 mg 24 hr tablet  8/4/19  Yes Historical Provider, MD   buPROPion (WELLBUTRIN XL) 300 mg 24 hr tablet Take 1 tablet (300 mg total) by mouth daily for 120 days Total Wellbutrin XL dose is 450 mg daily 6/26/19 10/24/19 Yes Ofe Ruby MD   clonazePAM (KlonoPIN) 1 mg tablet Take 0 5-1 tablets (0 5-1 mg total) by mouth 3 (three) times a day for 120 days To be filled on or after 7/20/19 7/20/19 11/17/19 Yes Ofe Ruby MD   [employer] INDOOR/OUTDOOR ALLERGY RLF 10 MG tablet Take 10 mg by mouth daily 1/29/18  Yes Historical Provider, MD   Erenumab-aooe (AIMOVIG) 140 MG/ML SOAJ Inject 1 mL under the skin every 30 (thirty) days 8/9/19  Yes Kaylyn Caul, DO   ergocalciferol (VITAMIN D2) 50,000 units TK 1 C WEEKLY 7/10/19  Yes Historical Provider, MD   gabapentin (NEURONTIN) 300 mg capsule 2 capsules in am, 2 capsules at noon, and 2 capsules in the evening  7/29/19  Yes Joy Chen PA-C   ibuprofen (MOTRIN) 800 mg tablet Take 1 tablet (800 mg total) by mouth every 6 (six) hours as needed for mild pain 8/6/19  Yes Karine Mccallum PA-C   levonorgestrel (MIRENA, 52 MG,) 20 MCG/24HR IUD by Intrauterine route   Yes Historical Provider, MD   mometasone (NASONEX) 50 mcg/act nasal spray  3/12/18  Yes Historical Provider, MD   naltrexone (REVIA) 50 mg tablet Take 1 tablet (50 mg total) by mouth daily for 120 days 6/26/19 10/24/19 Yes Ofe Ruby MD   OLANZapine (ZyPREXA) 2 5 mg tablet Take 1 tab qhs prn migraine   4/10/19  Yes Joy Chen PA-C   olopatadine (PATANOL) 0 1 % ophthalmic solution Administer 1 drop to both eyes 2 (two) times a day for 90 days 8/7/19 11/5/19 Yes Lashae Moseley DO   omeprazole ([facility]) 20 mg delayed release capsule Take 1 capsule by mouth daily 1/28/11  Yes Historical Provider, MD   ondansetron (ZOFRAN-ODT) 4 mg disintegrating tablet Take 1 tablet (4 mg total) by mouth every 6 (six) hours as needed for nausea or vomiting 8/1/19  Yes Crystal Wan PA-C   sertraline (ZOLOFT) 100 mg tablet Take 2 tablets (200 mg total) by mouth daily at bedtime for 120 days 6/26/19 10/24/19 Yes Rachel Yao MD   SPIRIVA RESPIMAT 2 5 MCG/ACT AERS  3/5/18  Yes Historical Provider, MD   venlafaxine (EFFEXOR-XR) 150 mg 24 hr capsule Take 2 capsules (300 mg total) by mouth daily for 120 days 6/26/19 10/24/19 Yes Rachel Yao MD   ZOLMitriptan (ZOMIG-ZMT) 5 MG disintegrating tablet TAKE 1 TABLET BY MOUTH AT ONSET OF HEADACHE, MAY REPEAT AFTER 2 HOURS AS NEEDED  MAX 2 TABLETS per 24 hours   7/30/19  Yes Crystal Wan PA-C   zolpidem (AMBIEN) 10 mg tablet Take 1 tablet (10 mg total) by mouth daily at bedtime as needed for sleep for up to 120 days To be filled on or after 6/27/19 6/27/19 10/25/19 Yes Rachel Yao MD   albuterol (2 5 mg/3 mL) 0 083 % nebulizer solution Inhale 4/19/13   Historical Provider, MD   baclofen 10 mg tablet Take 1 tablet (10 mg total) by mouth daily at bedtime 4/15/19   Bailey Mcarthur DO   EPINEPHrine (EPIPEN) 0 3 mg/0 3 mL SOAJ as directed 11/15/10   Historical Provider, MD   phenazopyridine (PYRIDIUM) 100 mg tablet Take 1 tablet (100 mg total) by mouth 3 (three) times a day as needed for bladder spasms  Patient not taking: Reported on 9/20/2019 2/22/19   Red Mason DO   PROAIR RESPICLICK 521 (06 Base) MCG/ACT AEPB Inhale 2 puffs every 6 (six) hours as needed (wheezing) 4/2/19 4/1/20  Adilene Mcintyre DO       Current Facility-Administered Medications   Medication Dose Route Frequency    albuterol (PROVENTIL HFA,VENTOLIN HFA) inhaler 2 puff  2 puff Inhalation Q4H PRN    buPROPion (WELLBUTRIN XL) 24 hr tablet 150 mg  150 mg Oral Daily    buPROPion (WELLBUTRIN XL) 24 hr tablet 300 mg  300 mg Oral Daily    clonazePAM (KlonoPIN) tablet 0 5 mg  0 5 mg Oral BID PRN    enoxaparin (LOVENOX) subcutaneous injection 40 mg  40 mg Subcutaneous Daily    fluticasone (FLONASE) 50 mcg/act nasal spray 2 spray  2 spray Each Nare Daily    fluticasone-vilanterol (BREO ELLIPTA) 200-25 MCG/INH inhaler 1 puff  1 puff Inhalation Daily    loratadine (CLARITIN) tablet 10 mg  10 mg Oral Daily    naltrexone (REVIA) tablet 50 mg  50 mg Oral Daily    OLANZapine (ZyPREXA) tablet 2 5 mg  2 5 mg Oral Daily PRN    pantoprazole (PROTONIX) EC tablet 40 mg  40 mg Oral Early Morning    phenazopyridine (PYRIDIUM) tablet 100 mg  100 mg Oral TID PRN    sertraline (ZOLOFT) tablet 200 mg  200 mg Oral HS    tiotropium (SPIRIVA) capsule for inhaler 18 mcg  18 mcg Inhalation Daily    venlafaxine (EFFEXOR-XR) 24 hr capsule 300 mg  300 mg Oral Daily       Allergies   Allergen Reactions    Nuts      Other reaction(s): WALNUTS    Erythromycin Diarrhea, GI Intolerance and Vomiting    Iodine Hives    Other      adobo    Shellfish Allergy     Shellfish-Derived Products     Zithromax [Azithromycin] Diarrhea     Can take name brand  Annotation - [id number]: can take brand name  Other reaction(s): Nausea/vomiting/diarrhea       Objective       Intake/Output Summary (Last 24 hours) at 9/21/2019 1857  Last data filed at 9/21/2019 1101  Gross per 24 hour   Intake 100 ml   Output 610 ml   Net -510 ml       Invasive Devices:   Peripheral IV 09/21/19 Right Antecubital (Active)   Site Assessment Clean;Dry; Intact 9/21/2019  8:30 AM   Dressing Type Transparent 9/21/2019  8:30 AM   Line Status Saline locked 9/21/2019  8:30 AM   Dressing Status Clean;Dry; Intact 9/21/2019  8:30 AM       Vitals   Vitals:    09/21/19 0850 09/21/19 1105 09/21/19 1109 09/21/19 1522   BP:  (!) 85/47 (!) 85/50 (!) 83/48   TempSrc:       Pulse:  76  86   Resp: 15 16     Patient Position - Orthostatic VS:       Temp:  98 1 °F (36 7 °C)  97 9 °F (36 6 °C) Lab Results: I have personally reviewed pertinent reports  Labs:  Results from last 7 days   Lab Units 09/21/19  0447   WBC Thousand/uL 4 61   HEMOGLOBIN g/dL 11 3*   HEMATOCRIT % 35 0   PLATELETS Thousands/uL 188      Results from last 7 days   Lab Units 09/21/19 0447 09/20/19  1848   POTASSIUM mmol/L 3 8 3 9   CHLORIDE mmol/L 108 106   CO2 mmol/L 27 26   BUN mg/dL 18 20   CREATININE mg/dL 0 92 0 88   CALCIUM mg/dL 8 8 8 9   ALK PHOS U/L  --  63   ALT U/L  --  17   AST U/L  --  11              No results found for: TROPONINI      Results from last 7 days   Lab Units 09/20/19  1848   ACETAMINOPHEN LVL ug/mL <2*   ETHANOL LVL mg/dL <3   SALICYLATE LVL mg/dL <3*           Counseling / Coordination of Care  Total time spent today 32 minutes  This was a phone consultation

## 2019-09-21 NOTE — ASSESSMENT & PLAN NOTE
· Not in acute exacerbation  · Continue home inhalers; add p r n   Nebulizers if shortness of breath and wheezing should develop

## 2019-09-21 NOTE — PROGRESS NOTES
Progress Note - Carlee Chin 1970, 53 y o  female MRN: [id number]    Unit/Bed#: [facility] 808-01 Encounter: [id number]    Primary Care Provider: Dena Anderson DO   Date and time admitted to hospital: 9/20/2019  5:52 PM        * Intentional drug overdose ([address] )  Assessment & Plan  · Had taken a handful (unclear number) of Klonopin 1 mg pills plus reportedly extra gabapentin in setting of marital difficulties after phone conversation with significant other  · She is awake and alert to my conversation and without respiratory distress on room air  · rapid Urine drug screen negative  · Patient reports to me that this  Overdose not was suicidal   she was not sure how much she should take  · She denies taking any other medications  PLAN:  · Await toxicology and Psychiatry input  · Continue one-to-one observation  · Telemetry monitoring in setting of drug overdose, continuous pulse oximetry  · Avoid and hold sedating medications      Gastroesophageal reflux disease with esophagitis  Assessment & Plan  · Continue PPI    Anxiety and depression  Assessment & Plan  · Psychiatry consultation as above  · Continue psychiatric medications for this except for sedating medications, can resume when okay with Toxicology and Psychiatry  · Although will re add klonopin at lower doses prn  Asthma  Assessment & Plan  · Not in acute exacerbation  · Continue home inhalers; add p r n  Nebulizers if shortness of breath and wheezing should develop        VTE Pharmacologic Prophylaxis:   Pharmacologic: Heparin  Mechanical VTE Prophylaxis in Place: Yes    Patient Centered Rounds: I have performed bedside rounds with nursing staff today  Discussions with Specialists or Other Care Team Provider:      Education and Discussions with Family / Patient:  Discussed plan of care with the patient    Time Spent for Care: 30 minutes    More than 50% of total time spent on counseling and coordination of care as described above     Current Length of Stay: 0 day(s)    Current Patient Status: Observation   Certification Statement: The patient will continue to require additional inpatient hospital stay due to Not medically stable    Discharge Plan:  Not medically stable    Code Status: Level 1 - Full Code      Subjective:   No overnight events reported  Blood pressure 80 to 90 systolic this morning without any symptoms  Patient reports that her baseline blood pressure runs on the lower side  She appears comfortable but tired and sleepy  Wakes up on calling her name and able to hold a conversation  She reported that she took few pills of Klonopin and she is not sure how many  Apart from [place] [address] she denies taking any medications in excess amount  She said she was not suicidal but did not know how much to take  Objective:     Vitals:   Temp (24hrs), Av 8 °F (36 6 °C), Min:97 4 °F (36 3 °C), Max:98 1 °F (36 7 °C)    Temp:  [97 4 °F (36 3 °C)-98 1 °F (36 7 °C)] 98 1 °F (36 7 °C)  HR:  [66-76] 76  Resp:  [12-20] 16  BP: ()/(47-75) 85/50  SpO2:  [97 %-100 %] 98 %  Body mass index is 26 36 kg/m²  Input and Output Summary (last 24 hours): Intake/Output Summary (Last 24 hours) at 2019 1309  Last data filed at 2019 1101  Gross per 24 hour   Intake 100 ml   Output 610 ml   Net -510 ml       Physical Exam:     Physical Exam   Constitutional: She is oriented to person, place, and time  No distress  Eyes: Pupils are equal, round, and reactive to light  Cardiovascular: Normal rate, regular rhythm and normal heart sounds  No murmur heard  Pulmonary/Chest: Effort normal and breath sounds normal  No respiratory distress  She has no wheezes  She has no rales  Abdominal: Soft  Bowel sounds are normal  She exhibits no distension  There is no tenderness  Musculoskeletal: She exhibits no edema  Neurological: She is alert and oriented to person, place, and time  Answers questions    Squeezes fingers and wiggle toes bilaterally   Skin: Skin is warm  Additional Data:     Labs:    Results from last 7 days   Lab Units 09/21/19  0447   WBC Thousand/uL 4 61   HEMOGLOBIN g/dL 11 3*   HEMATOCRIT % 35 0   PLATELETS Thousands/uL 188     Results from last 7 days   Lab Units 09/21/19  0447 09/20/19  1848   SODIUM mmol/L 138 136   POTASSIUM mmol/L 3 8 3 9   CHLORIDE mmol/L 108 106   CO2 mmol/L 27 26   BUN mg/dL 18 20   CREATININE mg/dL 0 92 0 88   ANION GAP mmol/L 3* 4   CALCIUM mg/dL 8 8 8 9   ALBUMIN g/dL  --  3 9   TOTAL BILIRUBIN mg/dL  --  0 28   ALK PHOS U/L  --  63   ALT U/L  --  17   AST U/L  --  11   GLUCOSE RANDOM mg/dL 70 73                           * I Have Reviewed All Lab Data Listed Above  * Additional Pertinent Lab Tests Reviewed:  All Labs Within Last 24 Hours Reviewed    Imaging:    No orders to display     Recent Cultures (last 7 days):           Last 24 Hours Medication List:     Current Facility-Administered Medications:  albuterol 2 puff Inhalation Q4H PRN Yong Rodriguez MD    buPROPion 150 mg Oral Daily Niki Danie, DO    buPROPion 300 mg Oral Daily Niki Liberty, DO    clonazePAM 0 5 mg Oral BID PRN Yong Rodriguez MD    enoxaparin 40 mg Subcutaneous Daily Niki Danie, DO    fluticasone 2 spray Each Nare Daily Niki Liberty, DO    fluticasone-vilanterol 1 puff Inhalation Daily Niki Liberty, DO    loratadine 10 mg Oral Daily Niki Danie, DO    naltrexone 50 mg Oral Daily Niki Liberty, DO    OLANZapine 2 5 mg Oral Daily PRN Niki Danie, DO    pantoprazole 40 mg Oral Early Morning Niki Danie, DO    phenazopyridine 100 mg Oral TID PRN Niki Liberty, DO    sertraline 200 mg Oral HS Niki Liberty, DO    sodium chloride 500 mL Intravenous Once Yong Rodriguez MD Last Rate: 500 mL (09/21/19 1212)   tiotropium 18 mcg Inhalation Daily Niki Danie, DO    venlafaxine 300 mg Oral Daily Niki Liberty, DO         Today, Patient Was Seen By: oYng Rodriguez MD    ** Please Note: Dictation voice to text software may have been used in the creation of this document   **

## 2019-09-21 NOTE — ASSESSMENT & PLAN NOTE
· Psychiatry consultation as above  · Continue psychiatric medications for this except for sedating medications, can resume when okay with Toxicology and Psychiatry

## 2019-09-22 PROBLEM — I95.9 HYPOTENSION: Status: ACTIVE | Noted: 2019-09-22

## 2019-09-22 LAB
ANION GAP SERPL CALCULATED.3IONS-SCNC: 5 MMOL/L (ref 4–13)
ATRIAL RATE: 69 BPM
BUN SERPL-MCNC: 30 MG/DL (ref 5–25)
CALCIUM SERPL-MCNC: 8.8 MG/DL (ref 8.3–10.1)
CHLORIDE SERPL-SCNC: 110 MMOL/L (ref 100–108)
CO2 SERPL-SCNC: 29 MMOL/L (ref 21–32)
CORTIS AM PEAK SERPL-MCNC: 5 UG/DL (ref 4.2–22.4)
CREAT SERPL-MCNC: 0.93 MG/DL (ref 0.6–1.3)
ERYTHROCYTE [DISTWIDTH] IN BLOOD BY AUTOMATED COUNT: 12 % (ref 11.6–15.1)
GFR SERPL CREATININE-BSD FRML MDRD: 73 ML/MIN/1.73SQ M
GLUCOSE SERPL-MCNC: 79 MG/DL (ref 65–140)
HCT VFR BLD AUTO: 34.1 % (ref 34.8–46.1)
HGB BLD-MCNC: 10.9 G/DL (ref 11.5–15.4)
MCH RBC QN AUTO: 29.3 PG (ref 26.8–34.3)
MCHC RBC AUTO-ENTMCNC: 32 G/DL (ref 31.4–37.4)
MCV RBC AUTO: 92 FL (ref 82–98)
P AXIS: 66 DEGREES
PLATELET # BLD AUTO: 184 THOUSANDS/UL (ref 149–390)
PMV BLD AUTO: 9.6 FL (ref 8.9–12.7)
POTASSIUM SERPL-SCNC: 4 MMOL/L (ref 3.5–5.3)
PR INTERVAL: 132 MS
QRS AXIS: -88 DEGREES
QRSD INTERVAL: 90 MS
QT INTERVAL: 406 MS
QTC INTERVAL: 435 MS
RBC # BLD AUTO: 3.72 MILLION/UL (ref 3.81–5.12)
SODIUM SERPL-SCNC: 144 MMOL/L (ref 136–145)
T WAVE AXIS: 62 DEGREES
VENTRICULAR RATE: 69 BPM
WBC # BLD AUTO: 5.83 THOUSAND/UL (ref 4.31–10.16)

## 2019-09-22 PROCEDURE — 99232 SBSQ HOSP IP/OBS MODERATE 35: CPT | Performed by: INTERNAL MEDICINE

## 2019-09-22 PROCEDURE — 85027 COMPLETE CBC AUTOMATED: CPT | Performed by: INTERNAL MEDICINE

## 2019-09-22 PROCEDURE — 94760 N-INVAS EAR/PLS OXIMETRY 1: CPT

## 2019-09-22 PROCEDURE — 82533 TOTAL CORTISOL: CPT | Performed by: INTERNAL MEDICINE

## 2019-09-22 PROCEDURE — 93010 ELECTROCARDIOGRAM REPORT: CPT | Performed by: INTERNAL MEDICINE

## 2019-09-22 PROCEDURE — 80048 BASIC METABOLIC PNL TOTAL CA: CPT | Performed by: INTERNAL MEDICINE

## 2019-09-22 RX ORDER — CLONAZEPAM 0.5 MG/1
0.5 TABLET ORAL 2 TIMES DAILY
Status: DISCONTINUED | OUTPATIENT
Start: 2019-09-22 | End: 2019-09-26 | Stop reason: HOSPADM

## 2019-09-22 RX ORDER — IBUPROFEN 600 MG/1
600 TABLET ORAL EVERY 6 HOURS PRN
Status: COMPLETED | OUTPATIENT
Start: 2019-09-22 | End: 2019-09-23

## 2019-09-22 RX ADMIN — PANTOPRAZOLE SODIUM 40 MG: 40 TABLET, DELAYED RELEASE ORAL at 06:01

## 2019-09-22 RX ADMIN — IBUPROFEN 600 MG: 600 TABLET, FILM COATED ORAL at 21:56

## 2019-09-22 RX ADMIN — ENOXAPARIN SODIUM 40 MG: 40 INJECTION SUBCUTANEOUS at 10:23

## 2019-09-22 RX ADMIN — BUPROPION HYDROCHLORIDE 300 MG: 150 TABLET, FILM COATED, EXTENDED RELEASE ORAL at 10:24

## 2019-09-22 RX ADMIN — SERTRALINE HYDROCHLORIDE 200 MG: 100 TABLET ORAL at 21:56

## 2019-09-22 RX ADMIN — CLONAZEPAM 0.25 MG: 0.5 TABLET ORAL at 17:25

## 2019-09-22 RX ADMIN — BUPROPION HYDROCHLORIDE 150 MG: 150 TABLET, FILM COATED, EXTENDED RELEASE ORAL at 10:22

## 2019-09-22 RX ADMIN — LORATADINE 10 MG: 10 TABLET ORAL at 10:23

## 2019-09-22 RX ADMIN — VENLAFAXINE HYDROCHLORIDE 300 MG: 150 CAPSULE, EXTENDED RELEASE ORAL at 10:21

## 2019-09-22 RX ADMIN — FLUTICASONE PROPIONATE 2 SPRAY: 50 SPRAY, METERED NASAL at 10:23

## 2019-09-22 NOTE — ASSESSMENT & PLAN NOTE
· Psychiatry consultation as above  · Continue psychiatric medications  · Restarted Klonopin at point 0 5 5 mg b i d  which is lower than her usual to prevent withdrawal   She has been on  0 5-1 mg t i d  Klonopin for years

## 2019-09-22 NOTE — ED ATTENDING ATTESTATION
9/20/2019  IRusty DO, saw and evaluated the patient  I have discussed the patient with the resident/non-physician practitioner and agree with the resident's/non-physician practitioner's findings, Plan of Care, and MDM as documented in the resident's/non-physician practitioner's note, except where noted  All available labs and Radiology studies were reviewed  I was present for key portions of any procedure(s) performed by the resident/non-physician practitioner and I was immediately available to provide assistance  At this point I agree with the current assessment done in the Emergency Department  I have conducted an independent evaluation of this patient a history and physical is as follows:    ED Course       Patient is a 73-year-old female brought in after a reported intentional overdose  Patient states she took several of her Klonopin as well as some gabapentin because she was stressed about marital issues  She states she took approximately 1 handful of her Klonopin  She was able to call her parents and told her that she was sorry for doing this and was not their fault  No previous suicide attempt  Patient is somnolent tearful but awake and answering questions appropriately  Will discussed with Toxicology  Patient require medical admission for continued observation and psychiatric evaluation        Critical Care Time  Procedures

## 2019-09-22 NOTE — ASSESSMENT & PLAN NOTE
· Had taken a handful (unclear number) of Klonopin 1 mg pills plus reportedly extra gabapentin in setting of marital difficulties after phone conversation with significant other  · She is awake and alert to my conversation and without respiratory distress on room air  · rapid Urine drug screen negative  · Patient reports to me that this  Overdose not was suicidal   she was not sure how much she should take  · She denies taking any other medications  PLAN:  · Await Psychiatry input  · Appreciate toxicology input    · Continue one-to-one observation pending psych evaluation

## 2019-09-22 NOTE — PROGRESS NOTES
Progress Note - Shannon Moss 1970, 53 y o  female MRN: [id number]    Unit/Bed#: [facility] 808-01 Encounter: [id number]    Primary Care Provider: Emilia Neville DO   Date and time admitted to hospital: 9/20/2019  5:52 PM        * Intentional drug overdose ([facility] [address] )  Assessment & Plan  · Had taken a handful (unclear number) of Klonopin 1 mg pills plus reportedly extra gabapentin in setting of marital difficulties after phone conversation with significant other  · She is awake and alert to my conversation and without respiratory distress on room air  · rapid Urine drug screen negative  · Patient reports to me that this  Overdose not was suicidal   she was not sure how much she should take  · She denies taking any other medications  PLAN:  · Await Psychiatry input  · Appreciate toxicology input  · Continue one-to-one observation pending psych evaluation        Hypotension  Assessment & Plan  Transient  Resolved with IV hydration  Monitor off IV hydration now  Cortisol a m  5   If patient is hypotensive again, would check cosyntropin stim test     Gastroesophageal reflux disease with esophagitis  Assessment & Plan  · Continue PPI    Anxiety and depression  Assessment & Plan  · Psychiatry consultation as above  · Continue psychiatric medications  · Restarted Klonopin at point 0 5 5 mg b i d  which is lower than her usual to prevent withdrawal   She has been on  0 5-1 mg t i d  Klonopin for years  Asthma  Assessment & Plan  · Not in acute exacerbation  · Continue home inhalers; add p r n  Nebulizers if shortness of breath and wheezing should develop        VTE Pharmacologic Prophylaxis:   Pharmacologic: Heparin  Mechanical VTE Prophylaxis in Place: Yes  *Patient Centered Rounds: I have performed bedside rounds with nursing staff today  Discussions with Specialists or Other Care Team Provider: CM    Education and Discussions with Family / Patient: plan of care, patient    Time Spent for Care: 30 minutes  More than 50% of total time spent on counseling and coordination of care as described above  Current Length of Stay: 0 day(s)    Current Patient Status: Observation   Certification Statement: The patient will continue to require additional inpatient hospital stay due to Not medically stable    Discharge Plan:  Pending psych evaluation tomorrow    Code Status: Level 1 - Full Code      Subjective:   No overnight events reported  Patient denies any acute discomfort or pain  No dizziness or lightheadedness  Hypotension resolved with IV hydration  Objective:     Vitals:   Temp (24hrs), Av 8 °F (36 6 °C), Min:97 6 °F (36 4 °C), Max:97 9 °F (36 6 °C)    Temp:  [97 6 °F (36 4 °C)-97 9 °F (36 6 °C)] 97 9 °F (36 6 °C)  HR:  [65-86] 66  Resp:  [16-20] 16  BP: ()/(48-69) 101/60  SpO2:  [94 %-100 %] 97 %  Body mass index is 26 36 kg/m²  Input and Output Summary (last 24 hours): Intake/Output Summary (Last 24 hours) at 2019 1247  Last data filed at 2019 0734  Gross per 24 hour   Intake 1960 ml   Output    Net 1960 ml       Physical Exam:     Physical Exam  Constitutional: She is oriented to person, place, and time  No distress  Eyes: Pupils are equal, round, and reactive to light  Cardiovascular: Normal rate, regular rhythm and normal heart sounds  No murmur heard  Pulmonary/Chest: Effort normal and breath sounds normal  No respiratory distress  She has no wheezes  She has no rales  Abdominal: Soft  Bowel sounds are normal  She exhibits no distension  There is no tenderness  Musculoskeletal: She exhibits no edema  Neurological: She is alert and oriented to person, place, and time  Nonfocal  Skin: Skin is warm       Additional Data:     Labs:    Results from last 7 days   Lab Units 19  0433   WBC Thousand/uL 5 83   HEMOGLOBIN g/dL 10 9*   HEMATOCRIT % 34 1*   PLATELETS Thousands/uL 184     Results from last 7 days   Lab Units 19  0434  19  1848   SODIUM mmol/L 144   < > 136   POTASSIUM mmol/L 4 0   < > 3 9   CHLORIDE mmol/L 110*   < > 106   CO2 mmol/L 29   < > 26   BUN mg/dL 30*   < > 20   CREATININE mg/dL 0 93   < > 0 88   ANION GAP mmol/L 5   < > 4   CALCIUM mg/dL 8 8   < > 8 9   ALBUMIN g/dL  --   --  3 9   TOTAL BILIRUBIN mg/dL  --   --  0 28   ALK PHOS U/L  --   --  63   ALT U/L  --   --  17   AST U/L  --   --  11   GLUCOSE RANDOM mg/dL 79   < > 73    < > = values in this interval not displayed  * I Have Reviewed All Lab Data Listed Above  * Additional Pertinent Lab Tests Reviewed: All Labs Within Last 24 Hours Reviewed    Imaging:    No orders to display       Recent Cultures (last 7 days):           Last 24 Hours Medication List:     Current Facility-Administered Medications:  albuterol 2 puff Inhalation Q4H PRN Dean James MD   buPROPion 150 mg Oral Daily Victoria Hikes, DO   buPROPion 300 mg Oral Daily Victoria Hikes, DO   clonazePAM 0 5 mg Oral BID Dean James MD   enoxaparin 40 mg Subcutaneous Daily Victoria Hikes, DO   fluticasone 2 spray Each Nare Daily Victoria Hikes, DO   fluticasone-vilanterol 1 puff Inhalation Daily Victoria Hikes, DO   loratadine 10 mg Oral Daily Victoria Hikes, DO   naltrexone 50 mg Oral Daily Victoria Hikes, DO   OLANZapine 2 5 mg Oral Daily PRN Victoria Hikes, DO   pantoprazole 40 mg Oral Early Morning Victoria Hikes, DO   phenazopyridine 100 mg Oral TID PRN Victoria Hikes, DO   sertraline 200 mg Oral HS Victoria Hikes, DO   tiotropium 18 mcg Inhalation Daily Victoria Hikes, DO   venlafaxine 300 mg Oral Daily Victoria Hikes, DO        Today, Patient Was Seen By: Dean James MD    ** Please Note: Dictation voice to text software may have been used in the creation of this document   **

## 2019-09-22 NOTE — UTILIZATION REVIEW
Continued Stay Review    Date: 9/22/19                       Current Patient Class:  Current Level of Care: OBS    HPI:48 y o  female initially admitted on 9/20 WITH INTENTIONAL OD     Assessment/Plan: MENTAL STATUS AS IMPROVED, SEEN BY Lucy Sahu  MEDICALLY CLEARED FROM A TOXICOLOGY PERSPECTIVE  HYPOTENSIVE YESTERDAY WHICH RESOLVED WITH IVF  CONTINUE 1:1 WATCH[facility]         Pertinent Labs/Diagnostic Results:   Results from last 7 days   Lab Units 09/22/19  0433 09/21/19  0447   WBC Thousand/uL 5 83 4 61   HEMOGLOBIN g/dL 10 9* 11 3*   HEMATOCRIT % 34 1* 35 0   PLATELETS Thousands/uL 184 188     Results from last 7 days   Lab Units 09/22/19  0434 09/21/19  0447 09/20/19  1848   SODIUM mmol/L 144 138 136   POTASSIUM mmol/L 4 0 3 8 3 9   CHLORIDE mmol/L 110* 108 106   CO2 mmol/L 29 27 26   ANION GAP mmol/L 5 3* 4   BUN mg/dL 30* 18 20   CREATININE mg/dL 0 93 0 92 0 88   EGFR ml/min/1 73sq m 73 74 78   CALCIUM mg/dL 8 8 8 8 8 9     Results from last 7 days   Lab Units 09/20/19  1848   AST U/L 11   ALT U/L 17   ALK PHOS U/L 63   TOTAL PROTEIN g/dL 6 5   ALBUMIN g/dL 3 9   TOTAL BILIRUBIN mg/dL 0 28     Results from last 7 days   Lab Units 09/22/19  0434 09/21/19  0447 09/20/19  1848   GLUCOSE RANDOM mg/dL 79 70 73     Results from last 7 days   Lab Units 09/21/19  0447   TSH 3RD GENERATON uIU/mL 7 760*     Results from last 7 days   Lab Units 09/20/19  2157   AMPH/METH  Negative   BARBITURATE UR  Negative   BENZODIAZEPINE UR  Negative   COCAINE UR  Negative   METHADONE URINE  Negative   OPIATE UR  Negative   PCP UR  Negative   THC UR  Negative     Results from last 7 days   Lab Units 09/20/19  1848   ETHANOL LVL mg/dL <3   ACETAMINOPHEN LVL ug/mL <2*   SALICYLATE LVL mg/dL <3*       Vital Signs:   Date/Time  Temp  Pulse  Resp  BP  MAP (mmHg)  SpO2  O2 Device   09/22/19 15:19:20  98 1 °F (36 7 °C)  78  16  87/53Abnormal   64  97 %     09/22/19 0857    66        97 %     09/22/19 0856           97 %  None (Room air)   09/22/19 07:34:59  97 9 °F (36 6 °C)  71  16  101/60  74  98 %     09/22/19 03:04:40  97 6 °F (36 4 °C)  65  18  118/69  85  100 %         Medications:   Scheduled Meds:   Current Facility-Administered Medications:  albuterol 2 puff Inhalation Q4H PRN   buPROPion 150 mg Oral Daily   buPROPion 300 mg Oral Daily   clonazePAM 0 5 mg Oral BID   enoxaparin 40 mg Subcutaneous Daily   fluticasone 2 spray Each Nare Daily   fluticasone-vilanterol 1 puff Inhalation Daily   loratadine 10 mg Oral Daily   naltrexone 50 mg Oral Daily   OLANZapine 2 5 mg Oral Daily PRN   pantoprazole 40 mg Oral Early Morning   phenazopyridine 100 mg Oral TID PRN   sertraline 200 mg Oral HS   tiotropium 18 mcg Inhalation Daily   venlafaxine 300 mg Oral Daily       Discharge Plan: TBD    Network Utilization Review Department  Phone: [phone]; Fax [phone]  [email or web address]  ATTENTION: Please call with any questions or concerns to [phone]  and carefully listen to the prompts so that you are directed to the right person  Send all requests for admission clinical reviews, approved or denied determinations and any other requests to fax [phone]   All voicemails are confidential

## 2019-09-22 NOTE — ASSESSMENT & PLAN NOTE
Transient  Resolved with IV hydration  Monitor off IV hydration now    Cortisol a m  5   If patient is hypotensive again, would check cosyntropin stim test

## 2019-09-23 LAB
ANION GAP SERPL CALCULATED.3IONS-SCNC: 3 MMOL/L (ref 4–13)
BASOPHILS # BLD AUTO: 0.03 THOUSANDS/ΜL (ref 0–0.1)
BASOPHILS NFR BLD AUTO: 1 % (ref 0–1)
BUN SERPL-MCNC: 24 MG/DL (ref 5–25)
CALCIUM SERPL-MCNC: 8.7 MG/DL (ref 8.3–10.1)
CHLORIDE SERPL-SCNC: 108 MMOL/L (ref 100–108)
CO2 SERPL-SCNC: 29 MMOL/L (ref 21–32)
CORTIS SERPL-MCNC: 19.9 UG/DL
CORTIS SERPL-MCNC: 22.2 UG/DL
CORTIS SERPL-MCNC: 7.6 UG/DL
CREAT SERPL-MCNC: 0.88 MG/DL (ref 0.6–1.3)
EOSINOPHIL # BLD AUTO: 0.05 THOUSAND/ΜL (ref 0–0.61)
EOSINOPHIL NFR BLD AUTO: 1 % (ref 0–6)
ERYTHROCYTE [DISTWIDTH] IN BLOOD BY AUTOMATED COUNT: 11.8 % (ref 11.6–15.1)
GFR SERPL CREATININE-BSD FRML MDRD: 78 ML/MIN/1.73SQ M
GLUCOSE SERPL-MCNC: 110 MG/DL (ref 65–140)
HCT VFR BLD AUTO: 35.8 % (ref 34.8–46.1)
HGB BLD-MCNC: 11.7 G/DL (ref 11.5–15.4)
IMM GRANULOCYTES # BLD AUTO: 0.01 THOUSAND/UL (ref 0–0.2)
IMM GRANULOCYTES NFR BLD AUTO: 0 % (ref 0–2)
LYMPHOCYTES # BLD AUTO: 1.28 THOUSANDS/ΜL (ref 0.6–4.47)
LYMPHOCYTES NFR BLD AUTO: 27 % (ref 14–44)
MCH RBC QN AUTO: 29.8 PG (ref 26.8–34.3)
MCHC RBC AUTO-ENTMCNC: 32.7 G/DL (ref 31.4–37.4)
MCV RBC AUTO: 91 FL (ref 82–98)
MONOCYTES # BLD AUTO: 0.45 THOUSAND/ΜL (ref 0.17–1.22)
MONOCYTES NFR BLD AUTO: 10 % (ref 4–12)
NEUTROPHILS # BLD AUTO: 2.92 THOUSANDS/ΜL (ref 1.85–7.62)
NEUTS SEG NFR BLD AUTO: 61 % (ref 43–75)
NRBC BLD AUTO-RTO: 0 /100 WBCS
PLATELET # BLD AUTO: 178 THOUSANDS/UL (ref 149–390)
PMV BLD AUTO: 9.5 FL (ref 8.9–12.7)
POTASSIUM SERPL-SCNC: 3.8 MMOL/L (ref 3.5–5.3)
RBC # BLD AUTO: 3.93 MILLION/UL (ref 3.81–5.12)
SODIUM SERPL-SCNC: 140 MMOL/L (ref 136–145)
WBC # BLD AUTO: 4.74 THOUSAND/UL (ref 4.31–10.16)

## 2019-09-23 PROCEDURE — 82533 TOTAL CORTISOL: CPT | Performed by: INTERNAL MEDICINE

## 2019-09-23 PROCEDURE — 99203 OFFICE O/P NEW LOW 30 MIN: CPT | Performed by: PSYCHIATRY & NEUROLOGY

## 2019-09-23 PROCEDURE — 80048 BASIC METABOLIC PNL TOTAL CA: CPT | Performed by: INTERNAL MEDICINE

## 2019-09-23 PROCEDURE — 99232 SBSQ HOSP IP/OBS MODERATE 35: CPT | Performed by: INTERNAL MEDICINE

## 2019-09-23 PROCEDURE — 85025 COMPLETE CBC W/AUTO DIFF WBC: CPT | Performed by: INTERNAL MEDICINE

## 2019-09-23 RX ORDER — LEVOTHYROXINE SODIUM 0.07 MG/1
75 TABLET ORAL
Status: DISCONTINUED | OUTPATIENT
Start: 2019-09-24 | End: 2019-09-24

## 2019-09-23 RX ORDER — LEVOTHYROXINE SODIUM 0.05 MG/1
50 TABLET ORAL
Status: DISCONTINUED | OUTPATIENT
Start: 2019-09-23 | End: 2019-09-23

## 2019-09-23 RX ORDER — COSYNTROPIN 0.25 MG/ML
0.25 INJECTION, POWDER, FOR SOLUTION INTRAMUSCULAR; INTRAVENOUS ONCE
Status: COMPLETED | OUTPATIENT
Start: 2019-09-23 | End: 2019-09-23

## 2019-09-23 RX ADMIN — BUPROPION HYDROCHLORIDE 300 MG: 150 TABLET, FILM COATED, EXTENDED RELEASE ORAL at 09:49

## 2019-09-23 RX ADMIN — VENLAFAXINE HYDROCHLORIDE 300 MG: 150 CAPSULE, EXTENDED RELEASE ORAL at 09:50

## 2019-09-23 RX ADMIN — LEVOTHYROXINE SODIUM 50 MCG: 50 TABLET ORAL at 09:56

## 2019-09-23 RX ADMIN — CLONAZEPAM 0.25 MG: 0.5 TABLET ORAL at 17:24

## 2019-09-23 RX ADMIN — IBUPROFEN 600 MG: 600 TABLET, FILM COATED ORAL at 16:14

## 2019-09-23 RX ADMIN — NALTREXONE HYDROCHLORIDE 50 MG: 50 TABLET, FILM COATED ORAL at 09:51

## 2019-09-23 RX ADMIN — PANTOPRAZOLE SODIUM 40 MG: 40 TABLET, DELAYED RELEASE ORAL at 05:15

## 2019-09-23 RX ADMIN — LORATADINE 10 MG: 10 TABLET ORAL at 09:51

## 2019-09-23 RX ADMIN — COSYNTROPIN 0.25 MG: 0.25 INJECTION, POWDER, LYOPHILIZED, FOR SOLUTION INTRAMUSCULAR; INTRAVENOUS at 10:45

## 2019-09-23 RX ADMIN — BUPROPION HYDROCHLORIDE 150 MG: 150 TABLET, FILM COATED, EXTENDED RELEASE ORAL at 09:49

## 2019-09-23 RX ADMIN — CLONAZEPAM 0.5 MG: 0.5 TABLET ORAL at 09:50

## 2019-09-23 RX ADMIN — SERTRALINE HYDROCHLORIDE 200 MG: 100 TABLET ORAL at 21:23

## 2019-09-23 RX ADMIN — ENOXAPARIN SODIUM 40 MG: 40 INJECTION SUBCUTANEOUS at 09:51

## 2019-09-23 RX ADMIN — FLUTICASONE PROPIONATE 2 SPRAY: 50 SPRAY, METERED NASAL at 09:52

## 2019-09-23 NOTE — SOCIAL WORK
Cm met with patient to explain Cm role and discuss discharge planning  Patient lives with , 2 dtrs, son, son's girlfriend, and their baby in 2 story home with 1STE and bedroom/bathroom on 2nd floor  Patient's  Carlota Watkins is emergency contact and [facility], [phone]  Patient reported being independent with ADLs PTA, drives, and works  Patient has no DME at home at this time  Patient denied SNF, VNA, and ETOH treatment in the past   Patient denied IP mental health treatment but stated she sees psychiatrist (Dr Zacarias Fuller) once/month or every 6 weeks who prescribes meds for depression and anxiety  Pharmacy: [facility] off [address] [facility]  PCP: Dr Brian Hernández  Cm following  Dr Dani Cortez evaluated patient; patient needing IP psych placement and is agreeable to 201 placement but is not medically stable at this time  SLIM to inform Cm once patient is medically stable  Patient and patient's  requesting [facility] once medically stable pending bed availability  CM reviewed d/c planning process including the following: identifying help at home, patient preference for d/c planning needs, Discharge Lounge, [facility] Meds to Bed program, availability of treatment team to discuss questions or concerns patient and/or family may have regarding understanding medications and recognizing signs and symptoms once discharged  CM also encouraged patient to follow up with all recommended appointments after discharge  Patient advised of importance for patient and family to participate in managing patients medical well being

## 2019-09-23 NOTE — SOCIAL WORK
Cm informed by Dr Anthony Barnes that patient is medically stable pending psych placement  201 form signed and received (in 201 folder at [facility])  Cm called [facility] per patient and patient's wife's request   No 201 beds available at [facility] today  Cm to call again after 9AM tomorrow  SLIM aware  Patient requested letter be sent to her employer stating she is currently in [facility]  Cm emailed letter from Dr Anthony Barnes stating current admission to [employer] [email or web address] with Attention to Sue  Copy of same letter given to patient  Cm following
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
## 2019-09-23 NOTE — UTILIZATION REVIEW
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  Generalized anxiety disorder F41 1  Plan:   Continue medical management  Continue one-to-one observation  Inpatient psych admission medically cleared and bed available patient is a 201    Pertinent Labs/Diagnostic Results:     Results from last 7 days   Lab Units 09/22/19  0433 09/21/19  0447   WBC Thousand/uL 5 83 4 61   HEMOGLOBIN g/dL 10 9* 11 3*   HEMATOCRIT % 34 1* 35 0   PLATELETS Thousands/uL 184 188     Results from last 7 days   Lab Units 09/22/19  0434 09/21/19  0447 09/20/19  1848   SODIUM mmol/L 144 138 136   POTASSIUM mmol/L 4 0 3 8 3 9   CHLORIDE mmol/L 110* 108 106   CO2 mmol/L 29 27 26   ANION GAP mmol/L 5 3* 4   BUN mg/dL 30* 18 20   CREATININE mg/dL 0 93 0 92 0 88   EGFR ml/min/1 73sq m 73 74 78   CALCIUM mg/dL 8 8 8 8 8 9     Results from last 7 days   Lab Units 09/20/19  1848   AST U/L 11   ALT U/L 17   ALK PHOS U/L 63   TOTAL PROTEIN g/dL 6 5   ALBUMIN g/dL 3 9   TOTAL BILIRUBIN mg/dL 0 28     Results from last 7 days   Lab Units 09/22/19  0434 09/21/19  0447 09/20/19  1848   GLUCOSE RANDOM mg/dL 79 70 73     Results from last 7 days   Lab Units 09/21/19  0447   TSH 3RD GENERATON uIU/mL 7 760*     Results from last 7 days   Lab Units 09/20/19  2157   AMPH/METH  Negative   BARBITURATE UR  Negative   BENZODIAZEPINE UR  Negative   COCAINE UR  Negative   METHADONE URINE  Negative   OPIATE UR  Negative   PCP UR  Negative   THC UR  Negative     Results from last 7 days   Lab Units 09/20/19  1848   ETHANOL LVL mg/dL <3   ACETAMINOPHEN LVL ug/mL <2*   SALICYLATE LVL mg/dL <3*     Vital Signs:   09/23/19 06:58:26  98 °F (36 7 °C)  71  18  97/54  68  99 %     09/22/19 2317        90/46Abnormal          09/22/19 23:08:55  97 5 °F (36 4 °C)  67  16  77/43Abnormal   54  97 %     09/22/19 2007            97 %  None (Room air)   09/22/19 1959    83    91/54  66  98 %     09/22/19 1557        90/50         09/22/19 15:19:20  98 1 °F (36 7 °C)  78  16  87/53Abnormal   64  97 %       Medications:   Scheduled Meds:   Current Facility-Administered Medications:  albuterol 2 puff Inhalation Q4H PRN   buPROPion 150 mg Oral Daily   buPROPion 300 mg Oral Daily   clonazePAM 0 5 mg Oral BID   enoxaparin 40 mg Subcutaneous Daily   fluticasone 2 spray Each Nare Daily   fluticasone-vilanterol 1 puff Inhalation Daily   ibuprofen 600 mg Oral Q6H PRN   levothyroxine 50 mcg Oral Early Morning   loratadine 10 mg Oral Daily   naltrexone 50 mg Oral Daily   OLANZapine 2 5 mg Oral Daily PRN   pantoprazole 40 mg Oral Early Morning   phenazopyridine 100 mg Oral TID PRN   sertraline 200 mg Oral HS   tiotropium 18 mcg Inhalation Daily   venlafaxine 300 mg Oral Daily       Discharge Plan: [facility] Utilization Review Department  Phone: [phone]; Fax [phone]  [email or web address]  ATTENTION: Please call with any questions or concerns to [phone]  and carefully listen to the prompts so that you are directed to the right person  Send all requests for admission clinical reviews, approved or denied determinations and any other requests to fax [phone]   All voicemails are confidential
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
## 2019-09-23 NOTE — ASSESSMENT & PLAN NOTE
Baseline blood pressure systolic between  per patient  Patient is asymptomatic even with hypotensive episodes  No tachycardia or fever  Received IV hydration initially and does not appear dehydrated on exam today  No signs and symptoms of infection  Blood work is negative for anemia, leukocytosis or renal insufficiency  TSH high with low T4 consistent with hypothyroidism>> started on 75 mcg levothyroxine  Outpatient Endocrine follow-up with repeat TFT in 4-6 weeks  Cosyntropin stimulation test negative for adrenal insufficiency
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
## 2019-09-24 ENCOUNTER — DOCUMENTATION (OUTPATIENT)
Dept: PSYCHIATRY | Facility: CLINIC | Age: 49
End: 2019-09-24

## 2019-09-24 PROBLEM — G43.909 MIGRAINE HEADACHE: Status: ACTIVE | Noted: 2019-09-24

## 2019-09-24 PROBLEM — E03.9 HYPOTHYROIDISM (ACQUIRED): Status: ACTIVE | Noted: 2019-09-24

## 2019-09-24 PROCEDURE — 99225 PR SBSQ OBSERVATION CARE/DAY 25 MINUTES: CPT | Performed by: INTERNAL MEDICINE

## 2019-09-24 RX ORDER — IBUPROFEN 600 MG/1
600 TABLET ORAL EVERY 12 HOURS PRN
Status: DISCONTINUED | OUTPATIENT
Start: 2019-09-24 | End: 2019-09-24

## 2019-09-24 RX ORDER — IBUPROFEN 600 MG/1
600 TABLET ORAL 2 TIMES DAILY PRN
Status: DISCONTINUED | OUTPATIENT
Start: 2019-09-24 | End: 2019-09-26 | Stop reason: HOSPADM

## 2019-09-24 RX ORDER — LEVOTHYROXINE SODIUM 0.05 MG/1
50 TABLET ORAL
Status: DISCONTINUED | OUTPATIENT
Start: 2019-09-25 | End: 2019-09-26 | Stop reason: HOSPADM

## 2019-09-24 RX ADMIN — FLUTICASONE PROPIONATE 2 SPRAY: 50 SPRAY, METERED NASAL at 08:46

## 2019-09-24 RX ADMIN — LEVOTHYROXINE SODIUM 75 MCG: 75 TABLET ORAL at 06:07

## 2019-09-24 RX ADMIN — VENLAFAXINE HYDROCHLORIDE 300 MG: 150 CAPSULE, EXTENDED RELEASE ORAL at 08:46

## 2019-09-24 RX ADMIN — IBUPROFEN 600 MG: 600 TABLET, FILM COATED ORAL at 17:56

## 2019-09-24 RX ADMIN — LORATADINE 10 MG: 10 TABLET ORAL at 08:46

## 2019-09-24 RX ADMIN — PANTOPRAZOLE SODIUM 40 MG: 40 TABLET, DELAYED RELEASE ORAL at 06:07

## 2019-09-24 RX ADMIN — CLONAZEPAM 0.25 MG: 0.5 TABLET ORAL at 08:46

## 2019-09-24 RX ADMIN — SERTRALINE HYDROCHLORIDE 200 MG: 100 TABLET ORAL at 21:35

## 2019-09-24 RX ADMIN — BUPROPION HYDROCHLORIDE 300 MG: 150 TABLET, FILM COATED, EXTENDED RELEASE ORAL at 08:46

## 2019-09-24 RX ADMIN — NALTREXONE HYDROCHLORIDE 50 MG: 50 TABLET, FILM COATED ORAL at 08:47

## 2019-09-24 RX ADMIN — BUPROPION HYDROCHLORIDE 150 MG: 150 TABLET, FILM COATED, EXTENDED RELEASE ORAL at 08:45

## 2019-09-24 RX ADMIN — CLONAZEPAM 0.25 MG: 0.5 TABLET ORAL at 17:56

## 2019-09-24 RX ADMIN — IBUPROFEN 600 MG: 600 TABLET, FILM COATED ORAL at 09:19

## 2019-09-24 RX ADMIN — ENOXAPARIN SODIUM 40 MG: 40 INJECTION SUBCUTANEOUS at 08:47

## 2019-09-24 NOTE — PROGRESS NOTES
Patient seen and examined today  Comfortable in bed  No events overnight  Tolerating oral diet with stable vital signs  She is medically stable for inpatient psych placement

## 2019-09-24 NOTE — SOCIAL WORK
Cm reviewed patient during care coordination rounds with Dr Braden Lopez  Patient medically stable for 201 psych placement  Cm called  Psych Unit [phone] and was informed to fax clinicals and 201 to [phone] for review  Cm faxed necessary clinicals and awaiting determination if there is an available bed for patient at  Psych Unit (patient's first choice)  Cm following   Psych unable to accept due to bed availability and since patient is seen by Sofi [facility]   Patient and patient's  aware  [address] has no beds available today  Cm to call [facility] Psych tomorrow morning to inquire about available beds  Patient will need BLS auth and pre-cert for 729 placement  Cm following
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
## 2019-09-24 NOTE — ASSESSMENT & PLAN NOTE
Baseline blood pressure systolic between  per patient  Patient is asymptomatic even with hypotensive episodes  No tachycardia or fever  Cosyntropin stimulation test negative for adrenal insufficiency  No signs and symptoms of infection    Continue supportive

## 2019-09-24 NOTE — UTILIZATION REVIEW
Continued Stay Review    Date: 9/24/19                   Current Patient Class: OBS ON 9/20/19 AND CHANGED TO INPATIENT TODAY FOR NO AVAILABLE PSYCH BED FOR PT TO GO TO    09/24/19 1514  Inpatient Admission Once     Transfer Service: General Medicine       Question Answer Comment   Admitting Physician Licha Sarkar    Level of Care Med Surg    Estimated length of stay More than 2 Midnights    Certification I certify that inpatient services are medically necessary for this patient for a duration of greater than two midnights  See H&P and MD Progress Notes for additional information about the patient's course of treatment  09/24/19 1514     Current Level of Care: MED SURG    HPI:48 y o  female initially admitted on 9/20/19    Assessment/Plan:   MRS MACK HAS SIGNED A 201 AND IS REQUESTING PSYCH IP TREATMENT AT [facility]  THERE WERE NO BEDS 9/23   CM WILL CONTINUE TO CONTACT THEM FOR BED AVAILABILITY  PER MEDICINE SHE IS STABLE FOR D/C TO PSYCH  CLINICALS FAXED TO [facility] PSYCH [facility]IT  THIS IS PT'S FIRST CHOICE FOR TREATMENT       Pertinent Labs/Diagnostic Results:     Results from last 7 days   Lab Units 09/23/19  1008 09/22/19  0433 09/21/19  0447   WBC Thousand/uL 4 74 5 83 4 61   HEMOGLOBIN g/dL 11 7 10 9* 11 3*   HEMATOCRIT % 35 8 34 1* 35 0   PLATELETS Thousands/uL 178 184 188   NEUTROS ABS Thousands/µL 2 92  --   --      Results from last 7 days   Lab Units 09/23/19  1009 09/22/19  0434 09/21/19  0447 09/20/19  1848   SODIUM mmol/L 140 144 138 136   POTASSIUM mmol/L 3 8 4 0 3 8 3 9   CHLORIDE mmol/L 108 110* 108 106   CO2 mmol/L 29 29 27 26   ANION GAP mmol/L 3* 5 3* 4   BUN mg/dL 24 30* 18 20   CREATININE mg/dL 0 88 0 93 0 92 0 88   EGFR ml/min/1 73sq m 78 73 74 78   CALCIUM mg/dL 8 7 8 8 8 8 8 9     Results from last 7 days   Lab Units 09/20/19  1848   AST U/L 11   ALT U/L 17   ALK PHOS U/L 63   TOTAL PROTEIN g/dL 6 5   ALBUMIN g/dL 3 9   TOTAL BILIRUBIN mg/dL 0 28     Results from last 7 days   Lab Units 09/23/19  1009 09/22/19  0434 09/21/19  0447 09/20/19  1848   GLUCOSE RANDOM mg/dL 110 79 70 73     Results from last 7 days   Lab Units 09/21/19  0447   TSH 3RD GENERATON uIU/mL 7 760*     Results from last 7 days   Lab Units 09/20/19  2157   AMPH/METH  Negative   BARBITURATE UR  Negative   BENZODIAZEPINE UR  Negative   COCAINE UR  Negative   METHADONE URINE  Negative   OPIATE UR  Negative   PCP UR  Negative   THC UR  Negative     Results from last 7 days   Lab Units 09/20/19  1848   ETHANOL LVL mg/dL <3   ACETAMINOPHEN LVL ug/mL <2*   SALICYLATE LVL mg/dL <3*     Vital Signs:   09/24/19 06:57:03  97 8 °F (36 6 °C)  73  15  93/54  67  96 %   09/23/19 2346        98/56       09/23/19 23:14:27  98 1 °F (36 7 °C)  73  18  83/47Abnormal   59  97 %   09/23/19 18:51:31    84    88/55Abnormal   66  99 %   09/23/19 15:44:02  98 6 °F (37 °C)  88  18  91/56  68  97 %   09/23/19 14:41:42    84    86/53Abnormal   64  96 %     Medications:   Scheduled Meds:   Current Facility-Administered Medications:  albuterol 2 puff Inhalation Q4H PRN   buPROPion 150 mg Oral Daily   buPROPion 300 mg Oral Daily   clonazePAM 0 5 mg Oral BID   enoxaparin 40 mg Subcutaneous Daily   fluticasone 2 spray Each Nare Daily   fluticasone-vilanterol 1 puff Inhalation Daily   levothyroxine 75 mcg Oral Early Morning   loratadine 10 mg Oral Daily   naltrexone 50 mg Oral Daily   OLANZapine 2 5 mg Oral Daily PRN   pantoprazole 40 mg Oral Early Morning   phenazopyridine 100 mg Oral TID PRN   sertraline 200 mg Oral HS   tiotropium 18 mcg Inhalation Daily   venlafaxine 300 mg Oral Daily       Discharge Plan: IP PSYCH AT [facility] - SIGNED 201 - NO BED AVAILABLE AT [address] Utilization Review Department  Phone: [phone]; Fax [phone]  [email or web address]  ATTENTION: Please call with any questions or concerns to [phone]  and carefully listen to the prompts so that you are directed to the right person     Send all requests for admission clinical reviews, approved or denied determinations and any other requests to fax [phone]  All voicemails are confidential

## 2019-09-24 NOTE — ASSESSMENT & PLAN NOTE
Patient found to have elevated TSH and low T4 consistent with hypothyroidism  Started on levothyroxine  Outpatient Endocrine follow-up and repeat thyroid function test in 4 weeks

## 2019-09-24 NOTE — PROGRESS NOTES
Treatment Plan not completed within required time limits due to: Bennie Chaudhyr  cancelled appointment  on 9/24/2019 Hospitalize (canceled appointment for today at 4:30   She said she can not make it )

## 2019-09-24 NOTE — PROGRESS NOTES
Progress Note - Breanna Talamantes 1970, 53 y o  female MRN: [id number]    Unit/Bed#: [facility] 808-01 Encounter: [id number]    Primary Care Provider: Cait Patiño DO   Date and time admitted to hospital: 9/20/2019  5:52 PM        * Intentional drug overdose ([facility] [address] )  Assessment & Plan  · Had taken a handful (unclear number) of Klonopin 1 mg pills plus reportedly extra gabapentin in setting of marital difficulties after phone conversation with significant other  · Rapid Urine drug screen negative  · Evaluated by Psychiatry, Patient signed [address], maintain one-to-one precautions by psych and patient will be transferred to inpatient psych facility  Appreciate psychiatry input  · Patient medically stable        Anxiety and depression  Assessment & Plan  · Psychiatry consultation as above  · Continue psychiatric medications  · Restarted Klonopin at point 0 5 5 mg b i d  which is lower than her usual to prevent withdrawal   She has been on  0 5-1 mg t i d  Klonopin for years  Hypotension  Assessment & Plan  Baseline blood pressure systolic between  per patient  Patient is asymptomatic even with hypotensive episodes  No tachycardia or fever  Cosyntropin stimulation test negative for adrenal insufficiency  No signs and symptoms of infection  Continue supportive        Migraine headache  Assessment & Plan  Continue supportive care    Hypothyroidism (acquired)  Assessment & Plan  Patient found to have elevated TSH and low T4 consistent with hypothyroidism  Started on levothyroxine  Outpatient Endocrine follow-up and repeat thyroid function test in 4 weeks    Gastroesophageal reflux disease with esophagitis  Assessment & Plan  · Continue PPI    Asthma  Assessment & Plan  · Not in acute exacerbation  · Continue home inhalers; add p r n   Nebulizers if shortness of breath and wheezing should develop             VTE Pharmacologic Prophylaxis:   Pharmacologic: Enoxaparin (Lovenox)  Mechanical VTE Prophylaxis in Place: Yes    Patient Centered Rounds: I have performed bedside rounds with nursing staff today  Discussions with Specialists or Other Care Team Provider:     Education and Discussions with Family / Patient:  Patient    Time Spent for Care: 30 minutes  More than 50% of total time spent on counseling and coordination of care as described above  Current Length of Stay: 0 day(s)    Current Patient Status: Observation   Certification Statement: The patient will continue to require additional inpatient hospital stay due to Above    Discharge Plan / Estimated Discharge Date:  Awaiting inpatient psych placement    Code Status: Level 1 - Full Code      Subjective:   Patient seen and examined  Comfortable in bed  No event overnight  No nausea vomiting or diarrhea    1 on 1 observation    Objective:     Vitals:   Temp (24hrs), Av 2 °F (36 8 °C), Min:97 8 °F (36 6 °C), Max:98 6 °F (37 °C)    Temp:  [97 8 °F (36 6 °C)-98 6 °F (37 °C)] 97 8 °F (36 6 °C)  HR:  [73-88] 73  Resp:  [15-18] 15  BP: (83-98)/(47-56) 93/54  SpO2:  [96 %-99 %] 96 %  Body mass index is 26 36 kg/m²  Input and Output Summary (last 24 hours):        Intake/Output Summary (Last 24 hours) at 2019 1322  Last data filed at 2019 0919  Gross per 24 hour   Intake 360 ml   Output 0 ml   Net 360 ml       Physical Exam:     Physical Exam  Patient is awake alert oriented in no acute distress  Lung clear to auscultation bilateral  Heart positive S1-S2 no murmur  Abdomen soft nontender  Lower extremities no edema    Additional Data:     Labs:    Results from last 7 days   Lab Units 19  1008   WBC Thousand/uL 4 74   HEMOGLOBIN g/dL 11 7   HEMATOCRIT % 35 8   PLATELETS Thousands/uL 178   NEUTROS PCT % 61   LYMPHS PCT % 27   MONOS PCT % 10   EOS PCT % 1     Results from last 7 days   Lab Units 19  1009  19  1848   POTASSIUM mmol/L 3 8   < > 3 9   CHLORIDE mmol/L 108   < > 106   CO2 mmol/L 29   < > 26   BUN mg/dL 24   < > 20   CREATININE mg/dL 0 88   < > 0 88   CALCIUM mg/dL 8 7   < > 8 9   ALK PHOS U/L  --   --  63   ALT U/L  --   --  17   AST U/L  --   --  11    < > = values in this interval not displayed  * I Have Reviewed All Lab Data Listed Above  * Additional Pertinent Lab Tests Reviewed: [address] [address] Admission Reviewed    Imaging:    Imaging Reports Reviewed Today Include:   Imaging Personally Reviewed by Myself Includes:      Recent Cultures (last 7 days):           Last 24 Hours Medication List:     Current Facility-Administered Medications:  albuterol 2 puff Inhalation Q4H PRN Rani Liriano MD   buPROPion 150 mg Oral Daily Carito Mckeon, DO   buPROPion 300 mg Oral Daily Carito Mckeon, DO   clonazePAM 0 5 mg Oral BID Rani Liriano MD   enoxaparin 40 mg Subcutaneous Daily Carito Mckeon, DO   fluticasone 2 spray Each Nare Daily Carito Mckeon, DO   fluticasone-vilanterol 1 puff Inhalation Daily Carito Mckeon, DO   ibuprofen 600 mg Oral Q12H PRN Ruth Greco DO   [START ON 9/25/2019] levothyroxine 50 mcg Oral Early Morning Ruth Greco,    loratadine 10 mg Oral Daily Carito Mckeon, DO   naltrexone 50 mg Oral Daily Carito Mckeon, DO   OLANZapine 2 5 mg Oral Daily PRN Carito Mckeon, DO   pantoprazole 40 mg Oral Early Morning Carito Mckeon, DO   phenazopyridine 100 mg Oral TID PRN Carito Mckeon, DO   sertraline 200 mg Oral HS Carito Mckeon, DO   tiotropium 18 mcg Inhalation Daily Carito Mckeon, DO   venlafaxine 300 mg Oral Daily Carito Mckeon,         Today, Patient Was Seen By: Ruth Greco DO    ** Please Note: This note has been constructed using a voice recognition system   **

## 2019-09-25 ENCOUNTER — TELEPHONE (OUTPATIENT)
Dept: SLEEP CENTER | Facility: CLINIC | Age: 49
End: 2019-09-25

## 2019-09-25 PROCEDURE — 99232 SBSQ HOSP IP/OBS MODERATE 35: CPT | Performed by: INTERNAL MEDICINE

## 2019-09-25 RX ADMIN — IBUPROFEN 600 MG: 600 TABLET, FILM COATED ORAL at 21:35

## 2019-09-25 RX ADMIN — CLONAZEPAM 0.5 MG: 0.5 TABLET ORAL at 08:54

## 2019-09-25 RX ADMIN — NALTREXONE HYDROCHLORIDE 50 MG: 50 TABLET, FILM COATED ORAL at 08:57

## 2019-09-25 RX ADMIN — FLUTICASONE PROPIONATE 2 SPRAY: 50 SPRAY, METERED NASAL at 08:56

## 2019-09-25 RX ADMIN — BUPROPION HYDROCHLORIDE 150 MG: 150 TABLET, FILM COATED, EXTENDED RELEASE ORAL at 08:55

## 2019-09-25 RX ADMIN — SERTRALINE HYDROCHLORIDE 200 MG: 100 TABLET ORAL at 21:33

## 2019-09-25 RX ADMIN — CLONAZEPAM 0.5 MG: 0.5 TABLET ORAL at 17:37

## 2019-09-25 RX ADMIN — LEVOTHYROXINE SODIUM 50 MCG: 50 TABLET ORAL at 05:58

## 2019-09-25 RX ADMIN — IBUPROFEN 600 MG: 600 TABLET, FILM COATED ORAL at 09:09

## 2019-09-25 RX ADMIN — PANTOPRAZOLE SODIUM 40 MG: 40 TABLET, DELAYED RELEASE ORAL at 05:58

## 2019-09-25 RX ADMIN — LORATADINE 10 MG: 10 TABLET ORAL at 08:59

## 2019-09-25 RX ADMIN — BUPROPION HYDROCHLORIDE 300 MG: 150 TABLET, FILM COATED, EXTENDED RELEASE ORAL at 08:54

## 2019-09-25 RX ADMIN — ENOXAPARIN SODIUM 40 MG: 40 INJECTION SUBCUTANEOUS at 08:53

## 2019-09-25 RX ADMIN — VENLAFAXINE HYDROCHLORIDE 300 MG: 150 CAPSULE, EXTENDED RELEASE ORAL at 08:58

## 2019-09-25 NOTE — PROGRESS NOTES
Progress Note - Juan Limon 1970, 53 y o  female MRN: [id number]    Unit/Bed#: [facility] 808-01 Encounter: [id number]    Primary Care Provider: Zulma Payne DO   Date and time admitted to hospital: 9/20/2019  5:52 PM        * Intentional drug overdose ([facility] [address] )  Assessment & Plan  · Had taken a handful (unclear number) of Klonopin 1 mg pills plus reportedly extra gabapentin in setting of marital difficulties after phone conversation with significant other  · Rapid Urine drug screen negative  · Evaluated by Psychiatry, Patient signed 61 51 81, maintain one-to-one precautions by psych and patient will be transferred to inpatient psych facility  Appreciate psychiatry input  · Patient medically stable  · Awaiting inpatient psych placement        Anxiety and depression  Assessment & Plan  · Psychiatry consultation as above  · Continue psychiatric medications  · Restarted Klonopin at point 0 5 5 mg b i d  which is lower than her usual to prevent withdrawal   She has been on  0 5-1 mg t i d  Klonopin for years  Hypotension  Assessment & Plan  Baseline blood pressure systolic between  per patient  Patient is asymptomatic even with hypotensive episodes  No tachycardia or fever  Cosyntropin stimulation test negative for adrenal insufficiency  No signs and symptoms of infection  Continue supportive care        Migraine headache  Assessment & Plan  Without exacerbation   Continue supportive care    Hypothyroidism (acquired)  Assessment & Plan  Patient found to have elevated TSH and low T4 consistent with hypothyroidism  Started on levothyroxine  Outpatient Endocrine follow-up and repeat thyroid function test in 4 weeks    Asthma  Assessment & Plan  · Not in acute exacerbation  · Continue home inhalers; add p r n   Nebulizers if shortness of breath and wheezing should develop           VTE Pharmacologic Prophylaxis:   Pharmacologic: Enoxaparin (Lovenox)  Mechanical VTE Prophylaxis in Place: Yes    Patient Centered Rounds: I have performed bedside rounds with nursing staff today  Discussions with Specialists or Other Care Team Provider:     Education and Discussions with Family / Patient:  Patient     Time Spent for Care: 30 minutes  More than 50% of total time spent on counseling and coordination of care as described above  Current Length of Stay: 1 day(s)    Current Patient Status: Inpatient   Certification Statement: The patient will continue to require additional inpatient hospital stay due to Above    Discharge Plan / Estimated Discharge Date:  Awaiting inpatient psych placement    Code Status: Level 1 - Full Code      Subjective:   Patient is comfortable in bed  No event overnight  Tolerating oral diet    Objective:     Vitals:   Temp (24hrs), Av 2 °F (36 8 °C), Min:98 2 °F (36 8 °C), Max:98 3 °F (36 8 °C)    Temp:  [98 2 °F (36 8 °C)-98 3 °F (36 8 °C)] 98 2 °F (36 8 °C)  HR:  [65-71] 65  Resp:  [16-18] 18  BP: ()/(53-59) 90/53  SpO2:  [97 %-98 %] 97 %  Body mass index is 26 36 kg/m²  Input and Output Summary (last 24 hours):        Intake/Output Summary (Last 24 hours) at 2019 1432  Last data filed at 2019 0730  Gross per 24 hour   Intake 480 ml   Output    Net 480 ml       Physical Exam:     Physical Exam  Patient is awake alert oriented in no acute distress  Lung clear to auscultation bilateral  Heart positive S1-S2 no murmur  Abdomen soft nontender  Lower extremities no edema      Additional Data:     Labs:    Results from last 7 days   Lab Units 19  1008   WBC Thousand/uL 4 74   HEMOGLOBIN g/dL 11 7   HEMATOCRIT % 35 8   PLATELETS Thousands/uL 178   NEUTROS PCT % 61   LYMPHS PCT % 27   MONOS PCT % 10   EOS PCT % 1     Results from last 7 days   Lab Units 19  1009  19  1848   POTASSIUM mmol/L 3 8   < > 3 9   CHLORIDE mmol/L 108   < > 106   CO2 mmol/L 29   < > 26   BUN mg/dL 24   < > 20   CREATININE mg/dL 0 88   < > 0 88   CALCIUM mg/dL 8 7   < > 8 9   ALK PHOS U/L  -- --  63   ALT U/L  --   --  17   AST U/L  --   --  11    < > = values in this interval not displayed  * I Have Reviewed All Lab Data Listed Above  * Additional Pertinent Lab Tests Reviewed: [address] Admission Reviewed    Imaging:    Imaging Reports Reviewed Today Include:   Imaging Personally Reviewed by Myself Includes:     Recent Cultures (last 7 days):           Last 24 Hours Medication List:     Current Facility-Administered Medications:  albuterol 2 puff Inhalation Q4H PRN Dave Cohen MD   buPROPion 150 mg Oral Daily Sung Lis, DO   buPROPion 300 mg Oral Daily Sung Lis, DO   clonazePAM 0 5 mg Oral BID Dave Cohen MD   enoxaparin 40 mg Subcutaneous Daily Sung Lis, DO   fluticasone 2 spray Each Nare Daily Sung Lis, DO   fluticasone-vilanterol 1 puff Inhalation Daily Sung Lis, DO   ibuprofen 600 mg Oral BID PRN Seamus Alfaro PA-C   levothyroxine 50 mcg Oral Early Morning Lilly Ferguson, DO   loratadine 10 mg Oral Daily Sung Lis, DO   naltrexone 50 mg Oral Daily Sung Lis, DO   OLANZapine 2 5 mg Oral Daily PRN Sung Lis, DO   pantoprazole 40 mg Oral Early Morning Sung Lis, DO   phenazopyridine 100 mg Oral TID PRN Sung Lis, DO   sertraline 200 mg Oral HS Sung Lis, DO   tiotropium 18 mcg Inhalation Daily Sung Lis, DO   venlafaxine 300 mg Oral Daily Sung Lis, DO        Today, Patient Was Seen By: Lilly Ferguson DO    ** Please Note: This note has been constructed using a voice recognition system   **

## 2019-09-25 NOTE — PLAN OF CARE
Problem: PAIN - ADULT  Goal: Verbalizes/displays adequate comfort level or baseline comfort level  Description  Interventions:  - Encourage patient to monitor pain and request assistance  - Assess pain using appropriate pain scale  - Administer analgesics based on type and severity of pain and evaluate response  - Implement non-pharmacological measures as appropriate and evaluate response  - Consider cultural and social influences on pain and pain management  - Notify physician/advanced practitioner if interventions unsuccessful or patient reports new pain  Outcome: Progressing     Problem: SAFETY ADULT  Goal: Patient will remain free of falls  Description  INTERVENTIONS:  - Assess patient frequently for physical needs  -  Identify cognitive and physical deficits and behaviors that affect risk of falls    -  [place] fall precautions as indicated by assessment   - Educate patient/family on patient safety including physical limitations  - Instruct patient to call for assistance with activity based on assessment  - Modify environment to reduce risk of injury  - Consider OT/PT consult to assist with strengthening/mobility  Outcome: Progressing  Goal: Maintain or return to baseline ADL function  Description  INTERVENTIONS:  -  Assess patient's ability to carry out ADLs; assess patient's baseline for ADL function and identify physical deficits which impact ability to perform ADLs (bathing, care of mouth/teeth, toileting, grooming, dressing, etc )  - Assess/evaluate cause of self-care deficits   - Assess range of motion  - Assess patient's mobility; develop plan if impaired  - Assess patient's need for assistive devices and provide as appropriate  - Encourage maximum independence but intervene and supervise when necessary  - Involve family in performance of ADLs  - Assess for home care needs following discharge   - Consider OT consult to assist with ADL evaluation and planning for discharge  - Provide patient education as appropriate  Outcome: Progressing  Goal: Maintain or return mobility status to optimal level  Description  INTERVENTIONS:  - Assess patient's baseline mobility status (ambulation, transfers, stairs, etc )    - Identify cognitive and physical deficits and behaviors that affect mobility  - Identify mobility aids required to assist with transfers and/or ambulation (gait belt, sit-to-stand, lift, walker, cane, etc )  - [place] fall precautions as indicated by assessment  - Record patient progress and toleration of activity level on Mobility SBAR; progress patient to next Phase/Stage  - Instruct patient to call for assistance with activity based on assessment  - Consider rehabilitation consult to assist with strengthening/weightbearing, etc   Outcome: Progressing     Problem: DISCHARGE PLANNING  Goal: Discharge to home or other facility with appropriate resources  Description  INTERVENTIONS:  - Identify barriers to discharge w/patient and caregiver  - Arrange for needed discharge resources and transportation as appropriate  - Identify discharge learning needs (meds, wound care, etc )  - Arrange for interpretive services to assist at discharge as needed  - Refer to Case Management Department for coordinating discharge planning if the patient needs post-hospital services based on physician/advanced practitioner order or complex needs related to functional status, cognitive ability, or social support system  Outcome: Progressing     Problem: Knowledge Deficit  Goal: Patient/family/caregiver demonstrates understanding of disease process, treatment plan, medications, and discharge instructions  Description  Complete learning assessment and assess knowledge base    Interventions:  - Provide teaching at level of understanding  - Provide teaching via preferred learning methods  Outcome: Progressing     Problem: Potential for Falls  Goal: Patient will remain free of falls  Description  INTERVENTIONS:  - Assess patient frequently for physical needs  -  Identify cognitive and physical deficits and behaviors that affect risk of falls    -  [place] fall precautions as indicated by assessment   - Educate patient/family on patient safety including physical limitations  - Instruct patient to call for assistance with activity based on assessment  - Modify environment to reduce risk of injury  - Consider OT/PT consult to assist with strengthening/mobility  Outcome: Progressing     Problem: SELF HARM/SUICIDALITY  Goal: Will have no self-injury during hospital stay  Description  INTERVENTIONS:  - Q 15 MINUTES: Routine safety checks  - Q WAKING SHIFT & PRN: Assess risk to determine if routine checks are adequate to maintain patient safety  - Encourage patient to participate actively in care by formulating a plan to combat response to suicidal ideation, identify supports and resources  Outcome: Progressing

## 2019-09-25 NOTE — ASSESSMENT & PLAN NOTE
Baseline blood pressure systolic between  per patient  Patient is asymptomatic even with hypotensive episodes  No tachycardia or fever  Cosyntropin stimulation test negative for adrenal insufficiency  No signs and symptoms of infection    Continue supportive care

## 2019-09-25 NOTE — ASSESSMENT & PLAN NOTE
· Had taken a handful (unclear number) of Klonopin 1 mg pills plus reportedly extra gabapentin in setting of marital difficulties after phone conversation with significant other  · Rapid Urine drug screen negative  · Evaluated by Psychiatry, Patient signed 12, maintain one-to-one precautions by psych and patient will be transferred to inpatient psych facility  Appreciate psychiatry input    · Patient medically stable  · Awaiting inpatient psych placement

## 2019-09-25 NOTE — SOCIAL WORK
Cm was informed yesterday by Shonna Moore from [address] Admissions that there were now beds available but requested Cm call back today 9/25/19 between 10-10:30AM   Cm called [address] at 10:30AM and spoke with Yaz Watson who is requesting Cm call back at 11AM as she does not have today's discharges confirmed at this time  Cm to call back at 11AM regarding 201 placement for patient has she is medically stable per Patti Smith's request     Cm called Yaz Watson after 11AM and left voicemail to inquire about available beds for 201 placement  Awaiting return call  Cm heard from Yaz Watson around 11:30AM stating that [facility] had an available bed  Cm informed Yaz Watson that patient may not be agreeable due to distance from home as she was not agreeable to [facility] yesterday but Cm also informed she would check with patient to see if patient would agree to [facility] as beds were not available at [facility]  Cm left voicemail for Yaz Watson around 12PM stating that patient was agreeable to [facility] bed  Cm awaited return call from Yaz Watson with [facility] Intake to receive pre-cert information in order to begin process  No return call received  Cm called [facility] Intake at 4PM with update in order to begin pre-cert  Yaz Watson informed Cm that patient's bed was given away due to miscommunication and informed Cm to call back tomorrow morning to inquire about new bed availability  Cm informed Norma Cruz (Manager), and patient of this  Patient is understanding  Cm to complete bed search tomorrow and inform patient once bed becomes available  Cm following

## 2019-09-26 ENCOUNTER — HOSPITAL ENCOUNTER (INPATIENT)
Facility: HOSPITAL | Age: 49
LOS: 8 days | Discharge: HOME | DRG: 885 | End: 2019-10-04
Attending: STUDENT IN AN ORGANIZED HEALTH CARE EDUCATION/TRAINING PROGRAM | Admitting: PSYCHIATRY & NEUROLOGY
Payer: COMMERCIAL

## 2019-09-26 VITALS
SYSTOLIC BLOOD PRESSURE: 101 MMHG | TEMPERATURE: 98.2 F | DIASTOLIC BLOOD PRESSURE: 60 MMHG | BODY MASS INDEX: 26.37 KG/M2 | HEART RATE: 82 BPM | WEIGHT: 148.81 LBS | OXYGEN SATURATION: 97 % | HEIGHT: 63 IN | RESPIRATION RATE: 18 BRPM

## 2019-09-26 DIAGNOSIS — F41.1 GAD (GENERALIZED ANXIETY DISORDER): Chronic | ICD-10-CM

## 2019-09-26 DIAGNOSIS — G43.709 CHRONIC MIGRAINE WITHOUT AURA: ICD-10-CM

## 2019-09-26 DIAGNOSIS — G47.09 OTHER INSOMNIA: Chronic | ICD-10-CM

## 2019-09-26 DIAGNOSIS — F63.9 IMPULSE CONTROL DISORDER: Chronic | ICD-10-CM

## 2019-09-26 DIAGNOSIS — F33.2 MAJOR DEPRESSIVE DISORDER, RECURRENT SEVERE WITHOUT PSYCHOTIC FEATURES (HCC): Primary | Chronic | ICD-10-CM

## 2019-09-26 DIAGNOSIS — T42.4X1A BENZODIAZEPINE (TRANQUILIZER) OVERDOSE: ICD-10-CM

## 2019-09-26 DIAGNOSIS — E03.9 HYPOTHYROIDISM (ACQUIRED): ICD-10-CM

## 2019-09-26 DIAGNOSIS — G43.909 MIGRAINE HEADACHE: ICD-10-CM

## 2019-09-26 LAB
ANION GAP SERPL CALCULATED.3IONS-SCNC: 6 MMOL/L (ref 4–13)
BUN SERPL-MCNC: 31 MG/DL (ref 5–25)
CALCIUM SERPL-MCNC: 9.2 MG/DL (ref 8.3–10.1)
CHLORIDE SERPL-SCNC: 109 MMOL/L (ref 100–108)
CO2 SERPL-SCNC: 27 MMOL/L (ref 21–32)
CREAT SERPL-MCNC: 0.98 MG/DL (ref 0.6–1.3)
GFR SERPL CREATININE-BSD FRML MDRD: 69 ML/MIN/1.73SQ M
GLUCOSE SERPL-MCNC: 76 MG/DL (ref 65–140)
MAGNESIUM SERPL-MCNC: 2 MG/DL (ref 1.6–2.6)
POTASSIUM SERPL-SCNC: 3.7 MMOL/L (ref 3.5–5.3)
SODIUM SERPL-SCNC: 142 MMOL/L (ref 136–145)

## 2019-09-26 PROCEDURE — 80048 BASIC METABOLIC PNL TOTAL CA: CPT | Performed by: INTERNAL MEDICINE

## 2019-09-26 PROCEDURE — 83735 ASSAY OF MAGNESIUM: CPT | Performed by: INTERNAL MEDICINE

## 2019-09-26 PROCEDURE — 99239 HOSP IP/OBS DSCHRG MGMT >30: CPT | Performed by: INTERNAL MEDICINE

## 2019-09-26 RX ORDER — CLONAZEPAM 0.5 MG/1
0.5 TABLET ORAL 2 TIMES DAILY
Status: CANCELLED | OUTPATIENT
Start: 2019-09-26

## 2019-09-26 RX ORDER — OLANZAPINE 2.5 MG/1
2.5 TABLET ORAL DAILY PRN
Status: CANCELLED | OUTPATIENT
Start: 2019-09-26

## 2019-09-26 RX ORDER — MAGNESIUM HYDROXIDE/ALUMINUM HYDROXICE/SIMETHICONE 120; 1200; 1200 MG/30ML; MG/30ML; MG/30ML
30 SUSPENSION ORAL EVERY 4 HOURS PRN
Status: CANCELLED | OUTPATIENT
Start: 2019-09-26

## 2019-09-26 RX ORDER — MAGNESIUM HYDROXIDE/ALUMINUM HYDROXICE/SIMETHICONE 120; 1200; 1200 MG/30ML; MG/30ML; MG/30ML
30 SUSPENSION ORAL EVERY 4 HOURS PRN
Status: DISCONTINUED | OUTPATIENT
Start: 2019-09-26 | End: 2019-10-04 | Stop reason: HOSPADM

## 2019-09-26 RX ORDER — PANTOPRAZOLE SODIUM 40 MG/1
40 TABLET, DELAYED RELEASE ORAL
Status: DISCONTINUED | OUTPATIENT
Start: 2019-09-27 | End: 2019-10-04 | Stop reason: HOSPADM

## 2019-09-26 RX ORDER — LEVOTHYROXINE SODIUM 0.05 MG/1
50 TABLET ORAL
Status: DISCONTINUED | OUTPATIENT
Start: 2019-09-27 | End: 2019-10-04 | Stop reason: HOSPADM

## 2019-09-26 RX ORDER — HALOPERIDOL 5 MG
5 TABLET ORAL EVERY 8 HOURS PRN
Status: CANCELLED | OUTPATIENT
Start: 2019-09-26

## 2019-09-26 RX ORDER — CLONAZEPAM 0.5 MG/1
0.5 TABLET ORAL 2 TIMES DAILY
Status: DISCONTINUED | OUTPATIENT
Start: 2019-09-27 | End: 2019-09-27

## 2019-09-26 RX ORDER — ACETAMINOPHEN 325 MG/1
650 TABLET ORAL EVERY 4 HOURS PRN
Status: DISCONTINUED | OUTPATIENT
Start: 2019-09-26 | End: 2019-09-27

## 2019-09-26 RX ORDER — OLANZAPINE 10 MG/1
10 INJECTION, POWDER, LYOPHILIZED, FOR SOLUTION INTRAMUSCULAR
Status: CANCELLED | OUTPATIENT
Start: 2019-09-26

## 2019-09-26 RX ORDER — VENLAFAXINE HYDROCHLORIDE 150 MG/1
300 CAPSULE, EXTENDED RELEASE ORAL DAILY
Status: DISCONTINUED | OUTPATIENT
Start: 2019-09-27 | End: 2019-09-27

## 2019-09-26 RX ORDER — PHENAZOPYRIDINE HYDROCHLORIDE 100 MG/1
100 TABLET, FILM COATED ORAL 3 TIMES DAILY PRN
Status: DISCONTINUED | OUTPATIENT
Start: 2019-09-26 | End: 2019-10-04 | Stop reason: HOSPADM

## 2019-09-26 RX ORDER — HYDROXYZINE HYDROCHLORIDE 25 MG/1
25 TABLET, FILM COATED ORAL EVERY 6 HOURS PRN
Status: CANCELLED | OUTPATIENT
Start: 2019-09-26

## 2019-09-26 RX ORDER — FLUTICASONE PROPIONATE 50 MCG
2 SPRAY, SUSPENSION (ML) NASAL DAILY
Status: DISCONTINUED | OUTPATIENT
Start: 2019-09-27 | End: 2019-09-27 | Stop reason: SDUPTHER

## 2019-09-26 RX ORDER — OLANZAPINE 2.5 MG/1
2.5 TABLET ORAL DAILY PRN
Status: DISCONTINUED | OUTPATIENT
Start: 2019-09-26 | End: 2019-09-27

## 2019-09-26 RX ORDER — LORATADINE 10 MG/1
10 TABLET ORAL DAILY
Status: DISCONTINUED | OUTPATIENT
Start: 2019-09-27 | End: 2019-09-30

## 2019-09-26 RX ORDER — FLUTICASONE FUROATE AND VILANTEROL 200; 25 UG/1; UG/1
1 POWDER RESPIRATORY (INHALATION) DAILY
Status: DISCONTINUED | OUTPATIENT
Start: 2019-09-27 | End: 2019-10-04 | Stop reason: HOSPADM

## 2019-09-26 RX ORDER — PANTOPRAZOLE SODIUM 40 MG/1
40 TABLET, DELAYED RELEASE ORAL
Status: CANCELLED | OUTPATIENT
Start: 2019-09-27

## 2019-09-26 RX ORDER — BUPROPION HYDROCHLORIDE 150 MG/1
300 TABLET ORAL DAILY
Status: DISCONTINUED | OUTPATIENT
Start: 2019-09-27 | End: 2019-09-27

## 2019-09-26 RX ORDER — LORATADINE 10 MG/1
10 TABLET ORAL DAILY
Status: CANCELLED | OUTPATIENT
Start: 2019-09-27

## 2019-09-26 RX ORDER — NALTREXONE HYDROCHLORIDE 50 MG/1
50 TABLET, FILM COATED ORAL DAILY
Status: CANCELLED | OUTPATIENT
Start: 2019-09-27

## 2019-09-26 RX ORDER — ZOLPIDEM TARTRATE 5 MG/1
10 TABLET ORAL
Status: DISCONTINUED | OUTPATIENT
Start: 2019-09-26 | End: 2019-09-26 | Stop reason: HOSPADM

## 2019-09-26 RX ORDER — LORAZEPAM 1 MG/1
1 TABLET ORAL EVERY 8 HOURS PRN
Status: CANCELLED | OUTPATIENT
Start: 2019-09-26

## 2019-09-26 RX ORDER — BUPROPION HYDROCHLORIDE 150 MG/1
300 TABLET ORAL DAILY
Status: CANCELLED | OUTPATIENT
Start: 2019-09-27

## 2019-09-26 RX ORDER — LORAZEPAM 2 MG/ML
1 INJECTION INTRAMUSCULAR EVERY 6 HOURS PRN
Status: DISCONTINUED | OUTPATIENT
Start: 2019-09-26 | End: 2019-09-30

## 2019-09-26 RX ORDER — HALOPERIDOL 5 MG/ML
5 INJECTION INTRAMUSCULAR EVERY 6 HOURS PRN
Status: DISCONTINUED | OUTPATIENT
Start: 2019-09-26 | End: 2019-10-04 | Stop reason: HOSPADM

## 2019-09-26 RX ORDER — VENLAFAXINE HYDROCHLORIDE 150 MG/1
300 CAPSULE, EXTENDED RELEASE ORAL DAILY
Status: CANCELLED | OUTPATIENT
Start: 2019-09-27

## 2019-09-26 RX ORDER — RISPERIDONE 1 MG/1
1 TABLET, ORALLY DISINTEGRATING ORAL
Status: DISCONTINUED | OUTPATIENT
Start: 2019-09-26 | End: 2019-10-04 | Stop reason: HOSPADM

## 2019-09-26 RX ORDER — ACETAMINOPHEN 325 MG/1
975 TABLET ORAL EVERY 6 HOURS PRN
Status: CANCELLED | OUTPATIENT
Start: 2019-09-26

## 2019-09-26 RX ORDER — ALBUTEROL SULFATE 90 UG/1
2 AEROSOL, METERED RESPIRATORY (INHALATION) EVERY 4 HOURS PRN
Status: DISCONTINUED | OUTPATIENT
Start: 2019-09-26 | End: 2019-09-27

## 2019-09-26 RX ORDER — BENZTROPINE MESYLATE 1 MG/ML
1 INJECTION INTRAMUSCULAR; INTRAVENOUS EVERY 6 HOURS PRN
Status: CANCELLED | OUTPATIENT
Start: 2019-09-26

## 2019-09-26 RX ORDER — ACETAMINOPHEN 325 MG/1
650 TABLET ORAL EVERY 6 HOURS PRN
Status: DISCONTINUED | OUTPATIENT
Start: 2019-09-26 | End: 2019-10-04 | Stop reason: HOSPADM

## 2019-09-26 RX ORDER — OLANZAPINE 10 MG/1
10 INJECTION, POWDER, LYOPHILIZED, FOR SOLUTION INTRAMUSCULAR
Status: DISCONTINUED | OUTPATIENT
Start: 2019-09-26 | End: 2019-10-04 | Stop reason: HOSPADM

## 2019-09-26 RX ORDER — ALBUTEROL SULFATE 90 UG/1
2 AEROSOL, METERED RESPIRATORY (INHALATION) EVERY 4 HOURS PRN
Status: CANCELLED | OUTPATIENT
Start: 2019-09-26

## 2019-09-26 RX ORDER — BENZTROPINE MESYLATE 1 MG/1
1 TABLET ORAL EVERY 6 HOURS PRN
Status: DISCONTINUED | OUTPATIENT
Start: 2019-09-26 | End: 2019-09-27

## 2019-09-26 RX ORDER — IBUPROFEN 600 MG/1
600 TABLET ORAL 2 TIMES DAILY PRN
Status: DISCONTINUED | OUTPATIENT
Start: 2019-09-26 | End: 2019-09-27

## 2019-09-26 RX ORDER — TRAZODONE HYDROCHLORIDE 50 MG/1
50 TABLET ORAL
Status: DISCONTINUED | OUTPATIENT
Start: 2019-09-26 | End: 2019-10-04 | Stop reason: HOSPADM

## 2019-09-26 RX ORDER — PHENAZOPYRIDINE HYDROCHLORIDE 100 MG/1
100 TABLET, FILM COATED ORAL 3 TIMES DAILY PRN
Status: CANCELLED | OUTPATIENT
Start: 2019-09-26

## 2019-09-26 RX ORDER — NALTREXONE HYDROCHLORIDE 50 MG/1
50 TABLET, FILM COATED ORAL DAILY
Status: DISCONTINUED | OUTPATIENT
Start: 2019-09-27 | End: 2019-10-04 | Stop reason: HOSPADM

## 2019-09-26 RX ORDER — ACETAMINOPHEN 325 MG/1
650 TABLET ORAL EVERY 4 HOURS PRN
Status: CANCELLED | OUTPATIENT
Start: 2019-09-26

## 2019-09-26 RX ORDER — LORAZEPAM 1 MG/1
1 TABLET ORAL EVERY 8 HOURS PRN
Status: DISCONTINUED | OUTPATIENT
Start: 2019-09-26 | End: 2019-09-30

## 2019-09-26 RX ORDER — ACETAMINOPHEN 325 MG/1
650 TABLET ORAL EVERY 6 HOURS PRN
Status: CANCELLED | OUTPATIENT
Start: 2019-09-26

## 2019-09-26 RX ORDER — ACETAMINOPHEN 325 MG/1
975 TABLET ORAL EVERY 6 HOURS PRN
Status: DISCONTINUED | OUTPATIENT
Start: 2019-09-26 | End: 2019-10-02

## 2019-09-26 RX ORDER — BENZTROPINE MESYLATE 1 MG/1
1 TABLET ORAL EVERY 6 HOURS PRN
Status: CANCELLED | OUTPATIENT
Start: 2019-09-26

## 2019-09-26 RX ORDER — OLANZAPINE 5 MG/1
10 TABLET ORAL
Status: CANCELLED | OUTPATIENT
Start: 2019-09-26

## 2019-09-26 RX ORDER — IBUPROFEN 600 MG/1
600 TABLET ORAL 2 TIMES DAILY PRN
Status: DISCONTINUED | OUTPATIENT
Start: 2019-09-26 | End: 2019-09-26

## 2019-09-26 RX ORDER — FLUTICASONE FUROATE AND VILANTEROL 200; 25 UG/1; UG/1
1 POWDER RESPIRATORY (INHALATION) DAILY
Status: CANCELLED | OUTPATIENT
Start: 2019-09-27

## 2019-09-26 RX ORDER — HALOPERIDOL 5 MG/ML
5 INJECTION INTRAMUSCULAR EVERY 6 HOURS PRN
Status: CANCELLED | OUTPATIENT
Start: 2019-09-26

## 2019-09-26 RX ORDER — HYDROXYZINE HYDROCHLORIDE 25 MG/1
25 TABLET, FILM COATED ORAL EVERY 6 HOURS PRN
Status: DISCONTINUED | OUTPATIENT
Start: 2019-09-26 | End: 2019-10-04 | Stop reason: HOSPADM

## 2019-09-26 RX ORDER — ZOLPIDEM TARTRATE 10 MG/1
5 TABLET ORAL
Qty: 30 TABLET | Refills: 0 | Status: SHIPPED | OUTPATIENT
Start: 2019-09-26 | End: 2019-10-04 | Stop reason: HOSPADM

## 2019-09-26 RX ORDER — BUPROPION HYDROCHLORIDE 150 MG/1
150 TABLET ORAL DAILY
Status: DISCONTINUED | OUTPATIENT
Start: 2019-09-27 | End: 2019-09-27

## 2019-09-26 RX ORDER — BUPROPION HYDROCHLORIDE 150 MG/1
150 TABLET ORAL DAILY
Status: CANCELLED | OUTPATIENT
Start: 2019-09-27

## 2019-09-26 RX ORDER — OLANZAPINE 10 MG/1
10 TABLET ORAL
Status: DISCONTINUED | OUTPATIENT
Start: 2019-09-26 | End: 2019-10-04 | Stop reason: HOSPADM

## 2019-09-26 RX ORDER — IBUPROFEN 600 MG/1
600 TABLET ORAL 2 TIMES DAILY PRN
Status: CANCELLED | OUTPATIENT
Start: 2019-09-26

## 2019-09-26 RX ORDER — LORAZEPAM 2 MG/ML
1 INJECTION INTRAMUSCULAR EVERY 6 HOURS PRN
Status: CANCELLED | OUTPATIENT
Start: 2019-09-26

## 2019-09-26 RX ORDER — HALOPERIDOL 5 MG
5 TABLET ORAL EVERY 8 HOURS PRN
Status: DISCONTINUED | OUTPATIENT
Start: 2019-09-26 | End: 2019-10-04 | Stop reason: HOSPADM

## 2019-09-26 RX ORDER — BENZTROPINE MESYLATE 1 MG/ML
1 INJECTION INTRAMUSCULAR; INTRAVENOUS EVERY 6 HOURS PRN
Status: DISCONTINUED | OUTPATIENT
Start: 2019-09-26 | End: 2019-09-27

## 2019-09-26 RX ORDER — FLUTICASONE PROPIONATE 50 MCG
2 SPRAY, SUSPENSION (ML) NASAL DAILY
Status: CANCELLED | OUTPATIENT
Start: 2019-09-27

## 2019-09-26 RX ORDER — TRAZODONE HYDROCHLORIDE 50 MG/1
50 TABLET ORAL
Status: CANCELLED | OUTPATIENT
Start: 2019-09-26

## 2019-09-26 RX ORDER — LEVOTHYROXINE SODIUM 0.05 MG/1
50 TABLET ORAL
Status: CANCELLED | OUTPATIENT
Start: 2019-09-27

## 2019-09-26 RX ORDER — RISPERIDONE 1 MG/1
1 TABLET, ORALLY DISINTEGRATING ORAL
Status: CANCELLED | OUTPATIENT
Start: 2019-09-26

## 2019-09-26 RX ADMIN — PANTOPRAZOLE SODIUM 40 MG: 40 TABLET, DELAYED RELEASE ORAL at 05:47

## 2019-09-26 RX ADMIN — BUPROPION HYDROCHLORIDE 150 MG: 150 TABLET, FILM COATED, EXTENDED RELEASE ORAL at 08:51

## 2019-09-26 RX ADMIN — LORATADINE 10 MG: 10 TABLET ORAL at 08:51

## 2019-09-26 RX ADMIN — VENLAFAXINE HYDROCHLORIDE 300 MG: 150 CAPSULE, EXTENDED RELEASE ORAL at 08:53

## 2019-09-26 RX ADMIN — TRAZODONE HYDROCHLORIDE 50 MG: 50 TABLET ORAL at 21:20

## 2019-09-26 RX ADMIN — ENOXAPARIN SODIUM 40 MG: 40 INJECTION SUBCUTANEOUS at 08:52

## 2019-09-26 RX ADMIN — CLONAZEPAM 0.5 MG: 0.5 TABLET ORAL at 17:24

## 2019-09-26 RX ADMIN — BUPROPION HYDROCHLORIDE 300 MG: 150 TABLET, FILM COATED, EXTENDED RELEASE ORAL at 08:51

## 2019-09-26 RX ADMIN — NALTREXONE HYDROCHLORIDE 50 MG: 50 TABLET, FILM COATED ORAL at 08:53

## 2019-09-26 RX ADMIN — CLONAZEPAM 0.5 MG: 0.5 TABLET ORAL at 08:51

## 2019-09-26 RX ADMIN — FLUTICASONE PROPIONATE 2 SPRAY: 50 SPRAY, METERED NASAL at 08:52

## 2019-09-26 RX ADMIN — LEVOTHYROXINE SODIUM 50 MCG: 50 TABLET ORAL at 05:47

## 2019-09-26 NOTE — DISCHARGE SUMMARY
Discharge- Hortensia Gunter 1970, 53 y o  female MRN: [id number]    Unit/Bed#: [facility] 808-01 Encounter: [id number]    Primary Care Provider: Beni Cornell DO   Date and time admitted to hospital: 9/20/2019  5:52 PM        * Intentional drug overdose ([facility] [address] )  Assessment & Plan  · Had taken a handful (unclear number) of Klonopin 1 mg pills plus reportedly extra gabapentin in setting of marital difficulties after phone conversation with significant other  · Rapid Urine drug screen negative  · Evaluated by Psychiatry, Patient signed 12, maintain one-to-one precautions by psych and patient will be transferred to inpatient psych facility  Appreciate psychiatry input  · Patient medically stable  · Awaiting inpatient psych placement        Anxiety and depression  Assessment & Plan  · Psychiatry consultation as above  · Continue psychiatric medications  · Restarted Klonopin at point 0 5 5 mg b i d  which is lower than her usual to prevent withdrawal   She has been on  0 5-1 mg t i d  Klonopin for years  Hypotension  Assessment & Plan  Baseline blood pressure systolic between  per patient  Patient is asymptomatic even with hypotensive episodes  No tachycardia or fever  Cosyntropin stimulation test negative for adrenal insufficiency  No signs and symptoms of infection  Continue supportive care        Migraine headache  Assessment & Plan  Without exacerbation   Continue supportive care    Hypothyroidism (acquired)  Assessment & Plan  Patient found to have elevated TSH and low T4 consistent with hypothyroidism  Started on levothyroxine  Outpatient Endocrine follow-up and repeat thyroid function test in 4 weeks    Gastroesophageal reflux disease with esophagitis  Assessment & Plan  · Continue PPI    Asthma  Assessment & Plan  · Not in acute exacerbation  · Continue home inhalers; add p r n   Nebulizers if shortness of breath and wheezing should develop      Discharge Summary - [facility]    Patient Information: Maranda Bernard 53 y o  female MRN: [id number]  [facility] Encounter: [id number]    Discharging Physician / Practitioner: Nidhi Novoa DO  PCP: Daisy Candelario DO  Admission Date: 9/20/2019  Discharge Date: 09/26/19    Disposition:     Other: [facility] rehab    Reason for Admission:   Intentional drug overdose  Major depressive disorder  Generalized anxiety disorder  Discharge Diagnoses:     Principal Problem:    Intentional drug overdose ([address] )  Active Problems:    Anxiety and depression    Hypotension    Asthma    Gastroesophageal reflux disease with esophagitis    Hypothyroidism (acquired)    Migraine headache  Resolved Problems:    * No resolved hospital problems  *      Consultations During Hospital Stay:  · Psychiatry  · Medical toxicology    Procedures Performed:   none  ·     Significant Findings / Test Results:     · As above    Incidental Findings:   · none    Test Results Pending at Discharge (will require follow up):   · none     Outpatient Tests Requested:  · none    Complications:  none    Hospital Course:   Per H&P  Maranda Bernard is a 53 y o  female patient who originally presented to the hospital on 9/20/2019 due to intentional drug overdose   Patient with past medical history significant for major depressive disorder, anxiety disorder, migraine headache,  hypothyroidism, asthma and  GERD  She presents from home after an intentional drug overdose  patient states she took multiple Klonopin 1 mg pills (per patient possibly up to 1/2 a bottle but she is uncertain of the exact amount) and extra gabapentin  She states she did this due to marital problems with her significant other, and after and angry conversation over the phone with significant other took the overdose  She denies using any other drugs at the time of overdose save for the aforementioned Klonopin and gabapentin, denies use of alcohol or illicit substances with the overdose    She states she took it to alleviate the pain related to her marital troubles  After taking overdose of, she contacted her parents, who contacted EMS  Patient was evaluated in the emergency room, medical toxicology was consulted, rapid urine drug screen was negative, normal chemistry panel  Patient was admitted the hospital, placed on 1 on 1 observation and Psychiatry consulted, patient took the medications because she feel depressed with intention to hurt herself  Recommendation for inpatient psych admission  Patient was found to have elevated TSH and low free T4 with suspected hypothyroidism, she was started on levothyroxine with plan to repeat thyroid study 4-6 weeks    Currently patient is in stable condition, no complications,  medically cleared, she will be discharged to [facility] psych unit for treatment  Condition at Discharge: stable     Discharge Day Visit / Exam:     Subjective:    Patient seen and examined  Comfortable in bed  No event overnight  Anxious to go to rehab  Vitals: Blood Pressure: 100/59 (09/26/19 1115)  Pulse: 70 (09/26/19 1115)  Temperature: 98 1 °F (36 7 °C) (09/26/19 1115)  Temp Source: Oral (09/20/19 1758)  Respirations: 18 (09/26/19 1115)  Height: 5' 3" (160 cm) (09/20/19 2303)  Weight - Scale: 67 5 kg (148 lb 13 oz) (09/20/19 2303)  SpO2: 97 % (09/26/19 1115)  Exam:   Physical Exam  Patient is awake alert oriented in no acute distress  Lung clear to auscultation bilateral  Heart positive S1-S2 no murmur  Abdomen soft nontender  Lower extremities no edema  Discussion with Family: no    Discharge instructions/Information to patient and family:   See after visit summary for information provided to patient and family  Provisions for Follow-Up Care:  See after visit summary for information related to follow-up care and any pertinent home health orders  Planned Readmission: no     Discharge Statement:  I spent 35  minutes discharging the patient   This time was spent on the day of discharge  I had direct contact with the patient on the day of discharge  Greater than 50% of the total time was spent examining patient, answering all patient questions, arranging and discussing plan of care with patient as well as directly providing post-discharge instructions  Additional time then spent on discharge activities  Discharge Medications:  See after visit summary for reconciled discharge medications provided to patient and family        ** Please Note: This note has been constructed using a voice recognition system **

## 2019-09-26 NOTE — SOCIAL WORK
Cm currently working on pre-cert for [facility] Psych admission  Transport set up through [address] [place] [address] for 6:45PM   Cm called Shawna Rodriguez and spoke with Representative who stated no pre-cert needed for non-emergent BLS transport  Cm informed Moise Mackey with [facility], Dr Azael Adam, and RN Shaq Span of transport time  Cm to speak with patient and patient's  if patient requested once pre-cert is received  Original 201 form placed in Transport Envelope at [employer] (to go with patient at time of discharge to [address] [place] [address])  Pre-Cert received by Gwendolyn Armas  With [facility] # [id number]) for 2 days with review due tomorrow 9/27/19 to [phone] (ask for concurrent review)  Cm informed patient, who is agreeable  All other parties aware and patient stated she will inform her   Cm following

## 2019-09-26 NOTE — TRANSPORTATION MEDICAL NECESSITY
Section I - General Information    Name of Patient: Bebo Rene                 : 1970    Medicare #: [id number]  Transport Date: 19 (PCS is valid for round trips on this date and for all repetitive trips in the 60-day range as noted below )  Origin: [facility]                                                         Destination: [address]  Is the pt's stay covered under Medicare Part A (PPS/DRG)   []     Closest appropriate facility? If no, why is transport to more distant facility required? Yes  If hospice pt, is this transport related to pt's terminal illness? NA       Section II - Medical Necessity Questionnaire  Ambulance transportation is medically necessary only if other means of transport are contraindicated or would be potentially harmful to the patient  To meet this requirement, the patient must either be "bed confined" or suffer from a condition such that transport by means other than ambulance is contraindicated by the patient's condition  The following questions must be answered by the medical professional signing below for this form to be valid:    1)  Describe the MEDICAL CONDITION (physical and/or mental) of this patient AT [address] [place] [address] that requires the patient to be transported in an ambulance and why transport by other means is contraindicated by the patient's condition: Intentional drug overdose, asthma, anxiety and depression, gastroesophageal reflux disease with esophagitis, hypotension, hypothyroidism, migraine headache, danger to self/others    2) Is the patient "bed confined" as defined below? No  To be "be confined" the patient must satisfy all three of the following conditions: (1) unable to get up from bed without Assistance; AND (2) unable to ambulate; AND (3) unable to sit in a chair or wheelchair      3) Can this patient safely be transported by car or wheelchair van (i e , seated during transport without a medical attendant or monitoring)? No    4) In addition to completing questions 1-3 above, please check any of the following conditions that apply*:   *Note: supporting documentation for any boxes checked must be maintained in the patient's medical records  If hosp-hosp transfer, describe services needed at 2nd facility not available at 1st facility? Danger to self/others  Medical attendant required   Other(specify) Danger to self; intentional overdose; 201 psych placement      Section III - Signature of Physician or Healthcare Professional  I certify that the above information is true and correct based on my evaluation of this patient, and represent that the patient requires transport by ambulance and that other forms of transport are contraindicated  I understand that this information will be used by the Centers for Medicare and Medicaid Services (CMS) to support the determination of medical necessity for ambulance services, and I represent that I have personal knowledge of the patient's condition at time of transport  []  If this box is checked, I also certify that the patient is physically or mentally incapable of signing the ambulance service's claim and that the institution with which I am affiliated has furnished care, services, or assistance to the patient  My signature below is made on behalf of the patient pursuant to 42 CFR §424 36(b)(4)  In accordance with 42 CFR §424 37, the specific reason(s) that the patient is physically or mentally incapable of signing the claim form is as follows:  Levell Cable of Physician* or Healthcare Professional______________________________________________________________  Signature Date 09/26/19 (For scheduled repetitive transports, this form is not valid for transports performed more than 60 days after this date)    Printed Name & Credentials of Physician or Healthcare Professional (MD, DO, RN, etc )___SARAH Verduzco_____________________________  *Form must be signed by patient's attending physician for scheduled, repetitive transports   For non-repetitive, unscheduled ambulance transports, if unable to obtain the signature of the attending physician, any of the following may sign (choose appropriate option below)  [] Physician Assistant []  Clinical Nurse Specialist []  Registered Nurse  []  Nurse Practitioner  [x] Discharge Planner

## 2019-09-26 NOTE — SOCIAL WORK
Cm called [facility] Psych [phone] and spoke with Amee Riley who stated she will call back shortly once discharges are confirmed  Cm provided Amee Riley with contact information in order to inform of bed availability at [address] [facility] 121  Cm following and awaiting return call  Cm called:  [facility] - bed available  [facility] - no bed at this time  [address] bed available  [facility] - no bed available  [facility] - no bed available  [facility] - no bed available  [facility] - bed available  [facility] - bed available  [facility] - no bed available    Cm received update from Amee Riley with [facility] Intake stating there is any available bed now at [facility]   201 faxed to Amee Riley for review at [phone]  Awaiting return call with Accepting Physician NPI information in order to begin pre-cert and BLS transport auth

## 2019-09-26 NOTE — ED NOTES
Patient is accepted at [address] [place] [address] 3B   Patient is accepted by Dr Galarza  Per Kartik Henry     Transportation is arranged with **     Transportation is scheduled for 1845  Nurse report is to be called to [phone] prior to patient transfer      Assigned Medical CM will complete precert prior to transfer
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
## 2019-09-26 NOTE — PLAN OF CARE
Problem: SAFETY ADULT  Goal: Maintain or return mobility status to optimal level  Description  INTERVENTIONS:  - Assess patient's baseline mobility status (ambulation, transfers, stairs, etc )    - Identify cognitive and physical deficits and behaviors that affect mobility  - Identify mobility aids required to assist with transfers and/or ambulation (gait belt, sit-to-stand, lift, walker, cane, etc )  - [place] fall precautions as indicated by assessment  - Record patient progress and toleration of activity level on Mobility SBAR; progress patient to next Phase/Stage  - Instruct patient to call for assistance with activity based on assessment  - Consider rehabilitation consult to assist with strengthening/weightbearing, etc   Outcome: Progressing

## 2019-09-26 NOTE — NURSING NOTE
Patient was a one to one until transport arrived to take patient to [facility]   made aware of transfer

## 2019-09-27 PROBLEM — S92.919A CLOSED FRACTURE OF PHALANX OF FOOT: Status: ACTIVE | Noted: 2019-09-27

## 2019-09-27 PROBLEM — M54.50 LOW BACK PAIN: Status: ACTIVE | Noted: 2019-09-27

## 2019-09-27 PROBLEM — F60.9 PERSONALITY DISORDER (HCC): Chronic | Status: ACTIVE | Noted: 2019-09-27

## 2019-09-27 LAB
BACTERIA UR QL AUTO: ABNORMAL /HPF
BILIRUB UR QL STRIP: NEGATIVE
CHOLEST SERPL-MCNC: 262 MG/DL
CLARITY UR: CLEAR
COLOR UR: ABNORMAL
EOSINOPHIL # BLD AUTO: 0.06 THOUSAND/UL (ref 0–0.4)
EOSINOPHIL NFR BLD MANUAL: 1 % (ref 0–6)
ERYTHROCYTE [DISTWIDTH] IN BLOOD BY AUTOMATED COUNT: 12.4 %
EST. AVERAGE GLUCOSE BLD GHB EST-MCNC: 120 MG/DL
GLUCOSE UR STRIP-MCNC: NEGATIVE MG/DL
HBA1C MFR BLD: 5.8 % (ref 4.2–6.3)
HCG SERPL QL: NEGATIVE
HCT VFR BLD AUTO: 39.6 % (ref 36–46)
HDLC SERPL-MCNC: 56 MG/DL (ref 40–59)
HGB BLD-MCNC: 13.5 G/DL (ref 12–16)
HGB UR QL STRIP.AUTO: NEGATIVE
KETONES UR STRIP-MCNC: NEGATIVE MG/DL
LDLC SERPL CALC-MCNC: 182 MG/DL
LEUKOCYTE ESTERASE UR QL STRIP: 500
LYMPHOCYTES # BLD AUTO: 1.6 THOUSAND/UL (ref 0.5–4)
LYMPHOCYTES # BLD AUTO: 29 % (ref 25–45)
MCH RBC QN AUTO: 29.8 PG (ref 26–34)
MCHC RBC AUTO-ENTMCNC: 34.2 G/DL (ref 31–36)
MCV RBC AUTO: 87 FL (ref 80–100)
MONOCYTES # BLD AUTO: 0.66 THOUSAND/UL (ref 0.2–0.9)
MONOCYTES NFR BLD AUTO: 12 % (ref 1–10)
MUCOUS THREADS UR QL AUTO: ABNORMAL
NEUTS SEG # BLD: 3.14 THOUSAND/UL (ref 1.8–7.8)
NEUTS SEG NFR BLD AUTO: 57 %
NITRITE UR QL STRIP: NEGATIVE
NON-SQ EPI CELLS URNS QL MICRO: ABNORMAL /HPF
NONHDLC SERPL-MCNC: 206 MG/DL
PH UR STRIP.AUTO: 5 [PH]
PLATELET # BLD AUTO: 231 THOUSANDS/UL (ref 150–450)
PLATELET BLD QL SMEAR: ADEQUATE
PMV BLD AUTO: 7.8 FL (ref 8.9–12.7)
PROT UR STRIP-MCNC: NEGATIVE MG/DL
RBC # BLD AUTO: 4.53 MILLION/UL (ref 4–5.2)
RBC #/AREA URNS AUTO: ABNORMAL /HPF
RBC MORPH BLD: NORMAL
SP GR UR STRIP.AUTO: 1.02 (ref 1–1.04)
T3FREE SERPL-MCNC: 2.45 PG/ML (ref 2.3–4.2)
T4 FREE SERPL-MCNC: 0.91 NG/DL (ref 0.76–1.46)
TOTAL CELLS COUNTED SPEC: 100
TRIGL SERPL-MCNC: 121 MG/DL
TSH SERPL DL<=0.05 MIU/L-ACNC: 8.65 UIU/ML (ref 0.47–4.68)
UROBILINOGEN UA: NEGATIVE MG/DL
VARIANT LYMPHS # BLD AUTO: 1 % (ref 0–0)
WBC # BLD AUTO: 5.5 THOUSAND/UL (ref 4.5–11)
WBC #/AREA URNS AUTO: ABNORMAL /HPF

## 2019-09-27 PROCEDURE — 84481 FREE ASSAY (FT-3): CPT | Performed by: PSYCHIATRY & NEUROLOGY

## 2019-09-27 PROCEDURE — 99253 IP/OBS CNSLTJ NEW/EST LOW 45: CPT | Performed by: FAMILY MEDICINE

## 2019-09-27 PROCEDURE — 81001 URINALYSIS AUTO W/SCOPE: CPT | Performed by: PSYCHIATRY & NEUROLOGY

## 2019-09-27 PROCEDURE — 85027 COMPLETE CBC AUTOMATED: CPT | Performed by: PSYCHIATRY & NEUROLOGY

## 2019-09-27 PROCEDURE — 84443 ASSAY THYROID STIM HORMONE: CPT | Performed by: PSYCHIATRY & NEUROLOGY

## 2019-09-27 PROCEDURE — 80061 LIPID PANEL: CPT | Performed by: PSYCHIATRY & NEUROLOGY

## 2019-09-27 PROCEDURE — 85007 BL SMEAR W/DIFF WBC COUNT: CPT | Performed by: PSYCHIATRY & NEUROLOGY

## 2019-09-27 PROCEDURE — 84703 CHORIONIC GONADOTROPIN ASSAY: CPT | Performed by: PSYCHIATRY & NEUROLOGY

## 2019-09-27 PROCEDURE — 84439 ASSAY OF FREE THYROXINE: CPT | Performed by: PSYCHIATRY & NEUROLOGY

## 2019-09-27 PROCEDURE — 99221 1ST HOSP IP/OBS SF/LOW 40: CPT | Performed by: PSYCHIATRY & NEUROLOGY

## 2019-09-27 PROCEDURE — 83036 HEMOGLOBIN GLYCOSYLATED A1C: CPT | Performed by: PSYCHIATRY & NEUROLOGY

## 2019-09-27 RX ORDER — ALBUTEROL SULFATE 2.5 MG/3ML
2.5 SOLUTION RESPIRATORY (INHALATION) EVERY 4 HOURS PRN
Status: DISCONTINUED | OUTPATIENT
Start: 2019-09-27 | End: 2019-10-04 | Stop reason: HOSPADM

## 2019-09-27 RX ORDER — ALBUTEROL SULFATE 90 UG/1
2 AEROSOL, METERED RESPIRATORY (INHALATION) EVERY 4 HOURS PRN
Status: DISCONTINUED | OUTPATIENT
Start: 2019-09-27 | End: 2019-10-04 | Stop reason: HOSPADM

## 2019-09-27 RX ORDER — BUPROPION HYDROCHLORIDE 150 MG/1
450 TABLET ORAL DAILY
Status: DISCONTINUED | OUTPATIENT
Start: 2019-09-28 | End: 2019-10-04 | Stop reason: HOSPADM

## 2019-09-27 RX ORDER — ERGOCALCIFEROL 1.25 MG/1
50000 CAPSULE ORAL WEEKLY
Status: DISCONTINUED | OUTPATIENT
Start: 2019-09-27 | End: 2019-10-04 | Stop reason: HOSPADM

## 2019-09-27 RX ORDER — KETOTIFEN FUMARATE 0.35 MG/ML
1 SOLUTION/ DROPS OPHTHALMIC 2 TIMES DAILY
Status: DISCONTINUED | OUTPATIENT
Start: 2019-09-27 | End: 2019-10-04 | Stop reason: HOSPADM

## 2019-09-27 RX ORDER — BENZTROPINE MESYLATE 1 MG/1
1 TABLET ORAL EVERY 6 HOURS PRN
Status: DISCONTINUED | OUTPATIENT
Start: 2019-09-27 | End: 2019-10-04 | Stop reason: HOSPADM

## 2019-09-27 RX ORDER — FLUTICASONE PROPIONATE 50 MCG
2 SPRAY, SUSPENSION (ML) NASAL DAILY
Status: DISCONTINUED | OUTPATIENT
Start: 2019-09-27 | End: 2019-10-04 | Stop reason: HOSPADM

## 2019-09-27 RX ORDER — IBUPROFEN 600 MG/1
600 TABLET ORAL EVERY 8 HOURS PRN
Status: DISCONTINUED | OUTPATIENT
Start: 2019-09-27 | End: 2019-10-04 | Stop reason: HOSPADM

## 2019-09-27 RX ORDER — ZOLMITRIPTAN 5 MG/1
5 TABLET, ORALLY DISINTEGRATING ORAL ONCE AS NEEDED
Status: COMPLETED | OUTPATIENT
Start: 2019-09-27 | End: 2019-09-29

## 2019-09-27 RX ORDER — ONDANSETRON 4 MG/1
4 TABLET, ORALLY DISINTEGRATING ORAL EVERY 6 HOURS PRN
Status: DISCONTINUED | OUTPATIENT
Start: 2019-09-27 | End: 2019-10-04 | Stop reason: HOSPADM

## 2019-09-27 RX ORDER — ARIPIPRAZOLE 15 MG/1
15 TABLET ORAL
Status: DISCONTINUED | OUTPATIENT
Start: 2019-09-27 | End: 2019-09-27

## 2019-09-27 RX ORDER — BENZTROPINE MESYLATE 1 MG/ML
1 INJECTION INTRAMUSCULAR; INTRAVENOUS EVERY 6 HOURS PRN
Status: DISCONTINUED | OUTPATIENT
Start: 2019-09-27 | End: 2019-10-04 | Stop reason: HOSPADM

## 2019-09-27 RX ORDER — DULOXETIN HYDROCHLORIDE 20 MG/1
20 CAPSULE, DELAYED RELEASE ORAL DAILY
Status: DISCONTINUED | OUTPATIENT
Start: 2019-09-28 | End: 2019-09-28

## 2019-09-27 RX ORDER — QUETIAPINE FUMARATE 50 MG/1
50 TABLET, FILM COATED ORAL
Status: DISCONTINUED | OUTPATIENT
Start: 2019-09-27 | End: 2019-09-28

## 2019-09-27 RX ORDER — BACLOFEN 10 MG/1
10 TABLET ORAL
Status: DISCONTINUED | OUTPATIENT
Start: 2019-09-27 | End: 2019-10-04 | Stop reason: HOSPADM

## 2019-09-27 RX ORDER — CLONAZEPAM 0.5 MG/1
0.25 TABLET ORAL 2 TIMES DAILY
Status: COMPLETED | OUTPATIENT
Start: 2019-09-27 | End: 2019-09-30

## 2019-09-27 RX ORDER — ALBUTEROL SULFATE 2.5 MG/3ML
2.5 SOLUTION RESPIRATORY (INHALATION) EVERY 4 HOURS PRN
Status: DISCONTINUED | OUTPATIENT
Start: 2019-09-27 | End: 2019-09-27

## 2019-09-27 RX ADMIN — BUPROPION HYDROCHLORIDE 300 MG: 150 TABLET, FILM COATED, EXTENDED RELEASE ORAL at 08:45

## 2019-09-27 RX ADMIN — ERGOCALCIFEROL 50000 UNITS: 1.25 CAPSULE ORAL at 12:11

## 2019-09-27 RX ADMIN — CLONAZEPAM 0.25 MG: 0.5 TABLET ORAL at 18:03

## 2019-09-27 RX ADMIN — CLONAZEPAM 0.5 MG: 0.5 TABLET ORAL at 08:39

## 2019-09-27 RX ADMIN — ZOLMITRIPTAN 5 MG: 5 TABLET, ORALLY DISINTEGRATING ORAL at 13:31

## 2019-09-27 RX ADMIN — VENLAFAXINE HYDROCHLORIDE 300 MG: 150 CAPSULE, EXTENDED RELEASE ORAL at 08:39

## 2019-09-27 RX ADMIN — FLUTICASONE PROPIONATE 2 SPRAY: 50 SPRAY, METERED NASAL at 12:11

## 2019-09-27 RX ADMIN — SERTRALINE HYDROCHLORIDE 200 MG: 50 TABLET ORAL at 21:38

## 2019-09-27 RX ADMIN — KETOTIFEN FUMARATE 1 DROP: 0.35 SOLUTION/ DROPS OPHTHALMIC at 18:04

## 2019-09-27 RX ADMIN — BACLOFEN 10 MG: 10 TABLET ORAL at 21:39

## 2019-09-27 RX ADMIN — LORATADINE 10 MG: 10 TABLET ORAL at 08:40

## 2019-09-27 RX ADMIN — KETOTIFEN FUMARATE 1 DROP: 0.35 SOLUTION/ DROPS OPHTHALMIC at 12:11

## 2019-09-27 RX ADMIN — NALTREXONE HYDROCHLORIDE 50 MG: 50 TABLET, FILM COATED ORAL at 08:39

## 2019-09-27 RX ADMIN — ACETAMINOPHEN 975 MG: 325 TABLET ORAL at 06:30

## 2019-09-27 RX ADMIN — QUETIAPINE FUMARATE 50 MG: 50 TABLET ORAL at 21:39

## 2019-09-27 RX ADMIN — LEVOTHYROXINE SODIUM 50 MCG: 50 TABLET ORAL at 06:31

## 2019-09-27 RX ADMIN — PANTOPRAZOLE SODIUM 40 MG: 40 TABLET, DELAYED RELEASE ORAL at 06:31

## 2019-09-27 RX ADMIN — BUPROPION HYDROCHLORIDE 150 MG: 150 TABLET, FILM COATED, EXTENDED RELEASE ORAL at 08:46
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
## 2019-09-27 NOTE — CONSULTS
Consult- Caity Candelario 1970, 53 y o  female MRN: [id number]    Unit/Bed#: [facility] 343-01 Encounter: [id number]    Primary Care Provider: Cara Min DO   Date and time admitted to hospital: 9/26/2019  7:30 PM      Inpatient consult for Medical Clearance for [address] patient  Consult performed by: Shahrzad Gibbs MD  Consult ordered by: Satnam Winter MD          KYLE (generalized anxiety disorder)  Assessment & Plan  Reviewed metabolic profile  EKG shows normal sinus rhythm  Further as per psych    Hypothyroidism (acquired)  Assessment & Plan  Patient recently had a TSH done and was initiated on levothyroxine 50 mcg daily  No further dose adjustment at this time  Check TSH in 6 weeks outpatient    Intentional drug overdose [facility])  Assessment & Plan  Patient was recently admitted to the medical floor for Klonopin overdose  Currently medically stable    Chronic left shoulder pain  Assessment & Plan  Continue p r n  Tylenol    Chronic migraine without aura  Assessment & Plan  Patient has known history of chronic migraine headaches  She is currently having a migraine headache at this time  Continue Neurontin for prophylaxis and zomig for acute pain control        VTE Prophylaxis: Reason for no pharmacologic prophylaxis Low risk  / reason for no mechanical VTE prophylaxis Low risk     Recommendations for Discharge:  · TSH in 6 weeks    Counseling / Coordination of Care Time: 1 hour  Greater than 50% of total time spent on patient counseling and coordination of care  Collaboration of Care: Were Recommendations Directly Discussed with Primary Treatment Team? - Yes     History of Present Illness:    Caity Candelario is a 53 y o  female who is originally admitted to the psychiatry service due to depression and anxiety and recent suicide attempt  We are consulted for medical management  Patient currently denies any chest pain shortness of breath    She complains of 7/10 migraine headache    Review of Systems:    Review of Systems   Constitutional: Positive for fatigue  Negative for appetite change, chills and fever  HENT: Negative for hearing loss, sore throat and trouble swallowing  Eyes: Negative for photophobia, discharge and visual disturbance  Respiratory: Negative for chest tightness and shortness of breath  Cardiovascular: Negative for chest pain and palpitations  Gastrointestinal: Negative for abdominal pain, blood in stool and vomiting  Endocrine: Negative for polydipsia and polyuria  Genitourinary: Negative for difficulty urinating, dysuria, flank pain and hematuria  Musculoskeletal: Negative for back pain and gait problem  Skin: Negative for rash  Allergic/Immunologic: Negative for environmental allergies and food allergies  Neurological: Positive for headaches  Negative for dizziness, seizures and syncope  Hematological: Does not bruise/bleed easily  Psychiatric/Behavioral: Positive for behavioral problems  The patient is nervous/anxious  All other systems reviewed and are negative        Past Medical and Surgical History:     Past Medical History:   Diagnosis Date    Amenorrhea     Anxiety     Asthma     Back pain     Chondromalacia of patella     Chronic fatigue     Deep vein thrombophlebitis of leg (HCC)     Depression     GERD (gastroesophageal reflux disease)     HPV (human papilloma virus) infection     Hypothyroidism     Lumbar radiculopathy     Migraine     Myofascial pain syndrome     Osteopenia     Piriformis syndrome     Sacroiliitis (HCC)     Sciatica     Sleep apnea     Spondylosis of lumbar spine     Trochanteric bursitis of right hip     Vertigo     Vitamin D deficiency        Past Surgical History:   Procedure Laterality Date    CERVIX LESION DESTRUCTION       SECTION      COLONOSCOPY      COLPOSCOPY W/ BIOPSY / CURETTAGE      Colposcopy cervix with biopsy    LAPAROSCOPIC ENDOMETRIOSIS FULGURATION      LAPAROSCOPY      TOOTH EXTRACTION Meds/Allergies:    all medications and allergies reviewed    Allergies: Allergies   Allergen Reactions    Nuts      Other reaction(s): WALNUTS    Cephalexin     Erythromycin Diarrhea, GI Intolerance and Vomiting    Iodine Hives    Other      adobo    Shellfish Allergy     Shellfish-Derived Products     Zithromax [Azithromycin] Diarrhea     Can take name brand  Annotation - [id number]: can take brand name  Other reaction(s): Nausea/vomiting/diarrhea       Social History:     Marital Status: /Civil Union    Substance Use History:   Social History     Substance and Sexual Activity   Alcohol Use Not Currently    Comment: Per [employer]; Occasional use     Social History     Tobacco Use   Smoking Status Never Smoker   Smokeless Tobacco Never Used     Social History     Substance and Sexual Activity   Drug Use Not Currently       Family History:    Family History   Problem Relation Age of Onset    Heart disease Mother     Asthma Daughter     Lung cancer Paternal Grandfather     Asthma Daughter     Colon cancer Father     Colon cancer Maternal Grandfather     Prostate cancer Maternal Grandfather     Colon cancer Maternal Aunt     No Known Problems Maternal Grandmother     No Known Problems Paternal Grandmother     No Known Problems Maternal Aunt     No Known Problems Maternal Aunt     No Known Problems Maternal Aunt     No Known Problems Paternal Aunt     Psychiatric Illness Neg Hx        Physical Exam:     Vitals:   Blood Pressure: 93/57 (09/27/19 0740)  Pulse: 88 (09/27/19 0740)  Temperature: 97 8 °F (36 6 °C) (09/27/19 0740)  Temp Source: Temporal (09/27/19 0740)  Respirations: 16 (09/27/19 0740)  Height: 5' 3" (160 cm) (09/26/19 1930)  Weight - Scale: 64 9 kg (143 lb 1 3 oz) (09/26/19 1930)  SpO2: 97 % (09/26/19 1930)    Physical Exam   Constitutional: She is oriented to person, place, and time  She appears well-developed and well-nourished     HENT:   Head: Normocephalic and atraumatic  Mouth/Throat: Oropharynx is clear and moist    Eyes: Conjunctivae and EOM are normal    Neck: Normal range of motion  Neck supple  Cardiovascular: Normal rate, regular rhythm and normal heart sounds  Pulmonary/Chest: Effort normal and breath sounds normal    Abdominal: Soft  Bowel sounds are normal  She exhibits no mass  There is no tenderness  There is no rebound and no guarding  Genitourinary:   Genitourinary Comments: deferred   Musculoskeletal: Normal range of motion  Neurological: She is alert and oriented to person, place, and time  She has normal reflexes  Cranial nerves 2-12 are normal   Normal neurological exam   Skin: Skin is warm and dry  No rash noted  Psychiatric:   anxious   Nursing note and vitals reviewed  Additional Data:     Lab Results: I have personally reviewed pertinent reports  Results from last 7 days   Lab Units 09/27/19  0619 09/23/19  1008   WBC Thousand/uL 5 50 4 74   HEMOGLOBIN g/dL 13 5 11 7   HEMATOCRIT % 39 6 35 8   PLATELETS Thousands/uL 231 178   NEUTROS PCT %  --  61   LYMPHS PCT %  --  27   LYMPHO PCT % 29  --    MONOS PCT %  --  10   MONO PCT % 12*  --    EOS PCT % 1 1     Results from last 7 days   Lab Units 09/26/19  0441  09/20/19  1848   SODIUM mmol/L 142   < > 136   POTASSIUM mmol/L 3 7   < > 3 9   CHLORIDE mmol/L 109*   < > 106   CO2 mmol/L 27   < > 26   BUN mg/dL 31*   < > 20   CREATININE mg/dL 0 98   < > 0 88   ANION GAP mmol/L 6   < > 4   CALCIUM mg/dL 9 2   < > 8 9   ALBUMIN g/dL  --   --  3 9   TOTAL BILIRUBIN mg/dL  --   --  0 28   ALK PHOS U/L  --   --  63   ALT U/L  --   --  17   AST U/L  --   --  11   GLUCOSE RANDOM mg/dL 76   < > 73    < > = values in this interval not displayed  No results found for: HGBA1C            Imaging: I have personally reviewed pertinent reports        No orders to display       EKG, Pathology, and Other Studies Reviewed on Admission:   · EKG:  Sinus rhythm    ** Please Note: This note has been constructed using a voice recognition system   **

## 2019-09-27 NOTE — ASSESSMENT & PLAN NOTE
Patient was recently admitted to the medical floor for Klonopin overdose    Currently medically stable

## 2019-09-27 NOTE — PROGRESS NOTES
09/27/19 0841   Team Meeting   Meeting Type Daily Rounds   Initial Conference Date 09/27/19   Team Members Present   Team Members Present Physician;Nurse;;; Other (Discipline and Name)   Physician Team Member [address] Team Member [address] Management Team Member Matthew   Social Work Team Member Ethan   Other (Discipline and Name) Med student-Peter Novoa-SARAVANAN   Patient/Family Present   Patient Present No   Patient's Family Present No   Medication Management, decrease klonopin
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
## 2019-09-27 NOTE — PROGRESS NOTES
09/27/19 1415   Activity/Group Checklist   Group Other (Comment)  (Art Therapy Group/Open Choice, Discussion)   Attendance Attended   Attendance Duration (min) Greater than 60   Interactions Interacted appropriately   Affect/Mood Appropriate   Goals Achieved Able to listen to others; Able to engage in interactions; Able to recieve feedback; Able to give feedback to another

## 2019-09-27 NOTE — ASSESSMENT & PLAN NOTE
Patient has known history of chronic migraine headaches  She is currently having a migraine headache at this time    Continue Neurontin for prophylaxis and zomig for acute pain control

## 2019-09-27 NOTE — PROGRESS NOTES
brought in her migraine medication which is non formulary  It is in pharmacy and will be returned to us for prn use by the patient  Magdaleno Tidwell is also non formulary and she has been informed to have family bring that inhaler in to floor also for her daily use  Patient is pleasant, appropriate and has been in the milieu all evening

## 2019-09-27 NOTE — PLAN OF CARE
Problem: SELF HARM/SUICIDALITY  Goal: Will have no self-injury during hospital stay  Description  INTERVENTIONS:  - Q 15 MINUTES: Routine safety checks  - Q WAKING SHIFT & PRN: Assess risk to determine if routine checks are adequate to maintain patient safety  - Encourage patient to participate actively in care by formulating a plan to combat response to suicidal ideation, identify supports and resources  Outcome: Not Progressing     Problem: DEPRESSION  Goal: Will be euthymic at discharge  Description  INTERVENTIONS:  - Administer medication as ordered  - Provide emotional support via 1:1 interaction with staff  - Encourage involvement in milieu/groups/activities  - Monitor for social isolation  Outcome: Not Progressing     Problem: ANXIETY  Goal: Will report anxiety at manageable levels  Description  INTERVENTIONS:  - Administer medication as ordered  - Teach and encourage coping skills  - Provide emotional support  - Assess patient/family for anxiety and ability to cope  Outcome: Not Progressing
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
## 2019-09-27 NOTE — ASSESSMENT & PLAN NOTE
Patient recently had a TSH done and was initiated on levothyroxine 50 mcg daily  No further dose adjustment at this time    Check TSH in 6 weeks outpatient
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
## 2019-09-27 NOTE — PROGRESS NOTES
Pt denies all symptoms  Pt is calm, cooperative and pleasant  Pt is seclusive to room with brief intervals in the milieu  Pt states having trouble sleeping last night  Compliant with medications  Will monitor

## 2019-09-28 PROCEDURE — 86592 SYPHILIS TEST NON-TREP QUAL: CPT | Performed by: PSYCHIATRY & NEUROLOGY

## 2019-09-28 RX ORDER — VENLAFAXINE HYDROCHLORIDE 37.5 MG/1
37.5 CAPSULE, EXTENDED RELEASE ORAL DAILY
Status: COMPLETED | OUTPATIENT
Start: 2019-10-01 | End: 2019-10-01

## 2019-09-28 RX ORDER — QUETIAPINE FUMARATE 100 MG/1
100 TABLET, FILM COATED ORAL
Status: DISCONTINUED | OUTPATIENT
Start: 2019-09-28 | End: 2019-09-29

## 2019-09-28 RX ORDER — VENLAFAXINE HYDROCHLORIDE 150 MG/1
150 CAPSULE, EXTENDED RELEASE ORAL DAILY
Status: COMPLETED | OUTPATIENT
Start: 2019-09-29 | End: 2019-09-29

## 2019-09-28 RX ORDER — DULOXETIN HYDROCHLORIDE 30 MG/1
30 CAPSULE, DELAYED RELEASE ORAL DAILY
Status: DISCONTINUED | OUTPATIENT
Start: 2019-09-29 | End: 2019-09-29

## 2019-09-28 RX ORDER — VENLAFAXINE HYDROCHLORIDE 75 MG/1
75 CAPSULE, EXTENDED RELEASE ORAL DAILY
Status: COMPLETED | OUTPATIENT
Start: 2019-09-30 | End: 2019-09-30

## 2019-09-28 RX ADMIN — LEVOTHYROXINE SODIUM 50 MCG: 50 TABLET ORAL at 06:09

## 2019-09-28 RX ADMIN — VENLAFAXINE HYDROCHLORIDE 225 MG: 75 CAPSULE, EXTENDED RELEASE ORAL at 09:00

## 2019-09-28 RX ADMIN — BUPROPION HYDROCHLORIDE 450 MG: 150 TABLET, EXTENDED RELEASE ORAL at 09:38

## 2019-09-28 RX ADMIN — BACLOFEN 10 MG: 10 TABLET ORAL at 21:13

## 2019-09-28 RX ADMIN — KETOTIFEN FUMARATE 1 DROP: 0.35 SOLUTION/ DROPS OPHTHALMIC at 09:00

## 2019-09-28 RX ADMIN — CLONAZEPAM 0.25 MG: 0.5 TABLET ORAL at 17:02

## 2019-09-28 RX ADMIN — FLUTICASONE PROPIONATE 2 SPRAY: 50 SPRAY, METERED NASAL at 09:00

## 2019-09-28 RX ADMIN — DULOXETINE HYDROCHLORIDE 20 MG: 20 CAPSULE, DELAYED RELEASE ORAL at 09:00

## 2019-09-28 RX ADMIN — TIOTROPIUM BROMIDE 18 MCG: 18 CAPSULE ORAL; RESPIRATORY (INHALATION) at 09:00

## 2019-09-28 RX ADMIN — KETOTIFEN FUMARATE 1 DROP: 0.35 SOLUTION/ DROPS OPHTHALMIC at 21:15

## 2019-09-28 RX ADMIN — NALTREXONE HYDROCHLORIDE 50 MG: 50 TABLET, FILM COATED ORAL at 09:00

## 2019-09-28 RX ADMIN — LORATADINE 10 MG: 10 TABLET ORAL at 09:00

## 2019-09-28 RX ADMIN — PANTOPRAZOLE SODIUM 40 MG: 40 TABLET, DELAYED RELEASE ORAL at 06:09

## 2019-09-28 RX ADMIN — QUETIAPINE FUMARATE 100 MG: 100 TABLET ORAL at 21:15

## 2019-09-28 RX ADMIN — ACETAMINOPHEN 975 MG: 325 TABLET ORAL at 15:45

## 2019-09-28 RX ADMIN — CLONAZEPAM 0.25 MG: 0.5 TABLET ORAL at 09:00

## 2019-09-28 RX ADMIN — SERTRALINE HYDROCHLORIDE 200 MG: 50 TABLET ORAL at 21:14

## 2019-09-28 NOTE — PROGRESS NOTES
Pt is complaint with meds  Still holds some anxiety, but depression has marked improvement  Calm and cooperative  Denies SI  Compliant with meds  Appears to have less resentment towards  given recent JAZMYN completion

## 2019-09-28 NOTE — PROGRESS NOTES
Pt visible on unit  Pleasant on approach, smiles  Denies SI, continues to c/o anxiety with racing thoughts  States depression has improved  C/o left shoulder pain and given PRN tylenol for c/o arthritis pain 8/10  Compliant with care  Will continue to monitor
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
## 2019-09-28 NOTE — PROGRESS NOTES
Progress Note - [address] [facility] 53 y o  female MRN: [id number]   Unit/Bed#: U 343-01 Encounter: [id number]    Behavior over the last 24 hours: some improvement  [facility] states she feels slightly less depressed and rates mood as 5 on a scale of 1 (best mood) to 10 (worst mood) today  She still has anxiety symptoms and seems distressed otherwise  She denies suicidal thoughts and now regrets suicide attempt "very much so", but has been still picking on skin on her fingers  Limited participation in milieu  Compliant with medications  Sleep: decreased  Appetite: improving  Medication side effects: No   ROS: reports shoulder pain, denies any shortness of breath or chest pain    Mental Status Evaluation:    Appearance:  casually dressed   Behavior:  cooperative, limited eye contact   Speech:  soft   Mood:  anxious, slightly less depressed   Affect:  blunted, less tearful   Thought Process:  organized, goal directed   Associations: intact associations   Thought Content:  no overt delusions, obsessive thoughts   Perceptual Disturbances: no auditory hallucinations, no visual hallucinations   Risk Potential: Suicidal ideation - None at present, remorseful about suicide attempt, self abusive behavior (picking on skin)  Homicidal ideation - None  Potential for aggression - No   Sensorium:  oriented to person, place and time/date   Memory:  recent and remote memory grossly intact   Consciousness:  alert and awake   Attention: decreased concentration and decreased attention span   Insight:  impaired   Judgment: impaired   Gait/Station: normal gait/station, normal balance   Motor Activity: no abnormal movements     Vital signs in last 24 hours:    Temp:  [97 5 °F (36 4 °C)-98 2 °F (36 8 °C)] 97 5 °F (36 4 °C)  HR:  [82-88] 82  Resp:  [16] 16  BP: (127-129)/(68-72) 127/72    Laboratory results: I have personally reviewed all pertinent laboratory/tests results      CBC:   Lab Results   Component Value Date WBC 5 50 09/27/2019    RBC 4 53 09/27/2019    HGB 13 5 09/27/2019    HCT 39 6 09/27/2019    MCV 87 09/27/2019     09/27/2019    MCH 29 8 09/27/2019    MCHC 34 2 09/27/2019    RDW 12 4 09/27/2019    MPV 7 8 (L) 09/27/2019    NRBC 0 09/23/2019    NEUTROABS 3 14 09/27/2019   Lipid Profile:   Lab Results   Component Value Date    CHOLESTEROL 262 (H) 09/27/2019    HDL 56 09/27/2019    TRIG 121 09/27/2019    LDLCALC 182 (H) 09/27/2019    NONHDLC 206 09/27/2019   Thyroid Studies:   Lab Results   Component Value Date    DAD6MFHJQQGH 8 650 (H) 09/27/2019    T3FREE 2 45 09/27/2019    FREET4 0 91 09/27/2019   Hemoglobin A1C/EST AVG Glucose   Lab Results   Component Value Date    HGBA1C 5 8 09/27/2019     09/27/2019   Pregnancy:   Lab Results   Component Value Date    PREGUR negative 07/24/2019    PREGSERUM Negative 09/27/2019   Urinalysis   Lab Results   Component Value Date    COLORU Kat (A) 09/27/2019    CLARITYU Clear 09/27/2019    SPECGRAV 1 025 09/27/2019    PHUR 5 0 09/27/2019    LEUKOCYTESUR 500 0 (A) 09/27/2019    NITRITE Negative 09/27/2019    PROTEIN UA Negative 09/27/2019    GLUCOSEU Negative 09/27/2019    KETONESU Negative 09/27/2019    UROBILINOGEN Negative 09/27/2019    BILIRUBINUR Negative 09/27/2019    BLOODU Negative 09/27/2019    RBCUA 0-1 (A) 09/27/2019    WBCUA 10-20 (A) 09/27/2019    EPIS Moderate (A) 09/27/2019    BACTERIA Moderate (A) 09/27/2019       Progress Toward Goals: some progress, still anxious, slightly less depressed, not suicidal today, working on coping skills, still picks on skin often    Assessment/Plan   Principal Problem:    Major depressive disorder, recurrent severe without psychotic features ([address] )  Active Problems:    KYLE (generalized anxiety disorder)    Panic disorder without agoraphobia    Obsessive-compulsive disorder, unspecified    Other insomnia    Personality disorder (HCC)    Chronic migraine without aura    Chronic left shoulder pain    Intentional drug overdose ([facility] )    Hypothyroidism (acquired)    Recommended Treatment:     Planned medication and treatment changes:     All current active medications have been reviewed  Encourage group therapy, milieu therapy and occupational therapy  Behavioral Health checks every 7 minutes  Increase Cymbalta to 30 mg daily to help with depressive symptoms  Increase Seroquel to 100 mg at bedtime to help with mood  Decrease Effexor XR to 150 mg daily and taper off  Taper off Klonopin gradually    Continue all other medications:    Current Facility-Administered Medications:  acetaminophen 650 mg Oral Q6H PRN Yolanda Rocha MD   acetaminophen 975 mg Oral Q6H PRN Yolanda Rocha MD   albuterol 2 puff Inhalation Q4H PRN Gloria Vásquez MD   albuterol 2 5 mg Nebulization Q4H PRN Gloria Vásquez MD   aluminum-magnesium hydroxide-simethicone 30 mL Oral Q4H PRN Yolanda Rocha MD   baclofen 10 mg Oral HS Gloira Vásquez MD   benztropine 1 mg Intramuscular Q6H PRN Gloria Vásquez MD   benztropine 1 mg Oral Q6H PRN Gloria Vásquez MD   buPROPion 450 mg Oral Daily Gloria Vásquez MD   clonazePAM 0 25 mg Oral BID Gloria Vásquez MD   [START ON 9/29/2019] DULoxetine 30 mg Oral Daily Gloria Vásquez MD   ergocalciferol 50,000 Units Oral Weekly Gloria Vásquez MD   fluticasone 2 spray Each Nare Daily Gloria Vásquez MD   fluticasone-vilanterol 1 puff Inhalation Daily Yolanda Rocha MD   haloperidol 5 mg Oral Q8H PRN Yolanda Rocha MD   haloperidol lactate 5 mg Intramuscular Q6H PRN Yolanda Rocha MD   hydrOXYzine HCL 25 mg Oral Q6H PRN Yolanda Rocha MD   ibuprofen 600 mg Oral Q8H PRN Gloria Vásquez MD   ketotifen 1 drop Both Eyes BID Gloria Vásquez MD   levothyroxine 50 mcg Oral Early Morning Yolanda Rocha MD   loratadine 10 mg Oral Daily Yolanda Rocha MD   LORazepam 1 mg Intramuscular Q6H PRN Yolanda Rocha MD   LORazepam 1 mg Oral Q8H PRN Yolanda Rocha MD magnesium hydroxide 30 mL Oral Daily PRN Deng Rivers MD   naltrexone 50 mg Oral Daily Deng Rivers MD   OLANZapine 10 mg Intramuscular Q3H PRN Deng Rivers MD   OLANZapine 10 mg Oral Q3H PRN Deng Rivers MD   ondansetron 4 mg Oral Q6H PRN Clemente Martinez MD   pantoprazole 40 mg Oral Early Morning Deng Rivers MD   phenazopyridine 100 mg Oral TID PRN Deng Rivers MD   QUEtiapine 100 mg Oral HS Clemente Martinez MD   risperiDONE 1 mg Oral Q3H PRN Deng Rivers MD   sertraline 200 mg Oral HS Clemente Martinez MD   tiotropium 18 mcg Inhalation Daily Deng Rivers MD   traZODone 50 mg Oral HS PRN Deng Rivers MD   [START ON 9/29/2019] venlafaxine 150 mg Oral Daily Clemente Martinez MD   [START ON 10/1/2019] venlafaxine 37 5 mg Oral Daily Clemente Martinez MD   [START ON 9/30/2019] venlafaxine 75 mg Oral Daily Clemente Martinez MD   ZOLMitriptan 5 mg Oral Once PRN Clemente Martinez MD       Risks / Benefits of Treatment:    Risks, benefits, and possible side effects of medications explained to patient and patient verbalizes understanding and agreement for treatment  Risks of medications in pregnancy explained if female patient  Patient verbalizes understanding and agrees to notify her doctor if she becomes pregnant  Counseling / Coordination of Care: Total floor / unit time spent today 35 minutes  Greater than 50% of total time was spent with the patient and / or family counseling and / or coordination of care  A description of counseling / coordination of care:    Patient's progress reviewed with nursing staff  Medications, treatment progress and treatment plan reviewed with patient  Coping with marital problems and ongoing anxiety reviewed with patient    Coping strategies including relaxation, using rubber band to distract herself form self-abusive thoughts, establishing crisis plan when she becomes overwhelmed and starting therapy at [facility] reviewed with Earnstine Clause  Reassurance and supportive therapy provided  Encouraged participation in milieu and group therapy on the unit      Damari Londono MD 09/28/19

## 2019-09-28 NOTE — PLAN OF CARE
Problem: SELF HARM/SUICIDALITY  Goal: Will have no self-injury during hospital stay  Description  INTERVENTIONS:  - Q 15 MINUTES: Routine safety checks  - Q WAKING SHIFT & PRN: Assess risk to determine if routine checks are adequate to maintain patient safety  - Encourage patient to participate actively in care by formulating a plan to combat response to suicidal ideation, identify supports and resources  Outcome: Progressing     Problem: DEPRESSION  Goal: Will be euthymic at discharge  Description  INTERVENTIONS:  - Administer medication as ordered  - Provide emotional support via 1:1 interaction with staff  - Encourage involvement in milieu/groups/activities  - Monitor for social isolation  Outcome: Progressing     Problem: ANXIETY  Goal: Will report anxiety at manageable levels  Description  INTERVENTIONS:  - Administer medication as ordered  - Teach and encourage coping skills  - Provide emotional support  - Assess patient/family for anxiety and ability to cope  Outcome: Progressing     Problem: DISCHARGE PLANNING  Goal: Discharge to home or other facility with appropriate resources  Description  INTERVENTIONS:  - Identify barriers to discharge w/patient and caregiver  - Arrange for needed discharge resources and transportation as appropriate  - Identify discharge learning needs (meds, wound care, etc )  - Arrange for interpretive services to assist at discharge as needed  - Refer to Case Management Department for coordinating discharge planning if the patient needs post-hospital services based on physician/advanced practitioner order or complex needs related to functional status, cognitive ability, or social support system  Outcome: Progressing

## 2019-09-28 NOTE — PROGRESS NOTES
09/28/19 0900   Activity/Group Checklist   Group Other (Comment)  (Surviving Trauma Group/Education, Open Discussion)   Attendance Attended   Attendance Duration (min) 46-60   Interactions Interacted appropriately   Affect/Mood Appropriate   Goals Achieved Identified feelings; Discussed coping strategies; Able to listen to others; Able to engage in interactions; Able to manage/cope with feelings; Able to recieve feedback; Able to give feedback to another

## 2019-09-29 RX ORDER — DULOXETIN HYDROCHLORIDE 60 MG/1
60 CAPSULE, DELAYED RELEASE ORAL DAILY
Status: DISCONTINUED | OUTPATIENT
Start: 2019-09-30 | End: 2019-10-02

## 2019-09-29 RX ADMIN — BACLOFEN 10 MG: 10 TABLET ORAL at 21:42

## 2019-09-29 RX ADMIN — PANTOPRAZOLE SODIUM 40 MG: 40 TABLET, DELAYED RELEASE ORAL at 06:28

## 2019-09-29 RX ADMIN — BUPROPION HYDROCHLORIDE 450 MG: 150 TABLET, EXTENDED RELEASE ORAL at 08:29

## 2019-09-29 RX ADMIN — ZOLMITRIPTAN 5 MG: 5 TABLET, ORALLY DISINTEGRATING ORAL at 20:46

## 2019-09-29 RX ADMIN — NALTREXONE HYDROCHLORIDE 50 MG: 50 TABLET, FILM COATED ORAL at 08:28

## 2019-09-29 RX ADMIN — VENLAFAXINE HYDROCHLORIDE 150 MG: 150 CAPSULE, EXTENDED RELEASE ORAL at 08:29

## 2019-09-29 RX ADMIN — ACETAMINOPHEN 650 MG: 325 TABLET ORAL at 12:15

## 2019-09-29 RX ADMIN — FLUTICASONE PROPIONATE 2 SPRAY: 50 SPRAY, METERED NASAL at 08:31

## 2019-09-29 RX ADMIN — LORATADINE 10 MG: 10 TABLET ORAL at 08:29

## 2019-09-29 RX ADMIN — SERTRALINE HYDROCHLORIDE 200 MG: 50 TABLET ORAL at 21:42

## 2019-09-29 RX ADMIN — CLONAZEPAM 0.25 MG: 0.5 TABLET ORAL at 17:19

## 2019-09-29 RX ADMIN — TIOTROPIUM BROMIDE 18 MCG: 18 CAPSULE ORAL; RESPIRATORY (INHALATION) at 08:27

## 2019-09-29 RX ADMIN — QUETIAPINE FUMARATE 150 MG: 50 TABLET ORAL at 21:42

## 2019-09-29 RX ADMIN — KETOTIFEN FUMARATE 1 DROP: 0.35 SOLUTION/ DROPS OPHTHALMIC at 08:30

## 2019-09-29 RX ADMIN — FLUTICASONE FUROATE AND VILANTEROL TRIFENATATE 1 PUFF: 200; 25 POWDER RESPIRATORY (INHALATION) at 09:45

## 2019-09-29 RX ADMIN — LEVOTHYROXINE SODIUM 50 MCG: 50 TABLET ORAL at 06:28

## 2019-09-29 RX ADMIN — DULOXETINE HYDROCHLORIDE 30 MG: 30 CAPSULE, DELAYED RELEASE ORAL at 08:29

## 2019-09-29 RX ADMIN — LORAZEPAM 1 MG: 1 TABLET ORAL at 20:46

## 2019-09-29 RX ADMIN — CLONAZEPAM 0.25 MG: 0.5 TABLET ORAL at 08:28

## 2019-09-29 NOTE — PROGRESS NOTES
Patient visible on the unit, watching football game with other patients, and social  Reports PRN pain medication "somewhat helped"  Pain reported 4/10

## 2019-09-29 NOTE — PROGRESS NOTES
Patient requested PRN pain med for shoulder and agreed for pain intervention of ice pack  Pain 4/10  Will continue to monitor

## 2019-09-29 NOTE — PROGRESS NOTES
Patient denies SI/HI, A/V hallucinations  Reports depression 4/10 and anxiety 1/10  States "depression and anxiety better in the morning and as the day goes along gets worse"  1215 Pain 7/10 left shoulder and lower back  PRN med given  Will continue to monitor

## 2019-09-29 NOTE — PLAN OF CARE
Problem: SELF HARM/SUICIDALITY  Goal: Will have no self-injury during hospital stay  Description  INTERVENTIONS:  - Q 15 MINUTES: Routine safety checks  - Q WAKING SHIFT & PRN: Assess risk to determine if routine checks are adequate to maintain patient safety  - Encourage patient to participate actively in care by formulating a plan to combat response to suicidal ideation, identify supports and resources  Outcome: Progressing     Problem: DEPRESSION  Goal: Will be euthymic at discharge  Description  INTERVENTIONS:  - Administer medication as ordered  - Provide emotional support via 1:1 interaction with staff  - Encourage involvement in milieu/groups/activities  - Monitor for social isolation  Outcome: Progressing     Problem: ANXIETY  Goal: Will report anxiety at manageable levels  Description  INTERVENTIONS:  - Administer medication as ordered  - Teach and encourage coping skills  - Provide emotional support  - Assess patient/family for anxiety and ability to cope  Outcome: Progressing     Problem: Ineffective Coping  Goal: Participates in unit activities  Description  Interventions:  - Provide therapeutic environment   - Provide required programming   - Redirect inappropriate behaviors   Outcome: Progressing

## 2019-09-30 LAB — RPR SER QL: NORMAL

## 2019-09-30 RX ORDER — HYDROXYZINE 50 MG/1
50 TABLET, FILM COATED ORAL EVERY 6 HOURS PRN
Status: DISCONTINUED | OUTPATIENT
Start: 2019-09-30 | End: 2019-10-04 | Stop reason: HOSPADM

## 2019-09-30 RX ORDER — LORAZEPAM 2 MG/ML
1 INJECTION INTRAMUSCULAR EVERY 6 HOURS PRN
Status: DISCONTINUED | OUTPATIENT
Start: 2019-09-30 | End: 2019-10-04 | Stop reason: HOSPADM

## 2019-09-30 RX ORDER — LORATADINE 10 MG/1
10 TABLET ORAL 2 TIMES DAILY
Status: DISCONTINUED | OUTPATIENT
Start: 2019-09-30 | End: 2019-10-04 | Stop reason: HOSPADM

## 2019-09-30 RX ORDER — QUETIAPINE FUMARATE 100 MG/1
200 TABLET, FILM COATED ORAL
Status: DISCONTINUED | OUTPATIENT
Start: 2019-09-30 | End: 2019-10-01

## 2019-09-30 RX ORDER — ZOLMITRIPTAN 5 MG/1
5 TABLET, ORALLY DISINTEGRATING ORAL ONCE AS NEEDED
Status: COMPLETED | OUTPATIENT
Start: 2019-09-30 | End: 2019-10-01

## 2019-09-30 RX ADMIN — ACETAMINOPHEN 975 MG: 325 TABLET ORAL at 15:25

## 2019-09-30 RX ADMIN — FLUTICASONE FUROATE AND VILANTEROL TRIFENATATE 1 PUFF: 200; 25 POWDER RESPIRATORY (INHALATION) at 08:37

## 2019-09-30 RX ADMIN — LORATADINE 10 MG: 10 TABLET ORAL at 08:36

## 2019-09-30 RX ADMIN — PANTOPRAZOLE SODIUM 40 MG: 40 TABLET, DELAYED RELEASE ORAL at 06:36

## 2019-09-30 RX ADMIN — NALTREXONE HYDROCHLORIDE 50 MG: 50 TABLET, FILM COATED ORAL at 08:36

## 2019-09-30 RX ADMIN — BACLOFEN 10 MG: 10 TABLET ORAL at 21:32

## 2019-09-30 RX ADMIN — LORATADINE 10 MG: 10 TABLET ORAL at 21:33

## 2019-09-30 RX ADMIN — SERTRALINE HYDROCHLORIDE 150 MG: 50 TABLET ORAL at 21:32

## 2019-09-30 RX ADMIN — LEVOTHYROXINE SODIUM 50 MCG: 50 TABLET ORAL at 06:36

## 2019-09-30 RX ADMIN — VENLAFAXINE HYDROCHLORIDE 75 MG: 75 CAPSULE, EXTENDED RELEASE ORAL at 08:36

## 2019-09-30 RX ADMIN — KETOTIFEN FUMARATE 1 DROP: 0.35 SOLUTION/ DROPS OPHTHALMIC at 08:37

## 2019-09-30 RX ADMIN — FLUTICASONE PROPIONATE 2 SPRAY: 50 SPRAY, METERED NASAL at 08:37

## 2019-09-30 RX ADMIN — DULOXETINE HYDROCHLORIDE 60 MG: 60 CAPSULE, DELAYED RELEASE ORAL at 09:00

## 2019-09-30 RX ADMIN — BUPROPION HYDROCHLORIDE 450 MG: 150 TABLET, EXTENDED RELEASE ORAL at 08:36

## 2019-09-30 RX ADMIN — TIOTROPIUM BROMIDE 18 MCG: 18 CAPSULE ORAL; RESPIRATORY (INHALATION) at 08:37

## 2019-09-30 RX ADMIN — CLONAZEPAM 0.25 MG: 0.5 TABLET ORAL at 08:36

## 2019-09-30 RX ADMIN — ZOLMITRIPTAN 5 MG: 5 TABLET, ORALLY DISINTEGRATING ORAL at 20:47

## 2019-09-30 RX ADMIN — QUETIAPINE FUMARATE 200 MG: 100 TABLET ORAL at 21:32

## 2019-09-30 NOTE — PROGRESS NOTES
Pt denies all symptoms  Pt is cooperative but has a slight irritable edge  Pt is about the unit and social with peers  Compliant with medications  Will monitor

## 2019-09-30 NOTE — PROGRESS NOTES
09/30/19 0700   Team Meeting   Meeting Type Daily Rounds   Team Members Present   Team Members Present Physician;Nurse;;; Other (Discipline and Name)   Physician Team Member Darien Rubi Team Member [id number] Management Team Member [address] CLARICE Nettles Work Team Member Anna Hyde   Other (Discipline and Name) MD student Marianne Alatorre   Patient/Family Present   Patient Present No   Patient's Family Present No     Pt encouraged [facility] to [facility], concerns regarding pt returning to home  Possibly d/c later this week

## 2019-09-30 NOTE — CASE MANAGEMENT
spoke to IVETH GONZALEZ and first available appointment with Dr Paloma Mccarthy is January 16th at 1400  Appointment noted in AVS,  will speak to Dr Paloma Mccarthy tomorrow regarding earlier appointment
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
## 2019-09-30 NOTE — PROGRESS NOTES
Pt complaining of 10/10 head and shoulder pain and was given PRN tylenol  PRN of tylenol was slightly effective rating pain 3/10  Will monitor
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
## 2019-09-30 NOTE — PROGRESS NOTES
Pharmacy Medication Education Group     Patient came to group and wanted to know more about Cymbalta, side effects and particularly if there is an interaction with magnesium because she takes this for migraines  Patient was counseled on this medication  Patient also stated that "wow I would have never known that it takes 4 to 6 weeks to work, no one has told me that before " Patient felt comforted by the fact that she could still take her magnesium  Patient also participated in a discussion with the group on the danger of supplements and confessed that she does take a supplement but that it was cleared by her primary care doctor  Patient and group were encouraged to let their providers know about supplements they use  Patient was behaviorally and emotionally appropriate for this period of group       Shoshana Mcghee, Ancel[place], BCPP, Clinical Pharmacist - Psychiatry

## 2019-09-30 NOTE — PROGRESS NOTES
Pt approached RN calm, asking for medication for anxiety  Ativan administered to pt  No notable signs of anxiety upon one hour reassessment
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
## 2019-10-01 ENCOUNTER — TELEPHONE (OUTPATIENT)
Dept: NEUROLOGY | Facility: CLINIC | Age: 49
End: 2019-10-01

## 2019-10-01 ENCOUNTER — DOCUMENTATION (OUTPATIENT)
Dept: NEUROLOGY | Facility: CLINIC | Age: 49
End: 2019-10-01

## 2019-10-01 RX ORDER — LAMOTRIGINE 25 MG/1
25 TABLET ORAL
Status: DISCONTINUED | OUTPATIENT
Start: 2019-10-01 | End: 2019-10-02

## 2019-10-01 RX ADMIN — TIOTROPIUM BROMIDE 18 MCG: 18 CAPSULE ORAL; RESPIRATORY (INHALATION) at 08:08

## 2019-10-01 RX ADMIN — NALTREXONE HYDROCHLORIDE 50 MG: 50 TABLET, FILM COATED ORAL at 08:07

## 2019-10-01 RX ADMIN — KETOTIFEN FUMARATE 1 DROP: 0.35 SOLUTION/ DROPS OPHTHALMIC at 08:07

## 2019-10-01 RX ADMIN — VENLAFAXINE HYDROCHLORIDE 37.5 MG: 37.5 CAPSULE, EXTENDED RELEASE ORAL at 08:20

## 2019-10-01 RX ADMIN — FLUTICASONE PROPIONATE 2 SPRAY: 50 SPRAY, METERED NASAL at 08:07

## 2019-10-01 RX ADMIN — LORATADINE 10 MG: 10 TABLET ORAL at 08:07

## 2019-10-01 RX ADMIN — ZOLMITRIPTAN 5 MG: 5 TABLET, ORALLY DISINTEGRATING ORAL at 19:48

## 2019-10-01 RX ADMIN — SERTRALINE HYDROCHLORIDE 100 MG: 50 TABLET ORAL at 21:46

## 2019-10-01 RX ADMIN — BUPROPION HYDROCHLORIDE 450 MG: 150 TABLET, EXTENDED RELEASE ORAL at 08:20

## 2019-10-01 RX ADMIN — PANTOPRAZOLE SODIUM 40 MG: 40 TABLET, DELAYED RELEASE ORAL at 06:08

## 2019-10-01 RX ADMIN — ACETAMINOPHEN 975 MG: 325 TABLET ORAL at 10:15

## 2019-10-01 RX ADMIN — FLUTICASONE FUROATE AND VILANTEROL TRIFENATATE 1 PUFF: 200; 25 POWDER RESPIRATORY (INHALATION) at 08:08

## 2019-10-01 RX ADMIN — LORATADINE 10 MG: 10 TABLET ORAL at 21:46

## 2019-10-01 RX ADMIN — LEVOTHYROXINE SODIUM 50 MCG: 50 TABLET ORAL at 06:08

## 2019-10-01 RX ADMIN — BACLOFEN 10 MG: 10 TABLET ORAL at 21:46

## 2019-10-01 RX ADMIN — ONDANSETRON 4 MG: 4 TABLET, ORALLY DISINTEGRATING ORAL at 21:53

## 2019-10-01 RX ADMIN — DULOXETINE HYDROCHLORIDE 60 MG: 60 CAPSULE, DELAYED RELEASE ORAL at 08:07

## 2019-10-01 RX ADMIN — QUETIAPINE FUMARATE 250 MG: 50 TABLET ORAL at 21:45

## 2019-10-01 RX ADMIN — LAMOTRIGINE 25 MG: 25 TABLET ORAL at 21:46

## 2019-10-01 NOTE — PROGRESS NOTES
Progress Note - 247 [facility] 53 y o  female MRN: [id number]   Unit/Bed#: [facility] 343-01 Encounter: [id number]    Behavior over the last 24 hours: minimal improvement  Milaan Wade continues to feel anxious and depressed, rates mood as 5 on a scale of 1 (best mood) to 10 (worst mood)  She denies suicidal thoughts, but is still picking on skin on her fingers  She has been using rubber band more often to distract herself from self-abusive thoughts and behaviors  Compliant with medications  Attends group therapy  Sleep: decreased, slept off and on  Appetite: normal  Medication side effects: No   ROS: reports shoulder pain, denies any headache or shortness of breath    Mental Status Evaluation:    Appearance:  casually dressed   Behavior:  cooperative   Speech:  soft   Mood:  depressed, anxious   Affect:  blunted   Thought Process:  organized, goal directed   Associations: intact associations   Thought Content:  no overt delusions, obsessive thoughts   Perceptual Disturbances: no auditory hallucinations, no visual hallucinations   Risk Potential: Suicidal ideation - None at present, self abusive behavior of skin picking is less prominent  Homicidal ideation - None  Potential for aggression - No   Sensorium:  oriented to person, place and time/date   Memory:  recent and remote memory grossly intact   Consciousness:  alert and awake   Attention: decreased concentration and decreased attention span   Insight:  impaired   Judgment: impaired   Gait/Station: normal gait/station, normal balance   Motor Activity: no abnormal movements     Vital signs in last 24 hours:    Temp:  [97 8 °F (36 6 °C)-98 3 °F (36 8 °C)] 98 3 °F (36 8 °C)  HR:  [82-98] 82  Resp:  [16-18] 16  BP: (100-102)/(56-59) 102/59    Laboratory results: I have personally reviewed all pertinent laboratory/tests results      RPR:   Lab Results   Component Value Date    RPR Non-Reactive 09/28/2019       Progress Toward Goals: minimal progress, still anxious, continues to feel depressed, working on coping skills, denies suicidal thoughts    Assessment/Plan   Principal Problem:    Major depressive disorder, recurrent severe without psychotic features ([place] [address] )  Active Problems:    KYLE (generalized anxiety disorder)    Panic disorder without agoraphobia    Obsessive-compulsive disorder, unspecified    Other insomnia    Personality disorder ([place] [address] )    Chronic migraine without aura    Chronic left shoulder pain    Intentional drug overdose ([place] [address] )    Hypothyroidism (acquired)    Recommended Treatment:     Planned medication and treatment changes:     All current active medications have been reviewed  Encourage group therapy, milieu therapy and occupational therapy  Behavioral Health checks every 7 minutes  Increase Seroquel to 250 mg at bedtime and titrate dose  Decrease Zoloft to 100 mg at bedtime and taper off  Discontinue Effexor XR  Titrate Cymbalta gradually  Add Lamictal 25 mg at bedtime to help with impulsivity and mood    Continue all other medications:    Current Facility-Administered Medications:  acetaminophen 650 mg Oral Q6H PRN Ignacio Mcclure MD   acetaminophen 975 mg Oral Q6H PRN Ignacio Mcclure MD   albuterol 2 puff Inhalation Q4H PRN Angie Alas MD   albuterol 2 5 mg Nebulization Q4H PRN Angie Alas MD   aluminum-magnesium hydroxide-simethicone 30 mL Oral Q4H PRN Ignacio Mcclure MD   baclofen 10 mg Oral HS Angie Alas MD   benztropine 1 mg Intramuscular Q6H PRN Angie Alas MD   benztropine 1 mg Oral Q6H PRN Angie Alas MD   buPROPion 450 mg Oral Daily Angie Alas MD   DULoxetine 60 mg Oral Daily Angie Alas MD   ergocalciferol 50,000 Units Oral Weekly Angie Alas MD   fluticasone 2 spray Each Nare Daily Angie Alas MD   fluticasone-vilanterol 1 puff Inhalation Daily Ignacio Mcclure MD   haloperidol 5 mg Oral Q8H PRN Ignacio Mcclure MD   haloperidol lactate 5 mg Intramuscular Q6H PRN Ignacio Mcclure MD   hydrOXYzine HCL 25 mg Oral Q6H PRN Ignacio Mcclure MD   hydrOXYzine HCL 50 mg Oral Q6H PRN Angie Alas MD   hydrOXYzine HCL 75 mg Oral Q6H PRN Angie Alas MD   ibuprofen 600 mg Oral Q8H PRN Angie Alas MD   ketotifen 1 drop Both Eyes BID Angie Alas MD   lamoTRIgine 25 mg Oral HS Angie Alas MD   levothyroxine 50 mcg Oral Early Morning Ignacio Mcclure MD   loratadine 10 mg Oral BID Angie Alas MD   LORazepam 1 mg Intramuscular Q6H PRN Angie Alas MD   magnesium hydroxide 30 mL Oral Daily PRN Ignacio Mcclure MD   naltrexone 50 mg Oral Daily Ignacio Mcclure MD   OLANZapine 10 mg Intramuscular Q3H PRN Ignacio Mcclure MD   OLANZapine 10 mg Oral Q3H PRN Ignacio Mcclure MD   ondansetron 4 mg Oral Q6H PRN Angie Alas MD   pantoprazole 40 mg Oral Early Morning Ignacio Mcclure MD   phenazopyridine 100 mg Oral TID PRN Ignacio Mcclure MD   QUEtiapine 250 mg Oral HS Angie Alas MD   risperiDONE 1 mg Oral Q3H PRN Ignacio Mcclure MD   sertraline 100 mg Oral HS Angie Alas MD   tiotropium 18 mcg Inhalation Daily Ignacio Mcclure MD   traZODone 50 mg Oral HS PRN Ignacio Mcclure MD   ZOLMitriptan 5 mg Oral Once PRN Angie Alas MD       Risks / Benefits of Treatment:    Risks, benefits, and possible side effects of medications explained to patient including risk of rash related to treatment with Lamictal, risk of parkinsonian symptoms, Tardive Dyskinesia and metabolic syndrome related to treatment with antipsychotic medications and risk of suicidality and serotonin syndrome related to treatment with antidepressants  The patient verbalizes understanding and agreement for treatment  Risks of medications in pregnancy explained if female patient  Patient verbalizes understanding and agrees to notify her doctor if she becomes pregnant      Counseling / Coordination of Care:    Patient's progress discussed with staff in treatment team meeting  Medications, treatment progress and treatment plan reviewed with patient  Medication changes discussed with patient      Shamir Swain MD 10/01/19

## 2019-10-01 NOTE — TELEPHONE ENCOUNTER
Pt called from hospital she is admitted to physiatric unit called in to neuro RX line   from hospital wanting  Sumatriptan, wanted to no if we could get it  to her    Once we call it in  Message was call  [phone]  called  Srikanth Ventura he stated she is having headaches and they are only giving her tylenol,I asked where is your wife and he stated at [address] [facility] I said where is that he said beth I reply with you mean [facility]  I checked her med list there was no sumatriptan in her med lists In her notes she took that med a year ago,he also stated that if we call her phramacy with the med he can take it to the hospital  I told him I would call him back had to speak with a nurse  He also said he did not want to speak with the nurse or Dr  When he goes to visit because he has a    11year old daughter and only has 1 5 min visit with his wife  Ella Naveen took over phone call at this time

## 2019-10-01 NOTE — PROGRESS NOTES
10/01/19 0700   Team Meeting   Meeting Type Daily Rounds   Team Members Present   Team Members Present Physician;Nurse;;; Other (Discipline and Name)   Physician Team Member [address] Team Member Gerardo   Care Management Team Member [address] Work Team Member Erum Rodriguez   Other (Discipline and Name) Resident Nikki Wilson   Patient/Family Present   Patient Present No   Patient's Family Present No   Medication adjustments  Will be keeping through the weekend

## 2019-10-01 NOTE — PROGRESS NOTES
Pt informed neurology called and stated for remainder of time on unit she will be switched to Imitrex for migraines rather than being on Zomig  Pt was agreeable to this change  Will monitor

## 2019-10-01 NOTE — CASE MANAGEMENT
called [facility] and left a message requesting an earlier appointment than the scheduled January 16th appointment per Dr Roxann Pugh request  Phone [phone]  Message left at [phone]

## 2019-10-01 NOTE — TELEPHONE ENCOUNTER
1ST CALL  Rusty Loera Spoke with pt  in regards to a phone call received from pt stating she needs her SUMATRIPTAN refill  Staff looked through medication list and did not saw medication listed also notice that pt is currently admitted at [facility])  Staff tried explainnig to  because pt is currently inpatient they will be taking care of her medication list    was hostile and stated [facility] does not have the medications, his wife has not been receiving her medication and hospital keeps giving her Tylenol  Staff told  she will investigate some more and call him back  Staff went and spoke to Nurse Supervisor who looked further into incident and noticed pt has not been on Sumatriptan for over a year and was switched to [facility]  Staff also did spoke with Dr Dakota Lee who stated if the pt is admitted there is not much she can do     2ND  [facility] E[facility] Loera Staff called  and explained to him the situation   again being hostile, kept demanding for wife meds to be filled and insist that [facility] is not giving the medication to her  Staff looked in chart and saw that [facility] was last filled 09/30/19 and script was ordered by a Dr Shelby Christopher  Staff also suggested to  to speak with Dr Paloma Mccarthy or a nurse staff in which pt  stated he does not want to/he does not have time  He kept demanding provider needs to refill meds  While speaking to pt  a second staff was on phone with pharmacist and it was confirmed that pt has been receiving medication  [facility] was last administered  on 9/30 at 8:47 pm  Pharmacist also stated that pt only has 1 left and a family member will need to bring in from home and drop off to pharmacy  Staff also spoke with a nurse staff from office who explained the situation just as the pharmacist explained   Staff also went out her way and called [facility] where pt is admitted (not [address] [place] [address] Avenue as  keeps stating) and spoke with a nurse Aimee Ortiz who explained what the pharmacist stated, also stating once the pt has taken last dose of ZOMIG they will substitute her medication for SUMATRIPTAN  Staff informed nurse if they can speak to the pt regarding this switch in medication as  is being very aggressive and adamant on provider writing a script  Nurse acknowledge that she will have a discussion with pt  At this time  hung up  3RD CALL  Magda Lee Staff called pt  a 3rd time and explained thoroughly the situation   was verbally aggressive towards staff stating he recorded everyone conversation and his 6 yr old daughter does not need this     Magda Lee  ended conversation by stating he will be writing to the medical board and hung up

## 2019-10-01 NOTE — PROGRESS NOTES
Pt denies all symptoms  Pt is about the unit and social with peers  Pt is calm, cooperative and pleasant  Compliant with medications  Will monitor

## 2019-10-01 NOTE — PLAN OF CARE
A  Problem: Ineffective Coping  Goal: Participates in unit activities  Description  Interventions:  - Provide therapeutic environment   - Provide required programming   - Redirect inappropriate behaviors   Outcome: Progressing

## 2019-10-01 NOTE — PROGRESS NOTES
10/01/19 1100   Activity/Group Checklist   Group   (recovery group)   Attendance Attended   Attendance Duration (min) 46-60   Interactions Interacted appropriately   Affect/Mood Appropriate   Goals Achieved Discussed self-esteem issues; Increased hopefulness; Displayed empathy;Able to listen to others; Able to engage in interactions; Able to self-disclose; Able to recieve feedback; Able to give feedback to another   Group was on giving feedback to others and positive affirmations  She was surprised that she was seen so positively from others and helped reinforce her being who she wanted to be
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
## 2019-10-01 NOTE — PLAN OF CARE
Problem: SELF HARM/SUICIDALITY  Goal: Will have no self-injury during hospital stay  Description  INTERVENTIONS:  - Q 15 MINUTES: Routine safety checks  - Q WAKING SHIFT & PRN: Assess risk to determine if routine checks are adequate to maintain patient safety  - Encourage patient to participate actively in care by formulating a plan to combat response to suicidal ideation, identify supports and resources  Outcome: Progressing     Problem: DEPRESSION  Goal: Will be euthymic at discharge  Description  INTERVENTIONS:  - Administer medication as ordered  - Provide emotional support via 1:1 interaction with staff  - Encourage involvement in milieu/groups/activities  - Monitor for social isolation  Outcome: Progressing     Problem: ANXIETY  Goal: Will report anxiety at manageable levels  Description  INTERVENTIONS:  - Administer medication as ordered  - Teach and encourage coping skills  - Provide emotional support  - Assess patient/family for anxiety and ability to cope  Outcome: Progressing     Problem: DISCHARGE PLANNING  Goal: Discharge to home or other facility with appropriate resources  Description  INTERVENTIONS:  - Identify barriers to discharge w/patient and caregiver  - Arrange for needed discharge resources and transportation as appropriate  - Identify discharge learning needs (meds, wound care, etc )  - Arrange for interpretive services to assist at discharge as needed  - Refer to Case Management Department for coordinating discharge planning if the patient needs post-hospital services based on physician/advanced practitioner order or complex needs related to functional status, cognitive ability, or social support system  Outcome: Progressing     Problem: Ineffective Coping  Goal: Participates in unit activities  Description  Interventions:  - Provide therapeutic environment   - Provide required programming   - Redirect inappropriate behaviors   Outcome: Progressing
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
## 2019-10-02 RX ORDER — QUETIAPINE FUMARATE 300 MG/1
300 TABLET, FILM COATED ORAL
Status: DISCONTINUED | OUTPATIENT
Start: 2019-10-02 | End: 2019-10-04 | Stop reason: HOSPADM

## 2019-10-02 RX ORDER — KETOROLAC TROMETHAMINE 30 MG/ML
30 INJECTION, SOLUTION INTRAMUSCULAR; INTRAVENOUS EVERY 6 HOURS PRN
Status: DISPENSED | OUTPATIENT
Start: 2019-10-02 | End: 2019-10-03

## 2019-10-02 RX ORDER — LANOLIN ALCOHOL/MO/W.PET/CERES
3 CREAM (GRAM) TOPICAL
Status: DISCONTINUED | OUTPATIENT
Start: 2019-10-02 | End: 2019-10-04 | Stop reason: HOSPADM

## 2019-10-02 RX ORDER — KETOROLAC TROMETHAMINE 30 MG/ML
30 INJECTION, SOLUTION INTRAMUSCULAR; INTRAVENOUS EVERY 6 HOURS SCHEDULED
Status: DISCONTINUED | OUTPATIENT
Start: 2019-10-02 | End: 2019-10-02

## 2019-10-02 RX ORDER — DIVALPROEX SODIUM 500 MG/1
500 TABLET, EXTENDED RELEASE ORAL
Status: DISCONTINUED | OUTPATIENT
Start: 2019-10-02 | End: 2019-10-04 | Stop reason: HOSPADM

## 2019-10-02 RX ORDER — MAGNESIUM SULFATE 1 G/100ML
1 INJECTION INTRAVENOUS 2 TIMES DAILY
Status: DISCONTINUED | OUTPATIENT
Start: 2019-10-02 | End: 2019-10-02 | Stop reason: RX

## 2019-10-02 RX ORDER — ACETAMINOPHEN 325 MG/1
975 TABLET ORAL EVERY 6 HOURS PRN
Status: DISCONTINUED | OUTPATIENT
Start: 2019-10-02 | End: 2019-10-04 | Stop reason: HOSPADM

## 2019-10-02 RX ORDER — GABAPENTIN 300 MG/1
300 CAPSULE ORAL 3 TIMES DAILY
Status: DISCONTINUED | OUTPATIENT
Start: 2019-10-02 | End: 2019-10-04 | Stop reason: HOSPADM

## 2019-10-02 RX ADMIN — BUPROPION HYDROCHLORIDE 450 MG: 150 TABLET, EXTENDED RELEASE ORAL at 08:32

## 2019-10-02 RX ADMIN — PANTOPRAZOLE SODIUM 40 MG: 40 TABLET, DELAYED RELEASE ORAL at 06:25

## 2019-10-02 RX ADMIN — DIVALPROEX SODIUM 500 MG: 500 TABLET, EXTENDED RELEASE ORAL at 21:11

## 2019-10-02 RX ADMIN — BACLOFEN 10 MG: 10 TABLET ORAL at 21:11

## 2019-10-02 RX ADMIN — LEVOTHYROXINE SODIUM 50 MCG: 50 TABLET ORAL at 06:25

## 2019-10-02 RX ADMIN — NALTREXONE HYDROCHLORIDE 50 MG: 50 TABLET, FILM COATED ORAL at 08:33

## 2019-10-02 RX ADMIN — MELATONIN TAB 3 MG 3 MG: 3 TAB at 21:11

## 2019-10-02 RX ADMIN — SERTRALINE HYDROCHLORIDE 50 MG: 50 TABLET ORAL at 21:11

## 2019-10-02 RX ADMIN — TIOTROPIUM BROMIDE 18 MCG: 18 CAPSULE ORAL; RESPIRATORY (INHALATION) at 08:31

## 2019-10-02 RX ADMIN — HYDROXYZINE HYDROCHLORIDE 50 MG: 50 TABLET ORAL at 13:57

## 2019-10-02 RX ADMIN — LORATADINE 10 MG: 10 TABLET ORAL at 21:11

## 2019-10-02 RX ADMIN — LORATADINE 10 MG: 10 TABLET ORAL at 08:33

## 2019-10-02 RX ADMIN — FLUTICASONE FUROATE AND VILANTEROL TRIFENATATE 1 PUFF: 200; 25 POWDER RESPIRATORY (INHALATION) at 10:34

## 2019-10-02 RX ADMIN — GABAPENTIN 300 MG: 300 CAPSULE ORAL at 21:11

## 2019-10-02 RX ADMIN — ACETAMINOPHEN 650 MG: 325 TABLET ORAL at 17:14

## 2019-10-02 RX ADMIN — GABAPENTIN 300 MG: 300 CAPSULE ORAL at 17:11

## 2019-10-02 RX ADMIN — KETOTIFEN FUMARATE 1 DROP: 0.35 SOLUTION/ DROPS OPHTHALMIC at 08:32

## 2019-10-02 RX ADMIN — ACETAMINOPHEN 975 MG: 325 TABLET ORAL at 10:34

## 2019-10-02 RX ADMIN — QUETIAPINE FUMARATE 300 MG: 300 TABLET ORAL at 21:11

## 2019-10-02 RX ADMIN — ACETAMINOPHEN 975 MG: 325 TABLET ORAL at 01:28

## 2019-10-02 RX ADMIN — FLUTICASONE PROPIONATE 2 SPRAY: 50 SPRAY, METERED NASAL at 08:32

## 2019-10-02 RX ADMIN — DULOXETINE HYDROCHLORIDE 60 MG: 60 CAPSULE, DELAYED RELEASE ORAL at 08:33

## 2019-10-02 NOTE — PLAN OF CARE
Problem: SELF HARM/SUICIDALITY  Goal: Will have no self-injury during hospital stay  Description  INTERVENTIONS:  - Q 15 MINUTES: Routine safety checks  - Q WAKING SHIFT & PRN: Assess risk to determine if routine checks are adequate to maintain patient safety  - Encourage patient to participate actively in care by formulating a plan to combat response to suicidal ideation, identify supports and resources  Outcome: Progressing     Problem: ANXIETY  Goal: Will report anxiety at manageable levels  Description  INTERVENTIONS:  - Administer medication as ordered  - Teach and encourage coping skills  - Provide emotional support  - Assess patient/family for anxiety and ability to cope  Outcome: Progressing     Problem: DEPRESSION  Goal: Will be euthymic at discharge  Description  INTERVENTIONS:  - Administer medication as ordered  - Provide emotional support via 1:1 interaction with staff  - Encourage involvement in milieu/groups/activities  - Monitor for social isolation  Outcome: Not Progressing

## 2019-10-02 NOTE — PROGRESS NOTES
Patient denies SI/HI, A/V hallucinations  Reports depression 6/10 and anxiety 3/10  Pain 6/10 left shoulder and 3/10 headache  PRN given  Reports didn't want to leave the room "feeling paranoid" with the patient screaming in the hallway  1055 TC to neurology for inpatient consult, Bernard page to Dr Saji Jensen and TC to neurology spoke with Cherie Peña  Will continue to monitor

## 2019-10-02 NOTE — PROGRESS NOTES
Patient approached nurse reporting anxiety  Pan anxiety score 25  PRN med provided  Patient in dayroom watching TV and social with other patients  Pleasant and cooperative  Will continue to monitor

## 2019-10-02 NOTE — PROGRESS NOTES
Patient reported to nurse while discussing neurology consult, she did not overdose on gabapentin  Stated "it was in my system b/c I had taken the scheduled dose that day I overdosed"
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
## 2019-10-02 NOTE — PROGRESS NOTES
10/02/19 1415   Activity/Group Checklist   Group Other (Comment)  (Art Therapy Process Group/Adapting to Change, Discussion)   Attendance Attended   Attendance Duration (min) Greater than 60   Interactions Interacted appropriately   Affect/Mood Appropriate   Goals Achieved Able to listen to others; Able to engage in interactions; Able to recieve feedback; Able to give feedback to another  (Able to engage materials and directive; gained insight)     Patient able to gain insight through creative process regarding her fears of failure and lack of belief in oneself  Patient remains open to challenges and alternative interventions

## 2019-10-02 NOTE — PROGRESS NOTES
Pt complaining of nausea and received PRN Zofran  PRN effective pt sleeping in bed comfortably  Will monitor

## 2019-10-02 NOTE — PROGRESS NOTES
[facility] GROUP NOTE     10/02/19 1000   Activity/Group Checklist   Group Life Skills  (goals, resistance to change)   Attendance Attended   Attendance Duration (min) 31-45   Interactions Interacted appropriately   Affect/Mood Appropriate   Goals Achieved Able to listen to others; Able to engage in interactions   Objectives/Key Points:  Living intentionally  Goal Setting  Thought for the Day  Self Check In

## 2019-10-02 NOTE — PROGRESS NOTES
10/02/19 0800   Team Meeting   Meeting Type Daily Rounds   Team Members Present   Team Members Present Physician;Nurse;;; Other (Discipline and Name)   Physician Team Member [address] Team Member Gerardo   Care Management Team Member Matthew   Social Work Team Member Ally Boudreaux   Other (Discipline and Name) MD student Selene Xavier   Patient/Family Present   Patient Present No   Patient's Family Present No   Medications adjustments still being made  Possible discharge on Friday  Family meeting tomorrow at 1 p m with

## 2019-10-02 NOTE — PROGRESS NOTES
Progress Note - [address] [facility] 53 y o  female MRN: [id number]   Unit/Bed#: -01 Encounter: [id number]    Behavior over the last 24 hours: connor Landeros states still she feels depressed and anxious today, rates mood as 6 on a scale of 1 (best mood) to 10 (worst mood)  She is upset about psychotic peer on the unit and is afraid that peer "would come out from nowhere"  Seems distressed, blunted affect  Compliant with medications  Attends some groups  Sleep: decreased, slept 2 hours  Appetite: normal  Medication side effects: No   ROS: reports headache and shoulder pain, denies any shortness of breath or chest pain    Mental Status Evaluation:    Appearance:  casually dressed   Behavior:  cooperative, restless   Speech:  soft   Mood:  depressed, anxious   Affect:  blunted   Thought Process:  organized, goal directed   Associations: intact associations   Thought Content:  no overt delusions, obsessive thoughts   Perceptual Disturbances: no auditory hallucinations, no visual hallucinations   Risk Potential: Suicidal ideation - None at present, self abusive behavior (picking on skin)  Homicidal ideation - None  Potential for aggression - No   Sensorium:  oriented to person, place and time/date   Memory:  recent and remote memory grossly intact   Consciousness:  alert and awake   Attention: decreased concentration and decreased attention span   Insight:  impaired   Judgment: impaired   Gait/Station: normal gait/station, normal balance   Motor Activity: no abnormal movements     Vital signs in last 24 hours:    Temp:  [97 °F (36 1 °C)] 97 °F (36 1 °C)  HR:  [93] 93  Resp:  [16] 16  BP: (104)/(73) 104/73    Laboratory results: I have personally reviewed all pertinent laboratory/tests results      Progress Toward Goals: no significant progress, still anxious, continues to feel depressed, still picks on skin frequently    Assessment/Plan   Principal Problem:    Major depressive disorder, recurrent severe without psychotic features ([place] [address] )  Active Problems:    KYLE (generalized anxiety disorder)    Panic disorder without agoraphobia    Obsessive-compulsive disorder, unspecified    Other insomnia    Personality disorder ([place] [address] )    Chronic migraine without aura    Chronic left shoulder pain    Intentional drug overdose ([place] [address] )    Hypothyroidism (acquired)    Recommended Treatment:     Planned medication and treatment changes:     All current active medications have been reviewed  Encourage group therapy, milieu therapy and occupational therapy  Behavioral Health checks every 7 minutes  Family meeting with  pending tomorrow  Consult Neurology due to ongoing migraine headaches  Increase Cymbalta to 90 mg daily to help with depressive symptoms  Increase Seroquel to 300 mg at bedtime  Decrease Zoloft to 50 mg at bedtime and taper off  Start Melatonin 3 mg at bedtime to help with sleep    Continue all other medications:    Current Facility-Administered Medications:  acetaminophen 650 mg Oral Q6H PRN Marianna Sawyer MD   acetaminophen 975 mg Oral Q6H PRN Marianna Sawyer MD   albuterol 2 puff Inhalation Q4H PRN Erick Barros MD   albuterol 2 5 mg Nebulization Q4H PRN Erick Barros MD   aluminum-magnesium hydroxide-simethicone 30 mL Oral Q4H PRN Marianna Sawyer MD   baclofen 10 mg Oral HS Erick Barros MD   benztropine 1 mg Intramuscular Q6H PRN Erick Barros MD   benztropine 1 mg Oral Q6H PRN Erick Barros MD   buPROPion 450 mg Oral Daily Erick Barros MD   [START ON 10/3/2019] DULoxetine 90 mg Oral Daily Erick Barros MD   ergocalciferol 50,000 Units Oral Weekly Erick Barros MD   fluticasone 2 spray Each Nare Daily Erick Barros MD   fluticasone-vilanterol 1 puff Inhalation Daily Marianna Sawyer MD   haloperidol 5 mg Oral Q8H PRN Marianna Sawyer MD   haloperidol lactate 5 mg Intramuscular Q6H PRN Marianna Sawyer MD   hydrOXYzine HCL 25 mg Oral Q6H PRN Gilda Barajas MD   hydrOXYzine HCL 50 mg Oral Q6H PRN Norma Fair MD   hydrOXYzine HCL 75 mg Oral Q6H PRN Norma Fair MD   ibuprofen 600 mg Oral Q8H PRN Norma Fair MD   ketorolac 30 mg Intravenous Q6H Paul Tan MD   ketotifen 1 drop Both Eyes BID Norma Fair MD   lamoTRIgine 25 mg Oral HS Norma Fair MD   levothyroxine 50 mcg Oral Early Morning Gilda Barajas MD   loratadine 10 mg Oral BID Norma Fair MD   LORazepam 1 mg Intramuscular Q6H PRN Norma Fair MD   magnesium hydroxide 30 mL Oral Daily PRN Gilda Barajas MD   magnesium sulfate 1 g Intravenous BID Himanshu Castillo MD   melatonin 3 mg Oral HS Norma Fair MD   naltrexone 50 mg Oral Daily Gilda Barajas MD   OLANZapine 10 mg Intramuscular Q3H PRN Gilda Barajas MD   OLANZapine 10 mg Oral Q3H PRN Gilda Barajas MD   ondansetron 4 mg Oral Q6H PRN Norma Fair MD   pantoprazole 40 mg Oral Early Morning Gilda Barajas MD   phenazopyridine 100 mg Oral TID PRN Gilda Barajas MD   QUEtiapine 300 mg Oral HS Norma Fair MD   risperiDONE 1 mg Oral Q3H PRN Gilda Barajas MD   sertraline 50 mg Oral HS Norma Fair MD   tiotropium 18 mcg Inhalation Daily Gilda Barajas MD   traZODone 50 mg Oral HS PRN Gilda Barajas MD   valproate sodium 1,000 mg Intravenous HS Himanshu Castillo MD       Risks / Benefits of Treatment:    Risks, benefits, and possible side effects of medications explained to patient and patient verbalizes understanding and agreement for treatment  Risks of medications in pregnancy explained if female patient  Patient verbalizes understanding and agrees to notify her doctor if she becomes pregnant  Counseling / Coordination of Care:    Patient's progress discussed with staff in treatment team meeting  Medications, treatment progress and treatment plan reviewed with patient  Medication changes discussed with patient      FAUSTINO Oj Doshi MD 10/02/19

## 2019-10-02 NOTE — PROGRESS NOTES
10/01/19 1415   Activity/Group Checklist   Group Other (Comment)  (Art Therapy Process Group/Exploring Expression, Discussion)   Attendance Attended   Attendance Duration (min) Greater than 60   Interactions Interacted appropriately   Affect/Mood Appropriate   Goals Achieved Identified feelings; Discussed coping strategies; Increased hopefulness; Able to listen to others; Able to engage in interactions; Able to reflect/comment on own behavior;Able to manage/cope with feelings; Able to recieve feedback; Able to give feedback to another  (Able to engage materials; full participation/gained insight)
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
## 2019-10-02 NOTE — PROGRESS NOTES
10/02/19 [id number] [facility] Neurosurgery  (Neurology)   Provider Name Dr Danya Coronado   Multi-disciplinary Rounds   Diagnosis  Reviewed   Neurology provider above TC the unit and spoke with this nurse  Had this nurse ask the patient if the headaches are worse than baseline  Patient was unable to follow up with outpatient botox injection recommendations when being followed by Neurology  Provider consult unable to be done today and tomorrow the neurology team will come to Unit  Tiger Text provider about IV orders unable to be done on Unit  Also reason patient was discontinued from gabepentin was due to overdosing with this medication
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
## 2019-10-02 NOTE — QUICK NOTE
Patient's chart reviewed remotely  Patient states her HA is worse than baseline  She had last seen neurology service as an outpt on 8/9/19  She has had some relief with botox and aimovig and briefly didn't have insurance which she now has  hppefully she will be able to resume these soon  Plan is to d/c the lamictal 25 mg po qhs and start depakoate  mg po qhs  We will restart the gabapentin 300 mg po tid which was held on admission out of concern for possible overdose  Unfortunately she cannot receive mag sulfate as IV not allowed on the floors from safety standpoint  The gabapentin can be further uptitrated if needed tomorrow  Patient has not had benefit from toradol in the past thus not iniitated  Neurology team will perform consultation tomorrow and help with aim of discharge by Friday

## 2019-10-03 RX ADMIN — KETOTIFEN FUMARATE 1 DROP: 0.35 SOLUTION/ DROPS OPHTHALMIC at 08:18

## 2019-10-03 RX ADMIN — FLUTICASONE PROPIONATE 2 SPRAY: 50 SPRAY, METERED NASAL at 08:18

## 2019-10-03 RX ADMIN — QUETIAPINE FUMARATE 300 MG: 300 TABLET ORAL at 21:08

## 2019-10-03 RX ADMIN — FLUTICASONE FUROATE AND VILANTEROL TRIFENATATE 1 PUFF: 200; 25 POWDER RESPIRATORY (INHALATION) at 08:18

## 2019-10-03 RX ADMIN — PANTOPRAZOLE SODIUM 40 MG: 40 TABLET, DELAYED RELEASE ORAL at 05:15

## 2019-10-03 RX ADMIN — DIVALPROEX SODIUM 500 MG: 500 TABLET, EXTENDED RELEASE ORAL at 21:09

## 2019-10-03 RX ADMIN — LEVOTHYROXINE SODIUM 50 MCG: 50 TABLET ORAL at 05:15

## 2019-10-03 RX ADMIN — LORATADINE 10 MG: 10 TABLET ORAL at 21:09

## 2019-10-03 RX ADMIN — GABAPENTIN 300 MG: 300 CAPSULE ORAL at 08:18

## 2019-10-03 RX ADMIN — GABAPENTIN 300 MG: 300 CAPSULE ORAL at 16:16

## 2019-10-03 RX ADMIN — NALTREXONE HYDROCHLORIDE 50 MG: 50 TABLET, FILM COATED ORAL at 08:18

## 2019-10-03 RX ADMIN — GABAPENTIN 300 MG: 300 CAPSULE ORAL at 21:09

## 2019-10-03 RX ADMIN — TIOTROPIUM BROMIDE 18 MCG: 18 CAPSULE ORAL; RESPIRATORY (INHALATION) at 08:18

## 2019-10-03 RX ADMIN — HYDROXYZINE HYDROCHLORIDE 50 MG: 50 TABLET ORAL at 16:16

## 2019-10-03 RX ADMIN — BACLOFEN 10 MG: 10 TABLET ORAL at 21:09

## 2019-10-03 RX ADMIN — ACETAMINOPHEN 975 MG: 325 TABLET ORAL at 16:16

## 2019-10-03 RX ADMIN — DULOXETINE HYDROCHLORIDE 90 MG: 60 CAPSULE, DELAYED RELEASE ORAL at 08:42

## 2019-10-03 RX ADMIN — BUPROPION HYDROCHLORIDE 450 MG: 150 TABLET, EXTENDED RELEASE ORAL at 08:18

## 2019-10-03 RX ADMIN — LORATADINE 10 MG: 10 TABLET ORAL at 08:18

## 2019-10-03 RX ADMIN — ACETAMINOPHEN 975 MG: 325 TABLET ORAL at 08:19

## 2019-10-03 RX ADMIN — MELATONIN TAB 3 MG 3 MG: 3 TAB at 21:08

## 2019-10-03 NOTE — PROGRESS NOTES
Pt denies all symptoms  Pt calm and cooperative, about the unit and social with peers  Pt complaining of 8/10 left shoulder pain and given PRN tylenol which was effective  Pt complaining of anxiety HAM scale score 18 and given atarax that was effective  Compliant with medications  Will monitor

## 2019-10-03 NOTE — PROGRESS NOTES
10/03/19 1415   Activity/Group Checklist   Group Other (Comment)  (Art Therapy Process Group/Therapy [place], Open Discussion)   Attendance Attended   Attendance Duration (min) Greater than 60   Interactions Interacted appropriately   Affect/Mood Appropriate   Goals Achieved Identified feelings; Discussed coping strategies; Discussed discharge plans; Discussed self-esteem issues; Increased hopefulness; Displayed empathy;Able to listen to others; Able to engage in interactions; Able to reflect/comment on own behavior;Able to manage/cope with feelings; Able to recieve feedback; Able to give feedback to another;Able to self-disclose; Able to experience relief/decrease in symptoms  (Able to engage materials and directive; full participation)
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
## 2019-10-03 NOTE — PROGRESS NOTES
10/03/19 0800   Team Meeting   Meeting Type Daily Rounds   Team Members Present   Team Members Present Physician;Nurse;;; Other (Discipline and Name)   Physician Team Member Darien Rubi Team Member [facility]   Care Management Team Member Matthew   Social Work Team Member Donavan Bergeron   Other (Discipline and Name) MD Kayden Wetzel   Patient/Family Present   Patient Present No   Patient's Family Present No     Family meeting today at 1:00 PM, pt wishes to be discharged by Friday which depends on results of family meeting

## 2019-10-03 NOTE — PROGRESS NOTES
Patient about the unit  Denies SI and continues to report decreased depression and anxiety  Patient is hopeful for discharge tomorrow back to her home
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
## 2019-10-03 NOTE — PLAN OF CARE
Problem: SELF HARM/SUICIDALITY  Goal: Will have no self-injury during hospital stay  Description  INTERVENTIONS:  - Q 15 MINUTES: Routine safety checks  - Q WAKING SHIFT & PRN: Assess risk to determine if routine checks are adequate to maintain patient safety  - Encourage patient to participate actively in care by formulating a plan to combat response to suicidal ideation, identify supports and resources  Outcome: Adequate for Discharge     Problem: DEPRESSION  Goal: Will be euthymic at discharge  Description  INTERVENTIONS:  - Administer medication as ordered  - Provide emotional support via 1:1 interaction with staff  - Encourage involvement in milieu/groups/activities  - Monitor for social isolation  Outcome: Adequate for Discharge     Problem: ANXIETY  Goal: Will report anxiety at manageable levels  Description  INTERVENTIONS:  - Administer medication as ordered  - Teach and encourage coping skills  - Provide emotional support  - Assess patient/family for anxiety and ability to cope  Outcome: Adequate for Discharge     Problem: DISCHARGE PLANNING  Goal: Discharge to home or other facility with appropriate resources  Description  INTERVENTIONS:  - Identify barriers to discharge w/patient and caregiver  - Arrange for needed discharge resources and transportation as appropriate  - Identify discharge learning needs (meds, wound care, etc )  - Arrange for interpretive services to assist at discharge as needed  - Refer to Case Management Department for coordinating discharge planning if the patient needs post-hospital services based on physician/advanced practitioner order or complex needs related to functional status, cognitive ability, or social support system  Outcome: Adequate for Discharge     Problem: Ineffective Coping  Goal: Participates in unit activities  Description  Interventions:  - Provide therapeutic environment   - Provide required programming   - Redirect inappropriate behaviors   Outcome: Adequate for Discharge

## 2019-10-03 NOTE — TELEPHONE ENCOUNTER
Hello,  I reviewed the notes just now, I was asked briefly two days ago by MA, if I can provide medication when in patient, and stated no as the in patient doctors typically take care of this so if any a/e they would be the ones present to immediately address it  Yes patient can take sumatriptan as long as no significant side effects and this helps her  Sumatriptan PO- 100 mg once at onset of migraine, can repeat once in 2 hours  Max 2 tabs in 24 hours  Max 2 days a week  If PO not effective  Can take Sumatriptan 6 mg SC and can repeat once in 24 hours  Max 2 days a week    If zomig more effective for patient then: 5 mg tab  TAKE 1 TABLET BY MOUTH AT ONSET OF HEADACHE, MAY REPEAT AFTER 2 HOURS AS NEEDED  MAX 2 TABLETS per 24 hours      She should not be combining 2 different triptans however at the same time    [facility] this helps  David

## 2019-10-03 NOTE — PROGRESS NOTES
Progress Note - [address] [facility] 53 y o  female MRN: [id number]   Unit/Bed#: U 343-01 Encounter: [id number]    Behavior over the last 24 hours: improving  Amrita Hebert is doing much better, feels less depressed and rates mood as 4 on a scale of 1 (best mood) to 10 (worst mood)  She still has anxiety symptoms today; denies suicidal thoughts  She has not been picking on her fingers today and has been using rubber band at times to distract herself from self-abusive thoughts  Compliant with medications  Attends group therapy  Socializes more with peers  Sleep: slept better  Appetite: normal  Medication side effects: No   ROS: reports headache and shoulder pain, denies any shortness of breath or chest pain    Mental Status Evaluation:    Appearance:  casually dressed   Behavior:  cooperative   Speech:  soft   Mood:  anxious, less depressed   Affect:  slightly brighter   Thought Process:  organized, goal directed   Associations: intact associations   Thought Content:  no overt delusions, less obsessive   Perceptual Disturbances: no auditory hallucinations, no visual hallucinations   Risk Potential: Suicidal ideation - None at present  Homicidal ideation - None  Potential for aggression - No   Sensorium:  oriented to person, place and time/date   Memory:  recent and remote memory grossly intact   Consciousness:  alert and awake   Attention: attention span and concentration appear shorter than expected for age   Insight:  improving and moderate   Judgment: improving and moderate   Gait/Station: normal gait/station, normal balance   Motor Activity: no abnormal movements     Vital signs in last 24 hours:    Temp:  [98 °F (36 7 °C)] 98 °F (36 7 °C)  HR:  [76-88] 76  Resp:  [16] 16  BP: (105-109)/(62) 109/62    Laboratory results: I have personally reviewed all pertinent laboratory/tests results      Progress Toward Goals: progressing, still anxious, less depressed, not suicidal, working on coping skills    Assessment/Plan   Principal Problem:    Major depressive disorder, recurrent severe without psychotic features ([facility] )  Active Problems:    KYLE (generalized anxiety disorder)    Panic disorder without agoraphobia    Obsessive-compulsive disorder, unspecified    Other insomnia    Personality disorder (HCC)    Chronic migraine without aura    Chronic left shoulder pain    Intentional drug overdose ([facility] )    Hypothyroidism (acquired)    Recommended Treatment:     Planned medication and treatment changes: All current active medications have been reviewed  Encourage group therapy, milieu therapy and occupational therapy  Behavioral Health checks every 7 minutes  Neurology consult pending  Family meeting with  pending today  Possible discharge tomorrow if meeting goes well  She was starter on Depakote  mg at bedtime by Neurology service to help with headache  Depakote ER will also help with mood and impulsivity   Lamictal was discontinued to avoid polypharmacy  Neurontin 300 mg tid was also restarted by Neurology   Discontinue Zoloft    Continue all other medications:    Current Facility-Administered Medications:  acetaminophen 650 mg Oral Q6H PRN Damian Magallon MD   acetaminophen 975 mg Oral Q6H PRN Ayanna Powers MD   albuterol 2 puff Inhalation Q4H PRN Sravanthi Tan MD   albuterol 2 5 mg Nebulization Q4H PRN Sravanthi Tan MD   aluminum-magnesium hydroxide-simethicone 30 mL Oral Q4H PRN Damian Magallon MD   baclofen 10 mg Oral HS Sravanthi Tan MD   benztropine 1 mg Intramuscular Q6H PRN Sravanthi Tan MD   benztropine 1 mg Oral Q6H PRN Sravanthi Tan MD   buPROPion 450 mg Oral Daily Sravanthi Tan MD   divalproex sodium 500 mg Oral HS Ayanna Powers MD   DULoxetine 90 mg Oral Daily Sravanthi Tan MD   ergocalciferol 50,000 Units Oral Weekly Sravanthi Tan MD   fluticasone 2 spray Each Nare Daily Sravanthi Tan MD   fluticasone-vilanterol 1 puff Inhalation Daily Pb Solis MD   gabapentin 300 mg Oral TID Fredrick Brandon MD   haloperidol 5 mg Oral Q8H PRN Pb Solis MD   haloperidol lactate 5 mg Intramuscular Q6H PRN Pb Solis MD   hydrOXYzine HCL 25 mg Oral Q6H PRN Pb Solis MD   hydrOXYzine HCL 50 mg Oral Q6H PRN Elton Sheehan MD   hydrOXYzine HCL 75 mg Oral Q6H PRN Elton Sheehan MD   ibuprofen 600 mg Oral Q8H PRN Elton Sheehan MD   ketorolac 30 mg Intramuscular Q6H PRN Fredrick Brandon MD   ketotifen 1 drop Both Eyes BID Elton Sheehan MD   levothyroxine 50 mcg Oral Early Morning Pb Solis MD   loratadine 10 mg Oral BID Elton Sheehan MD   LORazepam 1 mg Intramuscular Q6H PRN Elton Sheehan MD   magnesium hydroxide 30 mL Oral Daily PRN Pb Solis MD   melatonin 3 mg Oral HS Elton Shehean MD   naltrexone 50 mg Oral Daily Pb Solis MD   OLANZapine 10 mg Intramuscular Q3H PRN Pb Solis MD   OLANZapine 10 mg Oral Q3H PRN Pb Solis MD   ondansetron 4 mg Oral Q6H PRN Elton Sheehan MD   pantoprazole 40 mg Oral Early Morning Pb Solis MD   phenazopyridine 100 mg Oral TID PRN Pb Solis MD   QUEtiapine 300 mg Oral HS Elton Sheehan MD   risperiDONE 1 mg Oral Q3H PRN Pb Solis MD   tiotropium 18 mcg Inhalation Daily Pb Solis MD   traZODone 50 mg Oral HS PRN Pb Solis MD       Risks / Benefits of Treatment:    Risks, benefits, and possible side effects of medications explained to patient and patient verbalizes understanding and agreement for treatment  Risks of medications in pregnancy explained if female patient  Patient verbalizes understanding and agrees to notify her doctor if she becomes pregnant  Counseling / Coordination of Care:    Patient's progress discussed with staff in treatment team meeting  Medications, treatment progress and treatment plan reviewed with patient    Discharge plan discussed with patient      Gina La MD 10/03/19

## 2019-10-03 NOTE — QUICK NOTE
Progress Note - [address] [address] [address] 53 y o  female MRN: [id number]  Unit/Bed#: U 343-01 Encounter: [id number]    Assessment/Plan   Principal Problem:    Major depressive disorder, recurrent severe without psychotic features ([facility] )  Active Problems:    Chronic migraine without aura    KYLE (generalized anxiety disorder)    Panic disorder without agoraphobia    Other insomnia    Obsessive-compulsive disorder, unspecified    Chronic left shoulder pain    Intentional drug overdose ([facility] )    Hypothyroidism (acquired)    Personality disorder (HCC)      Behavior over the last 24 hours:  Improved    Patient states that she is feeling much better in regards to her anxiety and depression, which she rates as both 3-4/10  She reports that she was finally able to sleep through the night  She reports some anxiety after receving news from her ex- about an emergency custody hearing for her 16year old daughter that she was unable to attend  However, she thinks she is dealing with stressors better and is not picking at her fingers (using her rubber band more)  She attends group therapy and is social  Compliant with medications and optimistic about discharge 10/4      Patient will have family meeting today 10/3 and try to contact [facility] again for a therapist      Sleep: improved, able to sleep through the night 8 hours  Appetite: normal  Medication side effects: No  ROS: no complaints    Mental Status Evaluation:  Appearance:  casually dressed   Behavior:  cooperative, fair eye contact, less fidgety   Speech:  soft and normal rate   Mood:  anxious and less depressed   Affect:  slightly brighter    Thought Process:  concrete and goal directed   Thought Content:  no overt delusions   Perceptual Disturbances: no auditory hallucinations, no visual hallucinations   Risk Potential: Suicidal Ideations none, Homicidal Ideations none and Potential for Aggression No   Sensorium:  person, place and time/date Cognition:  grossly intact   Consciousness:  alert and awake    Attention: attention span and concentration were age appropriate   Insight:  fair and improving   Judgment: fair and improving   Gait/Station: normal gait/station   Motor Activity: no abnormal movements     Progress Toward Goals: significant improvement, less depressed, slightly anxious  Not suicidal and improving with self-abusive behaviors (working on coping skills)    Recommended Treatment:   - Overall: significant improvement  - Started on Depakote  mg at bedtime for headache, mood and impulsivity as per Neurology consult (increased migraines)  - Check Depakote levels, CMP and CBC tomorrow  - Discontinue Lamictal due to starting Depakote (avoid polypharmacy)  - Restart Neurontin 300 mg tid per Neurology  - Discontinue Zoloft   - Continue current psychiatric medication regimen   - Family meeting scheduled for later today 10/3  - Follow up with patient contacting [address] [place] [address] to discharge 10/4  - Continue with group therapy, milieu therapy and occupational therapy  Risks, benefits and possible side effects of Medications:   Risks, benefits, and possible side effects of medications explained to patient and patient verbalizes understanding        Medications:   all current active meds have been reviewed and current meds:   Current Facility-Administered Medications   Medication Dose Route Frequency    acetaminophen (TYLENOL) tablet 650 mg  650 mg Oral Q6H PRN    acetaminophen (TYLENOL) tablet 975 mg  975 mg Oral Q6H PRN    albuterol (PROVENTIL HFA,VENTOLIN HFA) inhaler 2 puff  2 puff Inhalation Q4H PRN    albuterol inhalation solution 2 5 mg  2 5 mg Nebulization Q4H PRN    aluminum-magnesium hydroxide-simethicone (MYLANTA) 200-200-20 mg/5 mL oral suspension 30 mL  30 mL Oral Q4H PRN    baclofen tablet 10 mg  10 mg Oral HS    benztropine (COGENTIN) injection 1 mg  1 mg Intramuscular Q6H PRN    benztropine (COGENTIN) tablet 1 mg  1 mg Oral Q6H PRN    buPROPion (WELLBUTRIN XL) 24 hr tablet 450 mg  450 mg Oral Daily    divalproex sodium (DEPAKOTE ER) 24 hr tablet 500 mg  500 mg Oral HS    DULoxetine (CYMBALTA) delayed release capsule 90 mg  90 mg Oral Daily    ergocalciferol (VITAMIN D2) capsule 50,000 Units  50,000 Units Oral Weekly    fluticasone (FLONASE) 50 mcg/act nasal spray 2 spray  2 spray Each Nare Daily    fluticasone-vilanterol (BREO ELLIPTA) 200-25 MCG/INH inhaler 1 puff  1 puff Inhalation Daily    gabapentin (NEURONTIN) capsule 300 mg  300 mg Oral TID    haloperidol (HALDOL) tablet 5 mg  5 mg Oral Q8H PRN    haloperidol lactate (HALDOL) injection 5 mg  5 mg Intramuscular Q6H PRN    hydrOXYzine HCL (ATARAX) tablet 25 mg  25 mg Oral Q6H PRN    hydrOXYzine HCL (ATARAX) tablet 50 mg  50 mg Oral Q6H PRN    hydrOXYzine HCL (ATARAX) tablet 75 mg  75 mg Oral Q6H PRN    ibuprofen (MOTRIN) tablet 600 mg  600 mg Oral Q8H PRN    ketotifen (ZADITOR) 0 025 % ophthalmic solution 1 drop  1 drop Both Eyes BID    levothyroxine tablet 50 mcg  50 mcg Oral Early Morning    loratadine (CLARITIN) tablet 10 mg  10 mg Oral BID    LORazepam (ATIVAN) 2 mg/mL injection 1 mg  1 mg Intramuscular Q6H PRN    magnesium hydroxide (MILK OF MAGNESIA) 400 mg/5 mL oral suspension 30 mL  30 mL Oral Daily PRN    melatonin tablet 3 mg  3 mg Oral HS    naltrexone (REVIA) tablet 50 mg  50 mg Oral Daily    OLANZapine (ZyPREXA) IM injection 10 mg  10 mg Intramuscular Q3H PRN    OLANZapine (ZyPREXA) tablet 10 mg  10 mg Oral Q3H PRN    ondansetron (ZOFRAN-ODT) dispersible tablet 4 mg  4 mg Oral Q6H PRN    pantoprazole (PROTONIX) EC tablet 40 mg  40 mg Oral Early Morning    phenazopyridine (PYRIDIUM) tablet 100 mg  100 mg Oral TID PRN    QUEtiapine (SEROquel) tablet 300 mg  300 mg Oral HS    risperiDONE (RisperDAL M-TABS) dispersible tablet 1 mg  1 mg Oral Q3H PRN    tiotropium (SPIRIVA) capsule for inhaler 18 mcg  18 mcg Inhalation Daily    traZODone (DESYREL) tablet 50 mg  50 mg Oral HS PRN     Labs: valproic acid level, CMP, CBC pending 10/4    Counseling / Coordination of Care  Total floor / unit time spent today 20 minutes  Greater than 50% of total time was spent with the patient and / or family counseling and / or coordination of care
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
## 2019-10-03 NOTE — TELEPHONE ENCOUNTER
Pt called in providing clarification  She is inpatient  in the psych unit  The physicians there are asking if okay to give pt sumatriptan since this is what they have in stock  Pt was getting zolmitriptan from her own supply but she is now out of med  They do not carry zolmitriptan  Okay for pt to have sumatriptan inpatient? Please advise on dose as well  Do not need script  Will then call psych unit at [place]

## 2019-10-03 NOTE — PROGRESS NOTES
[facility] GROUP NOTE     10/03/19 1100   Activity/Group Checklist   Group Self Esteem   Attendance Attended   Attendance Duration (min) 46-60   Interactions Interacted appropriately   Affect/Mood Appropriate   Goals Achieved Identified feelings; Discussed self-esteem issues; Able to listen to others; Able to engage in interactions; Able to self-disclose; Able to recieve feedback   Objectives/Key Points  Define Self Esteem (positive/negative)  Raise awareness of personal strengths  Define ways to bolster positive self esteem  Understand factors playing into negative self esteem

## 2019-10-04 VITALS
TEMPERATURE: 98.7 F | BODY MASS INDEX: 25.34 KG/M2 | HEART RATE: 89 BPM | WEIGHT: 143 LBS | SYSTOLIC BLOOD PRESSURE: 109 MMHG | DIASTOLIC BLOOD PRESSURE: 64 MMHG | RESPIRATION RATE: 18 BRPM | OXYGEN SATURATION: 97 % | HEIGHT: 63 IN

## 2019-10-04 DIAGNOSIS — G43.709 CHRONIC MIGRAINE WITHOUT AURA WITHOUT STATUS MIGRAINOSUS, NOT INTRACTABLE: ICD-10-CM

## 2019-10-04 PROBLEM — T50.902A INTENTIONAL DRUG OVERDOSE (HCC): Status: RESOLVED | Noted: 2019-09-20 | Resolved: 2019-10-04

## 2019-10-04 LAB
ALBUMIN SERPL BCP-MCNC: 3.6 G/DL (ref 3–5.2)
ALP SERPL-CCNC: 48 U/L (ref 43–122)
ALT SERPL W P-5'-P-CCNC: 27 U/L (ref 9–52)
ANION GAP SERPL CALCULATED.3IONS-SCNC: 5 MMOL/L (ref 5–14)
AST SERPL W P-5'-P-CCNC: 19 U/L (ref 14–36)
BASOPHILS # BLD AUTO: 0 THOUSANDS/ΜL (ref 0–0.1)
BASOPHILS NFR BLD AUTO: 0 % (ref 0–1)
BILIRUB SERPL-MCNC: 0.2 MG/DL
BUN SERPL-MCNC: 20 MG/DL (ref 5–25)
CALCIUM SERPL-MCNC: 8.8 MG/DL (ref 8.4–10.2)
CHLORIDE SERPL-SCNC: 105 MMOL/L (ref 97–108)
CO2 SERPL-SCNC: 31 MMOL/L (ref 22–30)
CREAT SERPL-MCNC: 1.05 MG/DL (ref 0.6–1.2)
EOSINOPHIL # BLD AUTO: 0.1 THOUSAND/ΜL (ref 0–0.4)
EOSINOPHIL NFR BLD AUTO: 1 % (ref 0–6)
ERYTHROCYTE [DISTWIDTH] IN BLOOD BY AUTOMATED COUNT: 12.5 %
GFR SERPL CREATININE-BSD FRML MDRD: 63 ML/MIN/1.73SQ M
GLUCOSE P FAST SERPL-MCNC: 88 MG/DL (ref 70–99)
GLUCOSE SERPL-MCNC: 88 MG/DL (ref 70–99)
HCT VFR BLD AUTO: 32.1 % (ref 36–46)
HGB BLD-MCNC: 11.1 G/DL (ref 12–16)
LYMPHOCYTES # BLD AUTO: 2 THOUSANDS/ΜL (ref 0.5–4)
LYMPHOCYTES NFR BLD AUTO: 37 % (ref 25–45)
MCH RBC QN AUTO: 30.8 PG (ref 26–34)
MCHC RBC AUTO-ENTMCNC: 34.7 G/DL (ref 31–36)
MCV RBC AUTO: 89 FL (ref 80–100)
MONOCYTES # BLD AUTO: 0.6 THOUSAND/ΜL (ref 0.2–0.9)
MONOCYTES NFR BLD AUTO: 11 % (ref 1–10)
NEUTROPHILS # BLD AUTO: 2.8 THOUSANDS/ΜL (ref 1.8–7.8)
NEUTS SEG NFR BLD AUTO: 51 % (ref 45–65)
PLATELET # BLD AUTO: 188 THOUSANDS/UL (ref 150–450)
PMV BLD AUTO: 8.4 FL (ref 8.9–12.7)
POTASSIUM SERPL-SCNC: 4.1 MMOL/L (ref 3.6–5)
PROT SERPL-MCNC: 5.9 G/DL (ref 5.9–8.4)
RBC # BLD AUTO: 3.61 MILLION/UL (ref 4–5.2)
SODIUM SERPL-SCNC: 141 MMOL/L (ref 137–147)
VALPROATE SERPL-MCNC: 43.2 UG/ML (ref 50–120)
WBC # BLD AUTO: 5.6 THOUSAND/UL (ref 4.5–11)

## 2019-10-04 PROCEDURE — 80164 ASSAY DIPROPYLACETIC ACD TOT: CPT | Performed by: PSYCHIATRY & NEUROLOGY

## 2019-10-04 PROCEDURE — 85025 COMPLETE CBC W/AUTO DIFF WBC: CPT | Performed by: PSYCHIATRY & NEUROLOGY

## 2019-10-04 PROCEDURE — 80053 COMPREHEN METABOLIC PANEL: CPT | Performed by: PSYCHIATRY & NEUROLOGY

## 2019-10-04 RX ORDER — GABAPENTIN 300 MG/1
300 CAPSULE ORAL 3 TIMES DAILY
Qty: 90 CAPSULE | Refills: 0 | Status: SHIPPED | OUTPATIENT
Start: 2019-10-04 | End: 2020-03-19 | Stop reason: SDUPTHER

## 2019-10-04 RX ORDER — DULOXETIN HYDROCHLORIDE 30 MG/1
90 CAPSULE, DELAYED RELEASE ORAL DAILY
Qty: 90 CAPSULE | Refills: 0 | Status: SHIPPED | OUTPATIENT
Start: 2019-10-05 | End: 2019-10-14 | Stop reason: SDUPTHER

## 2019-10-04 RX ORDER — QUETIAPINE FUMARATE 300 MG/1
300 TABLET, FILM COATED ORAL
Qty: 30 TABLET | Refills: 0 | Status: SHIPPED | OUTPATIENT
Start: 2019-10-04 | End: 2019-10-14 | Stop reason: SDUPTHER

## 2019-10-04 RX ORDER — METHYLPREDNISOLONE 4 MG/1
TABLET ORAL
Qty: 1 EACH | Refills: 0 | Status: SHIPPED | OUTPATIENT
Start: 2019-10-04 | End: 2019-10-14 | Stop reason: ALTCHOICE

## 2019-10-04 RX ORDER — DIVALPROEX SODIUM 500 MG/1
500 TABLET, EXTENDED RELEASE ORAL
Qty: 30 TABLET | Refills: 0 | Status: SHIPPED | OUTPATIENT
Start: 2019-10-04 | End: 2019-10-14 | Stop reason: SDUPTHER

## 2019-10-04 RX ORDER — LEVOTHYROXINE SODIUM 0.05 MG/1
50 TABLET ORAL
Refills: 0
Start: 2019-10-05 | End: 2021-05-19 | Stop reason: SDUPTHER

## 2019-10-04 RX ORDER — LANOLIN ALCOHOL/MO/W.PET/CERES
3 CREAM (GRAM) TOPICAL
Qty: 30 TABLET | Refills: 0 | Status: SHIPPED | OUTPATIENT
Start: 2019-10-04 | End: 2019-11-03

## 2019-10-04 RX ORDER — ZOLMITRIPTAN 5 MG/1
TABLET, ORALLY DISINTEGRATING ORAL
Qty: 12 TABLET | Refills: 3 | Status: SHIPPED | OUTPATIENT
Start: 2019-10-04 | End: 2020-11-17 | Stop reason: SDUPTHER

## 2019-10-04 RX ORDER — HYDROXYZINE 50 MG/1
50 TABLET, FILM COATED ORAL 2 TIMES DAILY PRN
Qty: 60 TABLET | Refills: 0 | Status: SHIPPED | OUTPATIENT
Start: 2019-10-04 | End: 2019-10-14 | Stop reason: SDUPTHER

## 2019-10-04 RX ADMIN — PANTOPRAZOLE SODIUM 40 MG: 40 TABLET, DELAYED RELEASE ORAL at 06:14

## 2019-10-04 RX ADMIN — LEVOTHYROXINE SODIUM 50 MCG: 50 TABLET ORAL at 06:14

## 2019-10-04 RX ADMIN — ACETAMINOPHEN 975 MG: 325 TABLET ORAL at 10:06

## 2019-10-04 RX ADMIN — GABAPENTIN 300 MG: 300 CAPSULE ORAL at 08:32

## 2019-10-04 RX ADMIN — TIOTROPIUM BROMIDE 18 MCG: 18 CAPSULE ORAL; RESPIRATORY (INHALATION) at 08:36

## 2019-10-04 RX ADMIN — BUPROPION HYDROCHLORIDE 450 MG: 150 TABLET, EXTENDED RELEASE ORAL at 08:32

## 2019-10-04 RX ADMIN — DULOXETINE HYDROCHLORIDE 90 MG: 60 CAPSULE, DELAYED RELEASE ORAL at 08:31

## 2019-10-04 RX ADMIN — ERGOCALCIFEROL 50000 UNITS: 1.25 CAPSULE ORAL at 08:36

## 2019-10-04 RX ADMIN — FLUTICASONE PROPIONATE 2 SPRAY: 50 SPRAY, METERED NASAL at 08:35

## 2019-10-04 RX ADMIN — KETOTIFEN FUMARATE 1 DROP: 0.35 SOLUTION/ DROPS OPHTHALMIC at 08:35

## 2019-10-04 RX ADMIN — LORATADINE 10 MG: 10 TABLET ORAL at 08:31

## 2019-10-04 RX ADMIN — FLUTICASONE FUROATE AND VILANTEROL TRIFENATATE 1 PUFF: 200; 25 POWDER RESPIRATORY (INHALATION) at 08:35

## 2019-10-04 RX ADMIN — NALTREXONE HYDROCHLORIDE 50 MG: 50 TABLET, FILM COATED ORAL at 08:32

## 2019-10-04 NOTE — PROGRESS NOTES
[facility] GROUP NOTE     10/04/19 0930   Activity/Group Checklist   Group Admission/Discharge  (completed DERs  Hopeful )   Attendance Attended   Attendance Duration (min) 0-15   Interactions Interacted appropriately   Affect/Mood Appropriate;Bright   Goals Achieved Identified feelings; Discussed coping strategies; Discussed discharge plans  (Expressed mixed emotions re going home but feels ready)

## 2019-10-04 NOTE — NURSING NOTE
Patient discharged to self  To be transported by   All discharge information, medications, diagnosis, upcoming appointments, crisis numbers taught to client by , Dilip Branham  Verbalized understanding  Patient denies SI/HI

## 2019-10-04 NOTE — QUICK NOTE
Progress Note - [address] [address] [address] 53 y o  female MRN: [id number]  Unit/Bed#: [facility] 343-01 Encounter: [id number]    Assessment/Plan   Principal Problem:    Major depressive disorder, recurrent severe without psychotic features ([facility] 75 )  Active Problems:    Chronic migraine without aura    KYLE (generalized anxiety disorder)    Panic disorder without agoraphobia    Other insomnia    Obsessive-compulsive disorder, unspecified    Chronic left shoulder pain    Intentional drug overdose ([facility] 75 )    Hypothyroidism (acquired)    Personality disorder (HCC)      Behavior over the last 24 hours:  Improved    Patient states she is overall doing better, reports her mood is 3/10 (1 being worst mood 10 being the best) in regards to her depression and anxiety  Also, she states she is doing better with self abusive behavior, coping more with rubber band and less of impulsivity  Her family meeting was a success 10/3 and she plans to go to therapy at [facility], although she still hasn't contacted them  In conclusion of the meeting Lianna Joana believes her  will be supportive  Attending groups  Compliant with medications  Patient requests doctor note for work from 20th to when she can return to work       Sleep: slightly decreased (6 hours) from "left hip pain"  Appetite: normal  Medication side effects: No  ROS: headache, left shoulder pain, left hip pain    Mental Status Evaluation:  Appearance:  age appropriate and casually dressed   Behavior:  pleasant, cooperative and calm   Speech:  normal pitch, normal volume and normal rate   Mood:  improved, euthymic  less depressed/anxious   Affect:  normal range and intensity   Thought Process:  goal directed and organized   Thought Content:  no overt delusions   Perceptual Disturbances: no auditory hallucinations and no visual hallucinations   Risk Potential: Suicidal Ideations none, Homicidal Ideations none and Potential for Aggression No   Sensorium:  person, place and time/date   Cognition:  grossly intact   Consciousness:  alert and awake    Attention: attention span and concentration were age appropriate   Insight:  improved   Judgment: improved   Gait/Station: normal gait/station   Motor Activity: no abnormal movements     Progress Toward Goals: significant improvement, less depressed/anxious  Not suicidal and not picking her fingers as much (using rubber band more to cope)  Recommended Treatment:   - Overall: significant improvement  - All current medications reviewed  - Continue Depakote  mg at bedtime   - Continue Cymbalta 90 mg daily, Seroquel 300 mg daily, Neurontin 300 mg tid  - Continue all other current medications  - Discharge today  - Follow up with patient scheduling therapy in [facility] and set up outpatient appointments with Dr Ellis as well as recheck labs (recent Depakote levels 43 2 10/4)  - Continue with group therapy, milieu therapy and occupational therapy  Risks, benefits and possible side effects of Medications:   Risks, benefits, and possible side effects of medications explained to patient and patient verbalizes understanding        Medications:   all current active meds have been reviewed and current meds:   Current Facility-Administered Medications   Medication Dose Route Frequency    acetaminophen (TYLENOL) tablet 650 mg  650 mg Oral Q6H PRN    acetaminophen (TYLENOL) tablet 975 mg  975 mg Oral Q6H PRN    albuterol (PROVENTIL HFA,VENTOLIN HFA) inhaler 2 puff  2 puff Inhalation Q4H PRN    albuterol inhalation solution 2 5 mg  2 5 mg Nebulization Q4H PRN    aluminum-magnesium hydroxide-simethicone (MYLANTA) 200-200-20 mg/5 mL oral suspension 30 mL  30 mL Oral Q4H PRN    baclofen tablet 10 mg  10 mg Oral HS    benztropine (COGENTIN) injection 1 mg  1 mg Intramuscular Q6H PRN    benztropine (COGENTIN) tablet 1 mg  1 mg Oral Q6H PRN    buPROPion (WELLBUTRIN XL) 24 hr tablet 450 mg  450 mg Oral Daily    divalproex sodium (DEPAKOTE ER) 24 hr tablet 500 mg  500 mg Oral HS    DULoxetine (CYMBALTA) delayed release capsule 90 mg  90 mg Oral Daily    ergocalciferol (VITAMIN D2) capsule 50,000 Units  50,000 Units Oral Weekly    fluticasone (FLONASE) 50 mcg/act nasal spray 2 spray  2 spray Each Nare Daily    fluticasone-vilanterol (BREO ELLIPTA) 200-25 MCG/INH inhaler 1 puff  1 puff Inhalation Daily    gabapentin (NEURONTIN) capsule 300 mg  300 mg Oral TID    haloperidol (HALDOL) tablet 5 mg  5 mg Oral Q8H PRN    haloperidol lactate (HALDOL) injection 5 mg  5 mg Intramuscular Q6H PRN    hydrOXYzine HCL (ATARAX) tablet 25 mg  25 mg Oral Q6H PRN    hydrOXYzine HCL (ATARAX) tablet 50 mg  50 mg Oral Q6H PRN    hydrOXYzine HCL (ATARAX) tablet 75 mg  75 mg Oral Q6H PRN    ibuprofen (MOTRIN) tablet 600 mg  600 mg Oral Q8H PRN    ketotifen (ZADITOR) 0 025 % ophthalmic solution 1 drop  1 drop Both Eyes BID    levothyroxine tablet 50 mcg  50 mcg Oral Early Morning    loratadine (CLARITIN) tablet 10 mg  10 mg Oral BID    LORazepam (ATIVAN) 2 mg/mL injection 1 mg  1 mg Intramuscular Q6H PRN    magnesium hydroxide (MILK OF MAGNESIA) 400 mg/5 mL oral suspension 30 mL  30 mL Oral Daily PRN    melatonin tablet 3 mg  3 mg Oral HS    naltrexone (REVIA) tablet 50 mg  50 mg Oral Daily    OLANZapine (ZyPREXA) IM injection 10 mg  10 mg Intramuscular Q3H PRN    OLANZapine (ZyPREXA) tablet 10 mg  10 mg Oral Q3H PRN    ondansetron (ZOFRAN-ODT) dispersible tablet 4 mg  4 mg Oral Q6H PRN    pantoprazole (PROTONIX) EC tablet 40 mg  40 mg Oral Early Morning    phenazopyridine (PYRIDIUM) tablet 100 mg  100 mg Oral TID PRN    QUEtiapine (SEROquel) tablet 300 mg  300 mg Oral HS    risperiDONE (RisperDAL M-TABS) dispersible tablet 1 mg  1 mg Oral Q3H PRN    tiotropium (SPIRIVA) capsule for inhaler 18 mcg  18 mcg Inhalation Daily    traZODone (DESYREL) tablet 50 mg  50 mg Oral HS PRN         Labs: valproic acid level 10/4: 43 2      Lab Results Component Value Date    WBC 5 60 10/04/2019    HGB 11 1 (L) 10/04/2019    HCT 32 1 (L) 10/04/2019    MCV 89 10/04/2019     10/04/2019           Counseling / Coordination of Care  Total floor / unit time spent today 20 minutes  Greater than 50% of total time was spent with the patient and / or family counseling and / or coordination of care

## 2019-10-04 NOTE — PROGRESS NOTES
10/04/19 1000   Service   Service   (Neurology)   Provider Name Dr Alphonso Whiting   Multi-disciplinary Rounds   Diagnosis  Reviewed   Vital Signs Reviewed   Provider called unit/Tiger text to this author and Providers were unable to come to the Unit yesterday  In preparation for discharge Provider will have orders placed and will email outpatient neurology for appt  Patient reported 5/10 headache when asked  This has been patient's baseline reported the Provider  Plan is for Medrol dosepack and botox

## 2019-10-04 NOTE — PROGRESS NOTES
Patient denies SI/HI, A/V hallucinations  Reports depression 3/10 and anxiety 2/10  Left shoulder pain 5/10 and right thigh pain 5/10 and headache 5/10  Visible on milieu and comfortable on unit  Will continue to monitor

## 2019-10-04 NOTE — QUICK NOTE
Kaylah's current SHIPMAN is at a 5-6/10 which is near baseline  She is complaining of left shoulder and rt thigh pain and tylenol has brought the pain down to a 5/10  I will reach out to  staff to expedite her outpt follow up appointment with neurology team for her botox  Medrol dose david has been ordered which she can  today from her pharmacy after discharge

## 2019-10-04 NOTE — PROGRESS NOTES
10/04/19 0800   Team Meeting   Meeting Type Daily Rounds   Team Members Present   Team Members Present Physician;Nurse;;; Other (Discipline and Name)   Physician Team Member Coles   Nursing Team Member [facility] Management Team Member Matthew   Social Work Team Member Georgina Mora   Other (Discipline and Name) MD Zakia Burt   Patient/Family Present   Patient Present No   Patient's Family Present No   FMLA paper work completed  Medication management  [facility]

## 2019-10-04 NOTE — TELEPHONE ENCOUNTER
Dr Sandoval - patient was discharged from [facility]  Please enter order for zolmatriptan

## 2019-10-04 NOTE — BH TRANSITION RECORD
Contact Information: If you have any questions, concerns, pended studies, tests and/or procedures, or emergencies regarding your inpatient behavioral health visit  Please contact [facility] behavioral health unit 3B [phone]  and ask to speak to a , nurse or physician  A contact is available 24 hours/ 7 days a week at this number  Summary of Procedures Performed During your Stay:  Below is a list of major procedures performed during your hospital stay and a summary of results:  - Cardiac Procedures/Studies: EKG  Pending Studies (From admission, onward)     Start     Ordered    10/04/19 0000  CBC and differential     Comments: This is a patient instruction: This test is non-fasting  Please drink two glasses of water morning of bloodwork  10/04/19 1015    10/04/19 0000  Comprehensive metabolic panel (CMP)     Comments: This is a patient instruction: Patient fasting for 8 hours or longer recommended  10/04/19 1015    10/04/19 0000  Valproic acid level, total      10/04/19 1015              If studies are pending at discharge, follow up with your PCP and/or referring provider

## 2019-10-04 NOTE — PROGRESS NOTES
10/04/19 0800   Team Meeting   Meeting Type Daily Rounds   Team Members Present   Team Members Present Physician;Nurse;;; Other (Discipline and Name)   Physician Team Member Rhonda   [facility] Member Matthew   Social Work Team Member Nayan Huggins   Other (Discipline and Name) MD Moise Gerardo   Patient/Family Present   Patient Present No   Patient's Family Present No   Discharge today

## 2019-10-04 NOTE — CASE MANAGEMENT
Pt states she is ready and looking forward to discharge today, pt denies any ideations of self harm whatsoever, no SI, no HI  Pt states she is not fearful for herself in any manner regarding returning home to her  and she does not fear for any bodily harm  Pt's  came to pick pt up and AVS was reviewed with both the pt and her   Pt demonstrated clear understanding of her medication education and outpt appts, pt states she will take her medications as ordered and will go to all of her outpt appts  ; pt had psychiatrist appt scheduled, she had a PCP appt scheduled, a neurological appt scheduled and if she needs any meds from neuro she is to call their nurse and she will provide refills as per the neuro office pt goes to as confirmed by CM  And pt is to also do the free therapy and counseling classes at [facility]  Pt and  verified comprehending all of this information  Dr Bianca Padgett had completed pt's FMLA sheet and this was returned to the pt, pt was also given a note to return to work on Wednesday, October 9th  Both pt and  stated they were satisfied with the discharge presentation and they did not have any questions  Pt and  thanked the CM and staff for all of their care and help  Pt was transported home via her
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
## 2019-10-04 NOTE — PROGRESS NOTES
[facility] GROUP NOTE     10/04/19 1000   Activity/Group Checklist   Group Life Skills   Attendance Attended   Attendance Duration (min) 46-60   Interactions Interacted appropriately   Affect/Mood Appropriate;Bright   Goals Achieved Able to listen to others; Able to engage in interactions;Verbalized increased hopefulness   Creative Flow/Work Group  Objectives  Sense of Purpose for Development of Sense of Self  Creativity as a gateway to resilience and problem solving  Creative flow and use of awe/wonder to switch perspective  Mindful awareness for creative problem solving

## 2019-10-04 NOTE — PLAN OF CARE
Problem: SELF HARM/SUICIDALITY  Goal: Will have no self-injury during hospital stay  Description  INTERVENTIONS:  - Q 15 MINUTES: Routine safety checks  - Q WAKING SHIFT & PRN: Assess risk to determine if routine checks are adequate to maintain patient safety  - Encourage patient to participate actively in care by formulating a plan to combat response to suicidal ideation, identify supports and resources  Outcome: Completed     Problem: DEPRESSION  Goal: Will be euthymic at discharge  Description  INTERVENTIONS:  - Administer medication as ordered  - Provide emotional support via 1:1 interaction with staff  - Encourage involvement in milieu/groups/activities  - Monitor for social isolation  Outcome: Completed     Problem: ANXIETY  Goal: Will report anxiety at manageable levels  Description  INTERVENTIONS:  - Administer medication as ordered  - Teach and encourage coping skills  - Provide emotional support  - Assess patient/family for anxiety and ability to cope  Outcome: Completed     Problem: DISCHARGE PLANNING  Goal: Discharge to home or other facility with appropriate resources  Description  INTERVENTIONS:  - Identify barriers to discharge w/patient and caregiver  - Arrange for needed discharge resources and transportation as appropriate  - Identify discharge learning needs (meds, wound care, etc )  - Arrange for interpretive services to assist at discharge as needed  - Refer to Case Management Department for coordinating discharge planning if the patient needs post-hospital services based on physician/advanced practitioner order or complex needs related to functional status, cognitive ability, or social support system  Outcome: Completed     Problem: Ineffective Coping  Goal: Participates in unit activities  Description  Interventions:  - Provide therapeutic environment   - Provide required programming   - Redirect inappropriate behaviors   Outcome: Completed

## 2019-10-07 ENCOUNTER — TRANSITIONAL CARE MANAGEMENT (OUTPATIENT)
Dept: INTERNAL MEDICINE CLINIC | Facility: CLINIC | Age: 49
End: 2019-10-07

## 2019-10-07 ENCOUNTER — OFFICE VISIT (OUTPATIENT)
Dept: INTERNAL MEDICINE CLINIC | Facility: CLINIC | Age: 49
End: 2019-10-07
Payer: COMMERCIAL

## 2019-10-07 VITALS
DIASTOLIC BLOOD PRESSURE: 70 MMHG | WEIGHT: 149 LBS | TEMPERATURE: 97.6 F | HEIGHT: 63 IN | HEART RATE: 86 BPM | BODY MASS INDEX: 26.4 KG/M2 | SYSTOLIC BLOOD PRESSURE: 100 MMHG | OXYGEN SATURATION: 97 %

## 2019-10-07 DIAGNOSIS — Z23 ENCOUNTER FOR IMMUNIZATION: ICD-10-CM

## 2019-10-07 DIAGNOSIS — F41.1 GAD (GENERALIZED ANXIETY DISORDER): Chronic | ICD-10-CM

## 2019-10-07 DIAGNOSIS — G47.09 OTHER INSOMNIA: Primary | Chronic | ICD-10-CM

## 2019-10-07 DIAGNOSIS — F33.2 MAJOR DEPRESSIVE DISORDER, RECURRENT SEVERE WITHOUT PSYCHOTIC FEATURES (HCC): Chronic | ICD-10-CM

## 2019-10-07 DIAGNOSIS — E03.9 HYPOTHYROIDISM (ACQUIRED): ICD-10-CM

## 2019-10-07 PROBLEM — R10.2 PELVIC PAIN: Status: RESOLVED | Noted: 2018-03-26 | Resolved: 2019-10-07

## 2019-10-07 PROBLEM — R55 SYNCOPE AND COLLAPSE: Status: RESOLVED | Noted: 2018-04-03 | Resolved: 2019-10-07

## 2019-10-07 PROBLEM — H10.13 ALLERGIC CONJUNCTIVITIS, BILATERAL: Status: RESOLVED | Noted: 2019-08-07 | Resolved: 2019-10-07

## 2019-10-07 PROBLEM — R79.89 ABNORMAL TSH: Status: RESOLVED | Noted: 2019-04-19 | Resolved: 2019-10-07

## 2019-10-07 PROBLEM — F32.A ANXIETY AND DEPRESSION: Status: RESOLVED | Noted: 2019-04-14 | Resolved: 2019-10-07

## 2019-10-07 PROBLEM — F41.9 ANXIETY AND DEPRESSION: Status: RESOLVED | Noted: 2019-04-14 | Resolved: 2019-10-07

## 2019-10-07 PROCEDURE — 90686 IIV4 VACC NO PRSV 0.5 ML IM: CPT

## 2019-10-07 PROCEDURE — 99495 TRANSJ CARE MGMT MOD F2F 14D: CPT | Performed by: INTERNAL MEDICINE

## 2019-10-07 PROCEDURE — 90471 IMMUNIZATION ADMIN: CPT

## 2019-10-07 RX ORDER — HYDROXYZINE HYDROCHLORIDE 25 MG/1
TABLET, FILM COATED ORAL
COMMUNITY
Start: 2019-10-04 | End: 2019-10-14 | Stop reason: SDUPTHER

## 2019-10-07 NOTE — ASSESSMENT & PLAN NOTE
Medications adjusted during recent hsopitalization and is on cymbalta and wellbutrin    Continue close follow up with therapist and psychiatrist

## 2019-10-07 NOTE — ASSESSMENT & PLAN NOTE
Now off ambien and placed on seroquel, trazodone and melatonin per Psych  Has underlying sleep apnea

## 2019-10-07 NOTE — ASSESSMENT & PLAN NOTE
Anxiety level remains high on atarax, wellbutrin, cymbalta and seroquel  Unfortunately due to overdose on clonazepam, benzos are to be avoided  She is to continue with therapy and contact her psychiatrist for further guidance

## 2019-10-07 NOTE — PROGRESS NOTES
Assessment/Plan:    Problem List Items Addressed This Visit        Endocrine    Hypothyroidism (acquired)     Patient started on levothyroxine 50mcg daily during her hospitalization  Given TFTs to be done in 6 weeks as well as thyroid antibodies  Relevant Orders    TSH, 3rd generation with Free T4 reflex    Thyroid Antibodies Panel       Other    KYLE (generalized anxiety disorder) (Chronic)     Anxiety level remains high on atarax, wellbutrin, cymbalta and seroquel  Unfortunately due to overdose on clonazepam, benzos are to be avoided  She is to continue with therapy and contact her psychiatrist for further guidance  Relevant Medications    hydrOXYzine HCL (ATARAX) 25 mg tablet    Major depressive disorder, recurrent severe without psychotic features ([address] ) (Chronic)     Medications adjusted during recent hsopitalization and is on cymbalta and wellbutrin  Continue close follow up with therapist and psychiatrist          Relevant Medications    hydrOXYzine HCL (ATARAX) 25 mg tablet    Other insomnia - Primary (Chronic)     Now off ambien and placed on seroquel, trazodone and melatonin per Psych  Has underlying sleep apnea  Other Visit Diagnoses     Encounter for immunization        Relevant Orders    influenza vaccine, [id number], quadrivalent, 0 5 mL, preservative-free, for adult and pediatric patients 6 mos+ (AFLURIA, FLUARIX, FLULAVAL, FLUZONE) (Completed)        Subjective:      Patient ID: Sona Overton is a 53 y o  female  HPI  TCM Call (since 9/6/2019)     Date and time call was made  10/7/2019 12:09 PM    Hospital care reviewed  Records reviewed    Patient was hospitialized at  [facility]    Date of Admission  09/26/19    Date of discharge  10/04/19    Diagnosis  Depression    Disposition  Home    Current Symptoms  None      TCM Call (since 9/6/2019)     Scheduled for follow up?   Yes    I have advised the patient to call PCP with any new or worsening symptoms Denia Rios    Counseling  Patient    Comments  Patient appointment scheduled for 10/7/19        47yo female with chronic migraines, insomnia, KYLE, MDD, OCD, asthma here for ELVIRA  She is accompanied by her daughter  She was hospitalized at [facility] for involuntary [facility] commitment due to depression, anxiety and suicide attempt with klonopin OD 9/26-10/4/19  Symptoms were relating to marital and financial problems  She took an unspecified amount of klonopin  Medications were adjusted during her hospitalization  She was also started on levothyroxine 50mcg daily due to persistent elevated TSH  Reports her current regimen is better  She is adjusting to being out of the hospital as she was in the hospital for two weeks initially at [place]  She is taking things day by day and is not pushing herself into the future as she used to  She does not feel that atrax is working, she still has anxiety  Has bruxisms and has heart palpitations though she has not had a "bad" attack where she gets chest palpitations with radiation to her L arm  She is not scheduled for another psych appt until January 2020  She is going for therapy once week      The following portions of the patient's history were reviewed and updated as appropriate: allergies, current medications, past family history, past medical history, past social history, past surgical history and problem list     Current Outpatient Medications:     albuterol (2 5 mg/3 mL) 0 083 % nebulizer solution, Inhale, Disp: , Rfl:     Aspirin-Acetaminophen-Caffeine (MIGRAINE FORMULA PO), Take by mouth, Disp: , Rfl:     baclofen 10 mg tablet, Take 1 tablet (10 mg total) by mouth daily at bedtime, Disp: 30 tablet, Rfl: 0    BREO ELLIPTA 200-25 MCG/INH inhaler, , Disp: , Rfl:     buPROPion (WELLBUTRIN XL) 150 mg 24 hr tablet, , Disp: , Rfl:     buPROPion (WELLBUTRIN XL) 300 mg 24 hr tablet, Take 1 tablet (300 mg total) by mouth daily for 120 days Total Wellbutrin XL dose is 450 mg daily, Disp: 30 tablet, Rfl: 3    [employer] INDOOR/OUTDOOR ALLERGY RLF 10 MG tablet, Take 10 mg by mouth daily, Disp: , Rfl: 10    divalproex sodium (DEPAKOTE ER) 500 mg 24 hr tablet, Take 1 tablet (500 mg total) by mouth daily at bedtime, Disp: 30 tablet, Rfl: 0    DULoxetine (CYMBALTA) 30 mg delayed release capsule, Take 3 capsules (90 mg total) by mouth daily, Disp: 90 capsule, Rfl: 0    EPINEPHrine (EPIPEN) 0 3 mg/0 3 mL SOAJ, as directed, Disp: , Rfl:     Erenumab-aooe (AIMOVIG) 140 MG/ML SOAJ, Inject 1 mL under the skin every 30 (thirty) days, Disp: 1 pen, Rfl: 3    ergocalciferol (VITAMIN D2) 50,000 units, TK 1 C WEEKLY, Disp: , Rfl: 0    gabapentin (NEURONTIN) 300 mg capsule, Take 1 capsule (300 mg total) by mouth 3 (three) times a day, Disp: 90 capsule, Rfl: 0    hydrOXYzine HCL (ATARAX) 50 mg tablet, Take 1 tablet (50 mg total) by mouth 2 (two) times a day as needed for anxiety, Disp: 60 tablet, Rfl: 0    ibuprofen (MOTRIN) 800 mg tablet, Take 1 tablet (800 mg total) by mouth every 6 (six) hours as needed for mild pain, Disp: 90 tablet, Rfl: 3    levonorgestrel (MIRENA, 52 MG,) 20 MCG/24HR IUD, by Intrauterine route, Disp: , Rfl:     levothyroxine 50 mcg tablet, Take 1 tablet (50 mcg total) by mouth daily in the early morning, Disp: , Rfl: 0    melatonin 3 mg, Take 1 tablet (3 mg total) by mouth daily at bedtime, Disp: 30 tablet, Rfl: 0    methylPREDNISolone 4 MG tablet therapy pack, Use as directed on package, Disp: 1 each, Rfl: 0    mometasone (NASONEX) 50 mcg/act nasal spray, , Disp: , Rfl:     naltrexone (REVIA) 50 mg tablet, Take 1 tablet (50 mg total) by mouth daily for 120 days, Disp: 30 tablet, Rfl: 3    olopatadine (PATANOL) 0 1 % ophthalmic solution, Administer 1 drop to both eyes 2 (two) times a day for 90 days, Disp: 10 mL, Rfl: 11    omeprazole (PriLOSEC) 20 mg delayed release capsule, Take 1 capsule by mouth daily, Disp: , Rfl:     ondansetron (ZOFRAN-ODT) 4 mg disintegrating tablet, Take 1 tablet (4 mg total) by mouth every 6 (six) hours as needed for nausea or vomiting, Disp: 20 tablet, Rfl: 0    phenazopyridine (PYRIDIUM) 100 mg tablet, Take 1 tablet (100 mg total) by mouth 3 (three) times a day as needed for bladder spasms, Disp: 3 tablet, Rfl: 0    PROAIR RESPICLICK 223 (90 Base) MCG/ACT AEPB, Inhale 2 puffs every 6 (six) hours as needed (wheezing), Disp: 1 each, Rfl: 3    QUEtiapine (SEROquel) 300 mg tablet, Take 1 tablet (300 mg total) by mouth daily at bedtime, Disp: 30 tablet, Rfl: 0    SPIRIVA RESPIMAT 2 5 MCG/ACT AERS, , Disp: , Rfl:     ZOLMitriptan (ZOMIG-ZMT) 5 MG disintegrating tablet, TAKE 1 TABLET BY MOUTH AT ONSET OF HEADACHE, MAY REPEAT AFTER 2 HOURS AS NEEDED  MAX 2 TABLETS per 24 hours  , Disp: 12 tablet, Rfl: 3    hydrOXYzine HCL (ATARAX) 25 mg tablet, , Disp: , Rfl:       Review of Systems   Constitutional:        +weight gain   Cardiovascular: Positive for palpitations  Negative for chest pain and leg swelling  Gastrointestinal: Negative  Endocrine: Positive for cold intolerance  Neurological: Positive for headaches  Negative for dizziness  Psychiatric/Behavioral: Positive for decreased concentration and sleep disturbance  Negative for hallucinations  The patient is nervous/anxious  Objective:    /70 (BP Location: Left arm, Patient Position: Sitting, Cuff Size: Adult)   Pulse 86   Temp 97 6 °F (36 4 °C) (Tympanic)   Ht 5' 3" (1 6 m)   Wt 67 6 kg (149 lb)   SpO2 97%   BMI 26 39 kg/m²        Physical Exam   Constitutional: She is oriented to person, place, and time  HENT:   Mouth/Throat: Oropharynx is clear and moist    Neck: Neck supple  No thyromegaly present  Cardiovascular: Normal rate, regular rhythm and normal heart sounds  Pulmonary/Chest: Effort normal and breath sounds normal  No stridor  No respiratory distress  Neurological: She is alert and oriented to person, place, and time  Psychiatric: Her speech is normal and behavior is normal  Her mood appears anxious  She does not exhibit a depressed mood  Vitals reviewed

## 2019-10-07 NOTE — ASSESSMENT & PLAN NOTE
Patient started on levothyroxine 50mcg daily during her hospitalization  Given TFTs to be done in 6 weeks as well as thyroid antibodies

## 2019-10-08 ENCOUNTER — TELEPHONE (OUTPATIENT)
Dept: NEUROLOGY | Facility: CLINIC | Age: 49
End: 2019-10-08

## 2019-10-08 NOTE — TELEPHONE ENCOUNTER
LMOM for patient to call back to schedule an appointment with Dr Anette Middleton  Appointment on hold for Thursday 10/10/19 [facility] location
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
## 2019-10-14 ENCOUNTER — OFFICE VISIT (OUTPATIENT)
Dept: PSYCHIATRY | Facility: CLINIC | Age: 49
End: 2019-10-14
Payer: COMMERCIAL

## 2019-10-14 VITALS
SYSTOLIC BLOOD PRESSURE: 107 MMHG | HEIGHT: 63 IN | DIASTOLIC BLOOD PRESSURE: 69 MMHG | HEART RATE: 91 BPM | WEIGHT: 154 LBS | BODY MASS INDEX: 27.29 KG/M2

## 2019-10-14 DIAGNOSIS — F63.9 IMPULSE CONTROL DISORDER: Chronic | ICD-10-CM

## 2019-10-14 DIAGNOSIS — G47.09 OTHER INSOMNIA: Chronic | ICD-10-CM

## 2019-10-14 DIAGNOSIS — F42.9 OBSESSIVE-COMPULSIVE DISORDER, UNSPECIFIED TYPE: Chronic | ICD-10-CM

## 2019-10-14 DIAGNOSIS — F33.41 MAJOR DEPRESSIVE DISORDER, RECURRENT EPISODE, IN PARTIAL REMISSION (HCC): Primary | Chronic | ICD-10-CM

## 2019-10-14 DIAGNOSIS — Z79.899 LONG-TERM USE OF HIGH-RISK MEDICATION: Chronic | ICD-10-CM

## 2019-10-14 DIAGNOSIS — F41.1 GAD (GENERALIZED ANXIETY DISORDER): Chronic | ICD-10-CM

## 2019-10-14 DIAGNOSIS — F41.0 PANIC DISORDER WITHOUT AGORAPHOBIA: Chronic | ICD-10-CM

## 2019-10-14 DIAGNOSIS — F60.9 PERSONALITY DISORDER (HCC): Chronic | ICD-10-CM

## 2019-10-14 PROCEDURE — 99213 OFFICE O/P EST LOW 20 MIN: CPT | Performed by: PSYCHIATRY & NEUROLOGY

## 2019-10-14 PROCEDURE — 90833 PSYTX W PT W E/M 30 MIN: CPT | Performed by: PSYCHIATRY & NEUROLOGY

## 2019-10-14 RX ORDER — QUETIAPINE FUMARATE 300 MG/1
300 TABLET, FILM COATED ORAL
Qty: 30 TABLET | Refills: 3 | Status: SHIPPED | OUTPATIENT
Start: 2019-10-14 | End: 2020-03-20 | Stop reason: SDUPTHER

## 2019-10-14 RX ORDER — DULOXETIN HYDROCHLORIDE 30 MG/1
90 CAPSULE, DELAYED RELEASE ORAL DAILY
Qty: 90 CAPSULE | Refills: 3 | Status: SHIPPED | OUTPATIENT
Start: 2019-10-14 | End: 2020-03-20 | Stop reason: SDUPTHER

## 2019-10-14 RX ORDER — BUPROPION HYDROCHLORIDE 150 MG/1
150 TABLET ORAL DAILY
Qty: 30 TABLET | Refills: 3 | Status: SHIPPED | OUTPATIENT
Start: 2019-10-14 | End: 2019-10-29 | Stop reason: HOSPADM

## 2019-10-14 RX ORDER — NALTREXONE HYDROCHLORIDE 50 MG/1
50 TABLET, FILM COATED ORAL DAILY
Qty: 30 TABLET | Refills: 3 | Status: SHIPPED | OUTPATIENT
Start: 2019-10-14 | End: 2019-11-21 | Stop reason: ALTCHOICE

## 2019-10-14 RX ORDER — HYDROXYZINE 50 MG/1
50 TABLET, FILM COATED ORAL 2 TIMES DAILY PRN
Qty: 60 TABLET | Refills: 3 | Status: SHIPPED | OUTPATIENT
Start: 2019-10-14 | End: 2020-03-20 | Stop reason: SDUPTHER

## 2019-10-14 RX ORDER — DIVALPROEX SODIUM 500 MG/1
500 TABLET, EXTENDED RELEASE ORAL
Qty: 30 TABLET | Refills: 3 | Status: SHIPPED | OUTPATIENT
Start: 2019-10-14 | End: 2020-03-20 | Stop reason: SDUPTHER

## 2019-10-14 RX ORDER — BUPROPION HYDROCHLORIDE 300 MG/1
300 TABLET ORAL DAILY
Qty: 30 TABLET | Refills: 3 | Status: SHIPPED | OUTPATIENT
Start: 2019-10-14 | End: 2019-10-29 | Stop reason: HOSPADM

## 2019-10-14 NOTE — BH TREATMENT PLAN
TREATMENT PLAN (Medication Management Only)        [facility]    Name/Date of Birth/MRN:  León Khan 53 y o  1970 MRN: [id number]  Date of Treatment Plan: October 14, 2019  Diagnosis/Diagnoses:   1  Major depressive disorder, recurrent episode, in partial remission ([facility] )    2  KYLE (generalized anxiety disorder)    3  Panic disorder without agoraphobia    4  Obsessive-compulsive disorder, unspecified type    5  Impulse control disorder    6  Personality disorder ([facility] )    7  Other insomnia    8  Long-term use of high-risk medication      Strengths/Personal Resources for Self-Care: "paying attention to other people's concerns"  Area/Areas of need (in own words): "sometimes helping other people"  1  Long Term Goal:   "to stop helping other people as much as I do", maintain improvement in anxiety, maintain stability of depression  Target Date: 2 months - 12/14/2019  Person/Persons responsible for completion of goal: Milana Grater  2  Short Term Objective (s) - How will we reach this goal?:   A  Provider new recommended medication/dosage changes and/or continue medication(s): continue current medications as prescribed (Cymbalta, Wellbutrin XL, Depakote ER, Seroquel, Atarax and Naltrexone)  B   "taking everything day by day"  C   N/A  Target Date: 2 months - 12/14/2019  Person/Persons Responsible for Completion of Goal: Milana Grater   Progress Towards Goals: progressing  Treatment Modality: medication management every 2 months, continue psychotherapy with own therapist  Review due 90 to 120 days from date of this plan: 4 months - 2/14/2020  Expected length of service: ongoing treatment  My Physician/PA/NP and I have developed this plan together and I agree to work on the goals and objectives  I understand the treatment goals that were developed for my treatment    Electronic Signatures: on file (unless signed below)    Peterson Cabello MD 10/14/19

## 2019-10-23 ENCOUNTER — OFFICE VISIT (OUTPATIENT)
Dept: INTERNAL MEDICINE CLINIC | Facility: CLINIC | Age: 49
End: 2019-10-23
Payer: COMMERCIAL

## 2019-10-23 VITALS
SYSTOLIC BLOOD PRESSURE: 112 MMHG | BODY MASS INDEX: 26.58 KG/M2 | RESPIRATION RATE: 16 BRPM | OXYGEN SATURATION: 98 % | HEART RATE: 92 BPM | HEIGHT: 63 IN | TEMPERATURE: 98 F | DIASTOLIC BLOOD PRESSURE: 72 MMHG | WEIGHT: 150 LBS

## 2019-10-23 DIAGNOSIS — R47.02 DYSPHASIA: Primary | ICD-10-CM

## 2019-10-23 DIAGNOSIS — K21.9 CHRONIC GERD: ICD-10-CM

## 2019-10-23 PROCEDURE — 3008F BODY MASS INDEX DOCD: CPT | Performed by: NURSE PRACTITIONER

## 2019-10-23 PROCEDURE — 99214 OFFICE O/P EST MOD 30 MIN: CPT | Performed by: NURSE PRACTITIONER

## 2019-10-23 NOTE — ASSESSMENT & PLAN NOTE
Not currently treating as she denies heartburn symptoms  She has been having morning hoarseness and difficulty swallowing so she may be experiencing a more silent GERD presentation  Recommended she may try pepcid to see if hoarseness and/or swallow improves

## 2019-10-23 NOTE — ASSESSMENT & PLAN NOTE
Normal exam   Pt has had 2 occurrences of dysphasia with continued report of difficulty swallowing her saliva  A swallow study with speech therapy is ordered for further evaluation  As noted, she does report some morning hoarseness which may indicate that she is still experiencing some reflux which may potentially contribute  Advised as above

## 2019-10-23 NOTE — PROGRESS NOTES
Assessment/Plan:    Dysphasia  Normal exam   Pt has had 2 occurrences of dysphasia with continued report of difficulty swallowing her saliva  A swallow study with speech therapy is ordered for further evaluation  As noted, she does report some morning hoarseness which may indicate that she is still experiencing some reflux which may potentially contribute  Advised as above  Chronic GERD  Not currently treating as she denies heartburn symptoms  She has been having morning hoarseness and difficulty swallowing so she may be experiencing a more silent GERD presentation  Recommended she may try pepcid to see if hoarseness and/or swallow improves  Diagnoses and all orders for this visit:    Dysphasia  -     FL barium swallow video w speech; Future    Chronic GERD          Subjective:      Patient ID: Elysia Anaya is a 53 y o  female  Pt is a 53 y o  y/o female who is seen today for evaluation after she experienced 2 recent episodes of choking  She states that the first episode was Sunday and she had trouble swallowing a small piece of salami  She states she swallowed it and it got stuck in her throat, though she could still talk and breathe  She was not able to get it to go all the way down with water  She states after about 5-10 minutes it finally did move down out of her throat  She states that Monday night she woke up overnight "choking" on her saliva  She got up and after a few minutes she was finally able to swallow  She also notes that for the past month - 6 weeks she has been hoarseness first thing in the morning  She says that after an hour this resolves  She has a history of GERD, not currently taking anything for this and she denies symptoms of heart burn  She denies chest pain or shortness of breath  She does not feel that her mouth or throat are any more or less wet than normal   No other concerns or complaints  Her appetite is normal and bowel are regular            The following portions of the patient's history were reviewed and updated as appropriate: allergies, current medications, past family history, past medical history, past social history, past surgical history and problem list     Review of Systems   Constitutional: Negative for appetite change, chills, fatigue and fever  HENT: Positive for trouble swallowing and voice change  Negative for congestion, postnasal drip, rhinorrhea and sore throat  Respiratory: Negative for cough, choking, chest tightness and shortness of breath  Cardiovascular: Negative for chest pain  Gastrointestinal: Negative for abdominal pain, constipation, diarrhea, nausea and vomiting  Neurological: Negative for dizziness, light-headedness and headaches  Objective:      /72 (BP Location: Left arm, Patient Position: Sitting)   Pulse 92   Temp 98 °F (36 7 °C)   Resp 16   Ht 5' 3" (1 6 m)   Wt 68 kg (150 lb)   SpO2 98%   BMI 26 57 kg/m²          Physical Exam   Constitutional: Vital signs are normal  She appears well-developed and well-nourished  She is cooperative  HENT:   Right Ear: Hearing, tympanic membrane, external ear and ear canal normal    Left Ear: Hearing, tympanic membrane, external ear and ear canal normal    Nose: Mucosal edema present  Mouth/Throat: Mucous membranes are normal  Normal dentition  No uvula swelling  No oropharyngeal exudate, posterior oropharyngeal edema, posterior oropharyngeal erythema or tonsillar abscesses  Tonsils are 0 on the right  Tonsils are 0 on the left  Eyes: Conjunctivae and lids are normal    Neck: Trachea normal  No JVD present  Carotid bruit is not present  No thyromegaly present  Cardiovascular: Normal rate, regular rhythm and normal heart sounds  Pulmonary/Chest: Effort normal and breath sounds normal    Abdominal: Normal appearance and bowel sounds are normal  There is no tenderness     Lymphadenopathy:        Head (right side): No submental, no submandibular, no tonsillar, no preauricular, no posterior auricular and no occipital adenopathy present  Head (left side): No submental, no submandibular, no tonsillar, no preauricular, no posterior auricular and no occipital adenopathy present  She has no cervical adenopathy  Neurological: She is alert  Skin: Skin is warm, dry and intact  Psychiatric: She has a normal mood and affect  Her speech is normal and behavior is normal  Judgment and thought content normal  Cognition and memory are normal    Vitals reviewed

## 2019-10-26 ENCOUNTER — APPOINTMENT (EMERGENCY)
Dept: RADIOLOGY | Facility: HOSPITAL | Age: 49
End: 2019-10-26
Payer: COMMERCIAL

## 2019-10-26 ENCOUNTER — HOSPITAL ENCOUNTER (OUTPATIENT)
Facility: HOSPITAL | Age: 49
Setting detail: OBSERVATION
Discharge: HOME | End: 2019-10-29
Attending: EMERGENCY MEDICINE | Admitting: INTERNAL MEDICINE
Payer: COMMERCIAL

## 2019-10-26 DIAGNOSIS — F33.41 MAJOR DEPRESSIVE DISORDER, RECURRENT EPISODE, IN PARTIAL REMISSION (HCC): Chronic | ICD-10-CM

## 2019-10-26 DIAGNOSIS — F41.0 PANIC DISORDER WITHOUT AGORAPHOBIA: Chronic | ICD-10-CM

## 2019-10-26 DIAGNOSIS — R56.9 SEIZURE (HCC): Primary | ICD-10-CM

## 2019-10-26 LAB
ALBUMIN SERPL BCP-MCNC: 4.2 G/DL (ref 3.5–5)
ALP SERPL-CCNC: 61 U/L (ref 46–116)
ALT SERPL W P-5'-P-CCNC: 15 U/L (ref 12–78)
ANION GAP SERPL CALCULATED.3IONS-SCNC: 8 MMOL/L (ref 4–13)
AST SERPL W P-5'-P-CCNC: 13 U/L (ref 5–45)
BASOPHILS # BLD AUTO: 0.04 THOUSANDS/ΜL (ref 0–0.1)
BASOPHILS NFR BLD AUTO: 1 % (ref 0–1)
BILIRUB SERPL-MCNC: 0.37 MG/DL (ref 0.2–1)
BUN SERPL-MCNC: 32 MG/DL (ref 5–25)
CALCIUM SERPL-MCNC: 8.6 MG/DL (ref 8.3–10.1)
CHLORIDE SERPL-SCNC: 110 MMOL/L (ref 100–108)
CO2 SERPL-SCNC: 25 MMOL/L (ref 21–32)
CREAT SERPL-MCNC: 1.14 MG/DL (ref 0.6–1.3)
D DIMER PPP FEU-MCNC: 1 UG/ML FEU
EOSINOPHIL # BLD AUTO: 0.05 THOUSAND/ΜL (ref 0–0.61)
EOSINOPHIL NFR BLD AUTO: 1 % (ref 0–6)
ERYTHROCYTE [DISTWIDTH] IN BLOOD BY AUTOMATED COUNT: 12.3 % (ref 11.6–15.1)
GFR SERPL CREATININE-BSD FRML MDRD: 57 ML/MIN/1.73SQ M
GLUCOSE SERPL-MCNC: 76 MG/DL (ref 65–140)
HCT VFR BLD AUTO: 33.8 % (ref 34.8–46.1)
HGB BLD-MCNC: 11.1 G/DL (ref 11.5–15.4)
IMM GRANULOCYTES # BLD AUTO: 0.03 THOUSAND/UL (ref 0–0.2)
IMM GRANULOCYTES NFR BLD AUTO: 1 % (ref 0–2)
LYMPHOCYTES # BLD AUTO: 2 THOUSANDS/ΜL (ref 0.6–4.47)
LYMPHOCYTES NFR BLD AUTO: 34 % (ref 14–44)
MCH RBC QN AUTO: 29.8 PG (ref 26.8–34.3)
MCHC RBC AUTO-ENTMCNC: 32.8 G/DL (ref 31.4–37.4)
MCV RBC AUTO: 91 FL (ref 82–98)
MONOCYTES # BLD AUTO: 0.69 THOUSAND/ΜL (ref 0.17–1.22)
MONOCYTES NFR BLD AUTO: 12 % (ref 4–12)
NEUTROPHILS # BLD AUTO: 3.06 THOUSANDS/ΜL (ref 1.85–7.62)
NEUTS SEG NFR BLD AUTO: 51 % (ref 43–75)
NRBC BLD AUTO-RTO: 0 /100 WBCS
PLATELET # BLD AUTO: 195 THOUSANDS/UL (ref 149–390)
PMV BLD AUTO: 9.7 FL (ref 8.9–12.7)
POTASSIUM SERPL-SCNC: 4 MMOL/L (ref 3.5–5.3)
PROT SERPL-MCNC: 6.6 G/DL (ref 6.4–8.2)
RBC # BLD AUTO: 3.73 MILLION/UL (ref 3.81–5.12)
SODIUM SERPL-SCNC: 143 MMOL/L (ref 136–145)
TROPONIN I SERPL-MCNC: <0.02 NG/ML
WBC # BLD AUTO: 5.87 THOUSAND/UL (ref 4.31–10.16)

## 2019-10-26 PROCEDURE — 85379 FIBRIN DEGRADATION QUANT: CPT | Performed by: EMERGENCY MEDICINE

## 2019-10-26 PROCEDURE — 36415 COLL VENOUS BLD VENIPUNCTURE: CPT | Performed by: EMERGENCY MEDICINE

## 2019-10-26 PROCEDURE — 71046 X-RAY EXAM CHEST 2 VIEWS: CPT

## 2019-10-26 PROCEDURE — 99220 PR INITIAL OBSERVATION CARE/DAY 70 MINUTES: CPT | Performed by: INTERNAL MEDICINE

## 2019-10-26 PROCEDURE — 80053 COMPREHEN METABOLIC PANEL: CPT | Performed by: EMERGENCY MEDICINE

## 2019-10-26 PROCEDURE — 71275 CT ANGIOGRAPHY CHEST: CPT

## 2019-10-26 PROCEDURE — 96360 HYDRATION IV INFUSION INIT: CPT

## 2019-10-26 PROCEDURE — 84484 ASSAY OF TROPONIN QUANT: CPT | Performed by: EMERGENCY MEDICINE

## 2019-10-26 PROCEDURE — 93005 ELECTROCARDIOGRAM TRACING: CPT

## 2019-10-26 PROCEDURE — 85025 COMPLETE CBC W/AUTO DIFF WBC: CPT | Performed by: EMERGENCY MEDICINE

## 2019-10-26 PROCEDURE — 70450 CT HEAD/BRAIN W/O DYE: CPT

## 2019-10-26 PROCEDURE — 96361 HYDRATE IV INFUSION ADD-ON: CPT

## 2019-10-26 PROCEDURE — 72125 CT NECK SPINE W/O DYE: CPT

## 2019-10-26 PROCEDURE — 99285 EMERGENCY DEPT VISIT HI MDM: CPT

## 2019-10-26 PROCEDURE — 99285 EMERGENCY DEPT VISIT HI MDM: CPT | Performed by: EMERGENCY MEDICINE

## 2019-10-26 RX ORDER — ALBUTEROL SULFATE 2.5 MG/3ML
2.5 SOLUTION RESPIRATORY (INHALATION) EVERY 6 HOURS PRN
Status: DISCONTINUED | OUTPATIENT
Start: 2019-10-26 | End: 2019-10-27

## 2019-10-26 RX ORDER — FLUTICASONE PROPIONATE 50 MCG
1 SPRAY, SUSPENSION (ML) NASAL 2 TIMES DAILY
Status: DISCONTINUED | OUTPATIENT
Start: 2019-10-27 | End: 2019-10-29 | Stop reason: HOSPADM

## 2019-10-26 RX ORDER — ACETAMINOPHEN 325 MG/1
650 TABLET ORAL EVERY 6 HOURS PRN
Status: DISCONTINUED | OUTPATIENT
Start: 2019-10-26 | End: 2019-10-28

## 2019-10-26 RX ORDER — LANOLIN ALCOHOL/MO/W.PET/CERES
3 CREAM (GRAM) TOPICAL
Status: DISCONTINUED | OUTPATIENT
Start: 2019-10-26 | End: 2019-10-29 | Stop reason: HOSPADM

## 2019-10-26 RX ORDER — FLUTICASONE FUROATE AND VILANTEROL 200; 25 UG/1; UG/1
1 POWDER RESPIRATORY (INHALATION) DAILY
Status: DISCONTINUED | OUTPATIENT
Start: 2019-10-27 | End: 2019-10-29 | Stop reason: HOSPADM

## 2019-10-26 RX ORDER — NALTREXONE HYDROCHLORIDE 50 MG/1
50 TABLET, FILM COATED ORAL DAILY
Status: DISCONTINUED | OUTPATIENT
Start: 2019-10-27 | End: 2019-10-29 | Stop reason: HOSPADM

## 2019-10-26 RX ORDER — DIVALPROEX SODIUM 500 MG/1
500 TABLET, EXTENDED RELEASE ORAL
Status: DISCONTINUED | OUTPATIENT
Start: 2019-10-26 | End: 2019-10-29 | Stop reason: HOSPADM

## 2019-10-26 RX ORDER — LORATADINE 10 MG/1
10 TABLET ORAL DAILY
Status: DISCONTINUED | OUTPATIENT
Start: 2019-10-27 | End: 2019-10-29 | Stop reason: HOSPADM

## 2019-10-26 RX ORDER — GABAPENTIN 300 MG/1
300 CAPSULE ORAL 3 TIMES DAILY
Status: DISCONTINUED | OUTPATIENT
Start: 2019-10-26 | End: 2019-10-29 | Stop reason: HOSPADM

## 2019-10-26 RX ORDER — QUETIAPINE FUMARATE 100 MG/1
300 TABLET, FILM COATED ORAL
Status: DISCONTINUED | OUTPATIENT
Start: 2019-10-26 | End: 2019-10-29 | Stop reason: HOSPADM

## 2019-10-26 RX ORDER — PANTOPRAZOLE SODIUM 40 MG/1
40 TABLET, DELAYED RELEASE ORAL
Status: DISCONTINUED | OUTPATIENT
Start: 2019-10-27 | End: 2019-10-29 | Stop reason: HOSPADM

## 2019-10-26 RX ORDER — LEVOTHYROXINE SODIUM 0.05 MG/1
50 TABLET ORAL
Status: DISCONTINUED | OUTPATIENT
Start: 2019-10-27 | End: 2019-10-29 | Stop reason: HOSPADM

## 2019-10-26 RX ORDER — HYDROXYZINE 50 MG/1
50 TABLET, FILM COATED ORAL 2 TIMES DAILY PRN
Status: DISCONTINUED | OUTPATIENT
Start: 2019-10-26 | End: 2019-10-29 | Stop reason: HOSPADM

## 2019-10-26 RX ADMIN — IODIXANOL 70 ML: 320 INJECTION, SOLUTION INTRAVASCULAR at 20:14

## 2019-10-26 RX ADMIN — QUETIAPINE FUMARATE 300 MG: 100 TABLET ORAL at 22:41

## 2019-10-26 RX ADMIN — MELATONIN 3 MG: 3 TAB ORAL at 22:41

## 2019-10-26 RX ADMIN — SODIUM CHLORIDE 1000 ML: 0.9 INJECTION, SOLUTION INTRAVENOUS at 18:54

## 2019-10-26 RX ADMIN — DIVALPROEX SODIUM 500 MG: 500 TABLET, FILM COATED, EXTENDED RELEASE ORAL at 22:41

## 2019-10-26 RX ADMIN — GABAPENTIN 300 MG: 300 CAPSULE ORAL at 22:41

## 2019-10-26 NOTE — LETTER
[address] [facility] MED SURG 7  [address] [address]  No information on file  October 27, 2019     Patient: Mindy Bender   YOB: 1970   Date of Visit: 10/26/2019       To Whom it May Concern:    Stefany Stone is under my professional care  She was seen in the hospital from 10/26/2019   to 10/27/19  She will be in the hospital until further notice  If you have any questions or concerns, please don't hesitate to call           Sincerely,          Josh Roque MD  [phone]

## 2019-10-26 NOTE — LETTER
[address] [facility] MED SURG 7  [address] [address]  No information on file  October 29, 2019     Patient: Deng Leos   YOB: 1970   Date of Visit: 10/26/2019       To Whom it May Concern:    Lefty Grace is under my professional care  She was seen in the hospital from 10/26/2019   to 10/29/19  She may return to work on 10/31/19 without limitations  If you have any questions or concerns, please don't hesitate to call           Sincerely,          Cristy Baca MD

## 2019-10-26 NOTE — ED PROVIDER NOTES
History  Chief Complaint   Patient presents with    Loss of Consciousness      heard patient fall, found unresponsive with blood in mouth, patient does not recall events  woke up confused with slow memory return  appears to have bitten tongue  no seizure history  Mirian Dewitt is a 53y o  year old female with PMH of depression on bupropion presenting to the [facility] ED for loss of consciousness  Approximately 1 hour prior to arrival, family heard a thump and found the patient unresponsive lying on floor  Patient was noted to be less responsive with her eyes rolled back and possible full body shaking  Patient was noted to has bitten tongue  No bowel/bladder incontinence  Patient was confused and lethargic for approximately 15 minutes after which she returned to baseline  Patient states she does not remember the events leading up to her fall and recalls awakening in the ambulance  Prior to this episode, patient states she did not have chest pain, shortness of breath cough, fever, lightheadedness, nausea/vomiting  Currently has no shoulder, elbow, wrist, hip, knee tenderness  Patient does have a history of DVT/PE provoked by prior pregnancy  Patient is not currently on anticoagulation  Patient states she has not have unilateral leg pain or swelling  Patient has no prior history of seizure  Patient has a Mirena in place for contraception  Of note, patient has a history mental health issues and has previously overdosed on psychiatric medications         History provided by:  Patient   used: No    Seizure - New Onset   Seizure activity on arrival: no    Seizure type:  Unable to specify  Preceding symptoms comment:  None  Episode characteristics: abnormal movements and disorientation    Postictal symptoms: confusion    Return to baseline: yes    Severity:  Severe  Duration:  10 minutes  Timing:  Once  Number of seizures this episode:  1  Progression:  Resolved  Context: change in medication    Context: not drug use and not fever    Recent head injury:  No recent head injuries  PTA treatment:  None  History of seizures: no        Prior to Admission Medications   Prescriptions Last Dose Informant Patient Reported? Taking? Aspirin-Acetaminophen-Caffeine (MIGRAINE FORMULA PO)  Self Yes No   Sig: Take by mouth   BREO ELLIPTA 200-25 MCG/INH inhaler  Self Yes No   [employer] INDOOR/OUTDOOR ALLERGY RLF 10 MG tablet  Self Yes No   Sig: Take 10 mg by mouth daily   DULoxetine (CYMBALTA) 30 mg delayed release capsule   No No   Sig: Take 3 capsules (90 mg total) by mouth daily   EPINEPHrine (EPIPEN) 0 3 mg/0 3 mL SOAJ  Self Yes No   Sig: as directed   Erenumab-aooe (AIMOVIG) 140 MG/ML SOAJ   No No   Sig: Inject 1 mL under the skin every 30 (thirty) days   PROAIR RESPICLICK 183 (90 Base) MCG/ACT AEPB  Self No No   Sig: Inhale 2 puffs every 6 (six) hours as needed (wheezing)   QUEtiapine (SEROquel) 300 mg tablet   No No   Sig: Take 1 tablet (300 mg total) by mouth daily at bedtime   SPIRIVA RESPIMAT 2 5 MCG/ACT AERS  Self Yes No   ZOLMitriptan (ZOMIG-ZMT) 5 MG disintegrating tablet   No No   Sig: TAKE 1 TABLET BY MOUTH AT ONSET OF HEADACHE, MAY REPEAT AFTER 2 HOURS AS NEEDED  MAX 2 TABLETS per 24 hours     albuterol (2 5 mg/3 mL) 0 083 % nebulizer solution  Self Yes No   Sig: Inhale   baclofen 10 mg tablet  Self No No   Sig: Take 1 tablet (10 mg total) by mouth daily at bedtime   buPROPion (WELLBUTRIN XL) 150 mg 24 hr tablet   No No   Sig: Take 1 tablet (150 mg total) by mouth daily Total Wellbutrin XL dose is 450 mg daily   buPROPion (WELLBUTRIN XL) 300 mg 24 hr tablet   No No   Sig: Take 1 tablet (300 mg total) by mouth daily Total Wellbutrin XL dose is 450 mg daily   divalproex sodium (DEPAKOTE ER) 500 mg 24 hr tablet   No No   Sig: Take 1 tablet (500 mg total) by mouth daily at bedtime   ergocalciferol (VITAMIN D2) 50,000 units  Self Yes No   Sig: TK 1 C WEEKLY   gabapentin (NEURONTIN) 300 mg capsule   No No   Sig: Take 1 capsule (300 mg total) by mouth 3 (three) times a day   hydrOXYzine HCL (ATARAX) 50 mg tablet   No No   Sig: Take 1 tablet (50 mg total) by mouth 2 (two) times a day as needed for anxiety   ibuprofen (MOTRIN) 800 mg tablet   No No   Sig: Take 1 tablet (800 mg total) by mouth every 6 (six) hours as needed for mild pain   levonorgestrel (MIRENA, 52 MG,) 20 MCG/24HR IUD  Self Yes No   Sig: by Intrauterine route   levothyroxine 50 mcg tablet   No No   Sig: Take 1 tablet (50 mcg total) by mouth daily in the early morning   melatonin 3 mg   No No   Sig: Take 1 tablet (3 mg total) by mouth daily at bedtime   mometasone (NASONEX) 50 mcg/act nasal spray  Self Yes No   naltrexone (REVIA) 50 mg tablet   No No   Sig: Take 1 tablet (50 mg total) by mouth daily   olopatadine (PATANOL) 0 1 % ophthalmic solution   No No   Sig: Administer 1 drop to both eyes 2 (two) times a day for 90 days   omeprazole (PriLOSEC) 20 mg delayed release capsule  Self Yes No   Sig: Take 1 capsule by mouth daily   ondansetron (ZOFRAN-ODT) 4 mg disintegrating tablet  Self No No   Sig: Take 1 tablet (4 mg total) by mouth every 6 (six) hours as needed for nausea or vomiting   phenazopyridine (PYRIDIUM) 100 mg tablet  Self No No   Sig: Take 1 tablet (100 mg total) by mouth 3 (three) times a day as needed for bladder spasms      Facility-Administered Medications: None       Past Medical History:   Diagnosis Date    Amenorrhea     Anxiety     Asthma     Back pain     Chondromalacia of patella     Chronic fatigue     Deep vein thrombophlebitis of leg (HCC)     Depression     GERD (gastroesophageal reflux disease)     HPV (human papilloma virus) infection     Hypothyroidism     Lumbar radiculopathy     Migraine     Myofascial pain syndrome     Osteopenia     Piriformis syndrome     Sacroiliitis (HCC)     Sciatica     Sleep apnea     Spondylosis of lumbar spine     Trochanteric bursitis of right hip     Vertigo  Vitamin D deficiency        Past Surgical History:   Procedure Laterality Date    CERVIX LESION DESTRUCTION       SECTION      COLONOSCOPY      COLPOSCOPY W/ BIOPSY / CURETTAGE      Colposcopy cervix with biopsy    LAPAROSCOPIC ENDOMETRIOSIS FULGURATION      LAPAROSCOPY      TOOTH EXTRACTION         Family History   Problem Relation Age of Onset    Heart disease Mother     Asthma Daughter     Lung cancer Paternal Grandfather     Asthma Daughter     Colon cancer Father     Colon cancer Maternal Grandfather     Prostate cancer Maternal Grandfather     Colon cancer Maternal Aunt     No Known Problems Maternal Grandmother     No Known Problems Paternal Grandmother     No Known Problems Maternal Aunt     No Known Problems Maternal Aunt     No Known Problems Maternal Aunt     No Known Problems Paternal Aunt     Psychiatric Illness Neg Hx      I have reviewed and agree with the history as documented  Social History     Tobacco Use    Smoking status: Never Smoker    Smokeless tobacco: Never Used   Substance Use Topics    Alcohol use: Not Currently     Frequency: Never     Binge frequency: Never    Drug use: Not Currently        Review of Systems   Constitutional: Negative for chills and fever  HENT: Negative for congestion and rhinorrhea  Eyes: Negative for photophobia and visual disturbance  Respiratory: Negative for cough, choking, chest tightness, shortness of breath and wheezing  Cardiovascular: Negative for chest pain and leg swelling  Gastrointestinal: Negative for abdominal distention, abdominal pain, constipation, diarrhea, nausea and vomiting  Endocrine: Negative for polyuria  Genitourinary: Negative for difficulty urinating, dysuria, flank pain and hematuria  Musculoskeletal: Negative for arthralgias, neck pain and neck stiffness  Skin: Negative for rash  Neurological: Positive for seizures and syncope  Negative for light-headedness  Psychiatric/Behavioral: Positive for confusion  Negative for behavioral problems and hallucinations  All other systems reviewed and are negative  Physical Exam  ED Triage Vitals   Temperature Pulse Respirations Blood Pressure SpO2   10/26/19 1808 10/26/19 1808 10/26/19 1808 10/26/19 1808 10/26/19 1808   98 °F (36 7 °C) 98 16 117/72 97 %      Temp src Heart Rate Source Patient Position - Orthostatic VS BP Location FiO2 (%)   -- 10/26/19 2132 10/27/19 0728 10/27/19 0728 --    Monitor Sitting Left arm       Pain Score       10/26/19 1808       2             Orthostatic Vital Signs  Vitals:    10/27/19 0728 10/27/19 1545 10/27/19 2238 10/28/19 0805   BP: 102/58 101/58 101/55 (!) 88/55   Pulse: 80 79 96 78   Patient Position - Orthostatic VS: Sitting          Physical Exam   Constitutional: She is oriented to person, place, and time  She appears well-developed and well-nourished  Non-toxic appearance  She does not appear ill  No distress  HENT:   Head: Normocephalic and atraumatic  Eyes: Pupils are equal, round, and reactive to light  EOM are normal    Neck: No JVD present  Cardiovascular: Normal rate, regular rhythm and intact distal pulses  No murmur heard  Pulses:       Radial pulses are 2+ on the right side, and 2+ on the left side  Pulmonary/Chest: Effort normal and breath sounds normal  No accessory muscle usage or stridor  No respiratory distress  She has no decreased breath sounds  She has no wheezes  She has no rhonchi  She has no rales  Abdominal: Soft  Bowel sounds are normal  She exhibits no distension  There is no tenderness  There is no rebound and no guarding  Musculoskeletal:        Right elbow: No tenderness found  Left elbow: No tenderness found  Right wrist: She exhibits no bony tenderness  Left wrist: She exhibits no bony tenderness  Right hip: She exhibits no bony tenderness  Left hip: She exhibits no bony tenderness          Right knee: No tenderness found  Left knee: No tenderness found  Cervical back: She exhibits bony tenderness  Thoracic back: She exhibits no bony tenderness  Lumbar back: She exhibits no bony tenderness  Right lower leg: She exhibits no tenderness and no edema  Left lower leg: She exhibits no tenderness and no edema  Neurological: She is alert and oriented to person, place, and time  She has normal strength  She is not disoriented  No cranial nerve deficit  She exhibits normal muscle tone  She displays a negative Romberg sign  She displays no seizure activity  Coordination normal  GCS eye subscore is 4  GCS verbal subscore is 5  GCS motor subscore is 6  Normal FNF  No nystagmus  Patient ambulatory without deficit  Skin: Capillary refill takes less than 2 seconds  No rash noted  Psychiatric: She has a normal mood and affect  Her behavior is normal    Nursing note and vitals reviewed        ED Medications  Medications   divalproex sodium (DEPAKOTE ER) 24 hr tablet 500 mg (500 mg Oral Given 10/27/19 2140)   fluticasone-vilanterol (BREO ELLIPTA) 200-25 MCG/INH inhaler 1 puff (1 puff Inhalation Not Given 10/28/19 1002)   loratadine (CLARITIN) tablet 10 mg (10 mg Oral Given 10/28/19 1001)   gabapentin (NEURONTIN) capsule 300 mg (300 mg Oral Given 10/28/19 1000)   hydrOXYzine HCL (ATARAX) tablet 50 mg (has no administration in time range)   levothyroxine tablet 50 mcg (50 mcg Oral Given 10/28/19 0543)   melatonin tablet 3 mg (3 mg Oral Given 10/27/19 2140)   fluticasone (FLONASE) 50 mcg/act nasal spray 1 spray (1 spray Each Nare Not Given 10/28/19 1002)   QUEtiapine (SEROquel) tablet 300 mg (300 mg Oral Given 10/27/19 2140)   pantoprazole (PROTONIX) EC tablet 40 mg (40 mg Oral Given 10/28/19 0543)   naltrexone (REVIA) tablet 50 mg (50 mg Oral Given 10/28/19 1002)   enoxaparin (LOVENOX) subcutaneous injection 40 mg (40 mg Subcutaneous Given 10/28/19 1000)   albuterol inhalation solution 2 5 mg (has no administration in time range)   acetaminophen (TYLENOL) tablet 650 mg (has no administration in time range)   sodium chloride 0 9 % bolus 1,000 mL (0 mL Intravenous Stopped 10/26/19 2041)   iodixanol (VISIPAQUE) 320 MG/ML injection 85 mL (70 mL Intravenous Given 10/26/19 2014)       Diagnostic Studies  Results Reviewed     Procedure Component Value Units Date/Time    Troponin I [[id number]]  (Normal) Collected:  10/26/19 1918    Lab Status:  Final result Specimen:  Blood from Arm, Left Updated:  10/26/19 1954     Troponin I <0 02 ng/mL     Comprehensive metabolic panel [[id number]]  (Abnormal) Collected:  10/26/19 1828    Lab Status:  Final result Specimen:  Blood from Arm, Right Updated:  10/26/19 1902     Sodium 143 mmol/L      Potassium 4 0 mmol/L      Chloride 110 mmol/L      CO2 25 mmol/L      ANION GAP 8 mmol/L      BUN 32 mg/dL      Creatinine 1 14 mg/dL      Glucose 76 mg/dL      Calcium 8 6 mg/dL      AST 13 U/L      ALT 15 U/L      Alkaline Phosphatase 61 U/L      Total Protein 6 6 g/dL      Albumin 4 2 g/dL      Total Bilirubin 0 37 mg/dL      eGFR 57 ml/min/1 73sq m     Narrative:       [place] guidelines for Chronic Kidney Disease (CKD):     Stage 1 with normal or high GFR (GFR > 90 mL/min/1 73 square meters)    Stage 2 Mild CKD (GFR = 60-89 mL/min/1 73 square meters)    Stage 3A Moderate CKD (GFR = 45-59 mL/min/1 73 square meters)    Stage 3B Moderate CKD (GFR = 30-44 mL/min/1 73 square meters)    Stage 4 Severe CKD (GFR = 15-29 mL/min/1 73 square meters)    Stage 5 End Stage CKD (GFR <15 mL/min/1 73 square meters)  Note: GFR calculation is accurate only with a steady state creatinine    D-Dimer [[id number]]  (Abnormal) Collected:  10/26/19 1828    Lab Status:  Final result Specimen:  Blood from Arm, Right Updated:  10/26/19 1900     D-Dimer, Quant 1 00 ug/ml FEU     CBC and differential [[id number]]  (Abnormal) Collected:  10/26/19 1828    Lab Status:  Final result Specimen:  Blood from Arm, Right Updated:  10/26/19 1836     WBC 5 87 Thousand/uL      RBC 3 73 Million/uL      Hemoglobin 11 1 g/dL      Hematocrit 33 8 %      MCV 91 fL      MCH 29 8 pg      MCHC 32 8 g/dL      RDW 12 3 %      MPV 9 7 fL      Platelets 973 Thousands/uL      nRBC 0 /100 WBCs      Neutrophils Relative 51 %      Immat GRANS % 1 %      Lymphocytes Relative 34 %      Monocytes Relative 12 %      Eosinophils Relative 1 %      Basophils Relative 1 %      Neutrophils Absolute 3 06 Thousands/µL      Immature Grans Absolute 0 03 Thousand/uL      Lymphocytes Absolute 2 00 Thousands/µL      Monocytes Absolute 0 69 Thousand/µL      Eosinophils Absolute 0 05 Thousand/µL      Basophils Absolute 0 04 Thousands/µL                  CTA ED chest PE study   Final Result by Vivi Gallegos DO (10/26 2123)      No evidence of pulmonary embolus                  Workstation performed: [id number]         CT head without contrast   Final Result by Vivi Gallegos DO (10/26 1939)      No acute intracranial abnormality  Workstation performed: [id number]         CT cervical spine without contrast   Final Result by Vivi Gallegos DO (10/26 1941)      No cervical spine fracture or traumatic malalignment  Nodular airspace opacities in the lung apices which may represent infection versus atelectasis  Clinical correlation is recommended  Workstation performed: [id number]         XR chest 2 views   Final Result by Natalie Hernandez MD (10/27 1051)      No acute cardiopulmonary disease        Stable exam      Workstation performed: [id number]         MRI brain seizure wo and w contrast    (Results Pending)         Procedures  Procedures        ED Course  ED Course as of Oct 28 1110   Sat Oct 26, 2019   [address] Procedure Note: EKG  Date/Time: 10/26/19 6:40 PM   Interpreted by: Bunny Grady DO  Indications / Diagnosis: Syncope  ECG reviewed by me, the ED Provider: yes   The EKG demonstrates:  Rhythm: Normal sinus @ 99  Intervals: Normal NE and QT intervals  Axis: Right axis  QRS/Blocks: Normal QRS  ST Changes: No acute ST Changes, no STD/JAYA  Ángel Morales' Criteria for PE      Most Recent Value   Chadwick' Criteria for PE   Clinical signs and symptoms of DVT  0 Filed at: 10/26/2019 2051   PE is primary diagnosis or equally likely  0 Filed at: 10/26/2019 1915   HR >100  1 5 Filed at: 10/26/2019 1915   Immobilization at least 3 days or Surgery in the previous 4 weeks  0 Filed at: 10/26/2019 1915   Previous, objectively diagnosed PE or DVT  1 5 Filed at: 10/26/2019 1915   Hemoptysis  0 Filed at: 10/26/2019 1915   Malignancy with treatment within 6 months or palliative  0 Filed at: 10/26/2019 1915   Chadwick' Criteria Total  3 Filed at: 10/26/2019 1915            MDM  Number of Diagnoses or Management Options  Seizure [facility]):   Diagnosis management comments: 53 y o  female presenting for loss of consciousness  Symptoms concerning for seizure versus syncope  Postictal confusion and tongue-biting make seizure more likely  CT head/cervical spine: Negative for acute injury  Positive D-dimer  CT PE: Negative for PE  Labs: Within normal limits  EKG:  No ischemic changes  Vital signs stable emergency department  Patient care discussed with Dr Elayne Montoya who will assume care and admit patient to the hospital  I have discussed with the patient our recommendation of inpatient admission for further medical care  I have answered all of the patient's questions and concerns   The patient is in agreement with the plan to proceed with admission to the hospital               Amount and/or Complexity of Data Reviewed  Clinical lab tests: ordered and reviewed  Tests in the radiology section of CPT®: ordered and reviewed  Obtain history from someone other than the patient: yes  Review and summarize past medical records: yes  Discuss the patient with other providers: yes    Patient Progress  Patient progress: stable      Disposition  Final diagnoses:   Seizure ([place] [address] )     Time reflects when diagnosis was documented in both MDM as applicable and the Disposition within this note     Time User Action Codes Description Comment    10/26/2019  9:11 PM Ramosqasim Gonsalesz Add [R56 9] Seizure ([place] [address] )     10/26/2019 10:12 PM Maria Guadalupe Ilda D Add [F33 41] Major depressive disorder, recurrent episode, in partial remission ([place] [address] )     10/26/2019 10:12 PM Maria Guadalupe Ilda D Modify [F33 41] Major depressive disorder, recurrent episode, in partial remission ([place] [address] )     10/26/2019 10:12 PM Maria Guadalupe Ilda D Add [F41 0] Panic disorder without agoraphobia     10/26/2019 10:12 PM Maria Guadalupe Ilda D Modify [F41 0] Panic disorder without agoraphobia       ED Disposition     None      Follow-up Information    None         Current Discharge Medication List      CONTINUE these medications which have NOT CHANGED    Details   albuterol (2 5 mg/3 mL) 0 083 % nebulizer solution Inhale      Aspirin-Acetaminophen-Caffeine (MIGRAINE FORMULA PO) Take by mouth      baclofen 10 mg tablet Take 1 tablet (10 mg total) by mouth daily at bedtime  Qty: 30 tablet, Refills: 0    Comments: Take 10 mg q h s, and daily p r n  For severe pain  Associated Diagnoses: Intractable chronic migraine without aura and with status migrainosus      BREO ELLIPTA 200-25 MCG/INH inhaler       !! buPROPion (WELLBUTRIN XL) 150 mg 24 hr tablet Take 1 tablet (150 mg total) by mouth daily Total Wellbutrin XL dose is 450 mg daily  Qty: 30 tablet, Refills: 3    Associated Diagnoses: Major depressive disorder, recurrent episode, in partial remission ([place] [address] )      ! !  buPROPion (WELLBUTRIN XL) 300 mg 24 hr tablet Take 1 tablet (300 mg total) by mouth daily Total Wellbutrin XL dose is 450 mg daily  Qty: 30 tablet, Refills: 3    Associated Diagnoses: Major depressive disorder, recurrent episode, in partial remission ([facility])      [employer] INDOOR/OUTDOOR ALLERGY RLF 10 MG tablet Take 10 mg by mouth daily  Refills: 10      divalproex sodium (DEPAKOTE ER) 500 mg 24 hr tablet Take 1 tablet (500 mg total) by mouth daily at bedtime  Qty: 30 tablet, Refills: 3    Associated Diagnoses: Impulse control disorder; Major depressive disorder, recurrent episode, in partial remission (HCC)      DULoxetine (CYMBALTA) 30 mg delayed release capsule Take 3 capsules (90 mg total) by mouth daily  Qty: 90 capsule, Refills: 3    Associated Diagnoses: KYLE (generalized anxiety disorder);  Major depressive disorder, recurrent episode, in partial remission (HCC)      EPINEPHrine (EPIPEN) 0 3 mg/0 3 mL SOAJ as directed      Erenumab-aooe (AIMOVIG) 140 MG/ML SOAJ Inject 1 mL under the skin every 30 (thirty) days  Qty: 1 pen, Refills: 3    Associated Diagnoses: Chronic migraine without aura without status migrainosus, not intractable      ergocalciferol (VITAMIN D2) 50,000 units TK 1 C WEEKLY  Refills: 0      gabapentin (NEURONTIN) 300 mg capsule Take 1 capsule (300 mg total) by mouth 3 (three) times a day  Qty: 90 capsule, Refills: 0    Associated Diagnoses: Chronic migraine without aura; KYLE (generalized anxiety disorder)      hydrOXYzine HCL (ATARAX) 50 mg tablet Take 1 tablet (50 mg total) by mouth 2 (two) times a day as needed for anxiety  Qty: 60 tablet, Refills: 3    Associated Diagnoses: KYLE (generalized anxiety disorder)      ibuprofen (MOTRIN) 800 mg tablet Take 1 tablet (800 mg total) by mouth every 6 (six) hours as needed for mild pain  Qty: 90 tablet, Refills: 3    Associated Diagnoses: Dysmenorrhea      levonorgestrel (MIRENA, 52 MG,) 20 MCG/24HR IUD by Intrauterine route      levothyroxine 50 mcg tablet Take 1 tablet (50 mcg total) by mouth daily in the early morning  Refills: 0    Associated Diagnoses: Hypothyroidism (acquired)      melatonin 3 mg Take 1 tablet (3 mg total) by mouth daily at bedtime  Qty: 30 tablet, Refills: 0    Associated Diagnoses: Other insomnia      mometasone (NASONEX) 50 mcg/act nasal spray       naltrexone (REVIA) 50 mg tablet Take 1 tablet (50 mg total) by mouth daily  Qty: 30 tablet, Refills: 3    Associated Diagnoses: Impulse control disorder      olopatadine (PATANOL) 0 1 % ophthalmic solution Administer 1 drop to both eyes 2 (two) times a day for 90 days  Qty: 10 mL, Refills: 11    Associated Diagnoses: Allergic conjunctivitis, bilateral      omeprazole (PriLOSEC) 20 mg delayed release capsule Take 1 capsule by mouth daily      ondansetron (ZOFRAN-ODT) 4 mg disintegrating tablet Take 1 tablet (4 mg total) by mouth every 6 (six) hours as needed for nausea or vomiting  Qty: 20 tablet, Refills: 0    Associated Diagnoses: Chronic migraine without aura without status migrainosus, not intractable      phenazopyridine (PYRIDIUM) 100 mg tablet Take 1 tablet (100 mg total) by mouth 3 (three) times a day as needed for bladder spasms  Qty: 3 tablet, Refills: 0    Associated Diagnoses: Urinary tract infection symptoms      PROAIR RESPICLICK 373 (90 Base) MCG/ACT AEPB Inhale 2 puffs every 6 (six) hours as needed (wheezing)  Qty: 1 each, Refills: 3    Associated Diagnoses: Uncomplicated asthma, unspecified asthma severity, unspecified whether persistent      QUEtiapine (SEROquel) 300 mg tablet Take 1 tablet (300 mg total) by mouth daily at bedtime  Qty: 30 tablet, Refills: 3    Associated Diagnoses: Major depressive disorder, recurrent episode, in partial remission (HCC)      SPIRIVA RESPIMAT 2 5 MCG/ACT AERS       ZOLMitriptan (ZOMIG-ZMT) 5 MG disintegrating tablet TAKE 1 TABLET BY MOUTH AT ONSET OF HEADACHE, MAY REPEAT AFTER 2 HOURS AS NEEDED  MAX 2 TABLETS per 24 hours  Qty: 12 tablet, Refills: 3    Comments: Do not refill under one month  Associated Diagnoses: Chronic migraine without aura without status migrainosus, not intractable       !! - Potential duplicate medications found  Please discuss with provider  No discharge procedures on file  ED Provider  Attending physically available and evaluated Giuliano Cole I managed the patient along with the ED Attending      Electronically Signed by DO Wagner Pelletier DO  10/28/19 5374

## 2019-10-27 LAB
ANION GAP SERPL CALCULATED.3IONS-SCNC: 7 MMOL/L (ref 4–13)
ATRIAL RATE: 101 BPM
ATRIAL RATE: 99 BPM
BUN SERPL-MCNC: 23 MG/DL (ref 5–25)
CALCIUM SERPL-MCNC: 8 MG/DL (ref 8.3–10.1)
CHLORIDE SERPL-SCNC: 113 MMOL/L (ref 100–108)
CO2 SERPL-SCNC: 24 MMOL/L (ref 21–32)
CREAT SERPL-MCNC: 0.76 MG/DL (ref 0.6–1.3)
ERYTHROCYTE [DISTWIDTH] IN BLOOD BY AUTOMATED COUNT: 12.3 % (ref 11.6–15.1)
GFR SERPL CREATININE-BSD FRML MDRD: 94 ML/MIN/1.73SQ M
GLUCOSE P FAST SERPL-MCNC: 83 MG/DL (ref 65–99)
GLUCOSE SERPL-MCNC: 83 MG/DL (ref 65–140)
HCT VFR BLD AUTO: 29.7 % (ref 34.8–46.1)
HGB BLD-MCNC: 9.5 G/DL (ref 11.5–15.4)
MAGNESIUM SERPL-MCNC: 1.9 MG/DL (ref 1.6–2.6)
MCH RBC QN AUTO: 29.6 PG (ref 26.8–34.3)
MCHC RBC AUTO-ENTMCNC: 32 G/DL (ref 31.4–37.4)
MCV RBC AUTO: 93 FL (ref 82–98)
P AXIS: 113 DEGREES
P AXIS: 60 DEGREES
PLATELET # BLD AUTO: 158 THOUSANDS/UL (ref 149–390)
PMV BLD AUTO: 9.3 FL (ref 8.9–12.7)
POTASSIUM SERPL-SCNC: 3.8 MMOL/L (ref 3.5–5.3)
PR INTERVAL: 152 MS
PR INTERVAL: 156 MS
QRS AXIS: -87 DEGREES
QRS AXIS: 265 DEGREES
QRSD INTERVAL: 90 MS
QRSD INTERVAL: 98 MS
QT INTERVAL: 360 MS
QT INTERVAL: 372 MS
QTC INTERVAL: 462 MS
QTC INTERVAL: 482 MS
RBC # BLD AUTO: 3.21 MILLION/UL (ref 3.81–5.12)
SODIUM SERPL-SCNC: 144 MMOL/L (ref 136–145)
T WAVE AXIS: 117 DEGREES
T WAVE AXIS: 61 DEGREES
VENTRICULAR RATE: 101 BPM
VENTRICULAR RATE: 99 BPM
WBC # BLD AUTO: 6.02 THOUSAND/UL (ref 4.31–10.16)

## 2019-10-27 PROCEDURE — 85027 COMPLETE CBC AUTOMATED: CPT | Performed by: INTERNAL MEDICINE

## 2019-10-27 PROCEDURE — 80048 BASIC METABOLIC PNL TOTAL CA: CPT | Performed by: INTERNAL MEDICINE

## 2019-10-27 PROCEDURE — 93010 ELECTROCARDIOGRAM REPORT: CPT | Performed by: INTERNAL MEDICINE

## 2019-10-27 PROCEDURE — 99245 OFF/OP CONSLTJ NEW/EST HI 55: CPT | Performed by: PSYCHIATRY & NEUROLOGY

## 2019-10-27 PROCEDURE — 99232 SBSQ HOSP IP/OBS MODERATE 35: CPT | Performed by: INTERNAL MEDICINE

## 2019-10-27 PROCEDURE — 83735 ASSAY OF MAGNESIUM: CPT | Performed by: INTERNAL MEDICINE

## 2019-10-27 RX ORDER — ALBUTEROL SULFATE 2.5 MG/3ML
2.5 SOLUTION RESPIRATORY (INHALATION) EVERY 4 HOURS PRN
Status: DISCONTINUED | OUTPATIENT
Start: 2019-10-27 | End: 2019-10-29 | Stop reason: HOSPADM

## 2019-10-27 RX ADMIN — DIVALPROEX SODIUM 500 MG: 500 TABLET, FILM COATED, EXTENDED RELEASE ORAL at 21:40

## 2019-10-27 RX ADMIN — LEVOTHYROXINE SODIUM 50 MCG: 50 TABLET ORAL at 05:26

## 2019-10-27 RX ADMIN — NALTREXONE HYDROCHLORIDE 50 MG: 50 TABLET, FILM COATED ORAL at 09:35

## 2019-10-27 RX ADMIN — LORATADINE 10 MG: 10 TABLET ORAL at 09:35

## 2019-10-27 RX ADMIN — MELATONIN 3 MG: 3 TAB ORAL at 21:40

## 2019-10-27 RX ADMIN — QUETIAPINE FUMARATE 300 MG: 100 TABLET ORAL at 21:40

## 2019-10-27 RX ADMIN — GABAPENTIN 300 MG: 300 CAPSULE ORAL at 09:35

## 2019-10-27 RX ADMIN — GABAPENTIN 300 MG: 300 CAPSULE ORAL at 16:26

## 2019-10-27 RX ADMIN — PANTOPRAZOLE SODIUM 40 MG: 40 TABLET, DELAYED RELEASE ORAL at 05:26

## 2019-10-27 RX ADMIN — ENOXAPARIN SODIUM 40 MG: 40 INJECTION SUBCUTANEOUS at 09:35

## 2019-10-27 RX ADMIN — GABAPENTIN 300 MG: 300 CAPSULE ORAL at 21:40

## 2019-10-27 NOTE — ASSESSMENT & PLAN NOTE
· With recent hospitalization for suicide attempt with Klonopin overdose and subsequent inpatient psychiatric hospitalization for which Cymbalta and Seroquel were initiated  · Given above events will hold Cymbalta, Wellbutrin for now pending psychiatry evaluation  · Currently denies suicidal/homicidal ideation

## 2019-10-27 NOTE — ASSESSMENT & PLAN NOTE
· Controlled, continue home medications for this  · Klonopin was weaned off at recent inpatient psychiatric hospitalization, will avoid benzodiazepines unless patient has recurrent and persistent seizure-like activity

## 2019-10-27 NOTE — PROGRESS NOTES
Progress Note - Dayton Pendleton 1970, 53 y o  female MRN: [id number]    Unit/Bed#: -01 Encounter: [id number]    Primary Care Provider: Tyler Prince DO   Date and time admitted to hospital: 10/26/2019  5:33 PM        Headache, chronic migraine without aura, intractable  Assessment & Plan  · Controlled and will continue home medications for this    Panic disorder without agoraphobia  Assessment & Plan  · Controlled, continue home medications for this  · Klonopin was weaned off at recent inpatient psychiatric hospitalization, will avoid benzodiazepines unless patient has recurrent and persistent seizure-like activity    Major depressive disorder, recurrent episode, in partial remission ([facility] [address] )  Assessment & Plan  · With recent hospitalization for suicide attempt with Klonopin overdose and subsequent inpatient psychiatric hospitalization for which Cymbalta and Seroquel were initiated  · Given above events will hold Cymbalta, Wellbutrin for now pending psychiatry evaluation  · Currently denies suicidal/homicidal ideation    Asthma  Assessment & Plan  · Not in acute exacerbation and will continue home inhalers/nebulizers    * Observed seizure-like activity ([facility] [address] )  Assessment & Plan  · Apparently witnessed by  to have fall with apparent seizure-like activity; examination significant for apparent right tongue bite  · Patient is on multiple psychiatric medications which may lower seizure threshold, had recent inpatient psychiatric hospitalization for which Cymbalta and Seroquel were initiated  · Patient with no further seizure-like activity reported in ED, neurologic examination unremarkable and CT head negative for acute intracranial pathology  · EEG pending  · Seizure precautions  · Will appreciate Neurology input  · Additionally will ask for psychiatric input given recent changes in medications to determine if medication adjustment is required in setting of above           VTE Pharmacologic Prophylaxis: Pharmacologic: Apixaban (Eliquis)  Mechanical VTE Prophylaxis in Place: Yes    Patient Centered Rounds: I have performed bedside rounds with nursing staff today  Discussions with Specialists or Other Care Team Provider:      Education and Discussions with Family / Patient: patient     Time Spent for Care: 45 minutes  More than 50% of total time spent on counseling and coordination of care as described above  Current Length of Stay: 0 day(s)    Current Patient Status: Observation   Certification Statement: The patient will continue to require additional inpatient hospital stay due to seizure    Discharge Plan: possible tomorrow  Code Status: Level 1 - Full Code      Subjective:   Patient is doing well  Has a lot of questions  Objective:     Vitals:   Temp (24hrs), Av °F (36 7 °C), Min:98 °F (36 7 °C), Max:98 °F (36 7 °C)    Temp:  [98 °F (36 7 °C)] 98 °F (36 7 °C)  HR:  [80-98] 80  Resp:  [16-19] 19  BP: ()/(48-72) 102/58  SpO2:  [95 %-97 %] 97 %  There is no height or weight on file to calculate BMI  Input and Output Summary (last 24 hours): Intake/Output Summary (Last 24 hours) at 10/27/2019 1510  Last data filed at 10/27/2019 0900  Gross per 24 hour   Intake 1000 ml   Output    Net 1000 ml       Physical Exam:     Physical Exam   Constitutional: She is oriented to person, place, and time  She appears well-developed and well-nourished  HENT:   Head: Normocephalic and atraumatic  Right Ear: External ear normal    Left Ear: External ear normal    Nose: Nose normal    Mouth/Throat: Oropharynx is clear and moist  No oropharyngeal exudate  Eyes: Pupils are equal, round, and reactive to light  Conjunctivae and EOM are normal  Right eye exhibits no discharge  Left eye exhibits no discharge  No scleral icterus  Neck: Normal range of motion  Neck supple  No JVD present  No tracheal deviation present  No thyromegaly present     Cardiovascular: Normal rate, regular rhythm, normal heart sounds and intact distal pulses  Exam reveals no gallop and no friction rub  No murmur heard  Pulmonary/Chest: Effort normal and breath sounds normal  No stridor  No respiratory distress  She has no wheezes  She has no rales  She exhibits no tenderness  Abdominal: Soft  Bowel sounds are normal  She exhibits no distension and no mass  There is no tenderness  There is no rebound and no guarding  No hernia  Musculoskeletal: Normal range of motion  She exhibits no edema, tenderness or deformity  Lymphadenopathy:     She has no cervical adenopathy  Neurological: She is alert and oriented to person, place, and time  She displays normal reflexes  No cranial nerve deficit or sensory deficit  She exhibits normal muscle tone  Coordination normal    Skin: Skin is warm and dry  No rash noted  No erythema  No pallor  Psychiatric: She has a normal mood and affect  Her behavior is normal  Judgment and thought content normal    Nursing note and vitals reviewed  Additional Data:     Labs:    Results from last 7 days   Lab Units 10/27/19  0609 10/26/19  1828   WBC Thousand/uL 6 02 5 87   HEMOGLOBIN g/dL 9 5* 11 1*   HEMATOCRIT % 29 7* 33 8*   PLATELETS Thousands/uL 158 195   NEUTROS PCT %  --  51   LYMPHS PCT %  --  34   MONOS PCT %  --  12   EOS PCT %  --  1     Results from last 7 days   Lab Units 10/27/19  0609 10/26/19  1828   POTASSIUM mmol/L 3 8 4 0   CHLORIDE mmol/L 113* 110*   CO2 mmol/L 24 25   BUN mg/dL 23 32*   CREATININE mg/dL 0 76 1 14   CALCIUM mg/dL 8 0* 8 6   ALK PHOS U/L  --  61   ALT U/L  --  15   AST U/L  --  13           * I Have Reviewed All Lab Data Listed Above  * Additional Pertinent Lab Tests Reviewed:  [facility] Admission Reviewed    Imaging:    Imaging Reports Reviewed Today Include:    Imaging Personally Reviewed by Myself Includes:      Recent Cultures (last 7 days):           Last 24 Hours Medication List:     Current Facility-Administered Medications:  acetaminophen 650 mg Oral Q6H PRN Ken Smith, DO   albuterol 2 5 mg Nebulization Q4H PRN Ken Smith, DO   divalproex sodium 500 mg Oral HS Ken Smith, DO   enoxaparin 40 mg Subcutaneous Daily Ken Smith, DO   fluticasone 1 spray Each Nare BID Ken Smith, DO   fluticasone-vilanterol 1 puff Inhalation Daily Ken Smith, DO   gabapentin 300 mg Oral TID Ken Smith, DO   hydrOXYzine HCL 50 mg Oral BID PRN Ken Smith, DO   levothyroxine 50 mcg Oral Early Morning Ken Smith, DO   loratadine 10 mg Oral Daily Ken Smith, DO   melatonin 3 mg Oral HS Ken Smith, DO   naltrexone 50 mg Oral Daily Ken Smith, DO   pantoprazole 40 mg Oral Early Morning Ken Smith, DO   QUEtiapine 300 mg Oral HS Ken mSith, DO        Today, Patient Was Seen By: Eufemia Santiago MD    ** Please Note: Dictation voice to text software may have been used in the creation of this document   **

## 2019-10-27 NOTE — ASSESSMENT & PLAN NOTE
· Apparently witnessed by  to have fall with apparent seizure-like activity; examination significant for apparent right tongue bite  · Patient is on multiple psychiatric medications which may lower seizure threshold, had recent inpatient psychiatric hospitalization for which Cymbalta and Seroquel were initiated  · Patient with no further seizure-like activity reported in ED, neurologic examination unremarkable and CT head negative for acute intracranial pathology  · EEG pending  · Seizure precautions  · Will appreciate Neurology input  · Additionally will ask for psychiatric input given recent changes in medications to determine if medication adjustment is required in setting of above
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
## 2019-10-27 NOTE — PLAN OF CARE
Problem: PAIN - ADULT  Goal: Verbalizes/displays adequate comfort level or baseline comfort level  Description  Interventions:  - Encourage patient to monitor pain and request assistance  - Assess pain using appropriate pain scale  - Administer analgesics based on type and severity of pain and evaluate response  - Implement non-pharmacological measures as appropriate and evaluate response  - Consider cultural and social influences on pain and pain management  - Notify physician/advanced practitioner if interventions unsuccessful or patient reports new pain  Outcome: Progressing     Problem: SAFETY ADULT  Goal: Patient will remain free of falls  Description  INTERVENTIONS:  - Assess patient frequently for physical needs  -  Identify cognitive and physical deficits and behaviors that affect risk of falls    -  [place] fall precautions as indicated by assessment   - Educate patient/family on patient safety including physical limitations  - Instruct patient to call for assistance with activity based on assessment  - Modify environment to reduce risk of injury  - Consider OT/PT consult to assist with strengthening/mobility  Outcome: Progressing  Goal: Maintain or return to baseline ADL function  Description  INTERVENTIONS:  -  Assess patient's ability to carry out ADLs; assess patient's baseline for ADL function and identify physical deficits which impact ability to perform ADLs (bathing, care of mouth/teeth, toileting, grooming, dressing, etc )  - Assess/evaluate cause of self-care deficits   - Assess range of motion  - Assess patient's mobility; develop plan if impaired  - Assess patient's need for assistive devices and provide as appropriate  - Encourage maximum independence but intervene and supervise when necessary  - Involve family in performance of ADLs  - Assess for home care needs following discharge   - Consider OT consult to assist with ADL evaluation and planning for discharge  - Provide patient education as appropriate  Outcome: Progressing  Goal: Maintain or return mobility status to optimal level  Description  INTERVENTIONS:  - Assess patient's baseline mobility status (ambulation, transfers, stairs, etc )    - Identify cognitive and physical deficits and behaviors that affect mobility  - Identify mobility aids required to assist with transfers and/or ambulation (gait belt, sit-to-stand, lift, walker, cane, etc )  - [place] fall precautions as indicated by assessment  - Record patient progress and toleration of activity level on Mobility SBAR; progress patient to next Phase/Stage  - Instruct patient to call for assistance with activity based on assessment  - Consider rehabilitation consult to assist with strengthening/weightbearing, etc   Outcome: Progressing     Problem: DISCHARGE PLANNING  Goal: Discharge to home or other facility with appropriate resources  Description  INTERVENTIONS:  - Identify barriers to discharge w/patient and caregiver  - Arrange for needed discharge resources and transportation as appropriate  - Identify discharge learning needs (meds, wound care, etc )  - Arrange for interpretive services to assist at discharge as needed  - Refer to Case Management Department for coordinating discharge planning if the patient needs post-hospital services based on physician/advanced practitioner order or complex needs related to functional status, cognitive ability, or social support system  Outcome: Progressing     Problem: Knowledge Deficit  Goal: Patient/family/caregiver demonstrates understanding of disease process, treatment plan, medications, and discharge instructions  Description  Complete learning assessment and assess knowledge base    Interventions:  - Provide teaching at level of understanding  - Provide teaching via preferred learning methods  Outcome: Progressing     Problem: NEUROSENSORY - ADULT  Goal: Achieves stable or improved neurological status  Description  INTERVENTIONS  - Monitor and report changes in neurological status  - Monitor vital signs such as temperature, blood pressure, glucose, and any other labs ordered   - Initiate measures to prevent increased intracranial pressure  - Monitor for seizure activity and implement precautions if appropriate      Outcome: Progressing  Goal: Remains free of injury related to seizures activity  Description  INTERVENTIONS  - Maintain airway, patient safety  and administer oxygen as ordered  - Monitor patient for seizure activity, document and report duration and description of seizure to physician/advanced practitioner  - If seizure occurs,  ensure patient safety during seizure  - Reorient patient post seizure  - Seizure pads on all 4 side rails  - Instruct patient/family to notify RN of any seizure activity including if an aura is experienced  - Instruct patient/family to call for assistance with activity based on nursing assessment  - Administer anti-seizure medications if ordered    Outcome: Progressing  Goal: Achieves maximal functionality and self care  Description  INTERVENTIONS  - Monitor swallowing and airway patency with patient fatigue and changes in neurological status  - Encourage and assist patient to increase activity and self care     - Encourage visually impaired, hearing impaired and aphasic patients to use assistive/communication devices  Outcome: Progressing

## 2019-10-27 NOTE — H&P
H&P- Mirian Dewitt 1970, 53 y o  female MRN: [id number]    Unit/Bed#: -01 Encounter: [id number]    Primary Care Provider: Gale Hardy DO   Date and time admitted to hospital: 10/26/2019  5:33 PM        * [facility]  Assessment & Plan  · Apparently witnessed by  to have fall with apparent seizure-like activity; examination significant for apparent right tongue bite  · Patient is on multiple psychiatric medications which may lower seizure threshold, had recent inpatient psychiatric hospitalization for which Cymbalta and Seroquel were initiated  · Patient with no further seizure-like activity reported in ED, neurologic examination unremarkable and CT head negative for acute intracranial pathology  · Will obtain routine EEG  · Seizure precautions  · Will appreciate Neurology input  · Additionally will ask for psychiatric input given recent changes in medications to determine if medication adjustment is required in setting of above    Major depressive disorder, recurrent episode, in partial remission ([facility] )  Assessment & Plan  · With recent hospitalization for suicide attempt with Klonopin overdose and subsequent inpatient psychiatric hospitalization        VTE Prophylaxis: Enoxaparin (Lovenox)  / sequential compression device   Code Status: Level 1 - Full Code  POLST: POLST form is not discussed and not completed at this time  Anticipated Length of Stay:  Patient will be admitted on an Observation basis with an anticipated length of stay of  Less than 2 midnights  Justification for Hospital Stay: Please see detailed plans noted above  Chief Complaint:     Fall, apparent witnessed seizure-like activity  History of Present Illness:  Mirian Dewitt is a 53 y o  female who has a past medical history significant for major depressive disorder, generalized anxiety disorder, hypothyroidism, and asthma    She had recent hospitalization 09/2019 for benzodiazepine overdose with self-harm intent, subsequently transitioned to inpatient psychiatry for further treatment once stabilized and with initiation Cymbalta and Seroquel with improvement in mental status  She presents this evening after a loss of consciousness with apparent witnessed seizure-like activity  Patient was in her normal state of health until earlier this evening when patient was found in laundry room after fall by her  and witnessed to have shaking and apparent bleeding from the mouth  Patient apparently took at least 15 minutes to return to normal mental status after the event and did  Per patient, she has no history prior personal or family history of seizures  Denies any prodromal symptoms prior to the event, and at the time my evaluation feels well and has no acute complaints  She denies any fevers/chills, recent infections, headache, dizziness/lightheadedness, or focal neuro deficits  States she took all home medications today at directed doses and denies any current suicidal or homicidal ideation  ED workup significant only for CT head negative for acute intracranial pathology; did have elevated D-dimer for which CTA PE study was obtained which was negative for pulmonary embolism      Review of Systems:    Constitutional:  Denies fever or chills   Eyes:  Denies change in visual acuity   HENT:  Denies nasal congestion or sore throat   Respiratory:  Denies cough or shortness of breath   Cardiovascular:  Denies chest pain or edema   GI:  Denies abdominal pain, nausea, vomiting, bloody stools or diarrhea   :  Denies dysuria   Musculoskeletal:  Denies back pain or joint pain   Integument:  Denies rash   Neurologic:  Denies headache, focal weakness or sensory changes but endorses loss of consciousness and possible seizure  Endocrine:  Denies polyuria or polydipsia   Lymphatic:  Denies swollen glands   Psychiatric:  Denies depression or anxiety     Past Medical and Surgical History:   Past Medical History: Diagnosis Date    Amenorrhea     Anxiety     Asthma     Back pain     Chondromalacia of patella     Chronic fatigue     Deep vein thrombophlebitis of leg (HCC)     Depression     GERD (gastroesophageal reflux disease)     HPV (human papilloma virus) infection     Hypothyroidism     Lumbar radiculopathy     Migraine     Myofascial pain syndrome     Osteopenia     Piriformis syndrome     Sacroiliitis (HCC)     Sciatica     Sleep apnea     Spondylosis of lumbar spine     Trochanteric bursitis of right hip     Vertigo     Vitamin D deficiency      Past Surgical History:   Procedure Laterality Date    CERVIX LESION DESTRUCTION       SECTION      COLONOSCOPY      COLPOSCOPY W/ BIOPSY / CURETTAGE      Colposcopy cervix with biopsy    LAPAROSCOPIC ENDOMETRIOSIS FULGURATION      LAPAROSCOPY      TOOTH EXTRACTION         Meds/Allergies:  Medications Prior to Admission   Medication    albuterol (2 5 mg/3 mL) 0 083 % nebulizer solution    Aspirin-Acetaminophen-Caffeine (MIGRAINE FORMULA PO)    baclofen 10 mg tablet    BREO ELLIPTA 200-25 MCG/INH inhaler    buPROPion (WELLBUTRIN XL) 150 mg 24 hr tablet    buPROPion (WELLBUTRIN XL) 300 mg 24 hr tablet    [employer] INDOOR/OUTDOOR ALLERGY RLF 10 MG tablet    divalproex sodium (DEPAKOTE ER) 500 mg 24 hr tablet    DULoxetine (CYMBALTA) 30 mg delayed release capsule    EPINEPHrine (EPIPEN) 0 3 mg/0 3 mL SOAJ    Erenumab-aooe (AIMOVIG) 140 MG/ML SOAJ    ergocalciferol (VITAMIN D2) 50,000 units    gabapentin (NEURONTIN) 300 mg capsule    hydrOXYzine HCL (ATARAX) 50 mg tablet    ibuprofen (MOTRIN) 800 mg tablet    levonorgestrel (MIRENA, 52 MG,) 20 MCG/24HR IUD    levothyroxine 50 mcg tablet    melatonin 3 mg    mometasone (NASONEX) 50 mcg/act nasal spray    naltrexone (REVIA) 50 mg tablet    olopatadine (PATANOL) 0 1 % ophthalmic solution    omeprazole (PriLOSEC) 20 mg delayed release capsule    ondansetron (ZOFRAN-ODT) 4 mg disintegrating tablet    phenazopyridine (PYRIDIUM) 100 mg tablet    PROAIR RESPICLICK 053 (90 Base) MCG/ACT AEPB    QUEtiapine (SEROquel) 300 mg tablet    SPIRIVA RESPIMAT 2 5 MCG/ACT AERS    ZOLMitriptan (ZOMIG-ZMT) 5 MG disintegrating tablet       Allergies:    Allergies   Allergen Reactions    Nuts      Other reaction(s): WALNUTS    Cephalexin     Erythromycin Diarrhea, GI Intolerance and Vomiting    Iodine Hives    Other      adobo    Shellfish Allergy     Shellfish-Derived Products     Zithromax [Azithromycin] Diarrhea     Can take name brand  Annotation - [id number]: can take brand name  Other reaction(s): Nausea/vomiting/diarrhea     History:  Marital Status: /Civil Union     Substance Use History:   Social History     Substance and Sexual Activity   Alcohol Use Not Currently    Frequency: Never    Binge frequency: Never     Social History     Tobacco Use   Smoking Status Never Smoker   Smokeless Tobacco Never Used     Social History     Substance and Sexual Activity   Drug Use Not Currently       Family History:  Family History   Problem Relation Age of Onset    Heart disease Mother     Asthma Daughter     Lung cancer Paternal Grandfather     Asthma Daughter     Colon cancer Father     Colon cancer Maternal Grandfather     Prostate cancer Maternal Grandfather     Colon cancer Maternal Aunt     No Known Problems Maternal Grandmother     No Known Problems Paternal Grandmother     No Known Problems Maternal Aunt     No Known Problems Maternal Aunt     No Known Problems Maternal Aunt     No Known Problems Paternal Aunt     Psychiatric Illness Neg Hx        Physical Exam:     Vitals:   Blood Pressure: (!) 81/48 (10/26/19 2218)  Pulse: 90 (10/26/19 2132)  Temperature: 98 °F (36 7 °C) (10/26/19 1808)  Respirations: 19 (10/26/19 2218)  SpO2: 95 % (10/26/19 2132)    Constitutional:  Well developed, well nourished, no acute distress, non-toxic appearance   Eyes:  PERRL, conjunctiva normal   HENT:  Atraumatic, external ears normal, nose normal, oropharynx moist, no pharyngeal exudates  Neck- normal range of motion, no tenderness, supple   Respiratory:  No respiratory distress, normal breath sounds, no rales, no wheezing   Cardiovascular:  Normal rate, normal rhythm, no murmurs, no gallops, no rubs   GI:  Soft, nondistended, normal bowel sounds, nontender, no organomegaly, no mass, no rebound, no guarding   :  No costovertebral angle tenderness   Musculoskeletal:  No edema, no tenderness, no deformities  Back- no tenderness  Integument:  Well hydrated, no rash   Lymphatic:  No lymphadenopathy noted   Neurologic:  Alert &awake, communicative, CN 2-12 normal, normal motor function, normal sensory function, no focal deficits noted   Psychiatric:  Speech and behavior appropriate       Lab Results: I have personally reviewed pertinent reports  Results from last 7 days   Lab Units 10/26/19  1828   WBC Thousand/uL 5 87   HEMOGLOBIN g/dL 11 1*   HEMATOCRIT % 33 8*   PLATELETS Thousands/uL 195   NEUTROS PCT % 51   LYMPHS PCT % 34   MONOS PCT % 12   EOS PCT % 1     Results from last 7 days   Lab Units 10/26/19  1828   POTASSIUM mmol/L 4 0   CHLORIDE mmol/L 110*   CO2 mmol/L 25   BUN mg/dL 32*   CREATININE mg/dL 1 14   CALCIUM mg/dL 8 6   ALK PHOS U/L 61   ALT U/L 15   AST U/L 13           EKG:  Normal sinus rhythm, right superior axis deviation    Imaging: I have personally reviewed pertinent reports  Ct Head Without Contrast    Result Date: 10/26/2019  Narrative: CT BRAIN - WITHOUT CONTRAST INDICATION:   Questionable seizure, unwitnessed fall  COMPARISON:  July 24, 2019 TECHNIQUE:  CT examination of the brain was performed  In addition to axial images, coronal 2D reformatted images were created and submitted for interpretation  Radiation dose length product (DLP) for this visit:  926 28 mGy-cm     This examination, like all CT scans performed in the [facility], was performed utilizing techniques to minimize radiation dose exposure, including the use of iterative  reconstruction and automated exposure control  IMAGE QUALITY:  Diagnostic  FINDINGS: PARENCHYMA:  No intracranial mass, mass effect or midline shift  No CT signs of acute infarction  No acute parenchymal hemorrhage  VENTRICLES AND EXTRA-AXIAL SPACES:  Normal for the patient's age  VISUALIZED ORBITS AND PARANASAL SINUSES:  Unremarkable  CALVARIUM AND EXTRACRANIAL SOFT TISSUES:  Normal      Impression: No acute intracranial abnormality  Workstation performed: [id number]     Ct Cervical Spine Without Contrast    Result Date: 10/26/2019  Narrative: CT CERVICAL SPINE - WITHOUT CONTRAST INDICATION:   loss of consciousness, fall, lower Cspine tenderness  COMPARISON:  None  TECHNIQUE:  CT examination of the cervical spine was performed without intravenous contrast   Contiguous axial images were obtained  Sagittal and coronal reconstructions were performed  Radiation dose length product (DLP) for this visit:  251 24 mGy-cm   This examination, like all CT scans performed in the [facility], was performed utilizing techniques to minimize radiation dose exposure, including the use of iterative  reconstruction and automated exposure control  IMAGE QUALITY:  Diagnostic  FINDINGS: ALIGNMENT:  Normal alignment of the cervical spine  No subluxation  VERTEBRAL BODIES:  No fracture  DEGENERATIVE CHANGES:  Moderate multilevel cervical degenerative changes are noted  No critical central canal stenosis  PREVERTEBRAL AND PARASPINAL SOFT TISSUES:  Unremarkable  THORACIC INLET:  Nodular airspace opacities in the lung apices are visualized     Impression: No cervical spine fracture or traumatic malalignment  Nodular airspace opacities in the lung apices which may represent infection versus atelectasis  Clinical correlation is recommended    Workstation performed: [id number]     Cta Ed Chest Pe Study    Result Date: 10/26/2019  Narrative: CTA - CHEST WITH IV CONTRAST - PULMONARY ANGIOGRAM INDICATION:   Syncope, Hx PE, tachycardia  COMPARISON: None  TECHNIQUE: CTA examination of the chest was performed using angiographic technique according to a protocol specifically tailored to evaluate for pulmonary embolism  Axial, sagittal, and coronal 2D reformatted images were created from the source data and  submitted for interpretation  In addition, coronal 3D MIP postprocessing was performed on the acquisition scanner  Radiation dose length product (DLP) for this visit:  869 84 mGy-cm   This examination, like all CT scans performed in the [facility], was performed utilizing techniques to minimize radiation dose exposure, including the use of iterative  reconstruction and automated exposure control  IV Contrast:  70 mL of iodixanol (VISIPAQUE)  FINDINGS: PULMONARY ARTERIAL TREE:  No pulmonary embolus is seen  LUNGS:  Atelectasis seen within the lung bases  Azygous lobe is visualized  Scarring versus atelectasis seen within the lung apices  PLEURA:  Unremarkable  HEART/GREAT VESSELS:  Unremarkable for patient's age  MEDIASTINUM AND SAMUEL:  Mediastinal and hilar lymph nodes measuring up to 1 5 cm are seen  CHEST WALL AND LOWER NECK:   Unremarkable  VISUALIZED STRUCTURES IN THE UPPER ABDOMEN:  Unremarkable  OSSEOUS STRUCTURES:  No acute fracture or destructive osseous lesion  Impression: No evidence of pulmonary embolus Workstation performed: [id number]         ** Please Note: Dragon 360 Dictation voice to text software was used in the creation of this document   **

## 2019-10-27 NOTE — ASSESSMENT & PLAN NOTE
· Apparently witnessed by  to have fall with apparent seizure-like activity; examination significant for apparent right tongue bite  · Patient is on multiple psychiatric medications which may lower seizure threshold, had recent inpatient psychiatric hospitalization for which Cymbalta and Seroquel were initiated  · Patient with no further seizure-like activity reported in ED, neurologic examination unremarkable and CT head negative for acute intracranial pathology  · Will obtain routine EEG  · Seizure precautions  · Will appreciate Neurology input  · Additionally will ask for psychiatric input given recent changes in medications to determine if medication adjustment is required in setting of above

## 2019-10-27 NOTE — UTILIZATION REVIEW
Initial Clinical Review    Admission: Date/Time/Statement: 10/26/19 @ 2144 -- OBS  Orders Placed This Encounter   Procedures    Place in Observation (expected length of stay for this patient is less than two midnights)     Standing Status:   Standing     Number of Occurrences:   1     Order Specific Question:   Admitting Physician     Answer:   Erlin Morales [[id number]]     Order Specific Question:   Level of Care     Answer:   Med Surg [[id number]]     ED Arrival Information     Expected Arrival Acuity Means of Arrival Escorted By Service Admission Type    - 10/26/2019 17:33 [facility]    Arrival Complaint    syncope        Chief Complaint   Patient presents with    Loss of Consciousness      heard patient fall, found unresponsive with blood in mouth, patient does not recall events  woke up confused with slow memory return  appears to have bitten tongue  no seizure history  Assessment/Plan: 53 y o  female with PMHx for major depressive disorder, generalized anxiety disorder, hypothyroidism, and asthma  She had recent hospitalization 09/2019 for benzodiazepine overdose with self-harm intent, subsequently transitioned to IP psychiatry for further treatment once stabilized and with initiation Cymbalta and Seroquel with improvement in mental status      She presents this evening after a LOC with apparent witnessed seizure-like activity  Patient was in her normal state of health until earlier this evening when patient was found in laundry room after fall by her  and witnessed to have shaking and apparent bleeding from the mouth  Patient apparently took at least 15 minutes to return to normal mental status after the event and did  Per patient, she has no history prior personal or family history of seizures  Denies any prodromal symptoms prior to the event, and at the time my evaluation feels well and has no acute complaints    She denies any fevers/chills, recent infections, headache, dizziness/lightheadedness, or focal neuro deficits  States she took all home medications today at directed doses and denies any current suicidal or homicidal ideation  ED workup significant only for CT head negative for acute intracranial pathology; did have elevated D-dimer for which CTA PE study was obtained which was negative for pulmonary embolism    * Observed seizure-like activity   Assessment & Plan  · Apparently witnessed by  to have fall with apparent seizure-like activity; examination significant for apparent right tongue bite  · Patient is on multiple psychiatric medications which may lower seizure threshold, had recent inpatient psychiatric hospitalization for which Cymbalta and Seroquel were initiated  · Patient with no further seizure-like activity reported in ED, neurologic examination unremarkable and CT head negative for acute intracranial pathology  · Will obtain routine EEG  · Seizure precautions  · Will appreciate Neurology input  · Additionally will ask for psychiatric input given recent changes in medications to determine if medication adjustment is required in setting of above     Major depressive disorder, recurrent episode, in partial remission   Assessment & Plan  · With recent hospitalization for suicide attempt with Klonopin overdose and subsequent inpatient psychiatric hospitalization    ADDENDUM -- Neuro consult pending      ED Triage Vitals   Temperature Pulse Respirations Blood Pressure SpO2   10/26/19 1808 10/26/19 1808 10/26/19 1808 10/26/19 1808 10/26/19 1808   98 °F (36 7 °C) 98 16 117/72 97 %      Temp src Heart Rate Source Patient Position - Orthostatic VS BP Location FiO2 (%)   -- 10/26/19 2132 10/27/19 0728 10/27/19 0728 --    Monitor Sitting Left arm       Pain Score       10/26/19 1808       2        Wt Readings from Last 1 Encounters:   10/23/19 68 kg (150 lb)     Additional Vital Signs:   Date/Time  Temp  Pulse  Resp  BP  MAP (mmHg) SpO2  O2 Device   10/27/19 07:28:42    80  19  102/58  73  97 %     10/26/19 22:18:22      19  81/48Abnormal   59       10/26/19 2132    90  18  103/66    95 %     10/26/19 1808  98 °F (36 7 °C)  98  16  117/72    97 %  None (Room air)       Pertinent Labs/Diagnostic Test Results:   CT head / c-spine -- no acute findings    CTA chest PE study - neg for PE    EKG --   Sinus tachycardia  Incomplete right bundle branch block  Left anterior fascicular block  Abnormal ECG      Results from last 7 days   Lab Units 10/27/19  0609 10/26/19  1828   WBC Thousand/uL 6 02 5 87   HEMOGLOBIN g/dL 9 5* 11 1*   HEMATOCRIT % 29 7* 33 8*   PLATELETS Thousands/uL 158 195   NEUTROS ABS Thousands/µL  --  3 06     Results from last 7 days   Lab Units 10/27/19  0609 10/26/19  1828   SODIUM mmol/L 144 143   POTASSIUM mmol/L 3 8 4 0   CHLORIDE mmol/L 113* 110*   CO2 mmol/L 24 25   ANION GAP mmol/L 7 8   BUN mg/dL 23 32*   CREATININE mg/dL 0 76 1 14   EGFR ml/min/1 73sq m 94 57   CALCIUM mg/dL 8 0* 8 6   MAGNESIUM mg/dL 1 9  --      Results from last 7 days   Lab Units 10/26/19  1828   AST U/L 13   ALT U/L 15   ALK PHOS U/L 61   TOTAL PROTEIN g/dL 6 6   ALBUMIN g/dL 4 2   TOTAL BILIRUBIN mg/dL 0 37     Results from last 7 days   Lab Units 10/27/19  0609 10/26/19  1828   GLUCOSE RANDOM mg/dL 83 76     Results from last 7 days   Lab Units 10/26/19  1918   TROPONIN I ng/mL <0 02     Results from last 7 days   Lab Units 10/26/19  1828   D-DIMER QUANTITATIVE ug/ml FEU 1 00*     ED Treatment:   Medication Administration from 10/26/2019 1733 to 10/26/2019 2207       Date/Time Order Dose Route Action     10/26/2019 1854 sodium chloride 0 9 % bolus 1,000 mL 1,000 mL Intravenous New Bag     Past Medical History:   Diagnosis Date    Amenorrhea     Anxiety     Asthma     Back pain     Chondromalacia of patella     Chronic fatigue     Deep vein thrombophlebitis of leg (HCC)     Depression     GERD (gastroesophageal reflux disease)     HPV (human papilloma virus) infection     Hypothyroidism     Lumbar radiculopathy     Migraine     Myofascial pain syndrome     Osteopenia     Piriformis syndrome     Sacroiliitis (HCC)     Sciatica     Sleep apnea     Spondylosis of lumbar spine     Trochanteric bursitis of right hip     Vertigo     Vitamin D deficiency      Present on Admission:   Observed seizure-like activity (HCC)   Major depressive disorder, recurrent episode, in partial remission ([facility] )   Asthma   Headache, chronic migraine without aura, intractable   Panic disorder without agoraphobia      Admitting Diagnosis: Seizure ([facility] ) [R56 9]  Loss of consciousness ([facility] ) [R40 20]  Age/Sex: 53 y o  female  Admission Orders:  Medications:  divalproex sodium 500 mg Oral HS   enoxaparin 40 mg Subcutaneous Daily   fluticasone 1 spray Each Nare BID   fluticasone-vilanterol 1 puff Inhalation Daily   gabapentin 300 mg Oral TID   levothyroxine 50 mcg Oral Early Morning   loratadine 10 mg Oral Daily   melatonin 3 mg Oral HS   naltrexone 50 mg Oral Daily   pantoprazole 40 mg Oral Early Morning   QUEtiapine 300 mg Oral HS          acetaminophen 650 mg Oral Q6H PRN   albuterol 2 5 mg Nebulization Q4H PRN   hydrOXYzine HCL 50 mg Oral BID PRN     Reg diet  Seizure precautions  EEG    IP CONSULT TO NEUROLOGY  IP CONSULT TO PSYCHIATRY    Network Utilization Review Department  [email or web address]  ATTENTION: Please call with any questions or concerns to [phone] and carefully listen to the prompts so that you are directed to the right person  All voicemails are confidential   Anshu Hudson all requests for admission clinical reviews, approved or denied determinations and any other requests to dedicated fax number below belonging to the campus where the patient is receiving treatment    FACILITY NAME UR FAX NUMBER   ADMISSION DENIALS (Administrative/Medical Necessity) [phone]   [facility] (Maternity/NICU/Pediatrics) [place] [address] [facility] [phone]   Romel Faraztiokristin [phone]   [address]  [place] [address] [facility] [phone]   [address] [phone]

## 2019-10-28 ENCOUNTER — APPOINTMENT (OUTPATIENT)
Dept: RADIOLOGY | Facility: HOSPITAL | Age: 49
End: 2019-10-28
Payer: COMMERCIAL

## 2019-10-28 ENCOUNTER — APPOINTMENT (OUTPATIENT)
Dept: NEUROLOGY | Facility: CLINIC | Age: 49
End: 2019-10-28
Payer: COMMERCIAL

## 2019-10-28 LAB
ANION GAP SERPL CALCULATED.3IONS-SCNC: 7 MMOL/L (ref 4–13)
BASOPHILS # BLD AUTO: 0.02 THOUSANDS/ΜL (ref 0–0.1)
BASOPHILS NFR BLD AUTO: 0 % (ref 0–1)
BUN SERPL-MCNC: 20 MG/DL (ref 5–25)
CALCIUM SERPL-MCNC: 8.4 MG/DL (ref 8.3–10.1)
CHLORIDE SERPL-SCNC: 113 MMOL/L (ref 100–108)
CO2 SERPL-SCNC: 25 MMOL/L (ref 21–32)
CREAT SERPL-MCNC: 0.72 MG/DL (ref 0.6–1.3)
EOSINOPHIL # BLD AUTO: 0.04 THOUSAND/ΜL (ref 0–0.61)
EOSINOPHIL NFR BLD AUTO: 1 % (ref 0–6)
ERYTHROCYTE [DISTWIDTH] IN BLOOD BY AUTOMATED COUNT: 12.5 % (ref 11.6–15.1)
GFR SERPL CREATININE-BSD FRML MDRD: 100 ML/MIN/1.73SQ M
GLUCOSE SERPL-MCNC: 86 MG/DL (ref 65–140)
HCT VFR BLD AUTO: 31.5 % (ref 34.8–46.1)
HGB BLD-MCNC: 10.3 G/DL (ref 11.5–15.4)
IMM GRANULOCYTES # BLD AUTO: 0.01 THOUSAND/UL (ref 0–0.2)
IMM GRANULOCYTES NFR BLD AUTO: 0 % (ref 0–2)
LYMPHOCYTES # BLD AUTO: 1.71 THOUSANDS/ΜL (ref 0.6–4.47)
LYMPHOCYTES NFR BLD AUTO: 37 % (ref 14–44)
MCH RBC QN AUTO: 30.3 PG (ref 26.8–34.3)
MCHC RBC AUTO-ENTMCNC: 32.7 G/DL (ref 31.4–37.4)
MCV RBC AUTO: 93 FL (ref 82–98)
MONOCYTES # BLD AUTO: 0.58 THOUSAND/ΜL (ref 0.17–1.22)
MONOCYTES NFR BLD AUTO: 13 % (ref 4–12)
NEUTROPHILS # BLD AUTO: 2.22 THOUSANDS/ΜL (ref 1.85–7.62)
NEUTS SEG NFR BLD AUTO: 49 % (ref 43–75)
NRBC BLD AUTO-RTO: 0 /100 WBCS
PLATELET # BLD AUTO: 176 THOUSANDS/UL (ref 149–390)
PMV BLD AUTO: 9.6 FL (ref 8.9–12.7)
POTASSIUM SERPL-SCNC: 3.6 MMOL/L (ref 3.5–5.3)
RBC # BLD AUTO: 3.4 MILLION/UL (ref 3.81–5.12)
SODIUM SERPL-SCNC: 145 MMOL/L (ref 136–145)
WBC # BLD AUTO: 4.58 THOUSAND/UL (ref 4.31–10.16)

## 2019-10-28 PROCEDURE — 70553 MRI BRAIN STEM W/O & W/DYE: CPT

## 2019-10-28 PROCEDURE — 95816 EEG AWAKE AND DROWSY: CPT

## 2019-10-28 PROCEDURE — 85025 COMPLETE CBC W/AUTO DIFF WBC: CPT | Performed by: INTERNAL MEDICINE

## 2019-10-28 PROCEDURE — 80048 BASIC METABOLIC PNL TOTAL CA: CPT | Performed by: INTERNAL MEDICINE

## 2019-10-28 PROCEDURE — 95819 EEG AWAKE AND ASLEEP: CPT | Performed by: PSYCHIATRY & NEUROLOGY

## 2019-10-28 PROCEDURE — 99203 OFFICE O/P NEW LOW 30 MIN: CPT | Performed by: PSYCHIATRY & NEUROLOGY

## 2019-10-28 PROCEDURE — A9585 GADOBUTROL INJECTION: HCPCS | Performed by: INTERNAL MEDICINE

## 2019-10-28 PROCEDURE — 99232 SBSQ HOSP IP/OBS MODERATE 35: CPT | Performed by: INTERNAL MEDICINE

## 2019-10-28 PROCEDURE — 99225 PR SBSQ OBSERVATION CARE/DAY 25 MINUTES: CPT | Performed by: PSYCHIATRY & NEUROLOGY

## 2019-10-28 RX ORDER — ACETAMINOPHEN 325 MG/1
650 TABLET ORAL EVERY 6 HOURS PRN
Status: DISCONTINUED | OUTPATIENT
Start: 2019-10-28 | End: 2019-10-29 | Stop reason: HOSPADM

## 2019-10-28 RX ADMIN — GABAPENTIN 300 MG: 300 CAPSULE ORAL at 21:30

## 2019-10-28 RX ADMIN — ENOXAPARIN SODIUM 40 MG: 40 INJECTION SUBCUTANEOUS at 10:00

## 2019-10-28 RX ADMIN — LORATADINE 10 MG: 10 TABLET ORAL at 10:01

## 2019-10-28 RX ADMIN — NALTREXONE HYDROCHLORIDE 50 MG: 50 TABLET, FILM COATED ORAL at 10:02

## 2019-10-28 RX ADMIN — DIVALPROEX SODIUM 500 MG: 500 TABLET, FILM COATED, EXTENDED RELEASE ORAL at 21:31

## 2019-10-28 RX ADMIN — MELATONIN 3 MG: 3 TAB ORAL at 21:31

## 2019-10-28 RX ADMIN — LEVOTHYROXINE SODIUM 50 MCG: 50 TABLET ORAL at 05:43

## 2019-10-28 RX ADMIN — GABAPENTIN 300 MG: 300 CAPSULE ORAL at 10:00

## 2019-10-28 RX ADMIN — PANTOPRAZOLE SODIUM 40 MG: 40 TABLET, DELAYED RELEASE ORAL at 05:43

## 2019-10-28 RX ADMIN — QUETIAPINE FUMARATE 300 MG: 100 TABLET ORAL at 21:31

## 2019-10-28 RX ADMIN — GADOBUTROL 8 ML: 604.72 INJECTION INTRAVENOUS at 13:27

## 2019-10-28 RX ADMIN — ACETAMINOPHEN 650 MG: 325 TABLET ORAL at 07:20

## 2019-10-28 RX ADMIN — GABAPENTIN 300 MG: 300 CAPSULE ORAL at 16:20
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
## 2019-10-28 NOTE — ASSESSMENT & PLAN NOTE
· Apparently witnessed by  to have fall with apparent seizure-like activity; examination significant for apparent right tongue bite  · Patient is on multiple psychiatric medications which may lower seizure threshold, had recent inpatient psychiatric hospitalization for which Cymbalta and Seroquel were initiated, Wellbutrin which could contribute to seizures   Behavioral health will consider to d/c    · Patient with no further seizure-like activity reported in ED, neurologic examination unremarkable and CT head negative for acute intracranial pathology  · EEG pending  · Seizure precautions  · Will appreciate Neurology input  · Additionally will ask for psychiatric input given recent changes in medications to determine if medication adjustment is required in setting of above

## 2019-10-28 NOTE — RESPIRATORY THERAPY NOTE
resp care      10/28/19 1948   Respiratory Assessment   Assessment Type Assess only   General Appearance Alert; Awake   Respiratory Pattern Normal   Chest Assessment Chest expansion symmetrical   Bilateral Breath Sounds Clear   Cough None   Resp Comments no indication for prn tx, pt appears comfortable   O2 Device room air

## 2019-10-28 NOTE — UTILIZATION REVIEW
Continued Stay Review    Date: 10-28-19                         Current Patient Class: observation  Current Level of Care: medical surgical     HPI:48 y o  female initially admitted on  10-26-19  2144  Or witnessed seizure like activity  Recent history of inpatient psychiatric hospitalization for suicidal ideation, depressive disorder and anxiety  Assessment/Plan:     Neurology consultation completed  Concern that new medications may have lowered her seizure threshold  Psychiatry consulted  Plan to obtain MRI and EEG  Hold off additional antiepileptic drugs   Continue seizure precautions  Addendum    Psychiatry consult completed   We know all psychotropic medication can decrease the seizure threshold, but she had been in this medication for a long time, she was in Effexor and this was switched to Cymbalta and she was in Zyprexa and switch to Seroquel  At this moment I agree with the holding of Wellbutrin that she also had been for many years because this medication decrease the seizure threshold  more than others, but I agree that restart the Seroquel and the Cymbalta after patient is cleared from Neurology if possible  She will follow up with a psychiatrist upon discharge discussed with her what medication she will be on because she had been seeing the same psychiatrist for several years  Routine EEG 10-26   Interpretation: This is an abnormal 26 minutes awake, drowsy, and asleep EEG due to slow posterior dominant rhythm and occasional delta activity  These findings are etiologically nonspecific for mild-moderate diffuse cerebral dysfunction  The lack of epileptiform discharges on a routine EEG does not preclude the diagnosis of seizures or epilepsy       MRI brain  In process     Pertinent Labs/Diagnostic Results:   Results from last 7 days   Lab Units 10/28/19  0544 10/27/19  0609 10/26/19  1828   WBC Thousand/uL 4 58 6 02 5 87   HEMOGLOBIN g/dL 10 3* 9 5* 11 1*   HEMATOCRIT % 31 5* 29 7* 33 8*   PLATELETS Thousands/uL 176 158 195   NEUTROS ABS Thousands/µL 2 22  --  3 06         Results from last 7 days   Lab Units 10/28/19  0544 10/27/19  0609 10/26/19  1828   SODIUM mmol/L 145 144 143   POTASSIUM mmol/L 3 6 3 8 4 0   CHLORIDE mmol/L 113* 113* 110*   CO2 mmol/L 25 24 25   ANION GAP mmol/L 7 7 8   BUN mg/dL 20 23 32*   CREATININE mg/dL 0 72 0 76 1 14   EGFR ml/min/1 73sq m 100 94 57   CALCIUM mg/dL 8 4 8 0* 8 6   MAGNESIUM mg/dL  --  1 9  --      Results from last 7 days   Lab Units 10/26/19  1828   AST U/L 13   ALT U/L 15   ALK PHOS U/L 61   TOTAL PROTEIN g/dL 6 6   ALBUMIN g/dL 4 2   TOTAL BILIRUBIN mg/dL 0 37         Results from last 7 days   Lab Units 10/28/19  0544 10/27/19  0609 10/26/19  1828   GLUCOSE RANDOM mg/dL 86 83 76       Results from last 7 days   Lab Units 10/26/19  1918   TROPONIN I ng/mL <0 02     Results from last 7 days   Lab Units 10/26/19  1828   D-DIMER QUANTITATIVE ug/ml FEU 1 00*         Vital Signs:      Date/Time  Temp  Pulse  Resp  BP  MAP (mmHg)  SpO2    10/28/19 08:05:54  98 1 °F (36 7 °C)  78  18  88/55Abnormal   66  97 %          Medications:     Medications:  divalproex sodium 500 mg Oral HS   enoxaparin 40 mg Subcutaneous Daily   fluticasone 1 spray Each Nare BID   fluticasone-vilanterol 1 puff Inhalation Daily   gabapentin 300 mg Oral TID   levothyroxine 50 mcg Oral Early Morning   loratadine 10 mg Oral Daily   melatonin 3 mg Oral HS   naltrexone 50 mg Oral Daily   pantoprazole 40 mg Oral Early Morning   QUEtiapine 300 mg Oral HS          acetaminophen 650 mg Oral Q6H PRN   albuterol 2 5 mg Nebulization Q4H PRN   hydrOXYzine HCL 50 mg Oral BID PRN       Discharge Plan: To be determined     Network Utilization Review Department  [email or web address]  ATTENTION: Please call with any questions or concerns to [phone] and carefully listen to the prompts so that you are directed to the right person   All voicemails are confidential   Meredith Flaherty all requests for admission clinical reviews, approved or denied determinations and any other requests to dedicated fax number below belonging to the campus where the patient is receiving treatment    FACILITY NAME UR FAX NUMBER   ADMISSION DENIALS (Administrative/Medical Necessity) [address] [facility] (Maternity/NICU/Pediatrics) [phone]   [facility] [address] [facility] 300 [facility] [phone] 145 [facility] 15[address] [facility] [phone]   Helon Milder 2000 [facility] [address] [place] [address] [phone]

## 2019-10-28 NOTE — SOCIAL WORK
CM met with patient and confirmed the following from 9/23/19 admission:    CM met with patient and explained CM role  Patient lives with , 2 dtrs, son, son's girlfriend, and their baby in 2 story home with 1STE and bedroom/bathroom on 2nd floor  Patient's  Jennyfer Florian is emergency contact and [facility], [phone]  Patient reported being independent with ADLs PTA, drives, and works  Patient has no DME at home at this time  Patient denied SNF, and ETOH treatment in the past  Pt has [facility] in the past  Pt has been to [facility] and she sees psychiatrist (Dr Alex Kemp) once/month or every 6 weeks who prescribes meds for depression and anxiety  Pharmacy: [facility] [address] [facility]  PCP: Dr Latanya Steel  Cm following  Pt states she has a ride at d/c     CM reviewed d/c planning process including the following: identifying help at home, patient preference for d/c planning needs, Discharge Lounge, [facility] Meds to Bed program, availability of treatment team to discuss questions or concerns patient and/or family may have regarding understanding medications and recognizing signs and symptoms once discharged  CM also encouraged patient to follow up with all recommended appointments after discharge  Patient advised of importance for patient and family to participate in managing patients medical well being

## 2019-10-28 NOTE — PROGRESS NOTES
Progress Note - Neurology   Elysia Anaya 53 y o  female MRN: [id number]  Unit/Bed#: -01 Encounter: [id number]        Assessment/Plan :  45 yo female with chronic migraine, lumbar radiculopathy, anxiety and depression admitted after new onset seizure like activity described as eyes rolled back in head with full body shaking followed by lethargy lasting approximately 15 minutes  Patient with tongue bite but no incontinence  Etiology  Unclear  ddx concern for new onset seizure  Patient on medications that may lower seizure threshold (wellbutrin)  Plan:  - will hold off on AED's at this time  - recommend decrease dose of Wellbutrin with plan to discuss with outpatient psychiatrist alternative medication  - Psychiatry consulted appreciate recommendations  - [employer] form completed  - requested follow up with [facility] Out[facility] neurologist Dr Lizzy Cerda wo contrast completed with report pending  If negative; ok for discharge    Results:   - labs: D-dimer 1, recent labs 9/27/19 HgA1c 5 8,  TSH 8 65, , HDL 56, triglycerides 121   - 10/27/19 Routine EEG: per report Aury Sullivan MD:  This is an abnormal 26 minutes awake, drowsy, and asleep EEG due to slow posterior dominant rhythm and occasional delta activity  These findings are etiologically nonspecific for mild-moderate diffuse cerebral dysfunction  The lack of epileptiform discharges on a routine EEG does not preclude the diagnosis of seizures or epilepsy    - 10/26/19 CT head wo contrast: per report: No acute intracranial abnormality  - 10/26/19 CT spine cervical wo contrast: per report: No cervical spine fracture or traumatic malalignment  Nodular airspace opacities in the lung apices which may represent infection versus atelectasis   Clinical correlation is recommended    - 10/26/19 CTA chest PE study: per report:  No evidence of pulmonary embolus    Subjective:   Patient feels "good" today with no other seizure like events or new complaints    12 point Review of Systems - conducted and negative      Vitals: Blood pressure (!) 88/55, pulse 78, temperature 98 1 °F (36 7 °C), resp  rate 18, SpO2 97 %, not currently breastfeeding  ,There is no height or weight on file to calculate BMI  MEDS:    Current Facility-Administered Medications:  acetaminophen 650 mg Oral Q6H PRN Josh Roque MD   albuterol 2 5 mg Nebulization Q4H PRN Harmony Ryann, DO   divalproex sodium 500 mg Oral HS [facility], DO   enoxaparin 40 mg Subcutaneous Daily Harmony Ryann, DO   fluticasone 1 spray Each Nare BID Harmony Ryann, DO   fluticasone-vilanterol 1 puff Inhalation Daily Harmony Ryann, DO   gabapentin 300 mg Oral TID Harmony Ryann, DO   hydrOXYzine HCL 50 mg Oral BID PRN Harmony Ryann, DO   levothyroxine 50 mcg Oral Early Morning [facility], DO   loratadine 10 mg Oral Daily Harmony Ryann, DO   melatonin 3 mg Oral HS [facility], DO   naltrexone 50 mg Oral Daily [facility], DO   pantoprazole 40 mg Oral Early Morning [facility], DO   QUEtiapine 300 mg Oral HS [facility], DO     Physical Exam   Constitutional: She is oriented to person, place, and time  She appears well-developed and well-nourished  HENT:   Head: Normocephalic and atraumatic  Mouth/Throat: Oropharynx is clear and moist    Eyes: Pupils are equal, round, and reactive to light  Conjunctivae and EOM are normal  Right eye exhibits no discharge  Left eye exhibits no discharge  Neck: Normal range of motion  Cardiovascular: Normal rate, regular rhythm, normal heart sounds and intact distal pulses  Exam reveals no gallop and no friction rub  No murmur heard  Pulmonary/Chest: Effort normal and breath sounds normal  No stridor  No respiratory distress  She has no wheezes  She has no rales  Abdominal: Soft  Bowel sounds are normal  She exhibits no distension  Musculoskeletal: Normal range of motion     Neurological: She is alert and oriented to person, place, and time  She has normal strength  Skin: Skin is warm and dry  No erythema  Psychiatric: She has a normal mood and affect  Her speech is normal and behavior is normal  Judgment and thought content normal        Neurologic Exam     Mental Status   Oriented to person, place, and time  Follows 3 step commands  Attention: normal  Concentration: normal    Speech: speech is normal   Level of consciousness: alert  Knowledge: good  Cranial Nerves   Cranial nerves II through XII intact  CN III, IV, VI   Pupils are equal, round, and reactive to light  Extraocular motions are normal      Motor Exam   Muscle bulk: normal  Overall muscle tone: normal  Right arm pronator drift: absent  Left arm pronator drift: absent    Strength   Strength 5/5 throughout  Sensory Exam   Light touch normal        Lab Results:   I have personally reviewed pertinent reports  CBC:   Results from last 7 days   Lab Units 10/28/19  0544 10/27/19  0609 10/26/19  1828   WBC Thousand/uL 4 58 6 02 5 87   RBC Million/uL 3 40* 3 21* 3 73*   HEMOGLOBIN g/dL 10 3* 9 5* 11 1*   HEMATOCRIT % 31 5* 29 7* 33 8*   MCV fL 93 93 91   PLATELETS Thousands/uL 176 158 195   BMP/CMP:   Results from last 7 days   Lab Units 10/28/19  0544 10/27/19  0609 10/26/19  1828   SODIUM mmol/L 145 144 143   POTASSIUM mmol/L 3 6 3 8 4 0   CHLORIDE mmol/L 113* 113* 110*   CO2 mmol/L 25 24 25   BUN mg/dL 20 23 32*   CREATININE mg/dL 0 72 0 76 1 14   CALCIUM mg/dL 8 4 8 0* 8 6   AST U/L  --   --  13   ALT U/L  --   --  15   ALK PHOS U/L  --   --  61   EGFR ml/min/1 73sq m 100 94 57     Imaging Studies: I have personally reviewed pertinent reports  and I have personally reviewed pertinent films in PACS     EEG, Pathology, and Other Studies: I have personally reviewed pertinent reports     and I have personally reviewed pertinent films in PACS    VTE Prophylaxis: Sequential compression device (Venodyne)      Counseling / Coordination of Care  Total time spent today 30 minutes  Greater than 50% of total time was spent with the patient and / or family counseling and / or coordination of care  A description of the counseling / coordination of care: discussion of diagnostic testing and plan of care for follow up with Dr Snehal Del Rio and Psychiatrist for medication management

## 2019-10-28 NOTE — PROGRESS NOTES
Progress Note - Kasia Birmingham 1970, 53 y o  female MRN: [id number]    Unit/Bed#: -01 Encounter: [id number]    Primary Care Provider: Jonnie Barahona DO   Date and time admitted to hospital: 10/26/2019  5:33 PM        Headache, chronic migraine without aura, intractable  Assessment & Plan  · Controlled and will continue home medications for this    Panic disorder without agoraphobia  Assessment & Plan  · Controlled, continue home medications for this  · Klonopin was weaned off at recent inpatient psychiatric hospitalization, will avoid benzodiazepines unless patient has recurrent and persistent seizure-like activity    Major depressive disorder, recurrent episode, in partial remission ([facility] [address] )  Assessment & Plan  · With recent hospitalization for suicide attempt with Klonopin overdose and subsequent inpatient psychiatric hospitalization for which Cymbalta and Seroquel were initiated  · Given above events will hold Cymbalta, Wellbutrin for now pending psychiatry evaluation  · Currently denies suicidal/homicidal ideation    Asthma  Assessment & Plan  · Not in acute exacerbation and will continue home inhalers/nebulizers    * Seizure [facility])  Assessment & Plan  · Apparently witnessed by  to have fall with apparent seizure-like activity; examination significant for apparent right tongue bite  · Patient is on multiple psychiatric medications which may lower seizure threshold, had recent inpatient psychiatric hospitalization for which Cymbalta and Seroquel were initiated, Wellbutrin which could contribute to seizures   Behavioral health will consider to d/c    · Patient with no further seizure-like activity reported in ED, neurologic examination unremarkable and CT head negative for acute intracranial pathology  · EEG pending  · Seizure precautions  · Will appreciate Neurology input  · Additionally will ask for psychiatric input given recent changes in medications to determine if medication adjustment is required in setting of above           VTE Pharmacologic Prophylaxis:   Pharmacologic: Enoxaparin (Lovenox)  Mechanical VTE Prophylaxis in Place: Yes    Patient Centered Rounds: I have performed bedside rounds with nursing staff today  Discussions with Specialists or Other Care Team Provider: psych, neurology    Education and Discussions with Family / Patient: patient     Time Spent for Care: 45 minutes  More than 50% of total time spent on counseling and coordination of care as described above  Current Length of Stay: 0 day(s)    Current Patient Status: Observation   Certification Statement: The patient will continue to require additional inpatient hospital stay due to seizure work up    Discharge Plan: possible once we have MRI, EEG    Code Status: Level 1 - Full Code      Subjective:   Patient     Objective:     Vitals:   Temp (24hrs), Av °F (36 7 °C), Min:97 8 °F (36 6 °C), Max:98 1 °F (36 7 °C)    Temp:  [97 8 °F (36 6 °C)-98 1 °F (36 7 °C)] 98 1 °F (36 7 °C)  HR:  [78-96] 78  Resp:  [18] 18  BP: ()/(55-58) 88/55  SpO2:  [96 %-97 %] 97 %  There is no height or weight on file to calculate BMI  Input and Output Summary (last 24 hours): Intake/Output Summary (Last 24 hours) at 10/28/2019 1228  Last data filed at 10/28/2019 0800  Gross per 24 hour   Intake 240 ml   Output    Net 240 ml       Physical Exam:     Physical Exam   Constitutional: She is oriented to person, place, and time  She appears well-developed and well-nourished  No distress  HENT:   Head: Normocephalic and atraumatic  Right Ear: External ear normal    Left Ear: External ear normal    Nose: Nose normal    Mouth/Throat: Oropharynx is clear and moist  No oropharyngeal exudate  Eyes: Pupils are equal, round, and reactive to light  Conjunctivae and EOM are normal  Right eye exhibits no discharge  Left eye exhibits no discharge  No scleral icterus  Neck: Normal range of motion  Neck supple  No JVD present   No tracheal deviation present  No thyromegaly present  Cardiovascular: Normal rate, regular rhythm, normal heart sounds and intact distal pulses  Exam reveals no gallop and no friction rub  No murmur heard  Pulmonary/Chest: Effort normal and breath sounds normal  No stridor  No respiratory distress  She has no wheezes  She has no rales  She exhibits no tenderness  Abdominal: Bowel sounds are normal  She exhibits no distension and no mass  There is no tenderness  There is no guarding  Musculoskeletal: Normal range of motion  She exhibits no edema, tenderness or deformity  Lymphadenopathy:     She has no cervical adenopathy  Neurological: She is alert and oriented to person, place, and time  She displays normal reflexes  No cranial nerve deficit or sensory deficit  She exhibits normal muscle tone  Coordination normal    Skin: Skin is warm and dry  No rash noted  She is not diaphoretic  No erythema  No pallor  Psychiatric: She has a normal mood and affect  Her behavior is normal    Nursing note and vitals reviewed  Additional Data:     Labs:    Results from last 7 days   Lab Units 10/28/19  0544   WBC Thousand/uL 4 58   HEMOGLOBIN g/dL 10 3*   HEMATOCRIT % 31 5*   PLATELETS Thousands/uL 176   NEUTROS PCT % 49   LYMPHS PCT % 37   MONOS PCT % 13*   EOS PCT % 1     Results from last 7 days   Lab Units 10/28/19  0544  10/26/19  1828   POTASSIUM mmol/L 3 6   < > 4 0   CHLORIDE mmol/L 113*   < > 110*   CO2 mmol/L 25   < > 25   BUN mg/dL 20   < > 32*   CREATININE mg/dL 0 72   < > 1 14   CALCIUM mg/dL 8 4   < > 8 6   ALK PHOS U/L  --   --  61   ALT U/L  --   --  15   AST U/L  --   --  13    < > = values in this interval not displayed  * I Have Reviewed All Lab Data Listed Above  * Additional Pertinent Lab Tests Reviewed:  All Labs For Current Hospital Admission Reviewed    Imaging:    Imaging Reports Reviewed Today Include:    Imaging Personally Reviewed by Myself Includes:       Recent Cultures (last 7 days):           Last 24 Hours Medication List:     Current Facility-Administered Medications:  acetaminophen 650 mg Oral Q6H PRN Joe Santos MD   albuterol 2 5 mg Nebulization Q4H PRN Lemon Hopping, DO   divalproex sodium 500 mg Oral HS Lemon Hopping, DO   enoxaparin 40 mg Subcutaneous Daily Lemon Hopping, DO   fluticasone 1 spray Each Nare BID Lemon Hopping, DO   fluticasone-vilanterol 1 puff Inhalation Daily Lemon Hopping, DO   gabapentin 300 mg Oral TID Lemon Hopping, DO   hydrOXYzine HCL 50 mg Oral BID PRN Lemon Hopping, DO   levothyroxine 50 mcg Oral Early Morning Lemon Hopping, DO   loratadine 10 mg Oral Daily Lemon Hopping, DO   melatonin 3 mg Oral HS Lemon Hopping, DO   naltrexone 50 mg Oral Daily Lemon Hopping, DO   pantoprazole 40 mg Oral Early Morning Lemon Hopping, DO   QUEtiapine 300 mg Oral HS Lemon Hopping, DO        Today, Patient Was Seen By: Joe Santos MD    ** Please Note: Dictation voice to text software may have been used in the creation of this document   **

## 2019-10-28 NOTE — PROGRESS NOTES
Patient's oxygen was dropping to around 80  She was asymptomatic and stated she felt fine   Placed on 2L NC

## 2019-10-29 VITALS
OXYGEN SATURATION: 95 % | SYSTOLIC BLOOD PRESSURE: 86 MMHG | TEMPERATURE: 98.1 F | RESPIRATION RATE: 20 BRPM | DIASTOLIC BLOOD PRESSURE: 55 MMHG | HEART RATE: 85 BPM

## 2019-10-29 LAB
ANION GAP SERPL CALCULATED.3IONS-SCNC: 5 MMOL/L (ref 4–13)
BASOPHILS # BLD AUTO: 0.03 THOUSANDS/ΜL (ref 0–0.1)
BASOPHILS NFR BLD AUTO: 1 % (ref 0–1)
BUN SERPL-MCNC: 18 MG/DL (ref 5–25)
CALCIUM SERPL-MCNC: 8.9 MG/DL (ref 8.3–10.1)
CHLORIDE SERPL-SCNC: 111 MMOL/L (ref 100–108)
CO2 SERPL-SCNC: 28 MMOL/L (ref 21–32)
CREAT SERPL-MCNC: 0.83 MG/DL (ref 0.6–1.3)
EOSINOPHIL # BLD AUTO: 0.06 THOUSAND/ΜL (ref 0–0.61)
EOSINOPHIL NFR BLD AUTO: 1 % (ref 0–6)
ERYTHROCYTE [DISTWIDTH] IN BLOOD BY AUTOMATED COUNT: 12.3 % (ref 11.6–15.1)
GFR SERPL CREATININE-BSD FRML MDRD: 84 ML/MIN/1.73SQ M
GLUCOSE SERPL-MCNC: 71 MG/DL (ref 65–140)
HCT VFR BLD AUTO: 35 % (ref 34.8–46.1)
HGB BLD-MCNC: 11.5 G/DL (ref 11.5–15.4)
IMM GRANULOCYTES # BLD AUTO: 0.01 THOUSAND/UL (ref 0–0.2)
IMM GRANULOCYTES NFR BLD AUTO: 0 % (ref 0–2)
LYMPHOCYTES # BLD AUTO: 1.97 THOUSANDS/ΜL (ref 0.6–4.47)
LYMPHOCYTES NFR BLD AUTO: 41 % (ref 14–44)
MCH RBC QN AUTO: 30.3 PG (ref 26.8–34.3)
MCHC RBC AUTO-ENTMCNC: 32.9 G/DL (ref 31.4–37.4)
MCV RBC AUTO: 92 FL (ref 82–98)
MONOCYTES # BLD AUTO: 0.71 THOUSAND/ΜL (ref 0.17–1.22)
MONOCYTES NFR BLD AUTO: 15 % (ref 4–12)
NEUTROPHILS # BLD AUTO: 2.03 THOUSANDS/ΜL (ref 1.85–7.62)
NEUTS SEG NFR BLD AUTO: 42 % (ref 43–75)
NRBC BLD AUTO-RTO: 0 /100 WBCS
PLATELET # BLD AUTO: 193 THOUSANDS/UL (ref 149–390)
PMV BLD AUTO: 9.5 FL (ref 8.9–12.7)
POTASSIUM SERPL-SCNC: 4 MMOL/L (ref 3.5–5.3)
RBC # BLD AUTO: 3.8 MILLION/UL (ref 3.81–5.12)
SODIUM SERPL-SCNC: 144 MMOL/L (ref 136–145)
WBC # BLD AUTO: 4.81 THOUSAND/UL (ref 4.31–10.16)

## 2019-10-29 PROCEDURE — 85025 COMPLETE CBC W/AUTO DIFF WBC: CPT | Performed by: INTERNAL MEDICINE

## 2019-10-29 PROCEDURE — 80048 BASIC METABOLIC PNL TOTAL CA: CPT | Performed by: INTERNAL MEDICINE

## 2019-10-29 PROCEDURE — 99217 PR OBSERVATION CARE DISCHARGE MANAGEMENT: CPT | Performed by: INTERNAL MEDICINE

## 2019-10-29 RX ADMIN — LEVOTHYROXINE SODIUM 50 MCG: 50 TABLET ORAL at 05:56

## 2019-10-29 RX ADMIN — PANTOPRAZOLE SODIUM 40 MG: 40 TABLET, DELAYED RELEASE ORAL at 05:56

## 2019-10-29 RX ADMIN — LORATADINE 10 MG: 10 TABLET ORAL at 09:04

## 2019-10-29 RX ADMIN — ENOXAPARIN SODIUM 40 MG: 40 INJECTION SUBCUTANEOUS at 09:03

## 2019-10-29 RX ADMIN — NALTREXONE HYDROCHLORIDE 50 MG: 50 TABLET, FILM COATED ORAL at 09:05

## 2019-10-29 RX ADMIN — GABAPENTIN 300 MG: 300 CAPSULE ORAL at 09:04

## 2019-10-29 NOTE — UTILIZATION REVIEW
Continued Stay Review    Date: 10-29-19                          Current Patient Class: observation  Current Level of Care: medical surgical     HPI:48 y o  female initially admitted on 10-29-19      Assessment/Plan:     Neurology  Follow up  7 pm  No additional neurological recommendations  Intermittent hypertension  Sbp<90  Dbp<60    Continue to monitor     Pertinent Labs/Diagnostic Results:      MRI  10-28  1323  Normal      Results from last 7 days   Lab Units 10/29/19  0505 10/28/19  0544 10/27/19  0609 10/26/19  1828   WBC Thousand/uL 4 81 4 58 6 02 5 87   HEMOGLOBIN g/dL 11 5 10 3* 9 5* 11 1*   HEMATOCRIT % 35 0 31 5* 29 7* 33 8*   PLATELETS Thousands/uL 193 176 158 195   NEUTROS ABS Thousands/µL 2 03 2 22  --  3 06         Results from last 7 days   Lab Units 10/29/19  0505 10/28/19  0544 10/27/19  0609 10/26/19  1828   SODIUM mmol/L 144 145 144 143   POTASSIUM mmol/L 4 0 3 6 3 8 4 0   CHLORIDE mmol/L 111* 113* 113* 110*   CO2 mmol/L 28 25 24 25   ANION GAP mmol/L 5 7 7 8   BUN mg/dL 18 20 23 32*   CREATININE mg/dL 0 83 0 72 0 76 1 14   EGFR ml/min/1 73sq m 84 100 94 57   CALCIUM mg/dL 8 9 8 4 8 0* 8 6   MAGNESIUM mg/dL  --   --  1 9  --      Results from last 7 days   Lab Units 10/26/19  1828   AST U/L 13   ALT U/L 15   ALK PHOS U/L 61   TOTAL PROTEIN g/dL 6 6   ALBUMIN g/dL 4 2   TOTAL BILIRUBIN mg/dL 0 37         Results from last 7 days   Lab Units 10/29/19  0505 10/28/19  0544 10/27/19  0609 10/26/19  1828   GLUCOSE RANDOM mg/dL 71 86 83 76       Results from last 7 days   Lab Units 10/26/19  1918   TROPONIN I ng/mL <0 02     Results from last 7 days   Lab Units 10/26/19  1828   D-DIMER QUANTITATIVE ug/ml FEU 1 00*     Vital Signs:     Date/Time  Temp  Pulse  Resp  BP  MAP (mmHg)  SpO2  O2 Device  Patient Position - Orthostatic VS   10/29/19 07:39:31  98 1 °F (36 7 °C)  85  20  86/55Abnormal   65  95 %  None (Room air)     10/28/19 22:26:34  97 9 °F (36 6 °C)  82  17  86/55Abnormal   65  100 % None (Room air)  Lying             Medications:     Medications:  divalproex sodium 500 mg Oral HS   enoxaparin 40 mg Subcutaneous Daily   fluticasone 1 spray Each Nare BID   fluticasone-vilanterol 1 puff Inhalation Daily   gabapentin 300 mg Oral TID   levothyroxine 50 mcg Oral Early Morning   loratadine 10 mg Oral Daily   melatonin 3 mg Oral HS   naltrexone 50 mg Oral Daily   pantoprazole 40 mg Oral Early Morning   QUEtiapine 300 mg Oral HS          acetaminophen 650 mg Oral Q6H PRN   albuterol 2 5 mg Nebulization Q4H PRN   hydrOXYzine HCL 50 mg Oral BID PRN       Discharge Plan: to  Be determined     Network Utilization Review Department  [email or web address]  ATTENTION: Please call with any questions or concerns to [phone] and carefully listen to the prompts so that you are directed to the right person  All voicemails are confidential   Colin Keyes all requests for admission clinical reviews, approved or denied determinations and any other requests to dedicated fax number below belonging to the campus where the patient is receiving treatment    FACILITY NAME UR FAX NUMBER   ADMISSION DENIALS (Administrative/Medical Necessity) [address] [facility] (Maternity/NICU/Pediatrics) [phone]   [facility] [address] [facility] [phone]   [address] [place] [address] [address] [facility] [phone]   Lita Peeling [address] [phone]

## 2019-10-29 NOTE — DISCHARGE SUMMARY
Discharge Summary - [address] [facility]    Patient Information: Wynema Mcardle 53 y o  female MRN: [id number]  Unit/Bed#: -01 Encounter: [id number]    Discharging Physician / Practitioner: Marito Cardona MD  PCP: Sofie Bonds DO  Admission Date: 10/26/2019  Discharge Date: 10/29/19    Disposition:     Home    Reason for Admission: seizure    Discharge Diagnoses:     Principal Problem:    Seizure [facility])  Active Problems:    Asthma    Major depressive disorder, recurrent episode, in partial remission ([facility] [address] )    Panic disorder without agoraphobia    Headache, chronic migraine without aura, intractable  Resolved Problems:    * No resolved hospital problems  *      Consultations During Hospital Stay:  · Neurology  · Behavioral health    Procedures Performed:     EEG: Interpretation: This is an abnormal 26 minutes awake, drowsy, and asleep EEG due to slow posterior dominant rhythm and occasional delta activity  These findings are etiologically nonspecific for mild-moderate diffuse cerebral dysfunction  The lack of epileptiform discharges on a routine EEG does not preclude the diagnosis of seizures or epilepsy  Significant Findings / Test Results:     · MRI: Normal routine MRI of the brain with and without contrast   · CTA: No evidence of pulmonary embolus  CT cervical spine: No cervical spine fracture or traumatic malalignment    Nodular airspace opacities in the lung apices which may represent infection versus atelectasis  Incidental Findings:   · None     Test Results Pending at Discharge (will require follow up): · None     Outpatient Tests Requested:  · None    Complications:  None    Hospital Course:     Wynema Mcardle is a 53 y o  female patient who originally presented to the hospital on 10/26/2019 due to witnessed seizure like activity  Trauma evaluation in the ED was unremarkable  She was negative for PE on CTA  She was evaluated by neurology and psychiatry   Seizure activity was attributed to Wellbutrin which was discontinued  She remained seizure free clinically and on EEG  She was discharged to follow up with psychiatry and neurology as an outpatient  Condition at Discharge: stable     Discharge Day Visit / Exam:     Subjective:  Denies any acute complaints  Vitals: Blood Pressure: (!) 86/55 (10/29/19 0739)  Pulse: 85 (10/29/19 0739)  Temperature: 98 1 °F (36 7 °C) (10/29/19 0739)  Temp Source: Oral (10/28/19 2226)  Respirations: 20 (10/29/19 0739)  SpO2: 95 % (10/29/19 0739)  Exam:   Physical Exam   Constitutional: She is oriented to person, place, and time  She appears well-developed and well-nourished  HENT:   Head: Normocephalic and atraumatic  Eyes: EOM are normal    Pulmonary/Chest: Effort normal    Neurological: She is alert and oriented to person, place, and time  No cranial nerve deficit  Discharge instructions/Information to patient and family:   See after visit summary for information provided to patient and family  Provisions for Follow-Up Care:  See after visit summary for information related to follow-up care and any pertinent home health orders  Planned Readmission: None     Discharge Statement:  I spent 40 minutes discharging the patient  This time was spent on the day of discharge  I had direct contact with the patient on the day of discharge  Greater than 50% of the total time was spent examining patient, answering all patient questions, arranging and discussing plan of care with patient as well as directly providing post-discharge instructions  Additional time then spent on discharge activities  Discharge Medications:  See after visit summary for reconciled discharge medications provided to patient and family        ** Please Note: This note has been constructed using a voice recognition system **

## 2019-10-30 ENCOUNTER — TELEPHONE (OUTPATIENT)
Dept: PSYCHIATRY | Facility: CLINIC | Age: 49
End: 2019-10-30

## 2019-10-30 NOTE — TELEPHONE ENCOUNTER
Patient would like to speak to you  She had a seizure over the weekend and she thinks it has something to do with the medication you had prescribed her and then she was taken off of

## 2019-10-30 NOTE — TELEPHONE ENCOUNTER
Spoke with Luigi Swann - she was taken off Wellbutrin XL in the hospital on 10/26/19 after she had a seizure  Instructed not to restart Wellbutrin XL  She reports that her mood is stable at present

## 2019-10-31 DIAGNOSIS — F33.2 MAJOR DEPRESSIVE DISORDER, RECURRENT SEVERE WITHOUT PSYCHOTIC FEATURES (HCC): Chronic | ICD-10-CM

## 2019-10-31 DIAGNOSIS — F41.1 GAD (GENERALIZED ANXIETY DISORDER): Chronic | ICD-10-CM

## 2019-10-31 RX ORDER — VENLAFAXINE HYDROCHLORIDE 150 MG/1
CAPSULE, EXTENDED RELEASE ORAL
Qty: 60 CAPSULE | Refills: 0 | OUTPATIENT
Start: 2019-10-31

## 2019-11-08 NOTE — ED ATTENDING ATTESTATION
10/26/2019  IAlexi MD, saw and evaluated the patient  I have discussed the patient with the resident/non-physician practitioner and agree with the resident's/non-physician practitioner's findings, Plan of Care, and MDM as documented in the resident's/non-physician practitioner's note, except where noted  All available labs and Radiology studies were reviewed  I was present for key portions of any procedure(s) performed by the resident/non-physician practitioner and I was immediately available to provide assistance  At this point I agree with the current assessment done in the Emergency Department  I have conducted an independent evaluation of this patient a history and physical is as follows:    ED Course     The patient presents for evaluation after being found by family unresponsive at home  Family states that they were outside when they heard a thump and found her lying on the floor, with blood in her mouth, and having a full-body shaking  There is no reported bowel or bladder incontinence  Following this episode, patient was confused and lethargic until she arrived in the emergency department which was approximately 15 minutes  Patient denies any prior episode of seizures  Patient denies any prodrome of symptoms including lightheadedness, chest pain, and shortness of breath  No additional complaints  Patient does report having a history of a provoked PE during a prior pregnancy  She denies any chest pain or shortness of breath  Additional complaints  Exam: AAOx3, NAD, RRR, CTA, S/NT/ND, no motor/sensory deficits, small wound to tongue  A/P:  Unresponsive episode  Symptoms likely due to seizure versus syncope which is less likely  Will check cardiac labs, will CT head given that patient has never had any prior seizures, and will check D-dimer given history of PE  Given complexity of situation, will likely plan to admit      Critical Care Time  Procedures
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
## 2019-11-13 ENCOUNTER — TELEPHONE (OUTPATIENT)
Dept: NEUROLOGY | Facility: CLINIC | Age: 49
End: 2019-11-13

## 2019-11-13 NOTE — TELEPHONE ENCOUNTER
Patient called to inform office she was admitted to the hospital the end of October with an "unconscious episode"  Patient received a letter from [facility] that they are requiring additional information regarding event  (The hospital submitted original form to [employer]) [facility] is now requesting a loss of consciousness form be completed  Teresa Murillo - are you agreeable to form completion?

## 2019-11-13 NOTE — TELEPHONE ENCOUNTER
Dr Arvind Milian, there is a vaild Auth for Botox  She will be specialty pharmacy so we will need time to order the botox  I have a spot on hold for 11/27/19 if you would like to see her as a f/u  Just an FYI when Botox was approved I previously tried multiple attempts to contact patient to schedule for Botox appt but patient never called back to schedule  Would you like to see the patient for a f/u or BINJ right away?

## 2019-11-13 NOTE — TELEPHONE ENCOUNTER
Reviewed chart yes I can complete it as there was concern for new onset seizure  However when I last saw her in August she was doing well and have not seen in follow up since    Emmy Cordova to help bring her in next available/ if any one cancels  Veronika Gonzalez: does she have a current valid Botox approval?  Thanks

## 2019-11-14 NOTE — TELEPHONE ENCOUNTER
If overdue for Botox then would like to see her for Botox as long as pt wishes to continue it  Thanks

## 2019-11-19 NOTE — TELEPHONE ENCOUNTER
Pt called and advised pt of the below  She cut me off when I was going over the botox  Pt stated, "I don't care about botox right now"  Pt is upset that nobody called her back about [place] form  Advised pt that Dr Kelly Faust is agreeable to complete loss of consciousness form  Form printed and placed in clinical bin  Pt is aware of the $5 fee  She asked we call her  Tanisha Starr to collect payment  Clerical team: baltazar drop charges and call Tanisha Ro at [phone] to collect payment       Katlin Prakash pls f/u w/ pt re: botox                  [phone] Tanisha Ro   [phone] (requesting a call back once form is complete) ok to leave detailed message

## 2019-11-20 NOTE — TELEPHONE ENCOUNTER
Patient called in to make payment for form  $5 fee collected  Patient requested a receipt be mailed home to her; mailed

## 2019-11-22 ENCOUNTER — TELEPHONE (OUTPATIENT)
Dept: DERMATOLOGY | Facility: CLINIC | Age: 49
End: 2019-11-22

## 2019-11-22 NOTE — TELEPHONE ENCOUNTER
Loss of Consciousness and Awareness form signed by Hanny Clemente  Faxed to [facility] and placed at  to be scanned into chart

## 2019-12-03 NOTE — TELEPHONE ENCOUNTER
3rd attempt [facility] for patient to contact patient to schedule Botox  This is the second time I tried to contact patient to schedule for Botox

## 2019-12-05 NOTE — TELEPHONE ENCOUNTER
Pt's  vilma called and states that pt did not receive fax from earlier today  He is asking what is the fastest way that they can get a copy of these forms  They will  a copy tomorrow at the [place] office    Copy of both [employer]ot forms placed at

## 2019-12-05 NOTE — TELEPHONE ENCOUNTER
Pt called to f/u on the loss of consciousness form  States that this was suppose to be faxed to [facility] and copy mailed to her  Advised pt that form was faxed to [employer] on 11/22/19  Requesting a copy of loss of consciousness and initial reporting form faxed to [phone]  Fax sent  Also advised of the below re: botox  Pt states that she will call our office back to schedule botox

## 2019-12-09 ENCOUNTER — TELEPHONE (OUTPATIENT)
Dept: BEHAVIORAL/MENTAL HEALTH CLINIC | Facility: CLINIC | Age: 49
End: 2019-12-09

## 2019-12-09 NOTE — TELEPHONE ENCOUNTER
She needs to be seen for medication adjustment   She can come today at 3 PM or 3:30 PM  Otherwise she has an appointment pending on 1/16/20

## 2019-12-09 NOTE — TELEPHONE ENCOUNTER
Patient called concerned she has been gaining weight since taking Cymbalta 30 mg and Seroquel 300 mg  She states weight gain is not slowing down    Please call or advise [phone]

## 2019-12-09 NOTE — TELEPHONE ENCOUNTER
Spoke to patient and offered her the appointments given by Dr Deep Argueta and she declined    Requests to be put on cancellation list

## 2019-12-10 ENCOUNTER — OFFICE VISIT (OUTPATIENT)
Dept: PSYCHIATRY | Facility: CLINIC | Age: 49
End: 2019-12-10
Payer: COMMERCIAL

## 2019-12-10 ENCOUNTER — TELEPHONE (OUTPATIENT)
Dept: PSYCHIATRY | Facility: CLINIC | Age: 49
End: 2019-12-10

## 2019-12-10 VITALS
DIASTOLIC BLOOD PRESSURE: 73 MMHG | SYSTOLIC BLOOD PRESSURE: 109 MMHG | HEIGHT: 63 IN | BODY MASS INDEX: 28.7 KG/M2 | WEIGHT: 162 LBS | HEART RATE: 97 BPM

## 2019-12-10 DIAGNOSIS — F41.0 PANIC DISORDER WITHOUT AGORAPHOBIA: Chronic | ICD-10-CM

## 2019-12-10 DIAGNOSIS — F33.41 MAJOR DEPRESSIVE DISORDER, RECURRENT EPISODE, IN PARTIAL REMISSION (HCC): Primary | Chronic | ICD-10-CM

## 2019-12-10 DIAGNOSIS — F41.1 GAD (GENERALIZED ANXIETY DISORDER): Chronic | ICD-10-CM

## 2019-12-10 DIAGNOSIS — Z79.899 LONG-TERM USE OF HIGH-RISK MEDICATION: Chronic | ICD-10-CM

## 2019-12-10 DIAGNOSIS — F42.9 OBSESSIVE-COMPULSIVE DISORDER, UNSPECIFIED TYPE: Chronic | ICD-10-CM

## 2019-12-10 DIAGNOSIS — G47.09 OTHER INSOMNIA: Chronic | ICD-10-CM

## 2019-12-10 DIAGNOSIS — F60.9 PERSONALITY DISORDER (HCC): Chronic | ICD-10-CM

## 2019-12-10 DIAGNOSIS — F63.9 IMPULSE CONTROL DISORDER: Chronic | ICD-10-CM

## 2019-12-10 PROCEDURE — 90833 PSYTX W PT W E/M 30 MIN: CPT | Performed by: PSYCHIATRY & NEUROLOGY

## 2019-12-10 PROCEDURE — 99213 OFFICE O/P EST LOW 20 MIN: CPT | Performed by: PSYCHIATRY & NEUROLOGY

## 2019-12-10 RX ORDER — TOPIRAMATE 25 MG/1
TABLET ORAL
Qty: 60 TABLET | Refills: 3 | Status: SHIPPED | OUTPATIENT
Start: 2019-12-10 | End: 2020-03-20 | Stop reason: SDUPTHER

## 2019-12-10 NOTE — TELEPHONE ENCOUNTER
She was offered 2 appointments yesterday that she declined   Please check the schedule if there are any cancellations
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
## 2019-12-11 ENCOUNTER — TELEPHONE (OUTPATIENT)
Dept: INTERNAL MEDICINE CLINIC | Facility: CLINIC | Age: 49
End: 2019-12-11

## 2019-12-11 ENCOUNTER — TELEPHONE (OUTPATIENT)
Dept: NEUROLOGY | Facility: CLINIC | Age: 49
End: 2019-12-11

## 2019-12-11 NOTE — TELEPHONE ENCOUNTER
I unfortunately do not prescribe phentermine, agree with asking PCP  Topamax may also help with weight loss and headaches, if that helps, certainly she can talk to her psychiatrist about lowering depakote or seroquel dose in future to see if that further help reduce weight gain   I cannot change her depakote or seroquel as prescribed by psychiatry    Hope this helps

## 2019-12-11 NOTE — TELEPHONE ENCOUNTER
Please inquire what her call was regarding  She has an upcoming appt next week, maybe give her an option to come in earlier to discuss    Thanks

## 2019-12-11 NOTE — TELEPHONE ENCOUNTER
Detailed message left for patient regarding Dr Sandoval's prior message and recommendations  Requested a call back with further questions

## 2019-12-11 NOTE — TELEPHONE ENCOUNTER
Pt called and states that her psychiatrist started her on seroquel 300mg at bedtime and  depakote er 500 mg at bedtime in Oct  She has gained >20 lbs  Psychiatrist was aware  She was advised to continue taking those meds bec it also helps w/ her migraine  In addition, topamax 25 mg was prescribed yesterday  Pt would like to take Phentermine  She asked her psychiatrist if ok to take this med w/ her current meds (listed above)  She was advised to call her neurologist    I did advise pt to call her pcp as we do not prescribe Phentermine  She verbalized understanding  Pt is asking for your advise                       [phone] ok to leave detailed message

## 2019-12-11 NOTE — TELEPHONE ENCOUNTER
Pt left a voicemail for Anibal Minor requesting a call back regarding a medication question  She can be reached back at [phone]      Thank you

## 2019-12-12 NOTE — TELEPHONE ENCOUNTER
Left voicemail asking pt to elaborate on the medication question  Gave pt the option to call the office back to bump up her appt      Thank you

## 2019-12-13 ENCOUNTER — TELEPHONE (OUTPATIENT)
Dept: DERMATOLOGY | Facility: CLINIC | Age: 49
End: 2019-12-13

## 2019-12-17 NOTE — TELEPHONE ENCOUNTER
Attempted to contact the patient to see if she is interested on changing the appt on 1/6/20 [place] [facility] but unable to reach patient   No further attempts as there have been multiple attempts to contact the patient

## 2019-12-19 ENCOUNTER — OFFICE VISIT (OUTPATIENT)
Dept: INTERNAL MEDICINE CLINIC | Facility: CLINIC | Age: 49
End: 2019-12-19
Payer: COMMERCIAL

## 2019-12-19 VITALS
OXYGEN SATURATION: 98 % | WEIGHT: 164.2 LBS | TEMPERATURE: 98.7 F | BODY MASS INDEX: 29.09 KG/M2 | HEIGHT: 63 IN | DIASTOLIC BLOOD PRESSURE: 70 MMHG | SYSTOLIC BLOOD PRESSURE: 120 MMHG | HEART RATE: 83 BPM

## 2019-12-19 DIAGNOSIS — E66.3 OVERWEIGHT (BMI 25.0-29.9): ICD-10-CM

## 2019-12-19 DIAGNOSIS — E03.9 HYPOTHYROIDISM (ACQUIRED): ICD-10-CM

## 2019-12-19 DIAGNOSIS — R63.5 WEIGHT GAIN: Primary | ICD-10-CM

## 2019-12-19 PROCEDURE — 99214 OFFICE O/P EST MOD 30 MIN: CPT | Performed by: INTERNAL MEDICINE

## 2019-12-19 PROCEDURE — 1036F TOBACCO NON-USER: CPT | Performed by: INTERNAL MEDICINE

## 2019-12-19 NOTE — PROGRESS NOTES
Assessment/Plan:    Problem List Items Addressed This Visit        Endocrine    Hypothyroidism (acquired)     Recheck TSH due to weight gain         Relevant Orders    TSH, 3rd generation with Free T4 reflex       Other    Overweight (BMI 25 0-29  9)     BMI 29 with comorbidities of HLD, MYRA and prediabetes  She has gained significant amount of weight since starting on seroquel despite efforts in reducing her weight  Encouraged to continue with diet and exercise  She has been started on topamax, may consider metformin as well  She will discuss further with her psychiatrist regarding seroquel  Weight gain - Primary     Clinical history consistent with rapid weight gain after seroquel was initiated  However, will recheck TSH  She has been started on topamax recently and advised to observe if she looses some weight with the medication  Otherwise, can consider metformin for antipsychotic-associated weight gain  Relevant Orders    TSH, 3rd generation with Free T4 reflex          Subjective:      Patient ID: Geovanni Erwin is a 51 y o  female  HPI  47yo female with hypothyroidism, KYLE, MDD, OCD, insomnia, asthma here for follow up care  She is accompanied by her daughter  She developed seizure at end of October and therefore was discontinued off wellbutrin, she has been seizure free since  However, her weight has skyrocketed, she has gained 15pounds over the past two months  She is uncomfortable, has difficulty breathing but different from asthma and lethargic  She denies change in diet  She was started on topamax last week by her psychiatrist due to weight  She is going to the gym, spending an hour per day without effect    She has been told she is perimenopausal     The following portions of the patient's history were reviewed and updated as appropriate: allergies, current medications, past family history, past medical history, past social history, past surgical history and problem list       Current Outpatient Medications:     albuterol (2 5 mg/3 mL) 0 083 % nebulizer solution, Inhale, Disp: , Rfl:     Aspirin-Acetaminophen-Caffeine (MIGRAINE FORMULA PO), Take by mouth, Disp: , Rfl:     baclofen 10 mg tablet, Take 1 tablet (10 mg total) by mouth daily at bedtime, Disp: 30 tablet, Rfl: 0    BREO ELLIPTA 200-25 MCG/INH inhaler, , Disp: , Rfl:     [employer] INDOOR/OUTDOOR ALLERGY RLF 10 MG tablet, Take 10 mg by mouth daily, Disp: , Rfl: 10    divalproex sodium (DEPAKOTE ER) 500 mg 24 hr tablet, Take 1 tablet (500 mg total) by mouth daily at bedtime, Disp: 30 tablet, Rfl: 3    DULoxetine (CYMBALTA) 30 mg delayed release capsule, Take 3 capsules (90 mg total) by mouth daily, Disp: 90 capsule, Rfl: 3    EPINEPHrine (EPIPEN) 0 3 mg/0 3 mL SOAJ, as directed, Disp: , Rfl:     Erenumab-aooe (AIMOVIG) 140 MG/ML SOAJ, Inject 1 mL under the skin every 30 (thirty) days, Disp: 1 pen, Rfl: 3    ergocalciferol (VITAMIN D2) 50,000 units, TK 1 C WEEKLY, Disp: , Rfl: 0    hydrOXYzine HCL (ATARAX) 50 mg tablet, Take 1 tablet (50 mg total) by mouth 2 (two) times a day as needed for anxiety, Disp: 60 tablet, Rfl: 3    ibuprofen (MOTRIN) 800 mg tablet, Take 1 tablet (800 mg total) by mouth every 6 (six) hours as needed for mild pain, Disp: 90 tablet, Rfl: 3    levonorgestrel (MIRENA, 52 MG,) 20 MCG/24HR IUD, by Intrauterine route, Disp: , Rfl:     levothyroxine 50 mcg tablet, Take 1 tablet (50 mcg total) by mouth daily in the early morning, Disp: , Rfl: 0    mometasone (NASONEX) 50 mcg/act nasal spray, , Disp: , Rfl:     omeprazole (PriLOSEC) 20 mg delayed release capsule, Take 1 capsule (20 mg total) by mouth daily, Disp: 30 capsule, Rfl: 11    ondansetron (ZOFRAN-ODT) 4 mg disintegrating tablet, Take 1 tablet (4 mg total) by mouth every 6 (six) hours as needed for nausea or vomiting, Disp: 20 tablet, Rfl: 0    PROAIR RESPICLICK 429 (90 Base) MCG/ACT AEPB, Inhale 2 puffs every 6 (six) hours as needed (wheezing), Disp: 1 each, Rfl: 3    QUEtiapine (SEROquel) 300 mg tablet, Take 1 tablet (300 mg total) by mouth daily at bedtime, Disp: 30 tablet, Rfl: 3    SPIRIVA RESPIMAT 2 5 MCG/ACT AERS, , Disp: , Rfl:     topiramate (TOPAMAX) 25 mg tablet, Take 1 tablet (25 mg total) by mouth daily at bedtime for 5 days, THEN 1 tablet (25 mg total) 2 (two) times a day , Disp: 60 tablet, Rfl: 3    ZOLMitriptan (ZOMIG-ZMT) 5 MG disintegrating tablet, TAKE 1 TABLET BY MOUTH AT ONSET OF HEADACHE, MAY REPEAT AFTER 2 HOURS AS NEEDED  MAX 2 TABLETS per 24 hours  , Disp: 12 tablet, Rfl: 3    gabapentin (NEURONTIN) 300 mg capsule, Take 1 capsule (300 mg total) by mouth 3 (three) times a day, Disp: 90 capsule, Rfl: 0    olopatadine (PATANOL) 0 1 % ophthalmic solution, Administer 1 drop to both eyes 2 (two) times a day for 90 days, Disp: 10 mL, Rfl: 11    Review of Systems   Constitutional: Positive for fatigue and unexpected weight change  Respiratory: Positive for shortness of breath  Negative for cough and wheezing  Cardiovascular: Positive for leg swelling  Negative for chest pain  Neurological: Positive for headaches  Psychiatric/Behavioral: The patient is nervous/anxious  Objective:    /70   Pulse 83   Temp 98 7 °F (37 1 °C)   Ht 5' 3" (1 6 m)   Wt 74 5 kg (164 lb 3 2 oz)   SpO2 98%   BMI 29 09 kg/m²      Physical Exam   Constitutional: She appears well-developed and well-nourished  No distress  Cardiovascular: Normal rate, regular rhythm and normal heart sounds  BLE nonpitting edema   Pulmonary/Chest: Effort normal and breath sounds normal  No stridor  No respiratory distress  Neurological: She is alert  Psychiatric: Her behavior is normal  Her mood appears anxious  Vitals reviewed

## 2019-12-19 NOTE — ASSESSMENT & PLAN NOTE
Clinical history consistent with rapid weight gain after seroquel was initiated  However, will recheck TSH  She has been started on topamax recently and advised to observe if she looses some weight with the medication  Otherwise, can consider metformin for antipsychotic-associated weight gain

## 2019-12-19 NOTE — ASSESSMENT & PLAN NOTE
BMI 29 with comorbidities of HLD, MYRA and prediabetes  She has gained significant amount of weight since starting on seroquel despite efforts in reducing her weight  Encouraged to continue with diet and exercise  She has been started on topamax, may consider metformin as well  She will discuss further with her psychiatrist regarding seroquel

## 2019-12-26 ENCOUNTER — TELEPHONE (OUTPATIENT)
Dept: DERMATOLOGY | Facility: CLINIC | Age: 49
End: 2019-12-26

## 2020-01-02 ENCOUNTER — TELEPHONE (OUTPATIENT)
Dept: NEUROLOGY | Facility: CLINIC | Age: 50
End: 2020-01-02

## 2020-01-05 LAB
ALBUMIN SERPL-MCNC: 4.1 G/DL (ref 3.5–5.5)
ALBUMIN/GLOB SERPL: 2.3 {RATIO} (ref 1.2–2.2)
ALP SERPL-CCNC: 65 IU/L (ref 39–117)
ALT SERPL-CCNC: 11 IU/L (ref 0–32)
AST SERPL-CCNC: 18 IU/L (ref 0–40)
BASOPHILS # BLD AUTO: 0 X10E3/UL (ref 0–0.2)
BASOPHILS NFR BLD AUTO: 0 %
BILIRUB SERPL-MCNC: 0.2 MG/DL (ref 0–1.2)
BUN SERPL-MCNC: 26 MG/DL (ref 6–24)
BUN/CREAT SERPL: 30 (ref 9–23)
CALCIUM SERPL-MCNC: 9.1 MG/DL (ref 8.7–10.2)
CHLORIDE SERPL-SCNC: 107 MMOL/L (ref 96–106)
CO2 SERPL-SCNC: 21 MMOL/L (ref 20–29)
CREAT SERPL-MCNC: 0.87 MG/DL (ref 0.57–1)
EOSINOPHIL # BLD AUTO: 0 X10E3/UL (ref 0–0.4)
EOSINOPHIL NFR BLD AUTO: 1 %
ERYTHROCYTE [DISTWIDTH] IN BLOOD BY AUTOMATED COUNT: 13 % (ref 12.3–15.4)
GLOBULIN SER-MCNC: 1.8 G/DL (ref 1.5–4.5)
GLUCOSE SERPL-MCNC: 83 MG/DL (ref 65–99)
HCT VFR BLD AUTO: 35.8 % (ref 34–46.6)
HGB BLD-MCNC: 11.9 G/DL (ref 11.1–15.9)
IMM GRANULOCYTES # BLD: 0 X10E3/UL (ref 0–0.1)
IMM GRANULOCYTES NFR BLD: 0 %
LYMPHOCYTES # BLD AUTO: 1.5 X10E3/UL (ref 0.7–3.1)
LYMPHOCYTES NFR BLD AUTO: 32 %
MCH RBC QN AUTO: 29.3 PG (ref 26.6–33)
MCHC RBC AUTO-ENTMCNC: 33.2 G/DL (ref 31.5–35.7)
MCV RBC AUTO: 88 FL (ref 79–97)
MONOCYTES # BLD AUTO: 0.6 X10E3/UL (ref 0.1–0.9)
MONOCYTES NFR BLD AUTO: 14 %
NEUTROPHILS # BLD AUTO: 2.4 X10E3/UL (ref 1.4–7)
NEUTROPHILS NFR BLD AUTO: 53 %
PLATELET # BLD AUTO: 196 X10E3/UL (ref 150–450)
POTASSIUM SERPL-SCNC: 5 MMOL/L (ref 3.5–5.2)
PROT SERPL-MCNC: 5.9 G/DL (ref 6–8.5)
RBC # BLD AUTO: 4.06 X10E6/UL (ref 3.77–5.28)
SL AMB EGFR AFRICAN AMERICAN: 91 ML/MIN/1.73
SL AMB EGFR NON AFRICAN AMERICAN: 79 ML/MIN/1.73
SODIUM SERPL-SCNC: 142 MMOL/L (ref 134–144)
VALPROATE SERPL-MCNC: 41 UG/ML (ref 50–100)
WBC # BLD AUTO: 4.5 X10E3/UL (ref 3.4–10.8)

## 2020-01-13 ENCOUNTER — PATIENT MESSAGE (OUTPATIENT)
Dept: INTERNAL MEDICINE CLINIC | Facility: CLINIC | Age: 50
End: 2020-01-13

## 2020-01-13 DIAGNOSIS — E66.3 OVERWEIGHT (BMI 25.0-29.9): Primary | ICD-10-CM

## 2020-01-13 NOTE — TELEPHONE ENCOUNTER
Can you please reschedule her appt to be seen in one month  Replied to her email and was notified to expect call from our office to reschedule her for follow up of new medication    Thanks

## 2020-01-23 ENCOUNTER — PATIENT MESSAGE (OUTPATIENT)
Dept: INTERNAL MEDICINE CLINIC | Facility: CLINIC | Age: 50
End: 2020-01-23

## 2020-01-23 NOTE — TELEPHONE ENCOUNTER
From: Katelin Friedman  To: Samantha Cordon DO  Sent: 1/23/2020 8:47 AM EST  Subject: Prescription Question    Good morning Dr Brianna Fitzgerald! Thank you so very much for calling in the prescription for Saxneda! Unfortunately, as you had suspected my insurance company won't cover it so I had asked for it to be appealed prior to me using the $25 00 co-pay coupon that I had found  I was wondering if you in fact received the appeal form from my insurance company and if it has been completed as of yet?     Thank you,    Katelin Friedman

## 2020-01-27 ENCOUNTER — DOCUMENTATION (OUTPATIENT)
Dept: PSYCHIATRY | Facility: CLINIC | Age: 50
End: 2020-01-27

## 2020-01-27 NOTE — PROGRESS NOTES
Treatment Plan not completed within required time limits due to: Lenard Streeter  canceled appointment  on 1/16/2020 ( per staff,  canceled for today @ 2pm  Work is having a mandatory meeting   She re-scheduled for Monday)

## 2020-01-27 NOTE — PROGRESS NOTES
Treatment Plan not completed within required time limits due to: Elysia Anaya  canceled appointment  on 1/20/2020 ( per staff, called and canceled there apt today 1/20/2020 at 4:00pm she said she couldn't make it in her daughter isn't feeling well)

## 2020-02-05 DIAGNOSIS — E66.3 OVERWEIGHT (BMI 25.0-29.9): ICD-10-CM

## 2020-03-02 NOTE — PROGRESS NOTES
Assessment/Plan:    Problem List Items Addressed This Visit        Other    Low back pain - Primary     Acute on chronic with known lumbar arthritis, levoscoliosis and annular bulge at L4-5  Recommend medrol dose david, skelaxin and naproxen 500mg bid, stop ibuprofen  Start PT next week  Consider re-eval by Pain Management if not improved         Relevant Medications    naproxen (NAPROSYN) 500 mg tablet    metaxalone (SKELAXIN) 800 mg tablet    methylPREDNISolone 4 MG tablet therapy pack    Other Relevant Orders    Ambulatory referral to Physical Therapy          Subjective:      Patient ID: Rajan Aiken is a 51 y o  female  HPI  49yo female with hypothyroidism, OCD, MDD, KYLE, asthma, insomnia, migraines here for evaluation of back pain  She is accompanied by her daughter  She got up two days ago, she bent down and empty out the  and within 1 minute after putting dishes away she developed low back pain  Described as throbbing pain and constant  Denies LE paresthesia, urinary or bowel changes, fever  She has taken ibuprofen, heat and ice without improvement  She has had back pain but had been tolerable, current pain is more intense  She previously received epidural injection years ago that was not effective  MRI LS in 2017 showed mild multilevel degenerative spondylosis, mild levoscoliosis, bulging annulus at L4-5  Pain is worse when bending over  Pain is about the same since it started      The following portions of the patient's history were reviewed and updated as appropriate: allergies, current medications, past family history, past medical history, past social history, past surgical history and problem list       Current Outpatient Medications:     albuterol (2 5 mg/3 mL) 0 083 % nebulizer solution, Inhale, Disp: , Rfl:     Aspirin-Acetaminophen-Caffeine (MIGRAINE FORMULA PO), Take by mouth, Disp: , Rfl:     BREO ELLIPTA 200-25 MCG/INH inhaler, , Disp: , Rfl:     [employer] INDOOR/OUTDOOR ALLERGY RLF 10 MG tablet, Take 10 mg by mouth daily, Disp: , Rfl: 10    EPINEPHrine (EPIPEN) 0 3 mg/0 3 mL SOAJ, as directed, Disp: , Rfl:     Erenumab-aooe (AIMOVIG) 140 MG/ML SOAJ, Inject 1 mL under the skin every 30 (thirty) days, Disp: 1 pen, Rfl: 3    ergocalciferol (VITAMIN D2) 50,000 units, TK 1 C WEEKLY, Disp: , Rfl: 0    ibuprofen (MOTRIN) 800 mg tablet, Take 1 tablet (800 mg total) by mouth every 6 (six) hours as needed for mild pain, Disp: 90 tablet, Rfl: 3    Insulin Pen Needle (NOVOFINE) 32G X 6 MM MISC, by Does not apply route daily, Disp: 50 each, Rfl: 0    levonorgestrel (MIRENA, 52 MG,) 20 MCG/24HR IUD, by Intrauterine route, Disp: , Rfl:     levothyroxine 50 mcg tablet, Take 1 tablet (50 mcg total) by mouth daily in the early morning, Disp: , Rfl: 0    liraglutide (SAXENDA) injection, Inject 0 5 mL (3 mg total) under the skin daily, Disp: 15 mL, Rfl: 0    mometasone (NASONEX) 50 mcg/act nasal spray, , Disp: , Rfl:     omeprazole (PriLOSEC) 20 mg delayed release capsule, Take 1 capsule (20 mg total) by mouth daily, Disp: 30 capsule, Rfl: 11    ondansetron (ZOFRAN-ODT) 4 mg disintegrating tablet, Take 1 tablet (4 mg total) by mouth every 6 (six) hours as needed for nausea or vomiting, Disp: 20 tablet, Rfl: 0    PROAIR RESPICLICK 081 (90 Base) MCG/ACT AEPB, Inhale 2 puffs every 6 (six) hours as needed (wheezing), Disp: 1 each, Rfl: 3    SPIRIVA RESPIMAT 2 5 MCG/ACT AERS, , Disp: , Rfl:     topiramate (TOPAMAX) 25 mg tablet, Take 1 tablet (25 mg total) by mouth daily at bedtime for 5 days, THEN 1 tablet (25 mg total) 2 (two) times a day , Disp: 60 tablet, Rfl: 3    ZOLMitriptan (ZOMIG-ZMT) 5 MG disintegrating tablet, TAKE 1 TABLET BY MOUTH AT ONSET OF HEADACHE, MAY REPEAT AFTER 2 HOURS AS NEEDED  MAX 2 TABLETS per 24 hours  , Disp: 12 tablet, Rfl: 3    divalproex sodium (DEPAKOTE ER) 500 mg 24 hr tablet, Take 1 tablet (500 mg total) by mouth daily at bedtime, Disp: 30 tablet, Rfl: 3   DULoxetine (CYMBALTA) 30 mg delayed release capsule, Take 3 capsules (90 mg total) by mouth daily, Disp: 90 capsule, Rfl: 3    gabapentin (NEURONTIN) 300 mg capsule, Take 1 capsule (300 mg total) by mouth 3 (three) times a day, Disp: 90 capsule, Rfl: 0    hydrOXYzine HCL (ATARAX) 50 mg tablet, Take 1 tablet (50 mg total) by mouth 2 (two) times a day as needed for anxiety, Disp: 60 tablet, Rfl: 3    metaxalone (SKELAXIN) 800 mg tablet, Take 1 tablet (800 mg total) by mouth 3 (three) times a day, Disp: 30 tablet, Rfl: 0    methylPREDNISolone 4 MG tablet therapy pack, Use as directed on package, Disp: 21 each, Rfl: 0    naproxen (NAPROSYN) 500 mg tablet, Take 1 tablet (500 mg total) by mouth 2 (two) times a day with meals, Disp: 20 tablet, Rfl: 0    olopatadine (PATANOL) 0 1 % ophthalmic solution, Administer 1 drop to both eyes 2 (two) times a day for 90 days, Disp: 10 mL, Rfl: 11    QUEtiapine (SEROquel) 300 mg tablet, Take 1 tablet (300 mg total) by mouth daily at bedtime, Disp: 30 tablet, Rfl: 3    Review of Systems   Constitutional: Negative for fever  Gastrointestinal: Negative for abdominal pain, constipation, diarrhea and nausea  Genitourinary: Negative for difficulty urinating, flank pain and urgency  Denies incontinence   Musculoskeletal: Positive for arthralgias, back pain, gait problem and myalgias  R hip pain   Skin: Negative for rash  Neurological: Negative for numbness  Objective:    /70 (BP Location: Left arm, Patient Position: Sitting, Cuff Size: Adult)   Pulse 79   Temp 98 1 °F (36 7 °C) (Tympanic)   Ht 5' 3" (1 6 m)   Wt 73 kg (161 lb)   SpO2 98%   BMI 28 52 kg/m²      Physical Exam   Constitutional: She appears well-developed and well-nourished  No distress  Cardiovascular:   Pulses:       Posterior tibial pulses are 2+ on the right side, and 2+ on the left side  Abdominal: Soft  Bowel sounds are normal  She exhibits no distension   There is no tenderness  Musculoskeletal:        Lumbar back: She exhibits decreased range of motion and spasm  She exhibits no bony tenderness and no edema  Neurological: She is alert  Skin: No rash noted  She is not diaphoretic  Psychiatric: Her speech is normal and behavior is normal  Thought content normal  Her mood appears anxious  Vitals reviewed

## 2020-03-03 ENCOUNTER — OFFICE VISIT (OUTPATIENT)
Dept: INTERNAL MEDICINE CLINIC | Facility: CLINIC | Age: 50
End: 2020-03-03
Payer: COMMERCIAL

## 2020-03-03 ENCOUNTER — TELEPHONE (OUTPATIENT)
Dept: INTERNAL MEDICINE CLINIC | Facility: CLINIC | Age: 50
End: 2020-03-03

## 2020-03-03 VITALS
OXYGEN SATURATION: 98 % | HEART RATE: 79 BPM | SYSTOLIC BLOOD PRESSURE: 110 MMHG | BODY MASS INDEX: 28.53 KG/M2 | HEIGHT: 63 IN | TEMPERATURE: 98.1 F | DIASTOLIC BLOOD PRESSURE: 70 MMHG | WEIGHT: 161 LBS

## 2020-03-03 DIAGNOSIS — M54.50 ACUTE BILATERAL LOW BACK PAIN WITHOUT SCIATICA: Primary | ICD-10-CM

## 2020-03-03 PROCEDURE — 99214 OFFICE O/P EST MOD 30 MIN: CPT | Performed by: INTERNAL MEDICINE

## 2020-03-03 PROCEDURE — 1036F TOBACCO NON-USER: CPT | Performed by: INTERNAL MEDICINE

## 2020-03-03 PROCEDURE — 3008F BODY MASS INDEX DOCD: CPT | Performed by: INTERNAL MEDICINE

## 2020-03-03 RX ORDER — NAPROXEN 500 MG/1
500 TABLET ORAL 2 TIMES DAILY WITH MEALS
Qty: 20 TABLET | Refills: 0 | Status: SHIPPED | OUTPATIENT
Start: 2020-03-03 | End: 2020-03-20 | Stop reason: ALTCHOICE

## 2020-03-03 RX ORDER — METHYLPREDNISOLONE 4 MG/1
TABLET ORAL
Qty: 21 EACH | Refills: 0 | Status: SHIPPED | OUTPATIENT
Start: 2020-03-03 | End: 2020-03-20 | Stop reason: ALTCHOICE

## 2020-03-03 RX ORDER — CARISOPRODOL 250 MG/1
250 TABLET ORAL 3 TIMES DAILY PRN
Qty: 21 TABLET | Refills: 0 | Status: SHIPPED | OUTPATIENT
Start: 2020-03-03 | End: 2020-03-20 | Stop reason: ALTCHOICE

## 2020-03-03 RX ORDER — METAXALONE 800 MG/1
800 TABLET ORAL 3 TIMES DAILY
Qty: 30 TABLET | Refills: 0 | Status: SHIPPED | OUTPATIENT
Start: 2020-03-03 | End: 2020-03-03

## 2020-03-03 NOTE — ASSESSMENT & PLAN NOTE
Acute on chronic with known lumbar arthritis, levoscoliosis and annular bulge at L4-5  Recommend medrol dose david, skelaxin and naproxen 500mg bid, stop ibuprofen  Start PT next week    Consider re-eval by Pain Management if not improved

## 2020-03-03 NOTE — LETTER
March 3, 2020     Patient: Wynema Mcardle   YOB: 1970   Date of Visit: 3/3/2020       To Whom it May Concern:    Carlo Cavanaugh is under my professional care  She was seen in my office on 3/3/2020 for a medical illness  Please excuse from 3/2-3/5/20  She may return to work on 3/6/20  If you have any questions or concerns, please don't hesitate to call           Sincerely,          Sofie Bonds DO        CC: No Recipients

## 2020-03-03 NOTE — TELEPHONE ENCOUNTER
Patient states metaxalone is pricey for her  She is asking if you could switch it to Carisoprodol      Pharm: [facility] [address]

## 2020-03-18 ENCOUNTER — PATIENT MESSAGE (OUTPATIENT)
Dept: NEUROLOGY | Facility: CLINIC | Age: 50
End: 2020-03-18

## 2020-03-18 DIAGNOSIS — F33.41 MAJOR DEPRESSIVE DISORDER, RECURRENT EPISODE, IN PARTIAL REMISSION (HCC): Chronic | ICD-10-CM

## 2020-03-18 DIAGNOSIS — F41.1 GAD (GENERALIZED ANXIETY DISORDER): Chronic | ICD-10-CM

## 2020-03-18 DIAGNOSIS — G43.709 CHRONIC MIGRAINE WITHOUT AURA: ICD-10-CM

## 2020-03-19 DIAGNOSIS — F33.41 MAJOR DEPRESSIVE DISORDER, RECURRENT EPISODE, IN PARTIAL REMISSION (HCC): Chronic | ICD-10-CM

## 2020-03-19 DIAGNOSIS — F41.1 GAD (GENERALIZED ANXIETY DISORDER): Chronic | ICD-10-CM

## 2020-03-19 RX ORDER — GABAPENTIN 300 MG/1
CAPSULE ORAL
Qty: 90 CAPSULE | Refills: 5 | Status: SHIPPED | OUTPATIENT
Start: 2020-03-19 | End: 2020-09-08 | Stop reason: SDUPTHER

## 2020-03-19 RX ORDER — DULOXETIN HYDROCHLORIDE 30 MG/1
90 CAPSULE, DELAYED RELEASE ORAL DAILY
Qty: 90 CAPSULE | Refills: 3 | Status: CANCELLED | OUTPATIENT
Start: 2020-03-19 | End: 2020-07-17

## 2020-03-20 ENCOUNTER — PATIENT MESSAGE (OUTPATIENT)
Dept: INTERNAL MEDICINE CLINIC | Facility: CLINIC | Age: 50
End: 2020-03-20

## 2020-03-20 ENCOUNTER — TELEMEDICINE (OUTPATIENT)
Dept: PSYCHIATRY | Facility: CLINIC | Age: 50
End: 2020-03-20
Payer: COMMERCIAL

## 2020-03-20 DIAGNOSIS — G47.09 OTHER INSOMNIA: Chronic | ICD-10-CM

## 2020-03-20 DIAGNOSIS — F63.9 IMPULSE CONTROL DISORDER: Chronic | ICD-10-CM

## 2020-03-20 DIAGNOSIS — Z79.899 LONG-TERM USE OF HIGH-RISK MEDICATION: Chronic | ICD-10-CM

## 2020-03-20 DIAGNOSIS — F41.1 GAD (GENERALIZED ANXIETY DISORDER): Chronic | ICD-10-CM

## 2020-03-20 DIAGNOSIS — F42.9 OBSESSIVE-COMPULSIVE DISORDER, UNSPECIFIED TYPE: Chronic | ICD-10-CM

## 2020-03-20 DIAGNOSIS — F60.9 PERSONALITY DISORDER (HCC): Chronic | ICD-10-CM

## 2020-03-20 DIAGNOSIS — F41.0 PANIC DISORDER WITHOUT AGORAPHOBIA: Chronic | ICD-10-CM

## 2020-03-20 DIAGNOSIS — M54.50 ACUTE BILATERAL LOW BACK PAIN WITHOUT SCIATICA: ICD-10-CM

## 2020-03-20 DIAGNOSIS — F33.1 MAJOR DEPRESSIVE DISORDER, RECURRENT EPISODE, MODERATE (HCC): Primary | Chronic | ICD-10-CM

## 2020-03-20 PROCEDURE — 99214 OFFICE O/P EST MOD 30 MIN: CPT | Performed by: PSYCHIATRY & NEUROLOGY

## 2020-03-20 RX ORDER — DIVALPROEX SODIUM 250 MG/1
250 TABLET, EXTENDED RELEASE ORAL
Qty: 30 TABLET | Refills: 3 | Status: SHIPPED | OUTPATIENT
Start: 2020-03-20 | End: 2020-07-30 | Stop reason: SDUPTHER

## 2020-03-20 RX ORDER — QUETIAPINE FUMARATE 300 MG/1
300 TABLET, FILM COATED ORAL
Qty: 30 TABLET | Refills: 3 | Status: SHIPPED | OUTPATIENT
Start: 2020-03-20 | End: 2020-07-30 | Stop reason: SDUPTHER

## 2020-03-20 RX ORDER — HYDROXYZINE 50 MG/1
50 TABLET, FILM COATED ORAL 2 TIMES DAILY PRN
Qty: 60 TABLET | Refills: 3 | Status: SHIPPED | OUTPATIENT
Start: 2020-03-20 | End: 2020-07-30 | Stop reason: SDUPTHER

## 2020-03-20 RX ORDER — DIVALPROEX SODIUM 500 MG/1
500 TABLET, EXTENDED RELEASE ORAL
Qty: 30 TABLET | Refills: 3 | Status: SHIPPED | OUTPATIENT
Start: 2020-03-20 | End: 2020-07-30 | Stop reason: SDUPTHER

## 2020-03-20 RX ORDER — TOPIRAMATE 50 MG/1
50 TABLET, FILM COATED ORAL
Qty: 30 TABLET | Refills: 3 | Status: SHIPPED | OUTPATIENT
Start: 2020-03-20 | End: 2020-03-23 | Stop reason: SDUPTHER

## 2020-03-20 RX ORDER — CARISOPRODOL 250 MG/1
250 TABLET ORAL 3 TIMES DAILY PRN
Qty: 21 TABLET | Refills: 0 | Status: CANCELLED | OUTPATIENT
Start: 2020-03-20

## 2020-03-20 RX ORDER — TRAZODONE HYDROCHLORIDE 50 MG/1
50-100 TABLET ORAL
Qty: 30 TABLET | Refills: 3 | Status: SHIPPED | OUTPATIENT
Start: 2020-03-20 | End: 2020-05-28 | Stop reason: SDUPTHER

## 2020-03-20 RX ORDER — NALTREXONE HYDROCHLORIDE 50 MG/1
50 TABLET, FILM COATED ORAL DAILY
Qty: 30 TABLET | Refills: 3 | Status: SHIPPED | OUTPATIENT
Start: 2020-03-20 | End: 2020-07-30 | Stop reason: SDUPTHER

## 2020-03-20 RX ORDER — DULOXETIN HYDROCHLORIDE 60 MG/1
120 CAPSULE, DELAYED RELEASE ORAL DAILY
Qty: 60 CAPSULE | Refills: 3 | Status: SHIPPED | OUTPATIENT
Start: 2020-03-20 | End: 2020-07-14
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
## 2020-03-20 NOTE — BH TREATMENT PLAN
TREATMENT PLAN (Medication Management Only)        [facility]    Name and Date of Birth:  Maryellen Leal 51 y o  1970  Date of Treatment Plan: March 20, 2020  Diagnosis/Diagnoses:    1  Major depressive disorder, recurrent episode, moderate ([facility] [address] )    2  KYLE (generalized anxiety disorder)    3  Impulse control disorder    4  Panic disorder without agoraphobia    5  Obsessive-compulsive disorder, unspecified type    6  Personality disorder ([facility] [address] )    7  Other insomnia    8  Long-term use of high-risk medication      Strengths/Personal Resources for Self-Care: "taking medications, helping others"  Area/Areas of need (in own words): anxiety, depression  1  Long Term Goal: help with anxiety, improve control of depression  Target Date: 2 months - 5/20/2020  Person/Persons responsible for completion of goal: Lexie Mabry  2  Short Term Objective (s) - How will we reach this goal?:   A  Provider new recommended medication/dosage changes and/or continue medication(s): increase Cymbalta, Depakote ER and Topamax, add Trazodone, continue all other medications (Seroquel, Atarax, Melatonin and Naltrexone)  B   N/A   C   N/A  Target Date: 2 months - 5/20/2020  Person/Persons Responsible for Completion of Goal: Lexie Mabry  Progress Towards Goals: regressed  Treatment Modality: medication management every 2 months, continue psychotherapy with own therapist  Review due 90 to 120 days from date of this plan: 6 months - 9/20/2020  Expected length of service: ongoing treatment unless revised  My Physician/PA/NP and I have developed this plan together and I agree to work on the goals and objectives  I understand the treatment goals that were developed for my treatment      Treatment Plan done but not signed at time of office visit due to:  Plan reviewed by phone or in person  and verbal consent given due to [employer] social josefina

## 2020-03-23 ENCOUNTER — TELEPHONE (OUTPATIENT)
Dept: PSYCHIATRY | Facility: CLINIC | Age: 50
End: 2020-03-23

## 2020-03-23 DIAGNOSIS — F63.9 IMPULSE CONTROL DISORDER: Primary | Chronic | ICD-10-CM

## 2020-03-23 RX ORDER — TOPIRAMATE 50 MG/1
50 TABLET, FILM COATED ORAL 2 TIMES DAILY
Qty: 60 TABLET | Refills: 3 | Status: SHIPPED | OUTPATIENT
Start: 2020-03-23 | End: 2020-07-30 | Stop reason: SDUPTHER

## 2020-03-23 NOTE — TELEPHONE ENCOUNTER
Lexie Mabry called and said you were supposed change the TOPIRAMATE from once a day to twice a day for the 50mg at bed time and you didn't change it so she didn't pick that script up Friday  Could you send a new script over for her for that please thank you

## 2020-03-25 ENCOUNTER — TELEMEDICINE (OUTPATIENT)
Dept: INTERNAL MEDICINE CLINIC | Facility: CLINIC | Age: 50
End: 2020-03-25
Payer: COMMERCIAL

## 2020-03-25 DIAGNOSIS — Z20.828 EXPOSURE TO SARS-ASSOCIATED CORONAVIRUS: Primary | ICD-10-CM

## 2020-03-25 DIAGNOSIS — Z20.828 EXPOSURE TO SARS-ASSOCIATED CORONAVIRUS: ICD-10-CM

## 2020-03-25 PROCEDURE — 87635 SARS-COV-2 COVID-19 AMP PRB: CPT | Performed by: INTERNAL MEDICINE

## 2020-03-25 PROCEDURE — 99442 PR PHYS/QHP TELEPHONE EVALUATION 11-20 MIN: CPT | Performed by: INTERNAL MEDICINE

## 2020-03-25 NOTE — PROGRESS NOTES
COVID-19 Virtual Visit     This virtual check-in was done via telephone  Encounter provider Delmer Cisneros DO    Provider located at [address] [address]    Recent Visits  No visits were found meeting these conditions  Showing recent visits within past 7 days and meeting all other requirements     Future Appointments  No visits were found meeting these conditions  Showing future appointments within next 150 days and meeting all other requirements        Patient agrees to participate in a virtual check in via telephone or video visit instead of presenting to the office to address urgent/immediate medical needs  Patient is aware this is a billable service  After connecting through [employer], the patient was identified by name and date of birth  Sona Overton was informed that this was a telemedicine visit and that the exam was being conducted confidentially over secure lines  My office door was closed  No one else was in the room  Sona Overton acknowledged consent and understanding of privacy and security of the telemedicine visit  I informed the patient that I have reviewed her record in Epic and presented the opportunity for her to ask any questions regarding the visit today  The patient agreed to participate  Sona Overton is a 51 y o  female who is concerned about COVID-19  She reports fever, shortness of breath and HA, achy  She has not traveled outside the [place]  within the last 14 days    She has had contact with a person who is under investigation for or who is positive for COVID-19 within the last 14 days  She has not been hospitalized recently for fever and/or lower respiratory symptoms  She was started on zpak yesterday for presumed acute sinusitis  She began runny nose, HA, head congestion, sore throat two days ago  Yesterday, she had nasal congestion and also had chest tightness    Reports two people at work had contact with COVID+ whom patient has had contact with  Her  goes to work to [employer] picking up biowaste  Temp 99 9      Past Medical History:   Diagnosis Date    Amenorrhea     Anxiety     Asthma     Back pain     Chondromalacia of patella     Chronic fatigue     Deep vein thrombophlebitis of leg (HCC)     Depression     GERD (gastroesophageal reflux disease)     HPV (human papilloma virus) infection     Hypothyroidism     Lumbar radiculopathy     Migraine     Myofascial pain syndrome     Osteopenia     Piriformis syndrome     Sacroiliitis (HCC)     Sciatica     Seizure ([address] )     Sleep apnea     Spondylosis of lumbar spine     Trochanteric bursitis of right hip     Vertigo     Vitamin D deficiency        Past Surgical History:   Procedure Laterality Date    CERVIX LESION DESTRUCTION       SECTION      COLONOSCOPY      COLPOSCOPY W/ BIOPSY / CURETTAGE      Colposcopy cervix with biopsy    LAPAROSCOPIC ENDOMETRIOSIS FULGURATION      LAPAROSCOPY      TOOTH EXTRACTION         Current Outpatient Medications   Medication Sig Dispense Refill    albuterol (2 5 mg/3 mL) 0 083 % nebulizer solution Inhale      Aspirin-Acetaminophen-Caffeine (MIGRAINE FORMULA PO) Take by mouth      azithromycin (ZITHROMAX) 250 mg tablet Take 2 tablets today then 1 tablet daily x 4 days 6 tablet 1    BREO ELLIPTA 200-25 MCG/INH inhaler       [facility] INDOOR/OUTDOOR ALLERGY RLF 10 MG tablet Take 10 mg by mouth daily  10    divalproex sodium (DEPAKOTE ER) 250 mg 24 hr tablet Take 1 tablet (250 mg total) by mouth daily at bedtime Total Depakote ER dose is 750 mg at bedtime 30 tablet 3    divalproex sodium (DEPAKOTE ER) 500 mg 24 hr tablet Take 1 tablet (500 mg total) by mouth daily at bedtime 30 tablet 3    DULoxetine (CYMBALTA) 60 mg delayed release capsule Take 2 capsules (120 mg total) by mouth daily 60 capsule 3    EPINEPHrine (EPIPEN) 0 3 mg/0 3 mL SOAJ as directed     Mery Thorne (AIMOVIG) 140 MG/ML SOAJ Inject 1 mL under the skin every 30 (thirty) days 1 pen 3    ergocalciferol (VITAMIN D2) 50,000 units TK 1 C WEEKLY  0    gabapentin (NEURONTIN) 300 mg capsule 1 cap TID  90 capsule 5    hydrOXYzine HCL (ATARAX) 50 mg tablet Take 1 tablet (50 mg total) by mouth 2 (two) times a day as needed for anxiety 60 tablet 3    ibuprofen (MOTRIN) 800 mg tablet Take 1 tablet (800 mg total) by mouth every 6 (six) hours as needed for mild pain 90 tablet 3    levonorgestrel (MIRENA, 52 MG,) 20 MCG/24HR IUD by Intrauterine route      levothyroxine 50 mcg tablet Take 1 tablet (50 mcg total) by mouth daily in the early morning  0    mometasone (NASONEX) 50 mcg/act nasal spray       naltrexone (REVIA) 50 mg tablet Take 1 tablet (50 mg total) by mouth daily 30 tablet 3    olopatadine (PATANOL) 0 1 % ophthalmic solution Administer 1 drop to both eyes 2 (two) times a day for 90 days 10 mL 11    omeprazole (PriLOSEC) 20 mg delayed release capsule Take 1 capsule (20 mg total) by mouth daily 30 capsule 11    ondansetron (ZOFRAN-ODT) 4 mg disintegrating tablet Take 1 tablet (4 mg total) by mouth every 6 (six) hours as needed for nausea or vomiting 20 tablet 0    PROAIR RESPICLICK 886 (90 Base) MCG/ACT AEPB Inhale 2 puffs every 6 (six) hours as needed (wheezing) 1 each 3    QUEtiapine (SEROquel) 300 mg tablet Take 1 tablet (300 mg total) by mouth daily at bedtime 30 tablet 3    SPIRIVA RESPIMAT 2 5 MCG/ACT AERS       topiramate (TOPAMAX) 50 MG tablet Take 1 tablet (50 mg total) by mouth 2 (two) times a day 60 tablet 3    traZODone (DESYREL) 50 mg tablet Take 1-2 tablets ( mg total) by mouth daily at bedtime as needed for sleep 30 tablet 3    ZOLMitriptan (ZOMIG-ZMT) 5 MG disintegrating tablet TAKE 1 TABLET BY MOUTH AT ONSET OF HEADACHE, MAY REPEAT AFTER 2 HOURS AS NEEDED  MAX 2 TABLETS per 24 hours  12 tablet 3     No current facility-administered medications for this visit           Allergies Allergen Reactions    Nuts      Other reaction(s): WALNUTS    Cephalexin     Erythromycin Diarrhea, GI Intolerance and Vomiting    Iodine Hives    Other      adobo    Shellfish Allergy     Shellfish-Derived Products     Turmeric Hives    Wellbutrin [Bupropion] Seizures    Zithromax [Azithromycin] Diarrhea     Can take name brand  Annotation - [id number]: can take brand name  Other reaction(s): Nausea/vomiting/diarrhea       Video Exam    Lexie Mabry appears healthy, has voice hoarseness  Disposition:      I referred Lexie Mabry to one of our centralized sites for a COVID-19 swab  I spent 11 minutes with the patient during this virtual check-in visit

## 2020-03-31 ENCOUNTER — TELEMEDICINE (OUTPATIENT)
Dept: INTERNAL MEDICINE CLINIC | Facility: CLINIC | Age: 50
End: 2020-03-31
Payer: COMMERCIAL

## 2020-03-31 DIAGNOSIS — J45.51 SEVERE PERSISTENT ASTHMA WITH ACUTE EXACERBATION: Primary | ICD-10-CM

## 2020-03-31 LAB — SARS-COV-2 RNA SPEC QL NAA+PROBE: NOT DETECTED

## 2020-03-31 PROCEDURE — 99442 PR PHYS/QHP TELEPHONE EVALUATION 11-20 MIN: CPT | Performed by: INTERNAL MEDICINE

## 2020-03-31 RX ORDER — PREDNISONE 10 MG/1
TABLET ORAL
Qty: 21 TABLET | Refills: 0 | Status: SHIPPED | OUTPATIENT
Start: 2020-03-31 | End: 2020-04-22

## 2020-03-31 NOTE — ASSESSMENT & PLAN NOTE
Persistent SOB, chest tightness, cough associated with allergic rhinitis  Completed zpak  She is COVID negative  Will start 10day prednisone taper to help reduce use of proair, side effects discussed  Use nebs qid prn  Continue BREO, spiriva, oral antihistamine and nasonex

## 2020-03-31 NOTE — PROGRESS NOTES
Virtual Brief Visit    Problem List Items Addressed This Visit        Respiratory    Asthma - Primary     Persistent SOB, chest tightness, cough associated with allergic rhinitis  Completed zpak  She is COVID negative  Will start 10day prednisone taper to help reduce use of proair, side effects discussed  Use nebs qid prn  Continue BREO, spiriva, oral antihistamine and nasonex  Reason for visit is follow up COVID test and symptoms  Encounter provider Nayla Aparicio DO    Provider located at [address] [address]      Recent Visits  Date Type Provider Dept   03/25/20 Telemedicine Nayla Aparicio, [address] [facility]   Showing recent visits within past 7 days and meeting all other requirements     Today's Visits  Date Type Provider Dept   03/31/20 Telemedicine Nayla Aparicio, [address] [facility]   Showing today's visits and meeting all other requirements     Future Appointments  Date Type Provider Dept   03/31/20 Telemedicine Nayla Aparicio, [address] [facility]   Showing future appointments within next 150 days and meeting all other requirements        After connecting through telephone, the patient was identified by name and date of birth  Geovanni Erwin was informed that this is a telemedicine visit and that the visit is being conducted through telephone  My office door was closed  No one else was in the room  She acknowledged consent and understanding of privacy and security of the video platform  The patient has agreed to participate and understands they can discontinue the visit at any time  Patient is aware this is a billable service  Dayana Hyatt is a 51 y o  female with hypothyroidism, asthma, OCD, MDD, KYLE, insomnia and migraines to discuss results of COVID test and follow up of symptoms       She was tested for COVID-19 on 3/25/20 due to complaint of fever, HA, nasal congestion, chest tightness and contact with COVID+ people  Test was negative  She continues to have rhinitis, nasal congestion, chills, HA, nausea  Temperature fluctuates from 97-99 9 degrees  She has completed zpak, last day was three days ago  She has been using proair four times daily, BREO and spiriva  Also using nasonex nasal spray without significant relief      Past Medical History:   Diagnosis Date    Amenorrhea     Anxiety     Asthma     Back pain     Chondromalacia of patella     Chronic fatigue     Deep vein thrombophlebitis of leg (HCC)     Depression     GERD (gastroesophageal reflux disease)     HPV (human papilloma virus) infection     Hypothyroidism     Lumbar radiculopathy     Migraine     Myofascial pain syndrome     Osteopenia     Piriformis syndrome     Sacroiliitis (HCC)     Sciatica     Seizure ([address] )     Sleep apnea     Spondylosis of lumbar spine     Trochanteric bursitis of right hip     Vertigo     Vitamin D deficiency        Past Surgical History:   Procedure Laterality Date    CERVIX LESION DESTRUCTION       SECTION      COLONOSCOPY      COLPOSCOPY W/ BIOPSY / CURETTAGE      Colposcopy cervix with biopsy    LAPAROSCOPIC ENDOMETRIOSIS FULGURATION      LAPAROSCOPY      TOOTH EXTRACTION         Current Outpatient Medications   Medication Sig Dispense Refill    albuterol (2 5 mg/3 mL) 0 083 % nebulizer solution Inhale      Aspirin-Acetaminophen-Caffeine (MIGRAINE FORMULA PO) Take by mouth      BREO ELLIPTA 200-25 MCG/INH inhaler       [facility]S INDOOR/OUTDOOR ALLERGY RLF 10 MG tablet Take 10 mg by mouth daily  10    divalproex sodium (DEPAKOTE ER) 250 mg 24 hr tablet Take 1 tablet (250 mg total) by mouth daily at bedtime Total Depakote ER dose is 750 mg at bedtime 30 tablet 3    divalproex sodium (DEPAKOTE ER) 500 mg 24 hr tablet Take 1 tablet (500 mg total) by mouth daily at bedtime 30 tablet 3    DULoxetine (CYMBALTA) 60 mg delayed release capsule Take 2 capsules (120 mg total) by mouth daily 60 capsule 3    EPINEPHrine (EPIPEN) 0 3 mg/0 3 mL SOAJ as directed      Erenumab-aooe (AIMOVIG) 140 MG/ML SOAJ Inject 1 mL under the skin every 30 (thirty) days 1 pen 3    ergocalciferol (VITAMIN D2) 50,000 units TK 1 C WEEKLY  0    gabapentin (NEURONTIN) 300 mg capsule 1 cap TID  90 capsule 5    hydrOXYzine HCL (ATARAX) 50 mg tablet Take 1 tablet (50 mg total) by mouth 2 (two) times a day as needed for anxiety 60 tablet 3    ibuprofen (MOTRIN) 800 mg tablet Take 1 tablet (800 mg total) by mouth every 6 (six) hours as needed for mild pain 90 tablet 3    levonorgestrel (MIRENA, 52 MG,) 20 MCG/24HR IUD by Intrauterine route      levothyroxine 50 mcg tablet Take 1 tablet (50 mcg total) by mouth daily in the early morning  0    mometasone (NASONEX) 50 mcg/act nasal spray       naltrexone (REVIA) 50 mg tablet Take 1 tablet (50 mg total) by mouth daily 30 tablet 3    omeprazole (PriLOSEC) 20 mg delayed release capsule Take 1 capsule (20 mg total) by mouth daily 30 capsule 11    ondansetron (ZOFRAN-ODT) 4 mg disintegrating tablet Take 1 tablet (4 mg total) by mouth every 6 (six) hours as needed for nausea or vomiting 20 tablet 0    PROAIR RESPICLICK 490 (90 Base) MCG/ACT AEPB Inhale 2 puffs every 6 (six) hours as needed (wheezing) 1 each 3    QUEtiapine (SEROquel) 300 mg tablet Take 1 tablet (300 mg total) by mouth daily at bedtime 30 tablet 3    SPIRIVA RESPIMAT 2 5 MCG/ACT AERS       topiramate (TOPAMAX) 50 MG tablet Take 1 tablet (50 mg total) by mouth 2 (two) times a day 60 tablet 3    traZODone (DESYREL) 50 mg tablet Take 1-2 tablets ( mg total) by mouth daily at bedtime as needed for sleep 30 tablet 3    ZOLMitriptan (ZOMIG-ZMT) 5 MG disintegrating tablet TAKE 1 TABLET BY MOUTH AT ONSET OF HEADACHE, MAY REPEAT AFTER 2 HOURS AS NEEDED  MAX 2 TABLETS per 24 hours   12 tablet 3    olopatadine (PATANOL) 0 1 % ophthalmic solution Administer 1 drop to both eyes 2 (two) times a day for 90 days 10 mL 11     No current facility-administered medications for this visit  Allergies   Allergen Reactions    Nuts      Other reaction(s): WALNUTS    Cephalexin     Erythromycin Diarrhea, GI Intolerance and Vomiting    Iodine Hives    Other      adobo    Shellfish Allergy     Shellfish-Derived Products     Turmeric Hives    Wellbutrin [Bupropion] Seizures    Zithromax [Azithromycin] Diarrhea     Can take name brand  Annotation - [id number]: can take brand name  Other reaction(s): Nausea/vomiting/diarrhea       Review of Systems   Constitutional: Positive for activity change  Negative for chills, diaphoresis and fever  HENT: Positive for congestion, postnasal drip, rhinorrhea, sinus pressure, sinus pain, sneezing (minimal) and sore throat  Respiratory: Positive for cough, shortness of breath and wheezing  Negative for stridor  Gastrointestinal: Positive for diarrhea  Neurological: Positive for headaches  Recent Results (from the past 168 hour(s))   2019 Novel Coronavirus (COVID-19), LUCIAN    Collection Time: 03/25/20  1:02 PM   Result Value Ref Range    SARS-CoV-2  Not Detected Not Detected       I spent 11 minutes with the patient during this visit

## 2020-04-02 ENCOUNTER — TELEPHONE (OUTPATIENT)
Dept: INTERNAL MEDICINE CLINIC | Facility: CLINIC | Age: 50
End: 2020-04-02

## 2020-04-13 DIAGNOSIS — M54.50 ACUTE BILATERAL LOW BACK PAIN WITHOUT SCIATICA: Primary | ICD-10-CM

## 2020-04-13 RX ORDER — CARISOPRODOL 250 MG/1
250 TABLET ORAL 3 TIMES DAILY PRN
Qty: 21 TABLET | Refills: 0 | Status: SHIPPED | OUTPATIENT
Start: 2020-04-13 | End: 2020-04-22 | Stop reason: SDUPTHER

## 2020-04-20 DIAGNOSIS — M54.50 ACUTE BILATERAL LOW BACK PAIN WITHOUT SCIATICA: ICD-10-CM

## 2020-04-20 RX ORDER — CARISOPRODOL 250 MG/1
250 TABLET ORAL 3 TIMES DAILY PRN
Qty: 21 TABLET | Refills: 0 | OUTPATIENT
Start: 2020-04-20

## 2020-04-21 ENCOUNTER — TELEPHONE (OUTPATIENT)
Dept: BEHAVIORAL/MENTAL HEALTH CLINIC | Facility: CLINIC | Age: 50
End: 2020-04-21

## 2020-04-22 ENCOUNTER — TELEMEDICINE (OUTPATIENT)
Dept: INTERNAL MEDICINE CLINIC | Facility: CLINIC | Age: 50
End: 2020-04-22
Payer: COMMERCIAL

## 2020-04-22 VITALS — BODY MASS INDEX: 28.53 KG/M2 | WEIGHT: 161 LBS | TEMPERATURE: 97.9 F | HEIGHT: 63 IN

## 2020-04-22 DIAGNOSIS — M54.50 ACUTE BILATERAL LOW BACK PAIN WITHOUT SCIATICA: Primary | ICD-10-CM

## 2020-04-22 PROCEDURE — 99443 PR PHYS/QHP TELEPHONE EVALUATION 21-30 MIN: CPT | Performed by: INTERNAL MEDICINE

## 2020-04-22 RX ORDER — CARISOPRODOL 250 MG/1
250 TABLET ORAL 3 TIMES DAILY PRN
Qty: 21 TABLET | Refills: 0 | Status: SHIPPED | OUTPATIENT
Start: 2020-04-22 | End: 2020-06-09 | Stop reason: SDUPTHER

## 2020-04-22 RX ORDER — NAPROXEN 500 MG/1
500 TABLET ORAL 2 TIMES DAILY WITH MEALS
Qty: 14 TABLET | Refills: 0 | Status: SHIPPED | OUTPATIENT
Start: 2020-04-22 | End: 2020-07-30 | Stop reason: ALTCHOICE

## 2020-04-30 DIAGNOSIS — F41.0 PANIC DISORDER WITHOUT AGORAPHOBIA: Chronic | ICD-10-CM

## 2020-04-30 DIAGNOSIS — F41.1 GAD (GENERALIZED ANXIETY DISORDER): Chronic | ICD-10-CM

## 2020-04-30 RX ORDER — CLONAZEPAM 1 MG/1
TABLET ORAL
Qty: 90 TABLET | OUTPATIENT
Start: 2020-04-30

## 2020-05-07 ENCOUNTER — HOSPITAL ENCOUNTER (EMERGENCY)
Facility: HOSPITAL | Age: 50
Discharge: HOME | End: 2020-05-07
Attending: EMERGENCY MEDICINE | Admitting: EMERGENCY MEDICINE
Payer: COMMERCIAL

## 2020-05-07 VITALS
SYSTOLIC BLOOD PRESSURE: 133 MMHG | HEIGHT: 63 IN | HEART RATE: 98 BPM | OXYGEN SATURATION: 96 % | WEIGHT: 150 LBS | RESPIRATION RATE: 16 BRPM | DIASTOLIC BLOOD PRESSURE: 84 MMHG | TEMPERATURE: 98 F | BODY MASS INDEX: 26.58 KG/M2

## 2020-05-07 DIAGNOSIS — L60.0 ONYCHOCRYPTOSIS: Primary | ICD-10-CM

## 2020-05-07 PROCEDURE — 11750 EXCISION NAIL&NAIL MATRIX: CPT | Performed by: NURSE PRACTITIONER

## 2020-05-07 PROCEDURE — 99284 EMERGENCY DEPT VISIT MOD MDM: CPT | Performed by: NURSE PRACTITIONER

## 2020-05-07 PROCEDURE — 99282 EMERGENCY DEPT VISIT SF MDM: CPT

## 2020-05-07 RX ORDER — LIDOCAINE HYDROCHLORIDE 10 MG/ML
10 INJECTION, SOLUTION EPIDURAL; INFILTRATION; INTRACAUDAL; PERINEURAL ONCE
Status: COMPLETED | OUTPATIENT
Start: 2020-05-07 | End: 2020-05-07

## 2020-05-07 RX ADMIN — LIDOCAINE HYDROCHLORIDE 10 ML: 10 INJECTION, SOLUTION EPIDURAL; INFILTRATION; INTRACAUDAL; PERINEURAL at 16:30

## 2020-05-11 PROBLEM — J45.50 SEVERE PERSISTENT ASTHMA WITHOUT COMPLICATION: Status: ACTIVE | Noted: 2020-05-11

## 2020-05-22 ENCOUNTER — TELEMEDICINE (OUTPATIENT)
Dept: OBGYN CLINIC | Facility: MEDICAL CENTER | Age: 50
End: 2020-05-22
Payer: COMMERCIAL

## 2020-05-22 DIAGNOSIS — G47.09 OTHER INSOMNIA: Chronic | ICD-10-CM

## 2020-05-22 DIAGNOSIS — B37.3 YEAST VAGINITIS: Primary | ICD-10-CM

## 2020-05-22 PROCEDURE — 99213 OFFICE O/P EST LOW 20 MIN: CPT | Performed by: OBSTETRICS & GYNECOLOGY

## 2020-05-22 RX ORDER — FLUCONAZOLE 150 MG/1
150 TABLET ORAL ONCE
Qty: 1 TABLET | Refills: 0 | Status: SHIPPED | OUTPATIENT
Start: 2020-05-22 | End: 2020-05-22

## 2020-05-22 RX ORDER — TRAZODONE HYDROCHLORIDE 50 MG/1
50-100 TABLET ORAL
Qty: 30 TABLET | Refills: 3 | OUTPATIENT
Start: 2020-05-22 | End: 2020-09-19

## 2020-05-26 DIAGNOSIS — G47.09 OTHER INSOMNIA: Chronic | ICD-10-CM

## 2020-05-26 RX ORDER — TRAZODONE HYDROCHLORIDE 50 MG/1
50-100 TABLET ORAL
Qty: 30 TABLET | Refills: 3 | OUTPATIENT
Start: 2020-05-26 | End: 2020-09-23

## 2020-05-28 ENCOUNTER — TELEPHONE (OUTPATIENT)
Dept: PSYCHIATRY | Facility: CLINIC | Age: 50
End: 2020-05-28

## 2020-05-28 DIAGNOSIS — G47.09 OTHER INSOMNIA: Primary | Chronic | ICD-10-CM

## 2020-05-28 RX ORDER — TRAZODONE HYDROCHLORIDE 50 MG/1
50-100 TABLET ORAL
Qty: 60 TABLET | Refills: 2 | Status: SHIPPED | OUTPATIENT
Start: 2020-05-28 | End: 2020-07-30 | Stop reason: SDUPTHER

## 2020-06-09 ENCOUNTER — OFFICE VISIT (OUTPATIENT)
Dept: INTERNAL MEDICINE CLINIC | Facility: CLINIC | Age: 50
End: 2020-06-09
Payer: COMMERCIAL

## 2020-06-09 VITALS
HEIGHT: 63 IN | TEMPERATURE: 99.6 F | DIASTOLIC BLOOD PRESSURE: 70 MMHG | SYSTOLIC BLOOD PRESSURE: 122 MMHG | HEART RATE: 100 BPM | RESPIRATION RATE: 16 BRPM | WEIGHT: 169.8 LBS | OXYGEN SATURATION: 98 % | BODY MASS INDEX: 30.09 KG/M2

## 2020-06-09 DIAGNOSIS — M54.50 ACUTE BILATERAL LOW BACK PAIN WITHOUT SCIATICA: ICD-10-CM

## 2020-06-09 DIAGNOSIS — F33.1 MAJOR DEPRESSIVE DISORDER, RECURRENT EPISODE, MODERATE (HCC): Chronic | ICD-10-CM

## 2020-06-09 DIAGNOSIS — M54.50 ACUTE LEFT-SIDED LOW BACK PAIN WITHOUT SCIATICA: ICD-10-CM

## 2020-06-09 DIAGNOSIS — E66.09 CLASS 1 OBESITY DUE TO EXCESS CALORIES WITHOUT SERIOUS COMORBIDITY WITH BODY MASS INDEX (BMI) OF 30.0 TO 30.9 IN ADULT: Primary | ICD-10-CM

## 2020-06-09 PROBLEM — E66.811 CLASS 1 OBESITY DUE TO EXCESS CALORIES WITHOUT SERIOUS COMORBIDITY WITH BODY MASS INDEX (BMI) OF 30.0 TO 30.9 IN ADULT: Status: ACTIVE | Noted: 2019-04-19

## 2020-06-09 PROCEDURE — 1036F TOBACCO NON-USER: CPT | Performed by: INTERNAL MEDICINE

## 2020-06-09 PROCEDURE — 3008F BODY MASS INDEX DOCD: CPT | Performed by: INTERNAL MEDICINE

## 2020-06-09 PROCEDURE — 99214 OFFICE O/P EST MOD 30 MIN: CPT | Performed by: INTERNAL MEDICINE

## 2020-06-09 RX ORDER — CARISOPRODOL 250 MG/1
250 TABLET ORAL 3 TIMES DAILY PRN
Qty: 21 TABLET | Refills: 0 | Status: SHIPPED | OUTPATIENT
Start: 2020-06-09 | End: 2020-07-30 | Stop reason: ALTCHOICE

## 2020-06-12 ENCOUNTER — TELEPHONE (OUTPATIENT)
Dept: INTERNAL MEDICINE CLINIC | Facility: CLINIC | Age: 50
End: 2020-06-12

## 2020-06-15 PROBLEM — E66.9 CLASS 1 OBESITY: Status: ACTIVE | Noted: 2019-04-19

## 2020-06-16 ENCOUNTER — TELEMEDICINE (OUTPATIENT)
Dept: BARIATRICS | Facility: CLINIC | Age: 50
End: 2020-06-16
Payer: COMMERCIAL

## 2020-06-16 VITALS — BODY MASS INDEX: 30.12 KG/M2 | WEIGHT: 170 LBS | HEIGHT: 63 IN

## 2020-06-16 DIAGNOSIS — E03.9 HYPOTHYROIDISM (ACQUIRED): ICD-10-CM

## 2020-06-16 DIAGNOSIS — E78.2 MIXED HYPERLIPIDEMIA: ICD-10-CM

## 2020-06-16 DIAGNOSIS — E66.9 CLASS 1 OBESITY: Primary | ICD-10-CM

## 2020-06-16 DIAGNOSIS — R63.5 ABNORMAL WEIGHT GAIN: ICD-10-CM

## 2020-06-16 DIAGNOSIS — E66.09 CLASS 1 OBESITY DUE TO EXCESS CALORIES WITHOUT SERIOUS COMORBIDITY WITH BODY MASS INDEX (BMI) OF 30.0 TO 30.9 IN ADULT: ICD-10-CM

## 2020-06-16 PROCEDURE — 99243 OFF/OP CNSLTJ NEW/EST LOW 30: CPT | Performed by: PHYSICIAN ASSISTANT

## 2020-06-18 NOTE — MISCELLANEOUS
Message   Recorded as Task   Date: 09/14/2016 10:45 AM, Created By: [facility]   Task Name: Follow Up   Assigned To: [facility] clinical,Team   Regarding Patient: Karishma Rodas, Status: Active   CommentDavid Brantleyt - 14 Sep 2016 10:45 AM     TASK CREATED  Pt called wanted xray results also wanted to know if doctor can give her medication for her knot in her neck pt can be reached [phone]   Clair Gonzalez - 14 Sep 2016 12:25 PM     TASK EDITED  Located within [facility] for pt to c/b to provide more info  Provided c/b number for [facility] triage line  *Pt's ov for 9/12/16 was bumped b/c the Dr was out and they R/S her povs to 10/10/16 w/ Tian Rodriguez  *   Clair Gonzalez - 14 Sep 2016 12:25 PM     TASK IN PROGRESS   Clair Gonzalez - 14 Sep 2016 12:55 PM     TASK EDITED  Pt returned our call  Pt said the tizandine 4mg QHS is not helping for her neck asking for something else  She knows she's not suppose to did it but she's been taking her husb Tyl #3 and those seem to help and was going to also ask for a Rx for Tyl #3  Pt told that we do not approve of her taking her 's Tyl #3, we don't usually prescribe it b/c it is very addicting  I asked pt if she is doing PT that was ordered? She said she wasn't going b/c each session would cost $40  She did not go to any PT session at all  Pt asking for c-spine xray results, done 8/12/16, results are in allscripts  Told pt I would forward info to Dr Myers Falling then someone would c/b with his rec     Kalyan Womack - 14 Sep 2016 4:23 PM     TASK REPLIED TO: Previously Assigned To Kalyan Womack  x-rays showed bone spurs and disc degeneration as well as straightening indicative of muscle spasm    she has to try PT or else MRI won't get approved    will d/c tizanidine and switch her to methocarbamol for the muscle spasms    don't use 's tylenol # 3    will see Tian Rodriguez next month who will re-eval her   Fara Letters - 15 Sep 2016 9:25 AM     TASK EDITED    Lennox Hillock for pt to return call  Clair Gonzalez - 19 Sep 2016 8:13 AM     TASK EDITED   [address] - 19 Sep 2016 9:30 AM     TASK EDITED  2nd attempt to reach pt, left vm on pt's home/cell to c/b and s/w nurse about Dr Pedro alfaro and her xray results  LisaClair - 19 Sep 2016 9:30 AM     TASK IN PROGRESS   Shruthi Campos - 20 Sep 2016 9:27 AM     TASK EDITED  Would you like us to send a can't reach you letter? Quinn Abebe - 20 Sep 2016 12:08 PM     TASK REPLIED TO: Previously Assigned To Shruthi Galan - 20 Sep 2016 3:27 PM     TASK EDITED   Malia Dwyer - 22 Sep 2016 11:39 AM     TASK COMPLETED   Gladys Martines - 26 Sep 2016 2:29 PM     TASK REACTIVATED  Pt lmom returning call  Spoke to pt, informed her of the xray results and recommendations on starting PT in order for MRI approval  PT # given to schedule  Pt did p/u methocarbamol and stopped the tizanidine  Pt inquired about taking something during the day, I instructed pt to try tylenol/nsaids/ice/heat  Pt verbalized understanding  Quinn Abebe - 26 Sep 2016 3:07 PM     TASK REPLIED TO: Previously Assigned To [facility],Team  md aware        Active Problems    1  Akathisia (781 0)   2  Amenorrhea (626 0) (N91 2)   3  History of Chondromalacia of patella, unspecified laterality (717 7) (M22 40)   4  Chronic migraine (346 70) (G43 709)   5  Contraceptives (V25 02)   6  Dysmenorrhea (625 3) (N94 6)   7  Encounter for IUD insertion (V25 11) (Z30 430)   8  Encounter for routine gynecological examination (V72 31) (Z01 419)   9  Encounter for screening mammogram for malignant neoplasm of breast (V76 12)   (Z12 31)   10  KYLE (generalized anxiety disorder) (300 02) (F41 1)   11  Impulse control disorder (312 30) (F63 9)   12  Insomnia (780 52) (G47 00)   13  Intractable chronic migraine without aura and without status migrainosus (346 71)    (G43 719)   14  Lumbar radiculopathy (724 4) (B99 16)   15  Major depressive disorder, recurrent severe without psychotic features (296 33) (F33 2)   16  Medication overuse headache (339 3) (G44 40)   17  Migraine headache (346 90) (G43 909)   18  Myofascial pain syndrome (729 1) (M79 1)   19  Neck pain on left side (723 1) (M54 2)   20  Panic disorder without agoraphobia (300 01) (F41 0)   21  Piriformis syndrome, unspecified laterality (355 0) (G57 00)   22  Sacroiliitis (720 2) (M46 1)   23  Sciatica (724 3) (M54 30)   24  Spondylosis of lumbar region without myelopathy or radiculopathy (721 3) (M47 816)    Current Meds   1  ALPRAZolam 1 MG Oral Tablet; Take 1/2 to 1 tablet 3 tmes daily as needed for anxiety; Therapy: 99RQQ4773 to (Evaluate:61Uir7366); Last Rx:26May2016 Ordered   2  ARIPiprazole 30 MG Oral Tablet (Abilify); TAKE 1 TABLET AT BEDTIME; Therapy: 07RXV0419 to (Evaluate:25Vtg4477)  Requested for: 37KNP8648; Last   Rx:26May2016 Ordered   3  Botox 100 UNIT Injection Solution Reconstituted; To be injected by physician as   indicated; Therapy: 43QFE8522 to (Last IM:71XGF7064) Ordered   4  Gabapentin 300 MG Oral Capsule; TAKE 1 CAPSULE BY MOUTH IN THE MORNING, 1   AT NOON, AND 2 CAPS AT NIGHT; Therapy: 20FPQ9231 to (Evaluate:26Jan2017)  Requested for: 41Rwy9795; Last   Rx:03Xjl3374 Ordered   5  Medrol 4 MG Oral Tablet Therapy Pack (MethylPREDNISolone); TAKE AS PRESRIBED; Therapy: 60WYX5617 to (Evaluate:13Mtf9082)  Requested for: 44Zah0335; Last   Rx:73Qbe0375 Ordered   6  Methocarbamol 750 MG Oral Tablet; TAKE 1 TABLET Bedtime PRN muscle spasm; Therapy: 05Yaf6780 to (Evaluate:82Yws9174)  Requested for: 33Hto0048; Last   Rx:86Ayn6930 Ordered   7  Omeprazole 20 MG Oral Capsule Delayed Release; Therapy: 82ZVL4971 to (Last Rx:28Jan2011)  Requested for: 70NRG4629 Ordered   8  Ondansetron HCl - 4 MG Oral Tablet; 1 tab q8h prn nausea; Therapy: 89TXY7066 to (Last Idalmis Jennings)  Requested for: 83FSM8586 Ordered   9   ProAir  (90 Base) MCG/ACT Inhalation Aerosol Solution; Therapy: 49PCN0671 to (Last Wash )  Requested for: 80Cad1627 Ordered   10  Sertraline HCl - 100 MG Oral Tablet; TAKE 1 AND 1/2 TABLETS DAILY; Therapy: 18ZMO0653 to (Evaluate:02Hoz7957)  Requested for: 49Mfe2484; Last    Rx:27Hhp9001; Status: ACTIVE - Renewal Denied Ordered   11  SUMAtriptan Succinate 100 MG Oral Tablet (Imitrex); TAKE ONE (1) TABLET(S)at onset    of migraine MAY REPEAT ONCE AFTER 2 HOURS  MAX 2 DOSES IN 24HOURS; Therapy: 88TZC6303 to (Evaluate:27Oct2016)  Requested for: 50RDO5722; Last    Rx:90Bgz4350 Ordered   12  Symbicort 160-4 5 MCG/ACT Inhalation Aerosol; Therapy: 42ZWZ0527 to (Last Wash )  Requested for: 27Apr2011 Ordered   13  Terconazole 0 4 % Vaginal Cream; INSERT 1 APPLICATORFUL INTRAVAGINALLY AT    BEDTIME; Therapy: 12PYV3527 to (Evaluate:16Jun2016)  Requested for: 38QCU9800; Last    Rx:40Avi6786 Ordered   14  Topiramate 50 MG Oral Tablet; TAKE 1 5 TABLET Twice daily  Requested for: 39JCB1581;    Last Rx:01Qqy7704 Ordered   15  Venlafaxine HCl  MG Oral Capsule Extended Release 24 Hour (Effexor XR);    TAKE 2 CAPSULES DAILY; Therapy: 20Mar2012 to (Evaluate:26Uqy3007)  Requested for: 27Ori9725; Last    Rx:87Epw8158; Status: ACTIVE - Renewal Denied Ordered   16  Vitamin B-2 100 MG Oral Tablet; Take two in am daily; Therapy: 23NHA9966 to (Last IV:74WGM7492)  Requested for: 28Oct2013 Ordered   17  Xopenex 1 25 MG/3ML Inhalation Nebulization Solution (Levalbuterol HCl); Therapy: 51CNV1118 to (Last Rx:08Oct2010)  Requested for: 22ITL2140 Ordered   18  Zolpidem Tartrate 10 MG Oral Tablet; TAKE 1 TABLET AT BEDTIME AS NEEDED; Therapy: 94VQT0494 to (Evaluate:09Fcb8430); Last Rx:33Xdy0789 Ordered    Allergies    1  Erythromycin TABS   2  Diflucan TABS   3  Zithromax TABS    4   Shellfish    Signatures   Electronically signed by : Willard Gutierrez RN; Sep 26 2016  3:16PM EST                       (Author) [Follow-up Visit ___] : a follow-up visit  for [unfilled]

## 2020-06-19 NOTE — PROGRESS NOTES
[facility] GROUP NOTE     10/01/19 0930   Activity/Group Checklist   Group Community meeting   Attendance Attended   Attendance Duration (min) 16-30   Interactions Interacted appropriately   Affect/Mood Appropriate   Goals Achieved Able to listen to others; Able to engage in interactions   Objectives/Key Points:  Living intentionally  Goal Setting  Thought for the Day  Self Check In GET WITH THE GUIDELINES HEART FAILURE 30 DAY PATIENT FOLLOW UP  Patient ID:_[id number]_  Hospital Discharge:  2020  30-Day Follow-Up     2020           Follow-Up Form Completed   2020  INTERVAL EVENTS IN THE FIRST 30 DAYS   within 30 days of discharge? ? Yes   X No  ? Unknown/NA  Date of Death:Click or tap to enter a date.        ? Unknown/NA  Was patient referred to Cardiac Rehab or disease management at discharge?   X Yes   ? No  If not, was patient referred to Cardiac Rehab or disease management within 30 days? ? Yes  ? No  Did the patient receive any of the following within 30 days of discharge (check all that apply)?      None   Cardiac Rehabilitation   HF Disease Management       Smoking Cessation Program X Telephone Management  Other_______________  Did the patient have an Echo or LVEF within 30 days post-discharge?  X None     ? Echocardiogram     ? LVEF       LVEF%  Click or tap here to enter text.     If No LVEF, qualitative LV Dysfunction:  ?Severe    ?Moderate    ?Mild    ?Normal  MEDICATIONS   ACE ARB ARNI BETA   Prescribed at D/C      X   Newly Prescribed after D/C           Not Prescribed X          Still being taken at 30-day Follow-Up       X    Documented contraindication, intolerance, other documented reason

## 2020-06-20 LAB — INSULIN SERPL-ACNC: 4.3 UIU/ML (ref 2.6–24.9)

## 2020-06-26 ENCOUNTER — TELEPHONE (OUTPATIENT)
Dept: BARIATRICS | Facility: CLINIC | Age: 50
End: 2020-06-26

## 2020-06-29 DIAGNOSIS — G43.709 CHRONIC MIGRAINE WITHOUT AURA WITHOUT STATUS MIGRAINOSUS, NOT INTRACTABLE: ICD-10-CM

## 2020-06-29 NOTE — TELEPHONE ENCOUNTER
Pt calling in for refill of zofran  Script pending  Pt is also asking about starting Ubrelvy  She states that with the zomig she has to take 2 tab each migraine for relief  Please  Send Fletcher Kitchen if agreeable

## 2020-07-01 RX ORDER — ONDANSETRON 4 MG/1
4 TABLET, ORALLY DISINTEGRATING ORAL EVERY 6 HOURS PRN
Qty: 20 TABLET | Refills: 0 | Status: SHIPPED | OUTPATIENT
Start: 2020-07-01 | End: 2020-11-17 | Stop reason: SDUPTHER

## 2020-07-10 ENCOUNTER — APPOINTMENT (EMERGENCY)
Dept: CT IMAGING | Facility: HOSPITAL | Age: 50
End: 2020-07-10

## 2020-07-10 ENCOUNTER — HOSPITAL ENCOUNTER (EMERGENCY)
Facility: HOSPITAL | Age: 50
Discharge: HOME | End: 2020-07-10
Attending: EMERGENCY MEDICINE | Admitting: EMERGENCY MEDICINE
Payer: COMMERCIAL

## 2020-07-10 VITALS
RESPIRATION RATE: 18 BRPM | SYSTOLIC BLOOD PRESSURE: 119 MMHG | TEMPERATURE: 98.7 F | DIASTOLIC BLOOD PRESSURE: 77 MMHG | OXYGEN SATURATION: 97 % | HEART RATE: 93 BPM

## 2020-07-10 DIAGNOSIS — S39.012A STRAIN OF LUMBAR REGION, INITIAL ENCOUNTER: ICD-10-CM

## 2020-07-10 DIAGNOSIS — R51.9 HEADACHE: ICD-10-CM
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
## 2020-07-10 PROCEDURE — 96375 TX/PRO/DX INJ NEW DRUG ADDON: CPT

## 2020-07-10 PROCEDURE — 70450 CT HEAD/BRAIN W/O DYE: CPT

## 2020-07-10 PROCEDURE — 96365 THER/PROPH/DIAG IV INF INIT: CPT

## 2020-07-10 PROCEDURE — 99284 EMERGENCY DEPT VISIT MOD MDM: CPT | Performed by: EMERGENCY MEDICINE

## 2020-07-10 PROCEDURE — 99284 EMERGENCY DEPT VISIT MOD MDM: CPT

## 2020-07-10 RX ORDER — MAGNESIUM SULFATE HEPTAHYDRATE 40 MG/ML
2 INJECTION, SOLUTION INTRAVENOUS ONCE
Status: COMPLETED | OUTPATIENT
Start: 2020-07-10 | End: 2020-07-10

## 2020-07-10 RX ORDER — METOCLOPRAMIDE HYDROCHLORIDE 5 MG/ML
10 INJECTION INTRAMUSCULAR; INTRAVENOUS ONCE
Status: COMPLETED | OUTPATIENT
Start: 2020-07-10 | End: 2020-07-10

## 2020-07-10 RX ORDER — KETOROLAC TROMETHAMINE 30 MG/ML
10 INJECTION, SOLUTION INTRAMUSCULAR; INTRAVENOUS ONCE
Status: COMPLETED | OUTPATIENT
Start: 2020-07-10 | End: 2020-07-10

## 2020-07-10 RX ORDER — DIPHENHYDRAMINE HYDROCHLORIDE 50 MG/ML
25 INJECTION INTRAMUSCULAR; INTRAVENOUS ONCE
Status: COMPLETED | OUTPATIENT
Start: 2020-07-10 | End: 2020-07-10

## 2020-07-10 RX ADMIN — METOCLOPRAMIDE HYDROCHLORIDE 10 MG: 5 INJECTION INTRAMUSCULAR; INTRAVENOUS at 19:32

## 2020-07-10 RX ADMIN — DIPHENHYDRAMINE HYDROCHLORIDE 25 MG: 50 INJECTION, SOLUTION INTRAMUSCULAR; INTRAVENOUS at 19:32

## 2020-07-10 RX ADMIN — MAGNESIUM SULFATE IN WATER 2 G: 40 INJECTION, SOLUTION INTRAVENOUS at 19:32

## 2020-07-10 RX ADMIN — KETOROLAC TROMETHAMINE 9.9 MG: 30 INJECTION, SOLUTION INTRAMUSCULAR at 20:29
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
## 2020-07-14 DIAGNOSIS — F41.1 GAD (GENERALIZED ANXIETY DISORDER): Primary | Chronic | ICD-10-CM

## 2020-07-14 RX ORDER — DULOXETIN HYDROCHLORIDE 60 MG/1
120 CAPSULE, DELAYED RELEASE ORAL DAILY
Qty: 60 CAPSULE | Refills: 0 | Status: SHIPPED | OUTPATIENT
Start: 2020-07-14 | End: 2020-07-30 | Stop reason: SDUPTHER

## 2020-07-16 ENCOUNTER — TELEPHONE (OUTPATIENT)
Dept: NEUROLOGY | Facility: CLINIC | Age: 50
End: 2020-07-16

## 2020-07-16 NOTE — TELEPHONE ENCOUNTER
Patient calling for ubrelvy status  It was sent to pharm but she didn't receive it  Called [facility] and they confirmed it requires a PA  ID [id number]  Jeremiah Velazquez [id number]  n [phone]      PA initiated on CMM  Awaiting determination

## 2020-07-30 ENCOUNTER — TELEMEDICINE (OUTPATIENT)
Dept: PSYCHIATRY | Facility: CLINIC | Age: 50
End: 2020-07-30
Payer: COMMERCIAL

## 2020-07-30 VITALS — WEIGHT: 197 LBS | HEIGHT: 63 IN | BODY MASS INDEX: 34.91 KG/M2

## 2020-07-30 DIAGNOSIS — Z79.899 LONG-TERM USE OF HIGH-RISK MEDICATION: Chronic | ICD-10-CM

## 2020-07-30 DIAGNOSIS — F41.1 GAD (GENERALIZED ANXIETY DISORDER): Chronic | ICD-10-CM

## 2020-07-30 DIAGNOSIS — F63.9 IMPULSE CONTROL DISORDER: Chronic | ICD-10-CM

## 2020-07-30 DIAGNOSIS — G47.09 OTHER INSOMNIA: Chronic | ICD-10-CM

## 2020-07-30 DIAGNOSIS — F41.0 PANIC DISORDER WITHOUT AGORAPHOBIA: Chronic | ICD-10-CM

## 2020-07-30 DIAGNOSIS — F60.9 PERSONALITY DISORDER (HCC): Chronic | ICD-10-CM

## 2020-07-30 DIAGNOSIS — F33.2 MAJOR DEPRESSIVE DISORDER, RECURRENT, SEVERE WITHOUT PSYCHOTIC FEATURES (HCC): Primary | Chronic | ICD-10-CM

## 2020-07-30 DIAGNOSIS — F42.9 OBSESSIVE-COMPULSIVE DISORDER, UNSPECIFIED TYPE: Chronic | ICD-10-CM

## 2020-07-30 PROCEDURE — 3008F BODY MASS INDEX DOCD: CPT | Performed by: PSYCHIATRY & NEUROLOGY

## 2020-07-30 PROCEDURE — 99213 OFFICE O/P EST LOW 20 MIN: CPT | Performed by: PSYCHIATRY & NEUROLOGY

## 2020-07-30 PROCEDURE — 90833 PSYTX W PT W E/M 30 MIN: CPT | Performed by: PSYCHIATRY & NEUROLOGY

## 2020-07-30 PROCEDURE — 1036F TOBACCO NON-USER: CPT | Performed by: PSYCHIATRY & NEUROLOGY

## 2020-07-30 RX ORDER — DIVALPROEX SODIUM 500 MG/1
500 TABLET, EXTENDED RELEASE ORAL
Qty: 30 TABLET | Refills: 3 | Status: SHIPPED | OUTPATIENT
Start: 2020-07-30 | End: 2021-08-24 | Stop reason: SDUPTHER

## 2020-07-30 RX ORDER — DIVALPROEX SODIUM 250 MG/1
250 TABLET, EXTENDED RELEASE ORAL
Qty: 30 TABLET | Refills: 3 | Status: SHIPPED | OUTPATIENT
Start: 2020-07-30 | End: 2021-08-24 | Stop reason: SDUPTHER

## 2020-07-30 RX ORDER — TRAZODONE HYDROCHLORIDE 50 MG/1
50-100 TABLET ORAL
Qty: 60 TABLET | Refills: 3 | Status: SHIPPED | OUTPATIENT
Start: 2020-07-30 | End: 2021-02-19 | Stop reason: SDUPTHER

## 2020-07-30 RX ORDER — TOPIRAMATE 100 MG/1
100 TABLET, FILM COATED ORAL 2 TIMES DAILY
Qty: 60 TABLET | Refills: 3 | Status: SHIPPED | OUTPATIENT
Start: 2020-07-30 | End: 2021-01-15 | Stop reason: SDUPTHER

## 2020-07-30 RX ORDER — NALTREXONE HYDROCHLORIDE 50 MG/1
50 TABLET, FILM COATED ORAL DAILY
Qty: 30 TABLET | Refills: 3 | Status: SHIPPED | OUTPATIENT
Start: 2020-07-30 | End: 2021-08-24 | Stop reason: SDUPTHER

## 2020-07-30 RX ORDER — QUETIAPINE FUMARATE 200 MG/1
200 TABLET, FILM COATED ORAL
Qty: 30 TABLET | Refills: 3 | Status: SHIPPED | OUTPATIENT
Start: 2020-07-30 | End: 2021-01-15 | Stop reason: SDUPTHER

## 2020-07-30 RX ORDER — DULOXETIN HYDROCHLORIDE 60 MG/1
120 CAPSULE, DELAYED RELEASE ORAL DAILY
Qty: 60 CAPSULE | Refills: 3 | Status: SHIPPED | OUTPATIENT
Start: 2020-07-30 | End: 2021-01-15 | Stop reason: SDUPTHER

## 2020-07-30 RX ORDER — HYDROXYZINE 50 MG/1
50 TABLET, FILM COATED ORAL 3 TIMES DAILY PRN
Qty: 90 TABLET | Refills: 3 | Status: SHIPPED | OUTPATIENT
Start: 2020-07-30 | End: 2021-08-24 | Stop reason: SDUPTHER
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
## 2020-07-30 NOTE — BH TREATMENT PLAN
TREATMENT PLAN (Medication Management Only)        [facility]    Name/Date of Birth/MRN:  Melyssa Leo 51 y o  1970 MRN: [id number]  Date of Treatment Plan: July 30, 2020  Diagnosis/Diagnoses:   1  Major depressive disorder, recurrent, severe without psychotic features ([facility] )    2  KYLE (generalized anxiety disorder)    3  Panic disorder without agoraphobia    4  Obsessive-compulsive disorder, unspecified type    5  Impulse control disorder    6  Personality disorder ([facility] )    7  Other insomnia    8  Long-term use of high-risk medication      Strengths/Personal Resources for Self-Care: "I take my medications all the time"  Area/Areas of need (in own words): "not being so hard on myself"  1  Long Term Goal:   alleviate anxiety, help with depression, maintain improvement in impulse control  Target Date: 2 months - 9/30/2020  Person/Persons responsible for completion of goal: Brian Garcia  2  Short Term Objective (s) - How will we reach this goal?:   A  Provider new recommended medication/dosage changes and/or continue medication(s): increase Topamax and Atarax, decrease Seroquel, continue all other medications (Cymbalta, Trazodone, Depakote ER, Melatonin and Naltrexone)  B   N/A   C   N/A  Target Date: 2 months - 9/30/2020  Person/Persons Responsible for Completion of Goal: Brian Garcia   Progress Towards Goals: regressed  Treatment Modality: medication management every 2 months, continue psychotherapy with own therapist  Review due 180 days from date of this plan: 6 months - 1/30/2021  Expected length of service: ongoing treatment unless revised  My Physician/PA/NP and I have developed this plan together and I agree to work on the goals and objectives  I understand the treatment goals that were developed for my treatment    Electronic Signatures: on file (unless signed below)    Juancho Brewer MD 07/30/20

## 2020-07-31 ENCOUNTER — TELEPHONE (OUTPATIENT)
Dept: PSYCHIATRY | Facility: CLINIC | Age: 50
End: 2020-07-31

## 2020-07-31 NOTE — TELEPHONE ENCOUNTER
Please let her know that medication changes (increase in Topamax and decrease in Seroquel) that I made yesterday during virtual session (she had a poor connection) should help with weight gain  She needs to give it some time  I will reassess the effect at her next visit and then make a decision regarding further changes

## 2020-07-31 NOTE — TELEPHONE ENCOUNTER
Patient would like to speak with you about being prescribed a new medication due to weight gain    Please call [phone]

## 2020-08-03 DIAGNOSIS — F33.1 MAJOR DEPRESSIVE DISORDER, RECURRENT EPISODE, MODERATE (HCC): Chronic | ICD-10-CM

## 2020-08-03 RX ORDER — QUETIAPINE FUMARATE 300 MG/1
TABLET, FILM COATED ORAL
Qty: 30 TABLET | Refills: 3 | OUTPATIENT
Start: 2020-08-03

## 2020-08-14 DIAGNOSIS — F41.1 GAD (GENERALIZED ANXIETY DISORDER): Chronic | ICD-10-CM

## 2020-08-17 RX ORDER — DULOXETIN HYDROCHLORIDE 60 MG/1
CAPSULE, DELAYED RELEASE ORAL
Qty: 60 CAPSULE | Refills: 3 | OUTPATIENT
Start: 2020-08-17

## 2020-08-25 DIAGNOSIS — G47.09 OTHER INSOMNIA: Chronic | ICD-10-CM

## 2020-08-25 RX ORDER — TRAZODONE HYDROCHLORIDE 50 MG/1
TABLET ORAL
Qty: 60 TABLET | Refills: 3 | OUTPATIENT
Start: 2020-08-25

## 2020-09-03 ENCOUNTER — TELEPHONE (OUTPATIENT)
Dept: BARIATRICS | Facility: CLINIC | Age: 50
End: 2020-09-03

## 2020-09-03 NOTE — TELEPHONE ENCOUNTER
----- Message from Miguel Cuevas MD sent at 9/3/2020  1:00 PM EDT -----  Regarding: RE: Phentermine  Her mood has not been stable for some time  She has significant depressive symptoms and anxiety symptoms, also is on multiple psychotropics  I would not recommend phentermine as an option for her  Thank you  Miguel Cuevas MD  ----- Message -----  From: Tina Coker PA-C  Sent: 9/3/2020   8:27 AM EDT  To: Miguel Cuevas MD  Subject: Phentermine                                      Hi Dr Cari Ha,    Pt is interested in weight loss medication  I have her checking coverage of Saxenda, but if it is not covered, phentermine is an option as long as her mental health issues are stable  Would you recommend phentermine as an option for this patient based on her mental health issues/medications       Thank you,  Sherry Bauer PA-C

## 2020-09-08 DIAGNOSIS — F41.1 GAD (GENERALIZED ANXIETY DISORDER): Chronic | ICD-10-CM

## 2020-09-08 DIAGNOSIS — G43.709 CHRONIC MIGRAINE WITHOUT AURA: ICD-10-CM

## 2020-09-08 RX ORDER — GABAPENTIN 300 MG/1
CAPSULE ORAL
Qty: 90 CAPSULE | Refills: 5 | Status: SHIPPED | OUTPATIENT
Start: 2020-09-08 | End: 2021-09-10 | Stop reason: SDUPTHER

## 2020-11-15 ENCOUNTER — NURSE TRIAGE (OUTPATIENT)
Dept: OTHER | Facility: OTHER | Age: 50
End: 2020-11-15

## 2020-11-17 ENCOUNTER — OFFICE VISIT (OUTPATIENT)
Dept: URGENT CARE | Age: 50
End: 2020-11-17

## 2020-11-17 VITALS
HEIGHT: 63 IN | HEART RATE: 92 BPM | DIASTOLIC BLOOD PRESSURE: 80 MMHG | BODY MASS INDEX: 26.58 KG/M2 | RESPIRATION RATE: 18 BRPM | WEIGHT: 150 LBS | SYSTOLIC BLOOD PRESSURE: 110 MMHG | OXYGEN SATURATION: 99 % | TEMPERATURE: 98.5 F

## 2020-11-17 DIAGNOSIS — G43.709 CHRONIC MIGRAINE WITHOUT AURA WITHOUT STATUS MIGRAINOSUS, NOT INTRACTABLE: ICD-10-CM

## 2020-11-17 DIAGNOSIS — M54.50 ACUTE MIDLINE LOW BACK PAIN WITHOUT SCIATICA: Primary | ICD-10-CM

## 2020-11-17 PROCEDURE — G0382 LEV 3 HOSP TYPE B ED VISIT: HCPCS | Performed by: FAMILY MEDICINE

## 2020-11-17 RX ORDER — BUDESONIDE AND FORMOTEROL FUMARATE DIHYDRATE 160; 4.5 UG/1; UG/1
2 AEROSOL RESPIRATORY (INHALATION) EVERY 12 HOURS
COMMUNITY
End: 2022-01-05 | Stop reason: SDUPTHER

## 2020-11-17 RX ORDER — ONDANSETRON 4 MG/1
4 TABLET, ORALLY DISINTEGRATING ORAL EVERY 6 HOURS PRN
Qty: 20 TABLET | Refills: 0 | Status: SHIPPED | OUTPATIENT
Start: 2020-11-17 | End: 2021-10-27 | Stop reason: SDUPTHER

## 2020-11-17 RX ORDER — CYCLOBENZAPRINE HCL 10 MG
10 TABLET ORAL
Qty: 20 TABLET | Refills: 0 | Status: SHIPPED | OUTPATIENT
Start: 2020-11-17 | End: 2021-05-19 | Stop reason: SDUPTHER

## 2020-11-17 RX ORDER — METHYLPREDNISOLONE 4 MG/1
TABLET ORAL
Qty: 21 TABLET | Refills: 0 | Status: SHIPPED | OUTPATIENT
Start: 2020-11-17 | End: 2021-05-05

## 2020-11-17 RX ORDER — LEVOTHYROXINE SODIUM 125 UG/1
TABLET ORAL DAILY
COMMUNITY
End: 2022-04-06 | Stop reason: ALTCHOICE

## 2020-11-18 RX ORDER — ZOLMITRIPTAN 5 MG/1
TABLET, ORALLY DISINTEGRATING ORAL
Qty: 12 TABLET | Refills: 0 | Status: SHIPPED | OUTPATIENT
Start: 2020-11-18 | End: 2021-06-28 | Stop reason: SDUPTHER

## 2020-12-04 ENCOUNTER — TELEPHONE (OUTPATIENT)
Dept: PSYCHIATRY | Facility: CLINIC | Age: 50
End: 2020-12-04

## 2021-01-14 DIAGNOSIS — F33.2 MAJOR DEPRESSIVE DISORDER, RECURRENT, SEVERE WITHOUT PSYCHOTIC FEATURES (HCC): Primary | Chronic | ICD-10-CM

## 2021-01-14 DIAGNOSIS — F63.9 IMPULSE CONTROL DISORDER: Chronic | ICD-10-CM

## 2021-01-14 DIAGNOSIS — F41.1 GAD (GENERALIZED ANXIETY DISORDER): Chronic | ICD-10-CM

## 2021-01-14 RX ORDER — DULOXETIN HYDROCHLORIDE 60 MG/1
120 CAPSULE, DELAYED RELEASE ORAL DAILY
Qty: 60 CAPSULE | Refills: 3 | OUTPATIENT
Start: 2021-01-14 | End: 2021-05-14

## 2021-01-14 RX ORDER — QUETIAPINE FUMARATE 200 MG/1
200 TABLET, FILM COATED ORAL
Qty: 30 TABLET | Refills: 3 | OUTPATIENT
Start: 2021-01-14 | End: 2021-05-14

## 2021-01-14 RX ORDER — TOPIRAMATE 100 MG/1
100 TABLET, FILM COATED ORAL 2 TIMES DAILY
Qty: 60 TABLET | Refills: 3 | OUTPATIENT
Start: 2021-01-14 | End: 2021-05-14

## 2021-01-15 RX ORDER — DULOXETIN HYDROCHLORIDE 60 MG/1
120 CAPSULE, DELAYED RELEASE ORAL DAILY
Qty: 60 CAPSULE | Refills: 2 | Status: SHIPPED | OUTPATIENT
Start: 2021-01-15 | End: 2021-06-03

## 2021-01-15 RX ORDER — QUETIAPINE FUMARATE 200 MG/1
200 TABLET, FILM COATED ORAL
Qty: 30 TABLET | Refills: 2 | Status: SHIPPED | OUTPATIENT
Start: 2021-01-15 | End: 2021-05-21

## 2021-01-15 RX ORDER — TOPIRAMATE 100 MG/1
100 TABLET, FILM COATED ORAL 2 TIMES DAILY
Qty: 60 TABLET | Refills: 2 | Status: SHIPPED | OUTPATIENT
Start: 2021-01-15 | End: 2021-05-21

## 2021-01-15 NOTE — TELEPHONE ENCOUNTER
Talked with Patient  She doesn't have insurance at the time however will be getting it  Just doesn't know when  I did get her scheduled for end of March and told her to keep us updated with the insurance situation

## 2021-02-19 DIAGNOSIS — G47.09 OTHER INSOMNIA: Primary | Chronic | ICD-10-CM

## 2021-02-19 RX ORDER — TRAZODONE HYDROCHLORIDE 50 MG/1
50-100 TABLET ORAL
Qty: 60 TABLET | Refills: 1 | Status: SHIPPED | OUTPATIENT
Start: 2021-02-19 | End: 2021-06-28 | Stop reason: SDUPTHER

## 2021-02-20 ENCOUNTER — TELEPHONE (OUTPATIENT)
Dept: OTHER | Facility: OTHER | Age: 51
End: 2021-02-20

## 2021-03-13 ENCOUNTER — HOSPITAL ENCOUNTER (EMERGENCY)
Facility: HOSPITAL | Age: 51
Discharge: HOME | End: 2021-03-13
Attending: EMERGENCY MEDICINE | Admitting: EMERGENCY MEDICINE
Payer: COMMERCIAL

## 2021-03-13 VITALS
TEMPERATURE: 98.7 F | SYSTOLIC BLOOD PRESSURE: 123 MMHG | DIASTOLIC BLOOD PRESSURE: 74 MMHG | BODY MASS INDEX: 26.05 KG/M2 | RESPIRATION RATE: 14 BRPM | OXYGEN SATURATION: 95 % | HEIGHT: 63 IN | WEIGHT: 147 LBS | HEART RATE: 88 BPM

## 2021-03-13 DIAGNOSIS — M26.622 ARTHRALGIA OF LEFT TEMPOROMANDIBULAR JOINT: Primary | ICD-10-CM

## 2021-03-13 PROCEDURE — 96372 THER/PROPH/DIAG INJ SC/IM: CPT

## 2021-03-13 PROCEDURE — 99284 EMERGENCY DEPT VISIT MOD MDM: CPT | Performed by: EMERGENCY MEDICINE

## 2021-03-13 PROCEDURE — 99283 EMERGENCY DEPT VISIT LOW MDM: CPT

## 2021-03-13 RX ORDER — CYCLOBENZAPRINE HCL 10 MG
10 TABLET ORAL 3 TIMES DAILY PRN
Qty: 20 TABLET | Refills: 0 | Status: SHIPPED | OUTPATIENT
Start: 2021-03-13 | End: 2021-11-01 | Stop reason: ALTCHOICE

## 2021-03-13 RX ORDER — KETOROLAC TROMETHAMINE 30 MG/ML
30 INJECTION, SOLUTION INTRAMUSCULAR; INTRAVENOUS ONCE
Status: COMPLETED | OUTPATIENT
Start: 2021-03-13 | End: 2021-03-13

## 2021-03-13 RX ORDER — IBUPROFEN 800 MG/1
800 TABLET ORAL EVERY 8 HOURS PRN
Qty: 21 TABLET | Refills: 0 | Status: SHIPPED | OUTPATIENT
Start: 2021-03-13 | End: 2021-05-19 | Stop reason: SDUPTHER

## 2021-03-13 RX ORDER — HYDROCODONE BITARTRATE AND ACETAMINOPHEN 5; 325 MG/1; MG/1
TABLET ORAL
Qty: 5 TABLET | Refills: 0 | Status: SHIPPED | OUTPATIENT
Start: 2021-03-13 | End: 2021-05-05

## 2021-03-13 RX ADMIN — KETOROLAC TROMETHAMINE 30 MG: 30 INJECTION, SOLUTION INTRAMUSCULAR at 20:06

## 2021-03-14 NOTE — ED PROVIDER NOTES
History  Chief Complaint   Patient presents with    Jaw Pain     Left sided jaw pain x 3-4 weeks, denies injury  Radiates into ear, taking OTCs without releif  20-year-old female presents the emergency department for evaluation of worsening left-sided jaw pain  Patient states initially she developed symptoms approximately 1 month ago  At that time she had a lot of clicking in the left jaw and popping sensation when she would chew  She gradually noticed increased pain and more difficulty with opening her mouth wide  She localizes the pain to the left TMJ but it radiates into the left side of the neck and aggravate and existing left shoulder arthritis  She is also having pain along the left postauricular region  She has not noticed any skin changes or swelling, redness  She has not had fevers or chills  She has had previous dental extractions of upper and lower molars on the left and denies dental pain  Patient has tried taking over-the-counter medications without relief  Patient states her symptoms are now significantly impairing her daily function as she is having difficulty sleeping  History provided by:  Patient and medical records   used: No    Medical Problem  Location:  Left jaw TMJ region  Quality:  Throbbing/aching  Severity:  Severe  Onset quality:  Gradual  Duration:  4 weeks  Timing:  Constant  Progression:  Worsening  Chronicity:  New  Context:  Unsure if she grinds her teeth at night  Gradual worsening of left-sided pain with clicking and popping noted  Relieved by:  Nothing  Worsened by:  Nothing  Ineffective treatments:  OTC meds  Associated symptoms: no abdominal pain, no chest pain, no fatigue and no sore throat        Prior to Admission Medications   Prescriptions Last Dose Informant Patient Reported? Taking?    Aspirin-Acetaminophen-Caffeine (MIGRAINE FORMULA PO)  Self Yes No   Sig: Take by mouth   BREO ELLIPTA 200-25 MCG/INH inhaler  Self Yes No   [employer] INDOOR/OUTDOOR ALLERGY RLF 10 MG tablet  Self Yes No   Sig: Take 10 mg by mouth daily   DULoxetine (CYMBALTA) 60 mg delayed release capsule   No No   Sig: Take 2 capsules (120 mg total) by mouth daily   EPINEPHrine (EPIPEN) 0 3 mg/0 3 mL SOAJ  Self Yes No   Sig: as directed   Erenumab-aooe (AIMOVIG) 140 MG/ML SOAJ  Self No No   Sig: Inject 1 mL under the skin every 30 (thirty) days   QUEtiapine (SEROquel) 200 mg tablet   No No   Sig: Take 1 tablet (200 mg total) by mouth daily at bedtime   SPIRIVA RESPIMAT 2 5 MCG/ACT AERS  Self Yes No   VITAMIN D PO   Yes No   Sig: Take 5,000 Units by mouth   ZOLMitriptan (ZOMIG-ZMT) 5 MG disintegrating tablet   No No   Sig: TAKE 1 TABLET BY MOUTH AT ONSET OF HEADACHE, MAY REPEAT AFTER 2 HOURS AS NEEDED  MAX 2 TABLETS per 24 hours  albuterol (2 5 mg/3 mL) 0 083 % nebulizer solution  Self Yes No   Sig: Inhale   aluminum-magnesium hydroxide 200-200 MG/5ML suspension   No No   Sig: GAVISCON REGULAR STRENGTH AFTER MEALS and BEDTIME WHEN NOT FOLLOWING LPR/GERD DIET     budesonide-formoterol (Symbicort) 160-4 5 mcg/act inhaler   Yes No   Sig: Inhale 2 puffs every 12 (twelve) hours   cyclobenzaprine (FLEXERIL) 10 mg tablet   No No   Sig: Take 1 tablet (10 mg total) by mouth daily at bedtime for 20 doses   divalproex sodium (DEPAKOTE ER) 250 mg 24 hr tablet   No No   Sig: Take 1 tablet (250 mg total) by mouth daily at bedtime Total Depakote ER dose is 750 mg at bedtime   divalproex sodium (DEPAKOTE ER) 500 mg 24 hr tablet   No No   Sig: Take 1 tablet (500 mg total) by mouth daily at bedtime   gabapentin (NEURONTIN) 300 mg capsule   No No   Si cap TID    hydrOXYzine HCL (ATARAX) 50 mg tablet   No No   Sig: Take 1 tablet (50 mg total) by mouth 3 (three) times a day as needed for anxiety   ibuprofen (MOTRIN) 800 mg tablet  Self No No   Sig: Take 1 tablet (800 mg total) by mouth every 6 (six) hours as needed for mild pain   levonorgestrel (MIRENA, 52 MG,) 20 MCG/24HR IUD  Self Yes No Sig: by Intrauterine route   levothyroxine (Synthroid) 125 mcg tablet   Yes No   Sig: Take by mouth Daily   levothyroxine 50 mcg tablet  Self No No   Sig: Take 1 tablet (50 mcg total) by mouth daily in the early morning   methylPREDNISolone 4 MG tablet therapy pack   No No   Sig: Use as directed on package   mometasone (NASONEX) 50 mcg/act nasal spray  Self Yes No   naltrexone (REVIA) 50 mg tablet   No No   Sig: Take 1 tablet (50 mg total) by mouth daily   olopatadine (PATANOL) 0 1 % ophthalmic solution   No No   Sig: Administer 1 drop to both eyes 2 (two) times a day for 90 days   omeprazole (PriLOSEC) 20 mg delayed release capsule  Self No No   Sig: Take 1 capsule (20 mg total) by mouth daily   ondansetron (ZOFRAN-ODT) 4 mg disintegrating tablet   No No   Sig: Take 1 tablet (4 mg total) by mouth every 6 (six) hours as needed for nausea or vomiting   predniSONE 10 mg tablet   No No   Sig: Take with food  Take 4 tabs x 2 days, then 3 x 2 days, then 2 x 2 days, then 1 x 2 days     topiramate (TOPAMAX) 100 mg tablet   No No   Sig: Take 1 tablet (100 mg total) by mouth 2 (two) times a day   traZODone (DESYREL) 50 mg tablet   No No   Sig: Take 1-2 tablets ( mg total) by mouth daily at bedtime as needed for sleep      Facility-Administered Medications: None       Past Medical History:   Diagnosis Date    Amenorrhea     Anxiety     Arthritis     Asthma     Back pain     Chondromalacia of patella     Chronic fatigue     [facility] Yes    Deep vein thrombophlebitis of leg [facility]     Depression     Disease of thyroid gland     GERD (gastroesophageal reflux disease)     HPV (human papilloma virus) infection     Hypothyroidism     Lumbar radiculopathy     Migraine     Myofascial pain syndrome     Osteopenia     Piriformis syndrome     [facility]roiliitis [facility]     Sciatica     Seizure ([address] )     Sleep apnea     Spondylosis of lumbar spine     Trochanteric bursitis of right hip     Vertigo     Vitamin D deficiency        Past Surgical History:   Procedure Laterality Date    CERVIX LESION DESTRUCTION       SECTION      COLONOSCOPY      COLPOSCOPY W/ BIOPSY / CURETTAGE      Colposcopy cervix with biopsy    LAPAROSCOPIC ENDOMETRIOSIS FULGURATION      LAPAROSCOPY      TOOTH EXTRACTION         Family History   Problem Relation Age of Onset    Heart disease Mother     Asthma Daughter     Lung cancer Paternal Grandfather     Asthma Daughter     Colon cancer Father     Colon cancer Maternal Grandfather     Prostate cancer Maternal Grandfather     Colon cancer Maternal Aunt     No Known Problems Maternal Grandmother     Diabetes Paternal Grandmother     No Known Problems Maternal Aunt     No Known Problems Maternal Aunt     No Known Problems Maternal Aunt     No Known Problems Paternal Aunt     Asthma Daughter     Psychiatric Illness Neg Hx     Stroke Neg Hx     Thyroid disease Neg Hx     Substance Abuse Neg Hx     Suicidality Neg Hx      I have reviewed and agree with the history as documented  E-Cigarette/Vaping    E-Cigarette Use Never User      E-Cigarette/Vaping Substances    Nicotine No     THC No     CBD No     Flavoring No     Other No     Unknown No      Social History     Tobacco Use    Smoking status: Never Smoker    Smokeless tobacco: Never Used   Substance Use Topics    Alcohol use: Not Currently     Frequency: Never     Binge frequency: Never    Drug use: Not Currently       Review of Systems   Constitutional: Negative for chills and fatigue  HENT: Positive for sinus pressure and sinus pain  Negative for dental problem, facial swelling, hearing loss, sore throat, tinnitus and trouble swallowing  Cardiovascular: Negative for chest pain  Gastrointestinal: Negative for abdominal pain  All other systems reviewed and are negative  Physical Exam  Physical Exam  Vitals signs and nursing note reviewed  Constitutional:       General: She is not in acute distress  Appearance: Normal appearance  She is well-developed  She is not ill-appearing, toxic-appearing or diaphoretic  HENT:      Head: Normocephalic  Comments: Tenderness of the left TMJ  Clicking appreciated with jaw opening and closing     Right Ear: Hearing, tympanic membrane, ear canal and external ear normal       Left Ear: Hearing, tympanic membrane, ear canal and external ear normal       Ears:        Nose: Nose normal    Eyes:      General: Lids are normal       Pupils: Pupils are equal, round, and reactive to light  Neck:      Musculoskeletal: Normal range of motion and neck supple  Pulmonary:      Effort: Pulmonary effort is normal  No respiratory distress  Musculoskeletal: Normal range of motion  General: No deformity  Skin:     General: Skin is warm and dry  Neurological:      Mental Status: She is alert and oriented to person, place, and time           Vital Signs  ED Triage Vitals [03/13/21 1925]   Temperature Pulse Respirations Blood Pressure SpO2   98 7 °F (37 1 °C) 88 14 123/74 95 %      Temp Source Heart Rate Source Patient Position - Orthostatic VS BP Location FiO2 (%)   Oral -- -- -- --      Pain Score       8           Vitals:    03/13/21 1925 03/13/21 1930   BP: 123/74 123/74   Pulse: 88          Visual Acuity      ED Medications  Medications   ketorolac (TORADOL) injection 30 mg (30 mg Intramuscular Given 3/13/21 2006)       Diagnostic Studies  Results Reviewed     None                 No orders to display              Procedures  Procedures         ED Course                                           MDM  Number of Diagnoses or Management Options  Arthralgia of left temporomandibular joint: new and requires workup     Amount and/or Complexity of Data Reviewed  Decide to obtain previous medical records or to obtain history from someone other than the patient: yes  Independent visualization of images, tracings, or specimens: yes    Risk of Complications, Morbidity, and/or Mortality  General comments: 50-year-old female presents with 1 month history of worsening left-sided jaw pain with clicking  Patient's symptoms are consistent with TMJ arthralgia  We discussed diet modification and use of a nighttime oral guard to prevent grinding  Will start patient on NSAIDs and muscle relaxer  She will follow-up with her dentist for recheck of dentition  Discussed stress reduction this is may be contributing to her discomfort  Will provide few tablets of opiate pain medicine for nighttime pain to use on an as-needed basis until inflammation improves  Discussed signs and symptoms to return to the emergency department  I have personally queried the [place] regarding this patient and I feel it is warranted to prescribe this patient the narcotic or benzodiazepine medications given at discharge  Patient Progress  Patient progress: improved      Disposition  Final diagnoses:   Arthralgia of left temporomandibular joint     Time reflects when diagnosis was documented in both MDM as applicable and the Disposition within this note     Time User Action Codes Description Comment    3/13/2021  7:59 PM Ciro Veronica Add [M26 [id number]] Arthralgia of left temporomandibular joint       ED Disposition     ED Disposition Condition Date/Time Comment    Discharge Stable Sat Mar 13, 2021  7:59 PM [facility] discharge to home/self care              Follow-up Information     Follow up With Specialties Details Why [address] Christianson, DO Internal Medicine Schedule an appointment as soon as possible for a visit in 2 days For recheck of current symptoms [address]  [address] [place], [address] [facility]  [phone]      Your dentist  Schedule an appointment as soon as possible for a visit in 2 days For recheck of current symptoms For further evaluation of left TMJ arthralgia and occlusal splint Discharge Medication List as of 3/13/2021  8:08 PM      START taking these medications    Details   HYDROcodone-acetaminophen (NORCO) 5-325 mg per tablet One tablet p o  Q h s  P r n  Pain, Normal      !! ibuprofen (MOTRIN) 800 mg tablet Take 1 tablet (800 mg total) by mouth every 8 (eight) hours as needed for mild pain or moderate pain (take with food) for up to 10 days, Starting Sat 3/13/2021, Until Tue 3/23/2021, Normal       !! - Potential duplicate medications found  Please discuss with provider        CONTINUE these medications which have CHANGED    Details   cyclobenzaprine (FLEXERIL) 10 mg tablet Take 1 tablet (10 mg total) by mouth 3 (three) times a day as needed for muscle spasms, Starting Sat 3/13/2021, Normal         CONTINUE these medications which have NOT CHANGED    Details   albuterol (2 5 mg/3 mL) 0 083 % nebulizer solution Inhale, Starting Fri 4/19/2013, Historical Med      aluminum-magnesium hydroxide 200-200 MG/5ML suspension GAVISCON REGULAR STRENGTH AFTER MEALS and BEDTIME WHEN NOT FOLLOWING LPR/GERD DIET , No Print      Aspirin-Acetaminophen-Caffeine (MIGRAINE FORMULA PO) Take by mouth, Historical Med      BREO ELLIPTA 200-25 MCG/INH inhaler Starting Mon 3/5/2018, Historical Med      budesonide-formoterol (Symbicort) 160-4 5 mcg/act inhaler Inhale 2 puffs every 12 (twelve) hours, Historical Med      [employer] INDOOR/OUTDOOR ALLERGY RLF 10 MG tablet Take 10 mg by mouth daily, Starting Mon 1/29/2018, Historical Med      divalproex sodium (DEPAKOTE ER) 250 mg 24 hr tablet Take 1 tablet (250 mg total) by mouth daily at bedtime Total Depakote ER dose is 750 mg at bedtime, Starting Thu 7/30/2020, Until Fri 11/27/2020, Normal      divalproex sodium (DEPAKOTE ER) 500 mg 24 hr tablet Take 1 tablet (500 mg total) by mouth daily at bedtime, Starting Thu 7/30/2020, Until Fri 11/27/2020, Normal      DULoxetine (CYMBALTA) 60 mg delayed release capsule Take 2 capsules (120 mg total) by mouth daily, Starting Fri 1/15/2021, Until Thu 4/15/2021, Normal      EPINEPHrine (EPIPEN) 0 3 mg/0 3 mL SOAJ as directed, Historical Med      Erenumab-aooe (AIMOVIG) 140 MG/ML SOAJ Inject 1 mL under the skin every 30 (thirty) days, Starting Fri 8/9/2019, Normal      gabapentin (NEURONTIN) 300 mg capsule 1 cap TID , Normal      hydrOXYzine HCL (ATARAX) 50 mg tablet Take 1 tablet (50 mg total) by mouth 3 (three) times a day as needed for anxiety, Starting Thu 7/30/2020, Until Fri 11/27/2020, Normal      !! ibuprofen (MOTRIN) 800 mg tablet Take 1 tablet (800 mg total) by mouth every 6 (six) hours as needed for mild pain, Starting Tue 8/6/2019, Normal      levonorgestrel (MIRENA, 52 MG,) 20 MCG/24HR IUD by Intrauterine route, Historical Med      !! levothyroxine (Synthroid) 125 mcg tablet Take by mouth Daily, Historical Med      !! levothyroxine 50 mcg tablet Take 1 tablet (50 mcg total) by mouth daily in the early morning, Starting Sat 10/5/2019, No Print      methylPREDNISolone 4 MG tablet therapy pack Use as directed on package, Normal      mometasone (NASONEX) 50 mcg/act nasal spray Starting Mon 3/12/2018, Historical Med      naltrexone (REVIA) 50 mg tablet Take 1 tablet (50 mg total) by mouth daily, Starting Thu 7/30/2020, Until Fri 11/27/2020, Normal      olopatadine (PATANOL) 0 1 % ophthalmic solution Administer 1 drop to both eyes 2 (two) times a day for 90 days, Starting Wed 8/7/2019, Until Thu 7/30/2020, Normal      omeprazole (PriLOSEC) 20 mg delayed release capsule Take 1 capsule (20 mg total) by mouth daily, Starting Tue 11/12/2019, Until Wed 11/11/2020, Normal      ondansetron (ZOFRAN-ODT) 4 mg disintegrating tablet Take 1 tablet (4 mg total) by mouth every 6 (six) hours as needed for nausea or vomiting, Starting Tue 11/17/2020, Normal      predniSONE 10 mg tablet Take with food  Take 4 tabs x 2 days, then 3 x 2 days, then 2 x 2 days, then 1 x 2 days  , Normal      QUEtiapine (SEROquel) 200 mg tablet Take 1 tablet (200 mg total) by mouth daily at bedtime, Starting Fri 1/15/2021, Until Thu 4/15/2021, Normal      SPIRIVA RESPIMAT 2 5 MCG/ACT AERS Starting Mon 3/5/2018, Historical Med      topiramate (TOPAMAX) 100 mg tablet Take 1 tablet (100 mg total) by mouth 2 (two) times a day, Starting Fri 1/15/2021, Until Thu 4/15/2021, Normal      traZODone (DESYREL) 50 mg tablet Take 1-2 tablets ( mg total) by mouth daily at bedtime as needed for sleep, Starting Fri 2/19/2021, Until Tue 4/20/2021, Normal      VITAMIN D PO Take 5,000 Units by mouth, Historical Med      ZOLMitriptan (ZOMIG-ZMT) 5 MG disintegrating tablet TAKE 1 TABLET BY MOUTH AT ONSET OF HEADACHE, MAY REPEAT AFTER 2 HOURS AS NEEDED  MAX 2 TABLETS per 24 hours  , Normal       !! - Potential duplicate medications found  Please discuss with provider              PDMP Review       Value Time User    PDMP Reviewed  Yes 6/9/2020 12:47 PM Yifan Renee DO          ED Provider  Electronically Signed by           Sachi Sullivan DO  03/17/21 [id number]

## 2021-03-23 ENCOUNTER — DOCUMENTATION (OUTPATIENT)
Dept: PSYCHIATRY | Facility: CLINIC | Age: 51
End: 2021-03-23

## 2021-03-29 ENCOUNTER — TELEPHONE (OUTPATIENT)
Dept: INTERNAL MEDICINE CLINIC | Facility: CLINIC | Age: 51
End: 2021-03-29

## 2021-03-29 ENCOUNTER — TRANSCRIBE ORDERS (OUTPATIENT)
Dept: ADMINISTRATIVE | Age: 51
End: 2021-03-29

## 2021-03-29 ENCOUNTER — APPOINTMENT (OUTPATIENT)
Dept: LAB | Age: 51
End: 2021-03-29
Payer: COMMERCIAL

## 2021-03-29 DIAGNOSIS — R30.0 DYSURIA: Primary | ICD-10-CM

## 2021-03-29 LAB
BACTERIA UR QL AUTO: ABNORMAL /HPF
BILIRUB UR QL STRIP: NEGATIVE
CLARITY UR: CLEAR
COLOR UR: ABNORMAL
GLUCOSE UR STRIP-MCNC: NEGATIVE MG/DL
HGB UR QL STRIP.AUTO: NEGATIVE
HYALINE CASTS #/AREA URNS LPF: ABNORMAL /LPF
KETONES UR STRIP-MCNC: NEGATIVE MG/DL
LEUKOCYTE ESTERASE UR QL STRIP: ABNORMAL
NITRITE UR QL STRIP: POSITIVE
NON-SQ EPI CELLS URNS QL MICRO: ABNORMAL /HPF
PH UR STRIP.AUTO: 7 [PH]
PROT UR STRIP-MCNC: NEGATIVE MG/DL
RBC #/AREA URNS AUTO: ABNORMAL /HPF
SP GR UR STRIP.AUTO: 1.01 (ref 1–1.03)
UROBILINOGEN UR QL STRIP.AUTO: 1 E.U./DL
WBC #/AREA URNS AUTO: ABNORMAL /HPF

## 2021-03-29 PROCEDURE — 81001 URINALYSIS AUTO W/SCOPE: CPT

## 2021-03-29 PROCEDURE — 87086 URINE CULTURE/COLONY COUNT: CPT

## 2021-03-29 NOTE — TELEPHONE ENCOUNTER
Pt is experiencing burning when she urinates and urgency  Pt believes that she has a bladder infection and is inquiring about get abx sent to the [facility] on [address]? Pt says that she temporarily does not have any insurance and is wondering if [facility] will send a script without being seen in office? Pt would appreciate a call back at [phone]      Thank you

## 2021-03-29 NOTE — TELEPHONE ENCOUNTER
Pt  Went for her UA today, she asked if when you get the results in and if you are ordering a antibiotic if you can also order the 2 day med that you've given her in the past to stop her from urine frequency  She doesn't remember the name of it? ?/

## 2021-03-29 NOTE — TELEPHONE ENCOUNTER
Pt is going to go to [facility] lab to get her urine tested       Thank you

## 2021-03-30 ENCOUNTER — NURSE TRIAGE (OUTPATIENT)
Dept: OTHER | Facility: OTHER | Age: 51
End: 2021-03-30

## 2021-03-30 ENCOUNTER — TELEPHONE (OUTPATIENT)
Dept: INTERNAL MEDICINE CLINIC | Facility: CLINIC | Age: 51
End: 2021-03-30

## 2021-03-30 NOTE — TELEPHONE ENCOUNTER
Reason for Disposition   Urinating more frequently than usual (i e , frequency)    Answer Assessment - Initial Assessment Questions  1  SYMPTOM: "What's the main symptom you're concerned about?" (e g , frequency, incontinence)      Frequency  2  ONSET: "When did the  Burning start?"      4 days  3  PAIN: "Is there any pain?" If so, ask: "How bad is it?" (Scale: 1-10; mild, moderate, severe)      It burns   4  CAUSE: "What do you think is causing the symptoms?"      uti  5  OTHER SYMPTOMS: "Do you have any other symptoms?" (e g , fever, flank pain, blood in urine, pain with urination)      Diarrhea, no back pain and no blood  6  PREGNANCY: "Is there any chance you are pregnant?" "When was your last menstrual period?"      no  Taking azo three times a day with no symptom relief      Protocols used: [facility]

## 2021-03-30 NOTE — TELEPHONE ENCOUNTER
Regarding: Possible UTI   ----- Message from Luis Zhu sent at 3/30/2021  7:04 PM EDT -----  " I was tested for a UTI , I have yet to get the results back and I am in a lot of pain, I am upset that the office has not called me back "

## 2021-03-30 NOTE — TELEPHONE ENCOUNTER
Pt would appreciate a call back to review urine test results once they all come in  Pt also wanted Jaimie Hong to know that she has been prescribed Pyridium in the past for UTIs  Pt can be reached back at [phone]      Thank you

## 2021-03-31 ENCOUNTER — TELEPHONE (OUTPATIENT)
Dept: INTERNAL MEDICINE CLINIC | Facility: CLINIC | Age: 51
End: 2021-03-31

## 2021-03-31 LAB — BACTERIA UR CULT: NORMAL

## 2021-03-31 NOTE — TELEPHONE ENCOUNTER
Please let her know her urine culture returned negative  There is no infection  Recommend increasing fluids and avoiding bladder irritants (coffee, wine, acidic drinks/foods) if she is still symptomatic  Thanks

## 2021-04-28 ENCOUNTER — TELEPHONE (OUTPATIENT)
Dept: PSYCHIATRY | Facility: CLINIC | Age: 51
End: 2021-04-28

## 2021-05-01 ENCOUNTER — APPOINTMENT (EMERGENCY)
Dept: RADIOLOGY | Facility: HOSPITAL | Age: 51
End: 2021-05-01
Payer: COMMERCIAL

## 2021-05-01 ENCOUNTER — HOSPITAL ENCOUNTER (EMERGENCY)
Facility: HOSPITAL | Age: 51
Discharge: HOME | End: 2021-05-02
Attending: EMERGENCY MEDICINE | Admitting: EMERGENCY MEDICINE
Payer: COMMERCIAL

## 2021-05-01 VITALS
BODY MASS INDEX: 25.69 KG/M2 | HEIGHT: 63 IN | OXYGEN SATURATION: 96 % | TEMPERATURE: 98.3 F | SYSTOLIC BLOOD PRESSURE: 101 MMHG | WEIGHT: 145 LBS | RESPIRATION RATE: 18 BRPM | DIASTOLIC BLOOD PRESSURE: 72 MMHG | HEART RATE: 71 BPM

## 2021-05-01 DIAGNOSIS — R07.89 ATYPICAL CHEST PAIN: Primary | ICD-10-CM

## 2021-05-01 LAB
ALBUMIN SERPL BCP-MCNC: 4.1 G/DL (ref 3.5–5)
ALP SERPL-CCNC: 94 U/L (ref 46–116)
ALT SERPL W P-5'-P-CCNC: 15 U/L (ref 12–78)
ANION GAP SERPL CALCULATED.3IONS-SCNC: 9 MMOL/L (ref 4–13)
AST SERPL W P-5'-P-CCNC: 11 U/L (ref 5–45)
BASOPHILS # BLD AUTO: 0.04 THOUSANDS/ΜL (ref 0–0.1)
BASOPHILS NFR BLD AUTO: 1 % (ref 0–1)
BILIRUB SERPL-MCNC: 0.43 MG/DL (ref 0.2–1)
BUN SERPL-MCNC: 19 MG/DL (ref 5–25)
CALCIUM SERPL-MCNC: 8.3 MG/DL (ref 8.3–10.1)
CHLORIDE SERPL-SCNC: 108 MMOL/L (ref 100–108)
CO2 SERPL-SCNC: 24 MMOL/L (ref 21–32)
CREAT SERPL-MCNC: 1.1 MG/DL (ref 0.6–1.3)
D DIMER PPP FEU-MCNC: 0.3 UG/ML FEU
EOSINOPHIL # BLD AUTO: 0.06 THOUSAND/ΜL (ref 0–0.61)
EOSINOPHIL NFR BLD AUTO: 1 % (ref 0–6)
ERYTHROCYTE [DISTWIDTH] IN BLOOD BY AUTOMATED COUNT: 12.4 % (ref 11.6–15.1)
EXT PREG TEST URINE: NEGATIVE
EXT. CONTROL ED NAV: NORMAL
GFR SERPL CREATININE-BSD FRML MDRD: 59 ML/MIN/1.73SQ M
GLUCOSE SERPL-MCNC: 83 MG/DL (ref 65–140)
HCT VFR BLD AUTO: 35.4 % (ref 34.8–46.1)
HGB BLD-MCNC: 11.8 G/DL (ref 11.5–15.4)
IMM GRANULOCYTES # BLD AUTO: 0.02 THOUSAND/UL (ref 0–0.2)
IMM GRANULOCYTES NFR BLD AUTO: 0 % (ref 0–2)
LIPASE SERPL-CCNC: 129 U/L (ref 73–393)
LYMPHOCYTES # BLD AUTO: 2.67 THOUSANDS/ΜL (ref 0.6–4.47)
LYMPHOCYTES NFR BLD AUTO: 39 % (ref 14–44)
MCH RBC QN AUTO: 30.6 PG (ref 26.8–34.3)
MCHC RBC AUTO-ENTMCNC: 33.3 G/DL (ref 31.4–37.4)
MCV RBC AUTO: 92 FL (ref 82–98)
MONOCYTES # BLD AUTO: 0.59 THOUSAND/ΜL (ref 0.17–1.22)
MONOCYTES NFR BLD AUTO: 9 % (ref 4–12)
NEUTROPHILS # BLD AUTO: 3.49 THOUSANDS/ΜL (ref 1.85–7.62)
NEUTS SEG NFR BLD AUTO: 50 % (ref 43–75)
NRBC BLD AUTO-RTO: 0 /100 WBCS
PLATELET # BLD AUTO: 222 THOUSANDS/UL (ref 149–390)
PMV BLD AUTO: 9.3 FL (ref 8.9–12.7)
POTASSIUM SERPL-SCNC: 3.7 MMOL/L (ref 3.5–5.3)
PROT SERPL-MCNC: 6.8 G/DL (ref 6.4–8.2)
RBC # BLD AUTO: 3.86 MILLION/UL (ref 3.81–5.12)
SODIUM SERPL-SCNC: 141 MMOL/L (ref 136–145)
TROPONIN I SERPL-MCNC: <0.02 NG/ML
WBC # BLD AUTO: 6.87 THOUSAND/UL (ref 4.31–10.16)

## 2021-05-01 PROCEDURE — 84484 ASSAY OF TROPONIN QUANT: CPT | Performed by: EMERGENCY MEDICINE

## 2021-05-01 PROCEDURE — 71045 X-RAY EXAM CHEST 1 VIEW: CPT

## 2021-05-01 PROCEDURE — 99285 EMERGENCY DEPT VISIT HI MDM: CPT | Performed by: EMERGENCY MEDICINE

## 2021-05-01 PROCEDURE — 85379 FIBRIN DEGRADATION QUANT: CPT | Performed by: EMERGENCY MEDICINE

## 2021-05-01 PROCEDURE — 36415 COLL VENOUS BLD VENIPUNCTURE: CPT | Performed by: EMERGENCY MEDICINE

## 2021-05-01 PROCEDURE — 80053 COMPREHEN METABOLIC PANEL: CPT | Performed by: EMERGENCY MEDICINE

## 2021-05-01 PROCEDURE — 96374 THER/PROPH/DIAG INJ IV PUSH: CPT

## 2021-05-01 PROCEDURE — 85025 COMPLETE CBC W/AUTO DIFF WBC: CPT | Performed by: EMERGENCY MEDICINE

## 2021-05-01 PROCEDURE — 99285 EMERGENCY DEPT VISIT HI MDM: CPT

## 2021-05-01 PROCEDURE — 93005 ELECTROCARDIOGRAM TRACING: CPT

## 2021-05-01 PROCEDURE — 83690 ASSAY OF LIPASE: CPT | Performed by: EMERGENCY MEDICINE

## 2021-05-01 PROCEDURE — 81025 URINE PREGNANCY TEST: CPT | Performed by: EMERGENCY MEDICINE

## 2021-05-01 RX ORDER — KETOROLAC TROMETHAMINE 30 MG/ML
15 INJECTION, SOLUTION INTRAMUSCULAR; INTRAVENOUS ONCE
Status: COMPLETED | OUTPATIENT
Start: 2021-05-01 | End: 2021-05-01

## 2021-05-01 RX ADMIN — KETOROLAC TROMETHAMINE 15 MG: 30 INJECTION, SOLUTION INTRAMUSCULAR at 22:45

## 2021-05-02 LAB
ATRIAL RATE: 72 BPM
P AXIS: 73 DEGREES
PR INTERVAL: 140 MS
QRS AXIS: -87 DEGREES
QRSD INTERVAL: 98 MS
QT INTERVAL: 420 MS
QTC INTERVAL: 459 MS
T WAVE AXIS: 77 DEGREES
TROPONIN I SERPL-MCNC: <0.02 NG/ML
VENTRICULAR RATE: 72 BPM

## 2021-05-02 PROCEDURE — 36415 COLL VENOUS BLD VENIPUNCTURE: CPT | Performed by: EMERGENCY MEDICINE

## 2021-05-02 PROCEDURE — 84484 ASSAY OF TROPONIN QUANT: CPT | Performed by: EMERGENCY MEDICINE

## 2021-05-02 PROCEDURE — 93010 ELECTROCARDIOGRAM REPORT: CPT | Performed by: INTERNAL MEDICINE

## 2021-05-02 PROCEDURE — 93005 ELECTROCARDIOGRAM TRACING: CPT

## 2021-05-02 NOTE — ED PROVIDER NOTES
History  Chief Complaint   Patient presents with    Chest Pain     Pt complains of chest pain for the last week  Pt states that the pain is different than her anxiety pain  Pt complains of pain in the upper abdomen and left side of chest/neck  Pt states that she also has some nausea  HPI         71-year-old female presenting for evaluation pain in her left shoulder, chest, and left arm  Pain started in the left side of the chest 1 week ago  Comes and goes, described as sharp, lasts for about 2-3 minutes at a time and then resolved  Denies shortness of breath  Pain radiates to the left arm and is accompanied by pain in the left shoulder and left arm from the shoulder down to the elbow  Denies pain in the neck or neck injury  Patient reports chronic left shoulder pain secondary to arthritis, but denies ever experiencing pain like this in her arm or chest in the past   No cardiac history  Symptoms are accompanied by nausea without vomiting  No personal history of CAD  She does have a history of DVT and PE but is no longer on anticoagulation  Denies cough, fevers, or chills  Pain does not radiate to the back  She reports that the pain occasionally also occurs in the epigastrium and left upper quadrant of the abdomen  Not worse with eating  Prior to Admission Medications   Prescriptions Last Dose Informant Patient Reported? Taking?    Aspirin-Acetaminophen-Caffeine (MIGRAINE FORMULA PO)  Self Yes No   Sig: Take by mouth   BREO ELLIPTA 200-25 MCG/INH inhaler  Self Yes No   [employer] RLF 10 MG tablet  Self Yes No   Sig: Take 10 mg by mouth daily   DULoxetine (CYMBALTA) 60 mg delayed release capsule   No No   Sig: Take 2 capsules (120 mg total) by mouth daily   EPINEPHrine (EPIPEN) 0 3 mg/0 3 mL SOAJ  Self Yes No   Sig: as directed   Erenumab-aooe (AIMOVIG) 140 MG/ML SOAJ  Self No No   Sig: Inject 1 mL under the skin every 30 (thirty) days   HYDROcodone-acetaminophen (NORCO) 5-325 mg per tablet   No No   Sig: One tablet p o  Q h s  P r n  Pain   QUEtiapine (SEROquel) 200 mg tablet   No No   Sig: Take 1 tablet (200 mg total) by mouth daily at bedtime   SPIRIVA RESPIMAT 2 5 MCG/ACT AERS  Self Yes No   VITAMIN D PO   Yes No   Sig: Take 5,000 Units by mouth   ZOLMitriptan (ZOMIG-ZMT) 5 MG disintegrating tablet   No No   Sig: TAKE 1 TABLET BY MOUTH AT ONSET OF HEADACHE, MAY REPEAT AFTER 2 HOURS AS NEEDED  MAX 2 TABLETS per 24 hours  albuterol (2 5 mg/3 mL) 0 083 % nebulizer solution  Self Yes No   Sig: Inhale   aluminum-magnesium hydroxide 200-200 MG/5ML suspension   No No   Sig: GAVISCON REGULAR STRENGTH AFTER MEALS and BEDTIME WHEN NOT FOLLOWING LPR/GERD DIET     budesonide-formoterol (Symbicort) 160-4 5 mcg/act inhaler   Yes No   Sig: Inhale 2 puffs every 12 (twelve) hours   cyclobenzaprine (FLEXERIL) 10 mg tablet   No No   Sig: Take 1 tablet (10 mg total) by mouth daily at bedtime for 20 doses   cyclobenzaprine (FLEXERIL) 10 mg tablet   No No   Sig: Take 1 tablet (10 mg total) by mouth 3 (three) times a day as needed for muscle spasms   divalproex sodium (DEPAKOTE ER) 250 mg 24 hr tablet   No No   Sig: Take 1 tablet (250 mg total) by mouth daily at bedtime Total Depakote ER dose is 750 mg at bedtime   divalproex sodium (DEPAKOTE ER) 500 mg 24 hr tablet   No No   Sig: Take 1 tablet (500 mg total) by mouth daily at bedtime   gabapentin (NEURONTIN) 300 mg capsule   No No   Si cap TID    hydrOXYzine HCL (ATARAX) 50 mg tablet   No No   Sig: Take 1 tablet (50 mg total) by mouth 3 (three) times a day as needed for anxiety   ibuprofen (MOTRIN) 800 mg tablet  Self No No   Sig: Take 1 tablet (800 mg total) by mouth every 6 (six) hours as needed for mild pain   ibuprofen (MOTRIN) 800 mg tablet   No No   Sig: Take 1 tablet (800 mg total) by mouth every 8 (eight) hours as needed for mild pain or moderate pain (take with food) for up to 10 days   levonorgestrel (MIRENA, 52 MG,) 20 MCG/24HR IUD  Self Yes No   Sig: by Intrauterine route   levothyroxine (Synthroid) 125 mcg tablet   Yes No   Sig: Take by mouth Daily   levothyroxine 50 mcg tablet  Self No No   Sig: Take 1 tablet (50 mcg total) by mouth daily in the early morning   methylPREDNISolone 4 MG tablet therapy pack   No No   Sig: Use as directed on package   mometasone (NASONEX) 50 mcg/act nasal spray  Self Yes No   naltrexone (REVIA) 50 mg tablet   No No   Sig: Take 1 tablet (50 mg total) by mouth daily   olopatadine (PATANOL) 0 1 % ophthalmic solution   No No   Sig: Administer 1 drop to both eyes 2 (two) times a day for 90 days   omeprazole (PriLOSEC) 20 mg delayed release capsule  Self No No   Sig: Take 1 capsule (20 mg total) by mouth daily   ondansetron (ZOFRAN-ODT) 4 mg disintegrating tablet   No No   Sig: Take 1 tablet (4 mg total) by mouth every 6 (six) hours as needed for nausea or vomiting   predniSONE 10 mg tablet   No No   Sig: Take with food  Take 4 tabs x 2 days, then 3 x 2 days, then 2 x 2 days, then 1 x 2 days     topiramate (TOPAMAX) 100 mg tablet   No No   Sig: Take 1 tablet (100 mg total) by mouth 2 (two) times a day   traZODone (DESYREL) 50 mg tablet   No No   Sig: Take 1-2 tablets ( mg total) by mouth daily at bedtime as needed for sleep      Facility-Administered Medications: None       Past Medical History:   Diagnosis Date    Amenorrhea     Anxiety     Arthritis     Asthma     Back pain     Chondromalacia of patella     Chronic fatigue     COPD [facility]ch[facility] obstructive [facility] Yes    Deep vein thrombophlebitis of leg ([facility])     Depression     Disease of thyroid gland     GERD (gastroesophageal reflux disease)     HPV (human papilloma virus) infection     Hypothyroidism     Lumbar radiculopathy     Migraine     Myofascial pain syndrome     Osteopenia     Piriformis syndrome     Sacroiliitis [facility]     Sciatica     Seizure ([address] )     Sleep apnea     Spondylosis of lumbar spine     Trochanteric bursitis of right hip     Vertigo     Vitamin D deficiency        Past Surgical History:   Procedure Laterality Date    CERVIX LESION DESTRUCTION       SECTION      COLONOSCOPY      COLPOSCOPY W/ BIOPSY / CURETTAGE      Colposcopy cervix with biopsy    LAPAROSCOPIC ENDOMETRIOSIS FULGURATION      LAPAROSCOPY      TOOTH EXTRACTION         Family History   Problem Relation Age of Onset    Heart disease Mother     Asthma Daughter     Lung cancer Paternal Grandfather     Asthma Daughter     Colon cancer Father     Colon cancer Maternal Grandfather     Prostate cancer Maternal Grandfather     Colon cancer Maternal Aunt     No Known Problems Maternal Grandmother     Diabetes Paternal Grandmother     No Known Problems Maternal Aunt     No Known Problems Maternal Aunt     No Known Problems Maternal Aunt     No Known Problems Paternal Aunt     Asthma Daughter     Psychiatric Illness Neg Hx     Stroke Neg Hx     Thyroid disease Neg Hx     Substance Abuse Neg Hx     Suicidality Neg Hx      I have reviewed and agree with the history as documented  E-Cigarette/Vaping    E-Cigarette Use Never User      E-Cigarette/Vaping Substances    Nicotine No     THC No     CBD No     Flavoring No     Other No     Unknown No      Social History     Tobacco Use    Smoking status: Never Smoker    Smokeless tobacco: Never Used   Substance Use Topics    Alcohol use: Not Currently     Frequency: Never     Binge frequency: Never    Drug use: Not Currently       Review of Systems   Constitutional: Negative for chills and fever  HENT: Negative for congestion  Eyes: Negative for visual disturbance  Respiratory: Negative for cough and shortness of breath  Cardiovascular: Positive for chest pain  Negative for palpitations and leg swelling  Gastrointestinal: Positive for abdominal pain (epigastrium, LUQ) and nausea  Negative for diarrhea and vomiting     Genitourinary: Negative for dysuria and frequency  Musculoskeletal: Positive for arthralgias (L shoulder, L arm)  Negative for back pain, neck pain and neck stiffness  Skin: Negative for rash  Neurological: Negative for weakness, numbness and headaches  Psychiatric/Behavioral: Negative for agitation, behavioral problems and confusion  Physical Exam  Physical Exam  Constitutional:       General: She is not in acute distress  Appearance: She is well-developed  She is not diaphoretic  HENT:      Head: Normocephalic and atraumatic  Right Ear: External ear normal       Left Ear: External ear normal       Nose: Nose normal    Eyes:      Conjunctiva/sclera: Conjunctivae normal    Neck:      Musculoskeletal: Normal range of motion and neck supple  Cardiovascular:      Rate and Rhythm: Normal rate and regular rhythm  Pulses: Normal pulses  Heart sounds: Normal heart sounds  No murmur  No friction rub  No gallop  Pulmonary:      Effort: Pulmonary effort is normal  No respiratory distress  Breath sounds: Normal breath sounds  No wheezing or rales  Abdominal:      General: Bowel sounds are normal  There is no distension  Palpations: Abdomen is soft  Tenderness: There is no abdominal tenderness  There is no guarding  Comments: Abdomen benign to deep palpation  Musculoskeletal: Normal range of motion  General: No deformity  Comments: No calf swelling or tenderness  Full range of motion of the left shoulder  No swelling to the left arm, compartments of the left arm are soft without overlying skin changes  Flexion and extension is fully intact at the DIPs, PIPs, MCPs, and IP joints of the L hand  Flexion, extension, and lateral movements of the L wrist are intact  Opposition of the L thumb is intact  Pt is able to resist adduction and abduction of the fingers of the L hand  Skin:     General: Skin is warm and dry     Neurological:      Mental Status: She is alert and oriented to person, place, and time  Motor: No abnormal muscle tone        Comments: Intact sensation to light touch in the peripheral nerve distributions of the left hand         Vital Signs  ED Triage Vitals [05/01/21 2028]   Temperature Pulse Respirations Blood Pressure SpO2   98 3 °F (36 8 °C) 80 18 126/61 98 %      Temp Source Heart Rate Source Patient Position - Orthostatic VS BP Location FiO2 (%)   Oral Monitor Lying Left arm --      Pain Score       5           Vitals:    05/01/21 2028 05/01/21 2317   BP: 126/61 101/72   Pulse: 80 71   Patient Position - Orthostatic VS: Lying Sitting         Visual Acuity      ED Medications  Medications   ketorolac (TORADOL) injection 15 mg (15 mg Intravenous Given 5/1/21 2245)       Diagnostic Studies  Results Reviewed     Procedure Component Value Units Date/Time    Troponin I [[id number]]  (Normal) Collected: 05/02/21 0036    Lab Status: Final result Specimen: Blood from Arm, Right Updated: 05/02/21 0102     Troponin I <0 02 ng/mL     Lipase [[id number]]  (Normal) Collected: 05/01/21 2145    Lab Status: Final result Specimen: Blood from Arm, Right Updated: 05/01/21 2332     Lipase 129 u/L     POCT pregnancy, urine [[id number]]  (Normal) Resulted: 05/01/21 2257    Lab Status: Final result Updated: 05/01/21 2257     EXT PREG TEST UR (Ref: Negative) negative     Control valid    Troponin I [[id number]]  (Normal) Collected: 05/01/21 2136    Lab Status: Final result Specimen: Blood from Arm, Right Updated: 05/01/21 2214     Troponin I <0 02 ng/mL     Comprehensive metabolic panel [[id number]] Collected: 05/01/21 2136    Lab Status: Final result Specimen: Blood from Arm, Right Updated: 05/01/21 2210     Sodium 141 mmol/L      Potassium 3 7 mmol/L      Chloride 108 mmol/L      CO2 24 mmol/L      ANION GAP 9 mmol/L      BUN 19 mg/dL      Creatinine 1 10 mg/dL      Glucose 83 mg/dL      Calcium 8 3 mg/dL      AST 11 U/L      ALT 15 U/L      Alkaline Phosphatase 94 U/L      Total Protein 6 8 g/dL Albumin 4 1 g/dL      Total Bilirubin 0 43 mg/dL      eGFR 59 ml/min/1 73sq m     Narrative:      [place] guidelines for Chronic Kidney Disease (CKD):     Stage 1 with normal or high GFR (GFR > 90 mL/min/1 73 square meters)    Stage 2 Mild CKD (GFR = 60-89 mL/min/1 73 square meters)    Stage 3A Moderate CKD (GFR = 45-59 mL/min/1 73 square meters)    Stage 3B Moderate CKD (GFR = 30-44 mL/min/1 73 square meters)    Stage 4 Severe CKD (GFR = 15-29 mL/min/1 73 square meters)    Stage 5 End Stage CKD (GFR <15 mL/min/1 73 square meters)  Note: GFR calculation is accurate only with a steady state creatinine    D-dimer, quantitative [[id number]]  (Normal) Collected: 05/01/21 2136    Lab Status: Final result Specimen: Blood from Arm, Right Updated: 05/01/21 2207     D-Dimer, Quant 0 30 ug/ml FEU     CBC and differential [[id number]] Collected: 05/01/21 2136    Lab Status: Final result Specimen: Blood from Arm, Right Updated: 05/01/21 2152     WBC 6 87 Thousand/uL      RBC 3 86 Million/uL      Hemoglobin 11 8 g/dL      Hematocrit 35 4 %      MCV 92 fL      MCH 30 6 pg      MCHC 33 3 g/dL      RDW 12 4 %      MPV 9 3 fL      Platelets 513 Thousands/uL      nRBC 0 /100 WBCs      Neutrophils Relative 50 %      Immat GRANS % 0 %      Lymphocytes Relative 39 %      Monocytes Relative 9 %      Eosinophils Relative 1 %      Basophils Relative 1 %      Neutrophils Absolute 3 49 Thousands/µL      Immature Grans Absolute 0 02 Thousand/uL      Lymphocytes Absolute 2 67 Thousands/µL      Monocytes Absolute 0 59 Thousand/µL      Eosinophils Absolute 0 06 Thousand/µL      Basophils Absolute 0 04 Thousands/µL                  XR chest 1 view portable   ED Interpretation by Annette Molina MD (05/01 4769)   No cardiopulmonary abnormalities                 Procedures  Procedures         ED Course             HEART Risk Score      Most Recent Value   Heart Score Risk Calculator   History  0 Filed at: 05/02/2021 0147   ECG  1 Filed at: 05/02/2021 0147   Age  1 Filed at: 05/02/2021 0147   Risk Factors  1 Filed at: 05/02/2021 0147   Troponin  0 Filed at: 05/02/2021 0147   HEART Score  3 Filed at: 05/02/2021 0147                      SBIRT 20yo+      Most Recent Value   SBIRT (23 yo +)   In order to provide better care to our patients, we are screening all of our patients for alcohol and drug use  Would it be okay to ask you these screening questions? Yes Filed at: 05/01/2021 2258   Initial Alcohol Screen: US AUDIT-C    1  How often do you have a drink containing alcohol?  0 Filed at: 05/01/2021 2258   2  How many drinks containing alcohol do you have on a typical day you are drinking? 0 Filed at: 05/01/2021 2258   3a  Male UNDER 65: How often do you have five or more drinks on one occasion? 0 Filed at: 05/01/2021 2258   3b  FEMALE Any Age, or MALE 65+: How often do you have 4 or more drinks on one occassion? 0 Filed at: 05/01/2021 2258   Audit-C Score  0 Filed at: 05/01/2021 2258   IDALIA: How many times in the past year have you    Used an illegal drug or used a prescription medication for non-medical reasons? Never Filed at: 05/01/2021 2258          Wells' Criteria for PE      Most Recent Value   Wells' Criteria for PE   Clinical signs and symptoms of DVT  0 Filed at: 05/02/2021 0147   PE is primary diagnosis or equally likely  0 Filed at: 05/02/2021 0147   HR >100  0 Filed at: 05/02/2021 0147   Immobilization at least 3 days or Surgery in the previous 4 weeks  0 Filed at: 05/02/2021 0147   Previous, objectively diagnosed PE or DVT  1 5 Filed at: 05/02/2021 0147   Hemoptysis  0 Filed at: 05/02/2021 0147   Malignancy with treatment within 6 months or palliative  0 Filed at: 05/02/2021 0147   Wells' Criteria Total  1 5 Filed at: 05/02/2021 0147                MDM  Number of Diagnoses or Management Options  Atypical chest pain: new and requires workup  Diagnosis management comments: Generally well appearing    Afebrile and hemodynamically stable  I personally interpreted the patient's EKG which reveals normal rate normal sinus rhythm, left axis deviation, incomplete right bundle-branch block unchanged from prior, Q-waves in leads 3 and AVF unchanged from prior, Q-wave in V1 and V2 unchanged from prior, poor R-wave progression, no ST segment deviation or pathologic T-wave inversions, similar to most recent prior EKG  Delta troponins undetectable  Low risk by heart score, doubt ischemia  Recommend follow-up with her PCP for outpatient stress testing  Chest x-ray with normal mediastinum, description of symptoms not consistent with aortic dissection  Low risk by Wells criteria, D-dimer not elevated, doubt PE  Patient is well-appearing on multiple reassessments  Return precautions discussed  Amount and/or Complexity of Data Reviewed  Clinical lab tests: reviewed and ordered  Tests in the radiology section of CPT®: ordered and reviewed  Review and summarize past medical records: yes  Independent visualization of images, tracings, or specimens: yes    Patient Progress  Patient progress: stable         Disposition  Final diagnoses:   Atypical chest pain     Time reflects when diagnosis was documented in both MDM as applicable and the Disposition within this note     Time User Action Codes Description Comment    5/2/2021  1:44 AM An Castillo Add [R07 [id number]] Atypical chest pain       ED Disposition     ED Disposition Condition Date/Time Comment    Discharge Stable [place] May 2, 2021  1:44 AM Maile Daniels discharge to home/self care  Follow-up Information     Follow up With Specialties Details Why Contact Info Additional Information    Leticia Goldman, DO Internal Medicine Call in 1 day Please call your doctor to schedule a stress test for further evaluation of your chest pain   [facility]  [address] Floor, [place] [id number]  [address] [place] [address] Emergency Department Emergency Medicine  As we discussed, return to the Emergency Department immediately for worsening pain, trouble breathing, or for new or concerning symptoms  [address] Emergency Department, [address], [place], [place], [address]          Patient's Medications   Discharge Prescriptions    No medications on file     No discharge procedures on file      PDMP Review       Value Time User    PDMP Reviewed  Yes 6/9/2020 12:47 PM Sondra Carrasquillo DO          ED Provider  Electronically Signed by           Linette Betancourt MD  05/02/21 0077

## 2021-05-03 LAB
ATRIAL RATE: 72 BPM
P AXIS: 71 DEGREES
PR INTERVAL: 144 MS
QRS AXIS: -88 DEGREES
QRSD INTERVAL: 90 MS
QT INTERVAL: 370 MS
QTC INTERVAL: 405 MS
T WAVE AXIS: 81 DEGREES
VENTRICULAR RATE: 72 BPM

## 2021-05-03 PROCEDURE — 93010 ELECTROCARDIOGRAM REPORT: CPT | Performed by: INTERNAL MEDICINE

## 2021-05-04 NOTE — TELEPHONE ENCOUNTER
I am willing to prescribe if she has a positive urine culture  She needs to get her urine tested, will place script for her to obtain at [facility] [place] [facility]  Please notify her     Thanks Rating pain 7/10 to right upper extremity-described as constant
throbbing-norco given per dr guajardo. Denies any other questions/concerns. Call
light in reach. Will monitor.

## 2021-05-05 ENCOUNTER — OFFICE VISIT (OUTPATIENT)
Dept: INTERNAL MEDICINE CLINIC | Facility: CLINIC | Age: 51
End: 2021-05-05
Payer: COMMERCIAL

## 2021-05-05 VITALS
RESPIRATION RATE: 16 BRPM | BODY MASS INDEX: 26.58 KG/M2 | HEIGHT: 63 IN | TEMPERATURE: 97.5 F | SYSTOLIC BLOOD PRESSURE: 124 MMHG | WEIGHT: 150 LBS | OXYGEN SATURATION: 98 % | DIASTOLIC BLOOD PRESSURE: 70 MMHG | HEART RATE: 100 BPM

## 2021-05-05 DIAGNOSIS — M25.512 CHRONIC LEFT SHOULDER PAIN: ICD-10-CM

## 2021-05-05 DIAGNOSIS — R07.89 ATYPICAL CHEST PAIN: Primary | ICD-10-CM

## 2021-05-05 DIAGNOSIS — M47.812 CERVICAL SPINE ARTHRITIS: ICD-10-CM

## 2021-05-05 DIAGNOSIS — G89.29 CHRONIC LEFT SHOULDER PAIN: ICD-10-CM

## 2021-05-05 PROCEDURE — 3725F SCREEN DEPRESSION PERFORMED: CPT | Performed by: INTERNAL MEDICINE

## 2021-05-05 PROCEDURE — 99214 OFFICE O/P EST MOD 30 MIN: CPT | Performed by: INTERNAL MEDICINE

## 2021-05-05 RX ORDER — METHYLPREDNISOLONE 4 MG/1
TABLET ORAL
Qty: 21 EACH | Refills: 0 | Status: SHIPPED | OUTPATIENT
Start: 2021-05-05 | End: 2021-05-19 | Stop reason: ALTCHOICE

## 2021-05-05 NOTE — ASSESSMENT & PLAN NOTE
-acute on chronic with known L acromioclavicular joint arthritis  -refuses PT  -continue gabapentin and duloxetine  -add medrol dose david, side effects discussed

## 2021-05-05 NOTE — PROGRESS NOTES
Assessment/Plan:    Problem List Items Addressed This Visit        Musculoskeletal and Integument    Cervical spine arthritis     -acute on chronic pain with underlying arthritis  -recommend she return to her Pain Management to see if she candidate for cortisone injection  -recommend return to PT, refuses  -do stretches  -already on gabapentin for migraine ppx an duloxetine  -add medrol dose david for now, side effects discussed  Relevant Medications    methylPREDNISolone 4 MG tablet therapy pack       Other    Chronic left shoulder pain     -acute on chronic with known L acromioclavicular joint arthritis  -refuses PT  -continue gabapentin and duloxetine  -add medrol dose david, side effects discussed  Relevant Medications    methylPREDNISolone 4 MG tablet therapy pack      Other Visit Diagnoses     Atypical chest pain    -  Primary    -low suspicion for cardiac etiology  Trops neg in ED  -suspect worsened neck and shoulder arthritis          Subjective:      Patient ID: Elisabeth Bruno is a 53 y o  female  HPI  51yo female with OCD, KYLE, MDD, migraines, insomnia, asthma and hypothyroidism here for ER follow up  She presents with her daughter  She presented to [facility] ED 5/1/21 for chest pain  Started one week prior to her presentation that began in L shoulder down to her L arm and LUQ abdomen and up to her neck  Also had low back pain  Pain was sharp  Episodes lasted for 2-5 minutes then would stop and then return  Has underlying arthritis in L acromioclavicular joint and would stay all day that felt different  She has applied ice on her L shoulder  She also has known mild degenerative changes from C4-C7 on imaging  She reports feeling fatigue and lethargic when episodes occur  She denies palpitations or SOB  She has no known triggers and would resolve spontaneously  She denies n/v/d, reflux or abdominal pain or antecedent trauma    She has tried rubbing the anterior chest wall without improvement  In ED, CXR neg, trops neg  Reports pain has been the same since onset  She has tried voltaren gel for her neck and shoulder but no longer feels improvement  She also has tried tylenol and ibuprofen      The following portions of the patient's history were reviewed and updated as appropriate: allergies, current medications, past family history, past medical history, past social history, past surgical history and problem list       Current Outpatient Medications:     albuterol (2 5 mg/3 mL) 0 083 % nebulizer solution, Inhale, Disp: , Rfl:     aluminum-magnesium hydroxide 200-200 MG/5ML suspension, GAVISCON REGULAR STRENGTH AFTER MEALS and BEDTIME WHEN NOT FOLLOWING LPR/GERD DIET , Disp: 355 mL, Rfl: 11    Aspirin-Acetaminophen-Caffeine (MIGRAINE FORMULA PO), Take by mouth, Disp: , Rfl:     BREO ELLIPTA 200-25 MCG/INH inhaler, , Disp: , Rfl:     budesonide-formoterol (Symbicort) 160-4 5 mcg/act inhaler, Inhale 2 puffs every 12 (twelve) hours, Disp: , Rfl:     [employer] INDOOR/OUTDOOR ALLERGY RLF 10 MG tablet, Take 10 mg by mouth daily, Disp: , Rfl: 10    cyclobenzaprine (FLEXERIL) 10 mg tablet, Take 1 tablet (10 mg total) by mouth 3 (three) times a day as needed for muscle spasms, Disp: 20 tablet, Rfl: 0    EPINEPHrine (EPIPEN) 0 3 mg/0 3 mL SOAJ, as directed, Disp: , Rfl:     Erenumab-aooe (AIMOVIG) 140 MG/ML SOAJ, Inject 1 mL under the skin every 30 (thirty) days, Disp: 1 pen, Rfl: 3    gabapentin (NEURONTIN) 300 mg capsule, 1 cap TID , Disp: 90 capsule, Rfl: 5    ibuprofen (MOTRIN) 800 mg tablet, Take 1 tablet (800 mg total) by mouth every 6 (six) hours as needed for mild pain, Disp: 90 tablet, Rfl: 3    levonorgestrel (MIRENA, 52 MG,) 20 MCG/24HR IUD, by Intrauterine route, Disp: , Rfl:     levothyroxine (Synthroid) 125 mcg tablet, Take by mouth Daily, Disp: , Rfl:     levothyroxine 50 mcg tablet, Take 1 tablet (50 mcg total) by mouth daily in the early morning, Disp: , Rfl: 0   mometasone (NASONEX) 50 mcg/act nasal spray, , Disp: , Rfl:     ondansetron (ZOFRAN-ODT) 4 mg disintegrating tablet, Take 1 tablet (4 mg total) by mouth every 6 (six) hours as needed for nausea or vomiting, Disp: 20 tablet, Rfl: 0    SPIRIVA RESPIMAT 2 5 MCG/ACT AERS, , Disp: , Rfl:     VITAMIN D PO, Take 5,000 Units by mouth, Disp: , Rfl:     ZOLMitriptan (ZOMIG-ZMT) 5 MG disintegrating tablet, TAKE 1 TABLET BY MOUTH AT ONSET OF HEADACHE, MAY REPEAT AFTER 2 HOURS AS NEEDED  MAX 2 TABLETS per 24 hours  , Disp: 12 tablet, Rfl: 0    cyclobenzaprine (FLEXERIL) 10 mg tablet, Take 1 tablet (10 mg total) by mouth daily at bedtime for 20 doses, Disp: 20 tablet, Rfl: 0    divalproex sodium (DEPAKOTE ER) 250 mg 24 hr tablet, Take 1 tablet (250 mg total) by mouth daily at bedtime Total Depakote ER dose is 750 mg at bedtime, Disp: 30 tablet, Rfl: 3    divalproex sodium (DEPAKOTE ER) 500 mg 24 hr tablet, Take 1 tablet (500 mg total) by mouth daily at bedtime, Disp: 30 tablet, Rfl: 3    DULoxetine (CYMBALTA) 60 mg delayed release capsule, Take 2 capsules (120 mg total) by mouth daily, Disp: 60 capsule, Rfl: 2    hydrOXYzine HCL (ATARAX) 50 mg tablet, Take 1 tablet (50 mg total) by mouth 3 (three) times a day as needed for anxiety, Disp: 90 tablet, Rfl: 3    ibuprofen (MOTRIN) 800 mg tablet, Take 1 tablet (800 mg total) by mouth every 8 (eight) hours as needed for mild pain or moderate pain (take with food) for up to 10 days, Disp: 21 tablet, Rfl: 0    methylPREDNISolone 4 MG tablet therapy pack, Use as directed on package, Disp: 21 each, Rfl: 0    naltrexone (REVIA) 50 mg tablet, Take 1 tablet (50 mg total) by mouth daily, Disp: 30 tablet, Rfl: 3    olopatadine (PATANOL) 0 1 % ophthalmic solution, Administer 1 drop to both eyes 2 (two) times a day for 90 days, Disp: 10 mL, Rfl: 11    omeprazole (PriLOSEC) 20 mg delayed release capsule, Take 1 capsule (20 mg total) by mouth daily, Disp: 30 capsule, Rfl: 11   QUEtiapine (SEROquel) 200 mg tablet, Take 1 tablet (200 mg total) by mouth daily at bedtime, Disp: 30 tablet, Rfl: 2    topiramate (TOPAMAX) 100 mg tablet, Take 1 tablet (100 mg total) by mouth 2 (two) times a day, Disp: 60 tablet, Rfl: 2    traZODone (DESYREL) 50 mg tablet, Take 1-2 tablets ( mg total) by mouth daily at bedtime as needed for sleep, Disp: 60 tablet, Rfl: 1    Review of Systems   Constitutional: Positive for fatigue  Negative for activity change, diaphoresis and unexpected weight change  Respiratory: Negative for cough, chest tightness, shortness of breath and wheezing  Cardiovascular: Positive for chest pain  Negative for palpitations and leg swelling  Gastrointestinal: Negative for abdominal pain, constipation, diarrhea, nausea and vomiting  Denies heartburn   Musculoskeletal: Positive for arthralgias, back pain, myalgias, neck pain and neck stiffness  Negative for joint swelling  Neurological: Positive for dizziness (occasional), numbness and headaches  Negative for weakness and light-headedness  Psychiatric/Behavioral: The patient is nervous/anxious  Objective:    /70 (BP Location: Left arm, Patient Position: Sitting)   Pulse 100   Temp 97 5 °F (36 4 °C) (Skin)   Resp 16   Ht 5' 3" (1 6 m)   Wt 68 kg (150 lb)   SpO2 98%   BMI 26 57 kg/m²      Physical Exam  Vitals signs reviewed  Constitutional:       General: She is not in acute distress  Appearance: Normal appearance  She is not ill-appearing  HENT:      Head: Normocephalic  Right Ear: Tympanic membrane, ear canal and external ear normal  There is no impacted cerumen  Left Ear: Tympanic membrane, ear canal and external ear normal  There is no impacted cerumen  Nose: Nose normal  No congestion or rhinorrhea  Mouth/Throat:      Mouth: Mucous membranes are moist       Pharynx: Oropharynx is clear  No oropharyngeal exudate or posterior oropharyngeal erythema     Neck: Musculoskeletal: Neck supple  Cardiovascular:      Rate and Rhythm: Normal rate and regular rhythm  Pulses: Normal pulses  Heart sounds: Normal heart sounds  Pulmonary:      Effort: Pulmonary effort is normal  No respiratory distress  Breath sounds: Normal breath sounds  No wheezing  Chest:       Abdominal:      General: Bowel sounds are normal  There is no distension  Palpations: Abdomen is soft  Tenderness: There is no abdominal tenderness  Musculoskeletal:      Left shoulder: She exhibits decreased range of motion and tenderness  She exhibits normal pulse and normal strength  Cervical back: She exhibits decreased range of motion (decreased flexion, sidebending and lateral rotation)  Right lower leg: No edema  Left lower leg: No edema  Comments:  strength 5/5 and equal   Lymphadenopathy:      Cervical: No cervical adenopathy  Neurological:      Mental Status: She is alert and oriented to person, place, and time  BMI Counseling: Body mass index is 26 57 kg/m²  The BMI is above normal  Nutrition recommendations include decreasing portion sizes, consuming healthier snacks, limiting drinks that contain sugar, moderation in carbohydrate intake and reducing intake of cholesterol  Exercise recommendations include moderate physical activity 150 minutes/week, exercising 3-5 times per week and strength training exercises  No pharmacotherapy was ordered

## 2021-05-05 NOTE — ASSESSMENT & PLAN NOTE
-acute on chronic pain with underlying arthritis  -recommend she return to her Pain Management to see if she candidate for cortisone injection  -recommend return to PT, refuses  -do stretches  -already on gabapentin for migraine ppx an duloxetine  -add medrol dose david for now, side effects discussed

## 2021-05-10 ENCOUNTER — TELEPHONE (OUTPATIENT)
Dept: PSYCHIATRY | Facility: CLINIC | Age: 51
End: 2021-05-10

## 2021-05-10 NOTE — TELEPHONE ENCOUNTER
Patient called to inform office that she will be stopping in to  a letter in regards to the service dog  Letter is on file and just needs to be printed

## 2021-05-10 NOTE — TELEPHONE ENCOUNTER
Pt came in to  letter  Had Pt fill out an JAZMYN for this letter  JAZMYN will be scanned into patients chart  Pt said the note from 5/2020 is good as the Housing lost the original note and they just need to replace it in her chart

## 2021-05-19 ENCOUNTER — ANNUAL EXAM (OUTPATIENT)
Dept: OBGYN CLINIC | Facility: CLINIC | Age: 51
End: 2021-05-19
Payer: COMMERCIAL

## 2021-05-19 VITALS
DIASTOLIC BLOOD PRESSURE: 82 MMHG | SYSTOLIC BLOOD PRESSURE: 122 MMHG | BODY MASS INDEX: 25.16 KG/M2 | HEIGHT: 64 IN | WEIGHT: 147.4 LBS

## 2021-05-19 DIAGNOSIS — Z12.11 COLON CANCER SCREENING: ICD-10-CM

## 2021-05-19 DIAGNOSIS — R79.89 ELEVATED TSH: ICD-10-CM

## 2021-05-19 DIAGNOSIS — Z87.42 HX OF ABNORMAL CERVICAL PAP SMEAR: ICD-10-CM

## 2021-05-19 DIAGNOSIS — N94.6 DYSMENORRHEA: ICD-10-CM

## 2021-05-19 DIAGNOSIS — Z12.31 ENCOUNTER FOR SCREENING MAMMOGRAM FOR MALIGNANT NEOPLASM OF BREAST: ICD-10-CM

## 2021-05-19 DIAGNOSIS — Z01.419 ENCNTR FOR GYN EXAM (GENERAL) (ROUTINE) W/O ABN FINDINGS: Primary | ICD-10-CM

## 2021-05-19 PROCEDURE — 1036F TOBACCO NON-USER: CPT | Performed by: PHYSICIAN ASSISTANT

## 2021-05-19 PROCEDURE — G0143 SCR C/V CYTO,THINLAYER,RESCR: HCPCS | Performed by: PHYSICIAN ASSISTANT

## 2021-05-19 PROCEDURE — 87624 HPV HI-RISK TYP POOLED RSLT: CPT | Performed by: PHYSICIAN ASSISTANT

## 2021-05-19 PROCEDURE — 3008F BODY MASS INDEX DOCD: CPT | Performed by: PHYSICIAN ASSISTANT

## 2021-05-19 PROCEDURE — 99396 PREV VISIT EST AGE 40-64: CPT | Performed by: PHYSICIAN ASSISTANT

## 2021-05-19 RX ORDER — IBUPROFEN 800 MG/1
800 TABLET ORAL EVERY 6 HOURS PRN
Qty: 90 TABLET | Refills: 3 | Status: SHIPPED | OUTPATIENT
Start: 2021-05-19

## 2021-05-19 RX ORDER — MISOPROSTOL 200 UG/1
TABLET ORAL
Qty: 2 TABLET | Refills: 0 | Status: SHIPPED | OUTPATIENT
Start: 2021-05-19 | End: 2021-08-03 | Stop reason: ALTCHOICE
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
## 2021-05-21 ENCOUNTER — IMMUNIZATIONS (OUTPATIENT)
Dept: FAMILY MEDICINE CLINIC | Facility: HOSPITAL | Age: 51
End: 2021-05-21

## 2021-05-21 DIAGNOSIS — F33.2 MAJOR DEPRESSIVE DISORDER, RECURRENT, SEVERE WITHOUT PSYCHOTIC FEATURES (HCC): Primary | Chronic | ICD-10-CM

## 2021-05-21 DIAGNOSIS — F63.9 IMPULSE CONTROL DISORDER: Chronic | ICD-10-CM

## 2021-05-21 DIAGNOSIS — Z23 ENCOUNTER FOR IMMUNIZATION: Primary | ICD-10-CM

## 2021-05-21 PROCEDURE — 0001A SARS-COV-2 / COVID-19 MRNA VACCINE (PFIZER-BIONTECH) 30 MCG: CPT

## 2021-05-21 PROCEDURE — 91300 SARS-COV-2 / COVID-19 MRNA VACCINE (PFIZER-BIONTECH) 30 MCG: CPT

## 2021-05-21 RX ORDER — QUETIAPINE FUMARATE 200 MG/1
200 TABLET, FILM COATED ORAL
Qty: 30 TABLET | Refills: 2 | Status: SHIPPED | OUTPATIENT
Start: 2021-05-21 | End: 2021-08-24 | Stop reason: SDUPTHER

## 2021-05-21 RX ORDER — TOPIRAMATE 100 MG/1
100 TABLET, FILM COATED ORAL 2 TIMES DAILY
Qty: 60 TABLET | Refills: 2 | Status: SHIPPED | OUTPATIENT
Start: 2021-05-21 | End: 2021-11-01 | Stop reason: SDUPTHER

## 2021-05-25 ENCOUNTER — TELEPHONE (OUTPATIENT)
Dept: OBGYN CLINIC | Facility: CLINIC | Age: 51
End: 2021-05-25

## 2021-05-25 LAB
LAB AP GYN PRIMARY INTERPRETATION: NORMAL
Lab: NORMAL

## 2021-05-25 NOTE — TELEPHONE ENCOUNTER
Per comm consent lm to pt re needed a repap  Told her I saw she has an appt on 6/2 for IUD removal and insertion  Will check with [facility] to see if it can be done at that time and call pt back to let her know        ----- Message from Julienne Verdugo PA-C sent at 5/25/2021 12:38 PM EDT -----  Please let Isabel Purvis know that there was some inflammation seen on her Pap, but they could not read her Pap  She needs to return for a repeat Pap  Thanks!

## 2021-05-26 ENCOUNTER — TELEPHONE (OUTPATIENT)
Dept: OBGYN CLINIC | Facility: CLINIC | Age: 51
End: 2021-05-26

## 2021-05-26 NOTE — TELEPHONE ENCOUNTER
Spoke to pt and reviewed findings from [address] [address]  She said she will wait until she comes back for pap

## 2021-05-26 NOTE — TELEPHONE ENCOUNTER
Pt would like to talk to Francy To about her pap results - she researched it last night and is afraid she has an std and that it is not from her "side"  Please call to discuss

## 2021-05-26 NOTE — TELEPHONE ENCOUNTER
There are no signs of an STD at this point  We will repeat her Pap to make sure the cells are normal  Her HPV was not active this year, which is a great sign  We can do cultures for chlamydia and gonorrhea (either now through urine at the lab, or at the time of her Pap) if she desires

## 2021-06-02 ENCOUNTER — PROCEDURE VISIT (OUTPATIENT)
Dept: OBGYN CLINIC | Facility: CLINIC | Age: 51
End: 2021-06-02
Payer: COMMERCIAL

## 2021-06-02 VITALS — DIASTOLIC BLOOD PRESSURE: 80 MMHG | WEIGHT: 147 LBS | BODY MASS INDEX: 25.41 KG/M2 | SYSTOLIC BLOOD PRESSURE: 120 MMHG

## 2021-06-02 DIAGNOSIS — R10.2 PELVIC PAIN: ICD-10-CM

## 2021-06-02 DIAGNOSIS — Z01.419 ENCNTR FOR GYN EXAM (GENERAL) (ROUTINE) W/O ABN FINDINGS: ICD-10-CM

## 2021-06-02 DIAGNOSIS — R92.8 ABNORMAL MAMMOGRAM: Primary | ICD-10-CM

## 2021-06-02 DIAGNOSIS — Z30.430 ENCOUNTER FOR IUD INSERTION: ICD-10-CM

## 2021-06-02 LAB — SL AMB POCT URINE HCG: NORMAL

## 2021-06-02 PROCEDURE — 58300 INSERT INTRAUTERINE DEVICE: CPT | Performed by: PHYSICIAN ASSISTANT

## 2021-06-02 PROCEDURE — 87624 HPV HI-RISK TYP POOLED RSLT: CPT | Performed by: PHYSICIAN ASSISTANT

## 2021-06-02 PROCEDURE — 58301 REMOVE INTRAUTERINE DEVICE: CPT | Performed by: PHYSICIAN ASSISTANT

## 2021-06-02 PROCEDURE — G0145 SCR C/V CYTO,THINLAYER,RESCR: HCPCS | Performed by: PHYSICIAN ASSISTANT

## 2021-06-02 NOTE — PROGRESS NOTES
Maile Daniels  1970      CC:  IUD swap    S:  53 y o  female here for IUD swap  The reason for her swap is  Mirena  She requests Mirena IUD  We reviewed the risks of IUD insertion including pain, perforation, migration, perforation, irregular bleeding, and infection  She is aware that with Mirena she can expect up to 6 months of irregular bleeding and at that point her periods should become lighter, shorter, and less crampy, and that many women stop having periods with their Mirena  She did premedicate with ibuprofen, Cytotec, and a snack prior to insertion  Her Pap obtained on  showed inflammation and otherwise could not be read - we will repeat this today    I had not heard back from regional [facility] regarding how they wanted her mammo ordered - so I again placed a call to them  They are recommending bilateral diagnostic mammogram and bilateral breast US at this time  She reports continued issues with right groin/pelvic pain with intercourse and also randomly  O:   Blood pressure 120/80, weight 66 7 kg (147 lb), not currently breastfeeding  Patient appears well and is not in distress  Abdomen is soft and nontender  External genitals are normal without lesion or rash  Vagina is normal with menses present  Cervix is normal without lesion  PROCEDURE:   IUD strings were visualized, grasped with a ring forceps, and removed without difficulty  The cervix was cleansed with Betadine  The uterus was sounded to 8 cm  IUD was inserted to the fundus without dilation and without tenaculum  Strings were trimmed to 3cm    The patient tolerated the procedure well without complication  She did note development of her right groin pain immediately after IUD insertion       A:   IUD swap   Pelvic pain     P:  Check pelvic ultrasound; if neg, pelvic PT referral   Check bilateral dx mammo and bilateral breast US   Pap and HPV obtained today     The patient will return in one month for IUD check but knows to call sooner should she have problems prior to that visit

## 2021-06-03 DIAGNOSIS — F33.2 MAJOR DEPRESSIVE DISORDER, RECURRENT, SEVERE WITHOUT PSYCHOTIC FEATURES (HCC): Primary | Chronic | ICD-10-CM

## 2021-06-03 DIAGNOSIS — F41.1 GAD (GENERALIZED ANXIETY DISORDER): Chronic | ICD-10-CM

## 2021-06-03 RX ORDER — DULOXETIN HYDROCHLORIDE 60 MG/1
120 CAPSULE, DELAYED RELEASE ORAL DAILY
Qty: 60 CAPSULE | Refills: 0 | Status: SHIPPED | OUTPATIENT
Start: 2021-06-03 | End: 2021-06-03 | Stop reason: SDUPTHER

## 2021-06-03 RX ORDER — DULOXETIN HYDROCHLORIDE 60 MG/1
120 CAPSULE, DELAYED RELEASE ORAL DAILY
Qty: 60 CAPSULE | Refills: 2 | Status: SHIPPED | OUTPATIENT
Start: 2021-06-03 | End: 2021-08-24 | Stop reason: SDUPTHER

## 2021-06-03 NOTE — TELEPHONE ENCOUNTER
Cymbalta escripted for 30 days with 2 RF  Sandre Wexford will need to keep her appointment on 8/4/21 in order to continue receiving medications   She would need to return as a new patient if she does not keep that appointment as her last med check was on 7/30/20

## 2021-06-03 NOTE — TELEPHONE ENCOUNTER
Left message for patient to notify will need to keep 8/4 appointment for any additional refills   Last seen 7/2020 and would be considered a new patient if appointment is not kept

## 2021-06-05 ENCOUNTER — HOSPITAL ENCOUNTER (EMERGENCY)
Facility: HOSPITAL | Age: 51
Discharge: HOME | End: 2021-06-05
Attending: EMERGENCY MEDICINE | Admitting: EMERGENCY MEDICINE
Payer: COMMERCIAL

## 2021-06-05 VITALS
RESPIRATION RATE: 18 BRPM | BODY MASS INDEX: 25.41 KG/M2 | SYSTOLIC BLOOD PRESSURE: 116 MMHG | HEART RATE: 80 BPM | OXYGEN SATURATION: 99 % | DIASTOLIC BLOOD PRESSURE: 64 MMHG | TEMPERATURE: 97.3 F | WEIGHT: 147 LBS

## 2021-06-05 DIAGNOSIS — S05.02XA ABRASION OF LEFT CORNEA, INITIAL ENCOUNTER: Primary | ICD-10-CM

## 2021-06-05 PROCEDURE — 99284 EMERGENCY DEPT VISIT MOD MDM: CPT | Performed by: PHYSICIAN ASSISTANT

## 2021-06-05 PROCEDURE — 99283 EMERGENCY DEPT VISIT LOW MDM: CPT

## 2021-06-05 RX ORDER — CIPROFLOXACIN HYDROCHLORIDE 3.5 MG/ML
2 SOLUTION/ DROPS TOPICAL ONCE
Status: COMPLETED | OUTPATIENT
Start: 2021-06-05 | End: 2021-06-05

## 2021-06-05 RX ORDER — TETRACAINE HYDROCHLORIDE 5 MG/ML
2 SOLUTION OPHTHALMIC ONCE
Status: COMPLETED | OUTPATIENT
Start: 2021-06-05 | End: 2021-06-05

## 2021-06-05 RX ADMIN — CIPROFLOXACIN 2 DROP: 3 SOLUTION OPHTHALMIC at 22:00

## 2021-06-05 RX ADMIN — TETRACAINE HYDROCHLORIDE 2 DROP: 5 SOLUTION OPHTHALMIC at 20:58

## 2021-06-05 RX ADMIN — FLUORESCEIN SODIUM 1 STRIP: 1 STRIP OPHTHALMIC at 20:58

## 2021-06-06 NOTE — ED PROVIDER NOTES
History  Chief Complaint   Patient presents with    Eye Pain     left eye pain after taking out contact today       Patient is a 71-year-old female with no significant past medical or surgical history that presents emergency department with eft eye burning for 6 hours  Patient has associated symptomatology of light sensitivity beginning the current ED presentation of left eye burning  Patient states that after she went strawberry picking this afternoon, she had manually removed her contact lenses completely and noticed that 5-10 minutes afterwards, she had left eye burning  Patient denies foreign object  Patient denies history of symptoms to present  Patient denies palliative factors with provocative factors of looking at bright light  Patient denies not effective treatment  Patient denies fevers, chills, nausea, vomiting, diarrhea, constipation urinary symptoms  Patient denies recent fall or recent trauma  Patient denies sick contacts recent travel  Patient denies recent antibiotic use  Patient denies chest pain, shortness of breath, and abdominal pain  History provided by:  Patient   used: No        Prior to Admission Medications   Prescriptions Last Dose Informant Patient Reported? Taking?    Aspirin-Acetaminophen-Caffeine (MIGRAINE FORMULA PO)  Self Yes No   Sig: Take by mouth   BREO ELLIPTA 200-25 MCG/INH inhaler  Self Yes No   [employer] INDOOR/OUTDOOR ALLERGY RLF 10 MG tablet  Self Yes No   Sig: Take 10 mg by mouth daily   DULoxetine (CYMBALTA) 60 mg delayed release capsule   No No   Sig: Take 2 capsules (120 mg total) by mouth daily   EPINEPHrine (EPIPEN) 0 3 mg/0 3 mL SOAJ  Self Yes No   Sig: as directed   Erenumab-aooe (AIMOVIG) 140 MG/ML SOAJ  Self No No   Sig: Inject 1 mL under the skin every 30 (thirty) days   QUEtiapine (SEROquel) 200 mg tablet   No No   Sig: Take 1 tablet (200 mg total) by mouth daily at bedtime   SPIRIVA RESPIMAT 2 5 MCG/ACT AERS  Self Yes No   VITAMIN D PO  Self Yes No   Sig: Take 5,000 Units by mouth   ZOLMitriptan (ZOMIG-ZMT) 5 MG disintegrating tablet  Self No No   Sig: TAKE 1 TABLET BY MOUTH AT ONSET OF HEADACHE, MAY REPEAT AFTER 2 HOURS AS NEEDED  MAX 2 TABLETS per 24 hours  albuterol (2 5 mg/3 mL) 0 083 % nebulizer solution  Self Yes No   Sig: Inhale   aluminum-magnesium hydroxide 200-200 MG/5ML suspension  Self No No   Sig: GAVISCON REGULAR STRENGTH AFTER MEALS and BEDTIME WHEN NOT FOLLOWING LPR/GERD DIET     budesonide-formoterol (Symbicort) 160-4 5 mcg/act inhaler  Self Yes No   Sig: Inhale 2 puffs every 12 (twelve) hours   cyclobenzaprine (FLEXERIL) 10 mg tablet  Self No No   Sig: Take 1 tablet (10 mg total) by mouth 3 (three) times a day as needed for muscle spasms   divalproex sodium (DEPAKOTE ER) 250 mg 24 hr tablet  Self No No   Sig: Take 1 tablet (250 mg total) by mouth daily at bedtime Total Depakote ER dose is 750 mg at bedtime   divalproex sodium (DEPAKOTE ER) 500 mg 24 hr tablet  Self No No   Sig: Take 1 tablet (500 mg total) by mouth daily at bedtime   gabapentin (NEURONTIN) 300 mg capsule  Self No No   Si cap TID    hydrOXYzine HCL (ATARAX) 50 mg tablet  Self No No   Sig: Take 1 tablet (50 mg total) by mouth 3 (three) times a day as needed for anxiety   ibuprofen (MOTRIN) 800 mg tablet   No No   Sig: Take 1 tablet (800 mg total) by mouth every 6 (six) hours as needed for mild pain   levonorgestrel (MIRENA, 52 MG,) 20 MCG/24HR IUD  Self Yes No   Sig: by Intrauterine route   levothyroxine (Synthroid) 125 mcg tablet  Self Yes No   Sig: Take by mouth Daily   misoprostol (CYTOTEC) 200 mcg tablet   No No   Sig: Two tablets PV 1-2 hours prior to IUD swap   mometasone (NASONEX) 50 mcg/act nasal spray  Self Yes No   naltrexone (REVIA) 50 mg tablet  Self No No   Sig: Take 1 tablet (50 mg total) by mouth daily   olopatadine (PATANOL) 0 1 % ophthalmic solution  Self No No   Sig: Administer 1 drop to both eyes 2 (two) times a day for 90 days omeprazole (PriLOSEC) 20 mg delayed release capsule  Self No No   Sig: Take 1 capsule (20 mg total) by mouth daily   ondansetron (ZOFRAN-ODT) 4 mg disintegrating tablet  Self No No   Sig: Take 1 tablet (4 mg total) by mouth every 6 (six) hours as needed for nausea or vomiting   topiramate (TOPAMAX) 100 mg tablet   No No   Sig: Take 1 tablet (100 mg total) by mouth 2 (two) times a day   traZODone (DESYREL) 50 mg tablet  Self No No   Sig: Take 1-2 tablets ( mg total) by mouth daily at bedtime as needed for sleep      Facility-Administered Medications: None       Past Medical History:   Diagnosis Date    Amenorrhea     Anxiety     Arthritis     Asthma     Back pain     Chondromalacia of patella     Chronic fatigue     COPD [facility]chronic obstructive pulmonary disease) [facility] Yes    Deep vein thrombophlebitis of leg ([facility])     Depression     Disease of thyroid gland     GERD (gastroesophageal reflux disease)     HPV (human papilloma virus) infection     Hypothyroidism     Lumbar radiculopathy     Migraine     Myofascial pain syndrome     Osteopenia     Piriformis syndrome     Sacroiliitis ([facility])     Sciatica     Seizure ([address] )     Sleep apnea     Spondylosis of lumbar spine     Trochanteric bursitis of right hip     Vertigo     Vitamin D deficiency        Past Surgical History:   Procedure Laterality Date    CERVIX LESION DESTRUCTION       SECTION      COLONOSCOPY      COLPOSCOPY W/ BIOPSY / CURETTAGE      Colposcopy cervix with biopsy    LAPAROSCOPIC ENDOMETRIOSIS FULGURATION      LAPAROSCOPY      TOOTH EXTRACTION         Family History   Problem Relation Age of Onset    Heart disease Mother     Asthma Daughter     Lung cancer Paternal Grandfather     Asthma Daughter     Colon cancer Father     Colon cancer Maternal Grandfather     Prostate cancer Maternal Grandfather     Colon cancer Maternal Aunt     No Known Problems Maternal Grandmother     Diabetes Paternal Grandmother     No Known Problems Maternal Aunt     No Known Problems Maternal Aunt     No Known Problems Maternal Aunt     No Known Problems Paternal Aunt     Asthma Daughter     Psychiatric Illness Neg Hx     Stroke Neg Hx     Thyroid disease Neg Hx     Substance Abuse Neg Hx     Suicidality Neg Hx      I have reviewed and agree with the history as documented  E-Cigarette/Vaping    E-Cigarette Use Never User      E-Cigarette/Vaping Substances    Nicotine No     THC No     CBD No     Flavoring No     Other No     Unknown No      Social History     Tobacco Use    Smoking status: Never Smoker    Smokeless tobacco: Never Used   Substance Use Topics    Alcohol use: Not Currently     Frequency: Never     Binge frequency: Never    Drug use: Not Currently       Review of Systems   Constitutional: Negative for activity change, appetite change, chills and fever  HENT: Negative for congestion, postnasal drip, rhinorrhea, sinus pressure, sinus pain, sore throat and tinnitus  Eyes: Positive for pain and redness  Negative for photophobia, discharge and visual disturbance  Respiratory: Negative for cough, chest tightness and shortness of breath  Cardiovascular: Negative for chest pain and palpitations  Gastrointestinal: Negative for abdominal pain, constipation, diarrhea, nausea and vomiting  Genitourinary: Negative for difficulty urinating, dysuria, flank pain, frequency and urgency  Musculoskeletal: Negative for back pain, gait problem, neck pain and neck stiffness  Skin: Negative for pallor and rash  Allergic/Immunologic: Negative for environmental allergies and food allergies  Neurological: Negative for dizziness, weakness, numbness and headaches  Psychiatric/Behavioral: Negative for confusion  All other systems reviewed and are negative  Physical Exam  Physical Exam  Vitals signs and nursing note reviewed  Constitutional:       General: She is awake        Appearance: Normal appearance  She is well-developed  She is not ill-appearing, toxic-appearing or diaphoretic  Comments: /64   Pulse 80   Temp (!) 97 3 °F (36 3 °C) (Oral)   Resp 18   Wt 66 7 kg (147 lb)   LMP  (LMP Unknown)   SpO2 99%   BMI 25 41 kg/m²      HENT:      Head: Normocephalic and atraumatic  Right Ear: Hearing, tympanic membrane, ear canal and external ear normal  No decreased hearing noted  No drainage, swelling or tenderness  No mastoid tenderness  Left Ear: Hearing, tympanic membrane, ear canal and external ear normal  No decreased hearing noted  No drainage, swelling or tenderness  No mastoid tenderness  Nose: Nose normal       Mouth/Throat:      Lips: Pink  Mouth: Mucous membranes are moist       Pharynx: Oropharynx is clear  Uvula midline  Eyes:      General: Lids are normal  Vision grossly intact  Right eye: No discharge  Left eye: No discharge  Extraocular Movements: Extraocular movements intact  Conjunctiva/sclera:      Right eye: Right conjunctiva is not injected  No chemosis, exudate or hemorrhage  Left eye: Left conjunctiva is injected  No chemosis, exudate or hemorrhage  Pupils: Pupils are equal, round, and reactive to light  Comments: OD 20/20, OS 20/20, both eyes 20/20 corrected; EOM intact, no pain on EOM; visual fields intact   Neck:      Musculoskeletal: Full passive range of motion without pain, normal range of motion and neck supple  Normal range of motion  No neck rigidity, spinous process tenderness or muscular tenderness  Vascular: No JVD  Trachea: Trachea and phonation normal  No tracheal tenderness or tracheal deviation  Cardiovascular:      Rate and Rhythm: Normal rate and regular rhythm  Pulses: Normal pulses  Radial pulses are 2+ on the right side and 2+ on the left side  Posterior tibial pulses are 2+ on the right side and 2+ on the left side        Heart sounds: Normal heart sounds  Pulmonary:      Effort: Pulmonary effort is normal       Breath sounds: Normal breath sounds  No decreased breath sounds  Abdominal:      General: Abdomen is flat  Palpations: Abdomen is not rigid  Musculoskeletal: Normal range of motion  Lymphadenopathy:      Head:      Right side of head: No submental, submandibular, tonsillar, preauricular, posterior auricular or occipital adenopathy  Left side of head: No submental, submandibular, tonsillar, preauricular, posterior auricular or occipital adenopathy  Cervical: No cervical adenopathy  Right cervical: No superficial, deep or posterior cervical adenopathy  Left cervical: No superficial, deep or posterior cervical adenopathy  Skin:     General: Skin is warm  Capillary Refill: Capillary refill takes less than 2 seconds  Neurological:      General: No focal deficit present  Mental Status: She is alert and oriented to person, place, and time  GCS: GCS eye subscore is 4  GCS verbal subscore is 5  GCS motor subscore is 6  Sensory: No sensory deficit  Deep Tendon Reflexes: Reflexes are normal and symmetric  Reflex Scores:       Patellar reflexes are 2+ on the right side and 2+ on the left side  Psychiatric:         Mood and Affect: Mood normal          Speech: Speech normal          Behavior: Behavior normal  Behavior is cooperative  Thought Content:  Thought content normal          Judgment: Judgment normal          Vital Signs  ED Triage Vitals [06/05/21 1935]   Temperature Pulse Respirations Blood Pressure SpO2   (!) 97 3 °F (36 3 °C) 80 18 116/64 99 %      Temp Source Heart Rate Source Patient Position - Orthostatic VS BP Location FiO2 (%)   Oral Monitor -- -- --      Pain Score       Worst Possible Pain           Vitals:    06/05/21 1935   BP: 116/64   Pulse: 80         Visual Acuity      ED Medications  Medications   tetracaine 0 5 % ophthalmic solution 2 drop (2 drops Right Eye Given 6/5/21 2058)   fluorescein sodium sterile ophthalmic strip 1 strip (1 strip Left Eye Given 6/5/21 2058)   ciprofloxacin (CILOXAN) 0 3 % ophthalmic solution 2 drop (2 drops Left Eye Given 6/5/21 2200)       Diagnostic Studies  Results Reviewed     None                 No orders to display              Procedures  Procedures         ED Course  ED Course as of Jun 06 0835   Sat Jun 05, 2021 2152 OD 20/20, OS 20/20, both eyes 20/20 corrected with glasses  EOM intact, no pain on EOM, visual fields intact                                  SBIRT 22yo+      Most Recent Value   SBIRT (23 yo +)   In order to provide better care to our patients, we are screening all of our patients for alcohol and drug use  Would it be okay to ask you these screening questions? Yes Filed at: 06/05/2021 1958   Initial Alcohol Screen: US AUDIT-C    1  How often do you have a drink containing alcohol?  0 Filed at: 06/05/2021 1958   2  How many drinks containing alcohol do you have on a typical day you are drinking? 0 Filed at: 06/05/2021 1958   3a  Male UNDER 65: How often do you have five or more drinks on one occasion? 0 Filed at: 06/05/2021 1958   3b  FEMALE Any Age, or MALE 65+: How often do you have 4 or more drinks on one occassion? 0 Filed at: 06/05/2021 1958   Audit-C Score  0 Filed at: 06/05/2021 1958   IDALIA: How many times in the past year have you    Used an illegal drug or used a prescription medication for non-medical reasons?   Never Filed at: 06/05/2021 1958                    MDM  Number of Diagnoses or Management Options  Abrasion of left cornea, initial encounter: new and does not require workup     Amount and/or Complexity of Data Reviewed  Review and summarize past medical records: yes    Risk of Complications, Morbidity, and/or Mortality  Presenting problems: moderate  Diagnostic procedures: moderate  Management options: low    Patient Progress  Patient progress: stable    Patient is a 71-year-old female with no significant past medical or surgical history that presents emergency department with eft eye burning for 6 hours  Patient has associated symptomatology of light sensitivity beginning the current ED presentation of left eye burning  Patient states that after she went strawberry picking this afternoon, she had manually removed her contact lenses completely and noticed that 5-10 minutes afterwards, she had left eye burning  Patient denies foreign object  Hemodynamically stable and afebrile  Corneal abrasions noted at the 6:00 position overlying iris of left eye; EOM intact, no pain on EOM  Tetracaine delivered to patient left eye with complete abatement of pain; very likely strictly related to corneal insult  Visual fields intact  OD 20/20, OS 20/20 both eyes 20/20; corrected with glasses  Prescribed ciloxan and counseled patient medication administration and side effects  [facility], emergency department symptoms persist or exacerbate  Patient demonstrates verbal understanding of all discharge instructions, follow-up verbalized agreement current treatment plan  Disposition  Final diagnoses:   Abrasion of left cornea, initial encounter     Time reflects when diagnosis was documented in both MDM as applicable and the Disposition within this note     Time User Action Codes Description Comment    6/5/2021  9:55 PM Laurann Brittle Add [S05  02XA] Abrasion of left cornea, initial encounter       ED Disposition     ED Disposition Condition Date/Time Comment    Discharge Stable Sat Jun 5, 2021  9:53 PM Morris Mcgovern discharge to home/self care              Follow-up Information     Follow up With Specialties Details Why Contact Info Additional [id number] [facility],  Internal Medicine   [address]  [address] Floor, [facility] [address]  [address] [place] [address], Emergency Department Emergency Medicine   [address] [facility] [address] [facility] Emergency Department, [address], [facility], [address], [address] Ophthalmology   [address]  [facility] [address] [phone]             Discharge Medication List as of 6/5/2021 10:02 PM      CONTINUE these medications which have NOT CHANGED    Details   albuterol (2 5 mg/3 mL) 0 083 % nebulizer solution Inhale, Starting Fri 4/19/2013, Historical Med      aluminum-magnesium hydroxide 200-200 MG/5ML suspension GAVISCON REGULAR STRENGTH AFTER MEALS and BEDTIME WHEN NOT FOLLOWING LPR/GERD DIET , No Print      Aspirin-Acetaminophen-Caffeine (MIGRAINE FORMULA PO) Take by mouth, Historical Med      BREO ELLIPTA 200-25 MCG/INH inhaler Starting Mon 3/5/2018, Historical Med      budesonide-formoterol (Symbicort) 160-4 5 mcg/act inhaler Inhale 2 puffs every 12 (twelve) hours, Historical Med      [employer]S INDOOR/OUTDOOR ALLERGY RLF 10 MG tablet Take 10 mg by mouth daily, Starting Mon 1/29/2018, Historical Med      cyclobenzaprine (FLEXERIL) 10 mg tablet Take 1 tablet (10 mg total) by mouth 3 (three) times a day as needed for muscle spasms, Starting Sat 3/13/2021, Normal      divalproex sodium (DEPAKOTE ER) 250 mg 24 hr tablet Take 1 tablet (250 mg total) by mouth daily at bedtime Total Depakote ER dose is 750 mg at bedtime, Starting Thu 7/30/2020, Until Wed 5/19/2021, Normal      divalproex sodium (DEPAKOTE ER) 500 mg 24 hr tablet Take 1 tablet (500 mg total) by mouth daily at bedtime, Starting Thu 7/30/2020, Until Wed 5/19/2021, Normal      DULoxetine (CYMBALTA) 60 mg delayed release capsule Take 2 capsules (120 mg total) by mouth daily, Starting Thu 6/3/2021, Until Wed 9/1/2021, Normal      EPINEPHrine (EPIPEN) 0 3 mg/0 3 mL SOAJ as directed, Historical Med      Erenumab-aooe (AIMOVIG) 140 MG/ML SOAJ Inject 1 mL under the skin every 30 (thirty) days, Starting Fri 8/9/2019, Normal      gabapentin (NEURONTIN) 300 mg capsule 1 cap TID , Normal      hydrOXYzine HCL (ATARAX) 50 mg tablet Take 1 tablet (50 mg total) by mouth 3 (three) times a day as needed for anxiety, Starting Thu 7/30/2020, Until Wed 5/19/2021, Normal      ibuprofen (MOTRIN) 800 mg tablet Take 1 tablet (800 mg total) by mouth every 6 (six) hours as needed for mild pain, Starting Wed 5/19/2021, Normal      levonorgestrel (MIRENA, 52 MG,) 20 MCG/24HR IUD by Intrauterine route, Historical Med      levothyroxine (Synthroid) 125 mcg tablet Take by mouth Daily, Historical Med      misoprostol (CYTOTEC) 200 mcg tablet Two tablets PV 1-2 hours prior to IUD swap, Normal      mometasone (NASONEX) 50 mcg/act nasal spray Starting Mon 3/12/2018, Historical Med      naltrexone (REVIA) 50 mg tablet Take 1 tablet (50 mg total) by mouth daily, Starting Thu 7/30/2020, Until Wed 5/19/2021, Normal      olopatadine (PATANOL) 0 1 % ophthalmic solution Administer 1 drop to both eyes 2 (two) times a day for 90 days, Starting Wed 8/7/2019, Until Wed 5/19/2021, Normal      omeprazole (PriLOSEC) 20 mg delayed release capsule Take 1 capsule (20 mg total) by mouth daily, Starting Tue 11/12/2019, Until Wed 5/19/2021, Normal      ondansetron (ZOFRAN-ODT) 4 mg disintegrating tablet Take 1 tablet (4 mg total) by mouth every 6 (six) hours as needed for nausea or vomiting, Starting Tue 11/17/2020, Normal      QUEtiapine (SEROquel) 200 mg tablet Take 1 tablet (200 mg total) by mouth daily at bedtime, Starting Fri 5/21/2021, Until Thu 8/19/2021, Normal      SPIRIVA RESPIMAT 2 5 MCG/ACT AERS Starting Mon 3/5/2018, Historical Med      topiramate (TOPAMAX) 100 mg tablet Take 1 tablet (100 mg total) by mouth 2 (two) times a day, Starting Fri 5/21/2021, Until Thu 8/19/2021, Normal      traZODone (DESYREL) 50 mg tablet Take 1-2 tablets ( mg total) by mouth daily at bedtime as needed for sleep, Starting Fri 2/19/2021, Until Wed 5/19/2021, Normal      VITAMIN D PO Take 5,000 Units by mouth, Historical Med ZOLMitriptan (ZOMIG-ZMT) 5 MG disintegrating tablet TAKE 1 TABLET BY MOUTH AT ONSET OF HEADACHE, MAY REPEAT AFTER 2 HOURS AS NEEDED  MAX 2 TABLETS per 24 hours  , Normal           No discharge procedures on file      PDMP Review       Value Time User    PDMP Reviewed  Yes 6/9/2020 12:47 PM Eber Dixon DO          ED Provider  Electronically Signed by           Nannette Krueger PA-C  06/06/21 9405

## 2021-06-06 NOTE — ED NOTES
20/20 bilateral visual acuity right and left eye with glasses on      Mary Joseph RN  06/05/21 2120       Mary Joseph [facility]  06/05/21 2122

## 2021-06-08 LAB
LAB AP GYN PRIMARY INTERPRETATION: NORMAL
Lab: NORMAL

## 2021-06-16 ENCOUNTER — IMMUNIZATIONS (OUTPATIENT)
Dept: FAMILY MEDICINE CLINIC | Facility: HOSPITAL | Age: 51
End: 2021-06-16

## 2021-06-16 DIAGNOSIS — Z23 ENCOUNTER FOR IMMUNIZATION: Primary | ICD-10-CM

## 2021-06-16 PROCEDURE — 91300 SARS-COV-2 / COVID-19 MRNA VACCINE (PFIZER-BIONTECH) 30 MCG: CPT

## 2021-06-16 PROCEDURE — 0002A SARS-COV-2 / COVID-19 MRNA VACCINE (PFIZER-BIONTECH) 30 MCG: CPT

## 2021-06-28 ENCOUNTER — TELEPHONE (OUTPATIENT)
Dept: NEUROLOGY | Facility: CLINIC | Age: 51
End: 2021-06-28

## 2021-06-28 DIAGNOSIS — G47.09 OTHER INSOMNIA: Primary | Chronic | ICD-10-CM

## 2021-06-28 DIAGNOSIS — G43.709 CHRONIC MIGRAINE WITHOUT AURA WITHOUT STATUS MIGRAINOSUS, NOT INTRACTABLE: ICD-10-CM

## 2021-06-28 RX ORDER — ZOLMITRIPTAN 5 MG/1
TABLET, ORALLY DISINTEGRATING ORAL
Qty: 12 TABLET | Refills: 0 | Status: SHIPPED | OUTPATIENT
Start: 2021-06-28 | End: 2021-07-30 | Stop reason: SDUPTHER

## 2021-06-28 RX ORDER — TRAZODONE HYDROCHLORIDE 50 MG/1
50-100 TABLET ORAL
Qty: 60 TABLET | Refills: 1 | Status: SHIPPED | OUTPATIENT
Start: 2021-06-28 | End: 2021-08-24 | Stop reason: SDUPTHER

## 2021-06-28 RX ORDER — KETOROLAC TROMETHAMINE 10 MG/1
10 TABLET, FILM COATED ORAL EVERY 6 HOURS PRN
Qty: 10 TABLET | Refills: 0 | Status: SHIPPED | OUTPATIENT
Start: 2021-06-28 | End: 2021-09-10 | Stop reason: SDUPTHER

## 2021-06-28 RX ORDER — PROCHLORPERAZINE MALEATE 10 MG
10 TABLET ORAL EVERY 6 HOURS PRN
Qty: 30 TABLET | Refills: 0 | Status: SHIPPED | OUTPATIENT
Start: 2021-06-28 | End: 2022-04-06 | Stop reason: SDUPTHER

## 2021-06-28 NOTE — TELEPHONE ENCOUNTER
In the past noted- "PO Toradol + Zomig ODT + Zofran +/- baclofen" helped to abort migraine  Can try this again  I resent toradol to start  I will send zomig

## 2021-06-28 NOTE — TELEPHONE ENCOUNTER
Patient calling in  She has had a migraine since yesterday  Describes pain as sharp all over her head  Rates pain as 8/10  Complains of nausea and dizziness upon standing  Patient usually uses Zolmitriptan but needs refill  She has new insurance and is not sure if it will require PA and does not feel she can wait days for the PA determination  Patient requesting a refill of zolmitriptan to see if it requires PA  Also requesting something to help with migraines in the meantime if possible  Patient last seen in office 08/09/2019   Scheduled patient for follow up on 7/30/21 with [facility]    Tried Medications:  Excedrin  Ibuprofen   Zofran     Please advise    Cb#: [phone], okay to leave detailed message

## 2021-06-28 NOTE — TELEPHONE ENCOUNTER
Patient call back  She states that she took her zolmatriptan 5mg, benadryl 25mg, zofran 4mg, and toradol 10mg together  She then repeated this two hours later  Patient states that she has had no relief in her symptoms at this point  She is asking what she should do at this time  (Patient is dry heaving in the background)    I advised at this point she has used most of her allottment of medications for not only the day but also the week, and the next step may be that she needs to go to the ED for medication  Patient verbalized understanding  MK-please advise on your recommendations      # [phone]

## 2021-06-28 NOTE — TELEPHONE ENCOUNTER
With dry heaving and headache, rec ED visit urgently for IV therapy, possible fluids  I will send compazine as well for later use

## 2021-06-28 NOTE — TELEPHONE ENCOUNTER
Called pharmacy  No PA needed on Zomig, went through as paid claim    Called patient, made her aware of MK's recommendations  Also made her aware zomig is covered   Patient verbalized understanding

## 2021-07-02 RX ORDER — ERENUMAB-AOOE 140 MG/ML
1 INJECTION, SOLUTION SUBCUTANEOUS
Qty: 1 ML | Refills: 11 | Status: SHIPPED | OUTPATIENT
Start: 2021-07-02 | End: 2021-11-01 | Stop reason: ALTCHOICE

## 2021-07-02 RX ORDER — DEXAMETHASONE 2 MG/1
TABLET ORAL
Qty: 5 TABLET | Refills: 0 | Status: SHIPPED | OUTPATIENT
Start: 2021-07-02 | End: 2021-07-30 | Stop reason: SDUPTHER

## 2021-07-02 NOTE — TELEPHONE ENCOUNTER
Patient calling in  She states the holiday weekend is approaching and she is worried because her headache is returning  She says it is throbbing and she has tried her Toradol, compazine, Topamax, and ibuprofen  None of these have been effective  Patient is not sure if she has used steroids or depakote for migraine in the past but is agreeable to either one  Patient is also interested in restarting Aimovig, she states she was using 2 pens per dose       Please advise

## 2021-07-02 NOTE — TELEPHONE ENCOUNTER
Called patient, made her aware decadron was sent to pharmacy along with Derk Merlin       Will complete Aimovig PA    ID: [id number]  [facility]: [id number]  PCN: [id number]  Group: [id number]

## 2021-07-07 ENCOUNTER — PREPPED CHART (OUTPATIENT)
Dept: URBAN - METROPOLITAN AREA CLINIC 6 | Facility: CLINIC | Age: 51
End: 2021-07-07

## 2021-07-30 ENCOUNTER — OFFICE VISIT (OUTPATIENT)
Dept: NEUROLOGY | Facility: CLINIC | Age: 51
End: 2021-07-30
Payer: COMMERCIAL

## 2021-07-30 VITALS
BODY MASS INDEX: 23.74 KG/M2 | SYSTOLIC BLOOD PRESSURE: 107 MMHG | DIASTOLIC BLOOD PRESSURE: 61 MMHG | HEIGHT: 63 IN | WEIGHT: 134 LBS | RESPIRATION RATE: 18 BRPM | HEART RATE: 85 BPM

## 2021-07-30 DIAGNOSIS — G43.709 CHRONIC MIGRAINE WITHOUT AURA WITHOUT STATUS MIGRAINOSUS, NOT INTRACTABLE: Primary | ICD-10-CM

## 2021-07-30 DIAGNOSIS — M79.18 MYOFASCIAL MUSCLE PAIN: ICD-10-CM

## 2021-07-30 PROCEDURE — 99214 OFFICE O/P EST MOD 30 MIN: CPT | Performed by: PHYSICIAN ASSISTANT

## 2021-07-30 PROCEDURE — 1036F TOBACCO NON-USER: CPT | Performed by: PHYSICIAN ASSISTANT

## 2021-07-30 RX ORDER — ZOLMITRIPTAN 5 MG/1
TABLET, ORALLY DISINTEGRATING ORAL
Qty: 12 TABLET | Refills: 2 | Status: SHIPPED | OUTPATIENT
Start: 2021-07-30 | End: 2021-10-27 | Stop reason: SDUPTHER

## 2021-07-30 RX ORDER — DEXAMETHASONE 2 MG/1
TABLET ORAL
Qty: 5 TABLET | Refills: 0 | Status: SHIPPED | OUTPATIENT
Start: 2021-07-30 | End: 2022-04-04 | Stop reason: SDUPTHER

## 2021-07-30 RX ORDER — TIZANIDINE HYDROCHLORIDE 2 MG/1
CAPSULE, GELATIN COATED ORAL
Qty: 30 CAPSULE | Refills: 0 | Status: SHIPPED | OUTPATIENT
Start: 2021-07-30 | End: 2021-11-01 | Stop reason: ALTCHOICE

## 2021-07-30 RX ORDER — TOPIRAMATE 25 MG/1
TABLET ORAL
Qty: 120 TABLET | Refills: 2 | Status: SHIPPED | OUTPATIENT
Start: 2021-07-30 | End: 2021-09-10 | Stop reason: SDUPTHER

## 2021-07-30 NOTE — PROGRESS NOTES
Neurology      Carlee Chin is a 53 y o  female  [id number]      Assessment/Recommendations:     Diagnoses and all orders for this visit:    Chronic migraine without aura without status migrainosus, not intractable  -     dexamethasone (DECADRON) 2 mg tablet; 1 tab qam with food prn migraine  -     TiZANidine (ZANAFLEX) 2 MG capsule; 1 tab qhs prn neck pain or headache  Hold flexeril   -     topiramate (TOPAMAX) 25 mg tablet; 1 tab q12 hours x 1 week, then 2 tabs q12 hours (with 100 mg q12 h), only for 3 months  -     ZOLMitriptan (ZOMIG-ZMT) 5 MG disintegrating tablet; TAKE 1 TABLET BY MOUTH AT ONSET OF HEADACHE, MAY REPEAT AFTER 2 HOURS AS NEEDED  MAX 2 TABLETS per 24 hours  -     Rimegepant Sulfate (NURTEC) 75 MG TBDP; 1 tab at migraine onset  No more than one dose per 24 hours  Hold triptan  Myofascial muscle pain  -     TiZANidine (ZANAFLEX) 2 MG capsule; 1 tab qhs prn neck pain or headache  Hold flexeril  Since I last saw her the patient felt that her migraine headaches were managed fairly well, but unfortunately now they are not  She believes the trigger was her second COVID vaccination  She did get partial relief with Decadron  I will prescribe Decadron again to break the cycle along with tizanidine at night  She will take tizanidine until the headache breaks, and then p r n  after that  She will temporarily increase the Topamax to 125 mg b i d  x1 week, then 150 mg b i d  for 3 months, then will wean back down to the original dose of 100 mg b i d   I did discuss the interaction with birth control  She understands that Topamax can decrease the effectiveness of birth control  Other side effects of all medications above reviewed  If headaches do not respond to any of the above interventions, she should call  Would plan to repeat blood work at that time, and/or repeat brain MRI      The patient should not hesitate to call me prior to her follow up with any questions or concerns  Chief Complaint:  No chief complaint on file  Worsening migraine headaches    Subjective:  HPI     Ms  Bon Garcia is a pleasant 52 yo female who is here for neurological follow-up for chronic migraine headaches  The patient states that she was doing very well since I last saw her in 2019  She got her second COVID shot (Albin Wade) on June 16th and a couple days later she developed a severe migraine headache  She feels like it is lasting "forever  "  She describes the headache in the bilateral frontotemporal regions, top of the head and sometimes neck tension  She also reports scalp tenderness  Current headache is 3 to 4/10 but can get up to 10/10  She has Decadron on July 2nd after she called and it helped partially after the 3rd to 4th day of taking it  She still has residual headaches and she feels like the cycle is not yet broken  She occasionally has nausea and light sensitivity  No vomiting currently  Her balance is okay, no focal or lateralizing deficits  She has difficulty falling asleep  She is always tossing and turning in her opinion  Sometimes she goes to bed with a headache which prevents sleep  She cannot think of any other triggers or reason for why they got worse, other than the COVID shot  Last brain MRI 10/28/2019 is unremarkable  Prior documentation:  She sees ophthalmologist- Dr Roseann Rodriguez (on [facility]) with normal exam      In the past, the patient had greater than 7 days of migraine relief from baseline, correlated with headache diary, decreased abortive medication use and decreased ER visits      She finds zomig helpful, as well as ibuprofen and excedrin migraine, however she is taking these quite a bit, almost QD  She also continues a combination of prevention meds: gabapentin 600 TID, effexor 150 mg qd and topamax 150 BID      Occur- daily  Last- several hours to entire day- usually worse from 3-5 pm but she does not know why, denies stress from her job  Severity- Average pain level 8-10/10  Location- bifrontal, b/l parietal region, will become diffuse/ global  Quality- throbbing/pounding, dull/nagging  Associated sxs: nausea  Aura- none   Trigger- sunlight, sometimes stress     Meds tried:  Preventative: Gabapentin, Venlafaxine, Lyrica, Magnesium, Vitmamin B2, verapamil, zoloft, olanzapine, topamax, botox, aimovig , Depakote, supplementation: Riboflavin, magnesium, Co Q10, feverfew, butterbur, B6, other or both supplements, aimovig  Abortive: Rizatriptan, Sumatriptan, buprofen, Toradol, Dilaudid, Fioricet, compazine, excedrin (daily use), zomig PO, decadron, depakote taper, DHE- ineff     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 (no family hx PE- hypercoagulable state as far as he is aware)  Maternal aunt with cerebral aneurysm noted at a young age    Patient Active Problem List   Diagnosis    Abnormal involuntary movements    Allergic rhinitis    Amenorrhea    Asthma    Chronic fatigue    Chronic GERD    Chronic migraine without aura    Esophageal reflux    KYLE (generalized anxiety disorder)    Goiter    Impulse control disorder    Major depressive disorder, recurrent, severe without psychotic features ([facility] )    Panic disorder without agoraphobia    Sciatica    IUD (intrauterine device) in place    Intervertebral disc disorder with radiculopathy of lumbar region    Other insomnia    Obsessive-compulsive disorder, unspecified    Headache, chronic migraine without aura, intractable    Status migrainosus    Class 1 obesity    Chronic left shoulder pain    Obstructive sleep apnea    Mixed hyperlipidemia    Cervical spine arthritis    History of pulmonary embolism    Chronic idiopathic urticaria    Vitamin D deficiency    Shellfish allergy    Gastroesophageal reflux disease with esophagitis    Hypotension    Hypothyroidism (acquired)    Migraine headache    Closed fracture of phalanx of foot    Low back pain    [facility]ality disorder [facility]    Long-term use of high-risk medication    Dysphasia    Seizure ([facility]    Weight gain    Severe persistent asthma without complication    Dysuria    Chronic migraine without aura without status migrainosus, not intractable    Myofascial muscle pain     Past Medical History:   Diagnosis Date    Amenorrhea     Anxiety     Arthritis     Asthma     Back pain     Chondromalacia of patella     Chronic fatigue     COPD (chronic obstructive pulmonary disease) ([facility]) Yes    Deep vein thrombophlebitis of leg ([facility])     Depression     Disease of thyroid gland     GERD (gastroesophageal reflux disease)     HPV (human papilloma virus) infection     Hypothyroidism     Lumbar radiculopathy     Migraine     Myofascial pain syndrome     Osteopenia     Piriformis syndrome     Sacroiliitis ([facility])     Sciatica     Seizure ([facility] )     Sleep apnea     Spondylosis of lumbar spine     Trochanteric bursitis of right hip     Vertigo     Vitamin D deficiency      Past Surgical History:   Procedure Laterality Date    CERVIX LESION DESTRUCTION       SECTION      COLONOSCOPY      COLPOSCOPY W/ BIOPSY / CURETTAGE      Colposcopy cervix with biopsy    LAPAROSCOPIC ENDOMETRIOSIS FULGURATION      LAPAROSCOPY      TOOTH EXTRACTION       Current Outpatient Medications on File Prior to Visit   Medication Sig Dispense Refill    albuterol (2 5 mg/3 mL) 0 083 % nebulizer solution Inhale      aluminum-magnesium hydroxide 200-200 MG/5ML suspension GAVISCON REGULAR STRENGTH AFTER MEALS and BEDTIME WHEN NOT FOLLOWING LPR/GERD DIET   355 mL 11    Aspirin-Acetaminophen-Caffeine (MIGRAINE FORMULA PO) Take by mouth      BREO ELLIPTA 200-25 MCG/INH inhaler       budesonide-formoterol (Symbicort) 160-4 5 mcg/act inhaler Inhale 2 puffs every 12 (twelve) hours      CVS INDOOR/OUTDOOR ALLERGY RLF 10 MG tablet Take 10 mg by mouth daily  10    cyclobenzaprine (FLEXERIL) 10 mg tablet Take 1 tablet (10 mg total) by mouth 3 (three) times a day as needed for muscle spasms 20 tablet 0    DULoxetine (CYMBALTA) 60 mg delayed release capsule Take 2 capsules (120 mg total) by mouth daily 60 capsule 2    EPINEPHrine (EPIPEN) 0 3 mg/0 3 mL SOAJ as directed      Erenumab-aooe (Aimovig) 140 MG/ML SOAJ Inject 1 mL under the skin every 30 (thirty) days 1 mL 11    gabapentin (NEURONTIN) 300 mg capsule 1 cap TID  90 capsule 5    ibuprofen (MOTRIN) 800 mg tablet Take 1 tablet (800 mg total) by mouth every 6 (six) hours as needed for mild pain 90 tablet 3    ketorolac (TORADOL) 10 mg tablet Take 1 tablet (10 mg total) by mouth every 6 (six) hours as needed (migraine) Max 2-3 per week   10 tablet 0    levonorgestrel (MIRENA, 52 MG,) 20 MCG/24HR IUD by Intrauterine route      levothyroxine (Synthroid) 125 mcg tablet Take by mouth Daily      misoprostol (CYTOTEC) 200 mcg tablet Two tablets PV 1-2 hours prior to IUD swap 2 tablet 0    mometasone (NASONEX) 50 mcg/act nasal spray       ondansetron (ZOFRAN-ODT) 4 mg disintegrating tablet Take 1 tablet (4 mg total) by mouth every 6 (six) hours as needed for nausea or vomiting 20 tablet 0    prochlorperazine (COMPAZINE) 10 mg tablet Take 1 tablet (10 mg total) by mouth every 6 (six) hours as needed for nausea or vomiting 30 tablet 0    QUEtiapine (SEROquel) 200 mg tablet Take 1 tablet (200 mg total) by mouth daily at bedtime 30 tablet 2    SPIRIVA RESPIMAT 2 5 MCG/ACT AERS       topiramate (TOPAMAX) 100 mg tablet Take 1 tablet (100 mg total) by mouth 2 (two) times a day 60 tablet 2    traZODone (DESYREL) 50 mg tablet Take 1-2 tablets ( mg total) by mouth daily at bedtime as needed for sleep 60 tablet 1    VITAMIN D PO Take 5,000 Units by mouth      divalproex sodium (DEPAKOTE ER) 250 mg 24 hr tablet Take 1 tablet (250 mg total) by mouth daily at bedtime Total Depakote ER dose is 750 mg at bedtime 30 tablet 3    divalproex sodium (DEPAKOTE ER) 500 mg 24 hr tablet Take 1 tablet (500 mg total) by mouth daily at bedtime 30 tablet 3    hydrOXYzine HCL (ATARAX) 50 mg tablet Take 1 tablet (50 mg total) by mouth 3 (three) times a day as needed for anxiety 90 tablet 3    naltrexone (REVIA) 50 mg tablet Take 1 tablet (50 mg total) by mouth daily 30 tablet 3    olopatadine (PATANOL) 0 1 % ophthalmic solution Administer 1 drop to both eyes 2 (two) times a day for 90 days 10 mL 11    omeprazole (PriLOSEC) 20 mg delayed release capsule Take 1 capsule (20 mg total) by mouth daily 30 capsule 11     No current facility-administered medications on file prior to visit  Allergies   Allergen Reactions    Nuts - Food Allergy      Other reaction(s): WALNUTS    Cephalexin     Erythromycin Diarrhea, GI Intolerance and Vomiting    Iodine - Food Allergy Hives    Other      adobo    Shellfish Allergy - Food Allergy     Shellfish-Derived Products - Food Allergy     Turmeric - Food Allergy Hives    Wellbutrin [Bupropion] Seizures    Zithromax [Azithromycin] Diarrhea     Can take name brand  Annotation - [id number]: can take brand name  Other reaction(s): Nausea/vomiting/diarrhea           ROS:  Review of Systems   Constitutional: Negative  Negative for appetite change and fever  HENT: Negative  Negative for hearing loss, tinnitus, trouble swallowing and voice change  Eyes: Negative  Negative for photophobia and pain  Respiratory: Negative  Negative for shortness of breath  Cardiovascular: Negative  Negative for palpitations  Gastrointestinal: Negative  Negative for nausea and vomiting  Endocrine: Negative  Negative for cold intolerance  Genitourinary: Negative  Negative for dysuria, frequency and urgency  Musculoskeletal: Negative  Negative for myalgias and neck pain  Skin: Negative  Negative for rash  Neurological: Negative    Negative for dizziness, tremors, seizures, syncope, facial asymmetry, speech difficulty, weakness, light-headedness, numbness and headaches  Hematological: Negative  Does not bruise/bleed easily  Psychiatric/Behavioral: Positive for sleep disturbance  Negative for confusion and hallucinations  The patient is nervous/anxious  Depression, anxiety           Objective:  /61 (BP Location: Right arm, Patient Position: Sitting, Cuff Size: Standard)   Pulse 85   Resp 18   Ht 5' 3" (1 6 m)   Wt 60 8 kg (134 lb)   BMI 23 74 kg/m²     Physical Exam    Neurological Exam  Vital signs reviewed  Well developed, well nourished  Head: Normocephalic, atraumatic  Neck: Neck flexors 5/5  CN 2-12: intact and symmetric, including EOMs which are normal b/l and PERRL  Fundi b/l are normal to crude ophthalmological examination  MSK: 5/5 t/o  ROM normal x all 4 extr  Reflexes: 2+ and symmetric in all 4 extr  Coordination: Nml x4 extr  Gait: Steady normal gait  The following portions of the patient's history were reviewed and updated as appropriate: allergies, current medications, family history, past medical history, social history and active problem list   Review of systems was reviewed and otherwise unremarkable from a neurological perspective  I have spent 30 minutes with the patient and her / daughter today in which greater than 50% of this time was spent in counseling and/or coordination of care regarding diagnoses, test results, impression, plan and potential medication side effects

## 2021-07-30 NOTE — PATIENT INSTRUCTIONS
Decadron AM  Tizanidine bedtime  If you feel headache is breaking, you can stop the tizanidine  Call me if headache persists after stopping decadron

## 2021-08-03 ENCOUNTER — OFFICE VISIT (OUTPATIENT)
Dept: OBGYN CLINIC | Facility: CLINIC | Age: 51
End: 2021-08-03
Payer: COMMERCIAL

## 2021-08-03 VITALS
WEIGHT: 134 LBS | BODY MASS INDEX: 24.66 KG/M2 | HEIGHT: 62 IN | SYSTOLIC BLOOD PRESSURE: 104 MMHG | DIASTOLIC BLOOD PRESSURE: 60 MMHG

## 2021-08-03 DIAGNOSIS — N91.2 AMENORRHEA: Primary | ICD-10-CM

## 2021-08-03 PROCEDURE — 3008F BODY MASS INDEX DOCD: CPT | Performed by: PHYSICIAN ASSISTANT

## 2021-08-03 PROCEDURE — 99213 OFFICE O/P EST LOW 20 MIN: CPT | Performed by: PHYSICIAN ASSISTANT

## 2021-08-03 NOTE — PROGRESS NOTES
Aleta Johan  1970      CC: IUD check    S: 53 y o  female here for IUD check  She is status post replacement of a Mirena IUD on 6/2/21  She has had no bleeding since placement  She has had no pain or other side effects  No LMP recorded  Patient has had an implant  Current Outpatient Medications:     aluminum-magnesium hydroxide 200-200 MG/5ML suspension, GAVISCON REGULAR STRENGTH AFTER MEALS and BEDTIME WHEN NOT FOLLOWING LPR/GERD DIET , Disp: 355 mL, Rfl: 11    Aspirin-Acetaminophen-Caffeine (MIGRAINE FORMULA PO), Take by mouth, Disp: , Rfl:     BREO ELLIPTA 200-25 MCG/INH inhaler, , Disp: , Rfl:     budesonide-formoterol (Symbicort) 160-4 5 mcg/act inhaler, Inhale 2 puffs every 12 (twelve) hours, Disp: , Rfl:     CVS INDOOR/OUTDOOR ALLERGY RLF 10 MG tablet, Take 10 mg by mouth daily, Disp: , Rfl: 10    cyclobenzaprine (FLEXERIL) 10 mg tablet, Take 1 tablet (10 mg total) by mouth 3 (three) times a day as needed for muscle spasms, Disp: 20 tablet, Rfl: 0    dexamethasone (DECADRON) 2 mg tablet, 1 tab qam with food prn migraine  , Disp: 5 tablet, Rfl: 0    DULoxetine (CYMBALTA) 60 mg delayed release capsule, Take 2 capsules (120 mg total) by mouth daily, Disp: 60 capsule, Rfl: 2    Erenumab-aooe (Aimovig) 140 MG/ML SOAJ, Inject 1 mL under the skin every 30 (thirty) days, Disp: 1 mL, Rfl: 11    gabapentin (NEURONTIN) 300 mg capsule, 1 cap TID , Disp: 90 capsule, Rfl: 5    ibuprofen (MOTRIN) 800 mg tablet, Take 1 tablet (800 mg total) by mouth every 6 (six) hours as needed for mild pain, Disp: 90 tablet, Rfl: 3    ketorolac (TORADOL) 10 mg tablet, Take 1 tablet (10 mg total) by mouth every 6 (six) hours as needed (migraine) Max 2-3 per week , Disp: 10 tablet, Rfl: 0    levonorgestrel (MIRENA, 52 MG,) 20 MCG/24HR IUD, by Intrauterine route, Disp: , Rfl:     levothyroxine (Synthroid) 125 mcg tablet, Take by mouth Daily, Disp: , Rfl:     mometasone (NASONEX) 50 mcg/act nasal spray, , Disp: , Rfl:     ondansetron (ZOFRAN-ODT) 4 mg disintegrating tablet, Take 1 tablet (4 mg total) by mouth every 6 (six) hours as needed for nausea or vomiting, Disp: 20 tablet, Rfl: 0    prochlorperazine (COMPAZINE) 10 mg tablet, Take 1 tablet (10 mg total) by mouth every 6 (six) hours as needed for nausea or vomiting, Disp: 30 tablet, Rfl: 0    QUEtiapine (SEROquel) 200 mg tablet, Take 1 tablet (200 mg total) by mouth daily at bedtime, Disp: 30 tablet, Rfl: 2    Rimegepant Sulfate (NURTEC) 75 MG TBDP, 1 tab at migraine onset  No more than one dose per 24 hours  Hold triptan , Disp: 8 tablet, Rfl: 0    SPIRIVA RESPIMAT 2 5 MCG/ACT AERS, , Disp: , Rfl:     TiZANidine (ZANAFLEX) 2 MG capsule, 1 tab qhs prn neck pain or headache  Hold flexeril , Disp: 30 capsule, Rfl: 0    topiramate (TOPAMAX) 100 mg tablet, Take 1 tablet (100 mg total) by mouth 2 (two) times a day, Disp: 60 tablet, Rfl: 2    topiramate (TOPAMAX) 25 mg tablet, 1 tab q12 hours x 1 week, then 2 tabs q12 hours (with 100 mg q12 h), only for 3 months, Disp: 120 tablet, Rfl: 2    traZODone (DESYREL) 50 mg tablet, Take 1-2 tablets ( mg total) by mouth daily at bedtime as needed for sleep, Disp: 60 tablet, Rfl: 1    VITAMIN D PO, Take 5,000 Units by mouth, Disp: , Rfl:     ZOLMitriptan (ZOMIG-ZMT) 5 MG disintegrating tablet, TAKE 1 TABLET BY MOUTH AT ONSET OF HEADACHE, MAY REPEAT AFTER 2 HOURS AS NEEDED  MAX 2 TABLETS per 24 hours  , Disp: 12 tablet, Rfl: 2    albuterol (2 5 mg/3 mL) 0 083 % nebulizer solution, Inhale (Patient not taking: Reported on 8/3/2021), Disp: , Rfl:     divalproex sodium (DEPAKOTE ER) 250 mg 24 hr tablet, Take 1 tablet (250 mg total) by mouth daily at bedtime Total Depakote ER dose is 750 mg at bedtime, Disp: 30 tablet, Rfl: 3    divalproex sodium (DEPAKOTE ER) 500 mg 24 hr tablet, Take 1 tablet (500 mg total) by mouth daily at bedtime, Disp: 30 tablet, Rfl: 3    EPINEPHrine (EPIPEN) 0 3 mg/0 3 mL SOAJ, as directed (Patient not taking: Reported on 8/3/2021), Disp: , Rfl:     hydrOXYzine HCL (ATARAX) 50 mg tablet, Take 1 tablet (50 mg total) by mouth 3 (three) times a day as needed for anxiety, Disp: 90 tablet, Rfl: 3    naltrexone (REVIA) 50 mg tablet, Take 1 tablet (50 mg total) by mouth daily, Disp: 30 tablet, Rfl: 3    olopatadine (PATANOL) 0 1 % ophthalmic solution, Administer 1 drop to both eyes 2 (two) times a day for 90 days, Disp: 10 mL, Rfl: 11    omeprazole (PriLOSEC) 20 mg delayed release capsule, Take 1 capsule (20 mg total) by mouth daily, Disp: 30 capsule, Rfl: 11  Social History     Socioeconomic History    Marital status: /Civil Union     Spouse name: Aly Han Number of children: 2    Years of education: 12    Highest education level: High school graduate   Occupational History    Occupation:    Tobacco Use    Smoking status: Never Smoker    Smokeless tobacco: Never Used   Vaping Use    Vaping Use: Never used   Substance and Sexual Activity    Alcohol use: Not Currently    Drug use: Not Currently    Sexual activity: Yes     Partners: Male     Birth control/protection: I U D       Comment: Mirena   Other Topics Concern    Not on file   Social History Narrative    Education: high school graduate    Learning Disabilities: none    Marital History:     Children: 2 daughters    Living Arrangement: lives in home with  and daughter    Occupational History: works as a  for [employer]; worked also as an  in the past    [address] [employer]: good support system    Legal History: none     History: None        Unsure of age of house    Heating system gas forced hot air    Central     Bedroom is carpeted    [address] [facility] in living [address] [facility] in living room    No basement    No dehumidifier,            Pets - 1 [place] [employer], [address]            Caffeine - none in beverages     Chocolate - 3 times a week Social Determinants of Health     Financial Resource Strain: Low Risk     Difficulty of Paying Living Expenses: Not very hard   Food Insecurity: No Food Insecurity    Worried About Running Out of Food in the Last Year: Never true    [place] of Food in the Last Year: Never true   Transportation Needs: No Transportation Needs    Lack of Transportation (Medical): No    Lack of Transportation (Non-Medical):  No   Physical Activity:     Days of Exercise per Week:     Minutes of Exercise per Session:    Stress:     Feeling of Stress :    Social Connections:     Frequency of Communication with Friends and Family:     Frequency of Social Gatherings with Friends and Family:     Attends Sikhism Services:     Active Member of Clubs or Organizations:     Attends Club or Organization Meetings:     Marital Status:    Intimate Partner Violence:     Fear of Current or Ex-Partner:     Emotionally Abused:     Physically Abused:     Sexually Abused:      Family History   Problem Relation Age of Onset    Heart disease Mother     Asthma Daughter     Lung cancer Paternal Grandfather     Asthma Daughter     Colon cancer Father     Colon cancer Maternal Grandfather     Prostate cancer Maternal Grandfather     Colon cancer Maternal Aunt     No Known Problems Maternal Grandmother     Diabetes Paternal Grandmother     No Known Problems Maternal Aunt     No Known Problems Maternal Aunt     No Known Problems Maternal Aunt     No Known Problems Paternal Aunt     Asthma Daughter     Psychiatric Illness Neg Hx     Stroke Neg Hx     Thyroid disease Neg Hx     Substance Abuse Neg Hx     Suicidality Neg Hx      Past Medical History:   Diagnosis Date    Amenorrhea     Anxiety     Arthritis     Asthma     Back pain     Chondromalacia of patella     Chronic fatigue     COPD [facility]) [facility] Yes    Deep vein thrombophlebitis of leg ([facility]     Depression     Disease of thyroid gland     GERD (gastroesophageal reflux disease)     HPV (human papilloma virus) infection     Hypothyroidism     Lumbar radiculopathy     Migraine     Myofascial pain syndrome     Osteopenia     Piriformis syndrome     Sacroiliitis (H[facility])     Sciatica     Seizure ([address] [facility]     Sleep apnea     Spondylosis of lumbar spine     Trochanteric bursitis of right hip     Vertigo     Vitamin D deficiency        Review of Systems   Respiratory: Negative  Cardiovascular: Negative  Gastrointestinal: Negative for constipation and diarrhea  Genitourinary: Negative for difficulty urinating, pelvic pain, vaginal bleeding, vaginal discharge, itching or odor  O:  Blood pressure 104/60, height 5' 2" (1 575 m), weight 60 8 kg (134 lb), not currently breastfeeding  Abdomen is soft and nontender  External genitals are normal without rashes or lesions  Vagina is normal without discharge or bleeding  Cervix is normal without discharge or lesion  IUD strings are normal      A:  IUD in place    P:  Patient was reassured that irregular bleeding is common for the first six months of IUD use and that this should slowly resolve over time  She will call with any persistently abnormal bleeding or other problems  She will return for her yearly exam as scheduled

## 2021-08-03 NOTE — PROGRESS NOTES
Here today for follow up string check after a new Mirena was exchanged on 6/2/2021  Has had no concerns with placement

## 2021-08-16 ENCOUNTER — TELEPHONE (OUTPATIENT)
Dept: NEUROLOGY | Facility: CLINIC | Age: 51
End: 2021-08-16

## 2021-08-16 DIAGNOSIS — G43.709 CHRONIC MIGRAINE WITHOUT AURA WITHOUT STATUS MIGRAINOSUS, NOT INTRACTABLE: ICD-10-CM

## 2021-08-16 NOTE — TELEPHONE ENCOUNTER
Received fax from [facility] requires PA  Gagnon [id number]    PA initiated   Awaiting determination

## 2021-08-17 NOTE — TELEPHONE ENCOUNTER
NOEL-  Nurte denied as patient is having greater than 15 migraine days a month    Can potentially send to [address] to fill for patient   Called patient to see if she is agreeable to this, left message for return call to office

## 2021-08-19 NOTE — TELEPHONE ENCOUNTER
Second attempt to reach patient  Left message for return call to office      Letter mailed to address on file
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
## 2021-08-24 ENCOUNTER — OFFICE VISIT (OUTPATIENT)
Dept: PSYCHIATRY | Facility: CLINIC | Age: 51
End: 2021-08-24
Payer: COMMERCIAL

## 2021-08-24 VITALS
BODY MASS INDEX: 23.57 KG/M2 | HEIGHT: 63 IN | HEART RATE: 72 BPM | SYSTOLIC BLOOD PRESSURE: 95 MMHG | DIASTOLIC BLOOD PRESSURE: 60 MMHG | WEIGHT: 133 LBS

## 2021-08-24 DIAGNOSIS — Z79.899 LONG-TERM USE OF HIGH-RISK MEDICATION: Chronic | ICD-10-CM

## 2021-08-24 DIAGNOSIS — F41.1 GAD (GENERALIZED ANXIETY DISORDER): Chronic | ICD-10-CM

## 2021-08-24 DIAGNOSIS — F41.0 PANIC DISORDER WITHOUT AGORAPHOBIA: Chronic | ICD-10-CM

## 2021-08-24 DIAGNOSIS — G47.09 OTHER INSOMNIA: Chronic | ICD-10-CM

## 2021-08-24 DIAGNOSIS — F60.9 PERSONALITY DISORDER (HCC): Chronic | ICD-10-CM

## 2021-08-24 DIAGNOSIS — F33.2 MAJOR DEPRESSIVE DISORDER, RECURRENT, SEVERE WITHOUT PSYCHOTIC FEATURES (HCC): Primary | Chronic | ICD-10-CM

## 2021-08-24 DIAGNOSIS — F42.9 OBSESSIVE-COMPULSIVE DISORDER, UNSPECIFIED TYPE: Chronic | ICD-10-CM

## 2021-08-24 DIAGNOSIS — F63.9 IMPULSE CONTROL DISORDER: Chronic | ICD-10-CM

## 2021-08-24 PROCEDURE — 3008F BODY MASS INDEX DOCD: CPT | Performed by: PSYCHIATRY & NEUROLOGY

## 2021-08-24 PROCEDURE — 1036F TOBACCO NON-USER: CPT | Performed by: PSYCHIATRY & NEUROLOGY

## 2021-08-24 PROCEDURE — 3725F SCREEN DEPRESSION PERFORMED: CPT | Performed by: PSYCHIATRY & NEUROLOGY

## 2021-08-24 PROCEDURE — 99214 OFFICE O/P EST MOD 30 MIN: CPT | Performed by: PSYCHIATRY & NEUROLOGY

## 2021-08-24 PROCEDURE — 90833 PSYTX W PT W E/M 30 MIN: CPT | Performed by: PSYCHIATRY & NEUROLOGY

## 2021-08-24 RX ORDER — NALTREXONE HYDROCHLORIDE 50 MG/1
50 TABLET, FILM COATED ORAL DAILY
Qty: 30 TABLET | Refills: 4 | Status: SHIPPED | OUTPATIENT
Start: 2021-08-24 | End: 2021-11-01 | Stop reason: SDUPTHER

## 2021-08-24 RX ORDER — HYDROXYZINE 50 MG/1
50 TABLET, FILM COATED ORAL 3 TIMES DAILY PRN
Qty: 90 TABLET | Refills: 4 | Status: SHIPPED | OUTPATIENT
Start: 2021-08-24 | End: 2021-11-01 | Stop reason: SDUPTHER

## 2021-08-24 RX ORDER — DULOXETIN HYDROCHLORIDE 60 MG/1
120 CAPSULE, DELAYED RELEASE ORAL DAILY
Qty: 60 CAPSULE | Refills: 4 | Status: SHIPPED | OUTPATIENT
Start: 2021-08-24 | End: 2021-11-01 | Stop reason: SDUPTHER

## 2021-08-24 RX ORDER — QUETIAPINE FUMARATE 200 MG/1
200 TABLET, FILM COATED ORAL
Qty: 30 TABLET | Refills: 4 | Status: SHIPPED | OUTPATIENT
Start: 2021-08-24 | End: 2021-11-01 | Stop reason: SDUPTHER

## 2021-08-24 RX ORDER — MIRTAZAPINE 7.5 MG/1
7.5 TABLET, FILM COATED ORAL
Qty: 30 TABLET | Refills: 4 | Status: SHIPPED | OUTPATIENT
Start: 2021-08-24 | End: 2021-11-01 | Stop reason: SDUPTHER

## 2021-08-24 RX ORDER — TRAZODONE HYDROCHLORIDE 50 MG/1
50-100 TABLET ORAL
Qty: 60 TABLET | Refills: 4 | Status: SHIPPED | OUTPATIENT
Start: 2021-08-24 | End: 2021-11-01 | Stop reason: SDUPTHER

## 2021-08-24 RX ORDER — DIVALPROEX SODIUM 500 MG/1
500 TABLET, EXTENDED RELEASE ORAL
Qty: 30 TABLET | Refills: 4 | Status: SHIPPED | OUTPATIENT
Start: 2021-08-24 | End: 2021-11-01 | Stop reason: SDDI

## 2021-08-24 RX ORDER — DIVALPROEX SODIUM 250 MG/1
250 TABLET, EXTENDED RELEASE ORAL
Qty: 30 TABLET | Refills: 4 | Status: SHIPPED | OUTPATIENT
Start: 2021-08-24 | End: 2021-11-01 | Stop reason: SDDI

## 2021-08-24 NOTE — BH TREATMENT PLAN
TREATMENT PLAN (Medication Management Only)        [facility]    Name/Date of Birth/MRN:  Ida Deal 53 y o  1970 MRN: [id number]  Date of Treatment Plan: August 24, 2021  Diagnosis/Diagnoses:   1  Major depressive disorder, recurrent, severe without psychotic features ([facility] )    2  KYLE (generalized anxiety disorder)    3  Panic disorder without agoraphobia    4  Obsessive-compulsive disorder, unspecified type    5  Impulse control disorder    6  Personality disorder ([facility] )    7  Other insomnia    8  Long-term use of high-risk medication      Strengths/Personal Resources for Self-Care: "I take all my medications"  Area/Areas of need (in own words): "getting out of my depression and my anxiety"  1  Long Term Goal:   alleviate anxiety, improve control of depression  Target Date: 2 months - 10/24/2021  Person/Persons responsible for completion of goal: [facility]  2  Short Term Objective (s) - How will we reach this goal?:   A  Provider new recommended medication/dosage changes and/or continue medication(s): discontinue Melatonin, start Remeron, continue all other medications (Cymbalta, Trazodone, Depakote ER, Topamax, Seroquel, Atarax and Naltrexone)  B   N/A   C   N/A  Target Date: 2 months - 10/24/2021  Person/Persons Responsible for Completion of Goal: [facility]   Progress Towards Goals: limited progress  Treatment Modality: medication management every 2 months, continue psychotherapy with own therapist  Review due 180 days from date of this plan: 6 months - 2/24/2022  Expected length of service: ongoing treatment unless revised  My Physician/PA/NP and I have developed this plan together and I agree to work on the goals and objectives  I understand the treatment goals that were developed for my treatment    Electronic Signatures: on file (unless signed below)    Rafa Salazar MD 08/24/21

## 2021-09-10 DIAGNOSIS — F41.1 GAD (GENERALIZED ANXIETY DISORDER): Chronic | ICD-10-CM

## 2021-09-10 DIAGNOSIS — G43.709 CHRONIC MIGRAINE WITHOUT AURA: ICD-10-CM

## 2021-09-10 DIAGNOSIS — G43.709 CHRONIC MIGRAINE WITHOUT AURA WITHOUT STATUS MIGRAINOSUS, NOT INTRACTABLE: ICD-10-CM

## 2021-09-10 RX ORDER — GABAPENTIN 300 MG/1
CAPSULE ORAL
Qty: 90 CAPSULE | Refills: 5 | Status: SHIPPED | OUTPATIENT
Start: 2021-09-10 | End: 2021-10-07

## 2021-09-10 RX ORDER — KETOROLAC TROMETHAMINE 10 MG/1
10 TABLET, FILM COATED ORAL EVERY 6 HOURS PRN
Qty: 10 TABLET | Refills: 0 | Status: SHIPPED | OUTPATIENT
Start: 2021-09-10 | End: 2021-12-17 | Stop reason: SDUPTHER

## 2021-09-10 RX ORDER — TOPIRAMATE 50 MG/1
TABLET, FILM COATED ORAL
Qty: 180 TABLET | Refills: 1 | Status: SHIPPED | OUTPATIENT
Start: 2021-09-10 | End: 2021-11-01 | Stop reason: SDUPTHER

## 2021-09-10 NOTE — TELEPHONE ENCOUNTER
Patient calling in for refills of toradol and gabapentin  Patient also states she received a letter in the mail from our office stating we were trying to reach her  Made her aware we were trying to reach her to inform her that Nurtec was denied by insurance and that we can potentially have it filled with [address] [place] [address]   Patient is agreeable to this  Patient also mentioned that since the patient is being prescribed topamax 25 mg from our office, she would like our provider to take over her prescription for topamax 50 mg BID   Patient does also need a refill of this     [address]- please send Nurtec to [address] [place] [address]  and if agreeable, send in refill of topamax 50 mg BID    toradol and gabapentin pended below

## 2021-09-16 NOTE — TELEPHONE ENCOUNTER
Received fax from [facility] requesting Nurtec PA  Chart reviewed: Nurtec PA has been denied  See enc 8/16/21  Denial letter faxed to [facility] at [phone], spoke w/Karlee and advised of the above  She verbalized understanding  Once they have the denial on file  Pt will be able to get Nurtec for free until the end of this year

## 2021-10-27 DIAGNOSIS — G43.709 CHRONIC MIGRAINE WITHOUT AURA WITHOUT STATUS MIGRAINOSUS, NOT INTRACTABLE: ICD-10-CM

## 2021-10-27 RX ORDER — ZOLMITRIPTAN 5 MG/1
TABLET, ORALLY DISINTEGRATING ORAL
Qty: 12 TABLET | Refills: 2 | Status: SHIPPED | OUTPATIENT
Start: 2021-10-27 | End: 2022-04-06 | Stop reason: SDUPTHER

## 2021-10-27 RX ORDER — ONDANSETRON 4 MG/1
4 TABLET, ORALLY DISINTEGRATING ORAL EVERY 6 HOURS PRN
Qty: 20 TABLET | Refills: 0 | Status: SHIPPED | OUTPATIENT
Start: 2021-10-27 | End: 2022-04-06 | Stop reason: SDUPTHER

## 2021-11-01 ENCOUNTER — OFFICE VISIT (OUTPATIENT)
Dept: PSYCHIATRY | Facility: CLINIC | Age: 51
End: 2021-11-01
Payer: COMMERCIAL

## 2021-11-01 VITALS
WEIGHT: 138 LBS | DIASTOLIC BLOOD PRESSURE: 64 MMHG | HEART RATE: 99 BPM | BODY MASS INDEX: 24.45 KG/M2 | HEIGHT: 63 IN | SYSTOLIC BLOOD PRESSURE: 99 MMHG

## 2021-11-01 DIAGNOSIS — G47.09 OTHER INSOMNIA: Chronic | ICD-10-CM

## 2021-11-01 DIAGNOSIS — F33.2 MAJOR DEPRESSIVE DISORDER, RECURRENT, SEVERE WITHOUT PSYCHOTIC FEATURES (HCC): Primary | Chronic | ICD-10-CM

## 2021-11-01 DIAGNOSIS — F42.9 OBSESSIVE-COMPULSIVE DISORDER, UNSPECIFIED TYPE: Chronic | ICD-10-CM

## 2021-11-01 DIAGNOSIS — F41.1 GAD (GENERALIZED ANXIETY DISORDER): Chronic | ICD-10-CM

## 2021-11-01 DIAGNOSIS — Z79.899 LONG-TERM USE OF HIGH-RISK MEDICATION: Chronic | ICD-10-CM

## 2021-11-01 DIAGNOSIS — F60.9 PERSONALITY DISORDER (HCC): Chronic | ICD-10-CM

## 2021-11-01 DIAGNOSIS — F63.9 IMPULSE CONTROL DISORDER: Chronic | ICD-10-CM

## 2021-11-01 DIAGNOSIS — F41.0 PANIC DISORDER WITHOUT AGORAPHOBIA: Chronic | ICD-10-CM

## 2021-11-01 PROCEDURE — 1036F TOBACCO NON-USER: CPT | Performed by: PSYCHIATRY & NEUROLOGY

## 2021-11-01 PROCEDURE — 99214 OFFICE O/P EST MOD 30 MIN: CPT | Performed by: PSYCHIATRY & NEUROLOGY

## 2021-11-01 PROCEDURE — 3725F SCREEN DEPRESSION PERFORMED: CPT | Performed by: PSYCHIATRY & NEUROLOGY

## 2021-11-01 PROCEDURE — 3008F BODY MASS INDEX DOCD: CPT | Performed by: PSYCHIATRY & NEUROLOGY

## 2021-11-01 PROCEDURE — 90833 PSYTX W PT W E/M 30 MIN: CPT | Performed by: PSYCHIATRY & NEUROLOGY

## 2021-11-01 RX ORDER — DULOXETIN HYDROCHLORIDE 60 MG/1
120 CAPSULE, DELAYED RELEASE ORAL DAILY
Qty: 60 CAPSULE | Refills: 4 | Status: SHIPPED | OUTPATIENT
Start: 2021-11-01 | End: 2022-04-21

## 2021-11-01 RX ORDER — HYDROXYZINE 50 MG/1
50 TABLET, FILM COATED ORAL 3 TIMES DAILY PRN
Qty: 90 TABLET | Refills: 4 | Status: SHIPPED | OUTPATIENT
Start: 2021-11-01 | End: 2022-04-27 | Stop reason: SDUPTHER

## 2021-11-01 RX ORDER — TRAZODONE HYDROCHLORIDE 50 MG/1
50-100 TABLET ORAL
Qty: 60 TABLET | Refills: 4 | Status: SHIPPED | OUTPATIENT
Start: 2021-11-01 | End: 2022-04-14

## 2021-11-01 RX ORDER — TOPIRAMATE 100 MG/1
100 TABLET, FILM COATED ORAL 2 TIMES DAILY
Qty: 60 TABLET | Refills: 4 | Status: SHIPPED | OUTPATIENT
Start: 2021-11-01 | End: 2022-04-04 | Stop reason: SDUPTHER

## 2021-11-01 RX ORDER — NALTREXONE HYDROCHLORIDE 50 MG/1
50 TABLET, FILM COATED ORAL DAILY
Qty: 30 TABLET | Refills: 4 | Status: SHIPPED | OUTPATIENT
Start: 2021-11-01 | End: 2022-04-27 | Stop reason: SDUPTHER

## 2021-11-01 RX ORDER — MIRTAZAPINE 15 MG/1
15 TABLET, FILM COATED ORAL
Qty: 30 TABLET | Refills: 4 | Status: SHIPPED | OUTPATIENT
Start: 2021-11-01 | End: 2022-04-27 | Stop reason: ALTCHOICE

## 2021-11-01 RX ORDER — QUETIAPINE FUMARATE 200 MG/1
200 TABLET, FILM COATED ORAL
Qty: 30 TABLET | Refills: 4 | Status: SHIPPED | OUTPATIENT
Start: 2021-11-01 | End: 2022-04-15

## 2021-11-23 ENCOUNTER — LAB (OUTPATIENT)
Dept: LAB | Age: 51
End: 2021-11-23
Payer: COMMERCIAL

## 2021-11-23 ENCOUNTER — TELEPHONE (OUTPATIENT)
Dept: OBGYN CLINIC | Facility: CLINIC | Age: 51
End: 2021-11-23

## 2021-11-23 DIAGNOSIS — Z79.899 LONG-TERM USE OF HIGH-RISK MEDICATION: Chronic | ICD-10-CM

## 2021-11-23 DIAGNOSIS — F33.2 MAJOR DEPRESSIVE DISORDER, RECURRENT, SEVERE WITHOUT PSYCHOTIC FEATURES (HCC): Chronic | ICD-10-CM

## 2021-11-23 DIAGNOSIS — R79.89 ELEVATED TSH: ICD-10-CM

## 2021-11-23 LAB
ALBUMIN SERPL BCP-MCNC: 4.1 G/DL (ref 3.5–5)
ALP SERPL-CCNC: 74 U/L (ref 46–116)
ALT SERPL W P-5'-P-CCNC: 17 U/L (ref 12–78)
ANION GAP SERPL CALCULATED.3IONS-SCNC: 5 MMOL/L (ref 4–13)
AST SERPL W P-5'-P-CCNC: 12 U/L (ref 5–45)
BASOPHILS # BLD AUTO: 0.03 THOUSANDS/ΜL (ref 0–0.1)
BASOPHILS NFR BLD AUTO: 1 % (ref 0–1)
BILIRUB SERPL-MCNC: 0.39 MG/DL (ref 0.2–1)
BUN SERPL-MCNC: 22 MG/DL (ref 5–25)
CALCIUM SERPL-MCNC: 10.6 MG/DL (ref 8.3–10.1)
CHLORIDE SERPL-SCNC: 109 MMOL/L (ref 100–108)
CHOLEST SERPL-MCNC: 240 MG/DL
CO2 SERPL-SCNC: 29 MMOL/L (ref 21–32)
CREAT SERPL-MCNC: 1.04 MG/DL (ref 0.6–1.3)
EOSINOPHIL # BLD AUTO: 0.03 THOUSAND/ΜL (ref 0–0.61)
EOSINOPHIL NFR BLD AUTO: 1 % (ref 0–6)
ERYTHROCYTE [DISTWIDTH] IN BLOOD BY AUTOMATED COUNT: 11.9 % (ref 11.6–15.1)
EST. AVERAGE GLUCOSE BLD GHB EST-MCNC: 108 MG/DL
GFR SERPL CREATININE-BSD FRML MDRD: 63 ML/MIN/1.73SQ M
GLUCOSE P FAST SERPL-MCNC: 84 MG/DL (ref 65–99)
HBA1C MFR BLD: 5.4 %
HCT VFR BLD AUTO: 38.4 % (ref 34.8–46.1)
HDLC SERPL-MCNC: 63 MG/DL
HGB BLD-MCNC: 12.3 G/DL (ref 11.5–15.4)
IMM GRANULOCYTES # BLD AUTO: 0.01 THOUSAND/UL (ref 0–0.2)
IMM GRANULOCYTES NFR BLD AUTO: 0 % (ref 0–2)
LDLC SERPL CALC-MCNC: 158 MG/DL (ref 0–100)
LYMPHOCYTES # BLD AUTO: 1.65 THOUSANDS/ΜL (ref 0.6–4.47)
LYMPHOCYTES NFR BLD AUTO: 27 % (ref 14–44)
MCH RBC QN AUTO: 29.6 PG (ref 26.8–34.3)
MCHC RBC AUTO-ENTMCNC: 32 G/DL (ref 31.4–37.4)
MCV RBC AUTO: 93 FL (ref 82–98)
MONOCYTES # BLD AUTO: 0.6 THOUSAND/ΜL (ref 0.17–1.22)
MONOCYTES NFR BLD AUTO: 10 % (ref 4–12)
NEUTROPHILS # BLD AUTO: 3.82 THOUSANDS/ΜL (ref 1.85–7.62)
NEUTS SEG NFR BLD AUTO: 61 % (ref 43–75)
NONHDLC SERPL-MCNC: 177 MG/DL
NRBC BLD AUTO-RTO: 0 /100 WBCS
PLATELET # BLD AUTO: 227 THOUSANDS/UL (ref 149–390)
PMV BLD AUTO: 9.6 FL (ref 8.9–12.7)
POTASSIUM SERPL-SCNC: 4.4 MMOL/L (ref 3.5–5.3)
PROT SERPL-MCNC: 7.1 G/DL (ref 6.4–8.2)
RBC # BLD AUTO: 4.15 MILLION/UL (ref 3.81–5.12)
SODIUM SERPL-SCNC: 143 MMOL/L (ref 136–145)
T3FREE SERPL-MCNC: 2.46 PG/ML (ref 2.3–4.2)
T4 FREE SERPL-MCNC: 0.76 NG/DL (ref 0.76–1.46)
TRIGL SERPL-MCNC: 93 MG/DL
TSH SERPL DL<=0.05 MIU/L-ACNC: 7.11 UIU/ML (ref 0.36–3.74)
WBC # BLD AUTO: 6.14 THOUSAND/UL (ref 4.31–10.16)

## 2021-11-23 PROCEDURE — 84439 ASSAY OF FREE THYROXINE: CPT

## 2021-11-23 PROCEDURE — 80061 LIPID PANEL: CPT

## 2021-11-23 PROCEDURE — 36415 COLL VENOUS BLD VENIPUNCTURE: CPT

## 2021-11-23 PROCEDURE — 86800 THYROGLOBULIN ANTIBODY: CPT

## 2021-11-23 PROCEDURE — 80053 COMPREHEN METABOLIC PANEL: CPT

## 2021-11-23 PROCEDURE — 84443 ASSAY THYROID STIM HORMONE: CPT

## 2021-11-23 PROCEDURE — 83036 HEMOGLOBIN GLYCOSYLATED A1C: CPT

## 2021-11-23 PROCEDURE — 84481 FREE ASSAY (FT-3): CPT

## 2021-11-23 PROCEDURE — 86376 MICROSOMAL ANTIBODY EACH: CPT

## 2021-11-23 PROCEDURE — 85025 COMPLETE CBC W/AUTO DIFF WBC: CPT

## 2021-11-24 LAB
THYROGLOB AB SERPL-ACNC: 4.5 IU/ML (ref 0–0.9)
THYROPEROXIDASE AB SERPL-ACNC: 348 IU/ML (ref 0–34)

## 2021-12-01 NOTE — TELEPHONE ENCOUNTER
Date of Service: 12/1/2021     YOB: 1973    CHIEF COMPLAINT:    Chief Complaint   Patient presents with   • Follow-up     discuss pain, and fatigue         HISTORY OF PRESENT ILLNESS:   This is a 48 year old year old patient who presents today for a follow up evaluation. Since their last office visit, she has not been doing well. She increased the Humira to once a week for three weeks. She states that everytime she has tried this, her gums act up- gets very painful. She backed off to every other week, but the gums were still pretty relentless. So she stopped it after that. Been about 5 weeks since she has had it. Been taking 2 tablets of the methylprednisolone 8mg which does help with the pain and fatigue. She states that the Humira did help with pain and stiffness. Hard to know if it helped with fatigue because she really couldn't be consistent. She continues to have pain and fatigue.   Jacinda describes the pain as deep achy and patient rates the pain at 6/7 out of 10. Sitting for longer periods of time make the pain worse.      HISTORIES:  I have reviewed the patient's medications and allergies, past medical, surgical, social and family history, updating these as appropriate.  See Histories section of the Electronic Medical Record for a display of this information.    REVIEW OF SYSTEMS:   As per the History of Present Illness.     PHYSICAL EXAMINATION:   Vitals:    12/01/21 1234   BP: 116/78   Pulse: (!) 109   Temp: 99.2 °F (37.3 °C)        GENERAL: Well dressed and well developed.   HEENT: Normocephalic, atraumatic. Normal appearing sclerae and conjunctivae. Pupils equal, round, reactive to light and accommodation.   EXTREMITIES: No edema, cyanosis, or clubbing.   SKIN: No rashes, digital ulcers, periungual erythema, nail pitting, nodules, or sclerodactyly.   MUSCULOSKELETAL:  Full range of motion of the neck; full range of motion of the bilateral shoulders, bilateral elbows, and bilateral  MD Centeno wrists. No nodules, synovitis, or nail changes.  Full range of motion of the bilateral hips, bilateral knees, and bilateral ankles.  There are no signs of synovitis, effusions, or Baker's cyst.   SPINE: No tenderness, normal range of motion.   NEUROLOGICAL: Sensation intact. Cranial nerves 2-12 intact.   PSYCHOLOGICAL: Alert and oriented x3. Mood and affect are normal.     Orders Placed This Encounter   • C Reactive Protein   • CBC with Automated Differential   • Creatinine   • Sedimentation Rate Westergren   • Hepatic Function Panel   • Vitamin D -25 Hydroxy   • PREAUTHORIZATION [facility]   • Barberry-Oreg Grape-Goldenseal (BERBERINE COMPLEX PO)   • UNKNOWN TO PATIENT   • HYDROcodone-acetaminophen (NORCO) 5-325 MG per tablet        ASSESSMENT/PLAN:     1. Ankylosing Spondylitis: The Humira gives her extreme gum sensitivity. We discussed various treatment options. I will start Enbrel once weekly. For now, I will continue the methylprednisolone 8mg daily. Once she starts the Enbrel, I will decrease this to 4mg and then hopefully stop.     2. Insomnia/Fatigue:  I will obtain a Vitamin D Level today and treat accordingly.     3. Chronic Pain: I will refill Hydrocodone for her today.     4. Monitoring Medications: I will obtain a CBC, Creatinine, Liver Panel, C Reactive Protein and Sedimentation Rate. She gets her labs at [facility] and was given the order today.       5. I reviewed the side effects of all medications prescribed by me as listed in the physician's desk reference and in the package inserts.  Other reasonable and generally accepted treatment options, including the option to do nothing at all, were discussed. The patient was instructed by me to contact our office if the symptoms have not improved, worsened, or if there are any issues/concerns. The patient will return to clinic in [place] in about 4 months (around 4/1/2022). The supervising physician for this visit is Dr. Montenegro  Wells.

## 2021-12-16 DIAGNOSIS — G43.709 CHRONIC MIGRAINE WITHOUT AURA WITHOUT STATUS MIGRAINOSUS, NOT INTRACTABLE: ICD-10-CM

## 2021-12-16 DIAGNOSIS — G43.709 CHRONIC MIGRAINE WITHOUT AURA: ICD-10-CM

## 2021-12-16 DIAGNOSIS — F41.1 GAD (GENERALIZED ANXIETY DISORDER): Chronic | ICD-10-CM

## 2021-12-17 RX ORDER — KETOROLAC TROMETHAMINE 10 MG/1
10 TABLET, FILM COATED ORAL EVERY 6 HOURS PRN
Qty: 10 TABLET | Refills: 0 | Status: SHIPPED | OUTPATIENT
Start: 2021-12-17 | End: 2022-01-21 | Stop reason: SDUPTHER

## 2021-12-17 RX ORDER — GABAPENTIN 300 MG/1
CAPSULE ORAL
Qty: 360 CAPSULE | Refills: 1 | Status: SHIPPED | OUTPATIENT
Start: 2021-12-17 | End: 2022-05-18 | Stop reason: SDUPTHER

## 2021-12-24 ENCOUNTER — OFFICE VISIT (OUTPATIENT)
Dept: URGENT CARE | Age: 51
End: 2021-12-24
Payer: COMMERCIAL

## 2021-12-24 VITALS — RESPIRATION RATE: 20 BRPM | TEMPERATURE: 98 F | OXYGEN SATURATION: 98 % | HEART RATE: 82 BPM

## 2021-12-24 DIAGNOSIS — J06.9 ACUTE URI: Primary | ICD-10-CM

## 2021-12-24 PROCEDURE — U0005 INFEC AGEN DETEC AMPLI PROBE: HCPCS | Performed by: PREVENTIVE MEDICINE

## 2021-12-24 PROCEDURE — U0003 INFECTIOUS AGENT DETECTION BY NUCLEIC ACID (DNA OR RNA); SEVERE ACUTE RESPIRATORY SYNDROME CORONAVIRUS 2 (SARS-COV-2) (CORONAVIRUS DISEASE [COVID-19]), AMPLIFIED PROBE TECHNIQUE, MAKING USE OF HIGH THROUGHPUT TECHNOLOGIES AS DESCRIBED BY CMS-2020-01-R: HCPCS | Performed by: PREVENTIVE MEDICINE

## 2021-12-24 PROCEDURE — 99213 OFFICE O/P EST LOW 20 MIN: CPT | Performed by: PREVENTIVE MEDICINE

## 2021-12-24 RX ORDER — AMOXICILLIN AND CLAVULANATE POTASSIUM 875; 125 MG/1; MG/1
1 TABLET, FILM COATED ORAL EVERY 12 HOURS SCHEDULED
Qty: 14 TABLET | Refills: 0 | Status: SHIPPED | OUTPATIENT
Start: 2021-12-24 | End: 2021-12-31

## 2021-12-24 RX ORDER — ALBUTEROL SULFATE 90 UG/1
2 AEROSOL, METERED RESPIRATORY (INHALATION) EVERY 6 HOURS PRN
Qty: 18 G | Refills: 0 | Status: SHIPPED | OUTPATIENT
Start: 2021-12-24 | End: 2022-01-05 | Stop reason: SDUPTHER

## 2021-12-27 ENCOUNTER — TELEPHONE (OUTPATIENT)
Dept: URGENT CARE | Facility: CLINIC | Age: 51
End: 2021-12-27

## 2021-12-28 ENCOUNTER — TELEMEDICINE (OUTPATIENT)
Dept: INTERNAL MEDICINE CLINIC | Facility: CLINIC | Age: 51
End: 2021-12-28
Payer: COMMERCIAL

## 2021-12-28 VITALS — TEMPERATURE: 97.8 F

## 2021-12-28 DIAGNOSIS — U07.1 COVID-19: Primary | ICD-10-CM

## 2021-12-28 PROCEDURE — 99214 OFFICE O/P EST MOD 30 MIN: CPT | Performed by: INTERNAL MEDICINE

## 2022-01-05 ENCOUNTER — TELEPHONE (OUTPATIENT)
Dept: PSYCHIATRY | Facility: CLINIC | Age: 52
End: 2022-01-05

## 2022-01-05 NOTE — TELEPHONE ENCOUNTER
LVM for the patient informing that her appt on 3/28/2022 will be canceled and need to be r/s due to the provider being out and to call back to r/s appt

## 2022-01-21 DIAGNOSIS — G43.709 CHRONIC MIGRAINE WITHOUT AURA WITHOUT STATUS MIGRAINOSUS, NOT INTRACTABLE: ICD-10-CM

## 2022-01-21 RX ORDER — KETOROLAC TROMETHAMINE 10 MG/1
10 TABLET, FILM COATED ORAL EVERY 6 HOURS PRN
Qty: 10 TABLET | Refills: 0 | Status: SHIPPED | OUTPATIENT
Start: 2022-01-21 | End: 2022-03-10 | Stop reason: SDUPTHER

## 2022-01-21 NOTE — TELEPHONE ENCOUNTER
Patient calling in  She states that over [place] she had COVID and since then her migraines have been much more frequent  Patient has used her Toradol and zolmitriptan without much relief  Patient is currently on prednisone taper as well for her asthma  Patient has tried Nurtec in the past and was not helpful  Do you think Akilah Bruno would be beneficial?     Patient seeking additional recommendations on medications to use for her migraines right now   She would also like refill of toradol which I have pended below     Please advise

## 2022-01-24 NOTE — TELEPHONE ENCOUNTER
Called pt and left detailed message regarding Toradol, and to call our office back to update us on Migraine to see if eCsar Rivas is needed

## 2022-01-31 ENCOUNTER — IMMUNIZATIONS (OUTPATIENT)
Dept: FAMILY MEDICINE CLINIC | Facility: HOSPITAL | Age: 52
End: 2022-01-31

## 2022-01-31 DIAGNOSIS — Z23 ENCOUNTER FOR IMMUNIZATION: Primary | ICD-10-CM

## 2022-01-31 PROCEDURE — 91300 COVID-19 PFIZER VACC 0.3 ML: CPT

## 2022-01-31 PROCEDURE — 0001A COVID-19 PFIZER VACC 0.3 ML: CPT

## 2022-03-10 DIAGNOSIS — G43.709 CHRONIC MIGRAINE WITHOUT AURA WITHOUT STATUS MIGRAINOSUS, NOT INTRACTABLE: ICD-10-CM

## 2022-03-11 RX ORDER — KETOROLAC TROMETHAMINE 10 MG/1
10 TABLET, FILM COATED ORAL EVERY 6 HOURS PRN
Qty: 10 TABLET | Refills: 0 | Status: SHIPPED | OUTPATIENT
Start: 2022-03-11 | End: 2022-04-06 | Stop reason: SDUPTHER

## 2022-03-25 ENCOUNTER — TELEPHONE (OUTPATIENT)
Dept: PSYCHIATRY | Facility: CLINIC | Age: 52
End: 2022-03-25
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
## 2022-03-25 NOTE — TELEPHONE ENCOUNTER
I would recommend referral for psychotherapy  She can also call frequently for cancellations for sooner medication management appointment - I cannot adjust her medications over the phone  We should place Taryn Dominguez on waiting list as well  Also, if she is willing to consider PHP, I can send a referral to PHP   Please let her know

## 2022-03-29 NOTE — TELEPHONE ENCOUNTER
Patient called the office inquiring about previous messages that were sent to the provider  Provided the information from the provider  Patient stated she was not interested in [facility], but that she would take the sooner appt and also would like to be added to the wait list  Patient also agreed to be added to the therapy wait list   Writer informed patient that her information will be sent to the intake department to be added to the therapy wait list and someone will be in contact once something was available

## 2022-03-30 ENCOUNTER — TELEPHONE (OUTPATIENT)
Dept: PSYCHIATRY | Facility: CLINIC | Age: 52
End: 2022-03-30

## 2022-04-04 ENCOUNTER — OFFICE VISIT (OUTPATIENT)
Dept: NEUROLOGY | Facility: CLINIC | Age: 52
End: 2022-04-04
Payer: COMMERCIAL

## 2022-04-04 VITALS
SYSTOLIC BLOOD PRESSURE: 138 MMHG | HEART RATE: 78 BPM | BODY MASS INDEX: 26.13 KG/M2 | DIASTOLIC BLOOD PRESSURE: 67 MMHG | WEIGHT: 147.5 LBS

## 2022-04-04 DIAGNOSIS — M79.18 MYOFASCIAL MUSCLE PAIN: ICD-10-CM

## 2022-04-04 DIAGNOSIS — G43.709 CHRONIC MIGRAINE WITHOUT AURA WITHOUT STATUS MIGRAINOSUS, NOT INTRACTABLE: Primary | ICD-10-CM

## 2022-04-04 PROCEDURE — 99214 OFFICE O/P EST MOD 30 MIN: CPT | Performed by: PHYSICIAN ASSISTANT

## 2022-04-04 PROCEDURE — 96372 THER/PROPH/DIAG INJ SC/IM: CPT | Performed by: PHYSICIAN ASSISTANT

## 2022-04-04 RX ORDER — TOPIRAMATE 100 MG/1
TABLET, FILM COATED ORAL
Qty: 90 TABLET | Refills: 2 | Status: SHIPPED | OUTPATIENT
Start: 2022-04-04

## 2022-04-04 RX ORDER — KETOROLAC TROMETHAMINE 30 MG/ML
60 INJECTION, SOLUTION INTRAMUSCULAR; INTRAVENOUS ONCE
Status: DISCONTINUED | OUTPATIENT
Start: 2022-04-04 | End: 2022-04-04

## 2022-04-04 RX ORDER — DEXAMETHASONE 2 MG/1
TABLET ORAL
Qty: 5 TABLET | Refills: 0 | Status: SHIPPED | OUTPATIENT
Start: 2022-04-04

## 2022-04-04 RX ORDER — PROCHLORPERAZINE EDISYLATE 5 MG/ML
10 INJECTION INTRAMUSCULAR; INTRAVENOUS ONCE
Status: COMPLETED | OUTPATIENT
Start: 2022-04-04 | End: 2022-04-04

## 2022-04-04 RX ORDER — KETOROLAC TROMETHAMINE 30 MG/ML
60 INJECTION, SOLUTION INTRAMUSCULAR; INTRAVENOUS ONCE
Status: COMPLETED | OUTPATIENT
Start: 2022-04-04 | End: 2022-04-04

## 2022-04-04 RX ADMIN — PROCHLORPERAZINE EDISYLATE 10 MG: 5 INJECTION INTRAMUSCULAR; INTRAVENOUS at 16:09

## 2022-04-04 RX ADMIN — KETOROLAC TROMETHAMINE 60 MG: 30 INJECTION, SOLUTION INTRAMUSCULAR; INTRAVENOUS at 16:11
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
## 2022-04-04 NOTE — PATIENT INSTRUCTIONS
Go up to to 100/150 mg qhs topamax x 1 week, then 150 mg BID, try increased dose of topamax for 3-4 weeks and then contact me for Vyepti infusion as alternative    Qulipta- alternative

## 2022-04-06 ENCOUNTER — TELEPHONE (OUTPATIENT)
Dept: NEUROLOGY | Facility: CLINIC | Age: 52
End: 2022-04-06

## 2022-04-06 NOTE — TELEPHONE ENCOUNTER
Called pt to see if she would be interested In switching her 10/11/2022 appointment to 3:00 instead of 3:45  Told pt to call back if agreeable 
Aortic anomaly

## 2022-04-08 ENCOUNTER — TELEPHONE (OUTPATIENT)
Dept: NEUROLOGY | Facility: CLINIC | Age: 52
End: 2022-04-08

## 2022-04-08 NOTE — TELEPHONE ENCOUNTER
Pt calling in with a question  Pt states her headache is already starting this morning and was going to take regimen you prescribed for her  Pt is asking if Gabapentin has side effects of headaches and could the medication be her trigger? Pt states she has been on Gabapentin for years  Currently taking:   Gabapentin 300 mg capsules 1 cap in the AM, 1 cap at Noon, and 2 caps at HS  Pt says her headaches typically come on in the afternoon @ 2 to 3 pm, with the exception of this morning  Please advise      CB# [phone]

## 2022-04-08 NOTE — TELEPHONE ENCOUNTER
Spoke with patient and went over MK's recs  Pt will try eliminating  Noon time dose to see if it helps with her headaches  Will keep us updated  Advised of Decadron to break cycle

## 2022-04-08 NOTE — TELEPHONE ENCOUNTER
Gabapentin typically reduces headaches, and it does not sound like a trigger for her  However she can attempt to remove the noon dose and see if the PM headache gets better  Let me know what happens  I sent decadron to break cycle as well, just as a reminder

## 2022-04-11 ENCOUNTER — ESTABLISHED COMPREHENSIVE EXAM (OUTPATIENT)
Dept: URBAN - METROPOLITAN AREA CLINIC 6 | Facility: CLINIC | Age: 52
End: 2022-04-11

## 2022-04-11 DIAGNOSIS — H25.13: ICD-10-CM

## 2022-04-11 DIAGNOSIS — D23.122: ICD-10-CM

## 2022-04-11 PROCEDURE — 92015 DETERMINE REFRACTIVE STATE: CPT

## 2022-04-11 PROCEDURE — 92014 COMPRE OPH EXAM EST PT 1/>: CPT

## 2022-04-11 ASSESSMENT — VISUAL ACUITY
OD_CC: 20/25
OS_CC: 20/25
OU_SC: J5

## 2022-04-11 ASSESSMENT — TONOMETRY
OS_IOP_MMHG: 8
OD_IOP_MMHG: 8

## 2022-04-12 DIAGNOSIS — G43.709 CHRONIC MIGRAINE WITHOUT AURA WITHOUT STATUS MIGRAINOSUS, NOT INTRACTABLE: ICD-10-CM

## 2022-04-12 RX ORDER — PROCHLORPERAZINE MALEATE 10 MG
TABLET ORAL
Qty: 30 TABLET | Refills: 0 | Status: SHIPPED | OUTPATIENT
Start: 2022-04-12

## 2022-04-14 DIAGNOSIS — G47.09 OTHER INSOMNIA: Primary | Chronic | ICD-10-CM

## 2022-04-14 RX ORDER — TRAZODONE HYDROCHLORIDE 50 MG/1
50-100 TABLET ORAL
Qty: 60 TABLET | Refills: 0 | Status: SHIPPED | OUTPATIENT
Start: 2022-04-14 | End: 2022-04-27 | Stop reason: SDUPTHER

## 2022-04-15 ENCOUNTER — OFFICE VISIT (OUTPATIENT)
Dept: INTERNAL MEDICINE CLINIC | Facility: CLINIC | Age: 52
End: 2022-04-15
Payer: COMMERCIAL

## 2022-04-15 VITALS
HEIGHT: 62 IN | TEMPERATURE: 97.7 F | HEART RATE: 87 BPM | OXYGEN SATURATION: 99 % | DIASTOLIC BLOOD PRESSURE: 72 MMHG | RESPIRATION RATE: 14 BRPM | BODY MASS INDEX: 27.6 KG/M2 | WEIGHT: 150 LBS | SYSTOLIC BLOOD PRESSURE: 110 MMHG

## 2022-04-15 DIAGNOSIS — Z12.12 SCREENING FOR COLORECTAL CANCER: ICD-10-CM

## 2022-04-15 DIAGNOSIS — E78.2 MIXED HYPERLIPIDEMIA: ICD-10-CM

## 2022-04-15 DIAGNOSIS — G89.29 CHRONIC LEFT-SIDED LOW BACK PAIN WITHOUT SCIATICA: ICD-10-CM

## 2022-04-15 DIAGNOSIS — Z00.00 ANNUAL PHYSICAL EXAM: Primary | ICD-10-CM

## 2022-04-15 DIAGNOSIS — Z12.11 SCREENING FOR COLORECTAL CANCER: ICD-10-CM

## 2022-04-15 DIAGNOSIS — M54.50 CHRONIC LEFT-SIDED LOW BACK PAIN WITHOUT SCIATICA: ICD-10-CM

## 2022-04-15 DIAGNOSIS — E03.8 SUBCLINICAL HYPOTHYROIDISM: ICD-10-CM

## 2022-04-15 PROBLEM — R63.5 WEIGHT GAIN: Status: RESOLVED | Noted: 2019-12-19 | Resolved: 2022-04-15

## 2022-04-15 PROCEDURE — 99396 PREV VISIT EST AGE 40-64: CPT | Performed by: INTERNAL MEDICINE

## 2022-04-15 RX ORDER — AMOXICILLIN 500 MG/1
500 CAPSULE ORAL 3 TIMES DAILY
COMMUNITY
Start: 2022-01-13 | End: 2022-04-27 | Stop reason: ALTCHOICE

## 2022-04-15 RX ORDER — CYCLOBENZAPRINE HCL 5 MG
5 TABLET ORAL
Qty: 30 TABLET | Refills: 0 | Status: SHIPPED | OUTPATIENT
Start: 2022-04-15

## 2022-04-15 NOTE — ASSESSMENT & PLAN NOTE
-clinical picture consistent with Hashimoto's with +thyroid antibodies  -will continue to monitor TFTs off levothyroxine as FT4 remains normal

## 2022-04-15 NOTE — PROGRESS NOTES
ADULT ANNUAL [address] [facility] INTERNAL MEDICINE    NAME: Mirian Dewitt  AGE: 49 y o  SEX: female  : 1970     DATE: 4/15/2022     Assessment and Plan:     Problem List Items Addressed This Visit        Endocrine    Subclinical hypothyroidism     -clinical picture consistent with Hashimoto's with +thyroid antibodies  -will continue to monitor TFTs off levothyroxine as FT4 remains normal         Relevant Orders    TSH, 3rd generation with Free T4 reflex    Comprehensive metabolic panel       Other    Mixed hyperlipidemia    Relevant Orders    TSH, 3rd generation with Free T4 reflex    Lipid panel    Comprehensive metabolic panel    Low back pain    Relevant Medications    cyclobenzaprine (FLEXERIL) 5 mg tablet      Other Visit Diagnoses     Annual physical exam    -  Primary    Screening for colorectal cancer        Relevant Orders    Ambulatory referral to Colorectal Surgery          Immunizations and preventive care screenings were discussed with patient today  Appropriate education was printed on patient's after visit summary  Counseling:  Dental Health: discussed importance of regular tooth brushing, flossing, and dental visits  · Exercise: the importance of regular exercise/physical activity was discussed  Recommend exercise 3-5 times per week for at least 30 minutes  · Cancer screenings discussed  Due for colonoscopy, will refer to CRS, due for mammo, has script  PAP per GYN  · Immunizations discussed  Recommend yearly influenza vaccine, shingrix and prevnar 20  BMI Counseling: Body mass index is 27 44 kg/m²  The BMI is above normal  Nutrition recommendations include decreasing portion sizes, consuming healthier snacks, limiting drinks that contain sugar, moderation in carbohydrate intake and reducing intake of cholesterol   Exercise recommendations include moderate physical activity 150 minutes/week, exercising 3-5 times per week and strength training exercises  No pharmacotherapy was ordered  Rationale for BMI follow-up plan is due to patient being overweight or obese  Return in about 4 months (around 8/15/2022) for Recheck  Chief Complaint:     Chief Complaint   Patient presents with    Annual Exam      History of Present Illness:     Adult Annual Physical   Patient with subclinical hypothyroidism, KYLE, MDD, insomnia, OCD, asthma and chronic migraines here for a comprehensive physical exam  The patient reports problems - back pain for several months  She is accompanied by her daughter  Diet and Physical Activity  · Diet/Nutrition: poor diet  High sodium  · Exercise: walking her dog  Depression Screening  PHQ-2/9 Depression Screening         General Health  · Sleep: sleeps poorly and gets 4-6 hours of sleep on average  · Hearing: normal - none   · Vision: most recent eye exam <1 year ago, wears glasses and wears contacts  · Dental: regular dental visits  /GYN Health  · Patient is: perimenopausal  · Last menstrual period:   · Contraceptive method: IUD placement  Review of Systems:     Review of Systems   Constitutional: Negative for appetite change and unexpected weight change  HENT: Positive for postnasal drip and rhinorrhea  Eyes: Positive for itching  Respiratory: Negative for cough, chest tightness, shortness of breath and wheezing  Cardiovascular: Negative for chest pain, palpitations and leg swelling  Gastrointestinal: Negative for abdominal pain, constipation, diarrhea, nausea and vomiting  Genitourinary: Negative for difficulty urinating  Musculoskeletal: Positive for arthralgias and back pain  Negative for neck pain and neck stiffness  Neurological: Positive for headaches  Negative for dizziness  Psychiatric/Behavioral: Positive for sleep disturbance  The patient is nervous/anxious         Past Medical History:     Past Medical History:   Diagnosis Date    Amenorrhea     Anxiety     Arthritis     Asthma     Back pain     Chondromalacia of patella     Chronic fatigue     COPD (chronic obstructive pulmonary disease) (HCC) Yes    Deep vein thrombophlebitis of leg (HCC)     Depression     Disease of thyroid gland     GERD (gastroesophageal reflux disease)     HPV (human papilloma virus) infection     Hypothyroidism     Lumbar radiculopathy     Migraine     Myofascial pain syndrome     Osteopenia     Piriformis syndrome     Sacroiliitis (HCC)     Sciatica     Seizure ([address] )     Sleep apnea     Spondylosis of lumbar spine     Trochanteric bursitis of right hip     Vertigo     Vitamin D deficiency     Weight gain 2019      Past Surgical History:     Past Surgical History:   Procedure Laterality Date    CERVIX LESION DESTRUCTION       SECTION      COLONOSCOPY      COLPOSCOPY W/ BIOPSY / CURETTAGE      Colposcopy cervix with biopsy    LAPAROSCOPIC ENDOMETRIOSIS FULGURATION      LAPAROSCOPY      TOOTH EXTRACTION        Social History:     Social History     Socioeconomic History    Marital status: /Civil Union     Spouse name: Terri Cordero Number of children: 2    Years of education: 15    Highest education level: High school graduate   Occupational History    Occupation: works for [employer]   Tobacco Use    Smoking status: Never Smoker    Smokeless tobacco: Never Used   Vaping Use    Vaping Use: Never used   Substance and Sexual Activity    Alcohol use: Not Currently    Drug use: Not Currently    Sexual activity: Yes     Partners: Male     Birth control/protection: I U D       Comment: Mirena   Other Topics Concern    None   Social History Narrative    Education: high school graduate    Learning Disabilities: none    Marital History:     Children: 2 daughters    Living Arrangement: lives in home with  and daughter    Occupational History: works for [employer], also worked as a  and as an  in the past    Functioning Relationships: good support system    Legal History: none     History: None        Unsure of age of house    Heating system gas forced hot air    Central AC    Bedroom is carpeted    Humidifier in living room    [address] in living room    No basement    No dehumidifier,            Pets - 1 [place] pig, Bearded Dragon Lizard            Caffeine - none in beverages     Chocolate - 3 times a week       Social Determinants of Health     Financial Resource Strain: Not on file   Food Insecurity: Not on file   Transportation Needs: Not on file   Physical Activity: Sufficiently Active    Days of Exercise per Week: 7 days   [employer] of Exercise per Session: 30 min   Stress: Not on file   Social Connections: Not on file   Intimate Partner Violence: Not on file   Housing Stability: Not on file      Family History:     Family History   Problem Relation Age of Onset    Heart disease Mother     Asthma Daughter     Lung cancer Paternal Grandfather     Asthma Daughter     Colon cancer Father     Colon cancer Maternal Grandfather     Prostate cancer Maternal Grandfather     Colon cancer Maternal Aunt     No Known Problems Maternal Grandmother     Diabetes Paternal Grandmother     No Known Problems Maternal Aunt     No Known Problems Maternal Aunt     No Known Problems Maternal Aunt     No Known Problems Paternal Aunt     Asthma Daughter     Psychiatric Illness Neg Hx     Stroke Neg Hx     Thyroid disease Neg Hx     Substance Abuse Neg Hx     Suicidality Neg Hx       Current Medications:     Current Outpatient Medications   Medication Sig Dispense Refill    albuterol (2 5 mg/3 mL) 0 083 % nebulizer solution Inhale       albuterol (Proventil HFA) 90 mcg/act inhaler Inhale 2 puffs every 6 (six) hours as needed for wheezing 18 g 3    aluminum-magnesium hydroxide 200-200 MG/5ML suspension GAVISCON REGULAR STRENGTH AFTER MEALS and BEDTIME WHEN NOT FOLLOWING LPR/GERD DIET  355 mL 11    amoxicillin (AMOXIL) 500 mg capsule Take 500 mg by mouth 3 (three) times a day      Aspirin-Acetaminophen-Caffeine (MIGRAINE FORMULA PO) Take by mouth      [facility] INDOOR/OUTDOOR ALLERGY RLF 10 MG tablet Take 10 mg by mouth daily  10    dexamethasone (DECADRON) 2 mg tablet 1 tab qam with food prn migraine  5 tablet 0    fluticasone-salmeterol (ADVAIR HFA) 115-21 MCG/ACT inhaler Inhale 2 puffs 2 (two) times a day Rinse mouth after use  12 g 11    gabapentin (NEURONTIN) 300 mg capsule 1 cap am, 1 cap noon and 2 caps qhs 360 capsule 1    ibuprofen (MOTRIN) 800 mg tablet Take 1 tablet (800 mg total) by mouth every 6 (six) hours as needed for mild pain 90 tablet 3    ketorolac (TORADOL) 10 mg tablet Take 1 tablet (10 mg total) by mouth every 6 (six) hours as needed (migraine) Max 2-3 per week  10 tablet 0    levonorgestrel (MIRENA, 52 MG,) 20 MCG/24HR IUD by Intrauterine route      mometasone (NASONEX) 50 mcg/act nasal spray       ondansetron (ZOFRAN-ODT) 4 mg disintegrating tablet Take 1 tablet (4 mg total) by mouth every 6 (six) hours as needed for nausea or vomiting 20 tablet 0    predniSONE 10 mg tablet Take with food  4 tabs daily x 4 days, then 3 daily x 4 days, then 2 daily x 4 days, then 1 daily x 4 days 40 tablet 1    prochlorperazine (COMPAZINE) 10 mg tablet TAKE 1 TABLET(10 MG) BY MOUTH EVERY 6 HOURS AS NEEDED FOR NAUSEA OR VOMITING 30 tablet 0    SPIRIVA RESPIMAT 2 5 MCG/ACT AERS       tiotropium (SPIRIVA RESPIMAT) 1 25 MCG/ACT AERS inhaler Inhale 2 puffs daily 4 g 11    topiramate (TOPAMAX) 100 mg tablet 150 mg q12 hours 90 tablet 2    traZODone (DESYREL) 50 mg tablet Take 1-2 tablets ( mg total) by mouth daily at bedtime as needed for sleep 60 tablet 0    VITAMIN D PO Take 5,000 Units by mouth      ZOLMitriptan (ZOMIG-ZMT) 5 MG disintegrating tablet TAKE 1 TABLET BY MOUTH AT ONSET OF HEADACHE, MAY REPEAT AFTER 2 HOURS AS NEEDED   MAX 2 TABLETS per 24 hours  12 tablet 2    cyclobenzaprine (FLEXERIL) 5 mg tablet Take 1 tablet (5 mg total) by mouth daily at bedtime as needed for muscle spasms 30 tablet 0    DULoxetine (CYMBALTA) 60 mg delayed release capsule Take 2 capsules (120 mg total) by mouth daily 60 capsule 4    EPINEPHrine (EPIPEN) 0 3 mg/0 3 mL SOAJ Inject 0 3 mL (0 3 mg total) into a muscle once for 1 dose As directed 0 6 mL 11    hydrOXYzine HCL (ATARAX) 50 mg tablet Take 1 tablet (50 mg total) by mouth 3 (three) times a day as needed for anxiety 90 tablet 4    mirtazapine (REMERON) 15 mg tablet Take 1 tablet (15 mg total) by mouth daily at bedtime 30 tablet 4    naltrexone (REVIA) 50 mg tablet Take 1 tablet (50 mg total) by mouth daily 30 tablet 4    olopatadine (PATANOL) 0 1 % ophthalmic solution Administer 1 drop to both eyes 2 (two) times a day for 90 days 10 mL 11    omeprazole (PriLOSEC) 20 mg delayed release capsule Take 1 capsule (20 mg total) by mouth daily 30 capsule 11     No current facility-administered medications for this visit  Allergies: Allergies   Allergen Reactions    Nuts - Food Allergy      Other reaction(s): WALNUTS    Cephalexin     Erythromycin Diarrhea, GI Intolerance and Vomiting    Iodine - Food Allergy Hives    Other      adobo    Shellfish Allergy - Food Allergy     Shellfish-Derived Products - Food Allergy     Turmeric - Food Allergy Hives    Wellbutrin [Bupropion] Seizures    Zithromax [Azithromycin] Diarrhea     Can take name brand  Annotation - [id number]: can take brand name  Other reaction(s): Nausea/vomiting/diarrhea      Physical Exam:     /72   Pulse 87   Temp 97 7 °F (36 5 °C)   Resp 14   Ht 5' 2" (1 575 m)   Wt 68 kg (150 lb)   SpO2 99%   BMI 27 44 kg/m²     Physical Exam  Vitals reviewed  Constitutional:       General: She is not in acute distress  Appearance: She is not diaphoretic  HENT:      Head: Normocephalic  Right Ear: There is no impacted cerumen  Left Ear: There is no impacted cerumen  Nose: Nose normal  No congestion  Mouth/Throat:      Mouth: Mucous membranes are moist       Pharynx: Oropharynx is clear  No oropharyngeal exudate or posterior oropharyngeal erythema  Eyes:      Extraocular Movements: Extraocular movements intact  Conjunctiva/sclera: Conjunctivae normal       Pupils: Pupils are equal, round, and reactive to light  Neck:      Thyroid: No thyromegaly or thyroid tenderness  Vascular: No carotid bruit  Cardiovascular:      Rate and Rhythm: Normal rate and regular rhythm  Pulses: Normal pulses  Heart sounds: Normal heart sounds  Pulmonary:      Effort: Pulmonary effort is normal  No respiratory distress  Breath sounds: Normal breath sounds  No wheezing  Chest:   Breasts:      Right: Normal  No swelling, bleeding, inverted nipple, mass, nipple discharge, skin change, tenderness, axillary adenopathy or supraclavicular adenopathy  Left: Normal  No swelling, bleeding, inverted nipple, mass, nipple discharge, skin change, tenderness, axillary adenopathy or supraclavicular adenopathy  Abdominal:      General: Bowel sounds are normal  There is no distension  Palpations: Abdomen is soft  Tenderness: There is no abdominal tenderness  Musculoskeletal:      Cervical back: Decreased range of motion  Right lower leg: No edema  Left lower leg: No edema  Lymphadenopathy:      Cervical: No cervical adenopathy  Upper Body:      Right upper body: No supraclavicular, axillary or pectoral adenopathy  Left upper body: No supraclavicular, axillary or pectoral adenopathy  Skin:     Coloration: Skin is not pale  Comments: Tattoo over L anterior chest wall  Multiple nevi and freckles   Neurological:      General: No focal deficit present  Mental Status: She is alert and oriented to person, place, and time     Psychiatric:         Attention and Perception: Attention normal  Mood and Affect: Mood is anxious  Speech: Speech normal          Behavior: Behavior is cooperative            Bernadette Hernandez DO  [facility] [address] [facility] INTERNAL MEDICINE

## 2022-04-15 NOTE — PATIENT INSTRUCTIONS
Wellness Visit for Adults   AMBULATORY CARE:   A wellness visit  is when you see your healthcare provider to get screened for health problems  Your healthcare provider will also give you advice on how to stay healthy  Write down your questions so you remember to ask them  Ask your healthcare provider how often you should have a wellness visit  What happens at a wellness visit:  Your healthcare provider will ask about your health, and your family history of health problems  This includes high blood pressure, heart disease, and cancer  He or she will ask if you have symptoms that concern you, if you smoke, and about your mood  You may also be asked about your intake of medicines, supplements, food, and alcohol  Any of the following may be done:  · Your weight  will be checked  Your height may also be checked so your body mass index (BMI) can be calculated  Your BMI shows if you are at a healthy weight  · Your blood pressure  and heart rate will be checked  Your temperature may also be checked  · Blood and urine tests  may be done  Blood tests may be done to check your cholesterol levels  Abnormal cholesterol levels increase your risk for heart disease and stroke  You may also need a blood or urine test to check for diabetes if you are at increased risk  Urine tests may be done to look for signs of an infection or kidney disease  · A physical exam  includes checking your heartbeat and lungs with a stethoscope  Your healthcare provider may also check your skin to look for sun damage  · Screening tests  may be recommended  A screening test is done to check for diseases that may not cause symptoms  The screening tests you may need depend on your age, gender, family history, and lifestyle habits  For example, colorectal screening may be recommended if you are 48years old or older  Screening tests you need if you are a woman:   · A Pap smear  is used to screen for cervical cancer   Pap smears are usually done every 3 to 5 years depending on your age  You may need them more often if you have had abnormal Pap smear test results in the past  Ask your healthcare provider how often you should have a Pap smear  · A mammogram  is an x-ray of your breasts to screen for breast cancer  Experts recommend mammograms every 2 years starting at age 48 years  You may need a mammogram at age 52 years or younger if you have an increased risk for breast cancer  Talk to your healthcare provider about when you should start having mammograms and how often you need them  Vaccines you may need:   · Get an influenza vaccine  every year  The influenza vaccine protects you from the flu  Several types of viruses cause the flu  The viruses change over time, so new vaccines are made each year  · Get a tetanus-diphtheria (Td) booster vaccine  every 10 years  This vaccine protects you against tetanus and diphtheria  Tetanus is a severe infection that may cause painful muscle spasms and lockjaw  Diphtheria is a severe bacterial infection that causes a thick covering in the back of your mouth and throat  · Get a human papillomavirus (HPV) vaccine  if you are female and aged 23 to 32 or male 23 to 24 and never received it  This vaccine protects you from HPV infection  HPV is the most common infection spread by sexual contact  HPV may also cause vaginal, penile, and anal cancers  · Get a pneumococcal vaccine  if you are aged 72 years or older  The pneumococcal vaccine is an injection given to protect you from pneumococcal disease  Pneumococcal disease is an infection caused by pneumococcal bacteria  The infection may cause pneumonia, meningitis, or an ear infection  · Get a shingles vaccine  if you are 60 or older, even if you have had shingles before  The shingles vaccine is an injection to protect you from the varicella-zoster virus  This is the same virus that causes chickenpox   Shingles is a painful rash that develops in people who had chickenpox or have been exposed to the virus  How to eat healthy:  My Plate is a model for planning healthy meals  It shows the types and amounts of foods that should go on your plate  Fruits and vegetables make up about half of your plate, and grains and protein make up the other half  A serving of dairy is included on the side of your plate  The amount of calories and serving sizes you need depends on your age, gender, weight, and height  Examples of healthy foods are listed below:  · Eat a variety of vegetables  such as dark green, red, and orange vegetables  You can also include canned vegetables low in sodium (salt) and frozen vegetables without added butter or sauces  · Eat a variety of fresh fruits , canned fruit in 100% juice, frozen fruit, and dried fruit  · Include whole grains  At least half of the grains you eat should be whole grains  Examples include whole-wheat bread, wheat pasta, brown rice, and whole-grain cereals such as oatmeal     · Eat a variety of protein foods such as seafood (fish and shellfish), lean meat, and poultry without skin (turkey and chicken)  Examples of lean meats include pork leg, shoulder, or tenderloin, and beef round, sirloin, tenderloin, and extra lean ground beef  Other protein foods include eggs and egg substitutes, beans, peas, soy products, nuts, and seeds  · Choose low-fat dairy products such as skim or 1% milk or low-fat yogurt, cheese, and cottage cheese  · Limit unhealthy fats  such as butter, hard margarine, and shortening  Exercise:  Exercise at least 30 minutes per day on most days of the week  Some examples of exercise include walking, biking, dancing, and swimming  You can also fit in more physical activity by taking the stairs instead of the elevator or parking farther away from stores  Include muscle strengthening activities 2 days each week  Regular exercise provides many health benefits   It helps you manage your weight, and decreases your risk for type 2 diabetes, heart disease, stroke, and high blood pressure  Exercise can also help improve your mood  Ask your healthcare provider about the best exercise plan for you  General health and safety guidelines:   · Do not smoke  Nicotine and other chemicals in cigarettes and cigars can cause lung damage  Ask your healthcare provider for information if you currently smoke and need help to quit  E-cigarettes or smokeless tobacco still contain nicotine  Talk to your healthcare provider before you use these products  · Limit alcohol  A drink of alcohol is 12 ounces of beer, 5 ounces of wine, or 1½ ounces of liquor  · Lose weight, if needed  Being overweight increases your risk of certain health conditions  These include heart disease, high blood pressure, type 2 diabetes, and certain types of cancer  · Protect your skin  Do not sunbathe or use tanning beds  Use sunscreen with a SPF 15 or higher  Apply sunscreen at least 15 minutes before you go outside  Reapply sunscreen every 2 hours  Wear protective clothing, hats, and sunglasses when you are outside  · Drive safely  Always wear your seatbelt  Make sure everyone in your car wears a seatbelt  A seatbelt can save your life if you are in an accident  Do not use your cell phone when you are driving  This could distract you and cause an accident  Pull over if you need to make a call or send a text message  · Practice safe sex  Use latex condoms if are sexually active and have more than one partner  Your healthcare provider may recommend screening tests for sexually transmitted infections (STIs)  · Wear helmets, lifejackets, and protective gear  Always wear a helmet when you ride a bike or motorcycle, go skiing, or play sports that could cause a head injury  Wear protective equipment when you play sports  Wear a lifejacket when you are on a boat or doing water sports      © Copyright [employer] 2022 Information is for End User's use only and may not be sold, redistributed or otherwise used for commercial purposes  All illustrations and images included in CareNotes® are the copyrighted property of [employer]  or [employer]  The above information is an  only  It is not intended as medical advice for individual conditions or treatments  Talk to your doctor, nurse or pharmacist before following any medical regimen to see if it is safe and effective for you  Cholesterol and Your Health   AMBULATORY CARE:   Cholesterol  is a waxy, fat-like substance  Your body uses cholesterol to make hormones and new cells, and to protect nerves  Cholesterol is made by your body  It also comes from certain foods you eat, such as meat and dairy products  Your healthcare provider can help you set goals for your cholesterol levels  He or she can help you create a plan to meet your goals  Cholesterol level goals: Your cholesterol level goals depend on your risk for heart disease, your age, and your other health conditions  The following are general guidelines:  · Total cholesterol  includes low-density lipoprotein (LDL), high-density lipoprotein (HDL), and triglyceride levels  The total cholesterol level should be lower than 200 mg/dL and is best at about 150 mg/dL  · LDL cholesterol  is called bad cholesterol  because it forms plaque in your arteries  As plaque builds up, your arteries become narrow, and less blood flows through  When plaque decreases blood flow to your heart, you may have chest pain  If plaque completely blocks an artery that brings blood to your heart, you may have a heart attack  Plaque can break off and form blood clots  Blood clots may block arteries in your brain and cause a stroke  The level should be less than 130 mg/dL and is best at about 100 mg/dL  · HDL cholesterol  is called good cholesterol  because it helps remove LDL cholesterol from your arteries   It does this by attaching to LDL cholesterol and carrying it to your liver  Your liver breaks down LDL cholesterol so your body can get rid of it  High levels of HDL cholesterol can help prevent a heart attack and stroke  Low levels of HDL cholesterol can increase your risk for heart disease, heart attack, and stroke  The level should be 60 mg/dL or higher  · Triglycerides  are a type of fat that store energy from foods you eat  High levels of triglycerides also cause plaque buildup  This can increase your risk for a heart attack or stroke  If your triglyceride level is high, your LDL cholesterol level may also be high  The level should be less than 150 mg/dL  Any of the following can increase your risk for high cholesterol:   · Smoking cigarettes    · Being overweight or obese, or not getting enough exercise    · Drinking large amounts of alcohol    · A medical condition such as hypertension (high blood pressure) or diabetes    · Certain genes passed from your parents to you    · Age older than 65 years    What you need to know about having your cholesterol levels checked: Adults 21to 39years of age should have their cholesterol levels checked every 4 to 6 years  Adults 45 years or older should have their cholesterol checked every 1 to 2 years  You may need your cholesterol checked more often, or at a younger age, if you have risk factors for heart disease  You may also need to have your cholesterol checked more often if you have other health conditions, such as diabetes  Blood tests are used to check cholesterol levels  Blood tests measure your levels of triglycerides, LDL cholesterol, and HDL cholesterol  How healthy fats affect your cholesterol levels:  Healthy fats, also called unsaturated fats, help lower LDL cholesterol and triglyceride levels  Healthy fats include the following:  · Monounsaturated fats  are found in foods such as olive oil, canola oil, avocado, nuts, and olives      · Polyunsaturated fats,  such as omega 3 fats, are found in fish, such as salmon, trout, and tuna  They can also be found in plant foods such as flaxseed, walnuts, and soybeans  How unhealthy fats affect your cholesterol levels:  Unhealthy fats increase LDL cholesterol and triglyceride levels  They are found in foods high in cholesterol, saturated fat, and trans fat:  · Cholesterol  is found in eggs, dairy, and meat  · Saturated fat  is found in butter, cheese, ice cream, whole milk, and coconut oil  Saturated fat is also found in meat, such as sausage, hot dogs, and bologna  · Trans fat  is found in liquid oils and is used in fried and baked foods  Foods that contain trans fats include chips, crackers, muffins, sweet rolls, microwave popcorn, and cookies  Treatment  for high cholesterol will also decrease your risk of heart disease, heart attack, and stroke  Treatment may include any of the following:  · Lifestyle changes  may include food, exercise, weight loss, and quitting smoking  You may also need to decrease the amount of alcohol you drink  Your healthcare provider will want you to start with lifestyle changes  Other treatment may be added if lifestyle changes are not enough  Your healthcare provider may recommend you work with a team to manage hyperlipidemia  The team may include medical experts such as a dietitian, an exercise or physical therapist, and a behavior therapist  Your family members may be included in helping you create lifestyle changes  · Medicines  may be given to lower your LDL cholesterol, triglyceride levels, or total cholesterol level  You may need medicines to lower your cholesterol if any of the following is true:    ? You have a history of stroke, TIA, unstable angina, or a heart attack  ? Your LDL cholesterol level is 190 mg/dL or higher  ? You are age 36 to 76 years, have diabetes or heart disease risk factors, and your LDL cholesterol is 70 mg/dL or higher      · Supplements  include fish oil, red yeast rice, and garlic  Fish oil may help lower your triglyceride and LDL cholesterol levels  It may also increase your HDL cholesterol level  Red yeast rice may help decrease your total cholesterol level and LDL cholesterol level  Garlic may help lower your total cholesterol level  Do not take any supplements without talking to your healthcare provider  Food changes you can make to lower your cholesterol levels:  A dietitian can help you create a healthy eating plan  He or she can show you how to read food labels and choose foods low in saturated fat, trans fats, and cholesterol  · Decrease the total amount of fat you eat  Choose lean meats, fat-free or 1% fat milk, and low-fat dairy products, such as yogurt and cheese  Try to limit or avoid red meats  Limit or do not eat fried foods or baked goods, such as cookies  · Replace unhealthy fats with healthy fats  Cook foods in olive oil or canola oil  Choose soft margarines that are low in saturated fat and trans fat  Seeds, nuts, and avocados are other examples of healthy fats  · Eat foods with omega-3 fats  Examples include salmon, tuna, mackerel, walnuts, and flaxseed  Eat fish 2 times per week  Pregnant women should not eat fish that have high levels of mercury, such as shark, swordfish, and kristyn mackerel  · Increase the amount of high-fiber foods you eat  High-fiber foods can help lower your LDL cholesterol  Aim to get between 20 and 30 grams of fiber each day  Fruits and vegetables are high in fiber  Eat at least 5 servings each day  Other high-fiber foods are whole-grain or whole-wheat breads, pastas, or cereals, and brown rice  Eat 3 ounces of whole-grain foods each day  Increase fiber slowly  You may have abdominal discomfort, bloating, and gas if you add fiber to your diet too quickly  · Eat healthy protein foods  Examples include low-fat dairy products, skinless chicken and turkey, fish, and nuts      · Limit foods and drinks that are high in sugar  Your dietitian or healthcare provider can help you create daily limits for high-sugar foods and drinks  The limit may be lower if you have diabetes or another health condition  Limits can also help you lose weight if needed  Lifestyle changes you can make to lower your cholesterol levels:   · Maintain a healthy weight  Ask your healthcare provider what a healthy weight is for you  Ask him or her to help you create a weight loss plan if needed  Weight loss can decrease your total cholesterol and triglyceride levels  Weight loss may also help keep your blood pressure at a healthy level  · Be physically active throughout the day  Physical activity, such as exercise, can help lower your total cholesterol level and maintain a healthy weight  Physical activity can also help increase your HDL cholesterol level  Work with your healthcare provider to create an program that is right for you  Get at least 30 to 40 minutes of moderate physical activity most days of the week  Examples of exercise include brisk walking, swimming, or biking  Also include strength training at least 2 times each week  Your healthcare providers can help you create a physical activity plan  · Do not smoke  Nicotine and other chemicals in cigarettes and cigars can raise your cholesterol levels  Ask your healthcare provider for information if you currently smoke and need help to quit  E-cigarettes or smokeless tobacco still contain nicotine  Talk to your healthcare provider before you use these products  · Limit or do not drink alcohol  Alcohol can increase your triglyceride levels  Ask your healthcare provider before you drink alcohol  Ask how much is okay for you to drink in 24 hours or 1 week  Follow up with your doctor as directed:  Write down your questions so you remember to ask them during your visits    © Copyright [address] 2022 Information is for End User's use only and may not be sold, redistributed or otherwise used for commercial purposes  All illustrations and images included in CareNotes® are the copyrighted property of [employer]  or [employer] Whipple  The above information is an  only  It is not intended as medical advice for individual conditions or treatments  Talk to your doctor, nurse or pharmacist before following any medical regimen to see if it is safe and effective for you

## 2022-04-16 ENCOUNTER — LAB (OUTPATIENT)
Dept: LAB | Age: 52
End: 2022-04-16
Payer: COMMERCIAL

## 2022-04-16 DIAGNOSIS — E78.2 MIXED HYPERLIPIDEMIA: ICD-10-CM

## 2022-04-16 DIAGNOSIS — F33.2 MAJOR DEPRESSIVE DISORDER, RECURRENT, SEVERE WITHOUT PSYCHOTIC FEATURES (HCC): Chronic | ICD-10-CM

## 2022-04-16 DIAGNOSIS — Z79.899 LONG-TERM USE OF HIGH-RISK MEDICATION: Chronic | ICD-10-CM

## 2022-04-16 DIAGNOSIS — E03.8 SUBCLINICAL HYPOTHYROIDISM: ICD-10-CM

## 2022-04-16 LAB
ALBUMIN SERPL BCP-MCNC: 3.9 G/DL (ref 3.5–5)
ALP SERPL-CCNC: 78 U/L (ref 46–116)
ALT SERPL W P-5'-P-CCNC: 21 U/L (ref 12–78)
ANION GAP SERPL CALCULATED.3IONS-SCNC: 3 MMOL/L (ref 4–13)
AST SERPL W P-5'-P-CCNC: 10 U/L (ref 5–45)
BILIRUB SERPL-MCNC: 0.28 MG/DL (ref 0.2–1)
BUN SERPL-MCNC: 25 MG/DL (ref 5–25)
CALCIUM SERPL-MCNC: 8.9 MG/DL (ref 8.3–10.1)
CHLORIDE SERPL-SCNC: 111 MMOL/L (ref 100–108)
CHOLEST SERPL-MCNC: 234 MG/DL
CO2 SERPL-SCNC: 28 MMOL/L (ref 21–32)
CREAT SERPL-MCNC: 0.97 MG/DL (ref 0.6–1.3)
GFR SERPL CREATININE-BSD FRML MDRD: 68 ML/MIN/1.73SQ M
GLUCOSE P FAST SERPL-MCNC: 90 MG/DL (ref 65–99)
HDLC SERPL-MCNC: 65 MG/DL
LDLC SERPL CALC-MCNC: 143 MG/DL (ref 0–100)
NONHDLC SERPL-MCNC: 169 MG/DL
POTASSIUM SERPL-SCNC: 3.9 MMOL/L (ref 3.5–5.3)
PROT SERPL-MCNC: 6.6 G/DL (ref 6.4–8.2)
SODIUM SERPL-SCNC: 142 MMOL/L (ref 136–145)
TRIGL SERPL-MCNC: 129 MG/DL
TSH SERPL DL<=0.05 MIU/L-ACNC: 4 UIU/ML (ref 0.45–4.5)
VALPROATE SERPL-MCNC: <3 UG/ML (ref 50–100)

## 2022-04-16 PROCEDURE — 80061 LIPID PANEL: CPT

## 2022-04-16 PROCEDURE — 80164 ASSAY DIPROPYLACETIC ACD TOT: CPT

## 2022-04-16 PROCEDURE — 80053 COMPREHEN METABOLIC PANEL: CPT

## 2022-04-16 PROCEDURE — 36415 COLL VENOUS BLD VENIPUNCTURE: CPT

## 2022-04-16 PROCEDURE — 84443 ASSAY THYROID STIM HORMONE: CPT
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
## 2022-04-21 ENCOUNTER — CONTACT LENS FITTING (OUTPATIENT)
Dept: URBAN - METROPOLITAN AREA CLINIC 6 | Facility: CLINIC | Age: 52
End: 2022-04-21

## 2022-04-21 DIAGNOSIS — F33.2 MAJOR DEPRESSIVE DISORDER, RECURRENT, SEVERE WITHOUT PSYCHOTIC FEATURES (HCC): Primary | Chronic | ICD-10-CM

## 2022-04-21 DIAGNOSIS — F41.1 GAD (GENERALIZED ANXIETY DISORDER): Chronic | ICD-10-CM

## 2022-04-21 DIAGNOSIS — H52.03: ICD-10-CM

## 2022-04-21 PROCEDURE — 92310 CONTACT LENS FITTING OU: CPT

## 2022-04-21 RX ORDER — DULOXETIN HYDROCHLORIDE 60 MG/1
120 CAPSULE, DELAYED RELEASE ORAL DAILY
Qty: 60 CAPSULE | Refills: 0 | Status: SHIPPED | OUTPATIENT
Start: 2022-04-21 | End: 2022-04-27 | Stop reason: SDUPTHER

## 2022-04-21 ASSESSMENT — KERATOMETRY
OD_K1POWER_DIOPTERS: 45.25
OD_AXISANGLE2_DEGREES: 74
OD_K2POWER_DIOPTERS: 46.25
OS_AXISANGLE2_DEGREES: 90
OS_K2POWER_DIOPTERS: 46.75
OS_AXISANGLE_DEGREES: 180
OD_AXISANGLE_DEGREES: 164
OS_K1POWER_DIOPTERS: 45.50

## 2022-04-27 ENCOUNTER — OFFICE VISIT (OUTPATIENT)
Dept: PSYCHIATRY | Facility: CLINIC | Age: 52
End: 2022-04-27
Payer: COMMERCIAL

## 2022-04-27 VITALS
HEIGHT: 62 IN | WEIGHT: 151 LBS | BODY MASS INDEX: 27.79 KG/M2 | DIASTOLIC BLOOD PRESSURE: 73 MMHG | HEART RATE: 87 BPM | SYSTOLIC BLOOD PRESSURE: 114 MMHG

## 2022-04-27 DIAGNOSIS — F42.9 OBSESSIVE-COMPULSIVE DISORDER, UNSPECIFIED TYPE: Chronic | ICD-10-CM

## 2022-04-27 DIAGNOSIS — G47.09 OTHER INSOMNIA: Chronic | ICD-10-CM

## 2022-04-27 DIAGNOSIS — F41.0 PANIC DISORDER WITHOUT AGORAPHOBIA: Chronic | ICD-10-CM

## 2022-04-27 DIAGNOSIS — F33.2 MAJOR DEPRESSIVE DISORDER, RECURRENT, SEVERE WITHOUT PSYCHOTIC FEATURES (HCC): Primary | Chronic | ICD-10-CM

## 2022-04-27 DIAGNOSIS — F60.9 PERSONALITY DISORDER (HCC): Chronic | ICD-10-CM

## 2022-04-27 DIAGNOSIS — F41.1 GAD (GENERALIZED ANXIETY DISORDER): Chronic | ICD-10-CM

## 2022-04-27 DIAGNOSIS — Z79.899 LONG-TERM USE OF HIGH-RISK MEDICATION: Chronic | ICD-10-CM

## 2022-04-27 DIAGNOSIS — F63.9 IMPULSE CONTROL DISORDER: Chronic | ICD-10-CM

## 2022-04-27 PROBLEM — E06.3 HASHIMOTO'S THYROIDITIS: Status: ACTIVE | Noted: 2021-12-22

## 2022-04-27 PROCEDURE — 90833 PSYTX W PT W E/M 30 MIN: CPT | Performed by: PSYCHIATRY & NEUROLOGY

## 2022-04-27 PROCEDURE — 99214 OFFICE O/P EST MOD 30 MIN: CPT | Performed by: PSYCHIATRY & NEUROLOGY

## 2022-04-27 PROCEDURE — 3008F BODY MASS INDEX DOCD: CPT | Performed by: PSYCHIATRY & NEUROLOGY

## 2022-04-27 PROCEDURE — 1036F TOBACCO NON-USER: CPT | Performed by: PSYCHIATRY & NEUROLOGY

## 2022-04-27 PROCEDURE — 3725F SCREEN DEPRESSION PERFORMED: CPT | Performed by: PSYCHIATRY & NEUROLOGY

## 2022-04-27 RX ORDER — HYDROXYZINE 50 MG/1
50 TABLET, FILM COATED ORAL 3 TIMES DAILY PRN
Qty: 90 TABLET | Refills: 4 | Status: SHIPPED | OUTPATIENT
Start: 2022-04-27 | End: 2022-09-24

## 2022-04-27 RX ORDER — QUETIAPINE FUMARATE 200 MG/1
200 TABLET, FILM COATED ORAL
Qty: 30 TABLET | Refills: 4 | Status: SHIPPED | OUTPATIENT
Start: 2022-04-27 | End: 2022-09-24

## 2022-04-27 RX ORDER — LAMOTRIGINE 25 MG/1
TABLET ORAL
Qty: 60 TABLET | Refills: 4 | Status: SHIPPED | OUTPATIENT
Start: 2022-04-27 | End: 2022-09-24

## 2022-04-27 RX ORDER — DULOXETIN HYDROCHLORIDE 60 MG/1
120 CAPSULE, DELAYED RELEASE ORAL DAILY
Qty: 60 CAPSULE | Refills: 4 | Status: SHIPPED | OUTPATIENT
Start: 2022-04-27 | End: 2022-09-24

## 2022-04-27 RX ORDER — NALTREXONE HYDROCHLORIDE 50 MG/1
50 TABLET, FILM COATED ORAL DAILY
Qty: 30 TABLET | Refills: 4 | Status: SHIPPED | OUTPATIENT
Start: 2022-04-27 | End: 2022-09-24

## 2022-04-27 RX ORDER — TRAZODONE HYDROCHLORIDE 100 MG/1
100-200 TABLET ORAL
Qty: 60 TABLET | Refills: 0 | Status: SHIPPED | OUTPATIENT
Start: 2022-04-27 | End: 2022-05-27

## 2022-04-27 NOTE — BH TREATMENT PLAN
TREATMENT PLAN (Medication Management Only)        [facility]    Name/Date of Birth/MRN:  Anand Rojas 49 y o  1970 MRN: [id number]  Date of Treatment Plan: April 27, 2022  Diagnosis/Diagnoses:   1  Major depressive disorder, recurrent, severe without psychotic features ([facility] )    2  KYLE (generalized anxiety disorder)    3  Panic disorder without agoraphobia    4  Obsessive-compulsive disorder, unspecified type    5  Impulse control disorder    6  Personality disorder ([facility] )    7  Other insomnia    8  Long-term use of high-risk medication      Strengths/Personal Resources for Self-Care: "taking my medicines"  Area/Areas of need (in own words): "my anxiety level and depression level"  1  Long Term Goal:   alleviate anxiety, improve control of depression, improve mood stability, help with sleep  Target Date: 2 months - 6/27/2022  Person/Persons responsible for completion of goal: Sarah Burnett  2  Short Term Objective (s) - How will we reach this goal?:   A  Provider new recommended medication/dosage changes and/or continue medication(s): increase Trazodone and Seroquel, discontinue Remeron, start Lamictal, continue all other medications (Atarax and Naltrexone)  B   N/A   C   N/A  Target Date: 2 months - 6/27/2022  Person/Persons Responsible for Completion of Goal: Sarah Burnett   Progress Towards Goals: minimal progress  Treatment Modality: medication management every 2 months, continue psychotherapy with own therapist  Review due 180 days from date of this plan: 6 months - 10/27/2022  Expected length of service: ongoing treatment unless revised  My Physician/PA/NP and I have developed this plan together and I agree to work on the goals and objectives  I understand the treatment goals that were developed for my treatment    Electronic Signatures: on file (unless signed below)    Jessie Cain MD 04/27/22

## 2022-05-03 ENCOUNTER — TELEPHONE (OUTPATIENT)
Dept: INTERNAL MEDICINE CLINIC | Facility: CLINIC | Age: 52
End: 2022-05-03

## 2022-05-03 ENCOUNTER — NURSE TRIAGE (OUTPATIENT)
Dept: OTHER | Facility: OTHER | Age: 52
End: 2022-05-03

## 2022-05-03 DIAGNOSIS — R39.9 URINARY SYMPTOM OR SIGN: ICD-10-CM

## 2022-05-03 DIAGNOSIS — N13.9 URINARY (TRACT) OBSTRUCTION: Primary | ICD-10-CM

## 2022-05-03 NOTE — TELEPHONE ENCOUNTER
Patient has bladder infection symptoms and would like get tested at [facility]  She can be reached at [phone]    Thank you

## 2022-05-03 NOTE — TELEPHONE ENCOUNTER
Regarding: UTI infection   ----- Message from Kiara Garcia sent at 5/3/2022  7:34 PM EDT -----  '' I have a UTI infection  ''

## 2022-05-03 NOTE — TELEPHONE ENCOUNTER
Reason for Disposition   All other patients with painful urination (Exception: [1] EITHER frequency or urgency AND [2] has on-call doctor)    Additional Information   [1] Discomfort (pain, burning or stinging) when passing urine AND [2] female    Answer Assessment - Initial Assessment Questions  1  SYMPTOM: "What's the main symptom you're concerned about?" (e g , frequency, incontinence)      Burning with urination, urinary frequency, pelvic cramping    2  ONSET: "When did this start?"      5/2/22 at 4pm    3  PAIN: "Is there any pain?" If Yes, ask: "How bad is it?" (Scale: 1-10; mild, moderate, severe)      Moderate    4  CAUSE: "What do you think is causing the symptoms?"      UTI    5  OTHER SYMPTOMS: "Do you have any other symptoms?" (e g , fever, flank pain, blood in urine, pain with urination)      Symptoms as noted above    Pt cursing frequently and yelling  She states that this was supposed to have been taken care of today because she may become septic because her mother had sepsis twice from UTI's  Reviewed chart and seen that an order was placed for a UA with reflex to culture  Informed pt of same  She states that she will go to get the test done tomorrow morning but will not wait for the culture to come back, as she knows how she feels when she has a UTI because she has had many before  She would like an antibiotic sent in for her as soon as possible  Offered to schedule an appt for pt but she states that she has to work  Recommended [facility] tonight as well but she declined and said she will go to the lab to give urine sample tomorrow      Protocols used: URINATION PAIN - FEMALE-ADULT-, URINARY SYMPTOMS-ADULT-AH

## 2022-05-04 ENCOUNTER — CONTACT LENS CHECK (OUTPATIENT)
Dept: URBAN - METROPOLITAN AREA CLINIC 6 | Facility: CLINIC | Age: 52
End: 2022-05-04

## 2022-05-04 ENCOUNTER — OFFICE VISIT (OUTPATIENT)
Dept: INTERNAL MEDICINE CLINIC | Facility: CLINIC | Age: 52
End: 2022-05-04
Payer: COMMERCIAL

## 2022-05-04 VITALS
HEART RATE: 109 BPM | SYSTOLIC BLOOD PRESSURE: 116 MMHG | BODY MASS INDEX: 29.22 KG/M2 | WEIGHT: 158.8 LBS | DIASTOLIC BLOOD PRESSURE: 70 MMHG | TEMPERATURE: 99.4 F | HEIGHT: 62 IN | OXYGEN SATURATION: 96 %

## 2022-05-04 DIAGNOSIS — R39.9 UTI SYMPTOMS: Primary | ICD-10-CM

## 2022-05-04 DIAGNOSIS — H52.201: ICD-10-CM

## 2022-05-04 DIAGNOSIS — H52.03: ICD-10-CM

## 2022-05-04 LAB
BACTERIA UR QL AUTO: ABNORMAL /HPF
BILIRUB UR QL STRIP: NEGATIVE
CLARITY UR: CLEAR
COLOR UR: ABNORMAL
GLUCOSE UR STRIP-MCNC: NEGATIVE MG/DL
HGB UR QL STRIP.AUTO: NEGATIVE
KETONES UR STRIP-MCNC: NEGATIVE MG/DL
LEUKOCYTE ESTERASE UR QL STRIP: ABNORMAL
NITRITE UR QL STRIP: NEGATIVE
NON-SQ EPI CELLS URNS QL MICRO: ABNORMAL /HPF
PH UR STRIP.AUTO: 7.5 [PH]
PROT UR STRIP-MCNC: ABNORMAL MG/DL
RBC #/AREA URNS AUTO: ABNORMAL /HPF
SL AMB  POCT GLUCOSE, UA: NORMAL
SL AMB LEUKOCYTE ESTERASE,UA: NORMAL
SL AMB POCT BILIRUBIN,UA: NORMAL
SL AMB POCT BLOOD,UA: NORMAL
SL AMB POCT CLARITY,UA: NORMAL
SL AMB POCT COLOR,UA: YELLOW
SL AMB POCT KETONES,UA: NORMAL
SL AMB POCT NITRITE,UA: NORMAL
SL AMB POCT PH,UA: 8
SL AMB POCT SPECIFIC GRAVITY,UA: 1
SL AMB POCT URINE PROTEIN: NORMAL
SL AMB POCT UROBILINOGEN: 3.5
SP GR UR STRIP.AUTO: 1.02 (ref 1–1.03)
UROBILINOGEN UR STRIP-ACNC: <2 MG/DL
WBC #/AREA URNS AUTO: ABNORMAL /HPF

## 2022-05-04 PROCEDURE — 81002 URINALYSIS NONAUTO W/O SCOPE: CPT | Performed by: INTERNAL MEDICINE

## 2022-05-04 PROCEDURE — 99213 OFFICE O/P EST LOW 20 MIN: CPT | Performed by: INTERNAL MEDICINE

## 2022-05-04 PROCEDURE — 92310 CONTACT LENS FITTING OU: CPT | Mod: NC

## 2022-05-04 PROCEDURE — 87086 URINE CULTURE/COLONY COUNT: CPT | Performed by: INTERNAL MEDICINE

## 2022-05-04 PROCEDURE — 81001 URINALYSIS AUTO W/SCOPE: CPT | Performed by: INTERNAL MEDICINE

## 2022-05-04 PROCEDURE — 1036F TOBACCO NON-USER: CPT | Performed by: INTERNAL MEDICINE

## 2022-05-04 PROCEDURE — 3008F BODY MASS INDEX DOCD: CPT | Performed by: INTERNAL MEDICINE

## 2022-05-04 RX ORDER — PHENAZOPYRIDINE HYDROCHLORIDE 95 MG/1
95 TABLET ORAL 3 TIMES DAILY PRN
Qty: 10 TABLET | Refills: 0 | Status: SHIPPED | OUTPATIENT
Start: 2022-05-04

## 2022-05-04 RX ORDER — CIPROFLOXACIN 500 MG/1
500 TABLET, FILM COATED ORAL EVERY 12 HOURS SCHEDULED
Qty: 14 TABLET | Refills: 0 | Status: SHIPPED | OUTPATIENT
Start: 2022-05-04 | End: 2022-05-11

## 2022-05-04 ASSESSMENT — KERATOMETRY
OD_K1POWER_DIOPTERS: 45.25
OD_K2POWER_DIOPTERS: 46.25
OS_K2POWER_DIOPTERS: 46.75
OS_AXISANGLE2_DEGREES: 90
OD_AXISANGLE2_DEGREES: 74
OS_K1POWER_DIOPTERS: 45.50
OD_AXISANGLE_DEGREES: 164
OS_AXISANGLE_DEGREES: 180

## 2022-05-04 NOTE — ASSESSMENT & PLAN NOTE
This patient presents today with symptoms consistent with a urinary tract infection  Will we do not see convincing evidence to support this on her urinalysis dipstick we will forward the urine for a culture  Given her symptoms and low-grade temperature I believe it is prudent to go ahead and start antibiotic therapy today  The patient is quite uncomfortable  Will start Cipro 500 mg twice a day for duration of 7 days during this period of time she should drink extra fluids to keep her kidneys and bladder Swain flushed  In addition will give her Pyridium 95 mg 3 times a day for the 1st 3 days to help with symptomatic control  Follow-up if symptoms do not resolve completely with our treatment

## 2022-05-04 NOTE — PROGRESS NOTES
Assessment/Plan:    UTI symptoms  This patient presents today with symptoms consistent with a urinary tract infection  Will we do not see convincing evidence to support this on her urinalysis dipstick we will forward the urine for a culture  Given her symptoms and low-grade temperature I believe it is prudent to go ahead and start antibiotic therapy today  The patient is quite uncomfortable  Will start Cipro 500 mg twice a day for duration of 7 days during this period of time she should drink extra fluids to keep her kidneys and bladder Swain flushed  In addition will give her Pyridium 95 mg 3 times a day for the 1st 3 days to help with symptomatic control  Follow-up if symptoms do not resolve completely with our treatment  Diagnoses and all orders for this visit:    UTI symptoms  -     POCT urine dip  -     ciprofloxacin (CIPRO) 500 mg tablet; Take 1 tablet (500 mg total) by mouth every 12 (twelve) hours for 7 days  -     phenazopyridine (PYRIDIUM) 95 MG tablet; Take 1 tablet (95 mg total) by mouth 3 (three) times a day as needed for bladder spasms  -     Cancel: UA w Reflex to Microscopic w Reflex to Culture -Lab Collect; Future  -     UA w Reflex to Microscopic w Reflex to Culture - Clinic Collect        Subjective:      Patient ID: Queen Vladimir is a 49 y o  female  This 61-year-old female patient presents today for an acute visit  She presents with symptoms of possible urinary tract infection  She recounts an increase in frequency of urination as well as burning on urination and cramping of her bladder over the past 3 days  She has no documented fever or chills at home but is noted to have a low-grade here in the office of [facility] [address]  She denies any costovertebral angle tenderness and has had no evidence of blood mixed in with her urine        The following portions of the patient's history were reviewed and updated as appropriate:   She  has a past medical history of Amenorrhea, Anxiety, Arthritis, Asthma, Back pain, Chondromalacia of patella, Chronic fatigue, COPD (chronic obstructive pulmonary disease) ([facility]) (Yes), Deep vein thrombophlebitis of leg ([address] ), Depression, Disease of thyroid gland, GERD (gastroesophageal reflux disease), HPV (human papilloma virus) infection, Hypothyroidism, Lumbar radiculopathy, Migraine, Myofascial pain syndrome, Osteopenia, Piriformis syndrome, Sacroiliitis ([address] ), Sciatica, Seizure ([address] ), Sleep apnea, Spondylosis of lumbar spine, Trochanteric bursitis of right hip, Vertigo, Vitamin D deficiency, and Weight gain (12/19/2019)    She   Patient Active Problem List    Diagnosis Date Noted    UTI symptoms 05/04/2022    COVID-19 12/28/2021    Hashimoto's thyroiditis 12/22/2021    Chronic migraine without aura without status migrainosus, not intractable 07/30/2021    Myofascial muscle pain 07/30/2021    Dysuria 03/29/2021    Severe persistent asthma without complication 43/20/8654    Seizure ([address] ) 10/26/2019    Dysphasia 10/23/2019    Long-term use of high-risk medication 10/14/2019    Closed fracture of phalanx of foot 09/27/2019    Low back pain 09/27/2019    Personality disorder ([address] ) 09/27/2019    Subclinical hypothyroidism 09/24/2019    Migraine headache 09/24/2019    Hypotension 09/22/2019    Chronic idiopathic urticaria 08/07/2019    Vitamin D deficiency 08/07/2019    Shellfish allergy 08/07/2019    Gastroesophageal reflux disease with esophagitis 08/07/2019    History of pulmonary embolism 07/25/2019    Cervical spine arthritis 07/11/2019    Mixed hyperlipidemia 06/06/2019    Obstructive sleep apnea     Chronic left shoulder pain 05/06/2019    Class 1 obesity 04/19/2019    Status migrainosus 04/14/2019    Headache, chronic migraine without aura, intractable 04/12/2019    Obsessive-compulsive disorder, unspecified 01/07/2019    Other insomnia 08/16/2018    Intervertebral disc disorder with radiculopathy of lumbar region 04/17/2018    IUD (intrauterine device) in place 2018    Major depressive disorder, recurrent, severe without psychotic features ([address] ) 2017    Chronic fatigue 2017    Chronic GERD 2017    Chronic migraine without aura 2017    KYLE (generalized anxiety disorder) 02/10/2016    Allergic rhinitis 2015    Impulse control disorder 2015    Amenorrhea 2014    Abnormal involuntary movements 2013    Panic disorder without agoraphobia 2013    Sciatica 2013    Asthma 2013    Esophageal reflux 2013    Goiter 06/10/2010     She  has a past surgical history that includes Laparoscopic endometriosis fulguration;  section; Cervix lesion destruction; Colonoscopy; LAPAROSCOPY; Tooth extraction; and Colposcopy w/ biopsy / curettage  Her family history includes Asthma in her daughter, daughter, and daughter; Colon cancer in her father, maternal aunt, and maternal grandfather; Diabetes in her paternal grandmother; Heart disease in her mother; Lung cancer in her paternal grandfather; No Known Problems in her maternal aunt, maternal aunt, maternal aunt, maternal grandmother, and paternal aunt; Prostate cancer in her maternal grandfather  She  reports that she has never smoked  She has never used smokeless tobacco  She reports previous alcohol use  She reports previous drug use  Current Outpatient Medications   Medication Sig Dispense Refill    albuterol (2 5 mg/3 mL) 0 083 % nebulizer solution Inhale       albuterol (Proventil HFA) 90 mcg/act inhaler Inhale 2 puffs every 6 (six) hours as needed for wheezing 18 g 3    aluminum-magnesium hydroxide 200-200 MG/5ML suspension GAVISCON REGULAR STRENGTH AFTER MEALS and BEDTIME WHEN NOT FOLLOWING LPR/GERD DIET   355 mL 11    Aspirin-Acetaminophen-Caffeine (MIGRAINE FORMULA PO) Take by mouth      [employer] INDOOR/OUTDOOR ALLERGY RLF 10 MG tablet Take 10 mg by mouth daily  10    cyclobenzaprine (FLEXERIL) 5 mg tablet Take 1 tablet (5 mg total) by mouth daily at bedtime as needed for muscle spasms 30 tablet 0    dexamethasone (DECADRON) 2 mg tablet 1 tab qam with food prn migraine  5 tablet 0    DULoxetine (CYMBALTA) 60 mg delayed release capsule Take 2 capsules (120 mg total) by mouth daily 60 capsule 4    fluticasone-salmeterol (ADVAIR HFA) 115-21 MCG/ACT inhaler Inhale 2 puffs 2 (two) times a day Rinse mouth after use  12 g 11    gabapentin (NEURONTIN) 300 mg capsule 1 cap am, 1 cap noon and 2 caps qhs 360 capsule 1    hydrOXYzine HCL (ATARAX) 50 mg tablet Take 1 tablet (50 mg total) by mouth 3 (three) times a day as needed for anxiety 90 tablet 4    ibuprofen (MOTRIN) 800 mg tablet Take 1 tablet (800 mg total) by mouth every 6 (six) hours as needed for mild pain 90 tablet 3    ketorolac (TORADOL) 10 mg tablet Take 1 tablet (10 mg total) by mouth every 6 (six) hours as needed (migraine) Max 2-3 per week  10 tablet 0    lamoTRIgine (LaMICtal) 25 mg tablet Take 1 tablet (25 mg total) by mouth daily at bedtime for 14 days, THEN 2 tablets (50 mg total) daily at bedtime   60 tablet 4    levonorgestrel (MIRENA, 52 MG,) 20 MCG/24HR IUD by Intrauterine route      mometasone (NASONEX) 50 mcg/act nasal spray       naltrexone (REVIA) 50 mg tablet Take 1 tablet (50 mg total) by mouth daily 30 tablet 4    ondansetron (ZOFRAN-ODT) 4 mg disintegrating tablet Take 1 tablet (4 mg total) by mouth every 6 (six) hours as needed for nausea or vomiting 20 tablet 0    prochlorperazine (COMPAZINE) 10 mg tablet TAKE 1 TABLET(10 MG) BY MOUTH EVERY 6 HOURS AS NEEDED FOR NAUSEA OR VOMITING 30 tablet 0    QUEtiapine (SEROquel) 200 mg tablet Take 1 tablet (200 mg total) by mouth daily at bedtime 30 tablet 4    tiotropium (SPIRIVA RESPIMAT) 1 25 MCG/ACT AERS inhaler Inhale 2 puffs daily 4 g 11    topiramate (TOPAMAX) 100 mg tablet 150 mg q12 hours 90 tablet 2    traZODone (DESYREL) 100 mg tablet Take 1-2 tablets (100-200 mg total) by mouth daily at bedtime as needed for sleep 60 tablet 0    VITAMIN D PO Take 5,000 Units by mouth      ZOLMitriptan (ZOMIG-ZMT) 5 MG disintegrating tablet TAKE 1 TABLET BY MOUTH AT ONSET OF HEADACHE, MAY REPEAT AFTER 2 HOURS AS NEEDED  MAX 2 TABLETS per 24 hours  12 tablet 2    ciprofloxacin (CIPRO) 500 mg tablet Take 1 tablet (500 mg total) by mouth every 12 (twelve) hours for 7 days 14 tablet 0    EPINEPHrine (EPIPEN) 0 3 mg/0 3 mL SOAJ Inject 0 3 mL (0 3 mg total) into a muscle once for 1 dose As directed 0 6 mL 11    olopatadine (PATANOL) 0 1 % ophthalmic solution Administer 1 drop to both eyes 2 (two) times a day for 90 days 10 mL 11    omeprazole (PriLOSEC) 20 mg delayed release capsule Take 1 capsule (20 mg total) by mouth daily 30 capsule 11    phenazopyridine (PYRIDIUM) 95 MG tablet Take 1 tablet (95 mg total) by mouth 3 (three) times a day as needed for bladder spasms 10 tablet 0     No current facility-administered medications for this visit       Review of Systems   Genitourinary: Positive for dysuria and frequency  Bladder cramping   All other systems reviewed and are negative  Objective:      /70   Pulse (!) 109   Temp 99 4 °F (37 4 °C)   Ht 5' 2" (1 575 m)   Wt 72 kg (158 lb 12 8 oz)   SpO2 96%   BMI 29 04 kg/m²          Physical Exam  Vitals reviewed  Constitutional:       General: She is not in acute distress  Appearance: Normal appearance  She is well-developed  She is not ill-appearing  HENT:      Right Ear: Hearing and external ear normal       Left Ear: Hearing and external ear normal       Nose: Nose normal  No mucosal edema  Mouth/Throat:      Pharynx: Uvula midline  Eyes:      General: Lids are normal       Conjunctiva/sclera: Conjunctivae normal       Pupils: Pupils are equal, round, and reactive to light  Neck:      Thyroid: No thyromegaly  Vascular: No carotid bruit or JVD     Cardiovascular:      Rate and Rhythm: Normal rate and regular rhythm  Heart sounds: Normal heart sounds  No murmur heard  Pulmonary:      Effort: Pulmonary effort is normal  No respiratory distress  Breath sounds: Normal breath sounds  No wheezing, rhonchi or rales  Abdominal:      General: Bowel sounds are normal       Palpations: Abdomen is soft  Tenderness: There is no right CVA tenderness or left CVA tenderness  Musculoskeletal:         General: Normal range of motion  Lymphadenopathy:      Cervical: No cervical adenopathy  Skin:     General: Skin is warm and dry  Neurological:      Mental Status: She is alert and oriented to person, place, and time  Deep Tendon Reflexes: Reflexes are normal and symmetric  Psychiatric:         Speech: Speech normal          Behavior: Behavior normal  Behavior is cooperative  Thought Content:  Thought content normal          Judgment: Judgment normal

## 2022-05-06 LAB — BACTERIA UR CULT: NORMAL

## 2022-05-11 ENCOUNTER — PROCEDURE ONLY (OUTPATIENT)
Dept: URBAN - METROPOLITAN AREA CLINIC 6 | Facility: CLINIC | Age: 52
End: 2022-05-11

## 2022-05-11 DIAGNOSIS — D23.122: ICD-10-CM

## 2022-05-11 PROCEDURE — 67840 REMOVE EYELID LESION: CPT

## 2022-05-11 ASSESSMENT — KERATOMETRY
OS_K2POWER_DIOPTERS: 46.75
OS_K1POWER_DIOPTERS: 45.50
OS_AXISANGLE2_DEGREES: 90
OD_AXISANGLE_DEGREES: 164
OS_AXISANGLE_DEGREES: 180
OD_K2POWER_DIOPTERS: 46.25
OD_K1POWER_DIOPTERS: 45.25
OD_AXISANGLE2_DEGREES: 74

## 2022-05-11 ASSESSMENT — TONOMETRY
OS_IOP_MMHG: 12
OD_IOP_MMHG: 11

## 2022-05-11 ASSESSMENT — VISUAL ACUITY
OD_CC: 20/25+2
OU_CC: J1
OS_CC: 20/100-1

## 2022-05-17 ENCOUNTER — TELEPHONE (OUTPATIENT)
Dept: NEUROLOGY | Facility: CLINIC | Age: 52
End: 2022-05-17

## 2022-05-17 DIAGNOSIS — F41.1 GAD (GENERALIZED ANXIETY DISORDER): Chronic | ICD-10-CM

## 2022-05-17 DIAGNOSIS — G43.709 CHRONIC MIGRAINE WITHOUT AURA: ICD-10-CM

## 2022-05-17 NOTE — TELEPHONE ENCOUNTER
Patient calling in  She is requesting refill of gabapentin 300 mg  She is requesting an increase of dose to 300 mg in AM, 600 mg afternoon, 600 mg in PM  Her psychiatrist would like her to increase dose a little for her anxiety       Patient would like 30 day supply at a time to [facility] on file     Please send if agreeable

## 2022-05-18 RX ORDER — GABAPENTIN 300 MG/1
CAPSULE ORAL
Qty: 450 CAPSULE | Refills: 0 | Status: SHIPPED | OUTPATIENT
Start: 2022-05-18 | End: 2022-06-26

## 2022-05-19 ENCOUNTER — CONTACT LENS CHECK (OUTPATIENT)
Dept: URBAN - METROPOLITAN AREA CLINIC 6 | Facility: CLINIC | Age: 52
End: 2022-05-19

## 2022-05-19 DIAGNOSIS — H52.03: ICD-10-CM

## 2022-05-19 DIAGNOSIS — H52.201: ICD-10-CM

## 2022-05-19 PROCEDURE — 92310 CONTACT LENS FITTING OU: CPT | Mod: NC

## 2022-05-19 ASSESSMENT — KERATOMETRY
OD_AXISANGLE2_DEGREES: 74
OS_AXISANGLE_DEGREES: 180
OS_K1POWER_DIOPTERS: 45.50
OS_AXISANGLE2_DEGREES: 90
OD_AXISANGLE_DEGREES: 164
OD_K1POWER_DIOPTERS: 45.25
OS_K2POWER_DIOPTERS: 46.75
OD_K2POWER_DIOPTERS: 46.25

## 2022-05-19 ASSESSMENT — VISUAL ACUITY
OS_CC: J1+
OS_CC: 20/30-1
OD_CC: 20/30

## 2022-05-27 DIAGNOSIS — G47.09 OTHER INSOMNIA: Primary | Chronic | ICD-10-CM

## 2022-05-27 RX ORDER — TRAZODONE HYDROCHLORIDE 100 MG/1
100-200 TABLET ORAL
Qty: 60 TABLET | Refills: 3 | Status: SHIPPED | OUTPATIENT
Start: 2022-05-27 | End: 2022-09-24

## 2022-05-29 NOTE — TELEPHONE ENCOUNTER
Patient called asking if [employer] letter from 5/5/2020 could be resent as the landlord had lost the original one  Sent a copy of letter to [address] [place] [address] with attention to Charo Dunham, as requested  room air

## 2022-06-25 DIAGNOSIS — F41.1 GAD (GENERALIZED ANXIETY DISORDER): Chronic | ICD-10-CM

## 2022-06-25 DIAGNOSIS — G43.709 CHRONIC MIGRAINE WITHOUT AURA: ICD-10-CM

## 2022-06-26 RX ORDER — GABAPENTIN 300 MG/1
CAPSULE ORAL
Qty: 450 CAPSULE | Refills: 0 | Status: SHIPPED | OUTPATIENT
Start: 2022-06-26

## 2022-06-29 DIAGNOSIS — G43.709 CHRONIC MIGRAINE WITHOUT AURA WITHOUT STATUS MIGRAINOSUS, NOT INTRACTABLE: ICD-10-CM

## 2022-06-29 RX ORDER — ONDANSETRON 4 MG/1
4 TABLET, ORALLY DISINTEGRATING ORAL EVERY 6 HOURS PRN
Qty: 20 TABLET | Refills: 0 | Status: SHIPPED | OUTPATIENT
Start: 2022-06-29

## 2022-06-29 RX ORDER — KETOROLAC TROMETHAMINE 10 MG/1
10 TABLET, FILM COATED ORAL EVERY 6 HOURS PRN
Qty: 10 TABLET | Refills: 0 | Status: SHIPPED | OUTPATIENT
Start: 2022-06-29 | End: 2022-08-02 | Stop reason: SDUPTHER

## 2022-06-29 NOTE — TELEPHONE ENCOUNTER
Received VM from patient requesting refills of Zofran and Toradol  Scripts pended for review and signature  Thanks!

## 2022-07-25 ENCOUNTER — TELEPHONE (OUTPATIENT)
Dept: PSYCHIATRY | Facility: CLINIC | Age: 52
End: 2022-07-25

## 2022-07-25 NOTE — TELEPHONE ENCOUNTER
Pt is on cymbalta and when pt went to  prescriptions over 100 00 and pt can not afford it insurance will not cover cost can provider put her on something else, please review, pt number is [phone]   Thank you

## 2022-07-25 NOTE — TELEPHONE ENCOUNTER
Please call Bill Hebert and guide her through Good Rx process  She should download Good Rx charlene and obtain Cymbalta coupon  60 capsules of Cymbalta 60 mg with Good Rx coupon (AND NO INSURANCE CLAIM) costs $15 28 at [facility], $16 53 at [facility], $16 53 at [facility] and $16 88 at [facility]   Once she decides which pharmacy she would use, I can send a new prescription there

## 2022-07-26 NOTE — TELEPHONE ENCOUNTER
Spoke with [place]  She is covered by her 's insurance  The plan was changed and now they have to start over with a deductible  Reviewed feedback per Dr Silas Francois  She will review GoodRx and call with a pharmacy

## 2022-07-26 NOTE — TELEPHONE ENCOUNTER
Called Marta Burns and left a VM:  Calling to review potential cost of medication with a coupon; nursing number to return the call

## 2022-08-02 DIAGNOSIS — G43.709 CHRONIC MIGRAINE WITHOUT AURA WITHOUT STATUS MIGRAINOSUS, NOT INTRACTABLE: ICD-10-CM

## 2022-08-02 NOTE — TELEPHONE ENCOUNTER
Patient left  for refill of gabapentin and ketorolac  Patient of [facility], last visit 4/4  Due for ketorolac; please sign script if in agreement  I called pharmacy to clarify gabapentin as should not be due until 9/26  Pharmacy confirmed they could only fill 30 day supply not 90 as sent  They will prepare for patient

## 2022-08-03 RX ORDER — KETOROLAC TROMETHAMINE 10 MG/1
10 TABLET, FILM COATED ORAL EVERY 6 HOURS PRN
Qty: 10 TABLET | Refills: 0 | Status: SHIPPED | OUTPATIENT
Start: 2022-08-03 | End: 2022-09-16 | Stop reason: SDUPTHER

## 2022-08-09 ENCOUNTER — TELEPHONE (OUTPATIENT)
Dept: INTERNAL MEDICINE CLINIC | Facility: CLINIC | Age: 52
End: 2022-08-09

## 2022-08-10 ENCOUNTER — OFFICE VISIT (OUTPATIENT)
Dept: URGENT CARE | Age: 52
End: 2022-08-10
Payer: COMMERCIAL

## 2022-08-10 VITALS — OXYGEN SATURATION: 99 % | TEMPERATURE: 97.3 F | RESPIRATION RATE: 14 BRPM | HEART RATE: 53 BPM

## 2022-08-10 DIAGNOSIS — S61.210A LACERATION OF RIGHT INDEX FINGER WITHOUT FOREIGN BODY WITHOUT DAMAGE TO NAIL, INITIAL ENCOUNTER: Primary | ICD-10-CM

## 2022-08-10 PROCEDURE — 99213 OFFICE O/P EST LOW 20 MIN: CPT | Performed by: NURSE PRACTITIONER

## 2022-08-10 NOTE — PROGRESS NOTES
[facility]        NAME: Kelly Jaffe is a 49 y o  female  : 1970    MRN: [id number]  DATE: August 10, 2022  TIME: 7:33 PM    Assessment and Plan   Laceration of right index finger without foreign body without damage to nail, initial encounter [S61 210A]  1  Laceration of right index finger without foreign body without damage to nail, initial encounter           Patient Instructions     Wound care instructions  Tdap in 2019; upto date   Follow up with PCP in 3-5 days  Proceed to  ER if symptoms worsen  Chief Complaint     Chief Complaint   Patient presents with    Laceration     Right index finger laceration         History of Present Illness       HPI   Reports she cut her right index finger last night, about 23 hrs ago  Mild pain  Review of Systems   Review of Systems   Constitutional: Negative for chills and fever  Skin: Positive for wound (right indx finger)  Negative for color change  Neurological: Negative for numbness  Current Medications       Current Outpatient Medications:     albuterol (2 5 mg/3 mL) 0 083 % nebulizer solution, Inhale , Disp: , Rfl:     albuterol (Proventil HFA) 90 mcg/act inhaler, Inhale 2 puffs every 6 (six) hours as needed for wheezing, Disp: 18 g, Rfl: 3    aluminum-magnesium hydroxide 200-200 MG/5ML suspension, GAVISCON REGULAR STRENGTH AFTER MEALS and BEDTIME WHEN NOT FOLLOWING LPR/GERD DIET , Disp: 355 mL, Rfl: 11    Aspirin-Acetaminophen-Caffeine (MIGRAINE FORMULA PO), Take by mouth, Disp: , Rfl:     [employer] INDOOR/OUTDOOR ALLERGY RLF 10 MG tablet, Take 10 mg by mouth daily, Disp: , Rfl: 10    cyclobenzaprine (FLEXERIL) 5 mg tablet, Take 1 tablet (5 mg total) by mouth daily at bedtime as needed for muscle spasms, Disp: 30 tablet, Rfl: 0    dexamethasone (DECADRON) 2 mg tablet, 1 tab qam with food prn migraine  , Disp: 5 tablet, Rfl: 0    DULoxetine (CYMBALTA) 60 mg delayed release capsule, Take 2 capsules (120 mg total) by mouth daily, Disp: 60 capsule, Rfl: 4    EPINEPHrine (EPIPEN) 0 3 mg/0 3 mL SOAJ, Inject 0 3 mL (0 3 mg total) into a muscle once for 1 dose As directed, Disp: 0 6 mL, Rfl: 11    fluticasone-salmeterol (ADVAIR HFA) 115-21 MCG/ACT inhaler, Inhale 2 puffs 2 (two) times a day Rinse mouth after use , Disp: 12 g, Rfl: 11    gabapentin (NEURONTIN) 300 mg capsule, TAKE ONE CAPSULE BY MOUTH EVERY MORNING, 2 CAPSULES BY MOUTH AT NOON AND 2 CAPSULES BY MOUTH EVERY NIGHT AT BEDTIME, Disp: 450 capsule, Rfl: 0    hydrOXYzine HCL (ATARAX) 50 mg tablet, Take 1 tablet (50 mg total) by mouth 3 (three) times a day as needed for anxiety, Disp: 90 tablet, Rfl: 4    ibuprofen (MOTRIN) 800 mg tablet, Take 1 tablet (800 mg total) by mouth every 6 (six) hours as needed for mild pain, Disp: 90 tablet, Rfl: 3    ketorolac (TORADOL) 10 mg tablet, Take 1 tablet (10 mg total) by mouth every 6 (six) hours as needed (migraine) Max 2-3 per week , Disp: 10 tablet, Rfl: 0    lamoTRIgine (LaMICtal) 25 mg tablet, Take 1 tablet (25 mg total) by mouth daily at bedtime for 14 days, THEN 2 tablets (50 mg total) daily at bedtime  , Disp: 60 tablet, Rfl: 4    levonorgestrel (MIRENA, 52 MG,) 20 MCG/24HR IUD, by Intrauterine route, Disp: , Rfl:     mometasone (NASONEX) 50 mcg/act nasal spray, , Disp: , Rfl:     naltrexone (REVIA) 50 mg tablet, Take 1 tablet (50 mg total) by mouth daily, Disp: 30 tablet, Rfl: 4    olopatadine (PATANOL) 0 1 % ophthalmic solution, Administer 1 drop to both eyes 2 (two) times a day for 90 days, Disp: 10 mL, Rfl: 11    omeprazole (PriLOSEC) 20 mg delayed release capsule, Take 1 capsule (20 mg total) by mouth daily, Disp: 30 capsule, Rfl: 11    ondansetron (ZOFRAN-ODT) 4 mg disintegrating tablet, Take 1 tablet (4 mg total) by mouth every 6 (six) hours as needed for nausea or vomiting, Disp: 20 tablet, Rfl: 0    phenazopyridine (PYRIDIUM) 95 MG tablet, Take 1 tablet (95 mg total) by mouth 3 (three) times a day as needed for bladder spasms, Disp: 10 tablet, Rfl: 0    prochlorperazine (COMPAZINE) 10 mg tablet, TAKE 1 TABLET(10 MG) BY MOUTH EVERY 6 HOURS AS NEEDED FOR NAUSEA OR VOMITING, Disp: 30 tablet, Rfl: 0    QUEtiapine (SEROquel) 200 mg tablet, Take 1 tablet (200 mg total) by mouth daily at bedtime, Disp: 30 tablet, Rfl: 4    tiotropium (SPIRIVA RESPIMAT) 1 25 MCG/ACT AERS inhaler, Inhale 2 puffs daily, Disp: 4 g, Rfl: 11    topiramate (TOPAMAX) 100 mg tablet, 150 mg q12 hours, Disp: 90 tablet, Rfl: 2    traZODone (DESYREL) 100 mg tablet, Take 1-2 tablets (100-200 mg total) by mouth daily at bedtime as needed for sleep, Disp: 60 tablet, Rfl: 3    VITAMIN D PO, Take 5,000 Units by mouth, Disp: , Rfl:     ZOLMitriptan (ZOMIG-ZMT) 5 MG disintegrating tablet, TAKE 1 TABLET BY MOUTH AT ONSET OF HEADACHE, MAY REPEAT AFTER 2 HOURS AS NEEDED  MAX 2 TABLETS per 24 hours  , Disp: 12 tablet, Rfl: 2    Current Allergies     Allergies as of 08/10/2022 - Reviewed 08/10/2022   Allergen Reaction Noted    Nuts - food allergy      Cephalexin  07/22/2013    Erythromycin Diarrhea, GI Intolerance, and Vomiting 06/12/2013    Iodine - food allergy Hives     Other  07/24/2019    Shellfish allergy - food allergy  06/12/2013    Shellfish-derived products - food allergy  07/30/2015    Turmeric - food allergy Hives 12/10/2019    Wellbutrin [bupropion] Seizures 12/10/2019    Zithromax [azithromycin] Diarrhea 01/25/2016            The following portions of the patient's history were reviewed and updated as appropriate: allergies, current medications, past family history, past medical history, past social history, past surgical history and problem list      Past Medical History:   Diagnosis Date    Amenorrhea     Anxiety     Arthritis     Asthma     Back pain     Chondromalacia of patella     Chronic fatigue     COPD [facility]chronic obstructive pulmonary disease) [facility] ) Yes    Deep vein thrombophlebitis of leg ([facility] )     Depression     Disease of thyroid gland     GERD (gastroesophageal reflux disease)     HPV (human papilloma virus) infection     Hypothyroidism     Lumbar radiculopathy     Migraine     Myofascial pain syndrome     Osteopenia     Piriformis syndrome     Sacroiliitis (HCC)     Sciatica     Seizure ([address] )     Sleep apnea     Spondylosis of lumbar spine     Trochanteric bursitis of right hip     Vertigo     Vitamin D deficiency     Weight gain 2019       Past Surgical History:   Procedure Laterality Date    CERVIX LESION DESTRUCTION       SECTION      COLONOSCOPY      COLPOSCOPY W/ BIOPSY / CURETTAGE      Colposcopy cervix with biopsy    LAPAROSCOPIC ENDOMETRIOSIS FULGURATION      LAPAROSCOPY      TOOTH EXTRACTION         Family History   Problem Relation Age of Onset    Heart disease Mother     Asthma Daughter     Lung cancer Paternal Grandfather     Asthma Daughter     Colon cancer Father     Colon cancer Maternal Grandfather     Prostate cancer Maternal Grandfather     Colon cancer Maternal Aunt     No Known Problems Maternal Grandmother     Diabetes Paternal Grandmother     No Known Problems Maternal Aunt     No Known Problems Maternal Aunt     No Known Problems Maternal Aunt     No Known Problems Paternal Aunt     Asthma Daughter     Psychiatric Illness Neg Hx     Stroke Neg Hx     Thyroid disease Neg Hx     Substance Abuse Neg Hx     Suicidality Neg Hx          Medications have been verified  Objective   Pulse (!) 53   Temp (!) 97 3 °F (36 3 °C) (Temporal)   Resp 14   SpO2 99%   No LMP recorded  Physical Exam     Physical Exam  Constitutional:       Appearance: She is not ill-appearing or diaphoretic  Musculoskeletal:         General: Tenderness present  No swelling  Skin:     Capillary Refill: Capillary refill takes less than 2 seconds  Findings: No bruising or erythema  Comments: There is a small curved wound, less than 5 mm diameter  Slanted wound  No active bleeding  Mild TTP

## 2022-08-22 ENCOUNTER — HOSPITAL ENCOUNTER (OUTPATIENT)
Dept: RADIOLOGY | Age: 52
Discharge: HOME | End: 2022-08-22
Payer: COMMERCIAL

## 2022-08-22 DIAGNOSIS — R10.2 PELVIC PAIN: ICD-10-CM

## 2022-08-22 PROCEDURE — 76830 TRANSVAGINAL US NON-OB: CPT

## 2022-08-22 PROCEDURE — 76856 US EXAM PELVIC COMPLETE: CPT

## 2022-08-26 ENCOUNTER — TELEPHONE (OUTPATIENT)
Dept: OBGYN CLINIC | Facility: CLINIC | Age: 52
End: 2022-08-26

## 2022-08-26 NOTE — TELEPHONE ENCOUNTER
Pt saw u/s result in mychart, saw result an has questions re poss adenexal mass  req further instructions

## 2022-08-30 ENCOUNTER — OFFICE VISIT (OUTPATIENT)
Dept: INTERNAL MEDICINE CLINIC | Facility: CLINIC | Age: 52
End: 2022-08-30
Payer: COMMERCIAL

## 2022-08-30 VITALS
HEART RATE: 92 BPM | WEIGHT: 160.8 LBS | DIASTOLIC BLOOD PRESSURE: 52 MMHG | SYSTOLIC BLOOD PRESSURE: 102 MMHG | BODY MASS INDEX: 29.59 KG/M2 | OXYGEN SATURATION: 98 % | TEMPERATURE: 97.6 F | HEIGHT: 62 IN

## 2022-08-30 DIAGNOSIS — G47.33 OBSTRUCTIVE SLEEP APNEA: ICD-10-CM

## 2022-08-30 DIAGNOSIS — E03.8 SUBCLINICAL HYPOTHYROIDISM: ICD-10-CM

## 2022-08-30 DIAGNOSIS — G43.909 MIGRAINE WITHOUT STATUS MIGRAINOSUS, NOT INTRACTABLE, UNSPECIFIED MIGRAINE TYPE: ICD-10-CM

## 2022-08-30 DIAGNOSIS — K21.00 GASTROESOPHAGEAL REFLUX DISEASE WITH ESOPHAGITIS WITHOUT HEMORRHAGE: ICD-10-CM

## 2022-08-30 DIAGNOSIS — Z23 NEED FOR VACCINATION: ICD-10-CM

## 2022-08-30 DIAGNOSIS — R56.9 SEIZURE (HCC): ICD-10-CM

## 2022-08-30 DIAGNOSIS — F33.2 MAJOR DEPRESSIVE DISORDER, RECURRENT, SEVERE WITHOUT PSYCHOTIC FEATURES (HCC): Chronic | ICD-10-CM

## 2022-08-30 DIAGNOSIS — J45.51 SEVERE PERSISTENT ASTHMA WITH ACUTE EXACERBATION: ICD-10-CM

## 2022-08-30 DIAGNOSIS — G47.30 SLEEP APNEA, UNSPECIFIED TYPE: ICD-10-CM

## 2022-08-30 DIAGNOSIS — E78.2 MIXED HYPERLIPIDEMIA: Primary | ICD-10-CM

## 2022-08-30 PROBLEM — J45.50 SEVERE PERSISTENT ASTHMA WITHOUT COMPLICATION: Status: RESOLVED | Noted: 2020-05-11 | Resolved: 2022-08-30

## 2022-08-30 PROBLEM — R39.9 UTI SYMPTOMS: Status: RESOLVED | Noted: 2022-05-04 | Resolved: 2022-08-30

## 2022-08-30 PROCEDURE — 99215 OFFICE O/P EST HI 40 MIN: CPT | Performed by: INTERNAL MEDICINE

## 2022-08-30 PROCEDURE — 90471 IMMUNIZATION ADMIN: CPT | Performed by: INTERNAL MEDICINE

## 2022-08-30 PROCEDURE — 90677 PCV20 VACCINE IM: CPT | Performed by: INTERNAL MEDICINE

## 2022-08-30 RX ORDER — AMOXICILLIN AND CLAVULANATE POTASSIUM 875; 125 MG/1; MG/1
TABLET, FILM COATED ORAL
COMMUNITY
Start: 2022-08-25 | End: 2022-08-30

## 2022-08-30 NOTE — ASSESSMENT & PLAN NOTE
The patient's subclinical hypothyroid condition was reviewed today  Most recent TSH is normal and she is not on any replacement therapy at the present time recommend continued surveillance

## 2022-08-30 NOTE — ASSESSMENT & PLAN NOTE
We reviewed the patient's diagnosis of sleep apnea and have referred her on to a sleep medicine physician at [facility] for the initiation of treatment of her sleep apnea

## 2022-08-30 NOTE — ASSESSMENT & PLAN NOTE
The patient's asthma condition was reviewed today lung fields are clear on today's examination and the patient denies any recent infections  Given her diagnosis of asthma I have recommended that she receive a dose of the Prevnar 20 vaccine for pneumonia protection    I have also recommended the patient have an annual seasonal flu vaccine and updated COVID vaccine when it becomes available

## 2022-08-30 NOTE — ASSESSMENT & PLAN NOTE
Patient's symptoms currently seem to be rather stable on her current dosing of omeprazole with occasional requirement for Gaviscon for the breakthrough symptoms recommend continuation of omeprazole and avoidance of high acid foods and highly caffeinated beverages

## 2022-08-30 NOTE — PROGRESS NOTES
Assessment/Plan:    Gastroesophageal reflux disease with esophagitis  Patient's symptoms currently seem to be rather stable on her current dosing of omeprazole with occasional requirement for Gaviscon for the breakthrough symptoms recommend continuation of omeprazole and avoidance of high acid foods and highly caffeinated beverages  Subclinical hypothyroidism  The patient's subclinical hypothyroid condition was reviewed today  Most recent TSH is normal and she is not on any replacement therapy at the present time recommend continued surveillance  Obstructive sleep apnea  We reviewed the patient's diagnosis of sleep apnea and have referred her on to a sleep medicine physician at [facility] for the initiation of treatment of her sleep apnea  Asthma  The patient's asthma condition was reviewed today lung fields are clear on today's examination and the patient denies any recent infections  Given her diagnosis of asthma I have recommended that she receive a dose of the Prevnar 20 vaccine for pneumonia protection  I have also recommended the patient have an annual seasonal flu vaccine and updated COVID vaccine when it becomes available    Migraine headache  The patient's migraine headaches currently are stable she indicates no recent episodes of migraine headaches recommend continuation of current medications prescribed through [facility] Neurology services  Seizure [facility])  Seizure disorder remains stable recommend continue a shin of patient's current medications for management of seizures and follow-up with Neurology services at [facility]  Mixed hyperlipidemia  Recommend a follow-up lipid profile patient was provided with request for this study    She should continue a careful balance diet with avoidance of high concentrations of saturated fats patient was provided with patient education materials regarding dietary/lifestyle adjustments       Diagnoses and all orders for this visit:    Need for vaccination  -     Pneumococcal Conjugate Vaccine 20-valent (Pcv20)    Sleep apnea, unspecified type  -     Ambulatory Referral to Sleep Medicine; Future    Mixed hyperlipidemia  -     Lipid panel; Future    Other orders  -     Discontinue: amoxicillin-clavulanate (AUGMENTIN) 875-125 mg per tablet; TAKE 1 TABLET BY MOUTH EVERY 12 HOURS FOR 7 DAYS        Subjective:      Patient ID: Lizbeth Sol is a 49 y o  female  This 56-year-old female patient presents today to transfer her medical care to our service  During today's visit with the patient we have reviewed her medical history and current active medical conditions  The patient does have a history of seizure disorder  Her medications are managed by [facility] services  She indicates that she has had no recent seizure episodes  Patient also it has a diagnosis of depression disorder indicates that her current medications seem to be working well to manage her symptoms  These medications are prescribed by her psychiatrist at [facility] psychiatry services  Patient is also noted to have a history of migraine headaches she indicates that she has not experienced any recent headache episodes medications for treatment and management of her migraines are also prescribed by her neurologist at [facility] services  Patient has a history of subclinical hypothyroid condition most recent TSH value reviewed and it is a normal reading she is currently on surveillance  Patient has a prior history of hyperlipidemia and is due for an updated profile I have provided her with a request for this blood testing and also provided her with patient education materials regarding healthy diet to manage her cholesterol  The patient does have a history of acid reflux and is currently on omeprazole daily  She utilizes Gaviscon for any breakthrough symptoms which are not frequent      Patient has been diagnosed with sleep apnea but has not yet started treatment  A referral to [facility] sleep medicine was provided to the patient today to initiate treatment for management of her sleep apnea  Patient does have ankle pain bilaterally with some occasional mild swelling in the ankles and feet bilaterally  The swelling seems to resolve overnight  Findings are consistent with an arthritic irritation in the ankles and recommend Tylenol 650-1000 mg q 8 hours for management of her arthritic discomfort  The following portions of the patient's history were reviewed and updated as appropriate:   She  has a past medical history of Amenorrhea, Anxiety, Arthritis, Asthma, Back pain, Chondromalacia of patella, Chronic fatigue, COPD (chronic obstructive pulmonary disease) ([address] ) (Yes), Deep vein thrombophlebitis of leg ([address] ), Depression, Disease of thyroid gland, GERD (gastroesophageal reflux disease), HPV (human papilloma virus) infection, Hypothyroidism, Lumbar radiculopathy, Migraine, Myofascial pain syndrome, Osteopenia, Piriformis syndrome, Sacroiliitis ([address] ), Sciatica, Seizure ([address] ), Sleep apnea, Spondylosis of lumbar spine, Trochanteric bursitis of right hip, Vertigo, Vitamin D deficiency, and Weight gain (12/19/2019)    She   Patient Active Problem List    Diagnosis Date Noted    COVID-19 12/28/2021    Hashimoto's thyroiditis 12/22/2021    Chronic migraine without aura without status migrainosus, not intractable 07/30/2021    Myofascial muscle pain 07/30/2021    Dysuria 03/29/2021    Seizure ([address] ) 10/26/2019    Dysphasia 10/23/2019    Long-term use of high-risk medication 10/14/2019    Closed fracture of phalanx of foot 09/27/2019    Low back pain 09/27/2019    Personality disorder ([address] ) 09/27/2019    Subclinical hypothyroidism 09/24/2019    Migraine headache 09/24/2019    Hypotension 09/22/2019    Chronic idiopathic urticaria 08/07/2019    Vitamin D deficiency 08/07/2019    Shellfish allergy 08/07/2019    Gastroesophageal reflux disease with esophagitis 2019    History of pulmonary embolism 2019    Cervical spine arthritis 2019    Mixed hyperlipidemia 2019    Obstructive sleep apnea     Chronic left shoulder pain 2019    Class 1 obesity 2019    Status migrainosus 2019    Headache, chronic migraine without aura, intractable 2019    Obsessive-compulsive disorder, unspecified 2019    Other insomnia 2018    Intervertebral disc disorder with radiculopathy of lumbar region 2018    IUD (intrauterine device) in place 2018    Major depressive disorder, recurrent, severe without psychotic features ([facility] ) 2017    Chronic fatigue 2017    Chronic GERD 2017    Chronic migraine without aura 2017    KYLE (generalized anxiety disorder) 02/10/2016    Allergic rhinitis 2015    Impulse control disorder 2015    Amenorrhea 2014    Abnormal involuntary movements 2013    Panic disorder without agoraphobia 2013    Sciatica 2013    Asthma 2013    Esophageal reflux 2013    Goiter 06/10/2010     She  has a past surgical history that includes Laparoscopic endometriosis fulguration;  section; Cervix lesion destruction; Colonoscopy; LAPAROSCOPY; Tooth extraction; and Colposcopy w/ biopsy / curettage  Her family history includes Asthma in her daughter, daughter, and daughter; Colon cancer in her father, maternal aunt, and maternal grandfather; Diabetes in her paternal grandmother; Heart disease in her mother; Lung cancer in her paternal grandfather; No Known Problems in her maternal aunt, maternal aunt, maternal aunt, maternal grandmother, and paternal aunt; Prostate cancer in her maternal grandfather  She  reports that she has never smoked  She has never used smokeless tobacco  She reports previous alcohol use  She reports previous drug use    Current Outpatient Medications   Medication Sig Dispense Refill    albuterol (2 5 mg/3 mL) 0 083 % nebulizer solution Inhale       albuterol (Proventil HFA) 90 mcg/act inhaler Inhale 2 puffs every 6 (six) hours as needed for wheezing 18 g 3    aluminum-magnesium hydroxide 200-200 MG/5ML suspension GAVISCON REGULAR STRENGTH AFTER MEALS and BEDTIME WHEN NOT FOLLOWING LPR/GERD DIET  355 mL 11    Aspirin-Acetaminophen-Caffeine (MIGRAINE FORMULA PO) Take by mouth      [employer] INDOOR/OUTDOOR ALLERGY RLF 10 MG tablet Take 10 mg by mouth daily  10    cyclobenzaprine (FLEXERIL) 5 mg tablet Take 1 tablet (5 mg total) by mouth daily at bedtime as needed for muscle spasms 30 tablet 0    dexamethasone (DECADRON) 2 mg tablet 1 tab qam with food prn migraine  5 tablet 0    DULoxetine (CYMBALTA) 60 mg delayed release capsule Take 2 capsules (120 mg total) by mouth daily 60 capsule 4    fluticasone-salmeterol (ADVAIR HFA) 115-21 MCG/ACT inhaler Inhale 2 puffs 2 (two) times a day Rinse mouth after use  12 g 11    gabapentin (NEURONTIN) 300 mg capsule TAKE ONE CAPSULE BY MOUTH EVERY MORNING, 2 CAPSULES BY MOUTH AT NOON AND 2 CAPSULES BY MOUTH EVERY NIGHT AT BEDTIME 450 capsule 0    hydrOXYzine HCL (ATARAX) 50 mg tablet Take 1 tablet (50 mg total) by mouth 3 (three) times a day as needed for anxiety 90 tablet 4    ibuprofen (MOTRIN) 800 mg tablet Take 1 tablet (800 mg total) by mouth every 6 (six) hours as needed for mild pain 90 tablet 3    ketorolac (TORADOL) 10 mg tablet Take 1 tablet (10 mg total) by mouth every 6 (six) hours as needed (migraine) Max 2-3 per week  10 tablet 0    lamoTRIgine (LaMICtal) 25 mg tablet Take 1 tablet (25 mg total) by mouth daily at bedtime for 14 days, THEN 2 tablets (50 mg total) daily at bedtime   60 tablet 4    levonorgestrel (MIRENA) 20 MCG/24HR IUD by Intrauterine route      mometasone (NASONEX) 50 mcg/act nasal spray       naltrexone (REVIA) 50 mg tablet Take 1 tablet (50 mg total) by mouth daily 30 tablet 4    ondansetron (ZOFRAN-ODT) 4 mg disintegrating tablet Take 1 tablet (4 mg total) by mouth every 6 (six) hours as needed for nausea or vomiting 20 tablet 0    phenazopyridine (PYRIDIUM) 95 MG tablet Take 1 tablet (95 mg total) by mouth 3 (three) times a day as needed for bladder spasms 10 tablet 0    prochlorperazine (COMPAZINE) 10 mg tablet TAKE 1 TABLET(10 MG) BY MOUTH EVERY 6 HOURS AS NEEDED FOR NAUSEA OR VOMITING 30 tablet 0    QUEtiapine (SEROquel) 200 mg tablet Take 1 tablet (200 mg total) by mouth daily at bedtime 30 tablet 4    tiotropium (SPIRIVA RESPIMAT) 1 25 MCG/ACT AERS inhaler Inhale 2 puffs daily 4 g 11    topiramate (TOPAMAX) 100 mg tablet 150 mg q12 hours 90 tablet 2    traZODone (DESYREL) 100 mg tablet Take 1-2 tablets (100-200 mg total) by mouth daily at bedtime as needed for sleep 60 tablet 3    VITAMIN D PO Take 5,000 Units by mouth      ZOLMitriptan (ZOMIG-ZMT) 5 MG disintegrating tablet TAKE 1 TABLET BY MOUTH AT ONSET OF HEADACHE, MAY REPEAT AFTER 2 HOURS AS NEEDED  MAX 2 TABLETS per 24 hours  12 tablet 2    EPINEPHrine (EPIPEN) 0 3 mg/0 3 mL SOAJ Inject 0 3 mL (0 3 mg total) into a muscle once for 1 dose As directed 0 6 mL 11    olopatadine (PATANOL) 0 1 % ophthalmic solution Administer 1 drop to both eyes 2 (two) times a day for 90 days 10 mL 11    omeprazole (PriLOSEC) 20 mg delayed release capsule Take 1 capsule (20 mg total) by mouth daily 30 capsule 11     No current facility-administered medications for this visit       Review of Systems   Gastrointestinal:        Acid reflux irritation of the esophagus   Musculoskeletal: Positive for arthralgias  All other systems reviewed and are negative  Objective:      /52   Pulse 92   Temp 97 6 °F (36 4 °C)   Ht 5' 2" (1 575 m)   Wt 72 9 kg (160 lb 12 8 oz)   SpO2 98%   BMI 29 41 kg/m²          Physical Exam  Vitals reviewed  Constitutional:       General: She is not in acute distress  Appearance: Normal appearance   She is well-developed  She is not ill-appearing  HENT:      Right Ear: Hearing and external ear normal       Left Ear: Hearing and external ear normal       Nose: Nose normal  No mucosal edema  Mouth/Throat:      Pharynx: Uvula midline  Eyes:      General: Lids are normal       Conjunctiva/sclera: Conjunctivae normal       Pupils: Pupils are equal, round, and reactive to light  Neck:      Thyroid: No thyromegaly  Vascular: No carotid bruit or JVD  Cardiovascular:      Rate and Rhythm: Normal rate and regular rhythm  Heart sounds: Normal heart sounds  No murmur heard  Pulmonary:      Effort: Pulmonary effort is normal  No respiratory distress  Breath sounds: Normal breath sounds  No wheezing, rhonchi or rales  Abdominal:      General: Bowel sounds are normal       Palpations: Abdomen is soft  Musculoskeletal:         General: Normal range of motion  Right lower leg: No edema  Left lower leg: No edema  Lymphadenopathy:      Cervical: No cervical adenopathy  Skin:     General: Skin is warm and dry  Coloration: Skin is not jaundiced or pale  Neurological:      General: No focal deficit present  Mental Status: She is alert and oriented to person, place, and time  Mental status is at baseline  Deep Tendon Reflexes: Reflexes are normal and symmetric  Psychiatric:         Speech: Speech normal          Behavior: Behavior normal  Behavior is cooperative  Thought Content:  Thought content normal          Judgment: Judgment normal

## 2022-08-30 NOTE — ASSESSMENT & PLAN NOTE
Recommend a follow-up lipid profile patient was provided with request for this study    She should continue a careful balance diet with avoidance of high concentrations of saturated fats patient was provided with patient education materials regarding dietary/lifestyle adjustments

## 2022-08-30 NOTE — ASSESSMENT & PLAN NOTE
Seizure disorder remains stable recommend continue a shin of patient's current medications for management of seizures and follow-up with Neurology services at [facility]

## 2022-08-30 NOTE — ASSESSMENT & PLAN NOTE
The patient's migraine headaches currently are stable she indicates no recent episodes of migraine headaches recommend continuation of current medications prescribed through [facility] Neurology services

## 2022-09-08 DIAGNOSIS — F63.9 IMPULSE CONTROL DISORDER: Primary | Chronic | ICD-10-CM

## 2022-09-08 RX ORDER — NALTREXONE HYDROCHLORIDE 50 MG/1
50 TABLET, FILM COATED ORAL DAILY
Qty: 30 TABLET | Refills: 0 | Status: SHIPPED | OUTPATIENT
Start: 2022-09-08 | End: 2022-09-29 | Stop reason: SDUPTHER

## 2022-09-13 ENCOUNTER — HOSPITAL ENCOUNTER (OUTPATIENT)
Dept: ULTRASOUND IMAGING | Facility: CLINIC | Age: 52
Discharge: HOME | End: 2022-09-13
Payer: COMMERCIAL

## 2022-09-13 ENCOUNTER — HOSPITAL ENCOUNTER (OUTPATIENT)
Dept: MAMMOGRAPHY | Facility: CLINIC | Age: 52
Discharge: HOME | End: 2022-09-13
Payer: COMMERCIAL

## 2022-09-13 VITALS — BODY MASS INDEX: 29.62 KG/M2 | WEIGHT: 160.94 LBS | HEIGHT: 62 IN

## 2022-09-13 DIAGNOSIS — R92.8 ABNORMAL MAMMOGRAM: ICD-10-CM

## 2022-09-13 PROCEDURE — G0279 TOMOSYNTHESIS, MAMMO: HCPCS

## 2022-09-13 PROCEDURE — 76642 ULTRASOUND BREAST LIMITED: CPT

## 2022-09-13 PROCEDURE — 77066 DX MAMMO INCL CAD BI: CPT

## 2022-09-16 DIAGNOSIS — G43.709 CHRONIC MIGRAINE WITHOUT AURA WITHOUT STATUS MIGRAINOSUS, NOT INTRACTABLE: ICD-10-CM

## 2022-09-16 RX ORDER — KETOROLAC TROMETHAMINE 10 MG/1
10 TABLET, FILM COATED ORAL EVERY 6 HOURS PRN
Qty: 10 TABLET | Refills: 0 | Status: SHIPPED | OUTPATIENT
Start: 2022-09-16 | End: 2022-10-11 | Stop reason: SDUPTHER

## 2022-09-16 NOTE — TELEPHONE ENCOUNTER
Patient left message on refill line requesting refill of ketorolac  Please sign if agreeable       LOV 4/4/2022  F/U visit 10/11/2022  Last fill 8/3/2022

## 2022-09-17 DIAGNOSIS — F33.2 MAJOR DEPRESSIVE DISORDER, RECURRENT, SEVERE WITHOUT PSYCHOTIC FEATURES (HCC): Primary | Chronic | ICD-10-CM

## 2022-09-17 DIAGNOSIS — F41.1 GAD (GENERALIZED ANXIETY DISORDER): Chronic | ICD-10-CM

## 2022-09-18 RX ORDER — DULOXETIN HYDROCHLORIDE 60 MG/1
120 CAPSULE, DELAYED RELEASE ORAL DAILY
Qty: 60 CAPSULE | Refills: 0 | Status: SHIPPED | OUTPATIENT
Start: 2022-09-18 | End: 2022-09-29 | Stop reason: SDUPTHER
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
## 2022-09-29 ENCOUNTER — OFFICE VISIT (OUTPATIENT)
Dept: PSYCHIATRY | Facility: CLINIC | Age: 52
End: 2022-09-29
Payer: COMMERCIAL

## 2022-09-29 VITALS
DIASTOLIC BLOOD PRESSURE: 79 MMHG | SYSTOLIC BLOOD PRESSURE: 134 MMHG | BODY MASS INDEX: 31.1 KG/M2 | HEART RATE: 91 BPM | HEIGHT: 62 IN | WEIGHT: 169 LBS

## 2022-09-29 DIAGNOSIS — Z79.899 LONG-TERM USE OF HIGH-RISK MEDICATION: Chronic | ICD-10-CM

## 2022-09-29 DIAGNOSIS — F41.1 GAD (GENERALIZED ANXIETY DISORDER): Chronic | ICD-10-CM

## 2022-09-29 DIAGNOSIS — G47.09 OTHER INSOMNIA: Chronic | ICD-10-CM

## 2022-09-29 DIAGNOSIS — F63.9 IMPULSE CONTROL DISORDER: Chronic | ICD-10-CM

## 2022-09-29 DIAGNOSIS — F41.0 PANIC DISORDER WITHOUT AGORAPHOBIA: Chronic | ICD-10-CM

## 2022-09-29 DIAGNOSIS — F42.9 OBSESSIVE-COMPULSIVE DISORDER, UNSPECIFIED TYPE: Chronic | ICD-10-CM

## 2022-09-29 DIAGNOSIS — F60.9 PERSONALITY DISORDER (HCC): Chronic | ICD-10-CM

## 2022-09-29 DIAGNOSIS — F33.41 MAJOR DEPRESSIVE DISORDER, RECURRENT EPISODE, IN PARTIAL REMISSION (HCC): Primary | Chronic | ICD-10-CM

## 2022-09-29 PROCEDURE — 90833 PSYTX W PT W E/M 30 MIN: CPT | Performed by: PSYCHIATRY & NEUROLOGY

## 2022-09-29 PROCEDURE — 99214 OFFICE O/P EST MOD 30 MIN: CPT | Performed by: PSYCHIATRY & NEUROLOGY

## 2022-09-29 RX ORDER — NALTREXONE HYDROCHLORIDE 50 MG/1
50 TABLET, FILM COATED ORAL DAILY
Qty: 30 TABLET | Refills: 4 | Status: SHIPPED | OUTPATIENT
Start: 2022-09-29 | End: 2023-02-26

## 2022-09-29 RX ORDER — TRAZODONE HYDROCHLORIDE 100 MG/1
100-200 TABLET ORAL
Qty: 60 TABLET | Refills: 4 | Status: SHIPPED | OUTPATIENT
Start: 2022-09-29 | End: 2023-02-26

## 2022-09-29 RX ORDER — DULOXETIN HYDROCHLORIDE 60 MG/1
120 CAPSULE, DELAYED RELEASE ORAL DAILY
Qty: 60 CAPSULE | Refills: 4 | Status: SHIPPED | OUTPATIENT
Start: 2022-09-29 | End: 2023-02-26

## 2022-09-29 RX ORDER — LAMOTRIGINE 100 MG/1
100 TABLET ORAL
Qty: 30 TABLET | Refills: 4 | Status: SHIPPED | OUTPATIENT
Start: 2022-09-29 | End: 2023-02-26

## 2022-09-29 RX ORDER — HYDROXYZINE 50 MG/1
50 TABLET, FILM COATED ORAL 3 TIMES DAILY PRN
Qty: 90 TABLET | Refills: 4 | Status: SHIPPED | OUTPATIENT
Start: 2022-09-29 | End: 2023-02-26

## 2022-09-29 RX ORDER — QUETIAPINE FUMARATE 200 MG/1
200 TABLET, FILM COATED ORAL
Qty: 30 TABLET | Refills: 4 | Status: SHIPPED | OUTPATIENT
Start: 2022-09-29 | End: 2023-02-26

## 2022-09-29 NOTE — BH TREATMENT PLAN
TREATMENT PLAN (Medication Management Only)        [facility]    Name/Date of Birth/MRN:  Liliana Noguera 49 y o  1970 MRN: [id number]  Date of Treatment Plan: September 29, 2022  Diagnosis/Diagnoses:   1  Major depressive disorder, recurrent episode, in partial remission ([facility] )    2  KYLE (generalized anxiety disorder)    3  Panic disorder without agoraphobia    4  Obsessive-compulsive disorder, unspecified type    5  Impulse control disorder    6  Personality disorder ([facility] )    7  Other insomnia    8  Long-term use of high-risk medication      Strengths/Personal Resources for Self-Care: "I take my medications every day and I try to listen to recommendations"  Area/Areas of need (in own words): "trying to manage my anxiety"  1  Long Term Goal:   improve control of anxiety, maintain improvement in depression, maintain impulse control  Target Date: 3 months - 12/29/2022  Person/Persons responsible for completion of goal: Caridad Dutton  2  Short Term Objective (s) - How will we reach this goal?:   A  Provider new recommended medication/dosage changes and/or continue medication(s): increase Lamictal, continue all other medications (Cymbalta, Trazodone, Seroquel, Atarax and Naltrexone)  B   N/A   C   N/A  Target Date: 3 months - 12/29/2022  Person/Persons Responsible for Completion of Goal: Caridad Dutton   Progress Towards Goals: progressing  Treatment Modality: medication management every 3 months, continue psychotherapy with own therapist  Review due 180 days from date of this plan: 6 months - 3/29/2023  Expected length of service: ongoing treatment unless revised  My Physician/PA/NP and I have developed this plan together and I agree to work on the goals and objectives  I understand the treatment goals that were developed for my treatment    Electronic Signatures: on file (unless signed below)    Garry Gibbs MD 09/29/22

## 2022-10-03 ENCOUNTER — APPOINTMENT (OUTPATIENT)
Dept: LAB | Age: 52
End: 2022-10-03
Payer: COMMERCIAL

## 2022-10-03 ENCOUNTER — TELEPHONE (OUTPATIENT)
Dept: INTERNAL MEDICINE CLINIC | Facility: CLINIC | Age: 52
End: 2022-10-03

## 2022-10-03 DIAGNOSIS — N30.00 ACUTE CYSTITIS WITHOUT HEMATURIA: Primary | ICD-10-CM

## 2022-10-03 DIAGNOSIS — N30.00 ACUTE CYSTITIS WITHOUT HEMATURIA: ICD-10-CM

## 2022-10-03 LAB
BACTERIA UR QL AUTO: ABNORMAL /HPF
BILIRUB UR QL STRIP: ABNORMAL
CLARITY UR: ABNORMAL
COLOR UR: ABNORMAL
GLUCOSE UR STRIP-MCNC: NEGATIVE MG/DL
HGB UR QL STRIP.AUTO: ABNORMAL
KETONES UR STRIP-MCNC: NEGATIVE MG/DL
LEUKOCYTE ESTERASE UR QL STRIP: ABNORMAL
NITRITE UR QL STRIP: POSITIVE
NON-SQ EPI CELLS URNS QL MICRO: ABNORMAL /HPF
PH UR STRIP.AUTO: 7.5 [PH]
PROT UR STRIP-MCNC: ABNORMAL MG/DL
RBC #/AREA URNS AUTO: ABNORMAL /HPF
SP GR UR STRIP.AUTO: 1.03 (ref 1–1.03)
UROBILINOGEN UR STRIP-ACNC: 12 MG/DL
WBC #/AREA URNS AUTO: ABNORMAL /HPF

## 2022-10-03 PROCEDURE — 87086 URINE CULTURE/COLONY COUNT: CPT

## 2022-10-03 PROCEDURE — 81001 URINALYSIS AUTO W/SCOPE: CPT

## 2022-10-03 RX ORDER — SULFAMETHOXAZOLE AND TRIMETHOPRIM 800; 160 MG/1; MG/1
1 TABLET ORAL EVERY 12 HOURS SCHEDULED
Qty: 14 TABLET | Refills: 0 | Status: SHIPPED | OUTPATIENT
Start: 2022-10-03 | End: 2022-10-10

## 2022-10-03 NOTE — TELEPHONE ENCOUNTER
Lorinda Apgar called and said she has UTI symptoms  She has freqency and burning, no blood / back pain / or fever  She is asking if she can be treated ?     [phone]

## 2022-10-04 LAB — BACTERIA UR CULT: NORMAL

## 2022-10-05 ENCOUNTER — HOSPITAL ENCOUNTER (EMERGENCY)
Facility: HOSPITAL | Age: 52
Discharge: HOME | End: 2022-10-05
Attending: EMERGENCY MEDICINE
Payer: COMMERCIAL

## 2022-10-05 VITALS
TEMPERATURE: 98.9 F | SYSTOLIC BLOOD PRESSURE: 120 MMHG | RESPIRATION RATE: 16 BRPM | DIASTOLIC BLOOD PRESSURE: 69 MMHG | OXYGEN SATURATION: 96 % | HEART RATE: 71 BPM

## 2022-10-05 DIAGNOSIS — M54.9 BACK PAIN: Primary | ICD-10-CM

## 2022-10-05 PROCEDURE — 96372 THER/PROPH/DIAG INJ SC/IM: CPT

## 2022-10-05 PROCEDURE — 99283 EMERGENCY DEPT VISIT LOW MDM: CPT

## 2022-10-05 PROCEDURE — 99284 EMERGENCY DEPT VISIT MOD MDM: CPT

## 2022-10-05 RX ORDER — DIAZEPAM 5 MG/1
5 TABLET ORAL ONCE
Status: COMPLETED | OUTPATIENT
Start: 2022-10-05 | End: 2022-10-05

## 2022-10-05 RX ORDER — ACETAMINOPHEN 325 MG/1
650 TABLET ORAL EVERY 6 HOURS PRN
Qty: 30 TABLET | Refills: 0 | Status: SHIPPED | OUTPATIENT
Start: 2022-10-05

## 2022-10-05 RX ORDER — ACETAMINOPHEN 325 MG/1
975 TABLET ORAL ONCE
Status: COMPLETED | OUTPATIENT
Start: 2022-10-05 | End: 2022-10-05

## 2022-10-05 RX ORDER — LIDOCAINE 50 MG/G
1 PATCH TOPICAL EVERY 24 HOURS
Qty: 6 PATCH | Refills: 0 | Status: SHIPPED | OUTPATIENT
Start: 2022-10-05 | End: 2022-10-10

## 2022-10-05 RX ORDER — METHOCARBAMOL 500 MG/1
500 TABLET, FILM COATED ORAL 2 TIMES DAILY
Qty: 10 TABLET | Refills: 0 | Status: SHIPPED | OUTPATIENT
Start: 2022-10-05 | End: 2022-10-10

## 2022-10-05 RX ORDER — LIDOCAINE 50 MG/G
1 PATCH TOPICAL EVERY 24 HOURS
Qty: 6 PATCH | Refills: 0 | Status: SHIPPED | OUTPATIENT
Start: 2022-10-05 | End: 2022-10-05 | Stop reason: SDUPTHER

## 2022-10-05 RX ORDER — KETOROLAC TROMETHAMINE 30 MG/ML
30 INJECTION, SOLUTION INTRAMUSCULAR; INTRAVENOUS ONCE
Status: COMPLETED | OUTPATIENT
Start: 2022-10-05 | End: 2022-10-05

## 2022-10-05 RX ORDER — NAPROXEN 500 MG/1
500 TABLET ORAL 2 TIMES DAILY WITH MEALS
Qty: 30 TABLET | Refills: 0 | Status: SHIPPED | OUTPATIENT
Start: 2022-10-05 | End: 2022-10-10

## 2022-10-05 RX ADMIN — ACETAMINOPHEN 975 MG: 325 TABLET ORAL at 22:07

## 2022-10-05 RX ADMIN — DIAZEPAM 5 MG: 5 TABLET ORAL at 22:06

## 2022-10-05 RX ADMIN — KETOROLAC TROMETHAMINE 30 MG: 30 INJECTION, SOLUTION INTRAMUSCULAR at 22:08

## 2022-10-06 ENCOUNTER — TELEPHONE (OUTPATIENT)
Dept: INTERNAL MEDICINE CLINIC | Facility: CLINIC | Age: 52
End: 2022-10-06

## 2022-10-06 NOTE — DISCHARGE INSTRUCTIONS
You will receive a call from the [facility] to complete a phone triage to evaluate need for physical therapy  Call your primary care provider to schedule appointment in the next week for re-evaluation  Take the prescribed naproxen twice daily for the next 5 days  Use Tylenol as needed for breakthrough pain  Use Robaxin as needed for muscle spasms  Apply topical lidocaine patch to areas of tenderness  Return to the Emergency Department sooner if increased pain, numbness, weakness, fever, vomiting, diarrhea, incontinence, difficulty walking or breathing or urinating, rash

## 2022-10-06 NOTE — TELEPHONE ENCOUNTER
Patient was in the ER for back pain and the cocktail they gave her worked great for the pain   She made an appt for Monday to discus her options with you for a pain medication that will work and her next step

## 2022-10-06 NOTE — ED PROVIDER NOTES
History  Chief Complaint   Patient presents with    Back Pain     Pt states "I was cleaning my whole house yesterday and now my back is so sore " Pain increases with movement  Patient is a 26-year-old female with history of chronic back pain presenting for evaluation of 1 day of generalized back pain  She notes yesterday she was cleaning her home extensively and awoke this morning with back stiffness  She notes she took 1 dose of ibuprofen this morning without relief  She has continued to have upper, middle, and lower back pain throughout the day for which she presents for evaluation and pain management  She denies associated fevers, chills, cough, sore throat, CP/SOB, abdominal pain, N/V/D, saddle anesthesia, urinary retention, urinary/bowel incontinence, numbness/tingling radiating to lower extremities, lower extremity swelling  She denies falls and trauma        History provided by:  Patient   used: No    Back Pain  Location:  Generalized  Quality:  Stiffness  Stiffness is present:  All day  Radiates to:  Does not radiate  Pain severity:  Moderate  Pain is:  Same all the time  Onset quality:  Sudden  Duration:  1 day  Timing:  Constant  Progression:  Unchanged  Chronicity:  Chronic (acutely worse today)  Context: not falling, not jumping from heights, not MVA, not recent illness and not recent injury    Context comment:  Vacumming and cleaning yesterday with twisting and pushing/pulling  Relieved by:  Being still  Worsened by:  Palpation, lying down, bending, movement and twisting  Ineffective treatments:  Ibuprofen (tried 1 dose without relief)  Associated symptoms: no abdominal pain, no abdominal swelling, no bladder incontinence, no bowel incontinence, no chest pain, no dysuria, no fever, no headaches, no leg pain, no numbness, no paresthesias, no pelvic pain, no perianal numbness, no tingling, no weakness and no weight loss    Risk factors: no hx of cancer, not pregnant, no recent surgery and no vascular disease    Risk factors comment:  Denies IVDA      Prior to Admission Medications   Prescriptions Last Dose Informant Patient Reported? Taking? Aspirin-Acetaminophen-Caffeine (MIGRAINE FORMULA PO)  Self Yes No   Sig: Take by mouth   [employer] INDOOR/OUTDOOR ALLERGY RLF 10 MG tablet  Self Yes No   Sig: Take 10 mg by mouth daily   DULoxetine (CYMBALTA) 60 mg delayed release capsule   No No   Sig: Take 2 capsules (120 mg total) by mouth daily   EPINEPHrine (EPIPEN) 0 3 mg/0 3 mL SOAJ   No No   Sig: Inject 0 3 mL (0 3 mg total) into a muscle once for 1 dose As directed   QUEtiapine (SEROquel) 200 mg tablet   No No   Sig: Take 1 tablet (200 mg total) by mouth daily at bedtime   VITAMIN D PO  Self Yes No   Sig: Take 5,000 Units by mouth   ZOLMitriptan (ZOMIG-ZMT) 5 MG disintegrating tablet   No No   Sig: TAKE 1 TABLET BY MOUTH AT ONSET OF HEADACHE, MAY REPEAT AFTER 2 HOURS AS NEEDED  MAX 2 TABLETS per 24 hours  albuterol (2 5 mg/3 mL) 0 083 % nebulizer solution  Self Yes No   Sig: Inhale    albuterol (Proventil HFA) 90 mcg/act inhaler   No No   Sig: Inhale 2 puffs every 6 (six) hours as needed for wheezing   aluminum-magnesium hydroxide 200-200 MG/5ML suspension  Self No No   Sig: GAVISCON REGULAR STRENGTH AFTER MEALS and BEDTIME WHEN NOT FOLLOWING LPR/GERD DIET     azelastine (ASTELIN) 0 1 % nasal spray   No No   Si-2 sprays into each nostril 2 (two) times a day as needed for rhinitis Use in each nostril as directed   cyclobenzaprine (FLEXERIL) 5 mg tablet   No No   Sig: Take 1 tablet (5 mg total) by mouth daily at bedtime as needed for muscle spasms   fluticasone-salmeterol (ADVAIR HFA) 115-21 MCG/ACT inhaler   No No   Sig: Inhale 2 puffs 2 (two) times a day Rinse mouth after use    gabapentin (NEURONTIN) 300 mg capsule   No No   Sig: TAKE ONE CAPSULE BY MOUTH EVERY MORNING, 2 CAPSULES BY MOUTH AT NOON AND 2 CAPSULES BY MOUTH EVERY NIGHT AT BEDTIME   hydrOXYzine HCL (ATARAX) 50 mg tablet No No   Sig: Take 1 tablet (50 mg total) by mouth 3 (three) times a day as needed for anxiety   ibuprofen (MOTRIN) 800 mg tablet   No No   Sig: Take 1 tablet (800 mg total) by mouth every 6 (six) hours as needed for mild pain   ketorolac (TORADOL) 10 mg tablet   No No   Sig: Take 1 tablet (10 mg total) by mouth every 6 (six) hours as needed (migraine) Max 2-3 per week  lamoTRIgine (LaMICtal) 100 mg tablet   No No   Sig: Take 1 tablet (100 mg total) by mouth daily at bedtime   levonorgestrel (MIRENA) 20 MCG/24HR IUD  Self Yes No   Sig: by Intrauterine route   mometasone (NASONEX) 50 mcg/act nasal spray  Self Yes No   naltrexone (REVIA) 50 mg tablet   No No   Sig: Take 1 tablet (50 mg total) by mouth daily   olopatadine (PATANOL) 0 1 % ophthalmic solution  Self No No   Sig: Administer 1 drop to both eyes 2 (two) times a day for 90 days   omeprazole (PriLOSEC) 20 mg delayed release capsule  Self No No   Sig: Take 1 capsule (20 mg total) by mouth daily   ondansetron (ZOFRAN-ODT) 4 mg disintegrating tablet   No No   Sig: Take 1 tablet (4 mg total) by mouth every 6 (six) hours as needed for nausea or vomiting   predniSONE 10 mg tablet   No No   Sig: Take with food  Take 4 tabs x 2 days, then 3 x 2 days, then 2 x 2 days, then 1 x 2 days     Patient not taking: Reported on 2022   prochlorperazine (COMPAZINE) 10 mg tablet   No No   Sig: TAKE 1 TABLET(10 MG) BY MOUTH EVERY 6 HOURS AS NEEDED FOR NAUSEA OR VOMITING   sulfamethoxazole-trimethoprim (BACTRIM DS) 800-160 mg per tablet   No No   Sig: Take 1 tablet by mouth every 12 (twelve) hours for 7 days   tiotropium (SPIRIVA RESPIMAT) 1 25 MCG/ACT AERS inhaler   No No   Sig: Inhale 2 puffs daily   topiramate (TOPAMAX) 100 mg tablet   No No   Si mg q12 hours   traZODone (DESYREL) 100 mg tablet   No No   Sig: Take 1-2 tablets (100-200 mg total) by mouth daily at bedtime as needed for sleep      Facility-Administered Medications: None       Past Medical History: Diagnosis Date    Amenorrhea     Anxiety     Arthritis     Asthma     Back pain     Chondromalacia of patella     Chronic fatigue     COPD (chronic obstructive pulmonary disease) (HCC) Yes    Deep vein thrombophlebitis of leg (HCC)     Depression     Disease of thyroid gland     Diverticulitis     GERD (gastroesophageal reflux disease)     HPV (human papilloma virus) infection     Hypothyroidism     Lumbar radiculopathy     Migraine     Myofascial pain syndrome     Osteopenia     Piriformis syndrome     Sacroiliitis (HCC)     Sciatica     Seizure ([address] )     Sleep apnea     Spondylosis of lumbar spine     Trochanteric bursitis of right hip     Vertigo     Vitamin D deficiency     Weight gain 2019       Past Surgical History:   Procedure Laterality Date    CERVIX LESION DESTRUCTION       SECTION      COLONOSCOPY      COLPOSCOPY W/ BIOPSY / CURETTAGE      Colposcopy cervix with biopsy    LAPAROSCOPIC ENDOMETRIOSIS FULGURATION      LAPAROSCOPY      TOOTH EXTRACTION         Family History   Problem Relation Age of Onset    Heart disease Mother     Asthma Daughter     Lung cancer Paternal Grandfather     Asthma Daughter     Colon cancer Father     Colon cancer Maternal Grandfather     Prostate cancer Maternal Grandfather     Colon cancer Maternal Aunt     No Known Problems Maternal Grandmother     Diabetes Paternal Grandmother     No Known Problems Maternal Aunt     No Known Problems Maternal Aunt     No Known Problems Maternal Aunt     No Known Problems Paternal Aunt     Asthma Daughter     Psychiatric Illness Neg Hx     Stroke Neg Hx     Thyroid disease Neg Hx     Substance Abuse Neg Hx     Suicidality Neg Hx      I have reviewed and agree with the history as documented      E-Cigarette/Vaping    E-Cigarette Use Never User      E-Cigarette/Vaping Substances    Nicotine No     THC No     CBD No     Flavoring No     Other No     Unknown No Social History     Tobacco Use    Smoking status: Never Smoker    Smokeless tobacco: Never Used   Vaping Use    Vaping Use: Never used   Substance Use Topics    Alcohol use: Not Currently    Drug use: Never       Review of Systems   Constitutional: Negative for chills, fever and weight loss  HENT: Negative for sore throat  Eyes: Negative for pain and visual disturbance  Respiratory: Negative for cough and shortness of breath  Cardiovascular: Negative for chest pain, palpitations and leg swelling  Gastrointestinal: Negative for abdominal pain, bowel incontinence, diarrhea, nausea and vomiting  Genitourinary: Negative for bladder incontinence, decreased urine volume, difficulty urinating, dysuria, flank pain, frequency, hematuria and pelvic pain  Musculoskeletal: Positive for back pain (upper, middle, and lower  Worsened with raising arms, twisting of torso, bending forward, lying flat)  Negative for arthralgias, gait problem, joint swelling, myalgias, neck pain and neck stiffness  Skin: Negative for color change, rash and wound  Allergic/Immunologic: Negative for immunocompromised state  Neurological: Negative for dizziness, tingling, seizures, syncope, weakness, light-headedness, numbness, headaches and paresthesias  All other systems reviewed and are negative  Physical Exam  Physical Exam  Vitals and nursing note reviewed  Constitutional:       General: She is awake  She is not in acute distress  Appearance: Normal appearance  She is well-developed and normal weight  She is not ill-appearing, toxic-appearing or diaphoretic  HENT:      Head: Normocephalic and atraumatic  Nose: Nose normal       Mouth/Throat:      Lips: Pink  No lesions  Mouth: Mucous membranes are moist       Pharynx: Oropharynx is clear  Eyes:      General: Lids are normal  Vision grossly intact  Gaze aligned appropriately  Extraocular Movements: Extraocular movements intact  Conjunctiva/sclera: Conjunctivae normal    Neck:      Trachea: Trachea and phonation normal  No abnormal tracheal secretions  Cardiovascular:      Rate and Rhythm: Normal rate  Pulses: Normal pulses  Radial pulses are 2+ on the right side and 2+ on the left side  Dorsalis pedis pulses are 2+ on the right side and 2+ on the left side  Posterior tibial pulses are 2+ on the right side and 2+ on the left side  Heart sounds: Normal heart sounds, S1 normal and S2 normal  No murmur heard  Pulmonary:      Effort: Pulmonary effort is normal  No tachypnea or respiratory distress  Breath sounds: Normal breath sounds and air entry  No stridor, decreased air movement or transmitted upper airway sounds  No decreased breath sounds  Abdominal:      Palpations: Abdomen is soft  Tenderness: There is no abdominal tenderness  There is no guarding or rebound  Musculoskeletal:         General: Normal range of motion  Cervical back: Full passive range of motion without pain, normal range of motion and neck supple  No bony tenderness  No spinous process tenderness  Thoracic back: Tenderness and bony tenderness present  No swelling, edema, deformity, signs of trauma or spasms  Normal range of motion  Lumbar back: Spasms, tenderness and bony tenderness present  No swelling, edema, deformity or signs of trauma  Normal range of motion  Negative right straight leg raise test and negative left straight leg raise test       Right hip: Normal       Left hip: Normal       Right knee: Normal       Left knee: Normal       Right lower leg: No swelling  No edema  Left lower leg: No swelling  No edema  Right ankle: Normal       Left ankle: Normal       Right foot: Normal       Left foot: Normal       Comments: KWAN, 5/5 strength throughout, sensation intact, no focal joint swelling  Ambulatory with steady gait   Diffuse tenderness to palpation through thoracic and lumbar spine without focality, as well as right and left sided back pain on palpation  Skin:     General: Skin is warm and dry  Capillary Refill: Capillary refill takes less than 2 seconds  Findings: No rash or wound  Neurological:      General: No focal deficit present  Mental Status: She is alert and oriented to person, place, and time  Mental status is at baseline  GCS: GCS eye subscore is 4  GCS verbal subscore is 5  GCS motor subscore is 6  Cranial Nerves: Cranial nerves are intact  Sensory: Sensation is intact  Motor: Motor function is intact  Coordination: Coordination is intact  Gait: Gait is intact  Psychiatric:         Behavior: Behavior is cooperative  Vital Signs  ED Triage Vitals   Temperature Pulse Respirations Blood Pressure SpO2   10/05/22 1838 10/05/22 1838 10/05/22 1838 10/05/22 1838 10/05/22 1838   98 9 °F (37 2 °C) 86 18 117/57 93 %      Temp Source Heart Rate Source Patient Position - Orthostatic VS BP Location FiO2 (%)   10/05/22 1838 10/05/22 2024 10/05/22 1838 10/05/22 1838 --   Oral Monitor Sitting Right arm       Pain Score       10/05/22 2207       7           Vitals:    10/05/22 1838 10/05/22 2024   BP: 117/57 120/69   Pulse: 86 71   Patient Position - Orthostatic VS: Sitting Lying         Visual Acuity      ED Medications  Medications   acetaminophen (TYLENOL) tablet 975 mg (975 mg Oral Given 10/5/22 2207)   ketorolac (TORADOL) injection 30 mg (30 mg Intramuscular Given 10/5/22 2208)   diazepam (VALIUM) tablet 5 mg (5 mg Oral Given 10/5/22 2206)       Diagnostic Studies  Results Reviewed     None                 No orders to display              Procedures  Procedures         ED Course  ED Course as of 10/06/22 0257   Wed Oct 05, 2022   2150 Patient updated on plan including treatment with NSAIDs, p r n  muscle relaxants, and follow up with [facility]    Patient notes her  is able to provide safe transportation home after med administration in the ER    [place] paperwork reviewed with patient including emphasis on new prescriptions, follow-up with primary care provider, [facility], and strict return precautions  Patient ambulatory with steady gait with improved pain status post medication administration  She notes her  providing safe transportation home for her daughter  She has no questions at this time, able to demonstrate teaching by teach back method  MDM  Number of Diagnoses or Management Options  Back pain: established and worsening  Diagnosis management comments: DDx including but not limited to: sciatica, herniated disc, arthritis, spinal stenosis, strain, sprain, fracture, cauda equina syndrome, epidural abscess, AAA  Denies fevers, chills, sweats and IVDA making infectious pathology less likely  Cauda equina syndrome less likely givennNo saddle anesthesia, full strength and sensation bilateral lower extremities, no loss of bowel or bladder function  Atraumatic fracture less likely given she has no hx of cancer and absence of long term steroid use  Fracture less likely as she denies direct trauma  Malignancy less likely given acute onset of pain and absence of unexpected weight loss, fevers, night sweats  AAA less likely given no pulsatile abdominal mass, b/l UE pulses 2+ and equal        Amount and/or Complexity of Data Reviewed  Decide to obtain previous medical records or to obtain history from someone other than the patient: yes  Review and summarize past medical records: yes    Risk of Complications, Morbidity, and/or Mortality  General comments: Assessment:  Patient is a 60-year-old female with history of chronic back pain presenting for evaluation of diffuse back stiffness  See above for MDM in the ED course for disposition discussion      Patient Progress  Patient progress: improved      Disposition  Final diagnoses:   Back pain     Time reflects when diagnosis was documented in both MDM as applicable and the Disposition within this note     Time User Action Codes Description Comment    10/5/2022 10:11 PM Fam Desanctis Add [M54 9] Musculoskeletal back pain     10/5/2022 10:11 PM Ac Slot [M54 9] Musculoskeletal back pain     10/5/2022 10:11 PM Fam Desanctis Add [M54 9] Back pain       ED Disposition     ED Disposition   Discharge    Condition   Stable    Date/Time   Wed Oct 5, 2022 10:11 PM    Comment   Kaylynn Needs discharge to home/self care                 Follow-up Information     Follow up With Specialties Details Why Contact Info Additional Information    Laura Dennis MD Internal Medicine Schedule an appointment as soon as possible for a visit in 1 week  [address]  [address] Floor, [address] Emergency Department Emergency Medicine Go to  If symptoms worsen [address] [facility] [address] [facility] Emergency Department, [address], [place], [place], [address]          Discharge Medication List as of 10/5/2022 10:17 PM      START taking these medications    Details   acetaminophen (TYLENOL) 325 mg tablet Take 2 tablets (650 mg total) by mouth every 6 (six) hours as needed for mild pain, Starting Wed 10/5/2022, Normal      methocarbamol (ROBAXIN) 500 mg tablet Take 1 tablet (500 mg total) by mouth 2 (two) times a day for 5 days, Starting Wed 10/5/2022, Until Mon 10/10/2022, Normal      naproxen (Naprosyn) 500 mg tablet Take 1 tablet (500 mg total) by mouth 2 (two) times a day with meals for 5 days, Starting Wed 10/5/2022, Until Mon 10/10/2022, Normal         CONTINUE these medications which have CHANGED    Details   lidocaine (LIDODERM) 5 % Apply 1 patch topically every 24 hours for 5 days Remove & Discard patch within 12 hours or as directed by MD, Starting Wed 10/5/2022, Until Mon 10/10/2022, Normal         CONTINUE these medications which have NOT CHANGED    Details   sulfamethoxazole-trimethoprim (BACTRIM DS) 800-160 mg per tablet Take 1 tablet by mouth every 12 (twelve) hours for 7 days, Starting Mon 10/3/2022, Until Mon 10/10/2022, Normal      albuterol (2 5 mg/3 mL) 0 083 % nebulizer solution Inhale , Starting Fri 4/19/2013, Historical Med      albuterol (Proventil HFA) 90 mcg/act inhaler Inhale 2 puffs every 6 (six) hours as needed for wheezing, Starting Wed 1/5/2022, Normal      aluminum-magnesium hydroxide 200-200 MG/5ML suspension GAVISCON REGULAR STRENGTH AFTER MEALS and BEDTIME WHEN NOT FOLLOWING LPR/GERD DIET , No Print      Aspirin-Acetaminophen-Caffeine (MIGRAINE FORMULA PO) Take by mouth, Historical Med      azelastine (ASTELIN) 0 1 % nasal spray 1-2 sprays into each nostril 2 (two) times a day as needed for rhinitis Use in each nostril as directed, Starting Fri 9/2/2022, Normal      [employer] INDOOR/OUTDOOR ALLERGY RLF 10 MG tablet Take 10 mg by mouth daily, Starting Mon 1/29/2018, Historical Med      cyclobenzaprine (FLEXERIL) 5 mg tablet Take 1 tablet (5 mg total) by mouth daily at bedtime as needed for muscle spasms, Starting Fri 4/15/2022, Normal      DULoxetine (CYMBALTA) 60 mg delayed release capsule Take 2 capsules (120 mg total) by mouth daily, Starting Thu 9/29/2022, Until Sun 2/26/2023, Normal      EPINEPHrine (EPIPEN) 0 3 mg/0 3 mL SOAJ Inject 0 3 mL (0 3 mg total) into a muscle once for 1 dose As directed, Starting Wed 1/5/2022, Normal      fluticasone-salmeterol (ADVAIR HFA) 115-21 MCG/ACT inhaler Inhale 2 puffs 2 (two) times a day Rinse mouth after use , Starting Thu 1/6/2022, Until Fri 1/6/2023, Normal      gabapentin (NEURONTIN) 300 mg capsule TAKE ONE CAPSULE BY MOUTH EVERY MORNING, 2 CAPSULES BY MOUTH AT NOON AND 2 CAPSULES BY MOUTH EVERY NIGHT AT BEDTIME, Normal      hydrOXYzine HCL (ATARAX) 50 mg tablet Take 1 tablet (50 mg total) by mouth 3 (three) times a day as needed for anxiety, Starting Thu 9/29/2022, Until Sun 2/26/2023 at 2359, Normal      ibuprofen (MOTRIN) 800 mg tablet Take 1 tablet (800 mg total) by mouth every 6 (six) hours as needed for mild pain, Starting Wed 5/19/2021, Normal      ketorolac (TORADOL) 10 mg tablet Take 1 tablet (10 mg total) by mouth every 6 (six) hours as needed (migraine) Max 2-3 per week , Starting Fri 9/16/2022, Normal      lamoTRIgine (LaMICtal) 100 mg tablet Take 1 tablet (100 mg total) by mouth daily at bedtime, Starting Thu 9/29/2022, Until Sun 2/26/2023, Normal      levonorgestrel (MIRENA) 20 MCG/24HR IUD by Intrauterine route, Historical Med      mometasone (NASONEX) 50 mcg/act nasal spray Starting Mon 3/12/2018, Historical Med      naltrexone (REVIA) 50 mg tablet Take 1 tablet (50 mg total) by mouth daily, Starting Thu 9/29/2022, Until Sun 2/26/2023, Normal      olopatadine (PATANOL) 0 1 % ophthalmic solution Administer 1 drop to both eyes 2 (two) times a day for 90 days, Starting Wed 8/7/2019, Until Thu 9/29/2022, Normal      omeprazole (PriLOSEC) 20 mg delayed release capsule Take 1 capsule (20 mg total) by mouth daily, Starting Tue 11/12/2019, Until Thu 9/29/2022, Normal      ondansetron (ZOFRAN-ODT) 4 mg disintegrating tablet Take 1 tablet (4 mg total) by mouth every 6 (six) hours as needed for nausea or vomiting, Starting Wed 6/29/2022, Normal      predniSONE 10 mg tablet Take with food  Take 4 tabs x 2 days, then 3 x 2 days, then 2 x 2 days, then 1 x 2 days  , Normal      prochlorperazine (COMPAZINE) 10 mg tablet TAKE 1 TABLET(10 MG) BY MOUTH EVERY 6 HOURS AS NEEDED FOR NAUSEA OR VOMITING, Normal      QUEtiapine (SEROquel) 200 mg tablet Take 1 tablet (200 mg total) by mouth daily at bedtime, Starting Thu 9/29/2022, Until Sun 2/26/2023, Normal      tiotropium (SPIRIVA RESPIMAT) 1 25 MCG/ACT AERS inhaler Inhale 2 puffs daily, Starting Wed 1/5/2022, Until u 1/5/2023, Normal      topiramate (TOPAMAX) 100 mg tablet 150 mg q12 hours, Normal      traZODone (DESYREL) 100 mg tablet Take 1-2 tablets (100-200 mg total) by mouth daily at bedtime as needed for sleep, Starting Thu 9/29/2022, Until Sun 2/26/2023 at 2359, Normal      VITAMIN D PO Take 5,000 Units by mouth, Historical Med      ZOLMitriptan (ZOMIG-ZMT) 5 MG disintegrating tablet TAKE 1 TABLET BY MOUTH AT ONSET OF HEADACHE, MAY REPEAT AFTER 2 HOURS AS NEEDED  MAX 2 TABLETS per 24 hours  , Normal                 PDMP Review       Value Time User    PDMP Reviewed  Yes 6/9/2020 12:47 PM Ly Stage, DO          ED Provider  Electronically Signed by           Yue Cespedes  10/06/22 4714

## 2022-10-07 ENCOUNTER — TELEPHONE (OUTPATIENT)
Dept: PHYSICAL THERAPY | Facility: OTHER | Age: 52
End: 2022-10-07

## 2022-10-11 ENCOUNTER — OFFICE VISIT (OUTPATIENT)
Dept: NEUROLOGY | Facility: CLINIC | Age: 52
End: 2022-10-11

## 2022-10-11 VITALS
DIASTOLIC BLOOD PRESSURE: 74 MMHG | HEART RATE: 97 BPM | BODY MASS INDEX: 30.55 KG/M2 | RESPIRATION RATE: 18 BRPM | WEIGHT: 166 LBS | SYSTOLIC BLOOD PRESSURE: 137 MMHG | HEIGHT: 62 IN | TEMPERATURE: 97.3 F

## 2022-10-11 DIAGNOSIS — G43.709 CHRONIC MIGRAINE WITHOUT AURA: ICD-10-CM

## 2022-10-11 DIAGNOSIS — F41.1 GAD (GENERALIZED ANXIETY DISORDER): Chronic | ICD-10-CM

## 2022-10-11 DIAGNOSIS — G43.709 CHRONIC MIGRAINE WITHOUT AURA WITHOUT STATUS MIGRAINOSUS, NOT INTRACTABLE: Primary | ICD-10-CM

## 2022-10-11 DIAGNOSIS — M79.18 MYOFASCIAL MUSCLE PAIN: ICD-10-CM

## 2022-10-11 RX ORDER — KETOROLAC TROMETHAMINE 10 MG/1
10 TABLET, FILM COATED ORAL EVERY 6 HOURS PRN
Qty: 10 TABLET | Refills: 3 | Status: SHIPPED | OUTPATIENT
Start: 2022-10-11

## 2022-10-11 RX ORDER — GABAPENTIN 300 MG/1
CAPSULE ORAL
Qty: 540 CAPSULE | Refills: 1 | Status: SHIPPED | OUTPATIENT
Start: 2022-10-11

## 2022-10-11 NOTE — PROGRESS NOTES
Patient ID: Wilfred Leger is a 49 y o  female  Assessment/Plan:       Diagnoses and all orders for this visit:    Chronic migraine without aura without status migrainosus, not intractable  -     ketorolac (TORADOL) 10 mg tablet; Take 1 tablet (10 mg total) by mouth every 6 (six) hours as needed (migraine, back pain) Max 2-3 per week  Myofascial muscle pain    Chronic migraine without aura  -     gabapentin (NEURONTIN) 300 mg capsule; TAKE 2 capsules BY MOUTH EVERY MORNING, 2 CAPSULES BY MOUTH AT NOON AND 2 CAPSULES BY MOUTH EVERY NIGHT AT BEDTIME    KYLE (generalized anxiety disorder)  -     gabapentin (NEURONTIN) 300 mg capsule; TAKE 2 capsules BY MOUTH EVERY MORNING, 2 CAPSULES BY MOUTH AT NOON AND 2 CAPSULES BY MOUTH EVERY NIGHT AT BEDTIME    Other orders  -     sodium chloride 0 9 % infusion  -     Eptinezumab-jjmr (VYEPTI) 100 mg in sodium chloride 0 9 % 100 mL IVPB          She continues to have 4 or more migraines per month  She reports a reduction of migraine headaches since starting supplementation as noted below  She stopped Topamax since it was ineffective per her opinion, and migraines did not worsen since she stopped it  She would like to continue an increased dose of gabapentin at 600 mg q 8 hours since it reduces anxiety in migraine frequency  She denies side effects  She is interested in trying Vyepti infusion for further reduction in migraine headaches  Side effects reviewed  She states back pain significantly improved/resolved with the cocktail she was given in the ER including Toradol with Valium  She can use Toradol p r n  back pain but no more than 3 doses per week  The patient should not hesitate to call me prior to her follow up with any questions or concerns  Subjective:    HPI    Ms Wilfred Leger is a pleasant 52 yo female who is here for neurological follow-up for chronic migraine headaches    She works for a [employer]    She works on the [employer] frequently      Migraine frequency:   As of 10/11/22- once per week  The patient reports significant reduction of migraine headaches since starting the migraine supplement that she bought online called “migraine support formula "  She denies side effects  It has several different supplements included in including magnesium, B6, Co Q10, feverfew, niacin, Etc   -- She takes Excedrin migraine first, then the cocktail of Zomig, Benadryl and Zofran if Excedrin fails  Waits 2 hours in between another dose of Zomig  Before starting the supplement on a regular basis she had daily migraine headaches, which typically started or worsened around 3-4 p m  every day  She also notes improvement or reduction in migraines since increasing the gabapentin  She states gabapentin also reduces anxiety if she takes it consistently  With Psychiatry recommended that she increase it to 600 mg q 8 hours  She denies side effects      States she stopped Topamax since I last saw her  Thankfully migraines did not worsen since stopping it  No current headache, when she gets a headache it is at the apex at the head and bilateral frontalis regions, throbbing in sensation, associated with nausea and light sensitivity, no vomiting  No focal or lateralizing deficits  Prior documentation: Biggest trigger recently is that she is having a lot of issues with her 15year-old daughter, she is taking her for psychiatric care      Around [place] she had COVID infection which worsened her headaches  She reports that she was very lethargic, fatigued all the time, and initially did not have a headache but after the COVID improved her headaches worsened  Prior to that she was having infrequent migraines, she was well controlled  Worsening migraine frequency may also be because she stopped her migraines support formula supplementation and started taking it again about a week ago    She takes 1-2 tabs daily      Continues to follow up with the appropriate provider for elevated TSH, and elevated microsomal antibodies recently      Migraine duration: several hours to entire day- usually worse from 3-5 pm but she does not know why, denies stress from her job  Severity- Average pain level 8-10/10  Location- bifrontal, b/l parietal region, will become diffuse/ global  Quality- throbbing/pounding, dull/nagging  Associated sxs: nausea     Aura- none  Trigger- sunlight, sometimes stress     Meds tried:  Preventative: Gabapentin, Venlafaxine, Lyrica, Magnesium, B2, verapamil, zoloft, olanzapine, topamax, botox, aimovig, Depakote, supplementation, migraine support formula which she gets online-takes 2 capsules twice daily    Abortive: Riboflavin, magnesium, Co Q10, feverfew, butterbur, B6, other or both supplements, aimovig     Abortive: Rizatriptan, Sumatriptan, buprofen, Toradol, Dilaudid, Fioricet, compazine, excedrin (daily use), zomig PO, decadron, depakote taper, DHE- ineff    The patient states that she was doing very well since I last saw her in 2019   She got her second COVID shot (Appetise) on June 16th and a couple days later she developed a severe migraine headache   She feels like it is lasting "forever  "  She describes the headache in the bilateral frontotemporal regions, top of the head and sometimes neck tension   She also reports scalp tenderness   Current headache is 3 to 4/10 but can get up to 10/10   She has Decadron on July 2nd after she called and it helped partially after the 3rd to 4th day of taking it   She still has residual headaches and she feels like the cycle is not yet broken   She occasionally has nausea and light sensitivity   No vomiting currently   Her balance is okay, no focal or lateralizing deficits   She has difficulty falling asleep   She is always tossing and turning in her opinion   Sometimes she goes to bed with a headache which prevents sleep   She cannot think of any other triggers or reason for why they got worse, other than the COVID shot      Last brain MRI 10/28/2019 is unremarkable      Prior documentation:  She sees ophthalmologist- Dr Gage Hernandez) with normal exam      In the past, the patient had greater than 7 days of migraine relief from baseline, correlated with headache diary, decreased abortive medication use and decreased ER visits      She finds zomig helpful, as well as ibuprofen and excedrin migraine, however she is taking these quite a bit, almost QD  She also continues a combination of prevention meds: gabapentin 600 TID, effexor 150 mg qd and topamax 150 BID       Other significant hx 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 (no family hx PE- hypercoagulable state as far as he is aware)  Maternal aunt with cerebral aneurysm noted at a young age    The following portions of the patient's history were reviewed and updated as appropriate:   She  has a past medical history of Amenorrhea, Anxiety, Arthritis, Asthma, Back pain, Chondromalacia of patella, Chronic fatigue, COPD (chronic obstructive pulmonary disease) ([address] ) (Yes), Deep vein thrombophlebitis of leg ([address] ), Depression, Disease of thyroid gland, Diverticulitis, GERD (gastroesophageal reflux disease), HPV (human papilloma virus) infection, Hypothyroidism, Lumbar radiculopathy, Migraine, Myofascial pain syndrome, Osteopenia, Piriformis syndrome, Sacroiliitis ([address] ), Sciatica, Seizure ([address] ), Sleep apnea, Spondylosis of lumbar spine, Trochanteric bursitis of right hip, Vertigo, Vitamin D deficiency, and Weight gain (12/19/2019)    She   Patient Active Problem List    Diagnosis Date Noted   • COVID-19 12/28/2021   • Hashimoto's thyroiditis 12/22/2021   • Chronic migraine without aura without status migrainosus, not intractable 07/30/2021   • Myofascial muscle pain 07/30/2021   • Dysuria 03/29/2021   • Seizure ([address] ) 10/26/2019   • Dysphasia 10/23/2019   • Long-term use of high-risk medication 10/14/2019   • Closed fracture of phalanx of foot 09/27/2019   • Low back pain 2019   • Personality disorder ([facility] ) 2019   • Subclinical hypothyroidism 2019   • Migraine headache 2019   • Hypotension 2019   • Chronic idiopathic urticaria 2019   • Vitamin D deficiency 2019   • Shellfish allergy 2019   • Gastroesophageal reflux disease with esophagitis 2019   • History of pulmonary embolism 2019   • Cervical spine arthritis 2019   • Mixed hyperlipidemia 2019   • Obstructive sleep apnea    • Chronic left shoulder pain 2019   • Class 1 obesity 2019   • Status migrainosus 2019   • Headache, chronic migraine without aura, intractable 2019   • Obsessive-compulsive disorder, unspecified 2019   • Other insomnia 2018   • Intervertebral disc disorder with radiculopathy of lumbar region 2018   • IUD (intrauterine device) in place 2018   • Major depressive disorder, recurrent episode, in partial remission ([facility] ) 2017   • Chronic fatigue 2017   • Chronic GERD 2017   • Chronic migraine without aura 2017   • KYLE (generalized anxiety disorder) 02/10/2016   • Allergic rhinitis 2015   • Impulse control disorder 2015   • Amenorrhea 2014   • Abnormal involuntary movements 2013   • Panic disorder without agoraphobia 2013   • Sciatica 2013   • Asthma 2013   • Esophageal reflux 2013   • Goiter 06/10/2010     She  has a past surgical history that includes Laparoscopic endometriosis fulguration;  section; Cervix lesion destruction; Colonoscopy; LAPAROSCOPY; Tooth extraction; and Colposcopy w/ biopsy / curettage    Her family history includes Asthma in her daughter, daughter, and daughter; Colon cancer in her father, maternal aunt, and maternal grandfather; Diabetes in her paternal grandmother; Heart disease in her mother; Lung cancer in her paternal grandfather; No Known Problems in her maternal aunt, maternal aunt, maternal aunt, maternal grandmother, and paternal aunt; Prostate cancer in her maternal grandfather  She  reports that she has never smoked  She has never used smokeless tobacco  She reports previous alcohol use  She reports that she does not use drugs  Current Outpatient Medications   Medication Sig Dispense Refill   • acetaminophen (TYLENOL) 325 mg tablet Take 2 tablets (650 mg total) by mouth every 6 (six) hours as needed for mild pain 30 tablet 0   • albuterol (2 5 mg/3 mL) 0 083 % nebulizer solution Inhale      • albuterol (Proventil HFA) 90 mcg/act inhaler Inhale 2 puffs every 6 (six) hours as needed for wheezing 18 g 3   • aluminum-magnesium hydroxide 200-200 MG/5ML suspension GAVISCON REGULAR STRENGTH AFTER MEALS and BEDTIME WHEN NOT FOLLOWING LPR/GERD DIET  355 mL 11   • Aspirin-Acetaminophen-Caffeine (MIGRAINE FORMULA PO) Take by mouth     • azelastine (ASTELIN) 0 1 % nasal spray 1-2 sprays into each nostril 2 (two) times a day as needed for rhinitis Use in each nostril as directed 30 mL 5   • [employer] INDOOR/OUTDOOR ALLERGY RLF 10 MG tablet Take 10 mg by mouth daily  10   • cyclobenzaprine (FLEXERIL) 5 mg tablet Take 1 tablet (5 mg total) by mouth daily at bedtime as needed for muscle spasms 30 tablet 0   • DULoxetine (CYMBALTA) 60 mg delayed release capsule Take 2 capsules (120 mg total) by mouth daily 60 capsule 4   • fluticasone-salmeterol (ADVAIR HFA) 115-21 MCG/ACT inhaler Inhale 2 puffs 2 (two) times a day Rinse mouth after use   12 g 11   • gabapentin (NEURONTIN) 300 mg capsule TAKE 2 capsules BY MOUTH EVERY MORNING, 2 CAPSULES BY MOUTH AT NOON AND 2 CAPSULES BY MOUTH EVERY NIGHT AT BEDTIME 540 capsule 1   • hydrOXYzine HCL (ATARAX) 50 mg tablet Take 1 tablet (50 mg total) by mouth 3 (three) times a day as needed for anxiety 90 tablet 4   • ibuprofen (MOTRIN) 800 mg tablet Take 1 tablet (800 mg total) by mouth every 6 (six) hours as needed for mild pain 90 tablet 3   • ketorolac (TORADOL) 10 mg tablet Take 1 tablet (10 mg total) by mouth every 6 (six) hours as needed (migraine, back pain) Max 2-3 per week  10 tablet 3   • lamoTRIgine (LaMICtal) 100 mg tablet Take 1 tablet (100 mg total) by mouth daily at bedtime 30 tablet 4   • levonorgestrel (MIRENA) 20 MCG/24HR IUD by Intrauterine route     • mometasone (NASONEX) 50 mcg/act nasal spray      • naltrexone (REVIA) 50 mg tablet Take 1 tablet (50 mg total) by mouth daily 30 tablet 4   • olopatadine (PATANOL) 0 1 % ophthalmic solution Administer 1 drop to both eyes 2 (two) times a day for 90 days 10 mL 11   • omeprazole (PriLOSEC) 20 mg delayed release capsule Take 1 capsule (20 mg total) by mouth daily 30 capsule 11   • ondansetron (ZOFRAN-ODT) 4 mg disintegrating tablet Take 1 tablet (4 mg total) by mouth every 6 (six) hours as needed for nausea or vomiting 20 tablet 0   • prochlorperazine (COMPAZINE) 10 mg tablet TAKE 1 TABLET(10 MG) BY MOUTH EVERY 6 HOURS AS NEEDED FOR NAUSEA OR VOMITING 30 tablet 0   • QUEtiapine (SEROquel) 200 mg tablet Take 1 tablet (200 mg total) by mouth daily at bedtime 30 tablet 4   • tiotropium (SPIRIVA RESPIMAT) 1 25 MCG/ACT AERS inhaler Inhale 2 puffs daily 4 g 11   • traZODone (DESYREL) 100 mg tablet Take 1-2 tablets (100-200 mg total) by mouth daily at bedtime as needed for sleep 60 tablet 4   • VITAMIN D PO Take 5,000 Units by mouth     • ZOLMitriptan (ZOMIG-ZMT) 5 MG disintegrating tablet TAKE 1 TABLET BY MOUTH AT ONSET OF HEADACHE, MAY REPEAT AFTER 2 HOURS AS NEEDED  MAX 2 TABLETS per 24 hours   12 tablet 2   • EPINEPHrine (EPIPEN) 0 3 mg/0 3 mL SOAJ Inject 0 3 mL (0 3 mg total) into a muscle once for 1 dose As directed 0 6 mL 11   • lidocaine (LIDODERM) 5 % Apply 1 patch topically every 24 hours for 5 days Remove & Discard patch within 12 hours or as directed by MD 6 patch 0   • methocarbamol (ROBAXIN) 500 mg tablet Take 1 tablet (500 mg total) by mouth 2 (two) times a day for 5 days 10 tablet 0   • naproxen (Naprosyn) 500 mg tablet Take 1 tablet (500 mg total) by mouth 2 (two) times a day with meals for 5 days 30 tablet 0   • predniSONE 10 mg tablet Take with food  Take 4 tabs x 2 days, then 3 x 2 days, then 2 x 2 days, then 1 x 2 days  (Patient not taking: Reported on 10/11/2022) 20 tablet 1     No current facility-administered medications for this visit  She is allergic to nuts - food allergy, cephalexin, erythromycin, iodine - food allergy, other, shellfish allergy - food allergy, shellfish-derived products - food allergy, turmeric - food allergy, wellbutrin [bupropion], and zithromax [azithromycin]            Objective:    Blood pressure 137/74, pulse 97, temperature (!) 97 3 °F (36 3 °C), temperature source Tympanic, resp  rate 18, height 5' 2" (1 575 m), weight 75 3 kg (166 lb), not currently breastfeeding  Body mass index is 30 36 kg/m²  Physical Exam    Neurological Exam  On neurologic exam, the patient is alert and oriented to time and place  Speech is fluent and articulate, and the patient follows commands appropriately  Judgment and affect appear normal  Pupils are equally round and reactive to light and extraocular muscles are intact without nystagmus  Face is symmetric, and tongue, uvula, and palate are midline  Motor examination reveals intact strength throughout  Reflexes symmetric/non-focal t/o  Normal gait is steady  ROS:    Review of Systems   Constitutional: Negative for chills and fever  HENT: Negative for ear pain and sore throat  Eyes: Negative for pain and visual disturbance  Respiratory: Negative for cough and shortness of breath  Cardiovascular: Negative for chest pain and palpitations  Gastrointestinal: Negative for abdominal pain and vomiting  Genitourinary: Negative for dysuria and hematuria  Musculoskeletal: Positive for back pain (pt was in Er last week for back pain ) and neck pain  Negative for arthralgias  Skin: Negative for color change and rash     Neurological: Positive for headaches (pt states when last saw us it was worse but not got better last migraine was saturday )  Negative for seizures and syncope  Psychiatric/Behavioral: Positive for sleep disturbance (at times)  All other systems reviewed and are negative  The following portions of the patient's history were reviewed and updated as appropriate: allergies, current medications/ medication history, past family history, past medical history, past social history, past surgical history and problem list     Review of systems was reviewed and otherwise unremarkable from a neurological perspective

## 2022-10-12 ENCOUNTER — TELEPHONE (OUTPATIENT)
Dept: BARIATRICS | Facility: CLINIC | Age: 52
End: 2022-10-12

## 2022-10-12 RX ORDER — SODIUM CHLORIDE 9 MG/ML
20 INJECTION, SOLUTION INTRAVENOUS ONCE
OUTPATIENT
Start: 2022-10-17

## 2022-10-13 NOTE — TELEPHONE ENCOUNTER
Call placed to the patient per [facility] referral     Spoke with patient who states she is feeling much better      csp ref closed

## 2022-10-19 DIAGNOSIS — G43.709 CHRONIC MIGRAINE WITHOUT AURA WITHOUT STATUS MIGRAINOSUS, NOT INTRACTABLE: ICD-10-CM

## 2022-10-19 RX ORDER — ZOLMITRIPTAN 5 MG/1
TABLET, ORALLY DISINTEGRATING ORAL
Qty: 12 TABLET | Refills: 2 | Status: SHIPPED | OUTPATIENT
Start: 2022-10-19

## 2022-10-20 ENCOUNTER — TELEPHONE (OUTPATIENT)
Dept: PSYCHIATRY | Facility: CLINIC | Age: 52
End: 2022-10-20

## 2022-10-25 ENCOUNTER — TELEPHONE (OUTPATIENT)
Dept: NEUROLOGY | Facility: CLINIC | Age: 52
End: 2022-10-25

## 2022-10-25 DIAGNOSIS — G43.709 CHRONIC MIGRAINE WITHOUT AURA WITHOUT STATUS MIGRAINOSUS, NOT INTRACTABLE: Primary | ICD-10-CM

## 2022-10-25 NOTE — TELEPHONE ENCOUNTER
Called [facility] at [phone] and spoke to Genevieve Brooks codes , [id number], [phone]  -idaepxpj auth if >$1000  Can be done through [employer]-[phone]  Covered at 70% after deductible  No copay  Individual deductible $1500 00  Family deductible-$4000  She was not able to give the accumulations and she could not tell me if we can buy and bill   she will have research team Look into this and call us back  Ref#[id number]    Called [employer] at [phone] and spoke to [phone] is not required for   Ref # for this call-[id number]  She was not able to tell me if buy and bill is ok either        Will await call back from research team in regards to buy and bill

## 2022-10-25 NOTE — TELEPHONE ENCOUNTER
----- Message from Steph Ignacio PA-C sent at 10/12/2022  7:53 PM EDT -----  Pt interested in Vyepti infusion  Would like be approved by her insurance? Thanks

## 2022-10-27 NOTE — TELEPHONE ENCOUNTER
Patient left voicemail in regards to vyepti  Looking for update on process  Patient also requesting "back cocktail" that was previously discussed  Im not sure what this is meaning  [address]- any thoughts on back cocktail?

## 2022-10-27 NOTE — TELEPHONE ENCOUNTER
We are still waiting to hear back from insurance regarding if we can buy and bill       MK-please advise on "back cocktail" and then we can call pt back

## 2022-10-28 NOTE — TELEPHONE ENCOUNTER
Reached vm, left detailed message (consent on file)    Left message to please clarify if toradol was the medication "back cocktail" so we can send script; also gave update on vyetpi and requested cb to discuss copays, deductible, etc and buy and bill

## 2022-10-28 NOTE — TELEPHONE ENCOUNTER
Is she referring to toradol? States she had this in the ED and improved back pain  I can prescribe this temporarily, and but if she needs it more than 2-3 times per week she may want to do some PT or see pain management  Thanks

## 2022-10-31 ENCOUNTER — OFFICE VISIT (OUTPATIENT)
Dept: BARIATRICS | Facility: CLINIC | Age: 52
End: 2022-10-31

## 2022-10-31 VITALS
RESPIRATION RATE: 16 BRPM | HEART RATE: 92 BPM | BODY MASS INDEX: 30.46 KG/M2 | SYSTOLIC BLOOD PRESSURE: 116 MMHG | DIASTOLIC BLOOD PRESSURE: 60 MMHG | HEIGHT: 62 IN | WEIGHT: 165.5 LBS

## 2022-10-31 DIAGNOSIS — R63.5 ABNORMAL WEIGHT GAIN: ICD-10-CM

## 2022-10-31 DIAGNOSIS — E66.9 OBESITY, CLASS I, BMI 30-34.9: Primary | ICD-10-CM

## 2022-10-31 DIAGNOSIS — R56.9 SEIZURE (HCC): ICD-10-CM

## 2022-10-31 DIAGNOSIS — E06.3 HASHIMOTO'S THYROIDITIS: ICD-10-CM

## 2022-10-31 DIAGNOSIS — G47.33 OSA (OBSTRUCTIVE SLEEP APNEA): ICD-10-CM

## 2022-10-31 DIAGNOSIS — F41.9 ANXIETY AND DEPRESSION: ICD-10-CM

## 2022-10-31 DIAGNOSIS — F32.A ANXIETY AND DEPRESSION: ICD-10-CM

## 2022-10-31 NOTE — PATIENT INSTRUCTIONS
Goals: Food log (ie ) www myfitnesspal com,sparkpeople  com,loseit com,calorieking  com,etc  baritastic  No sugary beverages  At least 64oz of water daily  Increase physical activity by 10 minutes daily   Gradually increase physical activity to a goal of 5 days per week for 30 minutes of MODERATE intensity PLUS 2 days per week of FULL BODY resistance training  5-10 servings of fruits and vegetables per day and 25-35 grams of dietary fiber per day, gradually increasing  0690-5219 eleuterio/day

## 2022-10-31 NOTE — PROGRESS NOTES
Assessment/Plan:    Follow up in approximately 6 weeks    -Discussed options of [facility]-Intensive Lifestyle Intervention Program, Very Low Calorie Diet-VLCD and Conservative Program and the role of weight loss medications  Patient had consult with us back in 2020 has been lost to follow since  -Initial weight loss goal of 5-10% weight loss for improved health  -Screening labs ordered   - she states she was recently started on seroquel, lamictal, cymbalta and dose was increased on trazodone and since then her weight has greatly increased  She has also been on gabapentin for several years  - she states she has been trying to cut out sweets and unhealthy foods  Increasing her water intake  Trying to limit portions  She currently tries to walk her dog 2-3 times a day  -Avoid Contrave- due to seizure  - saxenda not covered by her insurance in the past , can check again  - avoid phentermine due to anxiety/depression hx drug OD  avoid topamax due to seizure history   - 9183-9023 calories per day  Start food logging  Exercise at least 150 min a week  - can consider addition of metformin once diet and behavioral changes are made and labs are done     Hypothyroidism  - she is off levothyroxine , following with endo     MYRA  - set to get a cpap machine next month         Goals:  Food log (ie ) www myfitnesspal com,sparkpeople  com,loseit com,calorieking  com,etc  baritastic  No sugary beverages  At least 64oz of water daily  Increase physical activity by 10 minutes daily  Gradually increase physical activity to a goal of 5 days per week for 30 minutes of MODERATE intensity PLUS 2 days per week of FULL BODY resistance training  5-10 servings of fruits and vegetables per day and 25-35 grams of dietary fiber per day, gradually increasing  0964-6374 eleuterio/day     Diagnoses and all orders for this visit:    Obesity, Class I, BMI 30-34 9  -     Comprehensive metabolic panel; Future  -     CBC and Platelet;  Future  -     TSH, 3rd generation with Free T4 reflex; Future  -     Lipid panel; Future    Hashimoto's thyroiditis  -     Comprehensive metabolic panel; Future  -     CBC and Platelet; Future  -     TSH, 3rd generation with Free T4 reflex; Future  -     Lipid panel; Future    MYRA (obstructive sleep apnea)  -     Comprehensive metabolic panel; Future  -     CBC and Platelet; Future  -     TSH, 3rd generation with Free T4 reflex; Future  -     Lipid panel; Future    Seizure ([address] )  -     Comprehensive metabolic panel; Future  -     CBC and Platelet; Future  -     TSH, 3rd generation with Free T4 reflex; Future  -     Lipid panel; Future    Anxiety and depression  -     Comprehensive metabolic panel; Future  -     CBC and Platelet; Future  -     TSH, 3rd generation with Free T4 reflex; Future  -     Lipid panel; Future    Abnormal weight gain  -     Comprehensive metabolic panel; Future  -     CBC and Platelet; Future  -     TSH, 3rd generation with Free T4 reflex; Future  -     Lipid panel; Future    Other orders  -     Ascorbic Acid (VITAMIN C PO); Take by mouth        Subjective:   Chief Complaint   Patient presents with   • Follow-up     MWM OD f/u; waist 35in; goal wt 130     Patient ID: Mukund Sousa  is a 49 y o  female with excess weight/obesity here to pursue weight management  HPI  The patient presents for MWM follow up  Food logging: no  Increased appetite/cravings: yes   Fruit/Vegetable servings: daily   Exercise: walking her dog  Sleep: sleeping ok  Colonoscopy- Completed 2022    The following portions of the patient's history were reviewed and updated as appropriate: allergies, current medications, past family history, past medical history, past social history, past surgical history and problem list     Review of Systems   Constitutional: Negative  Respiratory: Negative  Cardiovascular: Negative  Gastrointestinal: Negative  Neurological: Negative  Psychiatric/Behavioral: Negative  Objective:    /60   Pulse 92   Resp 16   Ht 5' 2" (1 575 m)   Wt 75 1 kg (165 lb 8 oz)   LMP  (LMP Unknown)   Breastfeeding No   BMI 30 27 kg/m²      Physical Exam  Vitals and nursing note reviewed  Constitutional:       Appearance: Normal appearance  She is obese  HENT:      Head: Normocephalic and atraumatic  Eyes:      Extraocular Movements: Extraocular movements intact  Pupils: Pupils are equal, round, and reactive to light  Cardiovascular:      Rate and Rhythm: Normal rate  Pulmonary:      Effort: Pulmonary effort is normal    Musculoskeletal:         General: Normal range of motion  Cervical back: Normal range of motion  Skin:     General: Skin is warm and dry  Neurological:      General: No focal deficit present  Mental Status: She is alert and oriented to person, place, and time     Psychiatric:         Mood and Affect: Mood normal

## 2022-11-01 DIAGNOSIS — R63.5 ABNORMAL WEIGHT GAIN: Primary | ICD-10-CM

## 2022-11-01 NOTE — TELEPHONE ENCOUNTER
i spoke to [employer] in pre encounter and she states that they always buy and bill with [employer]ony [employer], especially if no auth required    Called and Left a message on pt's answering machine for a call back  And advised that I would send her a [employer] message    [employer] message sent to pt

## 2022-11-01 NOTE — TELEPHONE ENCOUNTER
called jony MCGEE at [phone] and spoke to [email or web address]  Gave codes  [id number], [id number], [id number]  -Yd auth needed  Can be done through health [phone]    Covered at 70% after deductible  No copay  Individual deductible $1500 00  Family deductible-$4000  Max OOP pt- $5000  Max OOP family-$10,000  Accumulations-could not locate accumulations  He again can't tell me if we can buy an bill  Again states that they will have research team look into both of these and call us back    He place me on an extended hold and then states that pt has met her individual deductible   Met $2962  49-family deductible  Met [id number]  90-individual OOP  Met $4402 11-family OOP    States that I would need to call   [facility] specialty    [phone] to see if I can do buy and bill   BELLA#[id number]    Called then transferred to [facility] and spoke to [place]  She was not able to locate pt  Then it comes up as inactive account  Will fill out [employer] enrollment form and they will do benefits investigation  vyepti connect form completed  Will have [address] sign in am and then will fax        MK-please see below about valium

## 2022-11-01 NOTE — TELEPHONE ENCOUNTER
Jay Angel  to Me          10:23 AM  Good morning Ludy Marrufo!     It's strange isnt't? Samuel Tabares feels like your writing to yourself   lol        I'm trying to get a hold of my insurance company, right now I'm getting no where    [facility]        As far as where I would like to go, I would prefer to go to [address] [place] [address] since it's very close to my job      Also, I was hoping that she would be able to prescribe me the Valium since that's what they gave me in the ER  I have everything else but that      I will message you again once I know everything regarding my insurance coverage

## 2022-11-01 NOTE — TELEPHONE ENCOUNTER
Sylvia Blair  to Me          11:26 AM  Hi again Caridad Dutton!     I contacted my Miguel's & they told me that they do NOT cover the Vyepti  I then contacted the Vyepti phone number to see if I qualify for the $5 00 & they told me that I don't becausa my insurance doesn't cover it  They had suggested that you go to their website & fill out the Enrollment Form to see if somehow the Insurance would cover it even though they don't right now  Idk Gerhardt Sep Gerhardt Sep Gerhardt Sep Gerhardt Sep

## 2022-11-02 NOTE — TELEPHONE ENCOUNTER
I completed vyepti connect form and [address] signed  Form, insurance card and last office note faxed  Placed a copy of completed form at  to be scanned into chart  called vyepti at [phone] spoke to  abiola  states that it typically takes 48 hours for benefits investigation once form is received  they will reach out to pt if consent is needed      states that we can fill out prescription portion and note buy an bill    Will await benefits investigation

## 2022-11-03 NOTE — TELEPHONE ENCOUNTER
Received fax confirming that vyepti enrollment form was received        Will await benefits investigation
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
## 2022-11-08 ENCOUNTER — ANNUAL EXAM (OUTPATIENT)
Dept: OBGYN CLINIC | Facility: CLINIC | Age: 52
End: 2022-11-08

## 2022-11-08 VITALS
WEIGHT: 166.8 LBS | SYSTOLIC BLOOD PRESSURE: 120 MMHG | DIASTOLIC BLOOD PRESSURE: 80 MMHG | HEIGHT: 63 IN | BODY MASS INDEX: 29.55 KG/M2

## 2022-11-08 DIAGNOSIS — Z12.31 ENCOUNTER FOR SCREENING MAMMOGRAM FOR MALIGNANT NEOPLASM OF BREAST: ICD-10-CM

## 2022-11-08 DIAGNOSIS — Z01.419 ENCNTR FOR GYN EXAM (GENERAL) (ROUTINE) W/O ABN FINDINGS: Primary | ICD-10-CM

## 2022-11-08 DIAGNOSIS — R92.2 DENSE BREAST TISSUE ON MAMMOGRAM: ICD-10-CM

## 2022-11-08 DIAGNOSIS — N94.6 DYSMENORRHEA: ICD-10-CM

## 2022-11-08 DIAGNOSIS — Z12.11 COLON CANCER SCREENING: ICD-10-CM

## 2022-11-08 RX ORDER — IBUPROFEN 800 MG/1
800 TABLET ORAL EVERY 6 HOURS PRN
Qty: 90 TABLET | Refills: 1 | Status: SHIPPED | OUTPATIENT
Start: 2022-11-08

## 2022-11-11 NOTE — TELEPHONE ENCOUNTER
received benefits investigation  Placed at  to be scanned into chart  called [facility] connect at [phone] and spoke to [facility]  Confirmed that we can not buy and bill, must go through [facility]  [facility] specialty pharm phone #[phone]  States that Pt is able to be enrolled in copay assistance program, but pt needs to enroll by either calling them or going on line to enroll  Left message for pt to call office back  Need to make her aware of above and need to schedule vyepti  Advised that I would also send her a mychart message    Called [facility] at [phone] and spoke to julia Montana to specialty pharm at [phone] and spoke to senaitora  Limited distribution and   [phone]  Or [facility] or [facility]-[phone]  States that we would need to send script to one of these pharmacies and if needed they would Forward to [facility] if needed  Script entered to be sent to [facility] specialty pharm    Please sign off

## 2022-11-15 NOTE — TELEPHONE ENCOUNTER
Carolina Mcarthur  to Twila Miller PA-C • Neurology [facility] Clinical Team 4    JK      9:51 AM  Script signed by [facility] and faxed to Cayla

## 2022-11-15 NOTE — TELEPHONE ENCOUNTER
Called pt made her aware of below  She would prefer [facility] and 8 am appt or latest appointment  Advised that I will call infusion center and schedule her a then call her back  Called [facility] and scheduled pt for 11/21 at [facility] 142 can call infusion center to schedule delivery  Called [facility] and spoke to arvind  She does not see that script was received yet  She will open an account for this  Gave her pt's insurance info  Then spoke to pharmacist and gave a verbal order for vyepti  Gave him [facility] phone number    States that pt may have a balance with them that may prevent them from dispensing the medication  This is from a few years ago  He is not sure if this will affect vyepti  If it does, they will contact our office  They will contact the infusion center to schedule delivery  Advised that pt is scheduled for 11/21 at [facility] pt and made her aware of appt date and time    Also advised of potential balance with [facility]

## 2022-11-18 NOTE — TELEPHONE ENCOUNTER
Hi, my name is Maye calling from [facility]  Wanted to reach out in regards to medication for patient Dayanara Mariscal, [birth year]  Looks like the doctor's NPI is in a dual region for a [employer], as well as [employer]  We just need to verify which region the doctor bills out of  When you get this message, can you please return my call at [phone]  Thank you

## 2022-11-21 ENCOUNTER — HOSPITAL ENCOUNTER (OUTPATIENT)
Dept: INFUSION CENTER | Facility: CLINIC | Age: 52
Discharge: HOME | End: 2022-11-21

## 2022-11-21 DIAGNOSIS — G43.709 CHRONIC MIGRAINE WITHOUT AURA WITHOUT STATUS MIGRAINOSUS, NOT INTRACTABLE: ICD-10-CM

## 2022-11-25 ENCOUNTER — HOSPITAL ENCOUNTER (OUTPATIENT)
Dept: INFUSION CENTER | Facility: CLINIC | Age: 52
End: 2022-11-25

## 2022-11-28 ENCOUNTER — HOSPITAL ENCOUNTER (OUTPATIENT)
Dept: INFUSION CENTER | Facility: CLINIC | Age: 52
Discharge: HOME | End: 2022-11-28

## 2022-11-28 VITALS
SYSTOLIC BLOOD PRESSURE: 126 MMHG | HEART RATE: 72 BPM | RESPIRATION RATE: 16 BRPM | DIASTOLIC BLOOD PRESSURE: 72 MMHG | TEMPERATURE: 97.6 F

## 2022-11-28 DIAGNOSIS — G43.709 CHRONIC MIGRAINE WITHOUT AURA WITHOUT STATUS MIGRAINOSUS, NOT INTRACTABLE: Primary | ICD-10-CM

## 2022-11-28 RX ORDER — SODIUM CHLORIDE 9 MG/ML
20 INJECTION, SOLUTION INTRAVENOUS ONCE
Status: COMPLETED | OUTPATIENT
Start: 2022-11-28 | End: 2022-11-28

## 2022-11-28 RX ORDER — SODIUM CHLORIDE 9 MG/ML
20 INJECTION, SOLUTION INTRAVENOUS ONCE
OUTPATIENT
Start: 2023-02-13

## 2022-11-28 RX ADMIN — EPTINEZUMAB-JJMR 100 MG: 100 INJECTION INTRAVENOUS at 10:21

## 2022-11-28 RX ADMIN — SODIUM CHLORIDE 20 ML/HR: 0.9 INJECTION, SOLUTION INTRAVENOUS at 09:35

## 2022-11-28 NOTE — PROGRESS NOTES
Pt  Tolerated Vyepti without adverse event  Today was Pt  Initial infusion  AVS declined  Appt card provided

## 2022-11-28 NOTE — ADDENDUM NOTE
Encounter addended by: Carter Avina, Pharmacist on: 11/28/2022 10:04 AM   Actions taken: i-Vent created or edited

## 2022-12-12 ENCOUNTER — TELEPHONE (OUTPATIENT)
Dept: BARIATRICS | Facility: CLINIC | Age: 52
End: 2022-12-12

## 2022-12-12 NOTE — TELEPHONE ENCOUNTER
Called patient to see if she would like to reschedule her No Show appointment with Fish Mayer PA-C on 12/12/22 and she said that she will call the office back to reschedule at a later time

## 2022-12-14 ENCOUNTER — OFFICE VISIT (OUTPATIENT)
Dept: SLEEP CENTER | Facility: CLINIC | Age: 52
End: 2022-12-14

## 2022-12-14 VITALS
WEIGHT: 163.8 LBS | DIASTOLIC BLOOD PRESSURE: 62 MMHG | OXYGEN SATURATION: 97 % | BODY MASS INDEX: 30.14 KG/M2 | HEART RATE: 120 BPM | HEIGHT: 62 IN | SYSTOLIC BLOOD PRESSURE: 102 MMHG

## 2022-12-14 DIAGNOSIS — G47.33 OSA (OBSTRUCTIVE SLEEP APNEA): Primary | ICD-10-CM

## 2022-12-14 DIAGNOSIS — G47.30 SLEEP APNEA, UNSPECIFIED TYPE: ICD-10-CM
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
## 2022-12-19 ENCOUNTER — TELEPHONE (OUTPATIENT)
Dept: SLEEP CENTER | Facility: CLINIC | Age: 52
End: 2022-12-19

## 2022-12-19 LAB
DME PARACHUTE DELIVERY DATE REQUESTED: NORMAL
DME PARACHUTE DELIVERY NOTE: NORMAL
DME PARACHUTE ITEM DESCRIPTION: NORMAL
DME PARACHUTE ORDER STATUS: NORMAL
DME PARACHUTE SUPPLIER NAME: NORMAL
DME PARACHUTE SUPPLIER PHONE: NORMAL

## 2022-12-31 ENCOUNTER — APPOINTMENT (OUTPATIENT)
Dept: LAB | Age: 52
End: 2022-12-31

## 2022-12-31 DIAGNOSIS — F41.9 ANXIETY AND DEPRESSION: ICD-10-CM

## 2022-12-31 DIAGNOSIS — E78.2 MIXED HYPERLIPIDEMIA: ICD-10-CM

## 2022-12-31 DIAGNOSIS — E66.9 OBESITY, CLASS I, BMI 30-34.9: ICD-10-CM

## 2022-12-31 DIAGNOSIS — E06.3 HASHIMOTO'S THYROIDITIS: ICD-10-CM

## 2022-12-31 DIAGNOSIS — R56.9 SEIZURE (HCC): ICD-10-CM

## 2022-12-31 DIAGNOSIS — F32.A ANXIETY AND DEPRESSION: ICD-10-CM

## 2022-12-31 DIAGNOSIS — R63.5 ABNORMAL WEIGHT GAIN: ICD-10-CM

## 2022-12-31 DIAGNOSIS — G47.33 OSA (OBSTRUCTIVE SLEEP APNEA): ICD-10-CM

## 2022-12-31 LAB
ALBUMIN SERPL BCP-MCNC: 3.5 G/DL (ref 3.5–5)
ALP SERPL-CCNC: 57 U/L (ref 46–116)
ALT SERPL W P-5'-P-CCNC: 13 U/L (ref 12–78)
ANION GAP SERPL CALCULATED.3IONS-SCNC: 4 MMOL/L (ref 4–13)
AST SERPL W P-5'-P-CCNC: 8 U/L (ref 5–45)
BILIRUB SERPL-MCNC: 0.29 MG/DL (ref 0.2–1)
BUN SERPL-MCNC: 17 MG/DL (ref 5–25)
CALCIUM SERPL-MCNC: 9.4 MG/DL (ref 8.3–10.1)
CHLORIDE SERPL-SCNC: 110 MMOL/L (ref 96–108)
CHOLEST SERPL-MCNC: 261 MG/DL
CO2 SERPL-SCNC: 30 MMOL/L (ref 21–32)
CREAT SERPL-MCNC: 1.04 MG/DL (ref 0.6–1.3)
ERYTHROCYTE [DISTWIDTH] IN BLOOD BY AUTOMATED COUNT: 13 % (ref 11.6–15.1)
EST. AVERAGE GLUCOSE BLD GHB EST-MCNC: 108 MG/DL
GFR SERPL CREATININE-BSD FRML MDRD: 62 ML/MIN/1.73SQ M
GLUCOSE P FAST SERPL-MCNC: 97 MG/DL (ref 65–99)
HBA1C MFR BLD: 5.4 %
HCT VFR BLD AUTO: 34.2 % (ref 34.8–46.1)
HDLC SERPL-MCNC: 50 MG/DL
HGB BLD-MCNC: 10.8 G/DL (ref 11.5–15.4)
LDLC SERPL CALC-MCNC: 185 MG/DL (ref 0–100)
MCH RBC QN AUTO: 29 PG (ref 26.8–34.3)
MCHC RBC AUTO-ENTMCNC: 31.6 G/DL (ref 31.4–37.4)
MCV RBC AUTO: 92 FL (ref 82–98)
NONHDLC SERPL-MCNC: 211 MG/DL
PLATELET # BLD AUTO: 250 THOUSANDS/UL (ref 149–390)
PMV BLD AUTO: 9.6 FL (ref 8.9–12.7)
POTASSIUM SERPL-SCNC: 4.4 MMOL/L (ref 3.5–5.3)
PROT SERPL-MCNC: 6.6 G/DL (ref 6.4–8.4)
RBC # BLD AUTO: 3.73 MILLION/UL (ref 3.81–5.12)
SODIUM SERPL-SCNC: 144 MMOL/L (ref 135–147)
T4 FREE SERPL-MCNC: 0.66 NG/DL (ref 0.76–1.46)
TRIGL SERPL-MCNC: 130 MG/DL
TSH SERPL DL<=0.05 MIU/L-ACNC: 8.31 UIU/ML (ref 0.45–4.5)
WBC # BLD AUTO: 6.77 THOUSAND/UL (ref 4.31–10.16)

## 2023-01-03 ENCOUNTER — TELEPHONE (OUTPATIENT)
Dept: BARIATRICS | Facility: CLINIC | Age: 53
End: 2023-01-03

## 2023-01-03 NOTE — TELEPHONE ENCOUNTER
----- Message from Suresh Mike PA-C sent at 1/3/2023  7:56 AM EST -----  Please call patient - she no showed her last medical weight management appt  If she would sumit to reschedule please let her know       She needs to follow up with her family doctor regarding her thyroid level - it is elevated and indicated hypothyroid  She has low hemoglobin  Low levels can lead to fatigue  If you are having any active signs of bleeding you should have this evaluated now  Please follow up on this with PCP

## 2023-01-03 NOTE — TELEPHONE ENCOUNTER
----- Message from Robyn Damico PA-C sent at 1/3/2023  7:56 AM EST -----  Please call patient - she no showed her last medical weight management appt  If she would sumit to reschedule please let her know       She needs to follow up with her family doctor regarding her thyroid level - it is elevated and indicated hypothyroid  She has low hemoglobin  Low levels can lead to fatigue  If you are having any active signs of bleeding you should have this evaluated now  Please follow up on this with PCP

## 2023-01-04 LAB
DME PARACHUTE DELIVERY DATE ACTUAL: NORMAL
DME PARACHUTE DELIVERY DATE REQUESTED: NORMAL
DME PARACHUTE DELIVERY NOTE: NORMAL
DME PARACHUTE ITEM DESCRIPTION: NORMAL
DME PARACHUTE ORDER STATUS: NORMAL
DME PARACHUTE SUPPLIER NAME: NORMAL
DME PARACHUTE SUPPLIER PHONE: NORMAL

## 2023-01-05 ENCOUNTER — OFFICE VISIT (OUTPATIENT)
Dept: INTERNAL MEDICINE CLINIC | Facility: CLINIC | Age: 53
End: 2023-01-05

## 2023-01-05 VITALS
RESPIRATION RATE: 14 BRPM | OXYGEN SATURATION: 97 % | HEART RATE: 94 BPM | HEIGHT: 62 IN | WEIGHT: 170.8 LBS | TEMPERATURE: 98.3 F | BODY MASS INDEX: 31.43 KG/M2

## 2023-01-05 DIAGNOSIS — E03.8 SUBCLINICAL HYPOTHYROIDISM: ICD-10-CM

## 2023-01-05 DIAGNOSIS — R53.82 CHRONIC FATIGUE: ICD-10-CM

## 2023-01-05 DIAGNOSIS — E78.5 HYPERLIPIDEMIA, UNSPECIFIED HYPERLIPIDEMIA TYPE: ICD-10-CM

## 2023-01-05 DIAGNOSIS — E53.8 FOLATE DEFICIENCY: ICD-10-CM

## 2023-01-05 DIAGNOSIS — E03.9 HYPOTHYROIDISM, UNSPECIFIED TYPE: Primary | ICD-10-CM

## 2023-01-05 DIAGNOSIS — E61.1 IRON DEFICIENCY: ICD-10-CM

## 2023-01-05 DIAGNOSIS — E53.8 VITAMIN B 12 DEFICIENCY: ICD-10-CM

## 2023-01-05 DIAGNOSIS — Z23 NEED FOR VACCINATION: ICD-10-CM

## 2023-01-05 DIAGNOSIS — Z23 ENCOUNTER FOR IMMUNIZATION: ICD-10-CM

## 2023-01-05 DIAGNOSIS — M54.50 MIDLINE LOW BACK PAIN WITHOUT SCIATICA, UNSPECIFIED CHRONICITY: ICD-10-CM

## 2023-01-05 DIAGNOSIS — D64.89 ANEMIA DUE TO OTHER CAUSE, NOT CLASSIFIED: ICD-10-CM

## 2023-01-05 PROBLEM — S92.919A CLOSED FRACTURE OF PHALANX OF FOOT: Status: RESOLVED | Noted: 2019-09-27 | Resolved: 2023-01-05

## 2023-01-05 PROBLEM — U07.1 COVID-19: Status: RESOLVED | Noted: 2021-12-28 | Resolved: 2023-01-05

## 2023-01-05 RX ORDER — ROSUVASTATIN CALCIUM 5 MG/1
TABLET, COATED ORAL
Qty: 12 TABLET | Refills: 6 | Status: SHIPPED | OUTPATIENT
Start: 2023-01-05

## 2023-01-05 RX ORDER — LEVOTHYROXINE SODIUM 0.03 MG/1
25 TABLET ORAL DAILY
Qty: 30 TABLET | Refills: 4 | Status: SHIPPED | OUTPATIENT
Start: 2023-01-05

## 2023-01-05 RX ORDER — CYCLOBENZAPRINE HCL 5 MG
5 TABLET ORAL
Qty: 20 TABLET | Refills: 0 | Status: SHIPPED | OUTPATIENT
Start: 2023-01-05
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
## 2023-01-05 NOTE — ASSESSMENT & PLAN NOTE
Most recent free T4 and TSH values reviewed with the patient today  The TSH is elevated and T4 is suppressed and I have elected to initiate the patient on replacement therapy  She indicates that she has had a difficult time controlling her weight and at times feels tired  We will start her at 25 mcg of levothyroxine daily with follow-up blood work in 6 weeks

## 2023-01-05 NOTE — ASSESSMENT & PLAN NOTE
Chronic fatigue symptoms reviewed to identifiable factors that may be adding to her fatigue include mild anemia and hypothyroid condition  Both issues will be addressed

## 2023-01-05 NOTE — ASSESSMENT & PLAN NOTE
I reviewed the patient's CBC indicates a mild anemia  We have requested iron T70 and folic acid assays to evaluate for possible vitamin deficiency

## 2023-01-05 NOTE — ASSESSMENT & PLAN NOTE
Intermittent symptoms of low back discomfort  They seem to be muscular and skeletal   She does seem to have some back fatigue with prolonged standing  She finds relief with Toradol and also relaxer medication  She has received both these medications through the emergency room in the past   A prescription for Flexeril was provided to the patient for use at bedtime on an as-needed basis    The dose is 5 mg and of advised the patient not to operate any motor vehicle after taking the medication for period of 6 hours

## 2023-01-11 ENCOUNTER — TELEPHONE (OUTPATIENT)
Dept: SLEEP CENTER | Facility: CLINIC | Age: 53
End: 2023-01-11

## 2023-01-12 NOTE — TELEPHONE ENCOUNTER
Pt  was set up on January 4th in [address] [place] [address]y [address] [place] on PAP 4-20cm and mask F30 full face (S)
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
## 2023-02-10 ENCOUNTER — OFFICE VISIT (OUTPATIENT)
Dept: BARIATRICS | Facility: CLINIC | Age: 53
End: 2023-02-10

## 2023-02-10 VITALS
SYSTOLIC BLOOD PRESSURE: 110 MMHG | DIASTOLIC BLOOD PRESSURE: 60 MMHG | RESPIRATION RATE: 16 BRPM | WEIGHT: 165.6 LBS | HEIGHT: 62 IN | HEART RATE: 72 BPM | BODY MASS INDEX: 30.47 KG/M2

## 2023-02-10 DIAGNOSIS — E66.9 OBESITY, CLASS I, BMI 30-34.9: Primary | ICD-10-CM

## 2023-02-10 DIAGNOSIS — E03.9 ACQUIRED HYPOTHYROIDISM: ICD-10-CM

## 2023-02-10 DIAGNOSIS — R63.5 WEIGHT GAIN DUE TO MEDICATION: ICD-10-CM

## 2023-02-10 DIAGNOSIS — T50.905A WEIGHT GAIN DUE TO MEDICATION: ICD-10-CM

## 2023-02-10 DIAGNOSIS — D64.9 NORMOCYTIC ANEMIA: ICD-10-CM

## 2023-02-10 DIAGNOSIS — G47.33 OBSTRUCTIVE SLEEP APNEA: ICD-10-CM

## 2023-02-10 RX ORDER — METFORMIN HYDROCHLORIDE 500 MG/1
500 TABLET, EXTENDED RELEASE ORAL
Qty: 90 TABLET | Refills: 0 | Status: SHIPPED | OUTPATIENT
Start: 2023-02-10

## 2023-02-10 NOTE — PROGRESS NOTES
Assessment/Plan:  Aamir Pham was seen today for follow-up  Diagnoses and all orders for this visit:    Obesity, Class I, BMI 30-34 9    Weight gain due to medication  -     metFORMIN (GLUCOPHAGE-XR) 500 mg 24 hr tablet; Take 1 tablet (500 mg total) by mouth daily with dinner    Acquired hypothyroidism    Normocytic anemia    Obstructive sleep apnea       Obesity, Class I, BMI 30-34 9  -No phentermine per psychiatrist  -Topiramate ineffective for migraines in the past and no notice weight loss   -On naltrexone for skin picking    General Recommendations:  Nutrition:  Eat breakfast daily  Do not skip meals  Food log (ie ) www myfitnesspal com, sparkpeople  com, loseit com, calorieking  com, etc     Practice mindful eating  Be sure to set aside time to eat, eat slowly, and savor your food  Hydration: At least 64oz of water daily  No sugar sweetened beverages  No juice (eat the fruit instead)  Exercise:  Studies have shown that the ideal exercise goal is somewhere between 150 to 300 minutes of moderate intensity exercise a week  Start with exercising 10 minutes every other day and gradually increase physical activity with a goal of at least 150 minutes of moderate intensity exercise a week, divided over at least 3 days a week  An example of this would be exercising 30 minutes a day, 5 days a week  Resistance training can increase muscle mass and increase our resting metabolic rate  FULL BODY resistance training is recommended 2-3 times a week  Do not do this on consecutive days to allow for muscle recovery  Aim for a bare minimum 5000 steps, even on days you do not exercise  Monitoring: Weigh yourself at least once a week  If you do weight yourself daily, weigh yourself the same time of the day with the same amount of clothing on  Preferably this should be done after waking up each day, before you eat, and with no clothing or minimal clothing on      Specific Goals/Recommendations :  1  START metformin XR 500mg daily  2  5000 steps a day as the bare minimum  Aim for 65326 steps as a goal on more active days  3  Eliminate all liquid calories as part of your regular diet  Only okay on occasion (once or twice a week at most)  4  Drink 64 oz of water a day/  5  Replace Anneliese's with lower calorie option such as lean cousin or healthy choice  6   No snacks at work  Pack your own snacks from the 6270-0568 calore plan as was provided to you  7  Make sure you follow-up your low thyroid hormone level and anemia with your primary care doctor  Return visit:  3 months           Total time spent reviewing chart, interviewing patient, examining patient, discussing plan, answering all questions, and documentin min        ______________________________________________________________________        Subjective:   Chief Complaint   Patient presents with   • Follow-up     MWM 6wk f/u; waist     Patient here to discuss weight associated problems and nutrition goals  HPI: Eduardo Del Rio  is a 47 y o  female with history of both thyroidism, MYRA, and excess weight  Most recent notes and records were reviewed  Initially seen by medical weight management in  but lost to follow-up  At that time it was discussed that Benjamen Rings is not an option due to her history of seizures  Also advised to look into decreasing or coming off of gabapentin which she was taking for migraine prevention  She is considering a VLCD but could not noel the taste of the products  Schedule was received from her doctor that was managing her depression and anxiety who advised against phentermine  Patient was lost to follow-up and reestablished on 10/31/2020 to seeing Diego Lino  Discussion of her being recently started on Seroquel, Lamictal, Cymbalta, and increased dose of trazodone as well as being on gabapentin for many years    Again phentermine was advised against due to her history of anxiety, depression, and history of drug overdose  Advised addition of metformin once diet and behavioral changes are made and her labs are done  Meets with psychiatrist   Ankita Roman with talk therapist and brings up her weight with them  Wt Readings from Last 10 Encounters:   02/10/23 75 1 kg (165 lb 9 6 oz)   01/05/23 77 5 kg (170 lb 12 8 oz)   12/14/22 74 3 kg (163 lb 12 8 oz)   11/08/22 75 7 kg (166 lb 12 8 oz)   10/31/22 75 1 kg (165 lb 8 oz)   10/11/22 75 3 kg (166 lb)   09/13/22 73 kg (160 lb 15 oz)   09/13/22 73 kg (161 lb)   08/30/22 72 9 kg (160 lb 12 8 oz)   05/04/22 72 kg (158 lb 12 8 oz)     Highest weight: 190  Lowest weight: 125 in her mid 30s  Last OV: 165  Current weight: 165    Food logging:  B: 10am apple cinnamon oatmeal or donut, Hot tea with 4 5 teaspoons or IJ  S: Potato chips, candy (snack sized reeses or kitkats x2-3)  L: 1pm Frozen Stouffers  S:  Sugar wafers  D: 6-7pm on weeksdays, Frozen stirfry or breakfast meal for dinner (eegs garber toast, or Slovenian toast or pancakes)  S: Sugar wafers    Hydration: Water 1-2 bottles  Alcohol: No  Exercise:  No  Sleep:  7+ MYRA on CPAP (takes it off around 2am)  Weight Monitoring:  No    Review Of Systems:  Review of Systems   Constitutional: Negative for activity change, appetite change, fatigue and fever  Respiratory: Negative for cough and shortness of breath  Cardiovascular: Negative for chest pain, palpitations and leg swelling  Gastrointestinal: Negative for abdominal pain, constipation, diarrhea, nausea and vomiting  Endocrine: Negative for cold intolerance and heat intolerance  Genitourinary: Negative for difficulty urinating and dysuria  Musculoskeletal: Negative for arthralgias, back pain and gait problem  Skin: Negative for pallor and rash  Neurological: Negative for headaches  Psychiatric/Behavioral: Negative for dysphoric mood, sleep disturbance and suicidal ideas (or HI)  The patient is not nervous/anxious        Objective:  /60   Pulse 72 Resp 16   Ht 5' 2" (1 575 m)   Wt 75 1 kg (165 lb 9 6 oz)   BMI 30 29 kg/m²   Physical Exam  Vitals and nursing note reviewed  Constitutional:       General: She is not in acute distress  Appearance: Normal appearance  She is not ill-appearing or diaphoretic  Eyes:      General: No scleral icterus  Cardiovascular:      Rate and Rhythm: Normal rate and regular rhythm  Pulses: Normal pulses  Heart sounds: No murmur heard  Pulmonary:      Effort: Pulmonary effort is normal  No respiratory distress  Breath sounds: Normal breath sounds  No wheezing or rhonchi  Abdominal:      General: Bowel sounds are normal  There is no distension  Palpations: Abdomen is soft  There is no mass  Tenderness: There is no abdominal tenderness  Musculoskeletal:      Cervical back: Neck supple  Right lower leg: No edema  Left lower leg: No edema  Lymphadenopathy:      Cervical: No cervical adenopathy  Skin:     Capillary Refill: Capillary refill takes less than 2 seconds  Findings: No lesion or rash  Neurological:      Mental Status: She is alert and oriented to person, place, and time  Gait: Gait normal    Psychiatric:         Mood and Affect: Mood normal          Behavior: Behavior normal        Labs and Imaging  Recent labs and imaging have been personally reviewed    Lab Results   Component Value Date    WBC 6 77 12/31/2022    HGB 10 8 (L) 12/31/2022    HCT 34 2 (L) 12/31/2022    MCV 92 12/31/2022     12/31/2022     Lab Results   Component Value Date    SODIUM 144 12/31/2022    K 4 4 12/31/2022     (H) 12/31/2022    CO2 30 12/31/2022    AGAP 4 12/31/2022    BUN 17 12/31/2022    CREATININE 1 04 12/31/2022    GLUC 83 05/01/2021    GLUF 97 12/31/2022    CALCIUM 9 4 12/31/2022    AST 8 12/31/2022    ALT 13 12/31/2022    ALKPHOS 57 12/31/2022    TP 6 6 12/31/2022    TBILI 0 29 12/31/2022    EGFR 62 12/31/2022     Lab Results   Component Value Date    HGBA1C 5 4 12/31/2022     Lab Results   Component Value Date    LHR6ZCLBNXRK 8 310 (H) 12/31/2022    TSH 6 250 (H) 06/01/2019     Lab Results   Component Value Date    CHOLESTEROL 261 (H) 12/31/2022     Lab Results   Component Value Date    HDL 50 12/31/2022     Lab Results   Component Value Date    TRIG 130 12/31/2022     Lab Results   Component Value Date    LDLCALC 185 (H) 12/31/2022

## 2023-02-10 NOTE — ASSESSMENT & PLAN NOTE
-No phentermine per psychiatrist  -Topiramate ineffective for migraines in the past and no notice weight loss   -On naltrexone for skin picking    General Recommendations:  Nutrition:  Eat breakfast daily  Do not skip meals  Food log (ie ) www myfitnesspal com, sparkpeople  com, loseit com, calorieking  com, etc     Practice mindful eating  Be sure to set aside time to eat, eat slowly, and savor your food  Hydration: At least 64oz of water daily  No sugar sweetened beverages  No juice (eat the fruit instead)  Exercise:  Studies have shown that the ideal exercise goal is somewhere between 150 to 300 minutes of moderate intensity exercise a week  Start with exercising 10 minutes every other day and gradually increase physical activity with a goal of at least 150 minutes of moderate intensity exercise a week, divided over at least 3 days a week  An example of this would be exercising 30 minutes a day, 5 days a week  Resistance training can increase muscle mass and increase our resting metabolic rate  FULL BODY resistance training is recommended 2-3 times a week  Do not do this on consecutive days to allow for muscle recovery  Aim for a bare minimum 5000 steps, even on days you do not exercise  Monitoring: Weigh yourself at least once a week  If you do weight yourself daily, weigh yourself the same time of the day with the same amount of clothing on  Preferably this should be done after waking up each day, before you eat, and with no clothing or minimal clothing on  Specific Goals/Recommendations :  1  START metformin XR 500mg daily  2  5000 steps a day as the bare minimum  Aim for 46907 steps as a goal on more active days  3  Eliminate all liquid calories as part of your regular diet  Only okay on occasion (once or twice a week at most)  4  Drink 64 oz of water a day/  5  Replace Anneliese's with lower calorie option such as lean cousin or healthy choice  6   No snacks at work  Pack your own snacks from the 1755-9126 calore plan as was provided to you  7  Make sure you follow-up your low thyroid hormone level and anemia with your primary care doctor      Return visit:  3 months

## 2023-02-10 NOTE — PATIENT INSTRUCTIONS
General Recommendations:  Nutrition:  Eat breakfast daily  Do not skip meals  Food log (ie ) www myfitnesspal com, sparkpeople  com, loseit com, calorieking  com, etc     Practice mindful eating  Be sure to set aside time to eat, eat slowly, and savor your food  Hydration: At least 64oz of water daily  No sugar sweetened beverages  No juice (eat the fruit instead)  Exercise:  Studies have shown that the ideal exercise goal is somewhere between 150 to 300 minutes of moderate intensity exercise a week  Start with exercising 10 minutes every other day and gradually increase physical activity with a goal of at least 150 minutes of moderate intensity exercise a week, divided over at least 3 days a week  An example of this would be exercising 30 minutes a day, 5 days a week  Resistance training can increase muscle mass and increase our resting metabolic rate  FULL BODY resistance training is recommended 2-3 times a week  Do not do this on consecutive days to allow for muscle recovery  Aim for a bare minimum 5000 steps, even on days you do not exercise  Monitoring: Weigh yourself at least once a week  If you do weight yourself daily, weigh yourself the same time of the day with the same amount of clothing on  Preferably this should be done after waking up each day, before you eat, and with no clothing or minimal clothing on  Specific Goals/Recommendations :  START metformin XR 500mg daily  5000 steps a day as the bare minimum  Aim for 53785 steps as a goal on more active days  3  Eliminate all liquid calories as part of your regular diet  Only okay on occasion (once or twice a week at most)  4  Drink 64 oz of water a day/  5  Replace Anneliese's with lower calorie option such as lean cousin or healthy choice  6   No snacks at work  Pack your own snacks from the 7966-2256 calore plan as was provided to you    7  Make sure you follow-up your low thyroid hormone level and anemia with your primary care doctor      Return visit:  3 months

## 2023-02-15 ENCOUNTER — OFFICE VISIT (OUTPATIENT)
Dept: PSYCHIATRY | Facility: CLINIC | Age: 53
End: 2023-02-15

## 2023-02-15 VITALS
DIASTOLIC BLOOD PRESSURE: 67 MMHG | HEIGHT: 62 IN | BODY MASS INDEX: 31.1 KG/M2 | WEIGHT: 169 LBS | SYSTOLIC BLOOD PRESSURE: 109 MMHG | HEART RATE: 84 BPM

## 2023-02-15 DIAGNOSIS — F41.0 PANIC DISORDER WITHOUT AGORAPHOBIA: Chronic | ICD-10-CM

## 2023-02-15 DIAGNOSIS — F41.1 GAD (GENERALIZED ANXIETY DISORDER): Chronic | ICD-10-CM

## 2023-02-15 DIAGNOSIS — F60.9 PERSONALITY DISORDER (HCC): Chronic | ICD-10-CM

## 2023-02-15 DIAGNOSIS — F42.9 OBSESSIVE-COMPULSIVE DISORDER, UNSPECIFIED TYPE: Chronic | ICD-10-CM

## 2023-02-15 DIAGNOSIS — F33.41 MAJOR DEPRESSIVE DISORDER, RECURRENT EPISODE, IN PARTIAL REMISSION (HCC): Primary | Chronic | ICD-10-CM

## 2023-02-15 DIAGNOSIS — G47.09 OTHER INSOMNIA: Chronic | ICD-10-CM

## 2023-02-15 DIAGNOSIS — Z79.899 LONG-TERM USE OF HIGH-RISK MEDICATION: Chronic | ICD-10-CM

## 2023-02-15 DIAGNOSIS — F63.9 IMPULSE CONTROL DISORDER: Chronic | ICD-10-CM

## 2023-02-15 RX ORDER — HYDROXYZINE 50 MG/1
50 TABLET, FILM COATED ORAL 3 TIMES DAILY PRN
Qty: 90 TABLET | Refills: 4 | Status: SHIPPED | OUTPATIENT
Start: 2023-02-15 | End: 2023-07-15

## 2023-02-15 RX ORDER — TRAZODONE HYDROCHLORIDE 100 MG/1
100-300 TABLET ORAL
Qty: 90 TABLET | Refills: 4 | Status: SHIPPED | OUTPATIENT
Start: 2023-02-15 | End: 2023-07-15

## 2023-02-15 RX ORDER — LAMOTRIGINE 100 MG/1
100 TABLET ORAL
Qty: 30 TABLET | Refills: 4 | Status: SHIPPED | OUTPATIENT
Start: 2023-02-15 | End: 2023-07-15

## 2023-02-15 RX ORDER — DULOXETIN HYDROCHLORIDE 60 MG/1
120 CAPSULE, DELAYED RELEASE ORAL DAILY
Qty: 60 CAPSULE | Refills: 4 | Status: SHIPPED | OUTPATIENT
Start: 2023-02-15 | End: 2023-07-15

## 2023-02-15 RX ORDER — NALTREXONE HYDROCHLORIDE 50 MG/1
50 TABLET, FILM COATED ORAL DAILY
Qty: 30 TABLET | Refills: 4 | Status: SHIPPED | OUTPATIENT
Start: 2023-02-15 | End: 2023-07-15

## 2023-02-15 RX ORDER — ARIPIPRAZOLE 5 MG/1
5 TABLET ORAL
Qty: 30 TABLET | Refills: 4 | Status: SHIPPED | OUTPATIENT
Start: 2023-02-15 | End: 2023-07-15

## 2023-02-15 NOTE — BH TREATMENT PLAN
TREATMENT PLAN (Medication Management Only)        [facility]    Name/Date of Birth/MRN:  Cathi Au 47 y o  1970 MRN: [id number]  Date of Treatment Plan: February 15, 2023  Diagnosis/Diagnoses:   1  Major depressive disorder, recurrent episode, in partial remission ([facility] )    2  KYLE (generalized anxiety disorder)    3  Panic disorder without agoraphobia    4  Impulse control disorder    5  Obsessive-compulsive disorder, unspecified type    6  Personality disorder ([facility] )    7  Other insomnia    8  Long-term use of high-risk medication      Strengths/Personal Resources for Self-Care: "taking my medications regularly"  Area/Areas of need (in own words): "anxiety"  1  Long Term Goal:   improve control of anxiety, help with depression, improve sleep  Target Date: 2 months - 4/15/2023  Person/Persons responsible for completion of goal: Leatha Ta  2  Short Term Objective (s) - How will we reach this goal?:   A  Provider new recommended medication/dosage changes and/or continue medication(s): increase Trazodone, start Abilify, taper off Seroquel, continue all other medications (Cymbalta, Lamictal, Naltrexone and Atarax)  B   N/A   C   N/A  Target Date: 2 months - 4/15/2023  Person/Persons Responsible for Completion of Goal: Leatha Ta   Progress Towards Goals: progressing  Treatment Modality: medication management every 2 months, continue psychotherapy with own therapist  Review due 180 days from date of this plan: 6 months - 8/15/2023  Expected length of service: ongoing treatment unless revised  My Physician/PA/NP and I have developed this plan together and I agree to work on the goals and objectives  I understand the treatment goals that were developed for my treatment    Electronic Signatures: on file (unless signed below)    Abdirashid Jones MD 02/15/23

## 2023-02-17 ENCOUNTER — APPOINTMENT (OUTPATIENT)
Dept: LAB | Age: 53
End: 2023-02-17

## 2023-02-17 ENCOUNTER — HOSPITAL ENCOUNTER (OUTPATIENT)
Dept: RADIOLOGY | Age: 53
Discharge: HOME | End: 2023-02-17

## 2023-02-17 DIAGNOSIS — E61.1 IRON DEFICIENCY: ICD-10-CM

## 2023-02-17 DIAGNOSIS — E53.8 VITAMIN B 12 DEFICIENCY: ICD-10-CM

## 2023-02-17 DIAGNOSIS — E03.9 HYPOTHYROIDISM, UNSPECIFIED TYPE: ICD-10-CM

## 2023-02-17 DIAGNOSIS — E53.8 FOLATE DEFICIENCY: ICD-10-CM

## 2023-02-17 LAB
FOLATE SERPL-MCNC: 19.6 NG/ML (ref 3.1–17.5)
IRON SERPL-MCNC: 58 UG/DL (ref 50–170)
T4 FREE SERPL-MCNC: 0.71 NG/DL (ref 0.76–1.46)
TSH SERPL DL<=0.05 MIU/L-ACNC: 7.12 UIU/ML (ref 0.45–4.5)
VIT B12 SERPL-MCNC: 816 PG/ML (ref 100–900)

## 2023-02-20 ENCOUNTER — HOSPITAL ENCOUNTER (OUTPATIENT)
Dept: INFUSION CENTER | Facility: CLINIC | Age: 53
End: 2023-02-20

## 2023-02-22 ENCOUNTER — OFFICE VISIT (OUTPATIENT)
Dept: INTERNAL MEDICINE CLINIC | Facility: CLINIC | Age: 53
End: 2023-02-22

## 2023-02-22 VITALS
WEIGHT: 165.4 LBS | OXYGEN SATURATION: 97 % | BODY MASS INDEX: 30.44 KG/M2 | HEIGHT: 62 IN | HEART RATE: 97 BPM | TEMPERATURE: 98.3 F | DIASTOLIC BLOOD PRESSURE: 62 MMHG | SYSTOLIC BLOOD PRESSURE: 110 MMHG

## 2023-02-22 DIAGNOSIS — R53.82 CHRONIC FATIGUE: ICD-10-CM

## 2023-02-22 DIAGNOSIS — D64.89 ANEMIA DUE TO OTHER CAUSE, NOT CLASSIFIED: ICD-10-CM

## 2023-02-22 DIAGNOSIS — E61.1 IRON DEFICIENCY: ICD-10-CM

## 2023-02-22 DIAGNOSIS — E03.9 HYPOTHYROIDISM, UNSPECIFIED TYPE: Primary | ICD-10-CM

## 2023-02-22 PROBLEM — E03.8 SUBCLINICAL HYPOTHYROIDISM: Status: RESOLVED | Noted: 2019-09-24 | Resolved: 2023-02-22

## 2023-02-22 RX ORDER — LEVOTHYROXINE SODIUM 0.05 MG/1
50 TABLET ORAL DAILY
Qty: 30 TABLET | Refills: 4 | Status: SHIPPED | OUTPATIENT
Start: 2023-02-22

## 2023-02-22 RX ORDER — FERROUS SULFATE TAB EC 324 MG (65 MG FE EQUIVALENT) 324 (65 FE) MG
324 TABLET DELAYED RESPONSE ORAL
Qty: 30 TABLET | Refills: 2 | Status: SHIPPED | OUTPATIENT
Start: 2023-02-22

## 2023-02-22 NOTE — ASSESSMENT & PLAN NOTE
T4 TSH reviewed with the patient show persistent indications of hypothyroid condition  Recommend increasing levothyroxine from 25 mcg daily to 50 mcg daily with follow-up blood work in 6 weeks

## 2023-02-22 NOTE — ASSESSMENT & PLAN NOTE
Fatigue symptoms persist at this point we have identified hypothyroid condition and anemia as causes of the fatigue working on trying to correct the hypothyroid condition as well as the anemia

## 2023-02-22 NOTE — ASSESSMENT & PLAN NOTE
Blood pressure assessment shows reading of 110/62 no symptoms associated with his blood pressure at the present time

## 2023-02-22 NOTE — ASSESSMENT & PLAN NOTE
A mild anemia was reviewed with the patient we ordered iron T15 and folic acid readings    She has a low normal iron level and I suspect this is likely the cause for her mild anemia we will start her on iron supplementation 324 mg daily with follow-up iron and CBC in 6 weeks C51 and folic acid are both normal

## 2023-02-22 NOTE — ASSESSMENT & PLAN NOTE
I reviewed the patient's free T4 and TSH indicates she is still in a hypothyroid condition  We will ask her to increase her thyroid supplementation to 50 mcg of levothyroxine daily with follow-up blood work in 6 weeks

## 2023-02-22 NOTE — PROGRESS NOTES
Name: Raul Black      : 1970      MRN: [id number]  Encounter Provider: Charlene Isaac MD  Encounter Date: 2023   Encounter department: [address]     1  Iron deficiency  -     ferrous sulfate 324 (65 Fe) mg; Take 1 tablet (324 mg total) by mouth daily before breakfast  -     Iron; Future    2  Hypothyroidism, unspecified type  Assessment & Plan:  T4 TSH reviewed with the patient show persistent indications of hypothyroid condition  Recommend increasing levothyroxine from 25 mcg daily to 50 mcg daily with follow-up blood work in 6 weeks  Orders:  -     levothyroxine (Synthroid) 50 mcg tablet; Take 1 tablet (50 mcg total) by mouth daily  -     T4, free; Future; Expected date: 2023  -     TSH, 3rd generation; Future; Expected date: 2023    3  Anemia due to other cause, not classified  Assessment & Plan:  A mild anemia was reviewed with the patient we ordered iron C87 and folic acid readings  She has a low normal iron level and I suspect this is likely the cause for her mild anemia we will start her on iron supplementation 324 mg daily with follow-up iron and CBC in 6 weeks F49 and folic acid are both normal    Orders:  -     ferrous sulfate 324 (65 Fe) mg; Take 1 tablet (324 mg total) by mouth daily before breakfast  -     CBC and differential; Future    4  Chronic fatigue  Assessment & Plan:  Fatigue symptoms persist at this point we have identified hypothyroid condition and anemia as causes of the fatigue working on trying to correct the hypothyroid condition as well as the anemia  Subjective      59-year-old female patient presents today for a follow-up visit  She is here today to review thyroid numbers having started on thyroid supplementation several weeks ago  She started at 25 mcg of levothyroxine daily    She is also here for follow-up evaluation of of vitamin N67 folic acid and iron levels which were ordered after her initial CBC indicated a mild anemia  Review of Systems   Constitutional: Positive for fatigue  Current Outpatient Medications on File Prior to Visit   Medication Sig   • acetaminophen (TYLENOL) 325 mg tablet Take 2 tablets (650 mg total) by mouth every 6 (six) hours as needed for mild pain   • albuterol (2 5 mg/3 mL) 0 083 % nebulizer solution Inhale    • albuterol (Proventil HFA) 90 mcg/act inhaler Inhale 2 puffs every 6 (six) hours as needed for wheezing   • aluminum-magnesium hydroxide 200-200 MG/5ML suspension GAVISCON REGULAR STRENGTH AFTER MEALS and BEDTIME WHEN NOT FOLLOWING LPR/GERD DIET  • ARIPiprazole (ABILIFY) 5 mg tablet Take 1 tablet (5 mg total) by mouth daily at bedtime   • Ascorbic Acid (VITAMIN C PO) Take by mouth   • Aspirin-Acetaminophen-Caffeine (MIGRAINE FORMULA PO) Take by mouth   • azelastine (ASTELIN) 0 1 % nasal spray 1-2 sprays into each nostril 2 (two) times a day as needed for rhinitis Use in each nostril as directed   • [employer] INDOOR/OUTDOOR ALLERGY RLF 10 MG tablet Take 10 mg by mouth daily   • cyclobenzaprine (FLEXERIL) 5 mg tablet Take 1 tablet (5 mg total) by mouth daily at bedtime   • DULoxetine (CYMBALTA) 60 mg delayed release capsule Take 2 capsules (120 mg total) by mouth daily   • eptinezumab-jjmr (VYEPTI) IVPB Infuse 100mg IV every 90 days  Note to pharmacy: To be delivered to where patient will be having infusion: [facility]   Jhbii-[phone], oio-[phone]   • gabapentin (NEURONTIN) 300 mg capsule TAKE 2 capsules BY MOUTH EVERY MORNING, 2 CAPSULES BY MOUTH AT NOON AND 2 CAPSULES BY MOUTH EVERY NIGHT AT BEDTIME   • hydrOXYzine HCL (ATARAX) 50 mg tablet Take 1 tablet (50 mg total) by mouth 3 (three) times a day as needed for anxiety   • ibuprofen (MOTRIN) 800 mg tablet Take 1 tablet (800 mg total) by mouth every 6 (six) hours as needed for mild pain   • ketorolac (TORADOL) 10 mg tablet Take 1 tablet (10 mg total) by mouth every 6 (six) hours as needed (migraine, back pain) Max 2-3 per week  • lamoTRIgine (LaMICtal) 100 mg tablet Take 1 tablet (100 mg total) by mouth daily at bedtime   • levonorgestrel (MIRENA) 20 MCG/24HR IUD by Intrauterine route   • metFORMIN (GLUCOPHAGE-XR) 500 mg 24 hr tablet Take 1 tablet (500 mg total) by mouth daily with dinner   • mometasone (NASONEX) 50 mcg/act nasal spray    • naltrexone (REVIA) 50 mg tablet Take 1 tablet (50 mg total) by mouth daily   • ondansetron (ZOFRAN-ODT) 4 mg disintegrating tablet Take 1 tablet (4 mg total) by mouth every 6 (six) hours as needed for nausea or vomiting   • prochlorperazine (COMPAZINE) 10 mg tablet TAKE 1 TABLET(10 MG) BY MOUTH EVERY 6 HOURS AS NEEDED FOR NAUSEA OR VOMITING   • rosuvastatin (CRESTOR) 5 mg tablet Take 1 pill Monday Wednesday and Friday in the evening   • traZODone (DESYREL) 100 mg tablet Take 1-3 tablets (100-300 mg total) by mouth daily at bedtime as needed for sleep   • VITAMIN D PO Take 5,000 Units by mouth   • ZOLMitriptan (ZOMIG-ZMT) 5 MG disintegrating tablet DISSOLVE 1 TABLET BY MOUTH AT ONSET OF HEADACHE, MAY REPEAT AFTER TWO HOURS AS NEEDED, MAX 2 TABLETS PER 24 HOURS   • [DISCONTINUED] levothyroxine (Synthroid) 25 mcg tablet Take 1 tablet (25 mcg total) by mouth daily   • EPINEPHrine (EPIPEN) 0 3 mg/0 3 mL SOAJ Inject 0 3 mL (0 3 mg total) into a muscle once for 1 dose As directed   • fluticasone-salmeterol (ADVAIR HFA) 115-21 MCG/ACT inhaler Inhale 2 puffs 2 (two) times a day Rinse mouth after use  • tiotropium (SPIRIVA RESPIMAT) 1 25 MCG/ACT AERS inhaler Inhale 2 puffs daily       Objective     /62   Pulse 97   Temp 98 3 °F (36 8 °C)   Ht 5' 2" (1 575 m)   Wt 75 kg (165 lb 6 4 oz)   SpO2 97%   BMI 30 25 kg/m²     Physical Exam  Vitals reviewed  Constitutional:       General: She is not in acute distress  Appearance: Normal appearance  She is well-developed  She is not ill-appearing  HENT:      Head: Normocephalic  Right Ear: Hearing and external ear normal       Left Ear: Hearing and external ear normal       Nose: Nose normal  No mucosal edema  Mouth/Throat:      Pharynx: Uvula midline  Eyes:      General: Lids are normal       Conjunctiva/sclera: Conjunctivae normal       Pupils: Pupils are equal, round, and reactive to light  Neck:      Thyroid: No thyromegaly  Vascular: No carotid bruit or JVD  Cardiovascular:      Rate and Rhythm: Normal rate and regular rhythm  Heart sounds: Normal heart sounds  No murmur heard  Pulmonary:      Effort: Pulmonary effort is normal  No respiratory distress  Breath sounds: Normal breath sounds  No wheezing, rhonchi or rales  Abdominal:      General: Bowel sounds are normal       Palpations: Abdomen is soft  Musculoskeletal:         General: Normal range of motion  Lymphadenopathy:      Cervical: No cervical adenopathy  Skin:     General: Skin is warm and dry  Neurological:      Mental Status: She is alert and oriented to person, place, and time  Deep Tendon Reflexes: Reflexes are normal and symmetric  Reflexes normal    Psychiatric:         Speech: Speech normal          Behavior: Behavior normal  Behavior is cooperative  Thought Content:  Thought content normal          Judgment: Judgment normal        Farhan Glass MD

## 2023-02-24 ENCOUNTER — HOSPITAL ENCOUNTER (OUTPATIENT)
Dept: INFUSION CENTER | Facility: CLINIC | Age: 53
End: 2023-02-24

## 2023-02-24 VITALS
DIASTOLIC BLOOD PRESSURE: 71 MMHG | SYSTOLIC BLOOD PRESSURE: 116 MMHG | HEART RATE: 66 BPM | RESPIRATION RATE: 16 BRPM | TEMPERATURE: 97 F

## 2023-02-24 DIAGNOSIS — G43.709 CHRONIC MIGRAINE WITHOUT AURA WITHOUT STATUS MIGRAINOSUS, NOT INTRACTABLE: Primary | ICD-10-CM

## 2023-02-24 RX ORDER — SODIUM CHLORIDE 9 MG/ML
20 INJECTION, SOLUTION INTRAVENOUS ONCE
Status: COMPLETED | OUTPATIENT
Start: 2023-02-24 | End: 2023-02-24

## 2023-02-24 RX ORDER — SODIUM CHLORIDE 9 MG/ML
20 INJECTION, SOLUTION INTRAVENOUS ONCE
OUTPATIENT
Start: 2023-05-15

## 2023-02-24 RX ADMIN — EPTINEZUMAB-JJMR 100 MG: 100 INJECTION INTRAVENOUS at 10:02

## 2023-02-24 RX ADMIN — SODIUM CHLORIDE 20 ML/HR: 0.9 INJECTION, SOLUTION INTRAVENOUS at 10:01

## 2023-03-01 ENCOUNTER — TELEPHONE (OUTPATIENT)
Dept: NEUROLOGY | Facility: CLINIC | Age: 53
End: 2023-03-01

## 2023-03-01 DIAGNOSIS — F41.1 GAD (GENERALIZED ANXIETY DISORDER): Chronic | ICD-10-CM

## 2023-03-01 DIAGNOSIS — G43.709 CHRONIC MIGRAINE WITHOUT AURA: ICD-10-CM

## 2023-03-01 NOTE — TELEPHONE ENCOUNTER
Pt left a VM re: Gabapentin  She is requesting her dosage be increased by one more capsule per each dosage, so 900 mg TID  Transcribed VM:   Hi, my name is Elian Montero  My birthdate [birth year]  I'm a patient of Jessica Salazar, and I went to see my shrink-e-dink, and she still wants Jessica Salazar to be the one to fill my Gabapentin  So I don't have any more scripts for Gabapentin to have it filled, but I also have a question  I know originally I had wanted    she had requested two, two and two, but is it possible just to add an additional one to each time that I take it? If not just keep the Gabapentin the same if you have to  I can be reached at [phone]  Thank you, bye  Called back and spoke with pt and informed her that her request is being forwarded to [facility]  She verbalized an understanding

## 2023-03-02 RX ORDER — GABAPENTIN 300 MG/1
CAPSULE ORAL
Qty: 540 CAPSULE | Refills: 0 | Status: SHIPPED | OUTPATIENT
Start: 2023-03-02

## 2023-03-02 NOTE — TELEPHONE ENCOUNTER
Please tell Alex Hurley that if the gabapentin increase is for migraine we can increase, but to address the depression/ mood/ anxiety, etc, it needs to be filled by her psychiatrist     In addition, I would not feel comfortable with increase further than 800 mg TID for the purposes of migraine

## 2023-03-03 NOTE — TELEPHONE ENCOUNTER
Phone call to pt regarding below  Pt stated she is requesting an increase in her Gabapentin to help with her mood  Advised pt that Pamela Soliz is deferring to pt's psychiatrist for mood medication management  All questions answered and pt voiced understanding

## 2023-03-13 ENCOUNTER — OFFICE VISIT (OUTPATIENT)
Dept: SLEEP CENTER | Facility: CLINIC | Age: 53
End: 2023-03-13

## 2023-03-13 VITALS
DIASTOLIC BLOOD PRESSURE: 70 MMHG | HEIGHT: 62 IN | WEIGHT: 165 LBS | BODY MASS INDEX: 30.36 KG/M2 | SYSTOLIC BLOOD PRESSURE: 108 MMHG

## 2023-03-13 DIAGNOSIS — G47.33 OBSTRUCTIVE SLEEP APNEA: Primary | ICD-10-CM

## 2023-03-13 DIAGNOSIS — K21.9 CHRONIC GERD: ICD-10-CM

## 2023-03-13 DIAGNOSIS — E66.9 OBESITY, CLASS I, BMI 30-34.9: ICD-10-CM

## 2023-03-13 RX ORDER — LEVOTHYROXINE SODIUM 0.03 MG/1
TABLET ORAL
COMMUNITY
Start: 2023-03-05 | End: 2023-03-13

## 2023-03-13 NOTE — PATIENT INSTRUCTIONS
Sleep Apnea   AMBULATORY CARE:   Sleep apnea  is a condition that causes you to stop breathing often during sleep  Types of sleep apnea:   Obstructive sleep apnea (MYRA)  is the most common kind  The muscles and tissues around your throat relax and block air from passing through  Obesity, use of alcohol or cigarettes, or a family history are common causes  MYRA may increase your risk for complications after surgery  Central sleep apnea (CSA)  means your brain does not send signals to the muscles that control breathing  You do not take a breath even though your airway is open  Common causes include medical conditions such as heart failure, being older than 40, or use of opioids  Complex (or mixed) sleep apnea  means you have both obstructive and central sleep apnea  Common signs and symptoms:   Loud snoring or long pauses in breathing    Feeling sleepy, slow, and tired during the day    Snorting, gasping, or choking while you sleep, and waking up suddenly because of these    Feeling irritable during the day    Dry mouth or a headache in the mornings    Heavy night sweating    A hard time thinking, remembering things, or focusing on your tasks the following day    Call your local emergency number (911 in the [address] [facility]) if:   You have chest pain or trouble breathing  Call your doctor if:   You have new or worsening signs or symptoms  You have questions or concerns about your condition or care  Treatment  depends on the kind of apnea you have  A mouth device  may be needed if you have mild sleep apnea  These are designed to keep your throat open  Ask your dentist or healthcare provider about the best mouth device for you  A machine  may be used to help you get more air during sleep  A mask may be placed over your nose and mouth, or just your nose  The mask is hooked to the machine  You will get air through the mask      A continuous positive airway pressure (CPAP) machine  is used to keep your airway open during sleep  The machine blows a gentle stream of air into the mask when you breathe  This helps keep your airway open so you can breathe more regularly  Extra oxygen may be given through the machine  A bilevel positive airway pressure (BiPAP) machine  gives air but lowers the pressure when you breathe out  An adaptive servo-ventilator (ASV)  is a machine that learns your usual breathing pattern  Then, it uses pressure to give you air and prevent stops in your breathing  Surgery  to expand your airway or remove extra tissues may be needed  Surgery is usually only considered if other treatments do not work  Manage or prevent sleep apnea:   Reach and maintain a healthy weight  Ask your healthcare provider what a healthy weight is for you  Ask your provider to help you create a safe weight loss plan if you are overweight  Even a small goal of a 10% weight loss can improve your symptoms  Do not smoke  Nicotine and other chemicals in cigarettes and cigars can cause lung damage  Ask your healthcare provider for information if you currently smoke and need help to quit  E-cigarettes or smokeless tobacco still contain nicotine  Talk to your healthcare provider before you use these products  Do not drink alcohol or take sedative medicine before you go to sleep  Alcohol and sedatives can relax the muscles and tissues around your throat  This can block the airflow to your lungs  Sleep on your side or use pillows designed to prevent sleep apnea  This prevents your tongue or other tissues from blocking your throat  You can also raise the head of your bed  Follow up with your doctor or specialist as directed: You may need to have blood tests during your follow-up visits  Work with your provider to find the right breathing support equipment and settings for you  Write down your questions so you remember to ask them during your visits    © Copyright [employer] 2022 Information is for End User's use only and may not be sold, redistributed or otherwise used for commercial purposes  The above information is an  only  It is not intended as medical advice for individual conditions or treatments  Talk to your doctor, nurse or pharmacist before following any medical regimen to see if it is safe and effective for you

## 2023-03-13 NOTE — ASSESSMENT & PLAN NOTE
Mild MYRA with AHI of 7 2 events per hour  She was diagnosed in 2019, she is symptomatic never had a CPAP because of COVID, started on CPAP therapy recently she is not adherent to using it over the last 30 days she started using 17 days out of which 10% only she was able to use it more than 4 hours her average use is 3 hours 24 minutes she tells me that the main problem was that she has significant acid reflux and she has been on omeprazole but never improved significantly  I referred her to gastroenterologist  I explained to the patient in details the pathophysiology of obstructive sleep apnea  I also explained the importance of treatment, and the consequences of untreated obstructive sleep apnea on underlying cardiovascular and cerebrovascular risk, morbidity, and mortality        Also it is noted that she has significant mask leak, I had her meet with the [employer] company today to get a new mask fitting

## 2023-03-13 NOTE — PROGRESS NOTES
Pulmonary/Sleep Follow Up Note   Patt Cox 47 y o  female MRN: [id number]  3/13/2023      Assessment and Plan:    1  Obstructive sleep apnea  Assessment & Plan:  Mild MYRA with AHI of 7 2 events per hour  She was diagnosed in 2019, she is symptomatic never had a CPAP because of COVID, started on CPAP therapy recently she is not adherent to using it over the last 30 days she started using 17 days out of which 10% only she was able to use it more than 4 hours her average use is 3 hours 24 minutes she tells me that the main problem was that she has significant acid reflux and she has been on omeprazole but never improved significantly  I referred her to gastroenterologist  I explained to the patient in details the pathophysiology of obstructive sleep apnea  I also explained the importance of treatment, and the consequences of untreated obstructive sleep apnea on underlying cardiovascular and cerebrovascular risk, morbidity, and mortality  Also it is noted that she has significant mask leak, I had her meet with the DME company today to get a new mask fitting      2  Chronic GERD  Assessment & Plan:  She is on chronic PPI, I referred her to GI for evaluation today    Orders:  -     Ambulatory Referral to Gastroenterology; Future    3  Obesity, Class I, BMI 30-34 9  Assessment & Plan: Body mass index of 30 18, noted she would benefit from weight loss          Return in about 3 months (around 6/13/2023)  History of Present Illness   HPI:  Patt Cox is a 47 y o  female who is here for follow-up appointment she was seen by Dr Katarina Wade recently she started on a CPAP trial due to a history of mild symptomatic sleep apnea diagnosed in 2019    The patient currently still very symptomatic she has excessive daytime sleepiness she has experienced difficulty tolerating the CPAP due to significant acid reflux that she has been trying to treat with omeprazole chronically with no significant improvement she is averaging 6 hours of sleep per night that has been interrupted by multiple awakenings and overall dissatisfaction with her sleep quality and her Hornitos scale is extremely high at 17 out of 24  Review of Systems   All other systems reviewed and are negative        Historical Information   Past Medical History:   Diagnosis Date   • Amenorrhea    • Anxiety    • Arthritis    • Asthma    • Back pain    • Chondromalacia of patella    • Chronic fatigue    • COPD (chronic obstructive pulmonary disease) ([facility]) Yes   • Deep vein thrombophlebitis of leg ([facility])    • Depression    • Disease of thyroid gland    • Diverticulitis    • GERD (gastroesophageal reflux disease)    • History of transfusion 2008   • HPV (human papilloma virus) infection    • Hypothyroidism    • Lumbar radiculopathy    • Migraine    • Myofascial pain syndrome    • Osteopenia    • Piriformis syndrome    • Sacroiliitis ([facility])    • Sciatica    • Seizure ([facility])    • Sleep apnea    • Spondylosis of lumbar spine    • Trochanteric bursitis of right hip    • Vertigo    • Vitamin D deficiency    • Weight gain 2019     Past Surgical History:   Procedure Laterality Date   • CERVIX LESION DESTRUCTION     •  SECTION     • COLONOSCOPY     • COLPOSCOPY W/ BIOPSY / CURETTAGE      Colposcopy cervix with biopsy   • LAPAROSCOPIC ENDOMETRIOSIS FULGURATION     • LAPAROSCOPY     • TOOTH EXTRACTION       Family History   Problem Relation Age of Onset   • Heart disease Mother    • Asthma Daughter    • Lung cancer Paternal Grandfather    • Asthma Daughter    • Colon cancer Father    • Colon cancer Maternal Grandfather    • Prostate cancer Maternal Grandfather    • Colon cancer Maternal Aunt    • No Known Problems Maternal Grandmother    • Diabetes Paternal Grandmother    • No Known Problems Maternal Aunt    • No Known Problems Maternal Aunt    • No Known Problems Maternal Aunt    • No Known Problems Paternal Aunt    • Asthma Daughter    • Psychiatric Illness Neg Hx    • Stroke Neg Hx    • Thyroid disease Neg Hx    • Substance Abuse Neg Hx    • Suicidality Neg Hx          Meds/Allergies     Current Outpatient Medications:   •  acetaminophen (TYLENOL) 325 mg tablet, Take 2 tablets (650 mg total) by mouth every 6 (six) hours as needed for mild pain, Disp: 30 tablet, Rfl: 0  •  albuterol (2 5 mg/3 mL) 0 083 % nebulizer solution, Inhale , Disp: , Rfl:   •  albuterol (Proventil HFA) 90 mcg/act inhaler, Inhale 2 puffs every 6 (six) hours as needed for wheezing, Disp: 18 g, Rfl: 3  •  aluminum-magnesium hydroxide 200-200 MG/5ML suspension, GAVISCON REGULAR STRENGTH AFTER MEALS and BEDTIME WHEN NOT FOLLOWING LPR/GERD DIET , Disp: 355 mL, Rfl: 11  •  ARIPiprazole (ABILIFY) 5 mg tablet, Take 1 tablet (5 mg total) by mouth daily at bedtime, Disp: 30 tablet, Rfl: 4  •  Ascorbic Acid (VITAMIN C PO), Take by mouth, Disp: , Rfl:   •  Aspirin-Acetaminophen-Caffeine (MIGRAINE FORMULA PO), Take by mouth, Disp: , Rfl:   •  azelastine (ASTELIN) 0 1 % nasal spray, 1-2 sprays into each nostril 2 (two) times a day as needed for rhinitis Use in each nostril as directed, Disp: 30 mL, Rfl: 5  •  [employer] INDOOR/OUTDOOR ALLERGY RLF 10 MG tablet, Take 10 mg by mouth daily, Disp: , Rfl: 10  •  cyclobenzaprine (FLEXERIL) 5 mg tablet, Take 1 tablet (5 mg total) by mouth daily at bedtime, Disp: 20 tablet, Rfl: 0  •  DULoxetine (CYMBALTA) 60 mg delayed release capsule, Take 2 capsules (120 mg total) by mouth daily, Disp: 60 capsule, Rfl: 4  •  eptinezumab-jjmr (VYEPTI) IVPB, Infuse 100mg IV every 90 days  Note to pharmacy: To be delivered to where patient will be having infusion: [facility]   [phone], [phone], Disp: 1 mL, Rfl: 3  •  ferrous sulfate 324 (65 Fe) mg, Take 1 tablet (324 mg total) by mouth daily before breakfast, Disp: 30 tablet, Rfl: 2  •  gabapentin (NEURONTIN) 300 mg capsule, TAKE 2 capsules BY MOUTH EVERY MORNING, 2 CAPSULES BY MOUTH AT NOON AND 2 CAPSULES BY MOUTH EVERY NIGHT AT BEDTIME, Disp: 540 capsule, Rfl: 0  •  hydrOXYzine HCL (ATARAX) 50 mg tablet, Take 1 tablet (50 mg total) by mouth 3 (three) times a day as needed for anxiety, Disp: 90 tablet, Rfl: 4  •  ibuprofen (MOTRIN) 800 mg tablet, Take 1 tablet (800 mg total) by mouth every 6 (six) hours as needed for mild pain, Disp: 90 tablet, Rfl: 1  •  ketorolac (TORADOL) 10 mg tablet, Take 1 tablet (10 mg total) by mouth every 6 (six) hours as needed (migraine, back pain) Max 2-3 per week , Disp: 10 tablet, Rfl: 3  •  lamoTRIgine (LaMICtal) 100 mg tablet, Take 1 tablet (100 mg total) by mouth daily at bedtime, Disp: 30 tablet, Rfl: 4  •  levonorgestrel (MIRENA) 20 MCG/24HR IUD, by Intrauterine route, Disp: , Rfl:   •  levothyroxine (Synthroid) 50 mcg tablet, Take 1 tablet (50 mcg total) by mouth daily, Disp: 30 tablet, Rfl: 4  •  metFORMIN (GLUCOPHAGE-XR) 500 mg 24 hr tablet, Take 1 tablet (500 mg total) by mouth daily with dinner, Disp: 90 tablet, Rfl: 0  •  mometasone (NASONEX) 50 mcg/act nasal spray, , Disp: , Rfl:   •  naltrexone (REVIA) 50 mg tablet, Take 1 tablet (50 mg total) by mouth daily, Disp: 30 tablet, Rfl: 4  •  ondansetron (ZOFRAN-ODT) 4 mg disintegrating tablet, Take 1 tablet (4 mg total) by mouth every 6 (six) hours as needed for nausea or vomiting, Disp: 20 tablet, Rfl: 0  •  prochlorperazine (COMPAZINE) 10 mg tablet, TAKE 1 TABLET(10 MG) BY MOUTH EVERY 6 HOURS AS NEEDED FOR NAUSEA OR VOMITING, Disp: 30 tablet, Rfl: 0  •  rosuvastatin (CRESTOR) 5 mg tablet, Take 1 pill Monday Wednesday and Friday in the evening, Disp: 12 tablet, Rfl: 6  •  VITAMIN D PO, Take 5,000 Units by mouth, Disp: , Rfl:   •  ZOLMitriptan (ZOMIG-ZMT) 5 MG disintegrating tablet, DISSOLVE 1 TABLET BY MOUTH AT ONSET OF HEADACHE, MAY REPEAT AFTER TWO HOURS AS NEEDED, MAX 2 TABLETS PER 24 HOURS, Disp: 12 tablet, Rfl: 2  •  EPINEPHrine (EPIPEN) 0 3 mg/0 3 mL SOAJ, Inject 0 3 mL (0 3 mg total) into a muscle once for 1 dose As directed, Disp: 0 6 mL, Rfl: 11  •  fluticasone-salmeterol (ADVAIR HFA) 115-21 MCG/ACT inhaler, Inhale 2 puffs 2 (two) times a day Rinse mouth after use , Disp: 12 g, Rfl: 11  •  tiotropium (SPIRIVA RESPIMAT) 1 25 MCG/ACT AERS inhaler, Inhale 2 puffs daily, Disp: 4 g, Rfl: 11  •  traZODone (DESYREL) 100 mg tablet, Take 1-3 tablets (100-300 mg total) by mouth daily at bedtime as needed for sleep, Disp: 90 tablet, Rfl: 4  Allergies   Allergen Reactions   • Nuts - Food Allergy      Other reaction(s): WALNUTS   • Cephalexin    • Erythromycin Diarrhea, GI Intolerance and Vomiting   • Iodine - Food Allergy Hives   • Other      adobo   • Shellfish Allergy - Food Allergy    • Shellfish-Derived Products - Food Allergy    • Turmeric - Food Allergy Hives   • Wellbutrin [Bupropion] Seizures   • Zithromax [Azithromycin] Diarrhea     Can take name brand  Annotation - [id number]: can take brand name  Other reaction(s): Nausea/vomiting/diarrhea       Vitals: Blood pressure 108/70, height 5' 2" (1 575 m), weight 74 8 kg (165 lb), not currently breastfeeding  Body mass index is 30 18 kg/m²  Physical Exam  General:  Awake alert and oriented x 3, conversant without conversational dyspnea, NAD, normal affect  HEENT:   Sclera noninjected, nonicteric OU, Nares patent,  no craniofacial abnormalities, Mucous membranes, moist, no oral lesions, normal dentition  NECK:  Trachea midline, no accessory muscle use, no stridor,  JVP not elevated  CARDIAC: Reg, single s1/S2, no m/r/g  PULM: CTA bilaterally no wheezing, rhonchi or rales  ABD: Soft nontender, nondistended, no rebound, no rigidity, no guarding  EXT: No cyanosis, no clubbing, no edema, normal capillary refill  NEURO: no focal neurologic deficits, AAOx3, moving all extremities appropriately    Labs: I have personally reviewed pertinent lab results  , ABG: No results found for: PHART, [id number], PO2ART, QKX0MAA, [id number], BEART, SOURCE, BNP: No results found for: BNP, CBC: No results found for: WBC, HGB, HCT, MCV, PLT, ADJUSTEDWBC, MCH, MCHC, RDW, MPV, NRBC, CMP: No results found for: SODIUM, K, CL, CO2, ANIONGAP, BUN, CREATININE, GLUCOSE, CALCIUM, AST, ALT, ALKPHOS, PROT, BILITOT, EGFR, PT/INR: No results found for: PT, INR, Troponin: No results found for: TROPONINI  Lab Results   Component Value Date    WBC 6 77 12/31/2022    HGB 10 8 (L) 12/31/2022    HCT 34 2 (L) 12/31/2022    MCV 92 12/31/2022     12/31/2022     Lab Results   Component Value Date    GLUCOSE 90 03/29/2017    CALCIUM 9 4 12/31/2022    K 4 4 12/31/2022    CO2 30 12/31/2022     (H) 12/31/2022    BUN 17 12/31/2022    CREATININE 1 04 12/31/2022     No results found for: IGE  Lab Results   Component Value Date    ALT 13 12/31/2022    AST 8 12/31/2022    ALKPHOS 57 12/31/2022         Sleep studies: I have personally reviewed pertinent reports  HST 2019: AHI 7 2 events/hr --> Mild MYRA     Compliance report:I have personally reviewed pertinent reports  Type of CPAP:  APAP 4-20                                   Percent usage: 57%                                   Average time used: 2 hrs 41 min                                  Time in large leak: very high with 95th tile mask leak of 94 7 L/Min                                   Residual AHI: 2 9               MD Betty Daniel [facility]       Portions of the record may have been created with voice recognition software  Occasional wrong word or "sound a like" substitutions may have occurred due to the inherent limitations of voice recognition software  Read the chart carefully and recognize, using context, where substitutions have occurred

## 2023-03-21 ENCOUNTER — OFFICE VISIT (OUTPATIENT)
Dept: INTERNAL MEDICINE CLINIC | Facility: CLINIC | Age: 53
End: 2023-03-21

## 2023-03-21 ENCOUNTER — NURSE TRIAGE (OUTPATIENT)
Dept: OTHER | Facility: OTHER | Age: 53
End: 2023-03-21

## 2023-03-21 VITALS
HEART RATE: 82 BPM | SYSTOLIC BLOOD PRESSURE: 116 MMHG | WEIGHT: 163.4 LBS | BODY MASS INDEX: 30.07 KG/M2 | OXYGEN SATURATION: 97 % | TEMPERATURE: 98.9 F | DIASTOLIC BLOOD PRESSURE: 64 MMHG | HEIGHT: 62 IN

## 2023-03-21 DIAGNOSIS — E03.9 HYPOTHYROIDISM, UNSPECIFIED TYPE: ICD-10-CM

## 2023-03-21 DIAGNOSIS — F41.9 ANXIETY: Primary | ICD-10-CM

## 2023-03-21 RX ORDER — LEVOTHYROXINE SODIUM 0.05 MG/1
50 TABLET ORAL DAILY
Qty: 30 TABLET | Refills: 4 | Status: SHIPPED | OUTPATIENT
Start: 2023-03-21

## 2023-03-21 RX ORDER — CLONAZEPAM 0.5 MG/1
0.5 TABLET ORAL 2 TIMES DAILY
Qty: 60 TABLET | Refills: 0 | Status: SHIPPED | OUTPATIENT
Start: 2023-03-21 | End: 2023-03-27

## 2023-03-21 NOTE — TELEPHONE ENCOUNTER
Medication Refill Request     Name levothyroxine   Dose/Frequency 50 mcg daily   Quantity 30 with 4 refills   Verified pharmacy   [x]  Verified ordering Provider   [x]  Does patient have enough for the next 3 days? Yes [] No [x]    Patient states the pharmacy does not have her prescription

## 2023-03-21 NOTE — ASSESSMENT & PLAN NOTE
In view of the patient's current anxiety symptoms I have recommended discontinuing her Atarax which does not seem to be doing much good for controlling her anxiety in its place have recommended the initiation of Klonopin 0 5 mg twice a day  The patient will continue her current medications of Cymbalta does her well Lamictal and Neurontin    I have asked her to return in a week to review the results of the Klonopin medication and of course continue with regular follow-up visits with her psychiatrist

## 2023-03-21 NOTE — PROGRESS NOTES
Name: Regina Warner      : 1970      MRN: [id number]  Encounter Provider: Luis Hope MD  Encounter Date: 3/21/2023   Encounter department: [address]     1  Anxiety  Assessment & Plan:  In view of the patient's current anxiety symptoms I have recommended discontinuing her Atarax which does not seem to be doing much good for controlling her anxiety in its place have recommended the initiation of Klonopin 0 5 mg twice a day  The patient will continue her current medications of Cymbalta does her well Lamictal and Neurontin  I have asked her to return in a week to review the results of the Klonopin medication and of course continue with regular follow-up visits with her psychiatrist     Orders:  -     clonazePAM (KlonoPIN) 0 5 mg tablet; Take 1 tablet (0 5 mg total) by mouth 2 (two) times a day         Subjective      This 47year-old patient presents today for an acute visit  She indicates that over the past week or perhaps a little bit longer she has been experiencing increased in anxiety symptoms as well as some periods of panic  She indicates that stress levels at work as well as at home have reached high levels resulting in anxiety and panic  She is currently under the care of psychiatry services and is on Atarax and Cymbalta for management of her anxiety  She does not feel that the Atarax has been particularly beneficial at helping to control her anxiety  In addition to a history of anxiety the patient is also noted to have a history of depression  Currently her depression symptoms seem to be well controlled  Review of Systems   Psychiatric/Behavioral: The patient is nervous/anxious  All other systems reviewed and are negative        Current Outpatient Medications on File Prior to Visit   Medication Sig   • acetaminophen (TYLENOL) 325 mg tablet Take 2 tablets (650 mg total) by mouth every 6 (six) hours as needed for mild pain   • albuterol (2 5 mg/3 mL) 0 083 % nebulizer solution Inhale    • albuterol (Proventil HFA) 90 mcg/act inhaler Inhale 2 puffs every 6 (six) hours as needed for wheezing   • aluminum-magnesium hydroxide 200-200 MG/5ML suspension GAVISCON REGULAR STRENGTH AFTER MEALS and BEDTIME WHEN NOT FOLLOWING LPR/GERD DIET  • ARIPiprazole (ABILIFY) 5 mg tablet Take 1 tablet (5 mg total) by mouth daily at bedtime   • Ascorbic Acid (VITAMIN C PO) Take by mouth   • Aspirin-Acetaminophen-Caffeine (MIGRAINE FORMULA PO) Take by mouth   • azelastine (ASTELIN) 0 1 % nasal spray 1-2 sprays into each nostril 2 (two) times a day as needed for rhinitis Use in each nostril as directed   • [employer] INDOOR/OUTDOOR ALLERGY RLF 10 MG tablet Take 10 mg by mouth daily   • cyclobenzaprine (FLEXERIL) 5 mg tablet Take 1 tablet (5 mg total) by mouth daily at bedtime   • DULoxetine (CYMBALTA) 60 mg delayed release capsule Take 2 capsules (120 mg total) by mouth daily   • eptinezumab-jjmr (VYEPTI) IVPB Infuse 100mg IV every 90 days  Note to pharmacy: To be delivered to where patient will be having infusion: [facility] infusion center  [phone], [phone]   • ferrous sulfate 324 (65 Fe) mg Take 1 tablet (324 mg total) by mouth daily before breakfast   • gabapentin (NEURONTIN) 300 mg capsule TAKE 2 capsules BY MOUTH EVERY MORNING, 2 CAPSULES BY MOUTH AT NOON AND 2 CAPSULES BY MOUTH EVERY NIGHT AT BEDTIME   • ibuprofen (MOTRIN) 800 mg tablet Take 1 tablet (800 mg total) by mouth every 6 (six) hours as needed for mild pain   • ketorolac (TORADOL) 10 mg tablet Take 1 tablet (10 mg total) by mouth every 6 (six) hours as needed (migraine, back pain) Max 2-3 per week     • lamoTRIgine (LaMICtal) 100 mg tablet Take 1 tablet (100 mg total) by mouth daily at bedtime   • levonorgestrel (MIRENA) 20 MCG/24HR IUD by Intrauterine route   • levothyroxine (Synthroid) 50 mcg tablet Take 1 tablet (50 mcg total) by mouth daily   • metFORMIN (GLUCOPHAGE-XR) 500 mg 24 hr tablet Take 1 tablet (500 mg total) by mouth daily with dinner   • mometasone (NASONEX) 50 mcg/act nasal spray    • naltrexone (REVIA) 50 mg tablet Take 1 tablet (50 mg total) by mouth daily   • ondansetron (ZOFRAN-ODT) 4 mg disintegrating tablet Take 1 tablet (4 mg total) by mouth every 6 (six) hours as needed for nausea or vomiting   • prochlorperazine (COMPAZINE) 10 mg tablet TAKE 1 TABLET(10 MG) BY MOUTH EVERY 6 HOURS AS NEEDED FOR NAUSEA OR VOMITING   • rosuvastatin (CRESTOR) 5 mg tablet Take 1 pill Monday Wednesday and Friday in the evening   • traZODone (DESYREL) 100 mg tablet Take 1-3 tablets (100-300 mg total) by mouth daily at bedtime as needed for sleep   • VITAMIN D PO Take 5,000 Units by mouth   • ZOLMitriptan (ZOMIG-ZMT) 5 MG disintegrating tablet DISSOLVE 1 TABLET BY MOUTH AT ONSET OF HEADACHE, MAY REPEAT AFTER TWO HOURS AS NEEDED, MAX 2 TABLETS PER 24 HOURS   • [DISCONTINUED] hydrOXYzine HCL (ATARAX) 50 mg tablet Take 1 tablet (50 mg total) by mouth 3 (three) times a day as needed for anxiety   • EPINEPHrine (EPIPEN) 0 3 mg/0 3 mL SOAJ Inject 0 3 mL (0 3 mg total) into a muscle once for 1 dose As directed   • fluticasone-salmeterol (ADVAIR HFA) 115-21 MCG/ACT inhaler Inhale 2 puffs 2 (two) times a day Rinse mouth after use  • tiotropium (SPIRIVA RESPIMAT) 1 25 MCG/ACT AERS inhaler Inhale 2 puffs daily   • [DISCONTINUED] levothyroxine (Synthroid) 50 mcg tablet Take 1 tablet (50 mcg total) by mouth daily       Objective     /64   Pulse 82   Temp 98 9 °F (37 2 °C)   Ht 5' 2" (1 575 m)   Wt 74 1 kg (163 lb 6 4 oz)   SpO2 97%   BMI 29 89 kg/m²     Physical Exam  Vitals reviewed  Constitutional:       Appearance: Normal appearance  She is well-developed  HENT:      Right Ear: Hearing normal       Left Ear: Hearing normal       Nose: Nose normal  No mucosal edema  Mouth/Throat:      Pharynx: Uvula midline     Eyes:      General: Lids are normal  Conjunctiva/sclera: Conjunctivae normal       Pupils: Pupils are equal, round, and reactive to light  Neck:      Thyroid: No thyromegaly  Vascular: No carotid bruit or JVD  Cardiovascular:      Rate and Rhythm: Normal rate  Heart sounds: No murmur heard  Pulmonary:      Effort: Pulmonary effort is normal  No respiratory distress  Abdominal:      General: Bowel sounds are normal       Palpations: Abdomen is soft  Musculoskeletal:         General: Normal range of motion  Lymphadenopathy:      Cervical: No cervical adenopathy  Skin:     General: Skin is warm and dry  Neurological:      Mental Status: She is alert and oriented to person, place, and time  Deep Tendon Reflexes: Reflexes are normal and symmetric  Reflexes normal    Psychiatric:         Speech: Speech normal          Behavior: Behavior normal  Behavior is cooperative  Thought Content:  Thought content normal          Judgment: Judgment normal        Diamond Laws MD

## 2023-03-21 NOTE — TELEPHONE ENCOUNTER
Regarding: anxiety  ----- Message from Mathieu Sorto RN sent at 3/21/2023  8:04 AM EDT -----  "I would like to know if Dr Kyle Dubin can give me something for my anxiety "

## 2023-03-21 NOTE — TELEPHONE ENCOUNTER
Patient complains of increasing anxiety symptoms  Patient denies SI/HI at this time  An in person apt was scheduled with patient's PCP   Reason for Disposition  • Patient sounds very upset or troubled to the triager    Answer Assessment - Initial Assessment Questions  1  CONCERN: "What happened that made you call today?"     " my anxiety is though the roof"   2  ANXIETY SYMPTOM SCREENING: "Can you describe how you have been feeling?"  (e g , tense, restless, panicky, anxious, keyed up, trouble sleeping, trouble concentrating) clenching teeth, rapid thought, jumpy     3  ONSET: "How long have you been feeling this way?"     4  RECURRENT: "Have you felt this way before?"  If Yes, ask: "What happened that time?" "What helped these feelings go away in the past?"   Yes   5  RISK OF HARM - SUICIDAL IDEATION:  "Do you ever have thoughts of hurting or killing yourself?"    Denies     6   RISK OF HARM - HOMICIDAL IDEATION:  "Do you ever have thoughts of hurting or killing someone else?"  (e g , yes, no, no but preoccupation with thoughts about death)  Denies    Protocols used: ANXIETY AND PANIC ATTACK-ADULT-OH

## 2023-03-27 DIAGNOSIS — F41.9 ANXIETY: ICD-10-CM

## 2023-03-27 RX ORDER — CLONAZEPAM 0.5 MG/1
0.25 TABLET ORAL 2 TIMES DAILY
Qty: 60 TABLET | Refills: 0 | Status: SHIPPED | OUTPATIENT
Start: 2023-03-27 | End: 2023-04-04

## 2023-03-27 NOTE — PROGRESS NOTES
Patient sent a message today that the Klonopin 0 5 mg twice a day is too sedating I recommended that she take half a pill twice a day

## 2023-04-03 ENCOUNTER — APPOINTMENT (OUTPATIENT)
Dept: LAB | Facility: MEDICAL CENTER | Age: 53
End: 2023-04-03

## 2023-04-03 DIAGNOSIS — E61.1 IRON DEFICIENCY: ICD-10-CM

## 2023-04-03 DIAGNOSIS — D64.89 ANEMIA DUE TO OTHER CAUSE, NOT CLASSIFIED: ICD-10-CM

## 2023-04-03 DIAGNOSIS — E03.9 HYPOTHYROIDISM, UNSPECIFIED TYPE: ICD-10-CM

## 2023-04-03 LAB
BASOPHILS # BLD AUTO: 0.03 THOUSANDS/ÂΜL (ref 0–0.1)
BASOPHILS NFR BLD AUTO: 0 % (ref 0–1)
EOSINOPHIL # BLD AUTO: 0.04 THOUSAND/ÂΜL (ref 0–0.61)
EOSINOPHIL NFR BLD AUTO: 1 % (ref 0–6)
ERYTHROCYTE [DISTWIDTH] IN BLOOD BY AUTOMATED COUNT: 13.2 % (ref 11.6–15.1)
HCT VFR BLD AUTO: 36.4 % (ref 34.8–46.1)
HGB BLD-MCNC: 11.7 G/DL (ref 11.5–15.4)
IMM GRANULOCYTES # BLD AUTO: 0.03 THOUSAND/UL (ref 0–0.2)
IMM GRANULOCYTES NFR BLD AUTO: 0 % (ref 0–2)
LYMPHOCYTES # BLD AUTO: 1.7 THOUSANDS/ÂΜL (ref 0.6–4.47)
LYMPHOCYTES NFR BLD AUTO: 23 % (ref 14–44)
MCH RBC QN AUTO: 29.4 PG (ref 26.8–34.3)
MCHC RBC AUTO-ENTMCNC: 32.1 G/DL (ref 31.4–37.4)
MCV RBC AUTO: 92 FL (ref 82–98)
MONOCYTES # BLD AUTO: 0.58 THOUSAND/ÂΜL (ref 0.17–1.22)
MONOCYTES NFR BLD AUTO: 8 % (ref 4–12)
NEUTROPHILS # BLD AUTO: 4.98 THOUSANDS/ÂΜL (ref 1.85–7.62)
NEUTS SEG NFR BLD AUTO: 68 % (ref 43–75)
NRBC BLD AUTO-RTO: 0 /100 WBCS
PLATELET # BLD AUTO: 238 THOUSANDS/UL (ref 149–390)
PMV BLD AUTO: 9.9 FL (ref 8.9–12.7)
RBC # BLD AUTO: 3.98 MILLION/UL (ref 3.81–5.12)
T4 FREE SERPL-MCNC: 0.94 NG/DL (ref 0.76–1.46)
TSH SERPL DL<=0.05 MIU/L-ACNC: 2.95 UIU/ML (ref 0.45–4.5)
WBC # BLD AUTO: 7.36 THOUSAND/UL (ref 4.31–10.16)

## 2023-04-04 ENCOUNTER — OFFICE VISIT (OUTPATIENT)
Dept: INTERNAL MEDICINE CLINIC | Facility: CLINIC | Age: 53
End: 2023-04-04

## 2023-04-04 VITALS
SYSTOLIC BLOOD PRESSURE: 120 MMHG | BODY MASS INDEX: 30.11 KG/M2 | HEIGHT: 62 IN | HEART RATE: 97 BPM | DIASTOLIC BLOOD PRESSURE: 76 MMHG | WEIGHT: 163.6 LBS | TEMPERATURE: 99.2 F | OXYGEN SATURATION: 98 %

## 2023-04-04 DIAGNOSIS — F41.9 ANXIETY: ICD-10-CM

## 2023-04-04 DIAGNOSIS — E03.9 HYPOTHYROIDISM, UNSPECIFIED TYPE: Primary | ICD-10-CM

## 2023-04-04 DIAGNOSIS — E61.1 IRON DEFICIENCY: ICD-10-CM

## 2023-04-04 PROBLEM — I95.9 HYPOTENSION: Status: RESOLVED | Noted: 2019-09-22 | Resolved: 2023-04-04

## 2023-04-04 RX ORDER — CLONAZEPAM 0.5 MG/1
0.25 TABLET ORAL DAILY
Qty: 60 TABLET | Refills: 0 | Status: SHIPPED | OUTPATIENT
Start: 2023-04-04

## 2023-04-04 RX ORDER — LEVOTHYROXINE SODIUM 0.03 MG/1
TABLET ORAL
COMMUNITY
Start: 2023-04-01 | End: 2023-04-04

## 2023-04-04 NOTE — ASSESSMENT & PLAN NOTE
Iron assay performed yesterday is still pending at the laboratory CBC reviewed and shows no evidence of anemia  Review iron level upon completion

## 2023-04-04 NOTE — PROGRESS NOTES
Name: Nimco Elliott      : 1970      MRN: [id number]  Encounter Provider: You Flores MD  Encounter Date: 2023   Encounter department: [facility] INTER[facility] MEDICINE    Assessment & Plan     1  Hypothyroidism, unspecified type  Assessment & Plan:  A review of the patient's free T4 and TSH values indicates balanced replacement on 50 mcg of levothyroxine daily  Ultrasound of the thyroid gland reveals no significant nodules  Recommend continuation of current therapy with follow-up testing in 6 months  2  Anxiety  Assessment & Plan:  Patient has improved anxiety but too much sedation from 0 25 mg of Klonopin twice a day have asked her to decrease dosing down to 0 25 mg once a day in the morning    Orders:  -     clonazePAM (KlonoPIN) 0 5 mg tablet; Take 0 5 tablets (0 25 mg total) by mouth daily    3  Iron deficiency  Assessment & Plan:  Iron assay performed yesterday is still pending at the laboratory CBC reviewed and shows no evidence of anemia  Review iron level upon completion  Subjective      This 71-year-old female patient returns today for a follow-up assessment of thyroid condition along with assessment of anxiety treatment  She reports feeling somewhat sedated by the Klonopin medication at 1/2 tablet twice a day dosing is 0 25 mg with each dose  Review of Systems   All other systems reviewed and are negative  Current Outpatient Medications on File Prior to Visit   Medication Sig   • acetaminophen (TYLENOL) 325 mg tablet Take 2 tablets (650 mg total) by mouth every 6 (six) hours as needed for mild pain   • albuterol (2 5 mg/3 mL) 0 083 % nebulizer solution Inhale    • albuterol (Proventil HFA) 90 mcg/act inhaler Inhale 2 puffs every 6 (six) hours as needed for wheezing   • aluminum-magnesium hydroxide 200-200 MG/5ML suspension GAVISCON REGULAR STRENGTH AFTER MEALS and BEDTIME WHEN NOT FOLLOWING LPR/GERD DIET     • ARIPiprazole (ABILIFY) 5 mg tablet Take 1 tablet (5 mg total) by mouth daily at bedtime   • Ascorbic Acid (VITAMIN C PO) Take by mouth   • Aspirin-Acetaminophen-Caffeine (MIGRAINE FORMULA PO) Take by mouth   • azelastine (ASTELIN) 0 1 % nasal spray 1-2 sprays into each nostril 2 (two) times a day as needed for rhinitis Use in each nostril as directed   • [employer]S INDOOR/OUTDOOR ALLERGY RLF 10 MG tablet Take 10 mg by mouth daily   • cyclobenzaprine (FLEXERIL) 5 mg tablet Take 1 tablet (5 mg total) by mouth daily at bedtime   • DULoxetine (CYMBALTA) 60 mg delayed release capsule Take 2 capsules (120 mg total) by mouth daily   • eptinezumab-jjmr (VYEPTI) IVPB Infuse 100mg IV every 90 days  Note to pharmacy: To be delivered to where patient [facility] [place] having infusion[place] [facility]  [phone], [phone]   • ferrous sulfate 324 (65 Fe) mg Take 1 tablet (324 mg total) by mouth daily before breakfast   • gabapentin (NEURONTIN) 300 mg capsule TAKE 2 capsules BY MOUTH EVERY MORNING, 2 CAPSULES BY MOUTH AT NOON AND 2 CAPSULES BY MOUTH EVERY NIGHT AT BEDTIME   • ibuprofen (MOTRIN) 800 mg tablet Take 1 tablet (800 mg total) by mouth every 6 (six) hours as needed for mild pain   • ketorolac (TORADOL) 10 mg tablet Take 1 tablet (10 mg total) by mouth every 6 (six) hours as needed (migraine, back pain) Max 2-3 per week     • lamoTRIgine (LaMICtal) 100 mg tablet Take 1 tablet (100 mg total) by mouth daily at bedtime   • levonorgestrel (MIRENA) 20 MCG/24HR IUD by Intrauterine route   • levothyroxine (Synthroid) 50 mcg tablet Take 1 tablet (50 mcg total) by mouth daily   • metFORMIN (GLUCOPHAGE-XR) 500 mg 24 hr tablet Take 1 tablet (500 mg total) by mouth daily with dinner   • mometasone (NASONEX) 50 mcg/act nasal spray    • naltrexone (REVIA) 50 mg tablet Take 1 tablet (50 mg total) by mouth daily   • ondansetron (ZOFRAN-ODT) 4 mg disintegrating tablet Take 1 tablet (4 mg total) by mouth every 6 (six) hours as needed for nausea or vomiting   • "prochlorperazine (COMPAZINE) 10 mg tablet TAKE 1 TABLET(10 MG) BY MOUTH EVERY 6 HOURS AS NEEDED FOR NAUSEA OR VOMITING   • rosuvastatin (CRESTOR) 5 mg tablet Take 1 pill Monday Wednesday and Friday in the evening   • traZODone (DESYREL) 100 mg tablet Take 1-3 tablets (100-300 mg total) by mouth daily at bedtime as needed for sleep   • VITAMIN D PO Take 5,000 Units by mouth   • ZOLMitriptan (ZOMIG-ZMT) 5 MG disintegrating tablet DISSOLVE 1 TABLET BY MOUTH AT ONSET OF HEADACHE, MAY REPEAT AFTER TWO HOURS AS NEEDED, MAX 2 TABLETS PER 24 HOURS   • [DISCONTINUED] clonazePAM (KlonoPIN) 0 5 mg tablet Take 0 5 tablets (0 25 mg total) by mouth 2 (two) times a day   • [DISCONTINUED] levothyroxine 25 mcg tablet    • EPINEPHrine (EPIPEN) 0 3 mg/0 3 mL SOAJ Inject 0 3 mL (0 3 mg total) into a muscle once for 1 dose As directed   • fluticasone-salmeterol (ADVAIR HFA) 115-21 MCG/ACT inhaler Inhale 2 puffs 2 (two) times a day Rinse mouth after use  • tiotropium (SPIRIVA RESPIMAT) 1 25 MCG/ACT AERS inhaler Inhale 2 puffs daily       Objective     /76   Pulse 97   Temp 99 2 °F (37 3 °C)   Ht 5' 2\" (1 575 m)   Wt 74 2 kg (163 lb 9 6 oz)   SpO2 98%   BMI 29 92 kg/m²     Physical Exam  Vitals reviewed  Constitutional:       General: She is not in acute distress  Appearance: Normal appearance  She is well-developed  She is not ill-appearing  HENT:      Right Ear: Hearing and external ear normal       Left Ear: Hearing and external ear normal       Nose: Nose normal  No mucosal edema  Mouth/Throat:      Pharynx: Uvula midline  Eyes:      General: Lids are normal       Conjunctiva/sclera: Conjunctivae normal       Pupils: Pupils are equal, round, and reactive to light  Neck:      Thyroid: No thyromegaly  Vascular: No carotid bruit or JVD  Cardiovascular:      Rate and Rhythm: Normal rate and regular rhythm  Heart sounds: Normal heart sounds  No murmur heard    Pulmonary:      Effort: Pulmonary " effort is normal  No respiratory distress  Breath sounds: Normal breath sounds  No wheezing, rhonchi or rales  Abdominal:      General: Bowel sounds are normal       Palpations: Abdomen is soft  Musculoskeletal:         General: Normal range of motion  Lymphadenopathy:      Cervical: No cervical adenopathy  Skin:     General: Skin is warm and dry  Neurological:      Mental Status: She is alert and oriented to person, place, and time  Deep Tendon Reflexes: Reflexes are normal and symmetric  Psychiatric:         Speech: Speech normal          Behavior: Behavior normal  Behavior is cooperative  Thought Content:  Thought content normal          Judgment: Judgment normal        Hiram Narvaez MD

## 2023-04-04 NOTE — ASSESSMENT & PLAN NOTE
A review of the patient's free T4 and TSH values indicates balanced replacement on 50 mcg of levothyroxine daily  Ultrasound of the thyroid gland reveals no significant nodules  Recommend continuation of current therapy with follow-up testing in 6 months

## 2023-04-04 NOTE — ASSESSMENT & PLAN NOTE
Patient has improved anxiety but too much sedation from 0 25 mg of Klonopin twice a day have asked her to decrease dosing down to 0 25 mg once a day in the morning

## 2023-04-06 LAB — IRON SERPL-MCNC: 70 UG/DL (ref 50–170)

## 2023-04-19 DIAGNOSIS — F41.9 ANXIETY: Primary | ICD-10-CM

## 2023-04-19 RX ORDER — ALPRAZOLAM 0.25 MG/1
0.25 TABLET ORAL
Qty: 60 TABLET | Refills: 0 | Status: SHIPPED | OUTPATIENT
Start: 2023-04-19 | End: 2023-06-22 | Stop reason: ALTCHOICE

## 2023-04-25 ENCOUNTER — OFFICE VISIT (OUTPATIENT)
Dept: NEUROLOGY | Facility: CLINIC | Age: 53
End: 2023-04-25

## 2023-04-25 VITALS
TEMPERATURE: 96.3 F | HEART RATE: 71 BPM | BODY MASS INDEX: 30.62 KG/M2 | HEIGHT: 62 IN | SYSTOLIC BLOOD PRESSURE: 111 MMHG | DIASTOLIC BLOOD PRESSURE: 61 MMHG | WEIGHT: 166.4 LBS

## 2023-04-25 DIAGNOSIS — G44.229 TENSION HEADACHE, CHRONIC: ICD-10-CM

## 2023-04-25 DIAGNOSIS — G43.709 CHRONIC MIGRAINE WITHOUT AURA WITHOUT STATUS MIGRAINOSUS, NOT INTRACTABLE: Primary | ICD-10-CM

## 2023-04-25 RX ORDER — ONDANSETRON 4 MG/1
4 TABLET, ORALLY DISINTEGRATING ORAL EVERY 6 HOURS PRN
Qty: 20 TABLET | Refills: 3 | Status: SHIPPED | OUTPATIENT
Start: 2023-04-25

## 2023-04-25 RX ORDER — ZOLMITRIPTAN 5 MG/1
TABLET, ORALLY DISINTEGRATING ORAL
Qty: 12 TABLET | Refills: 5 | Status: SHIPPED | OUTPATIENT
Start: 2023-04-25

## 2023-04-25 RX ORDER — KETOROLAC TROMETHAMINE 10 MG/1
10 TABLET, FILM COATED ORAL EVERY 6 HOURS PRN
Qty: 10 TABLET | Refills: 5 | Status: SHIPPED | OUTPATIENT
Start: 2023-04-25

## 2023-04-25 RX ORDER — GABAPENTIN 100 MG/1
200 CAPSULE ORAL 3 TIMES DAILY
Qty: 180 CAPSULE | Refills: 1 | Status: SHIPPED | OUTPATIENT
Start: 2023-04-25

## 2023-04-25 NOTE — PATIENT INSTRUCTIONS
"Headache management instructions  - When patient has a moderate to severe headache, they should seek rest, initiate relaxation and apply cold compresses to the head  - Maintain regular sleep schedule  Adults need at least 7-8 hours of uninterrupted a night  - Limit over the counter medications such as Tylenol, Ibuprofen, Aleve, Excedrin  (No more than 2- 3 times a week or max 10 a month)  - Maintain headache diary  Free MARY for a smart phone, which can be used is \"Migraine vikram\"  - Limit caffeine to 1-2 cups 8 to 16 oz a day or less  - Avoid dietary trigger  (aged cheese, peanuts, MSG, aspartame and nitrates)  - Patient is to have regular frequent meals to prevent headache onset  - Please drink at least 64 ounces of water a day to help remain hydrated     "

## 2023-04-25 NOTE — PROGRESS NOTES
Patient ID: Nimo Barrios is a 47 y o  female  Assessment/Plan:     Diagnoses and all orders for this visit:    Chronic migraine without aura without status migrainosus, not intractable  -     gabapentin (Neurontin) 100 mg capsule; Take 2 capsules (200 mg total) by mouth 3 (three) times a day (with 600 mg TID)  -     ketorolac (TORADOL) 10 mg tablet; Take 1 tablet (10 mg total) by mouth every 6 (six) hours as needed (migraine, back pain) Max 2-3 per week  -     ZOLMitriptan (ZOMIG-ZMT) 5 MG disintegrating tablet; DISSOLVE 1 TABLET BY MOUTH AT ONSET OF HEADACHE, MAY REPEAT AFTER TWO HOURS AS NEEDED, MAX 2 TABLETS PER 24 HOURS  -     ondansetron (ZOFRAN-ODT) 4 mg disintegrating tablet; Take 1 tablet (4 mg total) by mouth every 6 (six) hours as needed for nausea or vomiting    Tension headache, chronic         Pt is doing better on Vyepti infusion 100 mg q84 days  Continue this for migraine prevention  Continue gabapetin and she is requesting dose increase for anxiety, which her psychiatrist is agreeing with  Since this was started for migraine prevention, I will continue to prescribe the med and titrate it further but I typically do not increase dose above total daily dose of 2400 mg  She was agreeable to 800 mg TID  If she needs a higher dose of psychiatric reasons, she needs to discuss this with psychiatrist     PRN migraine- Zomig, toradol and zofran cocktail  Okay to continue excedrin PRN but no more than 3 doses per week to prevent medication overuse headache  The patient should not hesitate to call me prior to her follow up with any questions or concerns  Subjective:    HPI     Ms  Nimo Barrios is a pleasant 50 yo female who is here for neurological follow-up for chronic migraine headaches    She works for a [employer] works on the [employer] frequently      Migraine frequency:  As of 4/25/23: since starting vyepti infusion she reports 2 migraines per month, and approx 1 lower grade or "tension headache per week for which she will take excedrin  As of 10/11/22- migraines once per week  The patient reports significant reduction of migraine headaches since starting the migraine supplement that she bought online called migraine support formula  \"  She denies side effects  It has several different supplements included in including magnesium, B6, Co Q10, feverfew, niacin, Etc   -- She takes Excedrin migraine first, then the cocktail of Zomig, Benadryl and Zofran if Excedrin fails  Waits 2 hours in between another dose of Zomig      Before starting the supplement on a regular basis she had daily migraine headaches, which typically started or worsened around 3-4 p m  every day      She also notes improvement or reduction in migraines since increasing the gabapentin  She states gabapentin also reduces anxiety if she takes it consistently  With Psychiatry recommended that she increase it to 600 mg q 8 hours    She denies side effects    -- pt requesting an increase in gabapentin to address psych concerns, specifically anxiety    Prior note:   No current headache, when she gets a headache it is at the apex at the head and bilateral frontalis regions, throbbing in sensation, associated with nausea and light sensitivity, no vomiting   No focal or lateralizing deficits      Continues to follow up with the appropriate provider for elevated TSH, and elevated microsomal antibodies recently      Migraine duration: several hours to entire day- usually worse from 3-5 pm but she does not know why, denies stress from her job  Severity- Average pain level 8-10/10  Location- bifrontal, b/l parietal region, will become diffuse/ global  Quality- throbbing/pounding, dull/nagging  Associated sxs: nausea     Aura- none  Trigger- sunlight, sometimes stress     Meds tried:  Preventative: Gabapentin, Venlafaxine, Lyrica, Magnesium, B2, verapamil, zoloft, olanzapine, topamax, botox, aimovig, Depakote, supplementation, " migraine support formula which she gets online-takes 2 capsules twice daily, vyepti 100 mg     Abortive: Rizatriptan, Sumatriptan, buprofen, Toradol, Dilaudid, Fioricet, compazine, excedrin (daily use), zomig PO, decadron, depakote taper, DHE- ineff    Last brain MRI 10/28/2019 is unremarkable      Prior documentation:  She sees ophthalmologist- Dr Dave Hicks) with normal exam      She finds zomig helpful, as well as ibuprofen and excedrin migraine, however she is taking these quite a bit, almost QD  She also continues a combination of prevention meds: gabapentin 600 TID, effexor 150 mg qd and topamax 150 BID       Other significant hx 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 (no family hx PE- hypercoagulable state as far as he is aware)  Maternal aunt with cerebral aneurysm noted at a young age  The following portions of the patient's history were reviewed and updated as appropriate:  She  has a past medical history of Amenorrhea, Anxiety, Arthritis, Asthma, Back pain, Chondromalacia of patella, Chronic fatigue, COPD (chronic obstructive pulmonary disease) ([address] ) (Yes), Deep vein thrombophlebitis of leg ([address] ), Depression, Disease of thyroid gland, Diverticulitis, GERD (gastroesophageal reflux disease), History of transfusion (01/31/2008), HPV (human papilloma virus) infection, Hypothyroidism, Lumbar radiculopathy, Migraine, Myofascial pain syndrome, Osteopenia, Piriformis syndrome, Sacroiliitis ([address] ), Sciatica, Seizure ([address] ), Sleep apnea, Spondylosis of lumbar spine, Trochanteric bursitis of right hip, Vertigo, Vitamin D deficiency, and Weight gain (12/19/2019)    She   Patient Active Problem List    Diagnosis Date Noted    Tension headache, chronic 04/30/2023    Anxiety 03/21/2023    Iron deficiency 02/22/2023    Hypothyroidism 02/22/2023    Other specified anemias 01/05/2023    Hashimoto's thyroiditis 12/22/2021    Chronic migraine without aura without status migrainosus, not intractable 07/30/2021  Myofascial muscle pain 2021    Dysuria 2021    Seizure ([facility] ) 10/26/2019    Dysphasia 10/23/2019    Long-term use of high-risk medication 10/14/2019    Low back pain 2019    Personality disorder ([facility] ) 2019    Migraine headache 2019    Chronic idiopathic urticaria 2019    Vitamin D deficiency 2019    Shellfish allergy 2019    Gastroesophageal reflux disease with esophagitis 2019    History of pulmonary embolism 2019    Cervical spine arthritis 2019    Mixed hyperlipidemia 2019    Obstructive sleep apnea     Chronic left shoulder pain 2019    Obesity, Class I, BMI 30-34 9 2019    Status migrainosus 2019    Headache, chronic migraine without aura, intractable 2019    Obsessive-compulsive disorder, unspecified 2019    Other insomnia 2018    Intervertebral disc disorder with radiculopathy of lumbar region 2018    IUD (intrauterine device) in place 2018    Major depressive disorder, recurrent episode, in partial remission ([facility] ) 2017    Chronic fatigue 2017    Chronic GERD 2017    Chronic migraine without aura 2017    KYLE (generalized anxiety disorder) 02/10/2016    Allergic rhinitis 2015    Impulse control disorder 2015    Amenorrhea 2014    Abnormal involuntary movements 2013    Panic disorder without agoraphobia 2013    Sciatica 2013    Asthma 2013    Esophageal reflux 2013     She  has a past surgical history that includes Laparoscopic endometriosis fulguration;  section; Cervix lesion destruction; Colonoscopy; LAPAROSCOPY; Tooth extraction; and Colposcopy w/ biopsy / curettage    Her family history includes Asthma in her daughter, daughter, and daughter; Colon cancer in her father, maternal aunt, and maternal grandfather; Diabetes in her paternal grandmother; Heart disease in her mother; Lung cancer in her paternal grandfather; No Known Problems in her maternal aunt, maternal aunt, maternal aunt, maternal grandmother, and paternal aunt; Prostate cancer in her maternal grandfather  She  reports that she has never smoked  She has never used smokeless tobacco  She reports that she does not currently use alcohol  She reports that she does not currently use drugs  Current Outpatient Medications   Medication Sig Dispense Refill    acetaminophen (TYLENOL) 325 mg tablet Take 2 tablets (650 mg total) by mouth every 6 (six) hours as needed for mild pain 30 tablet 0    albuterol (2 5 mg/3 mL) 0 083 % nebulizer solution Inhale       albuterol (Proventil HFA) 90 mcg/act inhaler Inhale 2 puffs every 6 (six) hours as needed for wheezing 18 g 3    ALPRAZolam (XANAX) 0 25 mg tablet Take 1 tablet (0 25 mg total) by mouth daily at bedtime as needed for anxiety 60 tablet 0    aluminum-magnesium hydroxide 200-200 MG/5ML suspension GAVISCON REGULAR STRENGTH AFTER MEALS and BEDTIME WHEN NOT FOLLOWING LPR/GERD DIET  355 mL 11    ARIPiprazole (ABILIFY) 5 mg tablet Take 1 tablet (5 mg total) by mouth daily at bedtime 30 tablet 4    Ascorbic Acid (VITAMIN C PO) Take by mouth      Aspirin-Acetaminophen-Caffeine (MIGRAINE FORMULA PO) Take by mouth      azelastine (ASTELIN) 0 1 % nasal spray 1-2 sprays into each nostril 2 (two) times a day as needed for rhinitis Use in each nostril as directed 30 mL 5    [employer] INDOOR/OUTDOOR ALLERGY RLF 10 MG tablet Take 10 mg by mouth daily  10    cyclobenzaprine (FLEXERIL) 5 mg tablet Take 1 tablet (5 mg total) by mouth daily at bedtime 20 tablet 0    DULoxetine (CYMBALTA) 60 mg delayed release capsule Take 2 capsules (120 mg total) by mouth daily 60 capsule 4    eptinezumab-jjmr (VYEPTI) IVPB Infuse 100mg IV every 90 days  Note to pharmacy: To be delivered to where patient will be having infusion: [facility]   [phone], [phone] 1 mL 3    ferrous sulfate 324 (65 Fe) mg Take 1 tablet (324 mg total) by mouth daily before breakfast 30 tablet 2    gabapentin (Neurontin) 100 mg capsule Take 2 capsules (200 mg total) by mouth 3 (three) times a day (with 600 mg TID) 180 capsule 1    gabapentin (NEURONTIN) 300 mg capsule TAKE 2 capsules BY MOUTH EVERY MORNING, 2 CAPSULES BY MOUTH AT NOON AND 2 CAPSULES BY MOUTH EVERY NIGHT AT BEDTIME 540 capsule 0    ibuprofen (MOTRIN) 800 mg tablet Take 1 tablet (800 mg total) by mouth every 6 (six) hours as needed for mild pain 90 tablet 1    ketorolac (TORADOL) 10 mg tablet Take 1 tablet (10 mg total) by mouth every 6 (six) hours as needed (migraine, back pain) Max 2-3 per week   10 tablet 5    lamoTRIgine (LaMICtal) 100 mg tablet Take 1 tablet (100 mg total) by mouth daily at bedtime 30 tablet 4    levonorgestrel (MIRENA) 20 MCG/24HR IUD by Intrauterine route      levothyroxine (Synthroid) 50 mcg tablet Take 1 tablet (50 mcg total) by mouth daily 30 tablet 4    metFORMIN (GLUCOPHAGE-XR) 500 mg 24 hr tablet Take 1 tablet (500 mg total) by mouth daily with dinner 90 tablet 0    mometasone (NASONEX) 50 mcg/act nasal spray       naltrexone (REVIA) 50 mg tablet Take 1 tablet (50 mg total) by mouth daily 30 tablet 4    ondansetron (ZOFRAN-ODT) 4 mg disintegrating tablet Take 1 tablet (4 mg total) by mouth every 6 (six) hours as needed for nausea or vomiting 20 tablet 3    prochlorperazine (COMPAZINE) 10 mg tablet TAKE 1 TABLET(10 MG) BY MOUTH EVERY 6 HOURS AS NEEDED FOR NAUSEA OR VOMITING 30 tablet 0    rosuvastatin (CRESTOR) 5 mg tablet Take 1 pill Monday Wednesday and Friday in the evening 12 tablet 6    traZODone (DESYREL) 100 mg tablet Take 1-3 tablets (100-300 mg total) by mouth daily at bedtime as needed for sleep 90 tablet 4    VITAMIN D PO Take 5,000 Units by mouth      ZOLMitriptan (ZOMIG-ZMT) 5 MG disintegrating tablet DISSOLVE 1 TABLET BY MOUTH AT ONSET OF HEADACHE, MAY REPEAT AFTER TWO "HOURS AS NEEDED, MAX 2 TABLETS PER 24 HOURS 12 tablet 5    azithromycin (ZITHROMAX) 250 mg tablet Take 2 tablets today then 1 tablet daily x 4 days 6 tablet 0    EPINEPHrine (EPIPEN) 0 3 mg/0 3 mL SOAJ Inject 0 3 mL (0 3 mg total) into a muscle once for 1 dose As directed 0 6 mL 11    fluticasone-salmeterol (ADVAIR HFA) 115-21 MCG/ACT inhaler Inhale 2 puffs 2 (two) times a day Rinse mouth after use  12 g 11    predniSONE 10 mg tablet Take with food  Take 4 tabs x 2 days, then 3 x 2 days, then 2 x 2 days, then 1 x 2 days  20 tablet 1    tiotropium (SPIRIVA RESPIMAT) 1 25 MCG/ACT AERS inhaler Inhale 2 puffs daily 4 g 11     No current facility-administered medications for this visit  She is allergic to nuts - food allergy, cephalexin, erythromycin, iodine - food allergy, other, shellfish allergy - food allergy, shellfish-derived products - food allergy, turmeric - food allergy, wellbutrin [bupropion], and zithromax [azithromycin]            Objective:    Blood pressure 111/61, pulse 71, temperature (!) 96 3 °F (35 7 °C), temperature source Temporal, height 5' 2\" (1 575 m), weight 75 5 kg (166 lb 6 4 oz), not currently breastfeeding  Body mass index is 30 43 kg/m²  Physical Exam    Neurological Exam  On neurologic exam, the patient is alert and oriented to time and place  Speech is fluent and articulate, and the patient follows commands appropriately  Judgment and affect appear normal  Pupils are equally round and reactive to light and extraocular muscles are intact without nystagmus  Face is symmetric, and tongue, uvula, and palate are midline  Hearing is intact  Motor examination reveals intact strength throughout  Normal gait is steady  ROS:    Review of Systems   Constitutional: Negative  Negative for appetite change and fever  HENT: Negative  Negative for hearing loss, tinnitus, trouble swallowing and voice change  Eyes: Negative  Negative for photophobia, pain and visual disturbance   " Respiratory: Negative  Negative for shortness of breath  Cardiovascular: Negative  Negative for palpitations  Gastrointestinal: Negative  Negative for nausea and vomiting  Endocrine: Negative  Negative for cold intolerance  Genitourinary: Negative  Negative for dysuria, frequency and urgency  Musculoskeletal: Negative  Negative for gait problem, myalgias and neck pain  Skin: Negative  Negative for rash  Allergic/Immunologic: Negative  Neurological: Negative  Negative for dizziness, tremors, seizures, syncope, facial asymmetry, speech difficulty, weakness, light-headedness, numbness and headaches  Hematological: Negative  Does not bruise/bleed easily  Psychiatric/Behavioral: Negative  Negative for confusion, hallucinations and sleep disturbance  The following portions of the patient's history were reviewed and updated as appropriate: allergies, current medications/ medication history, past family history, past medical history, past social history, past surgical history and problem list     Review of systems was reviewed and otherwise unremarkable from a neurological perspective  I have spent a total time of 30 minutes on 04/30/23 in caring for this patient including Risks and benefits of tx options, Instructions for management, Importance of tx compliance, Risk factor reductions, Counseling / Coordination of care, Documenting in the medical record, Reviewing / ordering tests, medicine, procedures   and Obtaining or reviewing history

## 2023-04-28 ENCOUNTER — TELEPHONE (OUTPATIENT)
Dept: PSYCHIATRY | Facility: CLINIC | Age: 53
End: 2023-04-28

## 2023-04-28 NOTE — TELEPHONE ENCOUNTER
Patient is calling regarding cancelling an appointment      Date/Time: 4/28 @ 3:30    Reason: not feeling well     Patient was rescheduled: YES [] NO [x]  If yes, when was Patient reschedule for:     Patient requesting call back to reschedule: YES [] NO [x]    Patient stated that they are good till next appt 6/22

## 2023-04-30 PROBLEM — G44.229 TENSION HEADACHE, CHRONIC: Status: ACTIVE | Noted: 2023-04-30

## 2023-05-03 DIAGNOSIS — D64.89 ANEMIA DUE TO OTHER CAUSE, NOT CLASSIFIED: ICD-10-CM

## 2023-05-03 DIAGNOSIS — E61.1 IRON DEFICIENCY: Primary | ICD-10-CM

## 2023-05-06 ENCOUNTER — APPOINTMENT (OUTPATIENT)
Dept: LAB | Age: 53
End: 2023-05-06

## 2023-05-06 DIAGNOSIS — E61.1 IRON DEFICIENCY: ICD-10-CM

## 2023-05-06 DIAGNOSIS — D64.89 ANEMIA DUE TO OTHER CAUSE, NOT CLASSIFIED: ICD-10-CM

## 2023-05-06 LAB
BASOPHILS # BLD AUTO: 0.06 THOUSANDS/ÂΜL (ref 0–0.1)
BASOPHILS NFR BLD AUTO: 1 % (ref 0–1)
EOSINOPHIL # BLD AUTO: 0.04 THOUSAND/ÂΜL (ref 0–0.61)
EOSINOPHIL NFR BLD AUTO: 1 % (ref 0–6)
ERYTHROCYTE [DISTWIDTH] IN BLOOD BY AUTOMATED COUNT: 12.9 % (ref 11.6–15.1)
HCT VFR BLD AUTO: 39.6 % (ref 34.8–46.1)
HGB BLD-MCNC: 12.6 G/DL (ref 11.5–15.4)
IMM GRANULOCYTES # BLD AUTO: 0.09 THOUSAND/UL (ref 0–0.2)
IMM GRANULOCYTES NFR BLD AUTO: 1 % (ref 0–2)
IRON SERPL-MCNC: 85 UG/DL (ref 50–170)
LYMPHOCYTES # BLD AUTO: 2.99 THOUSANDS/ÂΜL (ref 0.6–4.47)
LYMPHOCYTES NFR BLD AUTO: 35 % (ref 14–44)
MCH RBC QN AUTO: 29.2 PG (ref 26.8–34.3)
MCHC RBC AUTO-ENTMCNC: 31.8 G/DL (ref 31.4–37.4)
MCV RBC AUTO: 92 FL (ref 82–98)
MONOCYTES # BLD AUTO: 0.83 THOUSAND/ÂΜL (ref 0.17–1.22)
MONOCYTES NFR BLD AUTO: 10 % (ref 4–12)
NEUTROPHILS # BLD AUTO: 4.58 THOUSANDS/ÂΜL (ref 1.85–7.62)
NEUTS SEG NFR BLD AUTO: 52 % (ref 43–75)
NRBC BLD AUTO-RTO: 0 /100 WBCS
PLATELET # BLD AUTO: 269 THOUSANDS/UL (ref 149–390)
PMV BLD AUTO: 9.4 FL (ref 8.9–12.7)
RBC # BLD AUTO: 4.32 MILLION/UL (ref 3.81–5.12)
WBC # BLD AUTO: 8.59 THOUSAND/UL (ref 4.31–10.16)

## 2023-05-09 ENCOUNTER — OFFICE VISIT (OUTPATIENT)
Dept: BARIATRICS | Facility: CLINIC | Age: 53
End: 2023-05-09

## 2023-05-09 VITALS
HEART RATE: 78 BPM | SYSTOLIC BLOOD PRESSURE: 110 MMHG | WEIGHT: 161 LBS | BODY MASS INDEX: 29.63 KG/M2 | RESPIRATION RATE: 16 BRPM | HEIGHT: 62 IN | DIASTOLIC BLOOD PRESSURE: 60 MMHG

## 2023-05-09 DIAGNOSIS — E66.3 OVERWEIGHT (BMI 25.0-29.9): Primary | ICD-10-CM

## 2023-05-09 DIAGNOSIS — E03.9 HYPOTHYROIDISM, UNSPECIFIED TYPE: ICD-10-CM

## 2023-05-09 DIAGNOSIS — E78.2 MIXED HYPERLIPIDEMIA: ICD-10-CM

## 2023-05-09 DIAGNOSIS — T50.905A WEIGHT GAIN DUE TO MEDICATION: ICD-10-CM

## 2023-05-09 DIAGNOSIS — R63.5 WEIGHT GAIN DUE TO MEDICATION: ICD-10-CM

## 2023-05-09 PROBLEM — E66.9 OBESITY, CLASS I, BMI 30-34.9: Status: RESOLVED | Noted: 2019-04-19 | Resolved: 2023-05-09

## 2023-05-09 PROBLEM — E66.811 OBESITY, CLASS I, BMI 30-34.9: Status: RESOLVED | Noted: 2019-04-19 | Resolved: 2023-05-09

## 2023-05-09 RX ORDER — METFORMIN HYDROCHLORIDE 500 MG/1
500 TABLET, EXTENDED RELEASE ORAL
Qty: 90 TABLET | Refills: 0 | Status: SHIPPED | OUTPATIENT
Start: 2023-05-09

## 2023-05-09 NOTE — PATIENT INSTRUCTIONS
- Weight not at goal  - Patient is interested in Conservative Program  - Labs reviewed: As below  General Recommendations:  Nutrition:  Eat breakfast daily  Do not skip meals  Food log (ie ) www myfitnesspal com, sparkpeople  com, loseit com, calorieking  com, etc     Practice mindful eating  Be sure to set aside time to eat, eat slowly, and savor your food  Hydration: At least 64oz of water daily  No sugar sweetened beverages  No juice (eat the fruit instead)  Exercise:  Studies have shown that the ideal exercise goal is somewhere between 150 to 300 minutes of moderate intensity exercise a week  Start with exercising 10 minutes every other day and gradually increase physical activity with a goal of at least 150 minutes of moderate intensity exercise a week, divided over at least 3 days a week  An example of this would be exercising 30 minutes a day, 5 days a week  Resistance training can increase muscle mass and increase our resting metabolic rate  FULL BODY resistance training is recommended 2-3 times a week  Do not do this on consecutive days to allow for muscle recovery  Aim for a bare minimum 5000 steps, even on days you do not exercise  Monitoring:   Weigh yourself daily  If this causes undue stress, then just weigh yourself once a week  Weigh yourself the same time of the day with the same amount of clothing on  Preferably this should be done after waking up, before you eat, and with no clothing or minimal clothing on  Specific Goals:  Food log (ie ) www myfitnesspal com,sparkpeople  com,loseit com,calorieking  com,etc    No sugary beverages  At least 64oz of water daily  Increase physical activity by 10 minutes daily  Continue with metformin  Calorie goal:  4228-0662 calories a day      Return visit:  3 months PATIENT NAME: Paz Beavers  PATIENT MRN: [id number]  DATE OF SURGERY: 9/12/2018    TITLE OF PROCEDURE:  1) Sacroiliac Injection       2) Fluoroscopic Guidance      SIDE: Bilateral    PREOPERATIVE DIAGNOSES: Sacroiliac Joint Dysfunction    POSTOPERATIVE DIAGNOSES:  Same    LOCATION: [facility]    SURGEON: Senia    ANESTHESIA:  Local    DESCRIPTION OF OPERATIVE PROCEDURE:  Informed consent was obtained. An IV was placed. The patient was brought to the Procedure Room and he/she positioned themselves to their comfort and optimal positioning for procedure.  Time-out was conducted with all members of the care team.  Standard ASA monitors were placed. Standard prep and drape were performed.    Fluoroscopic views were obtained of the sacroiliac joint. The area overlying the joint was identified with contralateral oblique fluoroscopy and marked with a sterile marking pen. Next, a 25-gauge 3.5 inch spinal needle was then guided into the sacroiliac joint under fluoroscopic guidance. 0.5 cc of Isovue 200 contrast dye was injected.  Good dye spread was noted in both AP and lateral projections without evidence of intraneural, intrathecal, or intravascular injection. After a negative aspiration, 1 mL of 0.25% bupivicaine and 20 mg of methylprednisolone was incrementally injected into the joint space, and the needle was then flushed with normal saline and removed. The procedure was repeated on the opposite side.     The surgical site preparation was washed off of the patient. Band-Aids were applied. The patient was brought to the recovery area.  The patient did very well and the procedure results were discussed.  Standard discharge instructions were given to the patient.  The patient knows how to contact the clinic should they have any questions or problems.      COMPLICATIONS: None    EBL: minimal    URINE OUTPUT: not monitored    FLUIDS: None    PLAN: Return to clinic for followup in 2 weeks.

## 2023-05-09 NOTE — PROGRESS NOTES
Assessment/Plan:  Mando Chavez was seen today for follow-up  Diagnoses and all orders for this visit:    Overweight (BMI 25 0-29  9)    Weight gain due to medication  -     metFORMIN (GLUCOPHAGE-XR) 500 mg 24 hr tablet; Take 1 tablet (500 mg total) by mouth daily with dinner    Mixed hyperlipidemia    Hypothyroidism, unspecified type       No problem-specific Assessment & Plan notes found for this encounter  Total time spent reviewing chart, interviewing patient, examining patient, discussing plan, answering all questions, and documentin min        ______________________________________________________________________    Subjective:   Chief Complaint   Patient presents with   • Follow-up     MWM 3mth f/u; waist 31 5in     Patient here to discuss weight associated problems and nutrition goals  HPI: Minor Parsons  is a 47 y o  female with history of hypothyroidism, MYRA, migraine headaches, history of seizures, and excess weight  Weight loss plan: Regina Peterson Most recent notes and records were reviewed  Contrave not an option due to her history of seizures  She was advised against phentermine by the doctor managing her depression and anxiety  Patient takes Seroquel, Lamictal, Cymbalta, trazodone, and gabapentin, all of which are associated with weight gain  Last appointment she was advised to start metformin extended release to help counteract the effect of these medications on the weight  She was advised a 5000 steps a day minimum with a goal of 10,000 steps a day  She was advised to eliminate all liquid calories from her diet and to drink at least 64 ounces of water  Advised a 1000 to 1200-calorie meal plan      Wt Readings from Last 10 Encounters:   23 73 kg (161 lb)   23 75 5 kg (166 lb 6 4 oz)   23 74 2 kg (163 lb 9 6 oz)   23 74 1 kg (163 lb 6 4 oz)   23 74 8 kg (165 lb)   23 75 kg (165 lb 6 4 oz)   02/10/23 75 1 kg (165 lb 9 6 oz)   23 77 5 kg (170 lb 12 8 oz) "  12/14/22 74 3 kg (163 lb 12 8 oz)   11/08/22 75 7 kg (166 lb 12 8 oz)     Highest weight: 190  Last OV weight: 165  Current weight: 161  Change in weight: And is 4 pounds  Goal 130    Food logging:  No  Hunger/Cravings:  Sweets  Dining out:    Hydration:  Likes to flavor her water and drink flavored water  Hint water  Alcohol:  No  Exercise:  No   Does not step count    Review Of Systems:  Review of Systems   Constitutional: Negative for activity change, appetite change, fatigue and fever  Respiratory: Negative for cough and shortness of breath  Cardiovascular: Negative for chest pain, palpitations and leg swelling  Gastrointestinal: Negative for abdominal pain, constipation, diarrhea, nausea and vomiting  Endocrine: Negative for cold intolerance and heat intolerance  Genitourinary: Negative for difficulty urinating and dysuria  Musculoskeletal: Negative for arthralgias, back pain and gait problem  Skin: Negative for pallor and rash  Neurological: Negative for headaches  Psychiatric/Behavioral: Negative for dysphoric mood, sleep disturbance and suicidal ideas (or HI)  The patient is not nervous/anxious  Objective:  /60   Pulse 78   Resp 16   Ht 5' 2\" (1 575 m)   Wt 73 kg (161 lb)   BMI 29 45 kg/m²   Physical Exam  Vitals and nursing note reviewed  Constitutional:       General: She is not in acute distress  Appearance: Normal appearance  She is not ill-appearing or diaphoretic  Eyes:      General: No scleral icterus  Cardiovascular:      Rate and Rhythm: Normal rate and regular rhythm  Pulses: Normal pulses  Heart sounds: No murmur heard  Pulmonary:      Effort: Pulmonary effort is normal  No respiratory distress  Breath sounds: Normal breath sounds  No wheezing or rhonchi  Abdominal:      General: Bowel sounds are normal  There is no distension  Palpations: Abdomen is soft  There is no mass  Tenderness: There is no abdominal tenderness   " Musculoskeletal:      Cervical back: Neck supple  Right lower leg: No edema  Left lower leg: No edema  Lymphadenopathy:      Cervical: No cervical adenopathy  Skin:     Capillary Refill: Capillary refill takes less than 2 seconds  Findings: No lesion or rash  Neurological:      Mental Status: She is alert and oriented to person, place, and time  Gait: Gait normal    Psychiatric:         Mood and Affect: Mood normal          Behavior: Behavior normal        Labs and Imaging  Recent labs and imaging have been personally reviewed    Lab Results   Component Value Date    WBC 8 59 05/06/2023    HGB 12 6 05/06/2023    HCT 39 6 05/06/2023    MCV 92 05/06/2023     05/06/2023     Lab Results   Component Value Date    SODIUM 139 04/08/2023    K 4 7 04/08/2023     04/08/2023    CO2 31 04/08/2023    AGAP 0 (L) 04/08/2023    BUN 15 04/08/2023    CREATININE 0 98 04/08/2023    GLUC 83 05/01/2021    GLUF 85 04/08/2023    CALCIUM 9 8 04/08/2023    AST 13 04/08/2023    ALT 16 04/08/2023    ALKPHOS 59 04/08/2023    TP 6 8 04/08/2023    TBILI 0 32 04/08/2023    EGFR 67 04/08/2023     Lab Results   Component Value Date    HGBA1C 5 4 12/31/2022     Lab Results   Component Value Date    CZP0GMYJUQBT 2 950 04/03/2023    TSH 6 250 (H) 06/01/2019     Lab Results   Component Value Date    CHOLESTEROL 209 (H) 04/08/2023     Lab Results   Component Value Date    HDL 59 04/08/2023     Lab Results   Component Value Date    TRIG 77 04/08/2023     Lab Results   Component Value Date    LDLCALC 135 (H) 04/08/2023     TSH, 3rd generation  Order: [id number]   Status: Final result      Visible to patient: Yes (not seen)      Next appt: 05/19/2023 at 08:00 AM in Infusion Therapy ([facility])      Dx: Hypothyroidism, unspecified type      0 Result Notes  Component Ref Range & Units 4/3/23 11:32 AM 2/17/23  8:33 AM 12/31/22  8:24 AM 4/16/22  8:50 AM 11/23/21  8:56 AM 9/27/19  6:19 AM 9/21/19  4:47 AM   TSH 3RD MANDY 0 450 - 4 500 uIU/mL 2 950  7 120 High  CM  8 310 High  CM  4 000 CM  7 110 High  R,

## 2023-05-19 ENCOUNTER — HOSPITAL ENCOUNTER (OUTPATIENT)
Dept: INFUSION CENTER | Facility: CLINIC | Age: 53
End: 2023-05-19

## 2023-05-19 ENCOUNTER — TELEPHONE (OUTPATIENT)
Dept: NEUROLOGY | Facility: CLINIC | Age: 53
End: 2023-05-19

## 2023-05-19 VITALS
TEMPERATURE: 99.4 F | RESPIRATION RATE: 16 BRPM | HEART RATE: 76 BPM | DIASTOLIC BLOOD PRESSURE: 66 MMHG | SYSTOLIC BLOOD PRESSURE: 109 MMHG

## 2023-05-19 DIAGNOSIS — G43.709 CHRONIC MIGRAINE WITHOUT AURA WITHOUT STATUS MIGRAINOSUS, NOT INTRACTABLE: Primary | ICD-10-CM

## 2023-05-19 RX ORDER — SODIUM CHLORIDE 9 MG/ML
20 INJECTION, SOLUTION INTRAVENOUS ONCE
OUTPATIENT
Start: 2023-08-11

## 2023-05-19 RX ORDER — SODIUM CHLORIDE 9 MG/ML
20 INJECTION, SOLUTION INTRAVENOUS ONCE
Status: COMPLETED | OUTPATIENT
Start: 2023-05-19 | End: 2023-05-19

## 2023-05-19 RX ADMIN — EPTINEZUMAB-JJMR 100 MG: 100 INJECTION INTRAVENOUS at 10:03

## 2023-05-19 RX ADMIN — SODIUM CHLORIDE 20 ML/HR: 0.9 INJECTION, SOLUTION INTRAVENOUS at 09:35

## 2023-05-19 NOTE — TELEPHONE ENCOUNTER
Hello patient is currently sitting in [facility] trying to get her infusion treatment, but the order needs to be renewed

## 2023-05-19 NOTE — TELEPHONE ENCOUNTER
Called [facility], went to  x2    Was then able to speak to someone  States that Plan must be reviewed every 6 months by provider and signed  Then spoke to nurse braden  States that they TT [facility] and she said she would send plan to too to review but pt has been their for over an hour  She states that she will TT   Dr Katia Mosley herself and if she needs anything from me she will send me a teams message

## 2023-05-24 DIAGNOSIS — F41.1 GAD (GENERALIZED ANXIETY DISORDER): Chronic | ICD-10-CM

## 2023-05-24 DIAGNOSIS — G43.709 CHRONIC MIGRAINE WITHOUT AURA WITHOUT STATUS MIGRAINOSUS, NOT INTRACTABLE: ICD-10-CM

## 2023-05-24 DIAGNOSIS — G43.709 CHRONIC MIGRAINE WITHOUT AURA: ICD-10-CM

## 2023-05-24 RX ORDER — GABAPENTIN 100 MG/1
200 CAPSULE ORAL 3 TIMES DAILY
Qty: 180 CAPSULE | Refills: 1 | Status: SHIPPED | OUTPATIENT
Start: 2023-05-24 | End: 2023-08-16

## 2023-05-24 RX ORDER — GABAPENTIN 300 MG/1
CAPSULE ORAL
Qty: 540 CAPSULE | Refills: 1 | Status: SHIPPED | OUTPATIENT
Start: 2023-05-24 | End: 2023-06-22 | Stop reason: ALTCHOICE

## 2023-06-06 PROBLEM — G47.33 OSA (OBSTRUCTIVE SLEEP APNEA): Status: ACTIVE | Noted: 2023-06-06

## 2023-06-13 ENCOUNTER — TELEPHONE (OUTPATIENT)
Dept: INTERNAL MEDICINE CLINIC | Facility: CLINIC | Age: 53
End: 2023-06-13

## 2023-06-13 ENCOUNTER — APPOINTMENT (OUTPATIENT)
Dept: URGENT CARE | Age: 53
End: 2023-06-13
Payer: COMMERCIAL

## 2023-06-13 ENCOUNTER — OFFICE VISIT (OUTPATIENT)
Dept: URGENT CARE | Age: 53
End: 2023-06-13
Payer: COMMERCIAL

## 2023-06-13 VITALS
BODY MASS INDEX: 28.99 KG/M2 | HEART RATE: 63 BPM | WEIGHT: 158.5 LBS | RESPIRATION RATE: 18 BRPM | OXYGEN SATURATION: 97 % | DIASTOLIC BLOOD PRESSURE: 70 MMHG | SYSTOLIC BLOOD PRESSURE: 110 MMHG | TEMPERATURE: 98 F

## 2023-06-13 DIAGNOSIS — R39.9 UTI SYMPTOMS: Primary | ICD-10-CM

## 2023-06-13 LAB
SL AMB  POCT GLUCOSE, UA: NEGATIVE
SL AMB LEUKOCYTE ESTERASE,UA: ABNORMAL
SL AMB POCT BILIRUBIN,UA: ABNORMAL
SL AMB POCT BLOOD,UA: ABNORMAL
SL AMB POCT CLARITY,UA: ABNORMAL
SL AMB POCT COLOR,UA: ABNORMAL
SL AMB POCT KETONES,UA: ABNORMAL
SL AMB POCT NITRITE,UA: POSITIVE
SL AMB POCT PH,UA: 6
SL AMB POCT SPECIFIC GRAVITY,UA: 1.02
SL AMB POCT URINE PROTEIN: ABNORMAL
SL AMB POCT UROBILINOGEN: 4

## 2023-06-13 PROCEDURE — 87086 URINE CULTURE/COLONY COUNT: CPT

## 2023-06-13 PROCEDURE — 81002 URINALYSIS NONAUTO W/O SCOPE: CPT

## 2023-06-13 PROCEDURE — 99213 OFFICE O/P EST LOW 20 MIN: CPT

## 2023-06-13 PROCEDURE — 87077 CULTURE AEROBIC IDENTIFY: CPT

## 2023-06-13 PROCEDURE — 87186 SC STD MICRODIL/AGAR DIL: CPT

## 2023-06-13 RX ORDER — SULFAMETHOXAZOLE AND TRIMETHOPRIM 800; 160 MG/1; MG/1
1 TABLET ORAL EVERY 12 HOURS SCHEDULED
Qty: 6 TABLET | Refills: 0 | Status: SHIPPED | OUTPATIENT
Start: 2023-06-13 | End: 2023-06-16

## 2023-06-13 NOTE — TELEPHONE ENCOUNTER
Our schedule is booked today and tomorrow I would recommend that she go to an urgent care location for evaluate and treatment of her urinary tract symptoms thank you

## 2023-06-13 NOTE — PATIENT INSTRUCTIONS
Please take Keflex 2 times daily for the next 3 days  Continue with Azo as needed  Ensure adequate fluid intake  If changes to your therapy need to be made based on culture results, we will contact you  Follow up with PCP in 3-5 days

## 2023-06-13 NOTE — TELEPHONE ENCOUNTER
Michael Williamson called and has UTI symptoms  She is asking for an appt  Please advise        [phone]

## 2023-06-13 NOTE — PROGRESS NOTES
[facility]        NAME: Emilee Phelps is a 47 y o  female  : 1970    MRN: [id number]  DATE: 2023  TIME: 12:40 PM    Assessment and Plan   UTI symptoms [R39 9]  1  UTI symptoms  POCT urine dip    sulfamethoxazole-trimethoprim (BACTRIM DS) 800-160 mg per tablet    Urine culture        Urine dip positive for leukocytes, nitrites and blood  Antibiotic therapy initiated at this time  Pending urine culture results  Patient Instructions     Please take Keflex 2 times daily for the next 3 days  Continue with Azo as needed  Ensure adequate fluid intake  If changes to your therapy need to be made based on culture results, we will contact you  Follow up with PCP in 3-5 days  Proceed to  ER if symptoms worsen  Chief Complaint     Chief Complaint   Patient presents with   • Possible UTI     Urgency, frequency, burning on urination X 1 day  Has been taking AZO         History of Present Illness       Patient presenting for evaluation of UTI symptoms  Patient states that since 4 PM yesterday she has been having frequency and urgency of urination as well as burning with urination  She denies any fevers, chills, flank pain or suprapubic pain, nausea, vomiting, diarrhea or hematuria  Patient denies any concern for pregnancy or STDs at this time  States that she has been taking Azo with mild relief of her symptoms  Review of Systems   Review of Systems   Constitutional: Negative for chills and fever  Gastrointestinal: Negative for abdominal pain, diarrhea, nausea and vomiting  Genitourinary: Positive for dysuria, hematuria and urgency  Negative for flank pain, pelvic pain and vaginal bleeding  All other systems reviewed and are negative          Current Medications       Current Outpatient Medications:   •  sulfamethoxazole-trimethoprim (BACTRIM DS) 800-160 mg per tablet, Take 1 tablet by mouth every 12 (twelve) hours for 3 days, Disp: 6 tablet, Rfl: 0  •  acetaminophen (TYLENOL) 325 mg tablet, Take 2 tablets (650 mg total) by mouth every 6 (six) hours as needed for mild pain, Disp: 30 tablet, Rfl: 0  •  albuterol (2 5 mg/3 mL) 0 083 % nebulizer solution, Inhale , Disp: , Rfl:   •  albuterol (Proventil HFA) 90 mcg/act inhaler, Inhale 2 puffs every 6 (six) hours as needed for wheezing, Disp: 18 g, Rfl: 3  •  ALPRAZolam (XANAX) 0 25 mg tablet, Take 1 tablet (0 25 mg total) by mouth daily at bedtime as needed for anxiety, Disp: 60 tablet, Rfl: 0  •  aluminum-magnesium hydroxide 200-200 MG/5ML suspension, GAVISCON REGULAR STRENGTH AFTER MEALS and BEDTIME WHEN NOT FOLLOWING LPR/GERD DIET , Disp: 355 mL, Rfl: 11  •  ARIPiprazole (ABILIFY) 5 mg tablet, Take 1 tablet (5 mg total) by mouth daily at bedtime, Disp: 30 tablet, Rfl: 4  •  Ascorbic Acid (VITAMIN C PO), Take by mouth, Disp: , Rfl:   •  Aspirin-Acetaminophen-Caffeine (MIGRAINE FORMULA PO), Take by mouth, Disp: , Rfl:   •  azelastine (ASTELIN) 0 1 % nasal spray, 1-2 sprays into each nostril 2 (two) times a day as needed for rhinitis Use in each nostril as directed, Disp: 30 mL, Rfl: 5  •  [employer] INDOOR/OUTDOOR ALLERGY RLF 10 MG tablet, Take 10 mg by mouth daily, Disp: , Rfl: 10  •  cyclobenzaprine (FLEXERIL) 5 mg tablet, Take 1 tablet (5 mg total) by mouth daily at bedtime, Disp: 20 tablet, Rfl: 0  •  DULoxetine (CYMBALTA) 60 mg delayed release capsule, Take 2 capsules (120 mg total) by mouth daily, Disp: 60 capsule, Rfl: 4  •  EPINEPHrine (EPIPEN) 0 3 mg/0 3 mL SOAJ, Inject 0 3 mL (0 3 mg total) into a muscle once for 1 dose As directed, Disp: 0 6 mL, Rfl: 11  •  eptinezumab-jjmr (VYEPTI) IVPB, Infuse 100mg IV every 90 days  Note to pharmacy: To be delivered to where patient will be having infusion: [facility]   [phone], [phone], Disp: 1 mL, Rfl: 3  •  ferrous sulfate 324 (65 Fe) mg, Take 1 tablet (324 mg total) by mouth daily before breakfast (Patient not taking: Reported on 6/6/2023), Disp: 30 tablet, Rfl: 2  •  fluticasone-salmeterol (ADVAIR HFA) 115-21 MCG/ACT inhaler, Inhale 2 puffs 2 (two) times a day Rinse mouth after use , Disp: 12 g, Rfl: 11  •  gabapentin (Neurontin) 100 mg capsule, Take 2 capsules (200 mg total) by mouth 3 (three) times a day (with 600 mg TID), Disp: 180 capsule, Rfl: 1  •  gabapentin (NEURONTIN) 300 mg capsule, TAKE 2 capsules BY MOUTH EVERY MORNING, 2 CAPSULES BY MOUTH AT NOON AND 2 CAPSULES BY MOUTH EVERY NIGHT AT BEDTIME, Disp: 540 capsule, Rfl: 1  •  ibuprofen (MOTRIN) 800 mg tablet, Take 1 tablet (800 mg total) by mouth every 6 (six) hours as needed for mild pain, Disp: 90 tablet, Rfl: 1  •  ketorolac (TORADOL) 10 mg tablet, Take 1 tablet (10 mg total) by mouth every 6 (six) hours as needed (migraine, back pain) Max 2-3 per week , Disp: 10 tablet, Rfl: 5  •  lamoTRIgine (LaMICtal) 100 mg tablet, Take 1 tablet (100 mg total) by mouth daily at bedtime, Disp: 30 tablet, Rfl: 4  •  levonorgestrel (MIRENA) 20 MCG/24HR IUD, by Intrauterine route, Disp: , Rfl:   •  levothyroxine (Synthroid) 50 mcg tablet, Take 1 tablet (50 mcg total) by mouth daily, Disp: 30 tablet, Rfl: 4  •  metFORMIN (GLUCOPHAGE-XR) 500 mg 24 hr tablet, Take 1 tablet (500 mg total) by mouth daily with dinner, Disp: 90 tablet, Rfl: 0  •  mometasone (NASONEX) 50 mcg/act nasal spray, , Disp: , Rfl:   •  Multiple Vitamins-Minerals (ZINC PO), Take by mouth, Disp: , Rfl:   •  naltrexone (REVIA) 50 mg tablet, Take 1 tablet (50 mg total) by mouth daily, Disp: 30 tablet, Rfl: 4  •  ondansetron (ZOFRAN-ODT) 4 mg disintegrating tablet, Take 1 tablet (4 mg total) by mouth every 6 (six) hours as needed for nausea or vomiting, Disp: 20 tablet, Rfl: 3  •  prochlorperazine (COMPAZINE) 10 mg tablet, TAKE 1 TABLET(10 MG) BY MOUTH EVERY 6 HOURS AS NEEDED FOR NAUSEA OR VOMITING, Disp: 30 tablet, Rfl: 0  •  rosuvastatin (CRESTOR) 5 mg tablet, Take 1 pill Monday Wednesday and Friday in the evening, Disp: 12 tablet, Rfl: 6  • tiotropium (SPIRIVA RESPIMAT) 1 25 MCG/ACT AERS inhaler, Inhale 2 puffs daily, Disp: 4 g, Rfl: 11  •  traZODone (DESYREL) 100 mg tablet, Take 1-3 tablets (100-300 mg total) by mouth daily at bedtime as needed for sleep, Disp: 90 tablet, Rfl: 4  •  VITAMIN D PO, Take 5,000 Units by mouth, Disp: , Rfl:   •  ZOLMitriptan (ZOMIG-ZMT) 5 MG disintegrating tablet, DISSOLVE 1 TABLET BY MOUTH AT ONSET OF HEADACHE, MAY REPEAT AFTER TWO HOURS AS NEEDED, MAX 2 TABLETS PER 24 HOURS, Disp: 12 tablet, Rfl: 5    Current Allergies     Allergies as of 06/13/2023 - Reviewed 06/13/2023   Allergen Reaction Noted   • Nuts - food allergy     • Cephalexin  07/22/2013   • Erythromycin Diarrhea, GI Intolerance, and Vomiting 06/12/2013   • Iodine - food allergy Hives    • Other  07/24/2019   • Shellfish allergy - food allergy  06/12/2013   • Shellfish-derived products - food allergy  07/30/2015   • Turmeric - food allergy Hives 12/10/2019   • Wellbutrin [bupropion] Seizures 12/10/2019   • Zithromax [azithromycin] Diarrhea 01/25/2016            The following portions of the patient's history were reviewed and updated as appropriate: allergies, current medications, past family history, past medical history, past social history, past surgical history and problem list      Past Medical History:   Diagnosis Date   • Amenorrhea    • Anxiety    • Arthritis    • Asthma    • Back pain    • Chondromalacia of patella    • Chronic fatigue    • COPD (chronic obstructive pulmonary disease) ([facility] [address] ) Yes   • Deep vein thrombophlebitis of leg (HCC)    • Depression    • Disease of thyroid gland    • Diverticulitis    • GERD (gastroesophageal reflux disease)    • History of transfusion 01/31/2008   • HPV (human papilloma virus) infection    • Hypothyroidism    • Lumbar radiculopathy    • Migraine    • Myofascial pain syndrome    • Osteopenia    • Piriformis syndrome    • Sacroiliitis (HCC)    • Sciatica    • Seizure (HCC)    • Sleep apnea    • Spondylosis of lumbar spine    • Trochanteric bursitis of right hip    • Vertigo    • Vitamin D deficiency    • Weight gain 2019       Past Surgical History:   Procedure Laterality Date   • CERVIX LESION DESTRUCTION     •  SECTION     • COLONOSCOPY     • COLPOSCOPY W/ BIOPSY / CURETTAGE      Colposcopy cervix with biopsy   • LAPAROSCOPIC ENDOMETRIOSIS FULGURATION     • LAPAROSCOPY     • TOOTH EXTRACTION         Family History   Problem Relation Age of Onset   • Heart disease Mother    • Asthma Daughter    • Lung cancer Paternal Grandfather    • Asthma Daughter    • Colon cancer Father    • Colon cancer Maternal Grandfather    • Prostate cancer Maternal Grandfather    • Colon cancer Maternal Aunt    • No Known Problems Maternal Grandmother    • Diabetes Paternal Grandmother    • No Known Problems Maternal Aunt    • No Known Problems Maternal Aunt    • No Known Problems Maternal Aunt    • No Known Problems Paternal Aunt    • Asthma Daughter    • Psychiatric Illness Neg Hx    • Stroke Neg Hx    • Thyroid disease Neg Hx    • Substance Abuse Neg Hx    • Suicidality Neg Hx          Medications have been verified  Objective   /70 (BP Location: Right arm, Patient Position: Sitting, Cuff Size: Adult)   Pulse 63   Temp 98 °F (36 7 °C) (Oral)   Resp 18   Wt 71 9 kg (158 lb 8 oz)   SpO2 97%   BMI 28 99 kg/m²        Physical Exam     Physical Exam  Vitals and nursing note reviewed  Constitutional:       General: She is not in acute distress  Appearance: Normal appearance  She is normal weight  She is not ill-appearing, toxic-appearing or diaphoretic  HENT:      Head: Normocephalic and atraumatic  Eyes:      General:         Right eye: No discharge  Left eye: No discharge  Cardiovascular:      Rate and Rhythm: Normal rate and regular rhythm  Pulses: Normal pulses  Heart sounds: Normal heart sounds  No murmur heard  No friction rub  No gallop     Pulmonary:      Effort: Pulmonary effort is normal  No respiratory distress  Breath sounds: Normal breath sounds  No stridor  No wheezing, rhonchi or rales  Chest:      Chest wall: No tenderness  Abdominal:      General: Bowel sounds are normal       Palpations: Abdomen is soft  Tenderness: There is no abdominal tenderness  There is no guarding or rebound  Skin:     General: Skin is warm and dry  Neurological:      Mental Status: She is alert     Psychiatric:         Mood and Affect: Mood normal          Behavior: Behavior normal

## 2023-06-15 ENCOUNTER — TELEPHONE (OUTPATIENT)
Dept: INTERNAL MEDICINE CLINIC | Facility: CLINIC | Age: 53
End: 2023-06-15

## 2023-06-15 DIAGNOSIS — N30.00 ACUTE CYSTITIS WITHOUT HEMATURIA: Primary | ICD-10-CM

## 2023-06-15 LAB
BACTERIA UR CULT: ABNORMAL
BACTERIA UR CULT: ABNORMAL

## 2023-06-15 RX ORDER — SULFAMETHOXAZOLE AND TRIMETHOPRIM 800; 160 MG/1; MG/1
1 TABLET ORAL EVERY 12 HOURS SCHEDULED
Qty: 8 TABLET | Refills: 0 | Status: SHIPPED | OUTPATIENT
Start: 2023-06-15 | End: 2023-06-19

## 2023-06-15 NOTE — TELEPHONE ENCOUNTER
Let the patient know that her urine culture is positive I have sent a prescription for 4 additional days of the Bactrim to her pharmacy to complete a 1 week course thank you

## 2023-06-15 NOTE — TELEPHONE ENCOUNTER
Hi, my name is Valery Argueta, [id number]  I'm a patient of Doctor Brooke Howell and I got a bladder infection on Monday went to [facility] [address] like I was told to go  I was given a three day supply of Bactrim and I was wondering if that is sufficient or if I should have more because I'm used to having it or at least a week worth of dose  If you can, please return my call [phone]  Thank you   Bye
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
## 2023-06-20 ENCOUNTER — ESTABLISHED COMPREHENSIVE EXAM (OUTPATIENT)
Dept: URBAN - METROPOLITAN AREA CLINIC 6 | Facility: CLINIC | Age: 53
End: 2023-06-20

## 2023-06-20 DIAGNOSIS — H52.203: ICD-10-CM

## 2023-06-20 DIAGNOSIS — H52.4: ICD-10-CM

## 2023-06-20 DIAGNOSIS — H25.13: ICD-10-CM

## 2023-06-20 DIAGNOSIS — H52.03: ICD-10-CM

## 2023-06-20 PROCEDURE — 92310 CONTACT LENS FITTING OU: CPT

## 2023-06-20 PROCEDURE — 92015 DETERMINE REFRACTIVE STATE: CPT

## 2023-06-20 PROCEDURE — 92014 COMPRE OPH EXAM EST PT 1/>: CPT

## 2023-06-20 ASSESSMENT — KERATOMETRY
OD_AXISANGLE2_DEGREES: 74
OD_K1POWER_DIOPTERS: 45.25
OS_K2POWER_DIOPTERS: 46.75
OD_K2POWER_DIOPTERS: 46.25
OS_AXISANGLE2_DEGREES: 90
OS_K1POWER_DIOPTERS: 45.50
OS_AXISANGLE_DEGREES: 180
OD_AXISANGLE_DEGREES: 164

## 2023-06-20 ASSESSMENT — TONOMETRY
OD_IOP_MMHG: 6
OS_IOP_MMHG: 9

## 2023-06-20 ASSESSMENT — VISUAL ACUITY
OS_CC: 20/30+1
OD_CC: 20/30+1

## 2023-06-22 ENCOUNTER — TELEMEDICINE (OUTPATIENT)
Dept: PSYCHIATRY | Facility: CLINIC | Age: 53
End: 2023-06-22
Payer: COMMERCIAL

## 2023-06-22 ENCOUNTER — TELEPHONE (OUTPATIENT)
Dept: PSYCHIATRY | Facility: CLINIC | Age: 53
End: 2023-06-22

## 2023-06-22 ENCOUNTER — NURSE TRIAGE (OUTPATIENT)
Dept: OTHER | Facility: OTHER | Age: 53
End: 2023-06-22

## 2023-06-22 ENCOUNTER — OFFICE VISIT (OUTPATIENT)
Dept: INTERNAL MEDICINE CLINIC | Facility: CLINIC | Age: 53
End: 2023-06-22
Payer: COMMERCIAL

## 2023-06-22 VITALS
WEIGHT: 158.2 LBS | TEMPERATURE: 98.5 F | OXYGEN SATURATION: 96 % | BODY MASS INDEX: 29.11 KG/M2 | HEIGHT: 62 IN | HEART RATE: 92 BPM

## 2023-06-22 VITALS — HEIGHT: 62 IN | WEIGHT: 159 LBS | BODY MASS INDEX: 29.26 KG/M2

## 2023-06-22 DIAGNOSIS — H10.31 ACUTE BACTERIAL CONJUNCTIVITIS OF RIGHT EYE: Primary | ICD-10-CM

## 2023-06-22 DIAGNOSIS — F41.1 GAD (GENERALIZED ANXIETY DISORDER): Chronic | ICD-10-CM

## 2023-06-22 DIAGNOSIS — F33.41 MAJOR DEPRESSIVE DISORDER, RECURRENT EPISODE, IN PARTIAL REMISSION (HCC): Primary | Chronic | ICD-10-CM

## 2023-06-22 DIAGNOSIS — F63.9 IMPULSE CONTROL DISORDER: Chronic | ICD-10-CM

## 2023-06-22 DIAGNOSIS — F41.0 PANIC DISORDER WITHOUT AGORAPHOBIA: Chronic | ICD-10-CM

## 2023-06-22 DIAGNOSIS — F60.9 PERSONALITY DISORDER (HCC): Chronic | ICD-10-CM

## 2023-06-22 DIAGNOSIS — F42.9 OBSESSIVE-COMPULSIVE DISORDER, UNSPECIFIED TYPE: Chronic | ICD-10-CM

## 2023-06-22 DIAGNOSIS — Z79.899 LONG-TERM USE OF HIGH-RISK MEDICATION: Chronic | ICD-10-CM

## 2023-06-22 DIAGNOSIS — G47.09 OTHER INSOMNIA: Chronic | ICD-10-CM

## 2023-06-22 PROCEDURE — 90833 PSYTX W PT W E/M 30 MIN: CPT | Performed by: PSYCHIATRY & NEUROLOGY

## 2023-06-22 PROCEDURE — 99213 OFFICE O/P EST LOW 20 MIN: CPT | Performed by: PSYCHIATRY & NEUROLOGY

## 2023-06-22 PROCEDURE — 99213 OFFICE O/P EST LOW 20 MIN: CPT | Performed by: INTERNAL MEDICINE

## 2023-06-22 RX ORDER — TOBRAMYCIN AND DEXAMETHASONE 3; 1 MG/ML; MG/ML
1 SUSPENSION/ DROPS OPHTHALMIC
Qty: 2.5 ML | Refills: 0 | Status: SHIPPED | OUTPATIENT
Start: 2023-06-22

## 2023-06-22 RX ORDER — NALTREXONE HYDROCHLORIDE 50 MG/1
50 TABLET, FILM COATED ORAL DAILY
Qty: 30 TABLET | Refills: 4 | Status: SHIPPED | OUTPATIENT
Start: 2023-06-22 | End: 2023-11-19

## 2023-06-22 RX ORDER — LEVOTHYROXINE SODIUM 0.03 MG/1
TABLET ORAL
COMMUNITY
Start: 2023-05-14 | End: 2023-06-22 | Stop reason: ALTCHOICE

## 2023-06-22 RX ORDER — DULOXETIN HYDROCHLORIDE 60 MG/1
120 CAPSULE, DELAYED RELEASE ORAL DAILY
Qty: 60 CAPSULE | Refills: 4 | Status: SHIPPED | OUTPATIENT
Start: 2023-06-22 | End: 2023-11-19

## 2023-06-22 RX ORDER — TRAZODONE HYDROCHLORIDE 100 MG/1
100-300 TABLET ORAL
Qty: 90 TABLET | Refills: 4 | Status: SHIPPED | OUTPATIENT
Start: 2023-06-22 | End: 2023-11-19

## 2023-06-22 RX ORDER — ARIPIPRAZOLE 5 MG/1
5 TABLET ORAL
Qty: 30 TABLET | Refills: 4 | Status: SHIPPED | OUTPATIENT
Start: 2023-06-22 | End: 2023-11-19

## 2023-06-22 RX ORDER — LAMOTRIGINE 100 MG/1
100 TABLET ORAL
Qty: 30 TABLET | Refills: 4 | Status: SHIPPED | OUTPATIENT
Start: 2023-06-22 | End: 2023-11-19

## 2023-06-22 NOTE — TELEPHONE ENCOUNTER
Patient called to get her appt for 6/22/23 at 3:30p switched to a virtual visit due to patient having pink eye  Writer switched appt  Patient would like the virtual link text to her

## 2023-06-22 NOTE — ASSESSMENT & PLAN NOTE
Acute bacterial conjunctivitis of the right eye recommend TobraDex eyedrops 1 drop 4 times a day for 5 days  Warm compress as needed for secretions    Precautions to avoid transmission of infection discussed with patient today follow-up if symptoms worsen or do not respond

## 2023-06-22 NOTE — TELEPHONE ENCOUNTER
"Patient reports that her R eye started feeling gritty yesterday and today she woke up with it pasted shut  Her sclera is pink and she has mild swelling of her eyelid and moderate to severe itching  Patient would like to be seen today in the office and would like a call back to advise  Reason for Disposition  • [1] Eye with yellow/green discharge or eyelashes stick together AND [2] NO PCP standing order to call in antibiotic eye drops    Answer Assessment - Initial Assessment Questions  1  EYE DISCHARGE: \"Is the discharge in one or both eyes? \" \"What color is it? \" \"How much is there? \" \"When did the discharge start? \"       Right eye  2  REDNESS OF SCLERA: \"Is the redness in one or both eyes? \" \"When did the redness start? \"       A little pink in R eye  3  EYELIDS: \"Are the eyelids red or swollen? \" If Yes, ask: \"How much? \"       A little swollen  4  VISION: \"Is there any difficulty seeing clearly? \"       Denies   5  PAIN: \"Is there any pain? If Yes, ask: \"How bad is it? \" (Scale 1-10; or mild, moderate, severe)     - MILD (1-3): doesn't interfere with normal activities      - MODERATE (4-7): interferes with normal activities or awakens from sleep     - SEVERE (8-10): excruciating pain, unable to do any normal activities        Denies   6  CONTACT LENS: \"Do you wear contacts? \"      Has trial contacts  7  OTHER SYMPTOMS: \"Do you have any other symptoms? \" (e g , fever, runny nose, cough)      Gritty feeling in eye, eye pasted shut this morning, itching   8  PREGNANCY: \"Is there any chance you are pregnant? \" \"When was your last menstrual period? \"      N/A    Protocols used: EYE - PUS OR DISCHARGE-ADULT-AH    "

## 2023-06-22 NOTE — TELEPHONE ENCOUNTER
"Regarding: eye lids gritty, itchy; eyes pasted shut upon awakening this am  ----- Message from Michelle Thompson sent at 6/22/2023  7:30 AM EDT -----  \"My eyes were pasted shut this morning and the top lids of my eyes are gritty and itchy  \"    "

## 2023-06-22 NOTE — BH TREATMENT PLAN
"TREATMENT PLAN (Medication Management Only)        [facility]    Name/Date of Birth/MRN:  Mikael Rm 47 y o  1970 MRN: [id number]  Date of Treatment Plan: June 22, 2023  Diagnosis/Diagnoses:   1  Major depressive disorder, recurrent episode, in partial remission ([facility] )    2  KYLE (generalized anxiety disorder)    3  Obsessive-compulsive disorder, unspecified type    4  Panic disorder without agoraphobia    5  Impulse control disorder    6  Personality disorder ([facility] )    7  Other insomnia    8  Long-term use of high-risk medication      Strengths/Personal Resources for Self-Care: \"taking my medications\"  Area/Areas of need (in own words): \"anxiety\"  1  Long Term Goal:   improve control of anxiety, maintain improvement in depression  Target Date: 3 months - 9/22/2023  Person/Persons responsible for completion of goal: [place]  2  Short Term Objective (s) - How will we reach this goal?:   A  Provider new recommended medication/dosage changes and/or continue medication(s): discontinue Atarax, continue all other medications (Cymbalta, Trazodone, Lamictal, Abilify and Naltrexone)  B   N/A   C   N/A  Target Date: 3 months - 9/22/2023  Person/Persons Responsible for Completion of Goal: [place]   Progress Towards Goals: progressing  Treatment Modality: medication management every 3 months, continue psychotherapy with own therapist  Review due 180 days from date of this plan: 6 months - 12/22/2023  Expected length of service: ongoing treatment unless revised  My Physician/PA/NP and I have developed this plan together and I agree to work on the goals and objectives  I understand the treatment goals that were developed for my treatment    Electronic Signatures: on file (unless signed below)    Hu Alvarez MD 06/22/23  "

## 2023-06-22 NOTE — PROGRESS NOTES
Name: Serita Collet      : 1970      MRN: [id number]  Encounter Provider: Jose F Carpio MD  Encounter Date: 2023   Encounter department: [address]     1  Acute bacterial conjunctivitis of right eye  Assessment & Plan:  Acute bacterial conjunctivitis of the right eye recommend TobraDex eyedrops 1 drop 4 times a day for 5 days  Warm compress as needed for secretions  Precautions to avoid transmission of infection discussed with patient today follow-up if symptoms worsen or do not respond    Orders:  -     tobramycin-dexamethasone (TOBRADEX) ophthalmic suspension; Administer 1 drop to the right eye every 4 (four) hours while awake         Subjective     This 72-year-old female patient presents today for evaluation of a recent onset of right eye irritation she indicates that the eye feels gritty have some inflammation and swelling in the eyelid superiorly and also has had some crusting on the eyelashes especially in the morning  She denies any visual changes in acuity  Review of Systems   Eyes: Positive for pain, discharge and redness  All other systems reviewed and are negative        Past Medical History:   Diagnosis Date   • Amenorrhea    • Anxiety    • Arthritis    • Asthma    • Back pain    • Chondromalacia of patella    • Chronic fatigue    • COPD (chronic obstructive pulmonary disease) ([facility]) Yes   • Deep vein thrombophlebitis of leg ([facility])    • Depression    • Disease of thyroid gland    • Diverticulitis    • GERD (gastroesophageal reflux disease)    • History of transfusion 2008   • HPV (human papilloma virus) infection    • Hypothyroidism    • Lumbar radiculopathy    • Migraine    • Myofascial pain syndrome    • Osteopenia    • Piriformis syndrome    • Sacroiliitis ([facility])    • Sciatica    • Seizure ([facility])    • Sleep apnea    • Spondylosis of lumbar spine    • Trochanteric bursitis of right hip    • Vertigo    • Vitamin D deficiency • Weight gain 2019     Past Surgical History:   Procedure Laterality Date   • CERVIX LESION DESTRUCTION     •  SECTION     • COLONOSCOPY     • COLPOSCOPY W/ BIOPSY / CURETTAGE      Colposcopy cervix with biopsy   • LAPAROSCOPIC ENDOMETRIOSIS FULGURATION     • LAPAROSCOPY     • TOOTH EXTRACTION       Family History   Problem Relation Age of Onset   • Heart disease Mother    • Asthma Daughter    • Lung cancer Paternal Grandfather    • Asthma Daughter    • Colon cancer Father    • Colon cancer Maternal Grandfather    • Prostate cancer Maternal Grandfather    • Colon cancer Maternal Aunt    • No Known Problems Maternal Grandmother    • Diabetes Paternal Grandmother    • No Known Problems Maternal Aunt    • No Known Problems Maternal Aunt    • No Known Problems Maternal Aunt    • No Known Problems Paternal Aunt    • Asthma Daughter    • Psychiatric Illness Neg Hx    • Stroke Neg Hx    • Thyroid disease Neg Hx    • Substance Abuse Neg Hx    • Suicidality Neg Hx      Social History     Socioeconomic History   • Marital status: /Civil Union     Spouse name: Barbara Herbert   • Number of children: 2   • Years of education: 12   • Highest education level: High school graduate   Occupational History   • Occupation: works for [employer]   Tobacco Use   • Smoking status: Never   • Smokeless tobacco: Never   Vaping Use   • Vaping Use: Never used   Substance and Sexual Activity   • Alcohol use: Not Currently   • Drug use: Not Currently   • Sexual activity: Yes     Partners: Male     Birth control/protection: I U D       Comment: Mirena 2021   Other Topics Concern   • None   Social History Narrative    Education: high school graduate    Learning Disabilities: none    Marital History:     Children: 2 daughters    Living Arrangement: lives in home with  and daughter    Occupational History: works for [employer], also worked as a  and as an  in the past Functioning Relationships: good support system    Legal History: none     History: None        Unsure of age of house    Heating system gas forced hot air    Central AC    Bedroom is carpeted    Humidifier in living room    [address] 23 in living room    No basement    No dehumidifier,            Pets - 1 [place] pig, Bearded Dragon Lizard            Caffeine - none in beverages     Chocolate - 3 times a week       Social Determinants of Health     Financial Resource Strain: Low Risk  (6/22/2023)    Overall Financial Resource Strain (CARDIA)    • Difficulty of Paying Living Expenses: Not hard at all   Food Insecurity: No Food Insecurity (6/22/2023)    Hunger Vital Sign    • Worried About Running Out of Food in the Last Year: Never true    • Ran Out of Food in the Last Year: Never true   Transportation Needs: No Transportation Needs (6/22/2023)    PRAPARE - Transportation    • Lack of Transportation (Medical): No    • Lack of Transportation (Non-Medical): No   Physical Activity: Sufficiently Active (6/22/2023)    Exercise Vital Sign    • Days of Exercise per Week: 7 days    • Minutes of Exercise per Session: 30 min   Stress: Stress Concern Present (6/22/2023)    Maritza[address] Helder Wick    • Feeling of Stress : To some extent   Social Connections:  Moderately Isolated (6/22/2023)    Social Connection and Isolation Panel [NHANES]    • Frequency of Communication with Friends and Family: Once a week    • Frequency of Social Gatherings with Friends and Family: Once a week    • Attends Quaker Services: More than 4 times per year    • Active Member of Clubs or Organizations: No    • Attends Club or Organization Meetings: Never    • Marital Status:    Intimate Partner Violence: Not At Risk (6/22/2023)    Humiliation, Afraid, Rape, and Kick questionnaire    • Fear of Current or Ex-Partner: No    • Emotionally Abused: No    • Physically Abused: No    • Sexually Abused: No   Housing Stability: Low Risk  (6/22/2023)    Housing Stability Vital Sign    • Unable to Pay for Housing in the Last Year: No    • Number of Places Lived in the Last Year: 1    • Unstable Housing in the Last Year: No     Current Outpatient Medications on File Prior to Visit   Medication Sig   • acetaminophen (TYLENOL) 325 mg tablet Take 2 tablets (650 mg total) by mouth every 6 (six) hours as needed for mild pain   • albuterol (2 5 mg/3 mL) 0 083 % nebulizer solution Inhale    • albuterol (Proventil HFA) 90 mcg/act inhaler Inhale 2 puffs every 6 (six) hours as needed for wheezing   • aluminum-magnesium hydroxide 200-200 MG/5ML suspension GAVISCON REGULAR STRENGTH AFTER MEALS and BEDTIME WHEN NOT FOLLOWING LPR/GERD DIET  • Ascorbic Acid (VITAMIN C PO) Take by mouth   • azelastine (ASTELIN) 0 1 % nasal spray 1-2 sprays into each nostril 2 (two) times a day as needed for rhinitis Use in each nostril as directed   • [facility] INDOOR/OUTDOOR ALLERGY RLF 10 MG tablet Take 10 mg by mouth daily   • eptinezumab-jjmr (VYEPTI) IVPB Infuse 100mg IV every 90 days  Note to pharmacy: To be delivered to where patient will be having infusion: [facility]  [phone], [phone]   • gabapentin (Neurontin) 100 mg capsule Take 2 capsules (200 mg total) by mouth 3 (three) times a day (with 600 mg TID)   • ibuprofen (MOTRIN) 800 mg tablet Take 1 tablet (800 mg total) by mouth every 6 (six) hours as needed for mild pain   • ketorolac (TORADOL) 10 mg tablet Take 1 tablet (10 mg total) by mouth every 6 (six) hours as needed (migraine, back pain) Max 2-3 per week     • levonorgestrel (MIRENA) 20 MCG/24HR IUD by Intrauterine route   • levothyroxine (Synthroid) 50 mcg tablet Take 1 tablet (50 mcg total) by mouth daily   • metFORMIN (GLUCOPHAGE-XR) 500 mg 24 hr tablet Take 1 tablet (500 mg total) by mouth daily with dinner   • mometasone (NASONEX) 50 mcg/act nasal spray    • Multiple Vitamins-Minerals (ZINC PO) Take by mouth   • ondansetron (ZOFRAN-ODT) 4 mg disintegrating tablet Take 1 tablet (4 mg total) by mouth every 6 (six) hours as needed for nausea or vomiting   • prochlorperazine (COMPAZINE) 10 mg tablet TAKE 1 TABLET(10 MG) BY MOUTH EVERY 6 HOURS AS NEEDED FOR NAUSEA OR VOMITING   • rosuvastatin (CRESTOR) 5 mg tablet Take 1 pill Monday Wednesday and Friday in the evening   • VITAMIN D PO Take 5,000 Units by mouth   • ZOLMitriptan (ZOMIG-ZMT) 5 MG disintegrating tablet DISSOLVE 1 TABLET BY MOUTH AT ONSET OF HEADACHE, MAY REPEAT AFTER TWO HOURS AS NEEDED, MAX 2 TABLETS PER 24 HOURS   • [DISCONTINUED] Aspirin-Acetaminophen-Caffeine (MIGRAINE FORMULA PO) Take by mouth (Patient not taking: Reported on 6/22/2023)   • [DISCONTINUED] cyclobenzaprine (FLEXERIL) 5 mg tablet Take 1 tablet (5 mg total) by mouth daily at bedtime (Patient not taking: Reported on 6/22/2023)   • [DISCONTINUED] DULoxetine (CYMBALTA) 60 mg delayed release capsule Take 2 capsules (120 mg total) by mouth daily   • [DISCONTINUED] gabapentin (NEURONTIN) 300 mg capsule TAKE 2 capsules BY MOUTH EVERY MORNING, 2 CAPSULES BY MOUTH AT NOON AND 2 CAPSULES BY MOUTH EVERY NIGHT AT BEDTIME (Patient not taking: Reported on 6/22/2023)   • [DISCONTINUED] lamoTRIgine (LaMICtal) 100 mg tablet Take 1 tablet (100 mg total) by mouth daily at bedtime   • [DISCONTINUED] levothyroxine 25 mcg tablet  (Patient not taking: Reported on 6/22/2023)   • [DISCONTINUED] naltrexone (REVIA) 50 mg tablet Take 1 tablet (50 mg total) by mouth daily   • [DISCONTINUED] traZODone (DESYREL) 100 mg tablet Take 1-3 tablets (100-300 mg total) by mouth daily at bedtime as needed for sleep   • ARIPiprazole (ABILIFY) 5 mg tablet Take 1 tablet (5 mg total) by mouth daily at bedtime   • DULoxetine (CYMBALTA) 60 mg delayed release capsule Take 2 capsules (120 mg total) by mouth daily   • fluticasone-salmeterol (ADVAIR HFA) 115-21 MCG/ACT inhaler Inhale 2 puffs 2 (two) times a day Rinse mouth after use     • lamoTRIgine (LaMICtal) 100 mg tablet Take 1 tablet (100 mg total) by mouth daily at bedtime   • naltrexone (REVIA) 50 mg tablet Take 1 tablet (50 mg total) by mouth daily   • tiotropium (SPIRIVA RESPIMAT) 1 25 MCG/ACT AERS inhaler Inhale 2 puffs daily   • traZODone (DESYREL) 100 mg tablet Take 1-3 tablets (100-300 mg total) by mouth daily at bedtime as needed for sleep   • [DISCONTINUED] ALPRAZolam (XANAX) 0 25 mg tablet Take 1 tablet (0 25 mg total) by mouth daily at bedtime as needed for anxiety (Patient not taking: Reported on 6/22/2023)   • [DISCONTINUED] ARIPiprazole (ABILIFY) 5 mg tablet Take 1 tablet (5 mg total) by mouth daily at bedtime   • [DISCONTINUED] EPINEPHrine (EPIPEN) 0 3 mg/0 3 mL SOAJ Inject 0 3 mL (0 3 mg total) into a muscle once for 1 dose As directed (Patient not taking: Reported on 6/22/2023)   • [DISCONTINUED] ferrous sulfate 324 (65 Fe) mg Take 1 tablet (324 mg total) by mouth daily before breakfast (Patient not taking: Reported on 6/6/2023)     Allergies   Allergen Reactions   • Nuts - Food Allergy      Other reaction(s): WALNUTS   • Cephalexin    • Erythromycin Diarrhea, GI Intolerance and Vomiting   • Iodine - Food Allergy Hives   • Other      adobo   • Shellfish Allergy - Food Allergy    • Shellfish-Derived Products - Food Allergy    • Turmeric - Food Allergy Hives   • Wellbutrin [Bupropion] Seizures   • Zithromax [Azithromycin] Diarrhea     Can take name brand  Annotation - [id number]: can take brand name  Other reaction(s): Nausea/vomiting/diarrhea     Immunization History   Administered Date(s) Administered   • COVID-19 PFIZER VACCINE 0 3 ML IM 05/21/2021, 06/16/2021, 01/31/2022, 01/31/2022   • INFLUENZA 10/31/2018   • Influenza, injectable, quadrivalent, preservative free 0 5 mL 10/31/2018, 10/07/2019   • Pneumococcal Conjugate Vaccine 20-valent (Pcv20), Polysace 08/30/2022   • Tdap 06/06/2019   • Zoster Vaccine Recombinant 01/05/2023 "      Objective     Pulse 92   Temp 98 5 °F (36 9 °C)   Ht 5' 2\" (1 575 m)   Wt 71 8 kg (158 lb 3 2 oz)   SpO2 96%   BMI 28 94 kg/m²     Physical Exam  Eyes:      Comments: On examination the patient's right eye has the classic indications of conjunctivitis with irritation and inflammation and slight swelling of the upper eyelid         Avon Gosselin, MD  "

## 2023-07-12 ENCOUNTER — OFFICE VISIT (OUTPATIENT)
Dept: GASTROENTEROLOGY | Facility: AMBULARY SURGERY CENTER | Age: 53
End: 2023-07-12
Payer: COMMERCIAL

## 2023-07-12 VITALS
DIASTOLIC BLOOD PRESSURE: 74 MMHG | HEIGHT: 62 IN | HEART RATE: 67 BPM | SYSTOLIC BLOOD PRESSURE: 118 MMHG | OXYGEN SATURATION: 98 % | WEIGHT: 156 LBS | BODY MASS INDEX: 28.71 KG/M2

## 2023-07-12 DIAGNOSIS — R13.19 ESOPHAGEAL DYSPHAGIA: ICD-10-CM

## 2023-07-12 DIAGNOSIS — K21.9 GASTROESOPHAGEAL REFLUX DISEASE WITHOUT ESOPHAGITIS: Primary | ICD-10-CM

## 2023-07-12 PROBLEM — K21.00 GASTROESOPHAGEAL REFLUX DISEASE WITH ESOPHAGITIS: Status: RESOLVED | Noted: 2019-08-07 | Resolved: 2023-07-12

## 2023-07-12 PROBLEM — R13.10 DYSPHAGIA: Status: ACTIVE | Noted: 2023-07-12

## 2023-07-12 PROCEDURE — 99244 OFF/OP CNSLTJ NEW/EST MOD 40: CPT | Performed by: INTERNAL MEDICINE

## 2023-07-12 RX ORDER — FAMOTIDINE 40 MG/1
40 TABLET, FILM COATED ORAL
Qty: 30 TABLET | Refills: 6 | Status: SHIPPED | OUTPATIENT
Start: 2023-07-12

## 2023-07-12 RX ORDER — PANTOPRAZOLE SODIUM 40 MG/1
40 TABLET, DELAYED RELEASE ORAL DAILY
Qty: 30 TABLET | Refills: 6 | Status: SHIPPED | OUTPATIENT
Start: 2023-07-12

## 2023-07-12 NOTE — PROGRESS NOTES
Consultation -  [facility]  Cristóbal Vail 47 y.o. female MRN: [id number]          Assessment & Plan:  Den 71-year-old female with a history of asthma, chronic migraines, depression, here for a longstanding history of acid reflux with intermittent dysphagia despite PPI therapy. 1.  GERD: Refractory symptoms  -Discussed lifestyle and dietary modification  -Recommended pantoprazole 40 mg in the morning, famotidine 40 mg in the evening  -Proceed with an upper endoscopy to evaluate for underlying Wallis's esophagus, hiatal hernia, esophagitis  -Discussed with the risks of procedure including bleeding, surgery, perforation    2. Intermittent dysphagia: Most likely secondary to Schatzki's ring versus peptic stricture  -We will evaluate at the time of endoscopy, will biopsy for EOE and consider dilation if clinically indicated    Jianeverett Veearsen was seen today for gerd. Diagnoses and all orders for this visit:    Gastroesophageal reflux disease without esophagitis  -     EGD; Future  -     pantoprazole (PROTONIX) 40 mg tablet; Take 1 tablet (40 mg total) by mouth daily  -     famotidine (PEPCID) 40 MG tablet; Take 1 tablet (40 mg total) by mouth daily at bedtime    Esophageal dysphagia  -     EGD; Future    Other orders  -     Diet NPO; Sips with meds; Standing  -     Void on call to OR; Standing            _____________________________________________________________        CC: Chronic GERD    HPI:  Cristóbal Vail is a 47 y. o.female who was referred for evaluation of chronic GERD. This is a den 71-year-old female with a history of depression, strong family history of colon cancer, history of asthma, chronic migraines. She reports she had a lifelong history of acid reflux since her teenage years. Has daily reflux symptoms with retrosternal burning and discomfort, occasionally worse at night with regurgitation episodes. She has been on omeprazole daily 20 mg without significant proving symptoms. She also takes Gaviscon as needed for breakthrough symptoms almost on a daily basis. She has occasional dysphagia to solid foods. Denies any abdominal pain. Reports baseline bowel movements of once per week. He is up-to-date on colon cancer 20, last examination was 1 year ago. Patient has never had an upper endoscopy. Medical history is notable above. Later surgical history is notable for  and laparoscopy. Denies any tobacco, rarely drinks. She works in [email or web address]. Family history is notable for colon cancer in her maternal grandfather, maternal aunt and her father. ROS:  The remainder of the ROS was negative except for the pertinent positives mentioned in HPI.          Allergies: Nuts - food allergy, Cephalexin, Erythromycin, Iodine - food allergy, Other, Shellfish allergy - food allergy, Shellfish-derived products - food allergy, Turmeric - food allergy, Wellbutrin [bupropion], and Zithromax [azithromycin]    Medications:   Current Outpatient Medications:   •  acetaminophen (TYLENOL) 325 mg tablet, Take 2 tablets (650 mg total) by mouth every 6 (six) hours as needed for mild pain, Disp: 30 tablet, Rfl: 0  •  albuterol (2.5 mg/3 mL) 0.083 % nebulizer solution, Inhale , Disp: , Rfl:   •  albuterol (Proventil HFA) 90 mcg/act inhaler, Inhale 2 puffs every 6 (six) hours as needed for wheezing, Disp: 18 g, Rfl: 3  •  aluminum-magnesium hydroxide 200-200 MG/5ML suspension, GAVISCON REGULAR STRENGTH AFTER MEALS and BEDTIME WHEN NOT FOLLOWING LPR/GERD DIET., Disp: 355 mL, Rfl: 11  •  ARIPiprazole (ABILIFY) 5 mg tablet, Take 1 tablet (5 mg total) by mouth daily at bedtime, Disp: 30 tablet, Rfl: 4  •  Ascorbic Acid (VITAMIN C PO), Take by mouth, Disp: , Rfl:   •  azelastine (ASTELIN) 0.1 % nasal spray, 1-2 sprays into each nostril 2 (two) times a day as needed for rhinitis Use in each nostril as directed, Disp: 30 mL, Rfl: 5  •  [employer] INDOOR/OUTDOOR ALLERGY RLF 10 MG tablet, Take 10 mg by mouth daily, Disp: , Rfl: 10  •  DULoxetine (CYMBALTA) 60 mg delayed release capsule, Take 2 capsules (120 mg total) by mouth daily, Disp: 60 capsule, Rfl: 4  •  eptinezumab-jjmr (VYEPTI) IVPB, Infuse 100mg IV every 90 days. Note to pharmacy: To be delivered to where patient will be having infusion: [facility].  [phone], [phone], Disp: 1 mL, Rfl: 3  •  famotidine (PEPCID) 40 MG tablet, Take 1 tablet (40 mg total) by mouth daily at bedtime, Disp: 30 tablet, Rfl: 6  •  gabapentin (Neurontin) 100 mg capsule, Take 2 capsules (200 mg total) by mouth 3 (three) times a day (with 600 mg TID), Disp: 180 capsule, Rfl: 1  •  ibuprofen (MOTRIN) 800 mg tablet, Take 1 tablet (800 mg total) by mouth every 6 (six) hours as needed for mild pain, Disp: 90 tablet, Rfl: 1  •  ketorolac (TORADOL) 10 mg tablet, Take 1 tablet (10 mg total) by mouth every 6 (six) hours as needed (migraine, back pain) Max 2-3 per week., Disp: 10 tablet, Rfl: 5  •  lamoTRIgine (LaMICtal) 100 mg tablet, Take 1 tablet (100 mg total) by mouth daily at bedtime, Disp: 30 tablet, Rfl: 4  •  levonorgestrel (MIRENA) 20 MCG/24HR IUD, by Intrauterine route, Disp: , Rfl:   •  levothyroxine (Synthroid) 50 mcg tablet, Take 1 tablet (50 mcg total) by mouth daily, Disp: 30 tablet, Rfl: 4  •  metFORMIN (GLUCOPHAGE-XR) 500 mg 24 hr tablet, Take 1 tablet (500 mg total) by mouth daily with dinner, Disp: 90 tablet, Rfl: 0  •  mometasone (NASONEX) 50 mcg/act nasal spray, , Disp: , Rfl:   •  Multiple Vitamins-Minerals (ZINC PO), Take by mouth, Disp: , Rfl:   •  naltrexone (REVIA) 50 mg tablet, Take 1 tablet (50 mg total) by mouth daily, Disp: 30 tablet, Rfl: 4  •  ondansetron (ZOFRAN-ODT) 4 mg disintegrating tablet, Take 1 tablet (4 mg total) by mouth every 6 (six) hours as needed for nausea or vomiting, Disp: 20 tablet, Rfl: 3  •  pantoprazole (PROTONIX) 40 mg tablet, Take 1 tablet (40 mg total) by mouth daily, Disp: 30 tablet, Rfl: 6  •  prochlorperazine (COMPAZINE) 10 mg tablet, TAKE 1 TABLET(10 MG) BY MOUTH EVERY 6 HOURS AS NEEDED FOR NAUSEA OR VOMITING, Disp: 30 tablet, Rfl: 0  •  rosuvastatin (CRESTOR) 5 mg tablet, Take 1 pill  and Friday in the evening, Disp: 12 tablet, Rfl: 6  •  traZODone (DESYREL) 100 mg tablet, Take 1-3 tablets (100-300 mg total) by mouth daily at bedtime as needed for sleep, Disp: 90 tablet, Rfl: 4  •  VITAMIN D PO, Take 5,000 Units by mouth, Disp: , Rfl:   •  ZOLMitriptan (ZOMIG-ZMT) 5 MG disintegrating tablet, DISSOLVE 1 TABLET BY MOUTH AT ONSET OF HEADACHE, MAY REPEAT AFTER TWO HOURS AS NEEDED, MAX 2 TABLETS PER 24 HOURS, Disp: 12 tablet, Rfl: 5  •  fluticasone-salmeterol (ADVAIR HFA) 115-21 MCG/ACT inhaler, Inhale 2 puffs 2 (two) times a day Rinse mouth after use., Disp: 12 g, Rfl: 11  •  tiotropium (SPIRIVA RESPIMAT) 1.25 MCG/ACT AERS inhaler, Inhale 2 puffs daily, Disp: 4 g, Rfl: 11'    Past Medical History:   Diagnosis Date   • Amenorrhea    • Anxiety    • Arthritis    • Asthma    • Back pain    • Chondromalacia of patella    • Chronic fatigue    • Colon polyp    • COPD (chronic obstructive pulmonary disease) ([facility]) Yes   • Deep vein thrombophlebitis of leg ([facility])    • Depression    • Disease of thyroid gland    • Diverticulitis    • GERD (gastroesophageal reflux disease)    • History of transfusion 2008   • HPV (human papilloma virus) infection    • Hypothyroidism    • Lumbar radiculopathy    • Migraine    • Myofascial pain syndrome    • Osteopenia    • Piriformis syndrome    • Sacroiliitis ([facility])    • Sciatica    • Seizure ([facility])    • Sleep apnea    • Spondylosis of lumbar spine    • Trochanteric bursitis of right hip    • Vertigo    • Vitamin D deficiency    • Weight gain 2019       Past Surgical History:   Procedure Laterality Date   • CERVIX LESION DESTRUCTION     •  SECTION     • COLONOSCOPY     • COLPOSCOPY W/ BIOPSY / CURETTAGE      Colposcopy cervix with biopsy   • LAPAROSCOPIC ENDOMETRIOSIS FULGURATION     • LAPAROSCOPY     • TOOTH EXTRACTION         Family History   Problem Relation Age of Onset   • Heart disease Mother    • Asthma Daughter    • Lung cancer Paternal Grandfather    • Asthma Daughter    • Colon cancer Father    • Colon cancer Maternal Grandfather    • Prostate cancer Maternal Grandfather    • Colon cancer Maternal Aunt    • No Known Problems Maternal Grandmother    • Diabetes Paternal Grandmother    • No Known Problems Maternal Aunt    • No Known Problems Maternal Aunt    • No Known Problems Maternal Aunt    • No Known Problems Paternal Aunt    • Asthma Daughter    • Psychiatric Illness Neg Hx    • Stroke Neg Hx    • Thyroid disease Neg Hx    • Substance Abuse Neg Hx    • Suicidality Neg Hx         reports that she has never smoked. She has never used smokeless tobacco. She reports that she does not currently use alcohol. She reports that she does not currently use drugs.           Physical Exam:     /74 (BP Location: Right arm, Patient Position: Sitting, Cuff Size: Standard)   Pulse 67   Ht 5' 2" (1.575 m)   Wt 70.8 kg (156 lb)   SpO2 98%   BMI 28.53 kg/m²     Gen: wn/wd, NAD, healthy-appearing female  HEENT: anicteric, MMM, no cervical LAD  CVS: RRR, no m/r/g  CHEST: CTA b/l  ABD: +BS, soft, NT,ND, no hepatosplenomegaly  EXT: no c/c/e  NEURO: aaox3  SKIN: NO rashes

## 2023-07-12 NOTE — LETTER
July 12, 2023     Marlo Sanchez MD  [address] [facility], [address] [address]    Patient: Kami Gray   YOB: 1970   Date of Visit: 7/12/2023       Dear Dr. Ana Rondonr:    Thank you for referring Ulisses Headley to me for evaluation. Below are my notes for this consultation. If you have questions, please do not hesitate to call me. I look forward to following your patient along with you. Sincerely,        Heidi Mercedes MD        CC: No Recipients    Heidi Mercedes MD  7/12/2023  8:36 AM  Sign when Signing Visit  Consultation - [id number]  [facility]  Kami Gray 47 y.o. female MRN: [id number]          Assessment & Plan:  Pleasant 50-year-old female with a history of asthma, chronic migraines, depression, here for a longstanding history of acid reflux with intermittent dysphagia despite PPI therapy. 1.  GERD: Refractory symptoms  -Discussed lifestyle and dietary modification  -Recommended pantoprazole 40 mg in the morning, famotidine 40 mg in the evening  -Proceed with an upper endoscopy to evaluate for underlying Wallis's esophagus, hiatal hernia, esophagitis  -Discussed with the risks of procedure including bleeding, surgery, perforation    2. Intermittent dysphagia: Most likely secondary to Schatzki's ring versus peptic stricture  -We will evaluate at the time of endoscopy, will biopsy for EOE and consider dilation if clinically indicated    Héctor Mccain was seen today for gerd. Diagnoses and all orders for this visit:    Gastroesophageal reflux disease without esophagitis  -     EGD; Future  -     pantoprazole (PROTONIX) 40 mg tablet; Take 1 tablet (40 mg total) by mouth daily  -     famotidine (PEPCID) 40 MG tablet; Take 1 tablet (40 mg total) by mouth daily at bedtime    Esophageal dysphagia  -     EGD;  Future    Other orders  -     Diet NPO; Sips with meds; Standing  -     Void on call to OR; Standing            _____________________________________________________________        CC: Chronic GERD    HPI:  Alyce Camarillo is a 47 y. o.female who was referred for evaluation of chronic GERD. This is a pleasant 52-year-old female with a history of depression, strong family history of colon cancer, history of asthma, chronic migraines. She reports she had a lifelong history of acid reflux since her teenage years. Has daily reflux symptoms with retrosternal burning and discomfort, occasionally worse at night with regurgitation episodes. She has been on omeprazole daily 20 mg without significant proving symptoms. She also takes Gaviscon as needed for breakthrough symptoms almost on a daily basis. She has occasional dysphagia to solid foods. Denies any abdominal pain. Reports baseline bowel movements of once per week. He is up-to-date on colon cancer 20, last examination was 1 year ago. Patient has never had an upper endoscopy. Medical history is notable above. Later surgical history is notable for  and laparoscopy. Denies any tobacco, rarely drinks. She works in [employer]. Family history is notable for colon cancer in her maternal grandfather, maternal aunt and her father. ROS:  The remainder of the ROS was negative except for the pertinent positives mentioned in HPI.          Allergies: Nuts - food allergy, Cephalexin, Erythromycin, Iodine - food allergy, Other, Shellfish allergy - food allergy, Shellfish-derived products - food allergy, Turmeric - food allergy, Wellbutrin [bupropion], and Zithromax [azithromycin]    Medications:   Current Outpatient Medications:   •  acetaminophen (TYLENOL) 325 mg tablet, Take 2 tablets (650 mg total) by mouth every 6 (six) hours as needed for mild pain, Disp: 30 tablet, Rfl: 0  •  albuterol (2.5 mg/3 mL) 0.083 % nebulizer solution, Inhale , Disp: , Rfl:   •  albuterol (Proventil HFA) 90 mcg/act inhaler, Inhale 2 puffs every 6 (six) hours as needed for wheezing, Disp: 18 g, Rfl: 3  •  aluminum-magnesium hydroxide 200-200 MG/5ML suspension, GAVISCON REGULAR STRENGTH AFTER MEALS and BEDTIME WHEN NOT FOLLOWING LPR/GERD DIET., Disp: 355 mL, Rfl: 11  •  ARIPiprazole (ABILIFY) 5 mg tablet, Take 1 tablet (5 mg total) by mouth daily at bedtime, Disp: 30 tablet, Rfl: 4  •  Ascorbic Acid (VITAMIN C PO), Take by mouth, Disp: , Rfl:   •  azelastine (ASTELIN) 0.1 % nasal spray, 1-2 sprays into each nostril 2 (two) times a day as needed for rhinitis Use in each nostril as directed, Disp: 30 mL, Rfl: 5  •  [employer]S INDOOR/OUTDOOR ALLERGY RLF 10 MG tablet, Take 10 mg by mouth daily, Disp: , Rfl: 10  •  DULoxetine (CYMBALTA) 60 mg delayed release capsule, Take 2 capsules (120 mg total) by mouth daily, Disp: 60 capsule, Rfl: 4  •  eptinezumab-jjmr (VYEPTI) IVPB, Infuse 100mg IV every 90 days. Note to pharmacy: To be delivered to where patient will be having infusion: [address] [place] [address].  [phone], [phone], Disp: 1 mL, Rfl: 3  •  famotidine (PEPCID) 40 MG tablet, Take 1 tablet (40 mg total) by mouth daily at bedtime, Disp: 30 tablet, Rfl: 6  •  gabapentin (Neurontin) 100 mg capsule, Take 2 capsules (200 mg total) by mouth 3 (three) times a day (with 600 mg TID), Disp: 180 capsule, Rfl: 1  •  ibuprofen (MOTRIN) 800 mg tablet, Take 1 tablet (800 mg total) by mouth every 6 (six) hours as needed for mild pain, Disp: 90 tablet, Rfl: 1  •  ketorolac (TORADOL) 10 mg tablet, Take 1 tablet (10 mg total) by mouth every 6 (six) hours as needed (migraine, back pain) Max 2-3 per week., Disp: 10 tablet, Rfl: 5  •  lamoTRIgine (LaMICtal) 100 mg tablet, Take 1 tablet (100 mg total) by mouth daily at bedtime, Disp: 30 tablet, Rfl: 4  •  levonorgestrel (MIRENA) 20 MCG/24HR IUD, by Intrauterine route, Disp: , Rfl:   •  levothyroxine (Synthroid) 50 mcg tablet, Take 1 tablet (50 mcg total) by mouth daily, Disp: 30 tablet, Rfl: 4  •  metFORMIN (GLUCOPHAGE-XR) 500 mg 24 hr tablet, Take 1 tablet (500 mg total) by mouth daily with dinner, Disp: 90 tablet, Rfl: 0  •  mometasone (NASONEX) 50 mcg/act nasal spray, , Disp: , Rfl:   •  Multiple Vitamins-Minerals (ZINC PO), Take by mouth, Disp: , Rfl:   •  naltrexone (REVIA) 50 mg tablet, Take 1 tablet (50 mg total) by mouth daily, Disp: 30 tablet, Rfl: 4  •  ondansetron (ZOFRAN-ODT) 4 mg disintegrating tablet, Take 1 tablet (4 mg total) by mouth every 6 (six) hours as needed for nausea or vomiting, Disp: 20 tablet, Rfl: 3  •  pantoprazole (PROTONIX) 40 mg tablet, Take 1 tablet (40 mg total) by mouth daily, Disp: 30 tablet, Rfl: 6  •  prochlorperazine (COMPAZINE) 10 mg tablet, TAKE 1 TABLET(10 MG) BY MOUTH EVERY 6 HOURS AS NEEDED FOR NAUSEA OR VOMITING, Disp: 30 tablet, Rfl: 0  •  rosuvastatin (CRESTOR) 5 mg tablet, Take 1 pill Monday Wednesday and Friday in the evening, Disp: 12 tablet, Rfl: 6  •  traZODone (DESYREL) 100 mg tablet, Take 1-3 tablets (100-300 mg total) by mouth daily at bedtime as needed for sleep, Disp: 90 tablet, Rfl: 4  •  VITAMIN D PO, Take 5,000 Units by mouth, Disp: , Rfl:   •  ZOLMitriptan (ZOMIG-ZMT) 5 MG disintegrating tablet, DISSOLVE 1 TABLET BY MOUTH AT ONSET OF HEADACHE, MAY REPEAT AFTER TWO HOURS AS NEEDED, MAX 2 TABLETS PER 24 HOURS, Disp: 12 tablet, Rfl: 5  •  fluticasone-salmeterol (ADVAIR HFA) 115-21 MCG/ACT inhaler, Inhale 2 puffs 2 (two) times a day Rinse mouth after use., Disp: 12 g, Rfl: 11  •  tiotropium (SPIRIVA RESPIMAT) 1.25 MCG/ACT AERS inhaler, Inhale 2 puffs daily, Disp: 4 g, Rfl: 6'    Past Medical History:   Diagnosis Date   • Amenorrhea    • Anxiety    • Arthritis    • Asthma    • Back pain    • Chondromalacia of patella    • Chronic fatigue    • Colon polyp    • COPD (chronic obstructive pulmonary disease) (HCC) Yes   • Deep vein thrombophlebitis of leg (HCC)    • Depression    • Disease of thyroid gland    • Diverticulitis    • GERD (gastroesophageal reflux disease)    • History of transfusion 2008   • HPV (human papilloma virus) infection    • Hypothyroidism    • Lumbar radiculopathy    • Migraine    • Myofascial pain syndrome    • Osteopenia    • Piriformis syndrome    • Sacroiliitis (HCC)    • Sciatica    • Seizure (HCC)    • Sleep apnea    • Spondylosis of lumbar spine    • Trochanteric bursitis of right hip    • Vertigo    • Vitamin D deficiency    • Weight gain 2019       Past Surgical History:   Procedure Laterality Date   • CERVIX LESION DESTRUCTION     •  SECTION     • COLONOSCOPY     • COLPOSCOPY W/ BIOPSY / CURETTAGE      Colposcopy cervix with biopsy   • LAPAROSCOPIC ENDOMETRIOSIS FULGURATION     • LAPAROSCOPY     • TOOTH EXTRACTION         Family History   Problem Relation Age of Onset   • Heart disease Mother    • Asthma Daughter    • Lung cancer Paternal Grandfather    • Asthma Daughter    • Colon cancer Father    • Colon cancer Maternal Grandfather    • Prostate cancer Maternal Grandfather    • Colon cancer Maternal Aunt    • No Known Problems Maternal Grandmother    • Diabetes Paternal Grandmother    • No Known Problems Maternal Aunt    • No Known Problems Maternal Aunt    • No Known Problems Maternal Aunt    • No Known Problems Paternal Aunt    • Asthma Daughter    • Psychiatric Illness Neg Hx    • Stroke Neg Hx    • Thyroid disease Neg Hx    • Substance Abuse Neg Hx    • Suicidality Neg Hx         reports that she has never smoked. She has never used smokeless tobacco. She reports that she does not currently use alcohol. She reports that she does not currently use drugs.           Physical Exam:     /74 (BP Location: Right arm, Patient Position: Sitting, Cuff Size: Standard)   Pulse 67   Ht 5' 2" (1.575 m)   Wt 70.8 kg (156 lb)   SpO2 98%   BMI 28.53 kg/m²     Gen: wn/wd, NAD, healthy-appearing female  HEENT: anicteric, MMM, no cervical LAD  CVS: RRR, no m/r/g  CHEST: CTA b/l  ABD: +BS, soft, NT,ND, no hepatosplenomegaly  EXT: no c/c/e  NEURO: aaox3  SKIN: NO rashes

## 2023-07-17 NOTE — PROGRESS NOTES
Patient called the office awoke today with UTI symptoms of burning and frequency no blood visible in the urine will start a urine culture but also start the patient on Bactrim double strength 1 tablet twice a day for 7 days with increased fluid intake during the course of antibiotic therapy follow-up if symptoms do not improve 
Partially impaired: cannot see medication labels or newsprint, but can see obstacles in path, and the surrounding layout; can count fingers at arm's length

## 2023-08-11 ENCOUNTER — HOSPITAL ENCOUNTER (OUTPATIENT)
Dept: INFUSION CENTER | Facility: CLINIC | Age: 53
Discharge: HOME | End: 2023-08-11
Payer: COMMERCIAL

## 2023-08-11 VITALS
TEMPERATURE: 98.5 F | RESPIRATION RATE: 18 BRPM | HEART RATE: 72 BPM | DIASTOLIC BLOOD PRESSURE: 72 MMHG | SYSTOLIC BLOOD PRESSURE: 133 MMHG

## 2023-08-11 DIAGNOSIS — G43.709 CHRONIC MIGRAINE WITHOUT AURA WITHOUT STATUS MIGRAINOSUS, NOT INTRACTABLE: Primary | ICD-10-CM

## 2023-08-11 PROCEDURE — 96365 THER/PROPH/DIAG IV INF INIT: CPT

## 2023-08-11 RX ORDER — SODIUM CHLORIDE 9 MG/ML
20 INJECTION, SOLUTION INTRAVENOUS ONCE
Status: COMPLETED | OUTPATIENT
Start: 2023-08-11 | End: 2023-08-11

## 2023-08-11 RX ORDER — SODIUM CHLORIDE 9 MG/ML
20 INJECTION, SOLUTION INTRAVENOUS ONCE
OUTPATIENT
Start: 2023-11-03

## 2023-08-11 RX ADMIN — EPTINEZUMAB-JJMR 100 MG: 100 INJECTION INTRAVENOUS at 09:27

## 2023-08-11 RX ADMIN — SODIUM CHLORIDE 20 ML/HR: 0.9 INJECTION, SOLUTION INTRAVENOUS at 09:03

## 2023-08-11 NOTE — PROGRESS NOTES
Pt arrived to unit without complaint, tolerated Vyepti infusion without incident. Pt's next appt scheduled, pt declines AVS, left unit in stable condition.

## 2023-08-16 DIAGNOSIS — G43.709 CHRONIC MIGRAINE WITHOUT AURA WITHOUT STATUS MIGRAINOSUS, NOT INTRACTABLE: ICD-10-CM

## 2023-08-16 RX ORDER — GABAPENTIN 100 MG/1
CAPSULE ORAL
Qty: 180 CAPSULE | Refills: 1 | Status: SHIPPED | OUTPATIENT
Start: 2023-08-16

## 2023-08-21 PROBLEM — H10.31 ACUTE BACTERIAL CONJUNCTIVITIS OF RIGHT EYE: Status: RESOLVED | Noted: 2023-06-22 | Resolved: 2023-08-21

## 2023-08-25 ENCOUNTER — OFFICE VISIT (OUTPATIENT)
Dept: INTERNAL MEDICINE CLINIC | Facility: CLINIC | Age: 53
End: 2023-08-25
Payer: COMMERCIAL

## 2023-08-25 VITALS
HEART RATE: 83 BPM | SYSTOLIC BLOOD PRESSURE: 118 MMHG | HEIGHT: 62 IN | WEIGHT: 160 LBS | TEMPERATURE: 98.4 F | BODY MASS INDEX: 29.44 KG/M2 | OXYGEN SATURATION: 97 % | DIASTOLIC BLOOD PRESSURE: 66 MMHG

## 2023-08-25 DIAGNOSIS — G43.711 INTRACTABLE CHRONIC MIGRAINE WITHOUT AURA AND WITH STATUS MIGRAINOSUS: ICD-10-CM

## 2023-08-25 DIAGNOSIS — E78.5 HYPERLIPIDEMIA, UNSPECIFIED HYPERLIPIDEMIA TYPE: ICD-10-CM

## 2023-08-25 DIAGNOSIS — S03.40XA SPRAIN OF TEMPOROMANDIBULAR JOINT, INITIAL ENCOUNTER: ICD-10-CM

## 2023-08-25 DIAGNOSIS — J45.51 SEVERE PERSISTENT ASTHMA WITH ACUTE EXACERBATION: ICD-10-CM

## 2023-08-25 DIAGNOSIS — M26.622 ARTHRALGIA OF LEFT TEMPOROMANDIBULAR JOINT: ICD-10-CM

## 2023-08-25 DIAGNOSIS — E03.9 HYPOTHYROIDISM, UNSPECIFIED TYPE: Primary | ICD-10-CM

## 2023-08-25 DIAGNOSIS — G43.909 MIGRAINE WITHOUT STATUS MIGRAINOSUS, NOT INTRACTABLE, UNSPECIFIED MIGRAINE TYPE: ICD-10-CM

## 2023-08-25 DIAGNOSIS — R53.82 CHRONIC FATIGUE: ICD-10-CM

## 2023-08-25 PROBLEM — G43.709 CHRONIC MIGRAINE WITHOUT AURA: Status: RESOLVED | Noted: 2017-05-01 | Resolved: 2023-08-25

## 2023-08-25 PROCEDURE — 99214 OFFICE O/P EST MOD 30 MIN: CPT | Performed by: INTERNAL MEDICINE

## 2023-08-25 RX ORDER — NAPROXEN 500 MG/1
500 TABLET ORAL 2 TIMES DAILY WITH MEALS
Qty: 20 TABLET | Refills: 1 | Status: SHIPPED | OUTPATIENT
Start: 2023-08-25

## 2023-08-25 NOTE — ASSESSMENT & PLAN NOTE
Clementcoli the patient appears to be adequately replaced on 50 mcg of levothyroxine daily. I requested a free T4 and TSH test to confirm adequate replacement.

## 2023-08-25 NOTE — PROGRESS NOTES
Name: Marcus Marquez      : 1970      MRN: [id number]  Encounter Provider: Joe Clark MD  Encounter Date: 2023   Encounter department: [address] [place] [address]     1. Hypothyroidism, unspecified type  Assessment & Plan:  Niccoli the patient appears to be adequately replaced on 50 mcg of levothyroxine daily. I requested a free T4 and TSH test to confirm adequate replacement. Orders:  -     T4, free; Future  -     TSH, 3rd generation; Future    2. Hyperlipidemia, unspecified hyperlipidemia type  -     Lipid panel; Future    3. Chronic fatigue  -     Hemoglobin A1C; Future  -     Comprehensive metabolic panel; Future    4. Sprain of temporomandibular joint, initial encounter  Assessment & Plan:  Patient presents today with left temporomandibular joint pain consistent with arthralgia of the joint. Tenderness is present over the joint on examination. Recommend application of ice as needed and also a 10-day course of naproxen 500 mg to be taken twice a day with food. Orders:  -     naproxen (Naprosyn) 500 mg tablet; Take 1 tablet (500 mg total) by mouth 2 (two) times a day with meals    5. Severe persistent asthma with acute exacerbation  Assessment & Plan:  Pulmonary assessment on today's visit indicates clear lung fields patient reports no pulmonary based symptoms. Oxygen saturation on room air is 97%. Recommend continued use of Advair inhaler Spiriva and as needed use of albuterol. 6. Intractable chronic migraine without aura and with status migrainosus  Assessment & Plan:  Patient reports headache frequency has decreased significantly. Continue Zomig as needed      7. Migraine without status migrainosus, not intractable, unspecified migraine type  Assessment & Plan:  Patient reports migraine headaches have been stable recommend continuation of current medications and routine follow-up with neurology.       8. Arthralgia of left temporomandibular joint  Assessment & Plan:  Patient presents today with left temporomandibular joint pain consistent with arthralgia of the joint. Tenderness is present over the joint on examination. Recommend application of ice as needed and also a 10-day course of naproxen 500 mg to be taken twice a day with food. Subjective      This 59-year-old female patient returns today for a routine follow-up visit. She reports feeling well and has only issues with her left temporomandibular joint. She indicates it has been she does admit to probably grinding her teeth at nighttime. Review of Systems   HENT:        Left TMJ pain   All other systems reviewed and are negative. Current Outpatient Medications on File Prior to Visit   Medication Sig   • acetaminophen (TYLENOL) 325 mg tablet Take 2 tablets (650 mg total) by mouth every 6 (six) hours as needed for mild pain   • albuterol (2.5 mg/3 mL) 0.083 % nebulizer solution Inhale    • albuterol (Proventil HFA) 90 mcg/act inhaler Inhale 2 puffs every 6 (six) hours as needed for wheezing   • ARIPiprazole (ABILIFY) 5 mg tablet Take 1 tablet (5 mg total) by mouth daily at bedtime   • Ascorbic Acid (VITAMIN C PO) Take by mouth   • azelastine (ASTELIN) 0.1 % nasal spray 1-2 sprays into each nostril 2 (two) times a day as needed for rhinitis Use in each nostril as directed   • [employer] INDOOR/OUTDOOR ALLERGY RLF 10 MG tablet Take 10 mg by mouth daily   • DULoxetine (CYMBALTA) 60 mg delayed release capsule Take 2 capsules (120 mg total) by mouth daily   • eptinezumab-jjmr (VYEPTI) IVPB Infuse 100mg IV every 90 days. Note to pharmacy: To be delivered to where patient will be having infusion: [address] [place] [address].  [phone], [phone]   • famotidine (PEPCID) 40 MG tablet Take 1 tablet (40 mg total) by mouth daily at bedtime   • gabapentin (NEURONTIN) 100 mg capsule TAKE 2 CAPSULES(200 MG TOTAL) BY MOUTH THREE TIMES DAILY(WITH 600 MG THREE TIMES DAILY) • ketorolac (TORADOL) 10 mg tablet Take 1 tablet (10 mg total) by mouth every 6 (six) hours as needed (migraine, back pain) Max 2-3 per week. • lamoTRIgine (LaMICtal) 100 mg tablet Take 1 tablet (100 mg total) by mouth daily at bedtime   • levonorgestrel (MIRENA) 20 MCG/24HR IUD by Intrauterine route   • levothyroxine (Synthroid) 50 mcg tablet Take 1 tablet (50 mcg total) by mouth daily   • metFORMIN (GLUCOPHAGE-XR) 500 mg 24 hr tablet Take 1 tablet (500 mg total) by mouth daily with dinner   • mometasone (NASONEX) 50 mcg/act nasal spray    • Multiple Vitamins-Minerals (ZINC PO) Take by mouth   • naltrexone (REVIA) 50 mg tablet Take 1 tablet (50 mg total) by mouth daily   • ondansetron (ZOFRAN-ODT) 4 mg disintegrating tablet Take 1 tablet (4 mg total) by mouth every 6 (six) hours as needed for nausea or vomiting   • pantoprazole (PROTONIX) 40 mg tablet Take 1 tablet (40 mg total) by mouth daily   • prochlorperazine (COMPAZINE) 10 mg tablet TAKE 1 TABLET(10 MG) BY MOUTH EVERY 6 HOURS AS NEEDED FOR NAUSEA OR VOMITING   • rosuvastatin (CRESTOR) 5 mg tablet Take 1 pill Monday Wednesday and Friday in the evening   • traZODone (DESYREL) 100 mg tablet Take 1-3 tablets (100-300 mg total) by mouth daily at bedtime as needed for sleep   • VITAMIN D PO Take 5,000 Units by mouth   • ZOLMitriptan (ZOMIG-ZMT) 5 MG disintegrating tablet DISSOLVE 1 TABLET BY MOUTH AT ONSET OF HEADACHE, MAY REPEAT AFTER TWO HOURS AS NEEDED, MAX 2 TABLETS PER 24 HOURS   • [DISCONTINUED] ibuprofen (MOTRIN) 800 mg tablet Take 1 tablet (800 mg total) by mouth every 6 (six) hours as needed for mild pain   • aluminum-magnesium hydroxide 200-200 MG/5ML suspension GAVISCON REGULAR STRENGTH AFTER MEALS and BEDTIME WHEN NOT FOLLOWING LPR/GERD DIET. (Patient not taking: Reported on 8/11/2023)   • fluticasone-salmeterol (ADVAIR HFA) 115-21 MCG/ACT inhaler Inhale 2 puffs 2 (two) times a day Rinse mouth after use.    • tiotropium (SPIRIVA RESPIMAT) 1.25 MCG/ACT AERS inhaler Inhale 2 puffs daily       Objective     /66   Pulse 83   Temp 98.4 °F (36.9 °C)   Ht 5' 2" (1.575 m)   Wt 72.6 kg (160 lb)   SpO2 97%   BMI 29.26 kg/m²     Physical Exam  Vitals reviewed. Constitutional:       General: She is not in acute distress. Appearance: Normal appearance. She is well-developed. She is not ill-appearing. HENT:      Head: Normocephalic. Right Ear: Hearing and external ear normal.      Left Ear: Hearing and external ear normal.      Nose: Nose normal. No mucosal edema. Mouth/Throat:      Pharynx: Uvula midline. Eyes:      General: Lids are normal.      Conjunctiva/sclera: Conjunctivae normal.      Pupils: Pupils are equal, round, and reactive to light. Neck:      Thyroid: No thyromegaly. Vascular: No carotid bruit or JVD. Comments: Left TMJ tenderness  Cardiovascular:      Rate and Rhythm: Normal rate and regular rhythm. Heart sounds: Normal heart sounds. No murmur heard. Pulmonary:      Effort: Pulmonary effort is normal. No respiratory distress. Breath sounds: Normal breath sounds. No wheezing, rhonchi or rales. Abdominal:      General: Bowel sounds are normal.      Palpations: Abdomen is soft. Musculoskeletal:         General: Normal range of motion. Cervical back: Neck supple. No rigidity or tenderness. Lymphadenopathy:      Cervical: No cervical adenopathy. Skin:     General: Skin is warm and dry. Neurological:      Mental Status: She is alert and oriented to person, place, and time. Mental status is at baseline. Deep Tendon Reflexes: Reflexes are normal and symmetric. Psychiatric:         Speech: Speech normal.         Behavior: Behavior normal. Behavior is cooperative. Thought Content:  Thought content normal.         Judgment: Judgment normal.       Roxi Helton MD

## 2023-08-25 NOTE — ASSESSMENT & PLAN NOTE
Patient presents today with left temporomandibular joint pain consistent with arthralgia of the joint. Tenderness is present over the joint on examination. Recommend application of ice as needed and also a 10-day course of naproxen 500 mg to be taken twice a day with food.

## 2023-08-25 NOTE — ASSESSMENT & PLAN NOTE
Patient reports migraine headaches have been stable recommend continuation of current medications and routine follow-up with neurology.

## 2023-08-25 NOTE — ASSESSMENT & PLAN NOTE
Pulmonary assessment on today's visit indicates clear lung fields patient reports no pulmonary based symptoms. Oxygen saturation on room air is 97%. Recommend continued use of Advair inhaler Spiriva and as needed use of albuterol.

## 2023-09-05 ENCOUNTER — TELEPHONE (OUTPATIENT)
Dept: OBGYN CLINIC | Facility: CLINIC | Age: 53
End: 2023-09-05

## 2023-09-05 DIAGNOSIS — N94.6 DYSMENORRHEA: Primary | ICD-10-CM

## 2023-09-06 ENCOUNTER — HOSPITAL ENCOUNTER (OUTPATIENT)
Dept: GASTROENTEROLOGY | Facility: AMBULARY SURGERY CENTER | Age: 53
Setting detail: OUTPATIENT SURGERY
Discharge: HOME | End: 2023-09-06
Attending: INTERNAL MEDICINE | Admitting: INTERNAL MEDICINE
Payer: COMMERCIAL

## 2023-09-06 ENCOUNTER — ANESTHESIA (OUTPATIENT)
Dept: GASTROENTEROLOGY | Facility: AMBULARY SURGERY CENTER | Age: 53
End: 2023-09-06

## 2023-09-06 ENCOUNTER — ANESTHESIA EVENT (OUTPATIENT)
Dept: GASTROENTEROLOGY | Facility: AMBULARY SURGERY CENTER | Age: 53
End: 2023-09-06

## 2023-09-06 VITALS
SYSTOLIC BLOOD PRESSURE: 115 MMHG | WEIGHT: 160 LBS | HEART RATE: 63 BPM | OXYGEN SATURATION: 98 % | DIASTOLIC BLOOD PRESSURE: 73 MMHG | HEIGHT: 62 IN | TEMPERATURE: 97.2 F | BODY MASS INDEX: 29.44 KG/M2 | RESPIRATION RATE: 18 BRPM

## 2023-09-06 DIAGNOSIS — R13.19 ESOPHAGEAL DYSPHAGIA: ICD-10-CM

## 2023-09-06 DIAGNOSIS — K21.9 GASTROESOPHAGEAL REFLUX DISEASE WITHOUT ESOPHAGITIS: ICD-10-CM

## 2023-09-06 LAB
EXT PREGNANCY TEST URINE: NEGATIVE
EXT. CONTROL: NORMAL

## 2023-09-06 PROCEDURE — 43239 EGD BIOPSY SINGLE/MULTIPLE: CPT | Performed by: INTERNAL MEDICINE

## 2023-09-06 PROCEDURE — 81025 URINE PREGNANCY TEST: CPT | Performed by: INTERNAL MEDICINE

## 2023-09-06 PROCEDURE — 88305 TISSUE EXAM BY PATHOLOGIST: CPT | Performed by: PATHOLOGY

## 2023-09-06 RX ORDER — LIDOCAINE HYDROCHLORIDE 20 MG/ML
INJECTION, SOLUTION EPIDURAL; INFILTRATION; INTRACAUDAL; PERINEURAL AS NEEDED
Status: DISCONTINUED | OUTPATIENT
Start: 2023-09-06 | End: 2023-09-06

## 2023-09-06 RX ORDER — PROPOFOL 10 MG/ML
INJECTION, EMULSION INTRAVENOUS AS NEEDED
Status: DISCONTINUED | OUTPATIENT
Start: 2023-09-06 | End: 2023-09-06

## 2023-09-06 RX ORDER — SODIUM CHLORIDE, SODIUM LACTATE, POTASSIUM CHLORIDE, CALCIUM CHLORIDE 600; 310; 30; 20 MG/100ML; MG/100ML; MG/100ML; MG/100ML
INJECTION, SOLUTION INTRAVENOUS CONTINUOUS PRN
Status: DISCONTINUED | OUTPATIENT
Start: 2023-09-06 | End: 2023-09-06

## 2023-09-06 RX ORDER — IBUPROFEN 800 MG/1
800 TABLET ORAL EVERY 8 HOURS PRN
Qty: 30 TABLET | Refills: 0 | Status: SHIPPED | OUTPATIENT
Start: 2023-09-06

## 2023-09-06 RX ADMIN — PROPOFOL 50 MG: 10 INJECTION, EMULSION INTRAVENOUS at 11:16

## 2023-09-06 RX ADMIN — PROPOFOL 50 MG: 10 INJECTION, EMULSION INTRAVENOUS at 11:13

## 2023-09-06 RX ADMIN — LIDOCAINE HYDROCHLORIDE 100 MG: 20 INJECTION, SOLUTION EPIDURAL; INFILTRATION; INTRACAUDAL; PERINEURAL at 11:10

## 2023-09-06 RX ADMIN — PROPOFOL 150 MG: 10 INJECTION, EMULSION INTRAVENOUS at 11:10

## 2023-09-06 RX ADMIN — PROPOFOL 50 MG: 10 INJECTION, EMULSION INTRAVENOUS at 11:12

## 2023-09-06 RX ADMIN — SODIUM CHLORIDE, SODIUM LACTATE, POTASSIUM CHLORIDE, AND CALCIUM CHLORIDE: .6; .31; .03; .02 INJECTION, SOLUTION INTRAVENOUS at 10:51

## 2023-09-06 NOTE — ANESTHESIA PREPROCEDURE EVALUATION
Procedure:  EGD    Relevant Problems   ANESTHESIA (within normal limits)      CARDIO   (+) Chronic migraine without aura without status migrainosus, not intractable   (+) Headache, chronic migraine without aura, intractable   (+) Migraine headache   (+) Mixed hyperlipidemia   (+) Status migrainosus      ENDO   (+) Hypothyroidism      GI/HEPATIC   (+) Chronic GERD   (+) Dysphagia   (+) Esophageal reflux      /RENAL (within normal limits)      HEMATOLOGY   (+) Other specified anemias      MUSCULOSKELETAL   (+) Cervical spine arthritis   (+) Low back pain   (+) Sciatica      NEURO/PSYCH   (+) Anxiety   (+) Chronic left shoulder pain   (+) Chronic migraine without aura without status migrainosus, not intractable   (+) KYLE (generalized anxiety disorder)   (+) Headache, chronic migraine without aura, intractable   (+) Major depressive disorder, recurrent episode, in partial remission (HCC)   (+) Migraine headache   (+) Obsessive-compulsive disorder, unspecified   (+) Panic disorder without agoraphobia   (+) Seizure (HCC)   (+) Status migrainosus   (+) Tension headache, chronic      PULMONARY   (+) Asthma   (+) MYRA (obstructive sleep apnea)   (+) Obstructive sleep apnea      Musculoskeletal and Integument   (+) Chronic idiopathic urticaria      Other   (+) Chronic fatigue   (+) History of pulmonary embolism   (+) Iron deficiency      EKG 5/2021:  Normal sinus rhythm  Left axis deviation  Incomplete right bundle branch block  Nonspecific T wave abnormality  Abnormal ECG  When compared with ECG of 01-MAY-2021 20:46, (unconfirmed)  QT has shortened    Lab Results   Component Value Date    WBC 8.59 05/06/2023    HGB 12.6 05/06/2023    HCT 39.6 05/06/2023    MCV 92 05/06/2023     05/06/2023     Lab Results   Component Value Date    SODIUM 139 04/08/2023    K 4.7 04/08/2023     04/08/2023    CO2 31 04/08/2023    BUN 15 04/08/2023    CREATININE 0.98 04/08/2023    GLUC 83 05/01/2021    CALCIUM 9.8 04/08/2023     Lab Results   Component Value Date    INR 0.97 02/14/2018    PROTIME 13.2 02/14/2018     Lab Results   Component Value Date    HGBA1C 5.4 12/31/2022          Physical Exam    Airway    Mallampati score: II  TM Distance: >3 FB  Neck ROM: full     Dental   No notable dental hx     Cardiovascular  Cardiovascular exam normal    Pulmonary  Pulmonary exam normal     Other Findings        Anesthesia Plan  ASA Score- 3     Anesthesia Type- IV sedation with anesthesia with ASA Monitors. Additional Monitors:   Airway Plan:           Plan Factors-Exercise tolerance (METS): >4 METS. Chart reviewed. EKG reviewed. Existing labs reviewed. Patient summary reviewed. Induction- intravenous. Postoperative Plan-     Informed Consent- Anesthetic plan and risks discussed with patient. I personally reviewed this patient with the CRNA. Discussed and agreed on the Anesthesia Plan with the CRNA. Gelacio Bowen

## 2023-09-06 NOTE — TELEPHONE ENCOUNTER
Refill provided until annual exam. She has been using ibuprofen PRN for menstrual cramps per my last annual note.  Please remind her not to use this at the same time as her toradol

## 2023-09-06 NOTE — ANESTHESIA POSTPROCEDURE EVALUATION
Post-Op Assessment Note    CV Status:  Stable  Pain Score: 0    Pain management: adequate     Mental Status:  Arousable and sleepy   Hydration Status:  Euvolemic   PONV Controlled:  Controlled   Airway Patency:  Patent      Post Op Vitals Reviewed: Yes      Staff: CRNA         No notable events documented.     /56 (09/06/23 1121)    Temp (!) 97.2 °F (36.2 °C) (09/06/23 1121)    Pulse 64 (09/06/23 1121)   Resp 16 (09/06/23 1121)    SpO2 95 % (09/06/23 1121)

## 2023-09-06 NOTE — H&P
History and Physical - [facility]  Norma Puentes 47 y.o. female MRN: [id number]    HPI: Norma Puentes is a 47y.o. year old female who presents with GERD and dysphagia.        Review of Systems    Historical Information   Past Medical History:   Diagnosis Date   • Amenorrhea    • Anxiety    • Arthritis    • Asthma    • Back pain    • Chondromalacia of patella    • Chronic fatigue    • Colon polyp    • COPD (chronic obstructive pulmonary disease) ([facility]) Yes   • Deep vein thrombophlebitis of leg ([facility])    • Depression    • Disease of thyroid gland    • Diverticulitis    • GERD (gastroesophageal reflux disease)    • History of transfusion 2008   • HPV (human papilloma virus) infection    • Hypothyroidism    • Lumbar radiculopathy    • Migraine    • Myofascial pain syndrome    • Osteopenia    • Piriformis syndrome    • Sacroiliitis (HCC)    • Sciatica    • Seizure ([facility])    • Sleep apnea    • Spondylosis of lumbar spine    • Sprain of temporomandibular joint or ligament 2023   • Trochanteric bursitis of right hip    • Vertigo    • Vitamin D deficiency    • Weight gain 2019     Past Surgical History:   Procedure Laterality Date   • CERVIX LESION DESTRUCTION     •  SECTION     • COLONOSCOPY     • COLPOSCOPY W/ BIOPSY / CURETTAGE      Colposcopy cervix with biopsy   • LAPAROSCOPIC ENDOMETRIOSIS FULGURATION     • LAPAROSCOPY     • TOOTH EXTRACTION       Social History   Social History     Substance and Sexual Activity   Alcohol Use Not Currently     Social History     Substance and Sexual Activity   Drug Use Not Currently     Social History     Tobacco Use   Smoking Status Never   Smokeless Tobacco Never     Family History   Problem Relation Age of Onset   • Heart disease Mother    • Asthma Daughter    • Lung cancer Paternal Grandfather    • Asthma Daughter    • Colon cancer Father    • Colon cancer Maternal Grandfather    • Prostate cancer Maternal Grandfather    • Colon cancer Maternal Aunt    • No Known Problems Maternal Grandmother    • Diabetes Paternal Grandmother    • No Known Problems Maternal Aunt    • No Known Problems Maternal Aunt    • No Known Problems Maternal Aunt    • No Known Problems Paternal Aunt    • Asthma Daughter    • Psychiatric Illness Neg Hx    • Stroke Neg Hx    • Thyroid disease Neg Hx    • Substance Abuse Neg Hx    • Suicidality Neg Hx        Meds/Allergies     (Not in a hospital admission)      Allergies   Allergen Reactions   • Nuts - Food Allergy      Other reaction(s): WALNUTS   • Cephalexin    • Erythromycin Diarrhea, GI Intolerance and Vomiting   • Iodine - Food Allergy Hives   • Other      adobo   • Shellfish Allergy - Food Allergy    • Shellfish-Derived Products - Food Allergy    • Turmeric - Food Allergy Hives   • Wellbutrin [Bupropion] Seizures   • Zithromax [Azithromycin] Diarrhea     Can take name brand  Annotation - [id number]: can take brand name  Other reaction(s): Nausea/vomiting/diarrhea       Objective     /69   Pulse (!) 54   Temp (!) 96.2 °F (35.7 °C) (Temporal)   Resp 18   Ht 5' 2" (1.575 m)   Wt 72.6 kg (160 lb)   SpO2 99%   BMI 29.26 kg/m²       PHYSICAL EXAM    Gen: NAD  CV: RRR  CHEST: Clear  ABD: soft, NT/ND  EXT: no edema  Neuro: AAO      ASSESSMENT/PLAN:  This is a 47y.o. year old female here for  GERD and dysphagia.        PLAN:   Procedure: [place]

## 2023-09-08 DIAGNOSIS — R13.19 ESOPHAGEAL DYSPHAGIA: Primary | ICD-10-CM

## 2023-09-08 PROCEDURE — 88305 TISSUE EXAM BY PATHOLOGIST: CPT | Performed by: PATHOLOGY

## 2023-09-10 DIAGNOSIS — S03.40XA SPRAIN OF TEMPOROMANDIBULAR JOINT, INITIAL ENCOUNTER: ICD-10-CM

## 2023-09-10 RX ORDER — NAPROXEN 500 MG/1
500 TABLET ORAL 2 TIMES DAILY WITH MEALS
Qty: 20 TABLET | Refills: 1 | Status: SHIPPED | OUTPATIENT
Start: 2023-09-10

## 2023-09-17 DIAGNOSIS — E03.9 HYPOTHYROIDISM, UNSPECIFIED TYPE: ICD-10-CM

## 2023-09-17 RX ORDER — LEVOTHYROXINE SODIUM 0.05 MG/1
50 TABLET ORAL DAILY
Qty: 30 TABLET | Refills: 4 | Status: SHIPPED | OUTPATIENT
Start: 2023-09-17

## 2023-09-18 ENCOUNTER — TELEPHONE (OUTPATIENT)
Dept: INTERNAL MEDICINE CLINIC | Facility: CLINIC | Age: 53
End: 2023-09-18

## 2023-09-18 ENCOUNTER — APPOINTMENT (OUTPATIENT)
Dept: LAB | Age: 53
End: 2023-09-18
Payer: COMMERCIAL

## 2023-09-18 DIAGNOSIS — E78.5 HYPERLIPIDEMIA, UNSPECIFIED HYPERLIPIDEMIA TYPE: ICD-10-CM

## 2023-09-18 DIAGNOSIS — R39.9 UTI SYMPTOMS: Primary | ICD-10-CM

## 2023-09-18 DIAGNOSIS — R39.9 UTI SYMPTOMS: ICD-10-CM

## 2023-09-18 LAB
BACTERIA UR QL AUTO: ABNORMAL /HPF
BILIRUB UR QL STRIP: ABNORMAL
CLARITY UR: CLEAR
COLOR UR: ABNORMAL
GLUCOSE UR STRIP-MCNC: NEGATIVE MG/DL
HGB UR QL STRIP.AUTO: NEGATIVE
KETONES UR STRIP-MCNC: NEGATIVE MG/DL
LEUKOCYTE ESTERASE UR QL STRIP: ABNORMAL
NITRITE UR QL STRIP: POSITIVE
NON-SQ EPI CELLS URNS QL MICRO: ABNORMAL /HPF
PH UR STRIP.AUTO: 6.5 [PH]
PROT UR STRIP-MCNC: ABNORMAL MG/DL
RBC #/AREA URNS AUTO: ABNORMAL /HPF
SP GR UR STRIP.AUTO: 1.02 (ref 1–1.03)
TRANS CELLS #/AREA URNS HPF: PRESENT /[HPF]
UROBILINOGEN UR STRIP-ACNC: 8 MG/DL
WBC #/AREA URNS AUTO: ABNORMAL /HPF

## 2023-09-18 PROCEDURE — 81001 URINALYSIS AUTO W/SCOPE: CPT

## 2023-09-18 RX ORDER — CIPROFLOXACIN 250 MG/1
250 TABLET, FILM COATED ORAL EVERY 12 HOURS SCHEDULED
Qty: 14 TABLET | Refills: 0 | Status: SHIPPED | OUTPATIENT
Start: 2023-09-18 | End: 2023-09-25

## 2023-09-18 RX ORDER — ROSUVASTATIN CALCIUM 5 MG/1
TABLET, COATED ORAL
Qty: 12 TABLET | Refills: 0 | Status: SHIPPED | OUTPATIENT
Start: 2023-09-18

## 2023-09-18 NOTE — TELEPHONE ENCOUNTER
Order has been sent to her pharmacy for Cipro antibiotic please have her drop off a urine specimen at the lab before she starts the antibiotic thank you

## 2023-09-18 NOTE — TELEPHONE ENCOUNTER
Patient called and thinks she has a bladder infection and asked if you would call in cipro at [facility] on  [address] and she asked if she can drop off a urine sample at [facility]. I can call her back at [phone].   Thank you

## 2023-10-02 DIAGNOSIS — S03.40XA SPRAIN OF TEMPOROMANDIBULAR JOINT, INITIAL ENCOUNTER: ICD-10-CM

## 2023-10-02 RX ORDER — NAPROXEN 500 MG/1
500 TABLET ORAL 2 TIMES DAILY WITH MEALS
Qty: 20 TABLET | Refills: 1 | Status: SHIPPED | OUTPATIENT
Start: 2023-10-02

## 2023-10-20 DIAGNOSIS — E78.5 HYPERLIPIDEMIA, UNSPECIFIED HYPERLIPIDEMIA TYPE: ICD-10-CM

## 2023-10-20 RX ORDER — ROSUVASTATIN CALCIUM 5 MG/1
TABLET, COATED ORAL
Qty: 12 TABLET | Refills: 0 | Status: SHIPPED | OUTPATIENT
Start: 2023-10-20

## 2023-10-22 DIAGNOSIS — G43.709 CHRONIC MIGRAINE WITHOUT AURA WITHOUT STATUS MIGRAINOSUS, NOT INTRACTABLE: ICD-10-CM

## 2023-10-22 RX ORDER — GABAPENTIN 100 MG/1
CAPSULE ORAL
Qty: 180 CAPSULE | Refills: 1 | Status: SHIPPED | OUTPATIENT
Start: 2023-10-22 | End: 2023-10-31

## 2023-10-26 ENCOUNTER — HOSPITAL ENCOUNTER (OUTPATIENT)
Dept: RADIOLOGY | Facility: HOSPITAL | Age: 53
Discharge: HOME | End: 2023-10-26
Attending: INTERNAL MEDICINE
Payer: COMMERCIAL

## 2023-10-26 DIAGNOSIS — R13.19 ESOPHAGEAL DYSPHAGIA: ICD-10-CM

## 2023-10-26 PROCEDURE — 74220 X-RAY XM ESOPHAGUS 1CNTRST: CPT

## 2023-10-31 ENCOUNTER — OFFICE VISIT (OUTPATIENT)
Dept: NEUROLOGY | Facility: CLINIC | Age: 53
End: 2023-10-31
Payer: COMMERCIAL

## 2023-10-31 VITALS
HEIGHT: 62 IN | WEIGHT: 162 LBS | RESPIRATION RATE: 16 BRPM | BODY MASS INDEX: 29.81 KG/M2 | SYSTOLIC BLOOD PRESSURE: 116 MMHG | TEMPERATURE: 97.7 F | OXYGEN SATURATION: 97 % | DIASTOLIC BLOOD PRESSURE: 68 MMHG | HEART RATE: 83 BPM

## 2023-10-31 DIAGNOSIS — G43.709 CHRONIC MIGRAINE WITHOUT AURA WITHOUT STATUS MIGRAINOSUS, NOT INTRACTABLE: Primary | ICD-10-CM

## 2023-10-31 DIAGNOSIS — G44.229 CHRONIC TENSION-TYPE HEADACHE, NOT INTRACTABLE: ICD-10-CM

## 2023-10-31 PROCEDURE — 99213 OFFICE O/P EST LOW 20 MIN: CPT | Performed by: PHYSICIAN ASSISTANT

## 2023-10-31 RX ORDER — PROCHLORPERAZINE MALEATE 10 MG
TABLET ORAL
Qty: 30 TABLET | Refills: 2 | Status: SHIPPED | OUTPATIENT
Start: 2023-10-31

## 2023-10-31 RX ORDER — KETOROLAC TROMETHAMINE 10 MG/1
10 TABLET, FILM COATED ORAL EVERY 6 HOURS PRN
Qty: 30 TABLET | Refills: 1 | Status: SHIPPED | OUTPATIENT
Start: 2023-10-31

## 2023-10-31 RX ORDER — GABAPENTIN 300 MG/1
CAPSULE ORAL
Qty: 540 CAPSULE | Refills: 2 | Status: SHIPPED | OUTPATIENT
Start: 2023-10-31

## 2023-10-31 RX ORDER — ZOLMITRIPTAN 5 MG/1
TABLET, ORALLY DISINTEGRATING ORAL
Qty: 12 TABLET | Refills: 5 | Status: SHIPPED | OUTPATIENT
Start: 2023-10-31

## 2023-10-31 RX ORDER — ONDANSETRON 4 MG/1
4 TABLET, ORALLY DISINTEGRATING ORAL EVERY 6 HOURS PRN
Qty: 30 TABLET | Refills: 2 | Status: SHIPPED | OUTPATIENT
Start: 2023-10-31

## 2023-10-31 NOTE — PROGRESS NOTES
Patient ID: Kathie Marina is a 47 y.o. female. Assessment/Plan:     Diagnoses and all orders for this visit:    Chronic migraine without aura without status migrainosus, not intractable  -     gabapentin (Neurontin) 300 mg capsule; 3 tabs BID  -     ZOLMitriptan (ZOMIG-ZMT) 5 MG disintegrating tablet; DISSOLVE 1 TABLET BY MOUTH AT ONSET OF HEADACHE, MAY REPEAT AFTER TWO HOURS AS NEEDED, MAX 2 TABLETS PER 24 HOURS  -     prochlorperazine (COMPAZINE) 10 mg tablet; 1 tab q6 hours prn migraine (with cocktail)  -     ondansetron (ZOFRAN-ODT) 4 mg disintegrating tablet; Take 1 tablet (4 mg total) by mouth every 6 (six) hours as needed for nausea or vomiting  -     ketorolac (TORADOL) 10 mg tablet; Take 1 tablet (10 mg total) by mouth every 6 (six) hours as needed (migraine, back pain) Max 2-3 per week. Chronic tension-type headache, not intractable         Migraines are controlled on the current therapies. Continue 100 mg Vyepti q84 days approximately. Continue gabapentin (pt takes 1800 mg qam due to forgetting to take the med in the PM; I explained that the dose is too high to take all at once so she agreed to BID dosing of 900 mg twice daily instead; currently without s/e at the higher dose but I would prefer she separate it to 900 mg BID, she was agreeable). Migraine onset-  PRN compazine, and if that fails after about 4 hours later she takes the cocktail ketorolac, zomig, zofran. She uses excedrin for lower grade headache, max 3 per week to prevent MOH. The patient should not hesitate to call me prior to her follow up with any questions or concerns. Subjective:    HPI    Ms. Kathie Marina is a pleasant 48 yo female who is here for neurological follow-up for chronic migraine headaches. She works for a [address]. She works on the computer frequently. Migraine frequency:  As of 10/31/23- rare migraines with Vyepti, 4 low grade headaches per month.  She rarely needs the migraine cocktail and mostly uses excedrin. Some mild wear-off towards the next Vyepti infusion. As of 4/25/23: since starting vyepti infusion she reports 2 migraines per month, and approx 1 lower grade or tension headache per week for which she will take excedrin. As of 10/11/22- migraines once per week. The patient reports significant reduction of migraine headaches since starting the migraine supplement that she bought online called “migraine support formula."  She denies side effects. It has several different supplements included in including magnesium, B6, Co Q10, feverfew, niacin, Etc.  -- She takes Excedrin migraine first, then the cocktail of Zomig, Benadryl and Zofran if Excedrin fails. Waits 2 hours in between another dose of Zomig. Before starting the supplement on a regular basis she had daily migraine headaches, which typically started or worsened around 3-4 p.m. every day. She also notes improvement or reduction in migraines since increasing the gabapentin. She states gabapentin also reduces anxiety if she takes it consistently. Psychiatry recommended that she increase it. She denies side effects. Prior note:   No current headache, when she gets a headache it is at the apex at the head and bilateral frontalis regions, throbbing in sensation, associated with nausea and light sensitivity, no vomiting. No focal or lateralizing deficits. Continues to follow up with the appropriate provider for elevated TSH, and elevated microsomal antibodies recently.      Migraine duration: several hours to entire day- usually worse from 3-5 pm but she does not know why, denies stress from her job  Severity- Average pain level 8-10/10  Location- bifrontal, b/l parietal region, will become diffuse/ global  Quality- throbbing/pounding, dull/nagging  Associated sxs: nausea     Aura- none  Trigger- sunlight, sometimes stress     Meds tried:  Preventative: Gabapentin, Venlafaxine, Lyrica, Magnesium, B2, verapamil, zoloft, olanzapine, topamax, botox, aimovig, Depakote, supplementation, migraine support formula which she gets online-takes 2 capsules twice daily, vyepti 100 mg     Abortive: Rizatriptan, Sumatriptan, buprofen, Toradol, Dilaudid, Fioricet, compazine, excedrin (daily use), zomig PO, decadron, depakote taper, DHE- ineff     Last brain MRI 10/28/2019 is unremarkable. Prior documentation:  She sees ophthalmologist- Dr. Sabrina Quiñones ([facility]) with normal exam.     She finds zomig helpful, as well as ibuprofen and excedrin migraine, however she is taking these quite a bit, almost QD. She also continues a combination of prevention meds: gabapentin 600 TID, effexor 150 mg qd and topamax 150 BID. 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 (no family hx PE- hypercoagulable state as far as he is aware)  Maternal aunt with cerebral aneurysm noted at a young age. The following portions of the patient's history were reviewed and updated as appropriate: She  has a past medical history of Amenorrhea, Anxiety, Arthritis, Asthma, Back pain, Chondromalacia of patella, Chronic fatigue, Colon polyp, COPD (chronic obstructive pulmonary disease) ([address]) (Yes), Deep vein thrombophlebitis of leg ([address]), Depression, Disease of thyroid gland, Diverticulitis, GERD (gastroesophageal reflux disease), History of transfusion (01/31/2008), HPV (human papilloma virus) infection, Hypothyroidism, Lumbar radiculopathy, Migraine, Myofascial pain syndrome, Osteopenia, Piriformis syndrome, Sacroiliitis ([address]), Sciatica, Seizure ([address]), Sleep apnea, Spondylosis of lumbar spine, Sprain of temporomandibular joint or ligament (8/25/2023), Trochanteric bursitis of right hip, Vertigo, Vitamin D deficiency, and Weight gain (12/19/2019).   She   Patient Active Problem List    Diagnosis Date Noted    Arthralgia of left temporomandibular joint 08/25/2023    Dysphagia 07/12/2023    MYRA (obstructive sleep apnea) 06/06/2023    Overweight 05/09/2023 Tension headache, chronic 2023    Anxiety 2023    Iron deficiency 2023    Hypothyroidism 2023    Other specified anemias 2023    Hashimoto's thyroiditis 2021    Chronic migraine without aura without status migrainosus, not intractable 2021    Myofascial muscle pain 2021    Dysuria 2021    Seizure ([address]) 10/26/2019    Dysphasia 10/23/2019    Long-term use of high-risk medication 10/14/2019    Low back pain 2019    Personality disorder ([address]) 2019    Migraine headache 2019    Chronic idiopathic urticaria 2019    Vitamin D deficiency 2019    Shellfish allergy 2019    History of pulmonary embolism 2019    Cervical spine arthritis 2019    Mixed hyperlipidemia 2019    Obstructive sleep apnea     Chronic left shoulder pain 2019    Status migrainosus 2019    Headache, chronic migraine without aura, intractable 2019    Obsessive-compulsive disorder, unspecified 2019    Other insomnia 2018    Intervertebral disc disorder with radiculopathy of lumbar region 2018    IUD (intrauterine device) in place 2018    Major depressive disorder, recurrent episode, in partial remission ([address]) 2017    Chronic fatigue 2017    Chronic GERD 2017    KYLE (generalized anxiety disorder) 02/10/2016    Allergic rhinitis 2015    Impulse control disorder 2015    Amenorrhea 2014    Abnormal involuntary movements 2013    Panic disorder without agoraphobia 2013    Sciatica 2013    Asthma 2013    Esophageal reflux 2013     She  has a past surgical history that includes Laparoscopic endometriosis fulguration;  section; Cervix lesion destruction; Colonoscopy; LAPAROSCOPY; Tooth extraction; and Colposcopy w/ biopsy / curettage.   Her family history includes Asthma in her daughter, daughter, and daughter; Colon cancer in her father, maternal aunt, and maternal grandfather; Diabetes in her paternal grandmother; Heart disease in her mother; Lung cancer in her paternal grandfather; No Known Problems in her maternal aunt, maternal aunt, maternal aunt, maternal grandmother, and paternal aunt; Prostate cancer in her maternal grandfather. She  reports that she has never smoked. She has never used smokeless tobacco. She reports that she does not currently use alcohol. She reports that she does not currently use drugs. Current Outpatient Medications   Medication Sig Dispense Refill    acetaminophen (TYLENOL) 325 mg tablet Take 2 tablets (650 mg total) by mouth every 6 (six) hours as needed for mild pain 30 tablet 0    albuterol (2.5 mg/3 mL) 0.083 % nebulizer solution Inhale       albuterol (Proventil HFA) 90 mcg/act inhaler Inhale 2 puffs every 6 (six) hours as needed for wheezing 18 g 3    aluminum-magnesium hydroxide 200-200 MG/5ML suspension GAVISCON REGULAR STRENGTH AFTER MEALS and BEDTIME WHEN NOT FOLLOWING LPR/GERD DIET. 355 mL 11    ARIPiprazole (ABILIFY) 5 mg tablet Take 1 tablet (5 mg total) by mouth daily at bedtime 30 tablet 4    Ascorbic Acid (VITAMIN C PO) Take by mouth      azelastine (ASTELIN) 0.1 % nasal spray 1-2 sprays into each nostril 2 (two) times a day as needed for rhinitis Use in each nostril as directed 30 mL 5    DULoxetine (CYMBALTA) 60 mg delayed release capsule Take 2 capsules (120 mg total) by mouth daily 60 capsule 4    eptinezumab-jjmr (VYEPTI) IVPB Infuse 100mg IV every 90 days. Note to pharmacy: To be delivered to where patient will be having infusion: [facility].  [phone], [phone] 1 mL 3    famotidine (PEPCID) 40 MG tablet Take 1 tablet (40 mg total) by mouth daily at bedtime 30 tablet 6    gabapentin (Neurontin) 300 mg capsule 3 tabs  capsule 2    ibuprofen (MOTRIN) 800 mg tablet Take 1 tablet (800 mg total) by mouth every 8 (eight) hours as needed for mild pain 30 tablet 0    ipratropium (ATROVENT) 0.06 % nasal spray 2 sprays into each nostril 4 (four) times a day 15 mL 12    ketorolac (TORADOL) 10 mg tablet Take 1 tablet (10 mg total) by mouth every 6 (six) hours as needed (migraine, back pain) Max 2-3 per week.  30 tablet 1    lamoTRIgine (LaMICtal) 100 mg tablet Take 1 tablet (100 mg total) by mouth daily at bedtime 30 tablet 4    levocetirizine (XYZAL) 5 MG tablet Take 5 mg by mouth every evening      levonorgestrel (MIRENA) 20 MCG/24HR IUD by Intrauterine route      levothyroxine 50 mcg tablet TAKE 1 TABLET(50 MCG) BY MOUTH DAILY 30 tablet 4    metFORMIN (GLUCOPHAGE-XR) 500 mg 24 hr tablet Take 1 tablet (500 mg total) by mouth daily with dinner 90 tablet 0    mometasone (NASONEX) 50 mcg/act nasal spray       Multiple Vitamins-Minerals (ZINC PO) Take by mouth      naltrexone (REVIA) 50 mg tablet Take 1 tablet (50 mg total) by mouth daily 30 tablet 4    naproxen (NAPROSYN) 500 mg tablet TAKE 1 TABLET(500 MG) BY MOUTH TWICE DAILY WITH MEALS 20 tablet 1    ondansetron (ZOFRAN-ODT) 4 mg disintegrating tablet Take 1 tablet (4 mg total) by mouth every 6 (six) hours as needed for nausea or vomiting 30 tablet 2    pantoprazole (PROTONIX) 40 mg tablet Take 1 tablet (40 mg total) by mouth daily 30 tablet 6    prochlorperazine (COMPAZINE) 10 mg tablet 1 tab q6 hours prn migraine (with cocktail) 30 tablet 2    rosuvastatin (CRESTOR) 5 mg tablet TAKE 1 TABLET BY MOUTH MONDAY WEDNESDAY AND FRIDAY IN THE EVENING 12 tablet 0    tiotropium (SPIRIVA RESPIMAT) 1.25 MCG/ACT AERS inhaler Inhale 2 puffs daily 4 g 11    traZODone (DESYREL) 100 mg tablet Take 1-3 tablets (100-300 mg total) by mouth daily at bedtime as needed for sleep 90 tablet 4    VITAMIN D PO Take 5,000 Units by mouth      ZOLMitriptan (ZOMIG-ZMT) 5 MG disintegrating tablet DISSOLVE 1 TABLET BY MOUTH AT ONSET OF HEADACHE, MAY REPEAT AFTER TWO HOURS AS NEEDED, MAX 2 TABLETS PER 24 HOURS 12 tablet 5     No current facility-administered medications for this visit. She is allergic to nuts - food allergy, cephalexin, erythromycin, iodine - food allergy, other, shellfish allergy - food allergy, shellfish-derived products - food allergy, turmeric - food allergy, wellbutrin [bupropion], and zithromax [azithromycin]. .         Objective:    Blood pressure 116/68, pulse 83, temperature 97.7 °F (36.5 °C), temperature source Temporal, resp. rate 16, height 5' 2" (1.575 m), weight 73.5 kg (162 lb), SpO2 97 %, not currently breastfeeding. Body mass index is 29.63 kg/m². Physical Exam    Neurological Exam  On neurologic exam, the patient is alert and oriented to time and place. Speech is fluent and articulate, and the patient follows commands appropriately. Judgment and affect appear normal. Pupils are equally round and reactive to light and extraocular muscles are intact without nystagmus. Face is symmetric, and tongue, uvula, and palate are midline. Hearing is intact. Motor examination reveals intact strength throughout. Reflexes brisk t/o in all 4 extremities, non focal. Normal gait is steady. ROS:    Review of Systems   Constitutional:  Negative for appetite change, fatigue and fever. HENT: Negative. Negative for hearing loss, tinnitus, trouble swallowing and voice change. Eyes: Negative. Negative for photophobia, pain and visual disturbance. Respiratory: Negative. Negative for shortness of breath. Cardiovascular: Negative. Negative for palpitations. Gastrointestinal: Negative. Negative for nausea and vomiting. Endocrine: Negative. Negative for cold intolerance. Genitourinary: Negative. Negative for dysuria, frequency and urgency. Musculoskeletal:  Negative for back pain, gait problem, myalgias and neck pain. Skin: Negative. Negative for rash. Allergic/Immunologic: Negative. Neurological: Negative.   Negative for dizziness, tremors, seizures, syncope, facial asymmetry, speech difficulty, weakness, light-headedness, numbness and headaches. Hematological: Negative. Does not bruise/bleed easily. Psychiatric/Behavioral: Negative. Negative for confusion, hallucinations and sleep disturbance. ROS reviewed.

## 2023-11-03 ENCOUNTER — HOSPITAL ENCOUNTER (OUTPATIENT)
Dept: INFUSION CENTER | Facility: CLINIC | Age: 53
End: 2023-11-03
Payer: COMMERCIAL

## 2023-11-03 VITALS
RESPIRATION RATE: 18 BRPM | HEART RATE: 80 BPM | SYSTOLIC BLOOD PRESSURE: 145 MMHG | DIASTOLIC BLOOD PRESSURE: 82 MMHG | TEMPERATURE: 99.1 F

## 2023-11-03 DIAGNOSIS — G43.709 CHRONIC MIGRAINE WITHOUT AURA WITHOUT STATUS MIGRAINOSUS, NOT INTRACTABLE: Primary | ICD-10-CM

## 2023-11-03 PROCEDURE — 96365 THER/PROPH/DIAG IV INF INIT: CPT

## 2023-11-03 RX ORDER — SODIUM CHLORIDE 9 MG/ML
20 INJECTION, SOLUTION INTRAVENOUS ONCE
OUTPATIENT
Start: 2024-01-26

## 2023-11-03 RX ORDER — SODIUM CHLORIDE 9 MG/ML
20 INJECTION, SOLUTION INTRAVENOUS ONCE
Status: COMPLETED | OUTPATIENT
Start: 2023-11-03 | End: 2023-11-03

## 2023-11-03 RX ADMIN — SODIUM CHLORIDE 20 ML/HR: 0.9 INJECTION, SOLUTION INTRAVENOUS at 08:36

## 2023-11-03 RX ADMIN — EPTINEZUMAB-JJMR 100 MG: 100 INJECTION INTRAVENOUS at 09:04

## 2023-11-03 NOTE — PROGRESS NOTES
Patient tolerated treatment without complication. Next appointment made, AVS declined, patient left unit in stable condition.

## 2023-11-09 DIAGNOSIS — G43.709 CHRONIC MIGRAINE WITHOUT AURA WITHOUT STATUS MIGRAINOSUS, NOT INTRACTABLE: Primary | ICD-10-CM

## 2023-11-13 DIAGNOSIS — S03.40XA SPRAIN OF TEMPOROMANDIBULAR JOINT, INITIAL ENCOUNTER: ICD-10-CM

## 2023-11-13 RX ORDER — NAPROXEN 500 MG/1
500 TABLET ORAL 2 TIMES DAILY WITH MEALS
Qty: 20 TABLET | Refills: 1 | Status: SHIPPED | OUTPATIENT
Start: 2023-11-13

## 2023-11-14 ENCOUNTER — ANNUAL EXAM (OUTPATIENT)
Dept: OBGYN CLINIC | Facility: CLINIC | Age: 53
End: 2023-11-14
Payer: COMMERCIAL

## 2023-11-14 VITALS
DIASTOLIC BLOOD PRESSURE: 64 MMHG | SYSTOLIC BLOOD PRESSURE: 100 MMHG | BODY MASS INDEX: 28.53 KG/M2 | HEIGHT: 63 IN | WEIGHT: 161 LBS

## 2023-11-14 DIAGNOSIS — Z12.31 ENCOUNTER FOR SCREENING MAMMOGRAM FOR MALIGNANT NEOPLASM OF BREAST: ICD-10-CM

## 2023-11-14 DIAGNOSIS — N94.6 DYSMENORRHEA: ICD-10-CM

## 2023-11-14 DIAGNOSIS — Z01.419 WELL WOMAN EXAM WITH ROUTINE GYNECOLOGICAL EXAM: Primary | ICD-10-CM

## 2023-11-14 PROCEDURE — S0612 ANNUAL GYNECOLOGICAL EXAMINA: HCPCS | Performed by: PHYSICIAN ASSISTANT

## 2023-11-14 RX ORDER — IBUPROFEN 800 MG/1
800 TABLET ORAL EVERY 8 HOURS PRN
Qty: 30 TABLET | Refills: 2 | Status: SHIPPED | OUTPATIENT
Start: 2023-11-14

## 2023-11-14 NOTE — PROGRESS NOTES
Patient presents for a routine annual visit  LMP- Implant   Last Pap Smear- 2021 -/-   Mammogram- 2022  Referral in system   Colonoscopy- 2022  3 year recall     nonsmoker  Currently sexually active - one partner - w.o pain some dryness   No family history of uterine, ovarian, cervical or breast cancer  No concerns/questions for today's visit

## 2023-11-14 NOTE — PROGRESS NOTES
Assessment   47 y.o. [id number] presenting for annual exam.     Plan   Diagnoses and all orders for this visit:    Well woman exam with routine gynecological exam    Encounter for screening mammogram for malignant neoplasm of breast    Dysmenorrhea  -     ibuprofen (MOTRIN) 800 mg tablet; Take 1 tablet (800 mg total) by mouth every 8 (eight) hours as needed for mild pain        Pap due next year  Mammo slip reprinted; advised to call to scheduled screening mammo and ABUS which she plans to do today   Colonoscopy up to date   Contraception- Mirena IUD in place   Dysmenorrhea- improved since Mirena placement. Uses ibuprofen PRN/ refills provided   Vaginal dryness- We reviewed various tx options to include use of lubricant (water and silicone based), coconut oil, hyaluronic acid suppository, vitamin e suppository, and topical estrogen cream. Pt is interested in trying these OTC measures and will schedule f/u if not relieved with this. Perineal hygiene reviewed. Weight bearing exercises minium of 150 mins/weekly advised. Kegel exercises recommended. SBE encouraged, A yearly mammogram is recommended for breast cancer screening starting at age 36. ASCCP guidelines reviewed. Calcium/ Vit D dietary requirements discussed. Advised to call with any issues, all concerns & questions addressed. See provided information in your after visit summary     F/U Annually and PRN    Results will be released to [facility], if abnormal will call or message to review and discuss treatment plan.     __________________________________________________________________    Ember Alexandre is a 47 y.o. [id number] presenting for annual exam.     Her only concern today is persistent vaginal dryness with intercourse. Using coconut oil for lubrication. SCREENING  Last Pap: 6/2/2021 neg/neg  Last Mammo: 09/13/2022 right diagnostic mammo birads 2; overdue for b/l screening mammo; extreme dense breast tissue;  Lifetime risk 15%  Last Colonoscopy: 2022 3 year recall      GYN  Menses absent with Mirena in place    Sexually active: Yes - single partner - male  Contraception: Mirena IUD placed 21    Hx Abnormal pap: denies  We reviewed ASCCP guidelines for Pap testing today. Denies vaginal discharge, itching, odor, dyspareunia, pelvic pain and vulvar/vaginal symptoms      OB           Complaints: denies   Denies urgency, frequency, hematuria, leakage / change in stream, difficulty urinating. BREAST  Complaints: denies   Denies: breast lump, breast tenderness, nipple discharge, skin color change, and skin lesion(s)      Pertinent Family Hx:   Family hx of breast cancer: no  Family hx of ovarian cancer: no  Family hx of colon cancer: father, maternal aunt, maternal grandfather       GENERAL  PMH reviewed/updated and is as below. Patient does follow with a PCP.     SOCIAL  Smoking: no  Alcohol:no  Drug: no  Occupation: title work       Past Medical History:   Diagnosis Date    Amenorrhea     Anemia 2023    Anxiety     Arthritis     Asthma     Back pain     Chondromalacia of patella     Chronic fatigue     Colon polyp     COPD (chronic obstructive pulmonary disease) (HCC) Yes    Deep vein thrombophlebitis of leg (HCC)     Depression     Disease of thyroid gland     Diverticulitis     GERD (gastroesophageal reflux disease)     History of transfusion 2008    HPV (human papilloma virus) infection     Hypothyroidism     Lumbar radiculopathy     Migraine     Myofascial pain syndrome     Osteopenia     Piriformis syndrome     Sacroiliitis (HCC)     Sciatica     Seizure (HCC)     Sleep apnea     Spondylosis of lumbar spine     Sprain of temporomandibular joint or ligament 2023    Trochanteric bursitis of right hip     Vertigo     Vitamin D deficiency     Weight gain 2019       Past Surgical History:   Procedure Laterality Date    CERVIX LESION DESTRUCTION       SECTION      COLONOSCOPY      COLPOSCOPY W/ BIOPSY / CURETTAGE      Colposcopy cervix with biopsy    LAPAROSCOPIC ENDOMETRIOSIS FULGURATION      LAPAROSCOPY      TOOTH EXTRACTION           Current Outpatient Medications:     acetaminophen (TYLENOL) 325 mg tablet, Take 2 tablets (650 mg total) by mouth every 6 (six) hours as needed for mild pain, Disp: 30 tablet, Rfl: 0    albuterol (2.5 mg/3 mL) 0.083 % nebulizer solution, Inhale , Disp: , Rfl:     albuterol (Proventil HFA) 90 mcg/act inhaler, Inhale 2 puffs every 6 (six) hours as needed for wheezing, Disp: 18 g, Rfl: 3    aluminum-magnesium hydroxide 200-200 MG/5ML suspension, GAVISCON REGULAR STRENGTH AFTER MEALS and BEDTIME WHEN NOT FOLLOWING LPR/GERD DIET., Disp: 355 mL, Rfl: 11    ARIPiprazole (ABILIFY) 5 mg tablet, Take 1 tablet (5 mg total) by mouth daily at bedtime, Disp: 30 tablet, Rfl: 4    Ascorbic Acid (VITAMIN C PO), Take by mouth, Disp: , Rfl:     azelastine (ASTELIN) 0.1 % nasal spray, 1-2 sprays into each nostril 2 (two) times a day as needed for rhinitis Use in each nostril as directed, Disp: 30 mL, Rfl: 5    DULoxetine (CYMBALTA) 60 mg delayed release capsule, Take 2 capsules (120 mg total) by mouth daily, Disp: 60 capsule, Rfl: 4    eptinezumab-jjmr (VYEPTI) IVPB, Infuse 100mg IV every 90 days. Note to pharmacy: To be delivered to where patient will be having infusion: [facility].  Rnkot-[phone], rpk-[phone], Disp: 1 mL, Rfl: 3    famotidine (PEPCID) 40 MG tablet, Take 1 tablet (40 mg total) by mouth daily at bedtime, Disp: 30 tablet, Rfl: 6    gabapentin (Neurontin) 300 mg capsule, 3 tabs BID, Disp: 540 capsule, Rfl: 2    ibuprofen (MOTRIN) 800 mg tablet, Take 1 tablet (800 mg total) by mouth every 8 (eight) hours as needed for mild pain, Disp: 30 tablet, Rfl: 0    ipratropium (ATROVENT) 0.06 % nasal spray, 2 sprays into each nostril 4 (four) times a day, Disp: 15 mL, Rfl: 12    ketorolac (TORADOL) 10 mg tablet, Take 1 tablet (10 mg total) by mouth every 6 (six) hours as needed (migraine, back pain) Max 2-3 per week., Disp: 30 tablet, Rfl: 1    lamoTRIgine (LaMICtal) 100 mg tablet, Take 1 tablet (100 mg total) by mouth daily at bedtime, Disp: 30 tablet, Rfl: 4    levocetirizine (XYZAL) 5 MG tablet, Take 5 mg by mouth every evening, Disp: , Rfl:     levonorgestrel (MIRENA) 20 MCG/24HR IUD, by Intrauterine route, Disp: , Rfl:     levothyroxine 50 mcg tablet, TAKE 1 TABLET(50 MCG) BY MOUTH DAILY, Disp: 30 tablet, Rfl: 4    metFORMIN (GLUCOPHAGE-XR) 500 mg 24 hr tablet, Take 1 tablet (500 mg total) by mouth daily with dinner, Disp: 90 tablet, Rfl: 0    mometasone (NASONEX) 50 mcg/act nasal spray, , Disp: , Rfl:     Multiple Vitamins-Minerals (ZINC PO), Take by mouth, Disp: , Rfl:     naltrexone (REVIA) 50 mg tablet, Take 1 tablet (50 mg total) by mouth daily, Disp: 30 tablet, Rfl: 4    naproxen (NAPROSYN) 500 mg tablet, TAKE 1 TABLET(500 MG) BY MOUTH TWICE DAILY WITH MEALS, Disp: 20 tablet, Rfl: 1    ondansetron (ZOFRAN-ODT) 4 mg disintegrating tablet, Take 1 tablet (4 mg total) by mouth every 6 (six) hours as needed for nausea or vomiting, Disp: 30 tablet, Rfl: 2    pantoprazole (PROTONIX) 40 mg tablet, Take 1 tablet (40 mg total) by mouth daily, Disp: 30 tablet, Rfl: 6    prochlorperazine (COMPAZINE) 10 mg tablet, 1 tab q6 hours prn migraine (with cocktail), Disp: 30 tablet, Rfl: 2    rosuvastatin (CRESTOR) 5 mg tablet, TAKE 1 TABLET BY MOUTH MONDAY WEDNESDAY AND FRIDAY IN THE EVENING, Disp: 12 tablet, Rfl: 0    tiotropium (SPIRIVA RESPIMAT) 1.25 MCG/ACT AERS inhaler, Inhale 2 puffs daily, Disp: 4 g, Rfl: 11    traZODone (DESYREL) 100 mg tablet, Take 1-3 tablets (100-300 mg total) by mouth daily at bedtime as needed for sleep, Disp: 90 tablet, Rfl: 4    VITAMIN D PO, Take 5,000 Units by mouth, Disp: , Rfl:     ZOLMitriptan (ZOMIG-ZMT) 5 MG disintegrating tablet, DISSOLVE 1 TABLET BY MOUTH AT ONSET OF HEADACHE, MAY REPEAT AFTER TWO HOURS AS NEEDED, MAX 2 TABLETS PER 24 HOURS, Disp: 12 tablet, Rfl: 5    Allergies   Allergen Reactions    Nuts - Food Allergy      Other reaction(s): WALNUTS    Cephalexin     Erythromycin Diarrhea, GI Intolerance and Vomiting    Iodine - Food Allergy Hives    Other      adobo    Shellfish Allergy - Food Allergy     Shellfish-Derived Products - Food Allergy     Turmeric - Food Allergy Hives    Wellbutrin [Bupropion] Seizures    Zithromax [Azithromycin] Diarrhea     Can take name brand  Annotation - [id number]: can take brand name  Other reaction(s): Nausea/vomiting/diarrhea       Social History     Socioeconomic History    Marital status: /Civil Union     Spouse name: Gilbert Valdovinos    Number of children: 2    Years of education: 12    Highest education level: High school graduate   Occupational History    Occupation: works for [employer]   Tobacco Use    Smoking status: Never    Smokeless tobacco: Never   Vaping Use    Vaping Use: Never used   Substance and Sexual Activity    Alcohol use: Not Currently    Drug use: Never    Sexual activity: Yes     Partners: Male     Birth control/protection: I.U.D.      Comment: Mirena   Other Topics Concern    Not on file   Social History Narrative    Education: high school graduate    Learning Disabilities: none    Marital History:     Children: 2 daughters    Living Arrangement: lives in home with  and daughter    Occupational History: works for [employer], also worked as a  and as an  in the past    [address]: good support system    Legal History: none     History: None        Unsure of age of house    Heating system gas forced hot air    Central     Bedroom is carpeted    Humidifier in living [address] in living room    No basement    No dehumidifier,            Pets - 1 Onel pig, [place]            Caffeine - none in beverages     Chocolate - 3 times a week       Social Determinants of Health Financial Resource Strain: Low Risk  (6/22/2023)    Overall Financial Resource Strain (CARDIA)     Difficulty of Paying Living Expenses: Not hard at all   Food Insecurity: No Food Insecurity (6/22/2023)    Hunger Vital Sign     Worried About Running Out of Food in the Last Year: Never true     Ran Out of Food in the Last Year: Never true   Transportation Needs: No Transportation Needs (6/22/2023)    PRAPARE - Transportation     Lack of Transportation (Medical): No     Lack of Transportation (Non-Medical): No   Physical Activity: Insufficiently Active (9/12/2023)    Exercise Vital Sign     Days of Exercise per Week: 7 days     Minutes of Exercise per Session: 10 min   Stress: Stress Concern Present (6/22/2023)    [address] [facility]     Feeling of Stress : To some extent   Social Connections: Moderately Isolated (6/22/2023)    Social Connection and Isolation Panel [NHANES]     Frequency of Communication with Friends and Family: Once a week     Frequency of Social Gatherings with Friends and Family: Once a week     Attends Mandaeism Services: More than 4 times per year     Active Member of [employer] Group or Organizations: No     Attends Club or Organization Meetings: Never     Marital Status:    Intimate Partner Violence: Not At Risk (6/22/2023)    Humiliation, Afraid, Rape, and Kick questionnaire     Fear of Current or Ex-Partner: No     Emotionally Abused: No     Physically Abused: No     Sexually Abused: No   Housing Stability: Low Risk  (6/22/2023)    Housing Stability Vital Sign     Unable to Pay for Housing in the Last Year: No     Number of [address] in the Last Year: 1     Unstable Housing in the Last Year: No       Review of Systems     ROS:  Constitutional: Negative for fatigue and unexpected weight change. Respiratory: Negative for cough and shortness of breath. Cardiovascular: Negative for chest pain and palpitations.    Gastrointestinal: Negative for abdominal pain and change in bowel habits  Breasts:  Negative, other than as noted above. Genitourinary: Negative, other than as noted above. Psychiatric: Negative for mood difficulties. Objective        Vitals:    11/14/23 0723   BP: 100/64       Physical Examination:    Patient appears well and is not in distress  Neck is supple without masses, no cervical or supraclavicular lymphadenopathy  Cardiovascular: regular rate and rhythm; no murmurs  Lungs: clear to auscultation bilaterally; no wheezes  Breasts are symmetrical without mass, tenderness, nipple discharge, skin changes or adenopathy. Abdomen is soft and nontender without masses. External genitals are normal without lesions or rashes. Urethral meatus and urethra are normal  Bladder is normal to palpation  Vagina is normal without discharge or bleeding. Cervix is normal without discharge or lesion. + IUD strings   Uterus is normal, mobile, nontender without palpable mass. Adnexa are normal, nontender, without palpable mass.

## 2023-11-15 DIAGNOSIS — E78.5 HYPERLIPIDEMIA, UNSPECIFIED HYPERLIPIDEMIA TYPE: ICD-10-CM

## 2023-11-15 RX ORDER — ROSUVASTATIN CALCIUM 5 MG/1
TABLET, COATED ORAL
Qty: 12 TABLET | Refills: 0 | Status: SHIPPED | OUTPATIENT
Start: 2023-11-15

## 2023-11-21 DIAGNOSIS — F41.1 GAD (GENERALIZED ANXIETY DISORDER): Chronic | ICD-10-CM

## 2023-11-21 DIAGNOSIS — F33.41 MAJOR DEPRESSIVE DISORDER, RECURRENT EPISODE, IN PARTIAL REMISSION (HCC): Primary | Chronic | ICD-10-CM

## 2023-11-21 RX ORDER — DULOXETIN HYDROCHLORIDE 60 MG/1
120 CAPSULE, DELAYED RELEASE ORAL DAILY
Qty: 60 CAPSULE | Refills: 0 | Status: SHIPPED | OUTPATIENT
Start: 2023-11-21 | End: 2023-12-21

## 2023-12-03 DIAGNOSIS — S03.40XA SPRAIN OF TEMPOROMANDIBULAR JOINT, INITIAL ENCOUNTER: ICD-10-CM

## 2023-12-03 RX ORDER — NAPROXEN 500 MG/1
500 TABLET ORAL 2 TIMES DAILY WITH MEALS
Qty: 20 TABLET | Refills: 1 | Status: SHIPPED | OUTPATIENT
Start: 2023-12-03

## 2023-12-17 DIAGNOSIS — E78.5 HYPERLIPIDEMIA, UNSPECIFIED HYPERLIPIDEMIA TYPE: ICD-10-CM

## 2023-12-17 RX ORDER — ROSUVASTATIN CALCIUM 5 MG/1
TABLET, COATED ORAL
Qty: 12 TABLET | Refills: 0 | Status: SHIPPED | OUTPATIENT
Start: 2023-12-17

## 2023-12-19 DIAGNOSIS — F33.41 MAJOR DEPRESSIVE DISORDER, RECURRENT EPISODE, IN PARTIAL REMISSION (HCC): Primary | Chronic | ICD-10-CM

## 2023-12-19 DIAGNOSIS — F41.1 GAD (GENERALIZED ANXIETY DISORDER): Chronic | ICD-10-CM

## 2023-12-19 RX ORDER — DULOXETIN HYDROCHLORIDE 60 MG/1
120 CAPSULE, DELAYED RELEASE ORAL DAILY
Qty: 60 CAPSULE | Refills: 1 | Status: SHIPPED | OUTPATIENT
Start: 2023-12-19 | End: 2024-02-17

## 2023-12-19 NOTE — TELEPHONE ENCOUNTER
Medication Refill Request     Name of Medication Duloxentine  Dose/Frequency 60 mg  Quantity 60  Verified pharmacy   [x]  Verified ordering Provider   [x]  Does patient have enough for the next 3 days? Yes [] No [x]  Does patient have a follow-up appointment scheduled? Yes [x] No []   If so when is appointment: 2/8/24

## 2023-12-22 DIAGNOSIS — S03.40XA SPRAIN OF TEMPOROMANDIBULAR JOINT, INITIAL ENCOUNTER: ICD-10-CM

## 2023-12-22 RX ORDER — NAPROXEN 500 MG/1
500 TABLET ORAL 2 TIMES DAILY WITH MEALS
Qty: 20 TABLET | Refills: 1 | Status: SHIPPED | OUTPATIENT
Start: 2023-12-22

## 2023-12-23 DIAGNOSIS — G47.09 OTHER INSOMNIA: Primary | Chronic | ICD-10-CM

## 2023-12-25 RX ORDER — TRAZODONE HYDROCHLORIDE 100 MG/1
100-300 TABLET ORAL
Qty: 90 TABLET | Refills: 1 | Status: SHIPPED | OUTPATIENT
Start: 2023-12-25 | End: 2024-02-23

## 2023-12-27 ENCOUNTER — APPOINTMENT (OUTPATIENT)
Dept: LAB | Age: 53
End: 2023-12-27
Payer: COMMERCIAL

## 2023-12-27 ENCOUNTER — TELEPHONE (OUTPATIENT)
Dept: INTERNAL MEDICINE CLINIC | Facility: CLINIC | Age: 53
End: 2023-12-27

## 2023-12-27 DIAGNOSIS — G47.419 NARCOLEPSY WITHOUT CATAPLEXY: ICD-10-CM

## 2023-12-27 DIAGNOSIS — R53.82 CHRONIC FATIGUE: ICD-10-CM

## 2023-12-27 DIAGNOSIS — R39.9 UTI SYMPTOMS: Primary | ICD-10-CM

## 2023-12-27 DIAGNOSIS — E78.5 HYPERLIPIDEMIA, UNSPECIFIED HYPERLIPIDEMIA TYPE: ICD-10-CM

## 2023-12-27 DIAGNOSIS — E03.9 HYPOTHYROIDISM, UNSPECIFIED TYPE: ICD-10-CM

## 2023-12-27 DIAGNOSIS — R39.9 UTI SYMPTOMS: ICD-10-CM

## 2023-12-27 LAB
BACTERIA UR QL AUTO: ABNORMAL /HPF
BILIRUB UR QL STRIP: ABNORMAL
CLARITY UR: CLEAR
COLOR UR: ABNORMAL
EST. AVERAGE GLUCOSE BLD GHB EST-MCNC: 120 MG/DL
GLUCOSE UR STRIP-MCNC: NEGATIVE MG/DL
HBA1C MFR BLD: 5.8 %
HGB UR QL STRIP.AUTO: NEGATIVE
KETONES UR STRIP-MCNC: NEGATIVE MG/DL
LEUKOCYTE ESTERASE UR QL STRIP: ABNORMAL
NITRITE UR QL STRIP: POSITIVE
NON-SQ EPI CELLS URNS QL MICRO: ABNORMAL /HPF
PH UR STRIP.AUTO: 6 [PH]
PROT UR STRIP-MCNC: NEGATIVE MG/DL
RBC #/AREA URNS AUTO: ABNORMAL /HPF
SP GR UR STRIP.AUTO: 1.02 (ref 1–1.03)
T4 FREE SERPL-MCNC: 2.14 NG/DL (ref 0.61–1.12)
TSH SERPL DL<=0.05 MIU/L-ACNC: 2.65 UIU/ML (ref 0.45–4.5)
UROBILINOGEN UR STRIP-ACNC: 3 MG/DL
WBC #/AREA URNS AUTO: ABNORMAL /HPF

## 2023-12-27 PROCEDURE — 87086 URINE CULTURE/COLONY COUNT: CPT

## 2023-12-27 PROCEDURE — 81001 URINALYSIS AUTO W/SCOPE: CPT

## 2023-12-27 PROCEDURE — 83036 HEMOGLOBIN GLYCOSYLATED A1C: CPT

## 2023-12-27 PROCEDURE — 84443 ASSAY THYROID STIM HORMONE: CPT

## 2023-12-27 PROCEDURE — 36415 COLL VENOUS BLD VENIPUNCTURE: CPT

## 2023-12-27 PROCEDURE — 84439 ASSAY OF FREE THYROXINE: CPT

## 2023-12-27 RX ORDER — CIPROFLOXACIN 250 MG/1
250 TABLET, FILM COATED ORAL EVERY 12 HOURS SCHEDULED
Qty: 14 TABLET | Refills: 0 | Status: SHIPPED | OUTPATIENT
Start: 2023-12-27 | End: 2024-01-03

## 2023-12-27 NOTE — TELEPHONE ENCOUNTER
Please let the patient know that I have ordered a urinalysis she just has to go to any [facility] laboratory.  After she submits the specimen for the culture she should go to her pharmacy and I have ordered Cipro 250 mg that should be taken twice a day for period of 7 days.  She should drink extra fluids while she is on the antibiotic therapy.  The prescription went to her [facility].  Thank you

## 2023-12-27 NOTE — TELEPHONE ENCOUNTER
Hi Dr. Montilla,   Patient called and stated that she woke up with a bladder infection. She is asking if you could send an antibiotic to her pharmacy and a urine test and culture so that [facility] Patient can be reached at: [phone]. Thank you.

## 2023-12-29 LAB — BACTERIA UR CULT: NORMAL

## 2024-01-11 ENCOUNTER — TELEPHONE (OUTPATIENT)
Dept: OBGYN CLINIC | Facility: CLINIC | Age: 54
End: 2024-01-11

## 2024-01-11 DIAGNOSIS — B37.31 YEAST INFECTION OF THE VAGINA: Primary | ICD-10-CM

## 2024-01-11 RX ORDER — FLUCONAZOLE 150 MG/1
TABLET ORAL
Qty: 2 TABLET | Refills: 0 | Status: SHIPPED | OUTPATIENT
Start: 2024-01-11 | End: 2024-11-14

## 2024-01-11 NOTE — TELEPHONE ENCOUNTER
Patient recently had a UTI. Finished antibiotics and now believes she has a yeast infection. Complains of vaginal itching.

## 2024-01-11 NOTE — TELEPHONE ENCOUNTER
On 12/27 , pt had Cipro from pcp and now she has itching inside and outside of vagina. Pt allergic to otc for yeast.  I pended 2 diflucan.  Thanks

## 2024-01-19 DIAGNOSIS — E78.5 HYPERLIPIDEMIA, UNSPECIFIED HYPERLIPIDEMIA TYPE: ICD-10-CM

## 2024-01-19 RX ORDER — ROSUVASTATIN CALCIUM 5 MG/1
TABLET, COATED ORAL
Qty: 12 TABLET | Refills: 0 | Status: SHIPPED | OUTPATIENT
Start: 2024-01-19

## 2024-01-26 ENCOUNTER — HOSPITAL ENCOUNTER (OUTPATIENT)
Dept: INFUSION CENTER | Facility: CLINIC | Age: 54
End: 2024-01-26
Payer: COMMERCIAL

## 2024-01-26 VITALS
SYSTOLIC BLOOD PRESSURE: 119 MMHG | TEMPERATURE: 97.8 F | DIASTOLIC BLOOD PRESSURE: 73 MMHG | HEART RATE: 81 BPM | RESPIRATION RATE: 18 BRPM

## 2024-01-26 DIAGNOSIS — G43.709 CHRONIC MIGRAINE WITHOUT AURA WITHOUT STATUS MIGRAINOSUS, NOT INTRACTABLE: Primary | ICD-10-CM

## 2024-01-26 PROCEDURE — 96365 THER/PROPH/DIAG IV INF INIT: CPT

## 2024-01-26 RX ORDER — SODIUM CHLORIDE 9 MG/ML
20 INJECTION, SOLUTION INTRAVENOUS ONCE
OUTPATIENT
Start: 2024-04-19

## 2024-01-26 RX ORDER — SODIUM CHLORIDE 9 MG/ML
20 INJECTION, SOLUTION INTRAVENOUS ONCE
Status: COMPLETED | OUTPATIENT
Start: 2024-01-26 | End: 2024-01-26

## 2024-01-26 RX ADMIN — EPTINEZUMAB-JJMR 100 MG: 100 INJECTION INTRAVENOUS at 08:54

## 2024-01-26 RX ADMIN — SODIUM CHLORIDE 20 ML/HR: 0.9 INJECTION, SOLUTION INTRAVENOUS at 08:33

## 2024-01-26 NOTE — PROGRESS NOTES
Pt arrives without any new complaints.  No signs of infections at this time. Headaches have been well controlled.  Tolerated vyepti well today without complications. Made next appt on 4-19-24 0800, appt desk printed and provided.

## 2024-01-31 NOTE — PSYCH
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 Plan:    Completed and signed during the session: Yes - with Kaylah    Note Share:    This note was shared with patient.    Visit Time    Visit Start Time: 2:30 PM  Visit Stop Time: 3:00 PM  Total Visit Duration:  30 minutes    Rosa Lawler MD 02/08/24

## 2024-02-08 ENCOUNTER — OFFICE VISIT (OUTPATIENT)
Dept: PSYCHIATRY | Facility: CLINIC | Age: 54
End: 2024-02-08
Payer: COMMERCIAL

## 2024-02-08 VITALS
DIASTOLIC BLOOD PRESSURE: 60 MMHG | BODY MASS INDEX: 29.77 KG/M2 | WEIGHT: 168 LBS | HEART RATE: 97 BPM | HEIGHT: 63 IN | SYSTOLIC BLOOD PRESSURE: 109 MMHG

## 2024-02-08 DIAGNOSIS — Z79.899 LONG-TERM USE OF HIGH-RISK MEDICATION: Chronic | ICD-10-CM

## 2024-02-08 DIAGNOSIS — F41.1 GAD (GENERALIZED ANXIETY DISORDER): Chronic | ICD-10-CM

## 2024-02-08 DIAGNOSIS — F42.9 OBSESSIVE-COMPULSIVE DISORDER, UNSPECIFIED TYPE: Chronic | ICD-10-CM

## 2024-02-08 DIAGNOSIS — F60.9 PERSONALITY DISORDER (HCC): Chronic | ICD-10-CM

## 2024-02-08 DIAGNOSIS — F41.0 PANIC DISORDER WITHOUT AGORAPHOBIA: Chronic | ICD-10-CM

## 2024-02-08 DIAGNOSIS — F63.9 IMPULSE CONTROL DISORDER: Chronic | ICD-10-CM

## 2024-02-08 DIAGNOSIS — G47.09 OTHER INSOMNIA: Chronic | ICD-10-CM

## 2024-02-08 DIAGNOSIS — F33.42 MAJOR DEPRESSIVE DISORDER, RECURRENT EPISODE, IN FULL REMISSION (HCC): Primary | Chronic | ICD-10-CM

## 2024-02-08 PROCEDURE — 90833 PSYTX W PT W E/M 30 MIN: CPT | Performed by: PSYCHIATRY & NEUROLOGY

## 2024-02-08 PROCEDURE — 99214 OFFICE O/P EST MOD 30 MIN: CPT | Performed by: PSYCHIATRY & NEUROLOGY

## 2024-02-08 RX ORDER — ARIPIPRAZOLE 5 MG/1
5 TABLET ORAL
Qty: 30 TABLET | Refills: 4 | Status: SHIPPED | OUTPATIENT
Start: 2024-02-08 | End: 2024-07-07

## 2024-02-08 RX ORDER — DULOXETIN HYDROCHLORIDE 60 MG/1
120 CAPSULE, DELAYED RELEASE ORAL DAILY
Qty: 60 CAPSULE | Refills: 4 | Status: SHIPPED | OUTPATIENT
Start: 2024-02-08 | End: 2024-07-07

## 2024-02-08 RX ORDER — LAMOTRIGINE 100 MG/1
100 TABLET ORAL
Qty: 30 TABLET | Refills: 4 | Status: SHIPPED | OUTPATIENT
Start: 2024-02-08 | End: 2024-07-07

## 2024-02-08 RX ORDER — NALTREXONE HYDROCHLORIDE 50 MG/1
50 TABLET, FILM COATED ORAL DAILY
Qty: 30 TABLET | Refills: 4 | Status: SHIPPED | OUTPATIENT
Start: 2024-02-08 | End: 2024-07-07

## 2024-02-08 RX ORDER — TRAZODONE HYDROCHLORIDE 100 MG/1
100-300 TABLET ORAL
Qty: 90 TABLET | Refills: 4 | Status: SHIPPED | OUTPATIENT
Start: 2024-02-08 | End: 2024-07-07

## 2024-02-08 NOTE — BH TREATMENT PLAN
"TREATMENT PLAN (Medication Management Only)        [facility]    Name/Date of Birth/MRN:  Kaylah Matute 53 y.o. 1970 MRN: [id number]  Date of Treatment Plan: February 8, 2024  Diagnosis/Diagnoses:   1. Major depressive disorder, recurrent episode, in full remission (HCC)    2. KYLE (generalized anxiety disorder)    3. Obsessive-compulsive disorder, unspecified type    4. Panic disorder without agoraphobia    5. Personality disorder (HCC)    6. Impulse control disorder    7. Long-term use of high-risk medication    8. Other insomnia      Strengths/Personal Resources for Self-Care: \"I take my medications all the time\".  Area/Areas of need (in own words): \"being able to focus more\".  1. Long Term Goal:   maintain control of anxiety, maintain control of depression, help with impulse control  Target Date: 4 months - 6/8/2024  Person/Persons responsible for completion of goal: Kaylah  2.  Short Term Objective (s) - How will we reach this goal?:   A.  Provider new recommended medication/dosage changes and/or continue medication(s): continue current medications as prescribed (Cymbalta, Trazodone, Lamictal, Abilify, and Naltrexone).  B.  N/A.  C.  N/A.  Target Date: 4 months - 6/8/2024  Person/Persons Responsible for Completion of Goal: Kaylah   Progress Towards Goals: stable  Treatment Modality: medication management every 4 months, continue psychotherapy with own therapist  Review due 180 days from date of this plan: 6 months - 8/8/2024  Expected length of service: maintenance unless revised  My Physician/PA/NP and I have developed this plan together and I agree to work on the goals and objectives. I understand the treatment goals that were developed for my treatment.  Electronic Signatures: on file (unless signed below)    Rosa Lawler MD 02/08/24  "

## 2024-02-08 NOTE — BH CRISIS PLAN
Client Name: Kaylah Matute       Client YOB: 1970    [place] Safety Plan      Creation Date: 2/8/24 Update Date: 2/8/24   Created By: Rosa Lawler MD Last Updated By: Rosa Lawler MD      Step 1: Warning Signs:   Warning Signs   anxiety gets worse            Step 2: Internal Coping Strategies:   Internal Coping Strategies   paly games on my phone            Step 3: People and social settings that provide distraction:   Name Contact Information   Lavinia (friend) in my cell phone   Lucy (friend) in my cell phone            Step 4: People whom I can ask for help during a crisis:      Name Contact Information    Lavinia (friend) in my cell phone    Lucy (friend) in my cell phone      Step 5: Professionals or agencies I can contact during a crisis:      Clinican/Agency Name Phone Emergency Contact    [facility] [phone]       [facility] Emergency Department Emergency Department Phone Emergency Department Address    Counts include 234 beds at the [facility] 911         Crisis Phone Numbers:   Suicide Prevention Lifeline: Call or Text  318 Crisis Text Line: Text HOME to [phone]   Please note: Some [facility] do not have a separate number for Child/Adolescent specific crisis. If your county is not listed under Child/Adolescent, please call the adult number for your county      Adult Crisis Numbers: Child/Adolescent Crisis Numbers   [facility]: [phone] [facility]: [phone]   [facility]: [phone] [facility]: [phone]   [facility]: [phone] [place]: [phone]   [facility]: [phone] [place]: [phone]   [place]: [phone]   [facility]: [phone]   [facility]: [phone]   [facility]: [phone] (daytime) [phone] (after hours, weekends, holidays)      Step 6: Making the environment safer (plan for lethal means safety):   Plan: Gun - locked, by       Optional: What is most important to me and worth living for?   My daughter and dog     [place] Safety Plan. Teena Palomino and Minor Shelton. Used with permission of the authors.

## 2024-02-19 DIAGNOSIS — K21.9 GASTROESOPHAGEAL REFLUX DISEASE WITHOUT ESOPHAGITIS: ICD-10-CM

## 2024-02-19 DIAGNOSIS — E03.9 HYPOTHYROIDISM, UNSPECIFIED TYPE: ICD-10-CM

## 2024-02-19 RX ORDER — LEVOTHYROXINE SODIUM 0.05 MG/1
50 TABLET ORAL DAILY
Qty: 30 TABLET | Refills: 4 | Status: SHIPPED | OUTPATIENT
Start: 2024-02-19

## 2024-02-20 RX ORDER — PANTOPRAZOLE SODIUM 40 MG/1
40 TABLET, DELAYED RELEASE ORAL DAILY
Qty: 30 TABLET | Refills: 5 | Status: SHIPPED | OUTPATIENT
Start: 2024-02-20

## 2024-03-25 DIAGNOSIS — E78.5 HYPERLIPIDEMIA, UNSPECIFIED HYPERLIPIDEMIA TYPE: ICD-10-CM

## 2024-03-25 RX ORDER — ROSUVASTATIN CALCIUM 5 MG/1
5 TABLET, COATED ORAL 3 TIMES WEEKLY
Qty: 36 TABLET | Refills: 1 | Status: SHIPPED | OUTPATIENT
Start: 2024-03-25 | End: 2024-06-23

## 2024-04-17 ENCOUNTER — TELEPHONE (OUTPATIENT)
Dept: NEUROLOGY | Facility: CLINIC | Age: 54
End: 2024-04-17

## 2024-04-19 ENCOUNTER — HOSPITAL ENCOUNTER (OUTPATIENT)
Dept: INFUSION CENTER | Facility: CLINIC | Age: 54
Discharge: HOME | End: 2024-04-19
Payer: COMMERCIAL

## 2024-04-19 VITALS
DIASTOLIC BLOOD PRESSURE: 77 MMHG | SYSTOLIC BLOOD PRESSURE: 135 MMHG | RESPIRATION RATE: 18 BRPM | TEMPERATURE: 97.7 F | HEART RATE: 79 BPM

## 2024-04-19 DIAGNOSIS — G43.709 CHRONIC MIGRAINE WITHOUT AURA WITHOUT STATUS MIGRAINOSUS, NOT INTRACTABLE: Primary | ICD-10-CM

## 2024-04-19 PROCEDURE — 96365 THER/PROPH/DIAG IV INF INIT: CPT

## 2024-04-19 RX ORDER — SODIUM CHLORIDE 9 MG/ML
20 INJECTION, SOLUTION INTRAVENOUS ONCE
Status: COMPLETED | OUTPATIENT
Start: 2024-04-19 | End: 2024-04-19

## 2024-04-19 RX ORDER — SODIUM CHLORIDE 9 MG/ML
20 INJECTION, SOLUTION INTRAVENOUS ONCE
OUTPATIENT
Start: 2024-07-12

## 2024-04-19 RX ADMIN — SODIUM CHLORIDE 20 ML/HR: 0.9 INJECTION, SOLUTION INTRAVENOUS at 08:38

## 2024-04-19 RX ADMIN — EPTINEZUMAB-JJMR 100 MG: 100 INJECTION INTRAVENOUS at 08:52

## 2024-04-19 NOTE — PROGRESS NOTES
Pt arrives without any new complaints. No signs of infections at this time. Headaches have been well controlled. Tolerating vyepti well today without complications. Made next appt on 7/12/24 0800, pt declines AVS, she has MyChart

## 2024-04-23 ENCOUNTER — NURSE TRIAGE (OUTPATIENT)
Dept: OTHER | Facility: OTHER | Age: 54
End: 2024-04-23

## 2024-04-23 NOTE — TELEPHONE ENCOUNTER
As per Dr montilla Pt  can alternate Tylenol 1000mg and Naprosyn 500 mg every 4 hours. Or go into the ER for an evaluation . Pt Verbalized understanding of Dr Montilla care advise.

## 2024-04-23 NOTE — TELEPHONE ENCOUNTER
"Reason for Disposition  • Face pain present > 24 hours    Answer Assessment - Initial Assessment Questions  1. ONSET: \"When did the pain start?\" (e.g., minutes, hours, days)      Left side jaw pain with headache  2. ONSET: \"Does the pain come and go, or has it been constant since it started?\" (e.g., constant, intermittent, fleeting)      Three days  3. SEVERITY: \"How bad is the pain?\"   (Scale 1-10; mild, moderate or severe)    - MILD (1-3): doesn't interfere with normal activities     - MODERATE (4-7): interferes with normal activities or awakens from sleep     - SEVERE (8-10): excruciating pain, unable to do any normal activities       5/10 with naprosyn 3.5 hours ago. And ice. No swelling   4. LOCATION: \"Where does it hurt?\"       Lt side of the jaw  5. RASH: \"Is there any redness, rash, or swelling of the face?\"     none   6. FEVER: \"Do you have a fever?\" If Yes, ask: \"What is it, how was it measured, and when did it start?\"         7. OTHER SYMPTOMS: \"Do you have any other symptoms?\" (e.g., fever, toothache, nasal discharge, nasal congestion, clicking sensation in jaw joint)      Headache 5/10    Protocols used: Face Pain-ADULT-AH    "

## 2024-04-23 NOTE — TELEPHONE ENCOUNTER
TT to on call Dr Montilla.  Pt is calling with left sided jaw pain with headache for three days, 5/10 with naprosyn 3.5 hours ago. And ice. No swelling . Suggestions to make her more comfortable until her appt tomorrow.

## 2024-04-23 NOTE — TELEPHONE ENCOUNTER
Left message to R/S her upcoming appt with SPRING as she will be out of the office.  
Left second message to get her R/S for her upcoming appt with SPRING.  
balance training/bed mobility training/gait training/strengthening/transfer training

## 2024-04-23 NOTE — TELEPHONE ENCOUNTER
Regarding: Jaw pain  ----- Message from Kristen Sandoval sent at 4/23/2024  7:14 PM EDT -----  '' I've been having jaw pain and I was wondering if the doctor can prescribe me something for the pain.''

## 2024-04-24 ENCOUNTER — TELEMEDICINE (OUTPATIENT)
Dept: INTERNAL MEDICINE CLINIC | Facility: CLINIC | Age: 54
End: 2024-04-24
Payer: COMMERCIAL

## 2024-04-24 VITALS — BODY MASS INDEX: 27.23 KG/M2 | WEIGHT: 148 LBS | HEIGHT: 62 IN

## 2024-04-24 DIAGNOSIS — G43.709 CHRONIC MIGRAINE WITHOUT AURA WITHOUT STATUS MIGRAINOSUS, NOT INTRACTABLE: ICD-10-CM

## 2024-04-24 DIAGNOSIS — S03.00XA DISLOCATION OF TEMPOROMANDIBULAR JOINT, INITIAL ENCOUNTER: Primary | ICD-10-CM

## 2024-04-24 DIAGNOSIS — N94.6 DYSMENORRHEA: ICD-10-CM

## 2024-04-24 PROCEDURE — 99213 OFFICE O/P EST LOW 20 MIN: CPT | Performed by: INTERNAL MEDICINE

## 2024-04-24 RX ORDER — CYCLOBENZAPRINE HCL 5 MG
5 TABLET ORAL 3 TIMES DAILY
Qty: 30 TABLET | Refills: 0 | Status: SHIPPED | OUTPATIENT
Start: 2024-04-24 | End: 2024-05-02 | Stop reason: SDUPTHER

## 2024-04-24 RX ORDER — IBUPROFEN 800 MG/1
800 TABLET ORAL EVERY 8 HOURS PRN
Qty: 30 TABLET | Refills: 0 | Status: SHIPPED | OUTPATIENT
Start: 2024-04-24 | End: 2024-05-02

## 2024-04-24 NOTE — PROGRESS NOTES
Virtual Regular Visit    Verification of patient location:    Patient is located at Home in the following state in which I hold an active license [place]      Assessment/Plan:    Problem List Items Addressed This Visit          Musculoskeletal and Integument    Dislocation of jaw - Primary     Patient is experiencing acute flareup of left temporomandibular joint dysfunction.  I recommended several measures to try to mediate her discomfort.  The first is to use ice for 20 minutes at a time 2-3 times a day.  I have also recommended recommended naproxen 500 mg twice a day to be taken with food in the stomach.  I recommend patient stick to a liquid or very soft diet to minimize the amount of chewing necessary.  I have also recommended Flexeril 5 mg 3 times a day to reduce any muscle spasm in the mandibular joint area.  And finally have recommended that she contact her dentist to see if they can fit a mouthguard for her to minimize any nighttime grinding of her jaw.  Follow-up if symptoms persist.         Relevant Medications    cyclobenzaprine (FLEXERIL) 5 mg tablet            Reason for visit is   Chief Complaint   Patient presents with    Jaw Pain    Virtual Regular Visit          Encounter provider Elvis Montilla MD    Provider located at [address] [place] [address] MEDICINE  [address] [place] [id number]      Recent Visits  No visits were found meeting these conditions.  Showing recent visits within past 7 days and meeting all other requirements  Today's Visits  Date Type Provider Dept   04/24/24 Telemedicine Elvis Montilla MD [facility] Internal [facility]   Showing today's visits and meeting all other requirements  Future Appointments  No visits were found meeting these conditions.  Showing future appointments within next 150 days and meeting all other requirements       The patient was identified by name and date of birth. Kaylah Matute was informed that this is a telemedicine visit  and that the visit is being conducted through the Epic Embedded platform. She agrees to proceed..  My office door was closed. No one else was in the room.  She acknowledged consent and understanding of privacy and security of the video platform. The patient has agreed to participate and understands they can discontinue the visit at any time.    Patient is aware this is a billable service.     Subjective  Kaylah Matute is a 53 y.o. female presents for a virtual evaluation of left jaw pain15.      This 53-year-old female patient presents today by video virtual visit for evaluation of left side jaw pain.  Pain has been present for approximately 1 week.  She does have a history of side temporal mandibular joint irritation in the past.  Has tenderness over the temporomandibular joint difficulty chewing and persistent pain.  No fever or chills.         Past Medical History:   Diagnosis Date    Amenorrhea     Anemia 2023    Anxiety     Arthritis     Asthma     Back pain     Chondromalacia of patella     Chronic fatigue     Colon polyp     COPD (chronic obstructive pulmonary disease) (HCC) Yes    Deep vein thrombophlebitis of leg (HCC)     Depression     Disease of thyroid gland     Diverticulitis     GERD (gastroesophageal reflux disease)     History of transfusion 2008    HPV (human papilloma virus) infection     Hypothyroidism     Lumbar radiculopathy     Migraine     Myofascial pain syndrome     Osteopenia     Piriformis syndrome     Sacroiliitis (HCC)     Sciatica     Seizure (HCC)     Sleep apnea     Spondylosis of lumbar spine     Sprain of temporomandibular joint or ligament 2023    Trochanteric bursitis of right hip     Vertigo     Vitamin D deficiency     Weight gain 2019       Past Surgical History:   Procedure Laterality Date    CERVIX LESION DESTRUCTION       SECTION      COLONOSCOPY      COLPOSCOPY W/ BIOPSY / CURETTAGE      Colposcopy cervix with biopsy    LAPAROSCOPIC  ENDOMETRIOSIS FULGURATION      LAPAROSCOPY      TOOTH EXTRACTION         Current Outpatient Medications   Medication Sig Dispense Refill    acetaminophen (TYLENOL) 325 mg tablet Take 2 tablets (650 mg total) by mouth every 6 (six) hours as needed for mild pain 30 tablet 0    albuterol (2.5 mg/3 mL) 0.083 % nebulizer solution Inhale       albuterol (Proventil HFA) 90 mcg/act inhaler Inhale 2 puffs every 6 (six) hours as needed for wheezing 18 g 3    aluminum-magnesium hydroxide 200-200 MG/5ML suspension GAVISCON REGULAR STRENGTH AFTER MEALS and BEDTIME WHEN NOT FOLLOWING LPR/GERD DIET. 355 mL 11    ARIPiprazole (ABILIFY) 5 mg tablet Take 1 tablet (5 mg total) by mouth daily at bedtime 30 tablet 4    Ascorbic Acid (VITAMIN C PO) Take by mouth      azelastine (ASTELIN) 0.1 % nasal spray 1-2 sprays into each nostril 2 (two) times a day as needed for rhinitis Use in each nostril as directed 30 mL 11    cyclobenzaprine (FLEXERIL) 5 mg tablet Take 1 tablet (5 mg total) by mouth 3 (three) times a day 30 tablet 0    DULoxetine (CYMBALTA) 60 mg delayed release capsule Take 2 capsules (120 mg total) by mouth daily 60 capsule 4    eptinezumab-jjmr (VYEPTI) IVPB Infuse 100mg IV every 90 days.  Note to pharmacy: To be delivered to where patient will be having infusion: [facility]. [phone], [phone] 1 mL 3    famotidine (PEPCID) 40 MG tablet Take 1 tablet (40 mg total) by mouth daily at bedtime 30 tablet 6    gabapentin (Neurontin) 300 mg capsule 3 tabs  capsule 2    ipratropium (ATROVENT) 0.06 % nasal spray 2 sprays into each nostril 4 (four) times a day 15 mL 12    ketorolac (TORADOL) 10 mg tablet Take 1 tablet (10 mg total) by mouth every 6 (six) hours as needed (migraine, back pain) Max 2-3 per week. 30 tablet 1    lamoTRIgine (LaMICtal) 100 mg tablet Take 1 tablet (100 mg total) by mouth daily at bedtime 30 tablet 4    levocetirizine (XYZAL) 5 MG tablet Take 5 mg by mouth  every evening      levonorgestrel (MIRENA) 20 MCG/24HR IUD by Intrauterine route      levothyroxine 50 mcg tablet TAKE 1 TABLET(50 MCG) BY MOUTH DAILY 30 tablet 4    metFORMIN (GLUCOPHAGE-XR) 500 mg 24 hr tablet Take 1 tablet (500 mg total) by mouth daily with dinner 90 tablet 0    mometasone (NASONEX) 50 mcg/act nasal spray       Multiple Vitamins-Minerals (ZINC PO) Take by mouth      naltrexone (REVIA) 50 mg tablet Take 1 tablet (50 mg total) by mouth daily 30 tablet 4    naproxen (NAPROSYN) 500 mg tablet TAKE 1 TABLET(500 MG) BY MOUTH TWICE DAILY WITH MEALS 20 tablet 1    ondansetron (ZOFRAN-ODT) 4 mg disintegrating tablet Take 1 tablet (4 mg total) by mouth every 6 (six) hours as needed for nausea or vomiting 30 tablet 2    pantoprazole (PROTONIX) 40 mg tablet TAKE 1 TABLET(40 MG) BY MOUTH DAILY 30 tablet 5    prochlorperazine (COMPAZINE) 10 mg tablet 1 tab q6 hours prn migraine (with cocktail) 30 tablet 2    rosuvastatin (CRESTOR) 5 mg tablet Take 1 tablet (5 mg total) by mouth 3 (three) times a week TAKE 1 TABLET BY MOUTH MONDAY WEDNESDAY AND FRIDAY IN THE EVENING 36 tablet 1    tiotropium (SPIRIVA RESPIMAT) 1.25 MCG/ACT AERS inhaler Inhale 2 puffs daily 4 g 11    traZODone (DESYREL) 100 mg tablet Take 1-3 tablets (100-300 mg total) by mouth daily at bedtime as needed for sleep 90 tablet 4    VITAMIN D PO Take 5,000 Units by mouth      ZOLMitriptan (ZOMIG-ZMT) 5 MG disintegrating tablet DISSOLVE 1 TABLET BY MOUTH AT ONSET OF HEADACHE, MAY REPEAT AFTER TWO HOURS AS NEEDED, MAX 2 TABLETS PER 24 HOURS 12 tablet 5    ibuprofen (MOTRIN) 800 mg tablet Take 1 tablet (800 mg total) by mouth every 8 (eight) hours as needed for mild pain 30 tablet 0     No current facility-administered medications for this visit.        Allergies   Allergen Reactions    Nuts - Food Allergy      Other reaction(s): WALNUTS    Cephalexin     Erythromycin Diarrhea, GI Intolerance and Vomiting    Iodine - Food Allergy Hives    Other       "adobo    Shellfish Allergy - Food Allergy     Shellfish-Derived Products - Food Allergy     Turmeric - Food Allergy Hives    Wellbutrin [Bupropion] Seizures    Zithromax [Azithromycin] Diarrhea     Can take name brand  Annotation - [id number]: can take brand name  Other reaction(s): Nausea/vomiting/diarrhea       Review of Systems   HENT:          Left temporomandibular joint pain       Video Exam    Vitals:    04/24/24 1130   Weight: 67.1 kg (148 lb)   Height: 5' 2\" (1.575 m)       Physical Exam  Constitutional:       Comments: Patient is alert and oriented x 3 in no acute distress          Visit Time  Total Visit Duration: 15 min        "

## 2024-04-24 NOTE — ASSESSMENT & PLAN NOTE
Patient is experiencing acute flareup of left temporomandibular joint dysfunction.  I recommended several measures to try to mediate her discomfort.  The first is to use ice for 20 minutes at a time 2-3 times a day.  I have also recommended recommended naproxen 500 mg twice a day to be taken with food in the stomach.  I recommend patient stick to a liquid or very soft diet to minimize the amount of chewing necessary.  I have also recommended Flexeril 5 mg 3 times a day to reduce any muscle spasm in the mandibular joint area.  And finally have recommended that she contact her dentist to see if they can fit a mouthguard for her to minimize any nighttime grinding of her jaw.  Follow-up if symptoms persist.

## 2024-04-25 RX ORDER — KETOROLAC TROMETHAMINE 10 MG/1
10 TABLET, FILM COATED ORAL EVERY 6 HOURS PRN
Qty: 30 TABLET | Refills: 1 | Status: SHIPPED | OUTPATIENT
Start: 2024-04-25

## 2024-05-02 ENCOUNTER — OFFICE VISIT (OUTPATIENT)
Dept: INTERNAL MEDICINE CLINIC | Facility: CLINIC | Age: 54
End: 2024-05-02
Payer: COMMERCIAL

## 2024-05-02 VITALS
BODY MASS INDEX: 29.63 KG/M2 | TEMPERATURE: 97.1 F | SYSTOLIC BLOOD PRESSURE: 122 MMHG | HEART RATE: 86 BPM | WEIGHT: 162 LBS | DIASTOLIC BLOOD PRESSURE: 70 MMHG | OXYGEN SATURATION: 96 %

## 2024-05-02 DIAGNOSIS — G43.719 INTRACTABLE CHRONIC MIGRAINE WITHOUT AURA AND WITHOUT STATUS MIGRAINOSUS: ICD-10-CM

## 2024-05-02 DIAGNOSIS — S03.00XA DISLOCATION OF TEMPOROMANDIBULAR JOINT, INITIAL ENCOUNTER: ICD-10-CM

## 2024-05-02 DIAGNOSIS — E78.5 HYPERLIPIDEMIA, UNSPECIFIED HYPERLIPIDEMIA TYPE: ICD-10-CM

## 2024-05-02 DIAGNOSIS — R73.09 ABNORMAL GLUCOSE: ICD-10-CM

## 2024-05-02 DIAGNOSIS — M26.622 ARTHRALGIA OF LEFT TEMPOROMANDIBULAR JOINT: ICD-10-CM

## 2024-05-02 DIAGNOSIS — S03.40XA SPRAIN OF TEMPOROMANDIBULAR JOINT, INITIAL ENCOUNTER: Primary | ICD-10-CM

## 2024-05-02 DIAGNOSIS — E03.9 HYPOTHYROIDISM, UNSPECIFIED TYPE: ICD-10-CM

## 2024-05-02 PROCEDURE — 99214 OFFICE O/P EST MOD 30 MIN: CPT | Performed by: INTERNAL MEDICINE

## 2024-05-02 RX ORDER — CYCLOBENZAPRINE HCL 5 MG
5 TABLET ORAL 3 TIMES DAILY
Qty: 30 TABLET | Refills: 0 | Status: SHIPPED | OUTPATIENT
Start: 2024-05-02

## 2024-05-02 RX ORDER — ROSUVASTATIN CALCIUM 5 MG/1
5 TABLET, COATED ORAL 3 TIMES WEEKLY
Qty: 36 TABLET | Refills: 1 | Status: SHIPPED | OUTPATIENT
Start: 2024-05-03 | End: 2024-08-01

## 2024-05-02 RX ORDER — NAPROXEN 500 MG/1
500 TABLET ORAL 2 TIMES DAILY WITH MEALS
Qty: 20 TABLET | Refills: 1 | Status: SHIPPED | OUTPATIENT
Start: 2024-05-02

## 2024-05-02 NOTE — ASSESSMENT & PLAN NOTE
Arthralgia type symptoms of the left temporomandibular joint recommend continued use of muscle relaxers as needed and naproxen 500 mg twice a day taken with food.  Follow-up with either dentistry or oral surgery

## 2024-05-02 NOTE — ASSESSMENT & PLAN NOTE
Continue Vyepti infusion for prophylaxis of migraine headaches and continue as needed dosing of Zomig.

## 2024-05-02 NOTE — ASSESSMENT & PLAN NOTE
Continue current supplementation of levothyroxine 80 mcg daily follow-up free T4 TSH at time of next visit.

## 2024-05-02 NOTE — PROGRESS NOTES
Name: Kaylah Matute      : 1970      MRN: [id number]  Encounter Provider: Elvis Montilla MD  Encounter Date: 2024   Encounter department: [facility] INTERNAL MEDICINE    Assessment & Plan     1. Hypothyroidism, unspecified type  Assessment & Plan:  Continue current supplementation of levothyroxine 80 mcg daily follow-up free T4 TSH at time of next visit.    Orders:  -     T4, free; Future  -     TSH, 3rd generation; Future    2. Dislocation of temporomandibular joint, initial encounter  -     cyclobenzaprine (FLEXERIL) 5 mg tablet; Take 1 tablet (5 mg total) by mouth 3 (three) times a day    3. Sprain of temporomandibular joint, initial encounter  -     naproxen (NAPROSYN) 500 mg tablet; Take 1 tablet (500 mg total) by mouth 2 (two) times a day with meals    4. Hyperlipidemia, unspecified hyperlipidemia type  -     Lipid panel; Future  -     rosuvastatin (CRESTOR) 5 mg tablet; Take 1 tablet (5 mg total) by mouth 3 (three) times a week TAKE 1 TABLET BY MOUTH  AND FRIDAY IN THE EVENING    5. Abnormal glucose  -     Comprehensive metabolic panel; Future  -     Hemoglobin A1C; Future    6. Intractable chronic migraine without aura and without status migrainosus  Assessment & Plan:  Continue Vyepti infusion for prophylaxis of migraine headaches and continue as needed dosing of Zomig.      7. Arthralgia of left temporomandibular joint  Assessment & Plan:  Arthralgia type symptoms of the left temporomandibular joint recommend continued use of muscle relaxers as needed and naproxen 500 mg twice a day taken with food.  Follow-up with either dentistry or oral surgery             Subjective      This 53-year-old female patient returns today for follow-up visit.  Most recently she has been experiencing irritation and discomfort in the left temporomandibular joint area.  We have been treating this with a combination of muscle relaxer and naproxen.  She reports improvement in her  symptoms.  Not clear if she grinds her teeth at night.  On examination she does have some mild tenderness and swelling in the left TMJ joint.  Examination of the dentition indicates that she does not have any opposing teeth on the bottom jaw for the upper posterior teeth on the left side may be an issue precipitating temporomandibular joint symptoms.  Recommend dental or oral surgery consultation for further evaluation.    Patient is quite happy with current medications for migraine treatment indicates the Vyepti has been successful at decreasing the frequency of her headaches.      Review of Systems   HENT:          Left TMJ pain   Neurological:  Positive for headaches.   All other systems reviewed and are negative.      Current Outpatient Medications on File Prior to Visit   Medication Sig    acetaminophen (TYLENOL) 325 mg tablet Take 2 tablets (650 mg total) by mouth every 6 (six) hours as needed for mild pain    albuterol (2.5 mg/3 mL) 0.083 % nebulizer solution Inhale     albuterol (Proventil HFA) 90 mcg/act inhaler Inhale 2 puffs every 6 (six) hours as needed for wheezing    aluminum-magnesium hydroxide 200-200 MG/5ML suspension GAVISCON REGULAR STRENGTH AFTER MEALS and BEDTIME WHEN NOT FOLLOWING LPR/GERD DIET.    ARIPiprazole (ABILIFY) 5 mg tablet Take 1 tablet (5 mg total) by mouth daily at bedtime    Ascorbic Acid (VITAMIN C PO) Take by mouth    azelastine (ASTELIN) 0.1 % nasal spray 1-2 sprays into each nostril 2 (two) times a day as needed for rhinitis Use in each nostril as directed    DULoxetine (CYMBALTA) 60 mg delayed release capsule Take 2 capsules (120 mg total) by mouth daily    eptinezumab-jjmr (VYEPTI) IVPB Infuse 100mg IV every 90 days.  Note to pharmacy: To be delivered to where patient will be having infusion: [facility]. [phone], [phone]    famotidine (PEPCID) 40 MG tablet Take 1 tablet (40 mg total) by mouth daily at bedtime    gabapentin  (Neurontin) 300 mg capsule 3 tabs BID    ipratropium (ATROVENT) 0.06 % nasal spray 2 sprays into each nostril 4 (four) times a day    ketorolac (TORADOL) 10 mg tablet Take 1 tablet (10 mg total) by mouth every 6 (six) hours as needed (migraine, back pain) Max 2-3 per week.    lamoTRIgine (LaMICtal) 100 mg tablet Take 1 tablet (100 mg total) by mouth daily at bedtime    levocetirizine (XYZAL) 5 MG tablet Take 5 mg by mouth every evening    levonorgestrel (MIRENA) 20 MCG/24HR IUD by Intrauterine route    levothyroxine 50 mcg tablet TAKE 1 TABLET(50 MCG) BY MOUTH DAILY    metFORMIN (GLUCOPHAGE-XR) 500 mg 24 hr tablet Take 1 tablet (500 mg total) by mouth daily with dinner    mometasone (NASONEX) 50 mcg/act nasal spray     Multiple Vitamins-Minerals (ZINC PO) Take by mouth    naltrexone (REVIA) 50 mg tablet Take 1 tablet (50 mg total) by mouth daily    ondansetron (ZOFRAN-ODT) 4 mg disintegrating tablet Take 1 tablet (4 mg total) by mouth every 6 (six) hours as needed for nausea or vomiting    pantoprazole (PROTONIX) 40 mg tablet TAKE 1 TABLET(40 MG) BY MOUTH DAILY    prochlorperazine (COMPAZINE) 10 mg tablet 1 tab q6 hours prn migraine (with cocktail)    tiotropium (SPIRIVA RESPIMAT) 1.25 MCG/ACT AERS inhaler Inhale 2 puffs daily    traZODone (DESYREL) 100 mg tablet Take 1-3 tablets (100-300 mg total) by mouth daily at bedtime as needed for sleep    VITAMIN D PO Take 5,000 Units by mouth    ZOLMitriptan (ZOMIG-ZMT) 5 MG disintegrating tablet DISSOLVE 1 TABLET BY MOUTH AT ONSET OF HEADACHE, MAY REPEAT AFTER TWO HOURS AS NEEDED, MAX 2 TABLETS PER 24 HOURS    [DISCONTINUED] cyclobenzaprine (FLEXERIL) 5 mg tablet Take 1 tablet (5 mg total) by mouth 3 (three) times a day    [DISCONTINUED] ibuprofen (MOTRIN) 800 mg tablet Take 1 tablet (800 mg total) by mouth every 8 (eight) hours as needed for mild pain    [DISCONTINUED] naproxen (NAPROSYN) 500 mg tablet TAKE 1 TABLET(500 MG) BY MOUTH TWICE DAILY WITH MEALS     [DISCONTINUED] rosuvastatin (CRESTOR) 5 mg tablet Take 1 tablet (5 mg total) by mouth 3 (three) times a week TAKE 1 TABLET BY MOUTH MONDAY WEDNESDAY AND FRIDAY IN THE EVENING       Objective     /70   Pulse 86   Temp (!) 97.1 °F (36.2 °C)   Wt 73.5 kg (162 lb)   SpO2 96%   BMI 29.63 kg/m²     Physical Exam  Constitutional:       General: She is not in acute distress.     Appearance: Normal appearance. She is not ill-appearing.   HENT:      Head: Normocephalic.      Mouth/Throat:      Comments: Tender over left temporomandibular joint  Eyes:      Pupils: Pupils are equal, round, and reactive to light.   Cardiovascular:      Rate and Rhythm: Regular rhythm.      Heart sounds: Normal heart sounds.   Pulmonary:      Effort: Pulmonary effort is normal.      Breath sounds: No wheezing, rhonchi or rales.   Musculoskeletal:      Right lower leg: No edema.      Left lower leg: No edema.   Neurological:      Mental Status: She is alert. Mental status is at baseline.   Psychiatric:         Mood and Affect: Mood normal.         Behavior: Behavior normal.         Thought Content: Thought content normal.         Judgment: Judgment normal.       Elvis Montilla MD

## 2024-05-07 DIAGNOSIS — K21.9 GASTROESOPHAGEAL REFLUX DISEASE WITHOUT ESOPHAGITIS: ICD-10-CM

## 2024-05-07 RX ORDER — FAMOTIDINE 40 MG/1
40 TABLET, FILM COATED ORAL
Qty: 30 TABLET | Refills: 5 | Status: SHIPPED | OUTPATIENT
Start: 2024-05-07

## 2024-05-21 ENCOUNTER — TELEPHONE (OUTPATIENT)
Dept: OBGYN CLINIC | Facility: CLINIC | Age: 54
End: 2024-05-21

## 2024-05-31 ENCOUNTER — TELEPHONE (OUTPATIENT)
Dept: PSYCHIATRY | Facility: CLINIC | Age: 54
End: 2024-05-31

## 2024-05-31 DIAGNOSIS — S03.00XA DISLOCATION OF TEMPOROMANDIBULAR JOINT, INITIAL ENCOUNTER: ICD-10-CM

## 2024-05-31 DIAGNOSIS — G43.709 CHRONIC MIGRAINE WITHOUT AURA WITHOUT STATUS MIGRAINOSUS, NOT INTRACTABLE: ICD-10-CM

## 2024-05-31 DIAGNOSIS — S03.40XA SPRAIN OF TEMPOROMANDIBULAR JOINT, INITIAL ENCOUNTER: ICD-10-CM

## 2024-05-31 NOTE — TELEPHONE ENCOUNTER
Patient called office requesting an update letter on her emotional support dog - poodle black and white, female, 20 lbs, name is Urmila.  Writer informed patient the message would be sent to provider.

## 2024-06-03 RX ORDER — NAPROXEN 500 MG/1
500 TABLET ORAL 2 TIMES DAILY WITH MEALS
Qty: 20 TABLET | Refills: 0 | Status: SHIPPED | OUTPATIENT
Start: 2024-06-03

## 2024-06-03 RX ORDER — CYCLOBENZAPRINE HCL 5 MG
5 TABLET ORAL 3 TIMES DAILY
Qty: 30 TABLET | Refills: 0 | Status: SHIPPED | OUTPATIENT
Start: 2024-06-03

## 2024-06-03 NOTE — TELEPHONE ENCOUNTER
Kaylah needs to sig a new JAZMYN for releasing of her diagnosis and treatment history. She also needs to specify to whom the letter should be addressed to.

## 2024-06-04 RX ORDER — KETOROLAC TROMETHAMINE 10 MG/1
10 TABLET, FILM COATED ORAL EVERY 6 HOURS PRN
Qty: 30 TABLET | Refills: 1 | Status: SHIPPED | OUTPATIENT
Start: 2024-06-04
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
## 2024-07-01 ENCOUNTER — OFFICE VISIT (OUTPATIENT)
Dept: PSYCHIATRY | Facility: CLINIC | Age: 54
End: 2024-07-01
Payer: COMMERCIAL

## 2024-07-01 VITALS
HEIGHT: 62 IN | SYSTOLIC BLOOD PRESSURE: 113 MMHG | DIASTOLIC BLOOD PRESSURE: 64 MMHG | WEIGHT: 166 LBS | BODY MASS INDEX: 30.55 KG/M2 | HEART RATE: 80 BPM

## 2024-07-01 DIAGNOSIS — G47.09 OTHER INSOMNIA: Chronic | ICD-10-CM

## 2024-07-01 DIAGNOSIS — F63.9 IMPULSE CONTROL DISORDER: Chronic | ICD-10-CM

## 2024-07-01 DIAGNOSIS — F33.42 MAJOR DEPRESSIVE DISORDER, RECURRENT EPISODE, IN FULL REMISSION (HCC): Primary | Chronic | ICD-10-CM

## 2024-07-01 DIAGNOSIS — F41.1 GAD (GENERALIZED ANXIETY DISORDER): Chronic | ICD-10-CM

## 2024-07-01 PROCEDURE — 90833 PSYTX W PT W E/M 30 MIN: CPT | Performed by: PSYCHIATRY & NEUROLOGY

## 2024-07-01 PROCEDURE — 99214 OFFICE O/P EST MOD 30 MIN: CPT | Performed by: PSYCHIATRY & NEUROLOGY

## 2024-07-01 RX ORDER — NALTREXONE HYDROCHLORIDE 50 MG/1
50 TABLET, FILM COATED ORAL DAILY
Qty: 30 TABLET | Refills: 4 | Status: SHIPPED | OUTPATIENT
Start: 2024-07-01 | End: 2024-11-28

## 2024-07-01 RX ORDER — TRAZODONE HYDROCHLORIDE 100 MG/1
100-300 TABLET ORAL
Qty: 90 TABLET | Refills: 4 | Status: SHIPPED | OUTPATIENT
Start: 2024-07-01 | End: 2024-11-28

## 2024-07-01 RX ORDER — LAMOTRIGINE 100 MG/1
100 TABLET ORAL
Qty: 30 TABLET | Refills: 4 | Status: SHIPPED | OUTPATIENT
Start: 2024-07-01 | End: 2024-11-28

## 2024-07-01 RX ORDER — DULOXETIN HYDROCHLORIDE 60 MG/1
120 CAPSULE, DELAYED RELEASE ORAL DAILY
Qty: 60 CAPSULE | Refills: 4 | Status: SHIPPED | OUTPATIENT
Start: 2024-07-01 | End: 2024-11-28

## 2024-07-01 RX ORDER — ARIPIPRAZOLE 5 MG/1
5 TABLET ORAL
Qty: 30 TABLET | Refills: 4 | Status: SHIPPED | OUTPATIENT
Start: 2024-07-01 | End: 2024-11-28

## 2024-07-01 NOTE — BH TREATMENT PLAN
"TREATMENT PLAN (Medication Management Only)        [facility]    Name/Date of Birth/MRN:  Kaylah Matute 53 y.o. 1970 MRN: [id number]  Date of Treatment Plan: July 1, 2024  Diagnosis/Diagnoses:   1. Major depressive disorder, recurrent episode, in full remission (HCC)    2. KYLE (generalized anxiety disorder)    3. Impulse control disorder    4. Other insomnia      Strengths/Personal Resources for Self-Care: \"I take my medications\".  Area/Areas of need (in own words): \"my anxiety\".  1. Long Term Goal:   maintain control of anxiety, maintain control of depression  Target Date: 4 months - 11/1/2024  Person/Persons responsible for completion of goal: Kaylah  2.  Short Term Objective (s) - How will we reach this goal?:   A.  Provider new recommended medication/dosage changes and/or continue medication(s): continue current medications as prescribed (Cymbalta, Trazodone, Lamictal, Abilify, and Naltrexone).  B.  N/A.  C.  N/A.  Target Date: 4 months - 11/1/2024  Person/Persons Responsible for Completion of Goal: Kaylah   Progress Towards Goals: progressing  Treatment Modality: medication management every 4 months, continue psychotherapy with own therapist  Review due 180 days from date of this plan: 6 months - 1/1/2025  Expected length of service: ongoing treatment unless revised  My Physician/PA/NP and I have developed this plan together and I agree to work on the goals and objectives. I understand the treatment goals that were developed for my treatment.  Electronic Signatures: on file (unless signed below)    Rosa Lawler MD 07/01/24  "

## 2024-07-03 ENCOUNTER — TELEPHONE (OUTPATIENT)
Dept: PSYCHIATRY | Facility: CLINIC | Age: 54
End: 2024-07-03

## 2024-07-03 NOTE — TELEPHONE ENCOUNTER
Patient left VM returning prior call from writer. Writer returned call, no answer, left VM to contact our office in regard to the emotional support dog letter per prior notes.

## 2024-07-03 NOTE — TELEPHONE ENCOUNTER
Patient left VM requesting an additional letter for emotional support dog from provider. Writer returned call, no answer, and left VM that we would need info as to who letter is addressed to and patient would need to complete a new JAZMYN prior, type of dog, etc.

## 2024-07-08 ENCOUNTER — TELEPHONE (OUTPATIENT)
Dept: PSYCHIATRY | Facility: CLINIC | Age: 54
End: 2024-07-08

## 2024-07-08 NOTE — TELEPHONE ENCOUNTER
PT stopped by FD to sign JAZMYN for a letter of emotional support animal from provider. JAZMYN scanned into chart. Writer let PT know we will call her as soon as its ready. PT did state she is needing the letter by 8/1/2024

## 2024-07-09 ENCOUNTER — TELEPHONE (OUTPATIENT)
Age: 54
End: 2024-07-09

## 2024-07-09 NOTE — TELEPHONE ENCOUNTER
Pt called to give new insurance card . NEW card was already in system.   Pt will bring new card to her appt on Friday 7/12/24.     Thank you

## 2024-07-12 ENCOUNTER — HOSPITAL ENCOUNTER (OUTPATIENT)
Dept: INFUSION CENTER | Facility: CLINIC | Age: 54
End: 2024-07-12
Payer: COMMERCIAL

## 2024-07-12 VITALS
DIASTOLIC BLOOD PRESSURE: 73 MMHG | RESPIRATION RATE: 18 BRPM | SYSTOLIC BLOOD PRESSURE: 126 MMHG | TEMPERATURE: 98 F | HEART RATE: 76 BPM

## 2024-07-12 DIAGNOSIS — G43.709 CHRONIC MIGRAINE WITHOUT AURA WITHOUT STATUS MIGRAINOSUS, NOT INTRACTABLE: Primary | ICD-10-CM

## 2024-07-12 PROCEDURE — 96365 THER/PROPH/DIAG IV INF INIT: CPT

## 2024-07-12 RX ORDER — SODIUM CHLORIDE 9 MG/ML
20 INJECTION, SOLUTION INTRAVENOUS ONCE
OUTPATIENT
Start: 2024-10-04

## 2024-07-12 RX ORDER — SODIUM CHLORIDE 9 MG/ML
20 INJECTION, SOLUTION INTRAVENOUS ONCE
Status: COMPLETED | OUTPATIENT
Start: 2024-07-12 | End: 2024-07-12

## 2024-07-12 RX ADMIN — EPTINEZUMAB-JJMR 100 MG: 100 INJECTION INTRAVENOUS at 08:52

## 2024-07-12 RX ADMIN — SODIUM CHLORIDE 20 ML/HR: 0.9 INJECTION, SOLUTION INTRAVENOUS at 08:50

## 2024-07-12 NOTE — PROGRESS NOTES
Patient tolerated Vyepti infusion without incident. Appointment card given with next infusion date and time. Declined AVS.

## 2024-07-19 DIAGNOSIS — E03.9 HYPOTHYROIDISM, UNSPECIFIED TYPE: ICD-10-CM

## 2024-07-19 RX ORDER — LEVOTHYROXINE SODIUM 0.05 MG/1
50 TABLET ORAL DAILY
Qty: 30 TABLET | Refills: 5 | Status: SHIPPED | OUTPATIENT
Start: 2024-07-19

## 2024-07-22 ENCOUNTER — TELEPHONE (OUTPATIENT)
Dept: PSYCHIATRY | Facility: CLINIC | Age: 54
End: 2024-07-22

## 2024-07-22 NOTE — TELEPHONE ENCOUNTER
LVM for PT needing to refill out an JAZMYN. Writer did not include the name and address of where the letter needs to go as well as the diagnoses to be included on the JAZMYN. Let her know in the message just to stop by the office and we can do that.

## 2024-08-09 ENCOUNTER — TELEMEDICINE (OUTPATIENT)
Dept: INTERNAL MEDICINE CLINIC | Facility: CLINIC | Age: 54
End: 2024-08-09
Payer: COMMERCIAL

## 2024-08-09 DIAGNOSIS — J01.01 ACUTE RECURRENT MAXILLARY SINUSITIS: Primary | ICD-10-CM

## 2024-08-09 PROCEDURE — 99213 OFFICE O/P EST LOW 20 MIN: CPT | Performed by: STUDENT IN AN ORGANIZED HEALTH CARE EDUCATION/TRAINING PROGRAM

## 2024-08-09 NOTE — PROGRESS NOTES
Virtual Regular Visit  Name: Kaylah Matute      : 1970      MRN: [id number]  Encounter Provider: Shawn Watson DO  Encounter Date: 2024   Encounter department: [facility] INTERNAL MEDICINE    Verification of patient location:    Patient is located at Home in the following state in which I hold an active license [place]    Assessment & Plan   1. Acute recurrent maxillary sinusitis  Patient states that on evening of  she began to feel nasal congestion and rhinorrhea.   She has had recurrent sinus infections in the past but has not had one recently over the past few months to year.  She is scheduled an appointment her PCPs office, and saw me virtually today for this issue.  She also contacted her allergist office, and her allergist prescribed her azithromycin.  She began taking the azithromycin on evening of .  Today on  during her virtual visit she states that she feels much improved and that her sinuses are clearing up.  Denies fever, chills, nausea, vomiting, diarrhea, constipation, chest pain, shortness of breath.  Instructed patient to continue to take her azithromycin as prescribed to completion  Also instructed patient to remain well-hydrated.  Patient understood and agreed.  She will contact her office with any further issues.       Encounter provider Shawn Watson DO    The patient was identified by name and date of birth. Kaylah Matute was informed that this is a telemedicine visit and that the visit is being conducted through the [employer] platform. She agrees to proceed..  My office door was closed. No one else was in the room.  She acknowledged consent and understanding of privacy and security of the video platform. The patient has agreed to participate and understands they can discontinue the visit at any time.    Patient is aware this is a billable service.     History of Present Illness     Ms. Kaylah Matute si a 53-year-old female who presents today for a virtual video  appointment for concern for sinus congestion. She has a past medical history of asthma, migraines, MYRA, GERD, hypothyroidism, anxiety/depression, panic disorder, personality disorder. She states that her congestion started the evening of 08/08/2024. She has tried to take Mucinex for relief.  Patient scheduled today's appointment to see primary care doctor's office regarding a possible sinus infection.  However, patient also contacted her allergist office.  Her allergist sent her prescription for azithromycin and patient began taking that yesterday.  At today's virtual visit, patient states that she is feeling much better today.  She states that she is feeling her sinuses cleared up.  She presently denies any fever, chills, nausea, vomiting, diarrhea, constipation, shortness of breath, chest pains.  She has no other new or worsening complaints at this time.        Review of Systems   Constitutional:  Negative for chills, fatigue and fever.   HENT:  Positive for rhinorrhea. Negative for congestion and sore throat.    Eyes:  Negative for pain and redness.   Respiratory:  Negative for cough, shortness of breath and wheezing.    Cardiovascular:  Negative for chest pain, palpitations and leg swelling.   Gastrointestinal:  Negative for abdominal distention, abdominal pain, constipation, diarrhea, nausea and vomiting.   Skin:  Negative for rash and wound.   Neurological:  Negative for dizziness, syncope, weakness, light-headedness and headaches.   Psychiatric/Behavioral:  Negative for agitation, behavioral problems and confusion.      Objective     There were no vitals taken for this visit.  Physical Exam  Constitutional:       General: She is not in acute distress.     Appearance: Normal appearance. She is not ill-appearing.   Pulmonary:      Effort: Pulmonary effort is normal.   Neurological:      Mental Status: She is alert and oriented to person, place, and time.   Psychiatric:         Mood and Affect: Mood normal.          Behavior: Behavior normal.         Thought Content: Thought content normal.         Visit Time  Total Visit Duration: 25 minutes

## 2024-08-13 ENCOUNTER — TELEPHONE (OUTPATIENT)
Age: 54
End: 2024-08-13

## 2024-08-13 DIAGNOSIS — R30.0 BURNING WITH URINATION: Primary | ICD-10-CM

## 2024-08-13 DIAGNOSIS — R35.0 FREQUENCY OF URINATION: ICD-10-CM

## 2024-08-13 NOTE — TELEPHONE ENCOUNTER
Pateint called in regarding concerns for possible UTI. She is having burning and frequency. She is having to pee every 20 to 30 minutes and it is only dribbles. She would like to know if anbx can be ordered. Please advise.

## 2024-08-14 ENCOUNTER — APPOINTMENT (OUTPATIENT)
Dept: LAB | Facility: MEDICAL CENTER | Age: 54
End: 2024-08-14
Payer: COMMERCIAL

## 2024-08-14 DIAGNOSIS — R30.0 BURNING WITH URINATION: ICD-10-CM

## 2024-08-14 DIAGNOSIS — R73.09 ABNORMAL GLUCOSE: ICD-10-CM

## 2024-08-14 DIAGNOSIS — E03.9 HYPOTHYROIDISM, UNSPECIFIED TYPE: ICD-10-CM

## 2024-08-14 DIAGNOSIS — R35.0 FREQUENCY OF URINATION: ICD-10-CM

## 2024-08-14 DIAGNOSIS — E78.5 HYPERLIPIDEMIA, UNSPECIFIED HYPERLIPIDEMIA TYPE: ICD-10-CM

## 2024-08-14 LAB
AMORPH URATE CRY URNS QL MICRO: ABNORMAL
BACTERIA UR QL AUTO: ABNORMAL /HPF
BILIRUB UR QL STRIP: ABNORMAL
CLARITY UR: ABNORMAL
COLOR UR: ABNORMAL
GLUCOSE UR STRIP-MCNC: ABNORMAL MG/DL
HGB UR QL STRIP.AUTO: ABNORMAL
KETONES UR STRIP-MCNC: ABNORMAL MG/DL
LEUKOCYTE ESTERASE UR QL STRIP: ABNORMAL
NITRITE UR QL STRIP: ABNORMAL
NON-SQ EPI CELLS URNS QL MICRO: ABNORMAL /HPF
PH UR STRIP.AUTO: ABNORMAL [PH]
PROT UR STRIP-MCNC: ABNORMAL MG/DL
RBC #/AREA URNS AUTO: ABNORMAL /HPF
SP GR UR STRIP.AUTO: 1.02 (ref 1–1.03)
WBC #/AREA URNS AUTO: ABNORMAL /HPF
WBC CLUMPS # UR AUTO: PRESENT /UL

## 2024-08-14 PROCEDURE — 81001 URINALYSIS AUTO W/SCOPE: CPT

## 2024-08-14 PROCEDURE — 87086 URINE CULTURE/COLONY COUNT: CPT

## 2024-08-15 LAB — BACTERIA UR CULT: ABNORMAL

## 2024-08-16 DIAGNOSIS — N30.01 ACUTE CYSTITIS WITH HEMATURIA: Primary | ICD-10-CM

## 2024-08-16 RX ORDER — NITROFURANTOIN 25; 75 MG/1; MG/1
100 CAPSULE ORAL 2 TIMES DAILY
Qty: 14 CAPSULE | Refills: 0 | Status: SHIPPED | OUTPATIENT
Start: 2024-08-16 | End: 2024-08-23

## 2024-08-16 NOTE — PROGRESS NOTES
Experiencing UTI symptoms along with some hematuria.  Culture is only 10,000 colonies but given her current symptoms we will start her on Macrobid 100 mg twice a day for 7 days follow-up if symptoms do not resolve

## 2024-08-16 NOTE — TELEPHONE ENCOUNTER
Patient calling back regarding results of urine culture. She is requesting anbx be sent in. States that she now has some blood with urination. Please advise.

## 2024-08-16 NOTE — TELEPHONE ENCOUNTER
Call placed to the patient regarding urinary tract symptoms antibiotic has been sent to her pharmacy please see note on chart.

## 2024-09-05 DIAGNOSIS — S03.40XA SPRAIN OF TEMPOROMANDIBULAR JOINT, INITIAL ENCOUNTER: ICD-10-CM

## 2024-09-05 RX ORDER — NAPROXEN 500 MG/1
500 TABLET ORAL 2 TIMES DAILY WITH MEALS
Qty: 20 TABLET | Refills: 0 | Status: SHIPPED | OUTPATIENT
Start: 2024-09-05 | End: 2024-09-15

## 2024-09-05 NOTE — TELEPHONE ENCOUNTER
Medication: naproxen (NAPROSYN) 500 mg tablet     Dose/Frequency: Take 1 tablet (500 mg total) by mouth 2 (two) times a day with meals     Quantity: 20 tabs    Pharmacy: [id number] - [place]     Office:   [x] PCP/Provider -   [] Speciality/Provider -     Does the patient have enough for 3 days?   [] Yes   [x] No - Send as HP to POD

## 2024-09-10 DIAGNOSIS — N94.6 DYSMENORRHEA: ICD-10-CM

## 2024-09-11 RX ORDER — IBUPROFEN 800 MG/1
800 TABLET, FILM COATED ORAL EVERY 8 HOURS PRN
Qty: 30 TABLET | Refills: 0 | Status: SHIPPED | OUTPATIENT
Start: 2024-09-11

## 2024-09-12 DIAGNOSIS — S03.40XA SPRAIN OF TEMPOROMANDIBULAR JOINT, INITIAL ENCOUNTER: ICD-10-CM

## 2024-09-15 RX ORDER — NAPROXEN 500 MG/1
500 TABLET ORAL 2 TIMES DAILY WITH MEALS
Qty: 20 TABLET | Refills: 0 | Status: SHIPPED | OUTPATIENT
Start: 2024-09-15

## 2024-10-04 ENCOUNTER — HOSPITAL ENCOUNTER (OUTPATIENT)
Dept: INFUSION CENTER | Facility: CLINIC | Age: 54
End: 2024-10-04
Payer: COMMERCIAL

## 2024-10-04 VITALS
TEMPERATURE: 97.2 F | RESPIRATION RATE: 18 BRPM | HEART RATE: 74 BPM | DIASTOLIC BLOOD PRESSURE: 74 MMHG | SYSTOLIC BLOOD PRESSURE: 116 MMHG

## 2024-10-04 DIAGNOSIS — G43.709 CHRONIC MIGRAINE WITHOUT AURA WITHOUT STATUS MIGRAINOSUS, NOT INTRACTABLE: Primary | ICD-10-CM

## 2024-10-04 PROCEDURE — 96365 THER/PROPH/DIAG IV INF INIT: CPT

## 2024-10-04 RX ORDER — SODIUM CHLORIDE 9 MG/ML
20 INJECTION, SOLUTION INTRAVENOUS ONCE
Status: COMPLETED | OUTPATIENT
Start: 2024-10-04 | End: 2024-10-04

## 2024-10-04 RX ORDER — SODIUM CHLORIDE 9 MG/ML
20 INJECTION, SOLUTION INTRAVENOUS ONCE
OUTPATIENT
Start: 2024-12-27

## 2024-10-04 RX ADMIN — SODIUM CHLORIDE 20 ML/HR: 0.9 INJECTION, SOLUTION INTRAVENOUS at 09:33

## 2024-10-04 RX ADMIN — EPTINEZUMAB-JJMR 100 MG: 100 INJECTION INTRAVENOUS at 09:55

## 2024-10-04 NOTE — PROGRESS NOTES
Pt denies any recent infections/antibiotics, tolerated Vyepti well without complications. Confirmed appt back 12-27-24 0800, AVS declined

## 2024-10-12 ENCOUNTER — APPOINTMENT (OUTPATIENT)
Dept: LAB | Age: 54
End: 2024-10-12
Payer: COMMERCIAL

## 2024-10-12 LAB
ALBUMIN SERPL BCG-MCNC: 4.2 G/DL (ref 3.5–5)
ALP SERPL-CCNC: 54 U/L (ref 34–104)
ALT SERPL W P-5'-P-CCNC: 7 U/L (ref 7–52)
ANION GAP SERPL CALCULATED.3IONS-SCNC: 7 MMOL/L (ref 4–13)
AST SERPL W P-5'-P-CCNC: 11 U/L (ref 13–39)
BILIRUB SERPL-MCNC: 0.31 MG/DL (ref 0.2–1)
BUN SERPL-MCNC: 18 MG/DL (ref 5–25)
CALCIUM SERPL-MCNC: 9 MG/DL (ref 8.4–10.2)
CHLORIDE SERPL-SCNC: 106 MMOL/L (ref 96–108)
CHOLEST SERPL-MCNC: 238 MG/DL
CO2 SERPL-SCNC: 27 MMOL/L (ref 21–32)
CREAT SERPL-MCNC: 0.81 MG/DL (ref 0.6–1.3)
EST. AVERAGE GLUCOSE BLD GHB EST-MCNC: 114 MG/DL
GFR SERPL CREATININE-BSD FRML MDRD: 83 ML/MIN/1.73SQ M
GLUCOSE P FAST SERPL-MCNC: 88 MG/DL (ref 65–99)
HBA1C MFR BLD: 5.6 %
HDLC SERPL-MCNC: 57 MG/DL
LDLC SERPL CALC-MCNC: 166 MG/DL (ref 0–100)
NONHDLC SERPL-MCNC: 181 MG/DL
POTASSIUM SERPL-SCNC: 4.4 MMOL/L (ref 3.5–5.3)
PROT SERPL-MCNC: 6.2 G/DL (ref 6.4–8.4)
SODIUM SERPL-SCNC: 140 MMOL/L (ref 135–147)
T4 FREE SERPL-MCNC: 0.73 NG/DL (ref 0.61–1.12)
TRIGL SERPL-MCNC: 74 MG/DL
TSH SERPL DL<=0.05 MIU/L-ACNC: 5.61 UIU/ML (ref 0.45–4.5)

## 2024-10-12 PROCEDURE — 84439 ASSAY OF FREE THYROXINE: CPT

## 2024-10-12 PROCEDURE — 84443 ASSAY THYROID STIM HORMONE: CPT

## 2024-10-12 PROCEDURE — 83036 HEMOGLOBIN GLYCOSYLATED A1C: CPT

## 2024-10-12 PROCEDURE — 80053 COMPREHEN METABOLIC PANEL: CPT

## 2024-10-12 PROCEDURE — 80061 LIPID PANEL: CPT

## 2024-10-22 ENCOUNTER — TELEPHONE (OUTPATIENT)
Dept: PSYCHIATRY | Facility: CLINIC | Age: 54
End: 2024-10-22

## 2024-10-22 NOTE — TELEPHONE ENCOUNTER
Nurse received call from Kaylah # [phone] (ok to leave detailed message on voice mail)       Kaylah states she was reading about a Cymbalta re-call and asks Dr. Lawler how to proceed.    Nurse advised Kaylah to call her pharmacy to see if the capsules she has are involved in the re-call.        Pre-procedure teaching reinforced.

## 2024-10-22 NOTE — TELEPHONE ENCOUNTER
Kaylah should check firs with her pharmacy if Cymbalta tablets she filled are on recall list. If yes - she should let us know and new Cymbalta Rx can be escripted to the pharmacy of her choice.

## 2024-10-22 NOTE — TELEPHONE ENCOUNTER
Nurse spoke with Kaylah - Kaylah talked with the pharmacist and stated they have no information of a re-call of the stock they use/used.  Pharmacist told Kaylah, if the lot she was given was recalled, she would be contacted directly.       Kaylah will call back if she does find her bottle has been recalled.

## 2024-11-04 ENCOUNTER — TELEPHONE (OUTPATIENT)
Age: 54
End: 2024-11-04

## 2024-11-04 NOTE — TELEPHONE ENCOUNTER
Kaylah called to cancel appt 11/4/24 at 3:30 due to not feeling well. Writer asked if she would like to reschedule. Kaylah made f/u for 11/21/24 at 3:30 pm.

## 2024-11-06 ENCOUNTER — OFFICE VISIT (OUTPATIENT)
Age: 54
End: 2024-11-06
Payer: COMMERCIAL

## 2024-11-06 VITALS
WEIGHT: 166 LBS | HEIGHT: 62 IN | TEMPERATURE: 98.2 F | OXYGEN SATURATION: 98 % | DIASTOLIC BLOOD PRESSURE: 74 MMHG | HEART RATE: 81 BPM | SYSTOLIC BLOOD PRESSURE: 122 MMHG | BODY MASS INDEX: 30.55 KG/M2

## 2024-11-06 DIAGNOSIS — E03.9 HYPOTHYROIDISM, UNSPECIFIED TYPE: Primary | ICD-10-CM

## 2024-11-06 DIAGNOSIS — E78.5 HYPERLIPIDEMIA, UNSPECIFIED HYPERLIPIDEMIA TYPE: ICD-10-CM

## 2024-11-06 DIAGNOSIS — K21.9 CHRONIC GERD: ICD-10-CM

## 2024-11-06 DIAGNOSIS — R73.09 ABNORMAL GLUCOSE: ICD-10-CM

## 2024-11-06 DIAGNOSIS — E78.2 MIXED HYPERLIPIDEMIA: ICD-10-CM

## 2024-11-06 PROCEDURE — 99396 PREV VISIT EST AGE 40-64: CPT | Performed by: INTERNAL MEDICINE

## 2024-11-06 PROCEDURE — 99214 OFFICE O/P EST MOD 30 MIN: CPT | Performed by: INTERNAL MEDICINE

## 2024-11-06 RX ORDER — ROSUVASTATIN CALCIUM 5 MG/1
5 TABLET, COATED ORAL DAILY
Qty: 90 TABLET | Refills: 3 | Status: SHIPPED | OUTPATIENT
Start: 2024-11-06 | End: 2025-11-01

## 2024-11-06 RX ORDER — LEVOTHYROXINE SODIUM 75 UG/1
75 TABLET ORAL
Qty: 100 TABLET | Refills: 3 | Status: SHIPPED | OUTPATIENT
Start: 2024-11-06

## 2024-11-06 NOTE — ASSESSMENT & PLAN NOTE
Patient denies any recent acid reflux breakthrough symptoms recommend continuation of current famotidine 40 mg at bedtime and pantoprazole 40 mg in the morning.

## 2024-11-06 NOTE — ASSESSMENT & PLAN NOTE
Fasting blood sugar and hemoglobin A1c confirm good control of abnormal glucoses recommend continuation of balanced diet regular exercise and continuation of metformin 500 mg extended release daily at dinnertime

## 2024-11-06 NOTE — ASSESSMENT & PLAN NOTE
Reviewed with the patient today her lipid profile which shows elevation in total cholesterol as well as LDL with upward trends.  She is currently taking rosuvastatin 5 mg on Monday Wednesday and Friday and I have asked her to increase her dose to daily.  A follow-up lipid profile should be performed in 4 months.  A review of dietary guidelines was performed today

## 2024-11-06 NOTE — PROGRESS NOTES
Ambulatory Visit  Name: Kaylah Matute      : 1970      MRN: [id number]  Encounter Provider: Elvis Montilla MD  Encounter Date: 2024   Encounter department: [facility] INTERNAL MEDICINE    Assessment & Plan  Hypothyroidism, unspecified type  I have reviewed the patient's most recent free T4 and TSH values.  T4 is in the normal range but lower and and TSH is elevated patient is currently on 50 mcg of levothyroxine daily and have asked her to increase her dose to 75 mcg daily.  She does admit to some fatigue symptoms.  I have asked her to have a follow-up free T4 TSH in 6 weeks with a follow-up evaluation in the office in 7 weeks.    Orders:    levothyroxine 75 mcg tablet; Take 1 tablet (75 mcg total) by mouth daily in the early morning    T4, free; Future    TSH, 3rd generation; Future    Hyperlipidemia, unspecified hyperlipidemia type    Orders:    rosuvastatin (CRESTOR) 5 mg tablet; Take 1 tablet (5 mg total) by mouth daily    Chronic GERD  Patient denies any recent acid reflux breakthrough symptoms recommend continuation of current famotidine 40 mg at bedtime and pantoprazole 40 mg in the morning.         Mixed hyperlipidemia  Reviewed with the patient today her lipid profile which shows elevation in total cholesterol as well as LDL with upward trends.  She is currently taking rosuvastatin 5 mg on  and Friday and I have asked her to increase her dose to daily.  A follow-up lipid profile should be performed in 4 months.  A review of dietary guidelines was performed today         Abnormal glucose  Fasting blood sugar and hemoglobin A1c confirm good control of abnormal glucoses recommend continuation of balanced diet regular exercise and continuation of metformin 500 mg extended release daily at dinnertime              History of Present Illness     This pleasant 53-year-old female patient returns to our office today for an annual physical examination and review of her  most recent blood work.  She has no complaints at this time.      Review of Systems   Constitutional:  Positive for fatigue.   All other systems reviewed and are negative.    Past Medical History:   Diagnosis Date    Allergic 1989    Amenorrhea     Anemia 2023    Anxiety     Arthritis     Asthma     Back pain     Chondromalacia of patella     Chronic fatigue     Colon polyp     COPD (chronic obstructive pulmonary disease) (HCC) Yes    Deep vein thrombophlebitis of leg (HCC)     Depression     Disease of thyroid gland     Diverticulitis     GERD (gastroesophageal reflux disease)     Headache(784.0)     History of transfusion 2008    HPV (human papilloma virus) infection     Hypothyroidism     Lumbar radiculopathy     Migraine     Myofascial pain syndrome     Osteopenia     Piriformis syndrome     Sacroiliitis (HCC)     Sciatica     Seizure (HCC)     Sleep apnea     Spondylosis of lumbar spine     Sprain of temporomandibular joint or ligament 2023    Trochanteric bursitis of right hip     Vertigo     Vitamin D deficiency     Weight gain 2019     Past Surgical History:   Procedure Laterality Date    CERVIX LESION DESTRUCTION       SECTION      COLONOSCOPY      COLPOSCOPY W/ BIOPSY / CURETTAGE      Colposcopy cervix with biopsy    LAPAROSCOPIC ENDOMETRIOSIS FULGURATION      LAPAROSCOPY      TOOTH EXTRACTION       Family History   Problem Relation Age of Onset    Heart disease Mother     Asthma Daughter     Lung cancer Paternal Grandfather     Asthma Daughter     Colon cancer Father     Colon cancer Maternal Grandfather     Prostate cancer Maternal Grandfather     Colon cancer Maternal Aunt     No Known Problems Maternal Grandmother     Diabetes Paternal Grandmother     Dementia Paternal Grandmother     No Known Problems Maternal Aunt     No Known Problems Maternal Aunt     No Known Problems Maternal Aunt     No Known Problems Paternal Aunt     Asthma Daughter     Psychiatric Illness Neg Hx      Stroke Neg Hx     Thyroid disease Neg Hx     Substance Abuse Neg Hx     Suicidality Neg Hx      Social History     Tobacco Use    Smoking status: Never    Smokeless tobacco: Never   Vaping Use    Vaping status: Never Used   Substance and Sexual Activity    Alcohol use: Not Currently    Drug use: Not Currently    Sexual activity: Yes     Partners: Male     Birth control/protection: I.U.D.     Comment: Mirena     Current Outpatient Medications on File Prior to Visit   Medication Sig    acetaminophen (TYLENOL) 325 mg tablet Take 2 tablets (650 mg total) by mouth every 6 (six) hours as needed for mild pain    albuterol (2.5 mg/3 mL) 0.083 % nebulizer solution Inhale     albuterol (Proventil HFA) 90 mcg/act inhaler Inhale 2 puffs every 6 (six) hours as needed for wheezing    aluminum-magnesium hydroxide 200-200 MG/5ML suspension GAVISCON REGULAR STRENGTH AFTER MEALS and BEDTIME WHEN NOT FOLLOWING LPR/GERD DIET.    ARIPiprazole (ABILIFY) 5 mg tablet Take 1 tablet (5 mg total) by mouth daily at bedtime    Ascorbic Acid (VITAMIN C PO) Take by mouth    azelastine (ASTELIN) 0.1 % nasal spray 1-2 sprays into each nostril 2 (two) times a day as needed for rhinitis Use in each nostril as directed    cyclobenzaprine (FLEXERIL) 5 mg tablet Take 1 tablet (5 mg total) by mouth 3 (three) times a day    DULoxetine (CYMBALTA) 60 mg delayed release capsule Take 2 capsules (120 mg total) by mouth daily    eptinezumab-jjmr (VYEPTI) IVPB Infuse 100mg IV every 90 days.  Note to pharmacy: To be delivered to where patient will be having infusion: [facility] center. [phone], [phone]    famotidine (PEPCID) 40 MG tablet TAKE 1 TABLET(40 MG) BY MOUTH DAILY AT BEDTIME    gabapentin (Neurontin) 300 mg capsule 3 tabs BID    ibuprofen (MOTRIN) 800 mg tablet TAKE 1 TABLET(800 MG) BY MOUTH EVERY 8 HOURS AS NEEDED FOR MILD PAIN    ipratropium (ATROVENT) 0.06 % nasal spray 2 sprays into each nostril 4 (four)  times a day    ketorolac (TORADOL) 10 mg tablet Take 1 tablet (10 mg total) by mouth every 6 (six) hours as needed (migraine, back pain) Max 2-3 per week.    lamoTRIgine (LaMICtal) 100 mg tablet Take 1 tablet (100 mg total) by mouth daily at bedtime    levocetirizine (XYZAL) 5 MG tablet Take 5 mg by mouth every evening    levonorgestrel (MIRENA) 20 MCG/24HR IUD by Intrauterine route    metFORMIN (GLUCOPHAGE-XR) 500 mg 24 hr tablet Take 1 tablet (500 mg total) by mouth daily with dinner    mometasone (NASONEX) 50 mcg/act nasal spray     Multiple Vitamins-Minerals (ZINC PO) Take by mouth    naltrexone (REVIA) 50 mg tablet Take 1 tablet (50 mg total) by mouth daily    naproxen (NAPROSYN) 500 mg tablet TAKE 1 TABLET(500 MG) BY MOUTH TWICE DAILY WITH MEALS    ondansetron (ZOFRAN-ODT) 4 mg disintegrating tablet Take 1 tablet (4 mg total) by mouth every 6 (six) hours as needed for nausea or vomiting    pantoprazole (PROTONIX) 40 mg tablet TAKE 1 TABLET(40 MG) BY MOUTH DAILY    prochlorperazine (COMPAZINE) 10 mg tablet 1 tab q6 hours prn migraine (with cocktail)    traZODone (DESYREL) 100 mg tablet Take 1-3 tablets (100-300 mg total) by mouth daily at bedtime as needed for sleep    VITAMIN D PO Take 5,000 Units by mouth    ZOLMitriptan (ZOMIG-ZMT) 5 MG disintegrating tablet DISSOLVE 1 TABLET BY MOUTH AT ONSET OF HEADACHE, MAY REPEAT AFTER TWO HOURS AS NEEDED, MAX 2 TABLETS PER 24 HOURS    [DISCONTINUED] levothyroxine 50 mcg tablet TAKE 1 TABLET(50 MCG) BY MOUTH DAILY    tiotropium (SPIRIVA RESPIMAT) 1.25 MCG/ACT AERS inhaler Inhale 2 puffs daily    [DISCONTINUED] rosuvastatin (CRESTOR) 5 mg tablet Take 1 tablet (5 mg total) by mouth 3 (three) times a week TAKE 1 TABLET BY MOUTH MONDAY WEDNESDAY AND FRIDAY IN THE EVENING     Allergies   Allergen Reactions    Nuts - Food Allergy      Other reaction(s): WALNUTS    Cephalexin     Erythromycin Diarrhea, GI Intolerance and Vomiting    Iodine - Food Allergy Hives    Other       "adobo    Shellfish Allergy - Food Allergy     Shellfish-Derived Products - Food Allergy     Turmeric - Food Allergy Hives    Wellbutrin [Bupropion] Seizures    Zithromax [Azithromycin] Diarrhea     Can take name brand  Annotation - [id number]: can take brand name  Other reaction(s): Nausea/vomiting/diarrhea     Immunization History   Administered Date(s) Administered    COVID-19 PFIZER VACCINE 0.3 ML IM 05/21/2021, 06/16/2021, 01/31/2022, 01/31/2022    INFLUENZA 10/31/2018    Influenza, injectable, quadrivalent, preservative free 0.5 mL 10/31/2018, 10/07/2019    Pneumococcal Conjugate Vaccine 20-valent (Pcv20), Polysace 08/30/2022    Tdap 06/06/2019    Zoster Vaccine Recombinant 01/05/2023, 03/15/2023     Objective     /74   Pulse 81   Temp 98.2 °F (36.8 °C) (Tympanic)   Ht 5' 2\" (1.575 m)   Wt 75.3 kg (166 lb)   SpO2 98%   BMI 30.36 kg/m²     Physical Exam  Vitals and nursing note reviewed.   Constitutional:       General: She is not in acute distress.     Appearance: She is well-developed.   HENT:      Head: Normocephalic and atraumatic.      Right Ear: Tympanic membrane, ear canal and external ear normal.      Left Ear: Tympanic membrane, ear canal and external ear normal.      Nose: Nose normal.      Mouth/Throat:      Mouth: Mucous membranes are moist.      Pharynx: Oropharynx is clear.   Eyes:      Extraocular Movements: Extraocular movements intact.      Conjunctiva/sclera: Conjunctivae normal.      Pupils: Pupils are equal, round, and reactive to light.   Neck:      Vascular: No carotid bruit.   Cardiovascular:      Rate and Rhythm: Normal rate and regular rhythm.      Heart sounds: Normal heart sounds. No murmur heard.  Pulmonary:      Effort: Pulmonary effort is normal. No respiratory distress.      Breath sounds: Normal breath sounds. No wheezing, rhonchi or rales.   Abdominal:      General: Abdomen is flat. Bowel sounds are normal. There is no distension.      Palpations: Abdomen is soft. " There is no mass.      Tenderness: There is no abdominal tenderness. There is no guarding.   Musculoskeletal:      Cervical back: Normal range of motion and neck supple. No rigidity or tenderness.      Right lower leg: No edema.      Left lower leg: No edema.   Lymphadenopathy:      Cervical: No cervical adenopathy.   Skin:     General: Skin is warm and dry.      Capillary Refill: Capillary refill takes less than 2 seconds.      Coloration: Skin is not jaundiced or pale.   Neurological:      General: No focal deficit present.      Mental Status: She is alert. Mental status is at baseline.   Psychiatric:         Mood and Affect: Mood normal.         Behavior: Behavior normal.         Thought Content: Thought content normal.         Judgment: Judgment normal.

## 2024-11-06 NOTE — ASSESSMENT & PLAN NOTE
I have reviewed the patient's most recent free T4 and TSH values.  T4 is in the normal range but lower and and TSH is elevated patient is currently on 50 mcg of levothyroxine daily and have asked her to increase her dose to 75 mcg daily.  She does admit to some fatigue symptoms.  I have asked her to have a follow-up free T4 TSH in 6 weeks with a follow-up evaluation in the office in 7 weeks.    Orders:    levothyroxine 75 mcg tablet; Take 1 tablet (75 mcg total) by mouth daily in the early morning    T4, free; Future    TSH, 3rd generation; Future
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
## 2024-11-19 ENCOUNTER — TELEPHONE (OUTPATIENT)
Dept: NEUROLOGY | Facility: CLINIC | Age: 54
End: 2024-11-19

## 2024-11-20 DIAGNOSIS — G43.709 CHRONIC MIGRAINE WITHOUT AURA WITHOUT STATUS MIGRAINOSUS, NOT INTRACTABLE: ICD-10-CM

## 2024-11-20 RX ORDER — GABAPENTIN 300 MG/1
CAPSULE ORAL
Qty: 540 CAPSULE | Refills: 0 | Status: SHIPPED | OUTPATIENT
Start: 2024-11-20

## 2024-11-20 NOTE — TELEPHONE ENCOUNTER
Patient requesting refill of gabapentin 300mg - 1 cap BID    LOV - 10/31/23  F/U - 12/3/24 w/MK    Last rx - 10/31/23 for a 9 month supply    Script pended below.     MK - please sign if agreeable. Thank you.

## 2024-11-20 NOTE — TELEPHONE ENCOUNTER
Reason for call:   [x] Refill   [] Prior Auth  [] Other:     Office:   [] PCP/Provider -   [x] Specialty/Provider - Melissa Montilla PA-C / [facility]     Medication: gabapentin (Neurontin) 300 mg capsule /  3 tabs BID     Pharmacy: [id number] - [place], [place] - [address]      Does the patient have enough for 3 days?   [] Yes   [x] No - Send as HP to POD

## 2024-11-21 ENCOUNTER — OFFICE VISIT (OUTPATIENT)
Dept: PSYCHIATRY | Facility: CLINIC | Age: 54
End: 2024-11-21
Payer: COMMERCIAL

## 2024-11-21 VITALS
DIASTOLIC BLOOD PRESSURE: 66 MMHG | WEIGHT: 169 LBS | BODY MASS INDEX: 31.1 KG/M2 | HEART RATE: 84 BPM | SYSTOLIC BLOOD PRESSURE: 117 MMHG | HEIGHT: 62 IN

## 2024-11-21 DIAGNOSIS — F41.0 PANIC DISORDER WITHOUT AGORAPHOBIA: Chronic | ICD-10-CM

## 2024-11-21 DIAGNOSIS — F63.9 IMPULSE CONTROL DISORDER: Chronic | ICD-10-CM

## 2024-11-21 DIAGNOSIS — G47.09 OTHER INSOMNIA: Chronic | ICD-10-CM

## 2024-11-21 DIAGNOSIS — Z79.899 LONG-TERM USE OF HIGH-RISK MEDICATION: Chronic | ICD-10-CM

## 2024-11-21 DIAGNOSIS — F33.0 MAJOR DEPRESSIVE DISORDER, RECURRENT EPISODE, MILD (HCC): Primary | Chronic | ICD-10-CM

## 2024-11-21 DIAGNOSIS — F41.1 GAD (GENERALIZED ANXIETY DISORDER): Chronic | ICD-10-CM

## 2024-11-21 DIAGNOSIS — F60.9 PERSONALITY DISORDER (HCC): Chronic | ICD-10-CM

## 2024-11-21 DIAGNOSIS — F42.9 OBSESSIVE-COMPULSIVE DISORDER, UNSPECIFIED TYPE: Chronic | ICD-10-CM

## 2024-11-21 PROCEDURE — 90833 PSYTX W PT W E/M 30 MIN: CPT | Performed by: PSYCHIATRY & NEUROLOGY

## 2024-11-21 PROCEDURE — 99214 OFFICE O/P EST MOD 30 MIN: CPT | Performed by: PSYCHIATRY & NEUROLOGY

## 2024-11-21 RX ORDER — TRAZODONE HYDROCHLORIDE 100 MG/1
100-300 TABLET ORAL
Qty: 90 TABLET | Refills: 4 | Status: SHIPPED | OUTPATIENT
Start: 2024-11-21 | End: 2025-04-20

## 2024-11-21 RX ORDER — NALTREXONE HYDROCHLORIDE 50 MG/1
50 TABLET, FILM COATED ORAL DAILY
Qty: 30 TABLET | Refills: 4 | Status: SHIPPED | OUTPATIENT
Start: 2024-11-21 | End: 2025-04-20

## 2024-11-21 RX ORDER — DULOXETIN HYDROCHLORIDE 60 MG/1
120 CAPSULE, DELAYED RELEASE ORAL DAILY
Qty: 60 CAPSULE | Refills: 4 | Status: SHIPPED | OUTPATIENT
Start: 2024-11-21 | End: 2025-04-20

## 2024-11-21 RX ORDER — LAMOTRIGINE 150 MG/1
150 TABLET ORAL
Qty: 30 TABLET | Refills: 4 | Status: SHIPPED | OUTPATIENT
Start: 2024-11-21 | End: 2025-04-20

## 2024-11-21 RX ORDER — ARIPIPRAZOLE 5 MG/1
5 TABLET ORAL
Qty: 30 TABLET | Refills: 4 | Status: SHIPPED | OUTPATIENT
Start: 2024-11-21 | End: 2025-04-20

## 2024-11-21 NOTE — ASSESSMENT & PLAN NOTE
Mild anxiety symptoms  Continue Cymbalta 120 mg daily to control depressive symptoms and anxiety  On Neurontin 100 mg three times a day to help with anxiety and headaches - prescribed by neurologist  Orders:    DULoxetine (CYMBALTA) 60 mg delayed release capsule; Take 2 capsules (120 mg total) by mouth daily

## 2024-11-21 NOTE — ASSESSMENT & PLAN NOTE
Some depression  Continue Cymbalta 120 mg daily to control depressive symptoms  Increase Lamictal to 150 mg at bedtime to help with depressive symptoms and mood  Continue Abilify 5 mg at bedtime to help with mood  Orders:    DULoxetine (CYMBALTA) 60 mg delayed release capsule; Take 2 capsules (120 mg total) by mouth daily    ARIPiprazole (ABILIFY) 5 mg tablet; Take 1 tablet (5 mg total) by mouth daily at bedtime    lamoTRIgine (LaMICtal) 150 MG tablet; Take 1 tablet (150 mg total) by mouth daily at bedtime

## 2024-11-21 NOTE — BH CRISIS PLAN
Client Name: Kaylah Matute       Client YOB: 1970    [place] Safety Plan      Creation Date: 2/8/24 Update Date: 11/21/24   Created By: Rosa Lawler MD Last Updated By: Rosa Lawler MD      Step 1: Warning Signs:   Warning Signs   anxiety gets worse            Step 2: Internal Coping Strategies:   Internal Coping Strategies   play games on my phone            Step 3: People and social settings that provide distraction:   Name Contact Information   Lavinia (friend) in my cell phone   Lucy (friend) in my cell phone            Step 4: People whom I can ask for help during a crisis:      Name Contact Information    Lavinia (friend) in my cell phone    Lucy (friend) in my cell phone      Step 5: Professionals or agencies I can contact during a crisis:      Clinican/Agency Name Phone Emergency Contact    [facility] [phone]       [facility] Emergency Department Emergency Department Phone Emergency Department Address    [facility] [address]         Crisis Phone Numbers:   Suicide Prevention Lifeline: Call or Text  055 Crisis Text Line: Text HOME to 471-443   Please note: Some [facility] do not have a separate number for Child/Adolescent specific crisis. If your county is not listed under Child/Adolescent, please call the adult number for your county      Adult Crisis Numbers: Child/Adolescent Crisis Numbers   [facility]: [phone] [facility]: [phone]   [facility]: [phone] [facility]: [phone]   [facility]: [phone] [place]: [phone]   [facility]: [phone] [place]: [phone]   [place]: [phone]   [facility]: [phone]   [facility]: [phone]   [facility]: [phone] (daytime) [phone] (after hours, weekends, holidays)      Step 6: Making the environment safer (plan for lethal means safety):   Plan: Gun - locked, by       Optional: What is most important to me and worth living for?   My daughter and dog     [facility] Safety Plan. Teena Palomino and Minor Shelton. Used with permission of the authors.

## 2024-11-21 NOTE — ASSESSMENT & PLAN NOTE
Stable  Continue Trazodone 100 mg to 300 mg at bedtime as needed to help with insomnia  Orders:    traZODone (DESYREL) 100 mg tablet; Take 1-3 tablets (100-300 mg total) by mouth daily at bedtime as needed for sleep

## 2024-11-21 NOTE — BH TREATMENT PLAN
"TREATMENT PLAN (Medication Management Only)        [facility]    Name/Date of Birth/MRN:  Kaylah Matute 53 y.o. 1970 MRN: [id number]  Date of Treatment Plan: November 21, 2024  Diagnosis/Diagnoses:   1. Major depressive disorder, recurrent episode, mild (HCC)    2. KYLE (generalized anxiety disorder)    3. Impulse control disorder    4. Panic disorder without agoraphobia    5. Obsessive-compulsive disorder, unspecified type    6. Personality disorder (HCC)    7. Long-term use of high-risk medication    8. Other insomnia      Strengths/Personal Resources for Self-Care: \"taking my medications and being open-minded\".  Area/Areas of need (in own words): \"depression\".  1. Long Term Goal:   maintain control of anxiety, improve control of depression  Target Date: 3 months - 2/21/2025  Person/Persons responsible for completion of goal: Kaylah  2.  Short Term Objective (s) - How will we reach this goal?:   A.  Provider new recommended medication/dosage changes and/or continue medication(s): increase Lamictal, continue all other medications (Cymbalta, Trazodone, Abilify, and Naltrexone).  B.  N/A.  C.  N/A.  Target Date: 3 months - 2/21/2025  Person/Persons Responsible for Completion of Goal: Kaylah   Progress Towards Goals: limited progress  Treatment Modality: medication management every 3 months, continue psychotherapy with own therapist  Review due 180 days from date of this plan: 6 months - 5/21/2025  Expected length of service: ongoing treatment unless revised  My Physician/PA/NP and I have developed this plan together and I agree to work on the goals and objectives. I understand the treatment goals that were developed for my treatment.  Electronic Signatures: on file (unless signed below)    Rosa Lawler MD 11/21/24  "

## 2024-11-21 NOTE — ASSESSMENT & PLAN NOTE
Stable  Continue Naltrexone 50 mg daily to help with impulsivity  Orders:    naltrexone (REVIA) 50 mg tablet; Take 1 tablet (50 mg total) by mouth daily

## 2024-11-27 ENCOUNTER — TELEPHONE (OUTPATIENT)
Age: 54
End: 2024-11-27

## 2024-11-27 DIAGNOSIS — J06.9 URTI (ACUTE UPPER RESPIRATORY INFECTION): Primary | ICD-10-CM

## 2024-11-27 RX ORDER — AMOXICILLIN 500 MG/1
500 CAPSULE ORAL EVERY 8 HOURS SCHEDULED
Qty: 30 CAPSULE | Refills: 0 | Status: SHIPPED | OUTPATIENT
Start: 2024-11-27 | End: 2024-12-07

## 2024-11-27 NOTE — TELEPHONE ENCOUNTER
Patient had called in and mentioned that she is not feeling well, running/stuffy nose, sore throat, congestion.     She was requesting to have Dr. Montilla send something in to be able to help these symptoms.     Did let patient know that an appointment would need to be scheduled, for antibiotics to be called in, understood, and still wanted the message sent over.   
Please call the patient a prescription for amoxicillin has been sent to her pharmacy it is a 500 mg dose that she would take 3 times a day for 10 days follow-up if symptoms persist or worsen.  Thank you
3 = A little assistance

## 2024-12-01 ENCOUNTER — APPOINTMENT (EMERGENCY)
Dept: CT IMAGING | Facility: HOSPITAL | Age: 54
End: 2024-12-01
Payer: COMMERCIAL

## 2024-12-01 ENCOUNTER — HOSPITAL ENCOUNTER (EMERGENCY)
Facility: HOSPITAL | Age: 54
Discharge: HOME | End: 2024-12-01
Attending: EMERGENCY MEDICINE
Payer: COMMERCIAL

## 2024-12-01 VITALS
HEART RATE: 75 BPM | TEMPERATURE: 97.9 F | SYSTOLIC BLOOD PRESSURE: 122 MMHG | DIASTOLIC BLOOD PRESSURE: 64 MMHG | RESPIRATION RATE: 18 BRPM | OXYGEN SATURATION: 97 %

## 2024-12-01 DIAGNOSIS — J02.9 PHARYNGITIS: Primary | ICD-10-CM

## 2024-12-01 LAB
ANION GAP SERPL CALCULATED.3IONS-SCNC: 5 MMOL/L (ref 4–13)
BASOPHILS # BLD AUTO: 0.04 THOUSANDS/ΜL (ref 0–0.1)
BASOPHILS NFR BLD AUTO: 1 % (ref 0–1)
BUN SERPL-MCNC: 12 MG/DL (ref 5–25)
CALCIUM SERPL-MCNC: 8.8 MG/DL (ref 8.4–10.2)
CHLORIDE SERPL-SCNC: 109 MMOL/L (ref 96–108)
CO2 SERPL-SCNC: 28 MMOL/L (ref 21–32)
CREAT SERPL-MCNC: 0.94 MG/DL (ref 0.6–1.3)
EOSINOPHIL # BLD AUTO: 0.12 THOUSAND/ΜL (ref 0–0.61)
EOSINOPHIL NFR BLD AUTO: 2 % (ref 0–6)
ERYTHROCYTE [DISTWIDTH] IN BLOOD BY AUTOMATED COUNT: 12.9 % (ref 11.6–15.1)
FLUAV AG UPPER RESP QL IA.RAPID: NEGATIVE
FLUBV AG UPPER RESP QL IA.RAPID: NEGATIVE
GFR SERPL CREATININE-BSD FRML MDRD: 69 ML/MIN/1.73SQ M
GLUCOSE SERPL-MCNC: 94 MG/DL (ref 65–140)
HCT VFR BLD AUTO: 33.5 % (ref 34.8–46.1)
HGB BLD-MCNC: 10.5 G/DL (ref 11.5–15.4)
IMM GRANULOCYTES # BLD AUTO: 0.02 THOUSAND/UL (ref 0–0.2)
IMM GRANULOCYTES NFR BLD AUTO: 0 % (ref 0–2)
LYMPHOCYTES # BLD AUTO: 1.76 THOUSANDS/ΜL (ref 0.6–4.47)
LYMPHOCYTES NFR BLD AUTO: 28 % (ref 14–44)
MCH RBC QN AUTO: 28.1 PG (ref 26.8–34.3)
MCHC RBC AUTO-ENTMCNC: 31.3 G/DL (ref 31.4–37.4)
MCV RBC AUTO: 90 FL (ref 82–98)
MONOCYTES # BLD AUTO: 0.77 THOUSAND/ΜL (ref 0.17–1.22)
MONOCYTES NFR BLD AUTO: 12 % (ref 4–12)
NEUTROPHILS # BLD AUTO: 3.49 THOUSANDS/ΜL (ref 1.85–7.62)
NEUTS SEG NFR BLD AUTO: 57 % (ref 43–75)
NRBC BLD AUTO-RTO: 0 /100 WBCS
PLATELET # BLD AUTO: 241 THOUSANDS/UL (ref 149–390)
PMV BLD AUTO: 9.1 FL (ref 8.9–12.7)
POTASSIUM SERPL-SCNC: 4.3 MMOL/L (ref 3.5–5.3)
RBC # BLD AUTO: 3.74 MILLION/UL (ref 3.81–5.12)
SARS-COV+SARS-COV-2 AG RESP QL IA.RAPID: NEGATIVE
SODIUM SERPL-SCNC: 142 MMOL/L (ref 135–147)
WBC # BLD AUTO: 6.2 THOUSAND/UL (ref 4.31–10.16)

## 2024-12-01 PROCEDURE — 99284 EMERGENCY DEPT VISIT MOD MDM: CPT | Performed by: EMERGENCY MEDICINE

## 2024-12-01 PROCEDURE — 96365 THER/PROPH/DIAG IV INF INIT: CPT

## 2024-12-01 PROCEDURE — 70491 CT SOFT TISSUE NECK W/DYE: CPT

## 2024-12-01 PROCEDURE — 36415 COLL VENOUS BLD VENIPUNCTURE: CPT

## 2024-12-01 PROCEDURE — 87811 SARS-COV-2 COVID19 W/OPTIC: CPT | Performed by: EMERGENCY MEDICINE

## 2024-12-01 PROCEDURE — 87804 INFLUENZA ASSAY W/OPTIC: CPT | Performed by: EMERGENCY MEDICINE

## 2024-12-01 PROCEDURE — 99284 EMERGENCY DEPT VISIT MOD MDM: CPT

## 2024-12-01 PROCEDURE — 96375 TX/PRO/DX INJ NEW DRUG ADDON: CPT

## 2024-12-01 PROCEDURE — 96366 THER/PROPH/DIAG IV INF ADDON: CPT

## 2024-12-01 PROCEDURE — 80048 BASIC METABOLIC PNL TOTAL CA: CPT

## 2024-12-01 PROCEDURE — 85025 COMPLETE CBC W/AUTO DIFF WBC: CPT

## 2024-12-01 RX ORDER — DEXAMETHASONE SODIUM PHOSPHATE 10 MG/ML
10 INJECTION, SOLUTION INTRAMUSCULAR; INTRAVENOUS ONCE
Status: COMPLETED | OUTPATIENT
Start: 2024-12-01 | End: 2024-12-01

## 2024-12-01 RX ADMIN — AMPICILLIN SODIUM AND SULBACTAM SODIUM 3 G: 100; 50 INJECTION, POWDER, FOR SOLUTION INTRAVENOUS at 14:11

## 2024-12-01 RX ADMIN — IOHEXOL 80 ML: 350 INJECTION, SOLUTION INTRAVENOUS at 14:49

## 2024-12-01 RX ADMIN — DEXAMETHASONE SODIUM PHOSPHATE 10 MG: 10 INJECTION INTRAMUSCULAR; INTRAVENOUS at 14:07

## 2024-12-01 NOTE — DISCHARGE INSTRUCTIONS
Take Augmentin twice a day for the next 5 days.  Follow-up with PCP for further evaluation.  Return to ED if worsening symptoms.

## 2024-12-01 NOTE — ED PROVIDER NOTES
Time reflects when diagnosis was documented in both MDM as applicable and the Disposition within this note       Time User Action Codes Description Comment    12/1/2024  4:03 PM Luca Copeland Add [J06.9] URI (upper respiratory infection)     12/1/2024  4:03 PM Luca Copeland Remove [J06.9] URI (upper respiratory infection)     12/1/2024  4:03 PM Luca Copeland Add [J02.9] Pharyngitis           ED Disposition       ED Disposition   Discharge    Condition   Stable    Date/Time   Sun Dec 1, 2024  4:05 PM    Comment   Kaylah OLGA LIDIA Matute discharge to home/self care.                   Assessment & Plan       Medical Decision Making  See ed course    Amount and/or Complexity of Data Reviewed  Labs: ordered. Decision-making details documented in ED Course.  Radiology: ordered.    Risk  Prescription drug management.        ED Course as of 12/01/24 1624   Sun Dec 01, 2024   1349 53-year-old otherwise healthy female presenting for sore throat.  Woke up this morning with congestion, sore throat.  Pain with eating/drinking but has been able to do so.  No shortness of breath.  No fevers.   has been sick with sore throat.  Was evaluated at urgent care this morning with negative strep throat.   1350 FLU/COVID Rapid Antigen (30 min. TAT) - Preferred screening test in ED  Negative COVID, flu.   1351 Differential at this time includes peritonsillar abscess, pharyngitis, tonsillitis, URI.   1603 IMPRESSION:     No soft tissue mass or pathologic adenopathy. No signs of airway infection.   1614 Prescription for Augmentin.  Discharge stable condition.  Outpatient follow-up.       Medications   ampicillin-sulbactam (UNASYN) 3 g in sodium chloride 0.9 % 100 mL IVPB (0 g Intravenous Stopped 12/1/24 1613)   dexamethasone (PF) (DECADRON) injection 10 mg (10 mg Intravenous Given 12/1/24 1407)   iohexol (OMNIPAQUE) 350 MG/ML injection (MULTI-DOSE) 80 mL (80 mL Intravenous Given 12/1/24 1449)       ED Risk Strat Scores                                                History of Present Illness       Chief Complaint   Patient presents with    URI     Congestion today. Sent from urgent care due to uvular swelling. Painful swallowing. No difficulty breathing.         Past Medical History:   Diagnosis Date    Allergic     Amenorrhea     Anemia 2023    Anxiety     Arthritis     Asthma     Back pain     Chondromalacia of patella     Chronic fatigue     Colon polyp     COPD (chronic obstructive pulmonary disease) (HCC) Yes    Deep vein thrombophlebitis of leg (HCC)     Depression     Disease of thyroid gland     Diverticulitis     GERD (gastroesophageal reflux disease)     Headache(784.0)     History of transfusion 2008    HPV (human papilloma virus) infection     Hypothyroidism     Lumbar radiculopathy     Migraine     Myofascial pain syndrome     Osteopenia     Piriformis syndrome     Sacroiliitis (HCC)     Sciatica     Seizure ([facility])     Sleep apnea     Spondylosis of lumbar spine     Sprain of temporomandibular joint or ligament 2023    Trochanteric bursitis of right hip     Vertigo     Vitamin D deficiency     Weight gain 2019      Past Surgical History:   Procedure Laterality Date    CERVIX LESION DESTRUCTION       SECTION      COLONOSCOPY      COLPOSCOPY W/ BIOPSY / CURETTAGE      Colposcopy cervix with biopsy    LAPAROSCOPIC ENDOMETRIOSIS FULGURATION      LAPAROSCOPY      TOOTH EXTRACTION        Family History   Problem Relation Age of Onset    Heart disease Mother     Asthma Daughter     Lung cancer Paternal Grandfather     Asthma Daughter     Colon cancer Father     Colon cancer Maternal Grandfather     Prostate cancer Maternal Grandfather     Colon cancer Maternal Aunt     No Known Problems Maternal Grandmother     Diabetes Paternal Grandmother     Dementia Paternal Grandmother     No Known Problems Maternal Aunt     No Known Problems Maternal Aunt     No Known Problems Maternal Aunt     No Known  Problems Paternal Aunt     Asthma Daughter     Psychiatric Illness Neg Hx     Stroke Neg Hx     Thyroid disease Neg Hx     Substance Abuse Neg Hx     Suicidality Neg Hx       Social History     Tobacco Use    Smoking status: Never    Smokeless tobacco: Never   Vaping Use    Vaping status: Never Used   Substance Use Topics    Alcohol use: Not Currently    Drug use: Not Currently      E-Cigarette/Vaping    E-Cigarette Use Never User       E-Cigarette/Vaping Substances    Nicotine No     THC No     CBD No     Flavoring No     Other No     Unknown No       I have reviewed and agree with the history as documented.     53-year-old otherwise healthy female presenting for sore throat.  Woke up this morning with congestion, sore throat.  Pain with eating/drinking but has been able to do so.  No shortness of breath.  No fevers.   has been sick with sore throat.  Was evaluated at urgent care this morning with negative strep throat.      URI  Presenting symptoms: congestion and sore throat    Presenting symptoms: no fever    Associated symptoms: no headaches        Review of Systems   Constitutional:  Negative for fever.   HENT:  Positive for congestion and sore throat.    Respiratory:  Negative for chest tightness and shortness of breath.    Cardiovascular:  Negative for chest pain.   Gastrointestinal:  Negative for abdominal pain.   Genitourinary:  Negative for dysuria.   Skin:  Negative for color change.   Neurological:  Negative for headaches.           Objective       ED Triage Vitals   Temperature Pulse Blood Pressure Respirations SpO2 Patient Position - Orthostatic VS   12/01/24 1227 12/01/24 1227 12/01/24 1227 12/01/24 1227 12/01/24 1227 12/01/24 1227   97.9 °F (36.6 °C) 79 121/66 18 100 % Sitting      Temp Source Heart Rate Source BP Location FiO2 (%) Pain Score    12/01/24 1227 12/01/24 1227 12/01/24 1227 -- 12/01/24 1614    Oral Monitor Right arm  2      Vitals      Date and Time Temp Pulse SpO2 Resp BP Pain  Score FACES Pain Rating User   12/01/24 1614 -- 75 97 % 18 122/64 2 -- VB   12/01/24 1227 97.9 °F (36.6 °C) 79 100 % 18 121/66 -- -- TP            Physical Exam  Vitals and nursing note reviewed.   Constitutional:       General: She is not in acute distress.     Appearance: She is well-developed.   HENT:      Head: Normocephalic and atraumatic.      Mouth/Throat:      Mouth: Mucous membranes are moist.      Pharynx: Posterior oropharyngeal erythema present. No oropharyngeal exudate.      Comments: Notable swelling to the soft palate.  Questionable uvular deviation to the right.  No exudates.  Eyes:      Conjunctiva/sclera: Conjunctivae normal.   Cardiovascular:      Rate and Rhythm: Normal rate and regular rhythm.      Heart sounds: No murmur heard.  Pulmonary:      Effort: Pulmonary effort is normal. No respiratory distress.      Breath sounds: Normal breath sounds.   Abdominal:      Palpations: Abdomen is soft.   Musculoskeletal:         General: No swelling.      Cervical back: Neck supple.   Skin:     General: Skin is warm and dry.      Capillary Refill: Capillary refill takes less than 2 seconds.   Neurological:      Mental Status: She is alert.   Psychiatric:         Mood and Affect: Mood normal.         Results Reviewed       Procedure Component Value Units Date/Time    Basic metabolic panel [[id number]]  (Abnormal) Collected: 12/01/24 1406    Lab Status: Final result Specimen: Blood from Arm, Right Updated: 12/01/24 1434     Sodium 142 mmol/L      Potassium 4.3 mmol/L      Chloride 109 mmol/L      CO2 28 mmol/L      ANION GAP 5 mmol/L      BUN 12 mg/dL      Creatinine 0.94 mg/dL      Glucose 94 mg/dL      Calcium 8.8 mg/dL      eGFR 69 ml/min/1.73sq m     Narrative:      National Kidney Disease Foundation guidelines for Chronic Kidney Disease (CKD):     Stage 1 with normal or high GFR (GFR > 90 mL/min/1.73 square meters)    Stage 2 Mild CKD (GFR = 60-89 mL/min/1.73 square meters)    Stage 3A Moderate CKD (GFR  = 45-59 mL/min/1.73 square meters)    Stage 3B Moderate CKD (GFR = 30-44 mL/min/1.73 square meters)    Stage 4 Severe CKD (GFR = 15-29 mL/min/1.73 square meters)    Stage 5 End Stage CKD (GFR <15 mL/min/1.73 square meters)  Note: GFR calculation is accurate only with a steady state creatinine    CBC and differential [[id number]]  (Abnormal) Collected: 12/01/24 1406    Lab Status: Final result Specimen: Blood from Arm, Right Updated: 12/01/24 1417     WBC 6.20 Thousand/uL      RBC 3.74 Million/uL      Hemoglobin 10.5 g/dL      Hematocrit 33.5 %      MCV 90 fL      MCH 28.1 pg      MCHC 31.3 g/dL      RDW 12.9 %      MPV 9.1 fL      Platelets 241 Thousands/uL      nRBC 0 /100 WBCs      Segmented % 57 %      Immature Grans % 0 %      Lymphocytes % 28 %      Monocytes % 12 %      Eosinophils Relative 2 %      Basophils Relative 1 %      Absolute Neutrophils 3.49 Thousands/µL      Absolute Immature Grans 0.02 Thousand/uL      Absolute Lymphocytes 1.76 Thousands/µL      Absolute Monocytes 0.77 Thousand/µL      Eosinophils Absolute 0.12 Thousand/µL      Basophils Absolute 0.04 Thousands/µL     FLU/COVID Rapid Antigen (30 min. TAT) - Preferred screening test in ED [[id number]]  (Normal) Collected: 12/01/24 1230    Lab Status: Final result Specimen: Nares from Nose Updated: 12/01/24 1304     SARS COV Rapid Antigen Negative     Influenza A Rapid Antigen Negative     Influenza B Rapid Antigen Negative    Narrative:      This test has been performed using the Quidel Patt 2 FLU+SARS Antigen test under the Emergency Use Authorization (EUA). This test has been validated by the  and verified by the performing laboratory. The Patt uses lateral flow immunofluorescent sandwich assay to detect SARS-COV, Influenza A and Influenza B Antigen.     The Quidel Patt 2 SARS Antigen test does not differentiate between SARS-CoV and SARS-CoV-2.     Negative results are presumptive and may be confirmed with a molecular assay, if  necessary, for patient management. Negative results do not rule out SARS-CoV-2 or influenza infection and should not be used as the sole basis for treatment or patient management decisions. A negative test result may occur if the level of antigen in a sample is below the limit of detection of this test.     Positive results are indicative of the presence of viral antigens, but do not rule out bacterial infection or co-infection with other viruses.     All test results should be used as an adjunct to clinical observations and other information available to the provider.    FOR PEDIATRIC PATIENTS - copy/paste COVID Guidelines URL to browser: [email or web address]            CT soft tissue neck with contrast   Final Interpretation by Deangelo Sol DO (12/01 1551)      No soft tissue mass or pathologic adenopathy. No signs of airway infection.            Workstation performed: [id number]             Procedures    ED Medication and Procedure Management   Prior to Admission Medications   Prescriptions Last Dose Informant Patient Reported? Taking?   ARIPiprazole (ABILIFY) 5 mg tablet   No No   Sig: Take 1 tablet (5 mg total) by mouth daily at bedtime   Ascorbic Acid (VITAMIN C PO)  Self Yes No   Sig: Take by mouth   DULoxetine (CYMBALTA) 60 mg delayed release capsule   No No   Sig: Take 2 capsules (120 mg total) by mouth daily   Multiple Vitamins-Minerals (ZINC PO)  Self Yes No   Sig: Take by mouth   VITAMIN D PO  Self Yes No   Sig: Take 5,000 Units by mouth   ZOLMitriptan (ZOMIG-ZMT) 5 MG disintegrating tablet  Self No No   Sig: DISSOLVE 1 TABLET BY MOUTH AT ONSET OF HEADACHE, MAY REPEAT AFTER TWO HOURS AS NEEDED, MAX 2 TABLETS PER 24 HOURS   albuterol (2.5 mg/3 mL) 0.083 % nebulizer solution  Self Yes No   Sig: Inhale    albuterol (Proventil HFA) 90 mcg/act inhaler  Self No No   Sig: Inhale 2 puffs every 6 (six) hours as needed for wheezing    aluminum-magnesium hydroxide 200-200 MG/5ML suspension  Self No No   Sig: GAVISCON REGULAR STRENGTH AFTER MEALS and BEDTIME WHEN NOT FOLLOWING LPR/GERD DIET.   amoxicillin (AMOXIL) 500 mg capsule   No No   Sig: Take 1 capsule (500 mg total) by mouth every 8 (eight) hours for 10 days   azelastine (ASTELIN) 0.1 % nasal spray  Self No No   Si-2 sprays into each nostril 2 (two) times a day as needed for rhinitis Use in each nostril as directed   cyclobenzaprine (FLEXERIL) 5 mg tablet  Self No No   Sig: Take 1 tablet (5 mg total) by mouth 3 (three) times a day   eptinezumab-jjmr (VYEPTI) IVPB  Self No No   Sig: Infuse 100mg IV every 90 days.  Note to pharmacy: To be delivered to where patient will be having infusion: [facility]. [phone], [phone]   famotidine (PEPCID) 40 MG tablet  Self No No   Sig: TAKE 1 TABLET(40 MG) BY MOUTH DAILY AT BEDTIME   gabapentin (Neurontin) 300 mg capsule   No No   Sig: 3 tabs BID   ibuprofen (MOTRIN) 800 mg tablet  Self No No   Sig: TAKE 1 TABLET(800 MG) BY MOUTH EVERY 8 HOURS AS NEEDED FOR MILD PAIN   ipratropium (ATROVENT) 0.06 % nasal spray  Self No No   Si sprays into each nostril 4 (four) times a day   ketorolac (TORADOL) 10 mg tablet  Self No No   Sig: Take 1 tablet (10 mg total) by mouth every 6 (six) hours as needed (migraine, back pain) Max 2-3 per week.   lamoTRIgine (LaMICtal) 150 MG tablet   No No   Sig: Take 1 tablet (150 mg total) by mouth daily at bedtime   levocetirizine (XYZAL) 5 MG tablet  Self Yes No   Sig: Take 5 mg by mouth every evening   levonorgestrel (MIRENA) 20 MCG/24HR IUD  Self Yes No   Sig: by Intrauterine route   levothyroxine 75 mcg tablet   No No   Sig: Take 1 tablet (75 mcg total) by mouth daily in the early morning   mometasone (NASONEX) 50 mcg/act nasal spray  Self Yes No   naltrexone (REVIA) 50 mg tablet   No No   Sig: Take 1 tablet (50 mg total) by mouth daily   naproxen (NAPROSYN) 500 mg tablet   Self No No   Sig: TAKE 1 TABLET(500 MG) BY MOUTH TWICE DAILY WITH MEALS   ondansetron (ZOFRAN-ODT) 4 mg disintegrating tablet  Self No No   Sig: Take 1 tablet (4 mg total) by mouth every 6 (six) hours as needed for nausea or vomiting   pantoprazole (PROTONIX) 40 mg tablet  Self No No   Sig: TAKE 1 TABLET(40 MG) BY MOUTH DAILY   prochlorperazine (COMPAZINE) 10 mg tablet  Self No No   Si tab q6 hours prn migraine (with cocktail)   rosuvastatin (CRESTOR) 5 mg tablet   No No   Sig: Take 1 tablet (5 mg total) by mouth daily   tiotropium (SPIRIVA RESPIMAT) 1.25 MCG/ACT AERS inhaler  Self No No   Sig: Inhale 2 puffs daily   traZODone (DESYREL) 100 mg tablet   No No   Sig: Take 1-3 tablets (100-300 mg total) by mouth daily at bedtime as needed for sleep      Facility-Administered Medications: None     Discharge Medication List as of 2024  4:05 PM        START taking these medications    Details   amoxicillin-clavulanate (AUGMENTIN) 875-125 mg per tablet Take 1 tablet by mouth every 12 (twelve) hours for 5 days, Starting Sun 2024, Until Fri 2024, Normal           CONTINUE these medications which have NOT CHANGED    Details   amoxicillin (AMOXIL) 500 mg capsule Take 1 capsule (500 mg total) by mouth every 8 (eight) hours for 10 days, Starting Wed 2024, Until Sat 2024, Normal      albuterol (2.5 mg/3 mL) 0.083 % nebulizer solution Inhale , Starting 2013, Historical Med      albuterol (Proventil HFA) 90 mcg/act inhaler Inhale 2 puffs every 6 (six) hours as needed for wheezing, Starting Wed 2022, Normal      aluminum-magnesium hydroxide 200-200 MG/5ML suspension GAVISCON REGULAR STRENGTH AFTER MEALS and BEDTIME WHEN NOT FOLLOWING LPR/GERD DIET., No Print      ARIPiprazole (ABILIFY) 5 mg tablet Take 1 tablet (5 mg total) by mouth daily at bedtime, Starting Thu 2024, Until Sun 2025, Normal      Ascorbic Acid (VITAMIN C PO) Take by mouth, Historical Med      azelastine (ASTELIN)  0.1 % nasal spray 1-2 sprays into each nostril 2 (two) times a day as needed for rhinitis Use in each nostril as directed, Starting Tue 12/5/2023, Normal      cyclobenzaprine (FLEXERIL) 5 mg tablet Take 1 tablet (5 mg total) by mouth 3 (three) times a day, Starting Mon 6/3/2024, Normal      DULoxetine (CYMBALTA) 60 mg delayed release capsule Take 2 capsules (120 mg total) by mouth daily, Starting Thu 11/21/2024, Until Sun 4/20/2025, Normal      eptinezumab-jjmr (VYEPTI) IVPB Infuse 100mg IV every 90 days.  Note to pharmacy: To be delivered to where patient will be having infusion: [facility]. [phone], [phone], Print      famotidine (PEPCID) 40 MG tablet TAKE 1 TABLET(40 MG) BY MOUTH DAILY AT BEDTIME, Starting Tue 5/7/2024, Normal      gabapentin (Neurontin) 300 mg capsule 3 tabs BID, Normal      ibuprofen (MOTRIN) 800 mg tablet TAKE 1 TABLET(800 MG) BY MOUTH EVERY 8 HOURS AS NEEDED FOR MILD PAIN, Starting Wed 9/11/2024, Normal      ipratropium (ATROVENT) 0.06 % nasal spray 2 sprays into each nostril 4 (four) times a day, Starting Tue 9/12/2023, Normal      ketorolac (TORADOL) 10 mg tablet Take 1 tablet (10 mg total) by mouth every 6 (six) hours as needed (migraine, back pain) Max 2-3 per week., Starting Tue 6/4/2024, Normal      lamoTRIgine (LaMICtal) 150 MG tablet Take 1 tablet (150 mg total) by mouth daily at bedtime, Starting Thu 11/21/2024, Until Sun 4/20/2025, Normal      levocetirizine (XYZAL) 5 MG tablet Take 5 mg by mouth every evening, Historical Med      levonorgestrel (MIRENA) 20 MCG/24HR IUD by Intrauterine route, Historical Med      levothyroxine 75 mcg tablet Take 1 tablet (75 mcg total) by mouth daily in the early morning, Starting Wed 11/6/2024, Normal      mometasone (NASONEX) 50 mcg/act nasal spray Starting Mon 3/12/2018, Historical Med      Multiple Vitamins-Minerals (ZINC PO) Take by mouth, Historical Med      naltrexone (REVIA) 50 mg tablet Take 1  tablet (50 mg total) by mouth daily, Starting Thu 11/21/2024, Until Sun 4/20/2025, Normal      naproxen (NAPROSYN) 500 mg tablet TAKE 1 TABLET(500 MG) BY MOUTH TWICE DAILY WITH MEALS, Starting Sun 9/15/2024, Normal      ondansetron (ZOFRAN-ODT) 4 mg disintegrating tablet Take 1 tablet (4 mg total) by mouth every 6 (six) hours as needed for nausea or vomiting, Starting Tue 10/31/2023, Normal      pantoprazole (PROTONIX) 40 mg tablet TAKE 1 TABLET(40 MG) BY MOUTH DAILY, Starting Tue 2/20/2024, Normal      prochlorperazine (COMPAZINE) 10 mg tablet 1 tab q6 hours prn migraine (with cocktail), Normal      rosuvastatin (CRESTOR) 5 mg tablet Take 1 tablet (5 mg total) by mouth daily, Starting Wed 11/6/2024, Until Sat 11/1/2025, Normal      tiotropium (SPIRIVA RESPIMAT) 1.25 MCG/ACT AERS inhaler Inhale 2 puffs daily, Starting Mon 9/25/2023, Until Thu 11/21/2024, Normal      traZODone (DESYREL) 100 mg tablet Take 1-3 tablets (100-300 mg total) by mouth daily at bedtime as needed for sleep, Starting Thu 11/21/2024, Until Sun 4/20/2025 at 2359, Normal      VITAMIN D PO Take 5,000 Units by mouth, Historical Med      ZOLMitriptan (ZOMIG-ZMT) 5 MG disintegrating tablet DISSOLVE 1 TABLET BY MOUTH AT ONSET OF HEADACHE, MAY REPEAT AFTER TWO HOURS AS NEEDED, MAX 2 TABLETS PER 24 HOURS, Normal           No discharge procedures on file.  ED SEPSIS DOCUMENTATION   Time reflects when diagnosis was documented in both MDM as applicable and the Disposition within this note       Time User Action Codes Description Comment    12/1/2024  4:03 PM Luca Copeland [J06.9] URI (upper respiratory infection)     12/1/2024  4:03 PM Luca Copeland Remove [J06.9] URI (upper respiratory infection)     12/1/2024  4:03 PM Luca Copeland [J02.9] Pharyngitis                  Luca Copeland MD  12/01/24 1363

## 2024-12-02 NOTE — ED ATTENDING ATTESTATION
12/1/2024  ILy MD, saw and evaluated the patient. I have discussed the patient with the resident/non-physician practitioner and agree with the resident's/non-physician practitioner's findings, Plan of Care, and MDM as documented in the resident's/non-physician practitioner's note, except where noted. All available labs and Radiology studies were reviewed.  I was present for key portions of any procedure(s) performed by the resident/non-physician practitioner and I was immediately available to provide assistance.       At this point I agree with the current assessment done in the Emergency Department.  I have conducted an independent evaluation of this patient a history and physical is as follows:    53-year-old female presenting to the ER with 1 day of pharyngitis.  No fever.  No nausea vomiting.  No trouble breathing.  Tolerating p.o.  No change in voice.    On exam significantly volar swelling.  No abscess noted on exam.    With the significant swelling will get CT to evaluate for any abscess.  Steroids and antibiotics.    ED Course         Critical Care Time  Procedures

## 2024-12-07 DIAGNOSIS — K21.9 GASTROESOPHAGEAL REFLUX DISEASE WITHOUT ESOPHAGITIS: ICD-10-CM

## 2024-12-09 RX ORDER — PANTOPRAZOLE SODIUM 40 MG/1
40 TABLET, DELAYED RELEASE ORAL DAILY
Qty: 30 TABLET | Refills: 5 | Status: SHIPPED | OUTPATIENT
Start: 2024-12-09

## 2024-12-16 NOTE — TELEPHONE ENCOUNTER
JAMIA for [employer]  Following up from phone call over weekend   Prescription was sent to the pharmacy Detail Level: Simple Price (Do Not Change): 0.00 Instructions: This plan will send the code FBSD to the PM system.  DO NOT or CHANGE the price.

## 2024-12-26 ENCOUNTER — TELEPHONE (OUTPATIENT)
Age: 54
End: 2024-12-26

## 2024-12-26 DIAGNOSIS — G43.709 CHRONIC MIGRAINE WITHOUT AURA WITHOUT STATUS MIGRAINOSUS, NOT INTRACTABLE: ICD-10-CM

## 2024-12-26 RX ORDER — ZOLMITRIPTAN 5 MG/1
TABLET, ORALLY DISINTEGRATING ORAL
Qty: 12 TABLET | Refills: 5 | Status: SHIPPED | OUTPATIENT
Start: 2024-12-26

## 2024-12-26 NOTE — TELEPHONE ENCOUNTER
Patient called the RX Refill Line. Message is being forwarded to the office.     Patient has an appointment for an infusion tomorrow, 24. She wants to make sure that the order has not . She said that had happened once before and the order had to be resent.     Please contact patient at phone #[phone]

## 2024-12-26 NOTE — TELEPHONE ENCOUNTER
Reason for call:   [x] Refill   [] Prior Auth  [] Other:     Office:   [x] Specialty/Provider - neuro / [place]    Medication: zolmitriptan disintegrating tablet    Dose/Frequency: 5mg (1 tab at onset of headache; repeat after 2 hrs prn)    Quantity: 12    Pharmacy: [id number] - [place], [place] - [address]     Does the patient have enough for 3 days?   [] Yes   [x] No - Send as HP to POD

## 2024-12-27 ENCOUNTER — HOSPITAL ENCOUNTER (OUTPATIENT)
Dept: INFUSION CENTER | Facility: CLINIC | Age: 54
End: 2024-12-27
Payer: COMMERCIAL

## 2024-12-27 VITALS
SYSTOLIC BLOOD PRESSURE: 134 MMHG | DIASTOLIC BLOOD PRESSURE: 75 MMHG | HEART RATE: 78 BPM | RESPIRATION RATE: 18 BRPM | TEMPERATURE: 97.9 F

## 2024-12-27 DIAGNOSIS — G43.709 CHRONIC MIGRAINE WITHOUT AURA WITHOUT STATUS MIGRAINOSUS, NOT INTRACTABLE: Primary | ICD-10-CM

## 2024-12-27 PROCEDURE — 96365 THER/PROPH/DIAG IV INF INIT: CPT

## 2024-12-27 RX ORDER — SODIUM CHLORIDE 9 MG/ML
20 INJECTION, SOLUTION INTRAVENOUS ONCE
Status: COMPLETED | OUTPATIENT
Start: 2024-12-27 | End: 2024-12-27

## 2024-12-27 RX ORDER — SODIUM CHLORIDE 9 MG/ML
20 INJECTION, SOLUTION INTRAVENOUS ONCE
OUTPATIENT
Start: 2025-03-21

## 2024-12-27 RX ADMIN — SODIUM CHLORIDE 20 ML/HR: 0.9 INJECTION, SOLUTION INTRAVENOUS at 08:44

## 2024-12-27 RX ADMIN — EPTINEZUMAB-JJMR 100 MG: 100 INJECTION INTRAVENOUS at 08:44

## 2024-12-27 NOTE — PROGRESS NOTES
Patient arrived to the unit and denied infection or complications. She tolerated her vyepti well without adverse reaction. She is aware to return 3/21/25.

## 2024-12-27 NOTE — PLAN OF CARE
Problem: INFECTION - ADULT  Goal: Absence or prevention of progression during hospitalization  Description: INTERVENTIONS:  - Assess and monitor for signs and symptoms of infection  - Monitor lab/diagnostic results  - Monitor all insertion sites, i.e. indwelling lines, tubes, and drains  - Monitor endotracheal if appropriate and nasal secretions for changes in amount and color  - Cody appropriate cooling/warming therapies per order  - Administer medications as ordered  - Instruct and encourage patient and family to use good hand hygiene technique  - Identify and instruct in appropriate isolation precautions for identified infection/condition  Outcome: Progressing

## 2025-01-13 DIAGNOSIS — N94.6 DYSMENORRHEA: ICD-10-CM

## 2025-01-14 RX ORDER — IBUPROFEN 800 MG/1
800 TABLET, FILM COATED ORAL EVERY 8 HOURS PRN
Qty: 30 TABLET | Refills: 0 | Status: SHIPPED | OUTPATIENT
Start: 2025-01-14

## 2025-01-14 NOTE — TELEPHONE ENCOUNTER
Spoke with patient to advise her that the medication was refilled and sent to [facility] on [address] in [place]. Patient thankful.

## 2025-01-27 ENCOUNTER — ESTABLISHED COMPREHENSIVE EXAM (OUTPATIENT)
Dept: URBAN - METROPOLITAN AREA CLINIC 6 | Facility: CLINIC | Age: 55
End: 2025-01-27

## 2025-01-27 DIAGNOSIS — H25.13: ICD-10-CM

## 2025-01-27 DIAGNOSIS — H52.03: ICD-10-CM

## 2025-01-27 DIAGNOSIS — D23.122: ICD-10-CM

## 2025-01-27 DIAGNOSIS — H52.4: ICD-10-CM

## 2025-01-27 DIAGNOSIS — H52.203: ICD-10-CM

## 2025-01-27 PROCEDURE — 92015 DETERMINE REFRACTIVE STATE: CPT

## 2025-01-27 PROCEDURE — 92310 CONTACT LENS FITTING OU: CPT

## 2025-01-27 PROCEDURE — 92014 COMPRE OPH EXAM EST PT 1/>: CPT

## 2025-01-27 ASSESSMENT — KERATOMETRY
OD_K2POWER_DIOPTERS: 46.25
OS_AXISANGLE2_DEGREES: 90
OS_K2POWER_DIOPTERS: 46.75
OS_K1POWER_DIOPTERS: 45.50
OS_AXISANGLE_DEGREES: 180
OD_K1POWER_DIOPTERS: 45.25
OD_AXISANGLE_DEGREES: 164
OD_AXISANGLE2_DEGREES: 74

## 2025-01-27 ASSESSMENT — TONOMETRY
OD_IOP_MMHG: 8
OS_IOP_MMHG: 7

## 2025-01-27 ASSESSMENT — VISUAL ACUITY
OS_CC: 20/25+1
OD_CC: 20/30

## 2025-01-28 ENCOUNTER — OFFICE VISIT (OUTPATIENT)
Dept: NEUROLOGY | Facility: CLINIC | Age: 55
End: 2025-01-28
Payer: COMMERCIAL

## 2025-01-28 VITALS
DIASTOLIC BLOOD PRESSURE: 75 MMHG | SYSTOLIC BLOOD PRESSURE: 129 MMHG | BODY MASS INDEX: 31.47 KG/M2 | HEART RATE: 83 BPM | HEIGHT: 62 IN | WEIGHT: 171 LBS

## 2025-01-28 DIAGNOSIS — G43.709 CHRONIC MIGRAINE WITHOUT AURA WITHOUT STATUS MIGRAINOSUS, NOT INTRACTABLE: Primary | ICD-10-CM

## 2025-01-28 PROBLEM — G47.419 NARCOLEPSY WITHOUT CATAPLEXY: Status: ACTIVE | Noted: 2025-01-28

## 2025-01-28 PROCEDURE — 99214 OFFICE O/P EST MOD 30 MIN: CPT | Performed by: PHYSICIAN ASSISTANT

## 2025-01-28 RX ORDER — GABAPENTIN 300 MG/1
CAPSULE ORAL
Qty: 540 CAPSULE | Refills: 2 | Status: SHIPPED | OUTPATIENT
Start: 2025-01-28

## 2025-01-28 RX ORDER — ONDANSETRON 4 MG/1
4 TABLET, ORALLY DISINTEGRATING ORAL EVERY 6 HOURS PRN
Qty: 30 TABLET | Refills: 2 | Status: SHIPPED | OUTPATIENT
Start: 2025-01-28

## 2025-01-28 NOTE — PROGRESS NOTES
Name: Kaylah Matute      : 1970      MRN: [id number]  Encounter Provider: Melissa Montilla PA-C  Encounter Date: 2025   Encounter department: [facility]  :  Assessment & Plan  Chronic migraine without aura without status migrainosus, not intractable  Recent increase stressors have exacerbated her migraine headaches.  Vyepti does help and she denies side effects.  If needed in the future we can consider increasing from 100 to 300 mg Vyepti.  Side effects reviewed.  She will let me know moving forward if this is what she needs/if stressors are not alleviated.    Continue gabapentin--900 mg twice daily.     Migraine onset-  PRN compazine, and if that fails after about 4 hours later she takes the cocktail ketorolac, zomig, zofran.  She uses excedrin for lower grade headache, max 3 per week to prevent MOH.    Orders:    gabapentin (Neurontin) 300 mg capsule; 3 tabs BID    ondansetron (ZOFRAN-ODT) 4 mg disintegrating tablet; Take 1 tablet (4 mg total) by mouth every 6 (six) hours as needed for nausea or vomiting      The patient should not hesitate to call me prior to her follow up with any questions or concerns.    There are no Patient Instructions on file for this visit.     History of Present Illness   HPI migraine follow up, Last week she had a really migraine last week, she was nauseous and took two migraine cocktails to help alleviate her pain, the week prior she also had another bad one with nausea, her scalp was also super sensitive after the migraine. These are the first two bad migraines since being on Vyepti.  No changes in character of the migraines, no new focal deficits.    Review of Systems   Constitutional:  Negative for appetite change, fatigue and fever.   HENT: Negative.  Negative for hearing loss, tinnitus, trouble swallowing and voice change.    Eyes: Negative.  Negative for photophobia, pain and visual disturbance.   Respiratory: Negative.  Negative for  shortness of breath.    Cardiovascular: Negative.  Negative for palpitations.   Gastrointestinal:  Positive for nausea. Negative for vomiting.   Endocrine: Negative.  Negative for cold intolerance.   Genitourinary: Negative.  Negative for dysuria, frequency and urgency.   Musculoskeletal:  Negative for back pain, gait problem, myalgias, neck pain and neck stiffness.   Skin: Negative.  Negative for rash.   Allergic/Immunologic: Negative.    Neurological:  Positive for headaches. Negative for dizziness, tremors, seizures, syncope, facial asymmetry, speech difficulty, weakness, light-headedness and numbness.   Hematological: Negative.  Does not bruise/bleed easily.   Psychiatric/Behavioral: Negative.  Negative for confusion, hallucinations and sleep disturbance.     I have personally reviewed the MA's review of systems and made changes as necessary.    Pertinent Medical History           Medical History Reviewed by provider this encounter:  Problems     .  Past Medical History   Past Medical History:   Diagnosis Date    Allergic 1989    Amenorrhea     Anemia 02/2023    Anxiety     Arthritis     Asthma     Back pain     Chondromalacia of patella     Chronic fatigue     Colon polyp     COPD (chronic obstructive pulmonary disease) (HCC) Yes    Deep vein thrombophlebitis of leg (HCC)     Depression     Disease of thyroid gland     Diverticulitis     GERD (gastroesophageal reflux disease)     Headache(784.0)     History of transfusion 01/31/2008    HPV (human papilloma virus) infection     Hypothyroidism     Lumbar radiculopathy     Migraine     Myofascial pain syndrome     Osteopenia     Piriformis syndrome     Sacroiliitis (HCC)     Sciatica     Seizure (HCC)     Sleep apnea     Spondylosis of lumbar spine     Sprain of temporomandibular joint or ligament 08/25/2023    Trochanteric bursitis of right hip     Vertigo     Vitamin D deficiency     Weight gain 12/19/2019     Past Surgical History:   Procedure Laterality Date     CERVIX LESION DESTRUCTION       SECTION      COLONOSCOPY      COLPOSCOPY W/ BIOPSY / CURETTAGE      Colposcopy cervix with biopsy    LAPAROSCOPIC ENDOMETRIOSIS FULGURATION      LAPAROSCOPY      TOOTH EXTRACTION       Family History   Problem Relation Age of Onset    Heart disease Mother     Asthma Daughter     Lung cancer Paternal Grandfather     Asthma Daughter     Colon cancer Father     Colon cancer Maternal Grandfather     Prostate cancer Maternal Grandfather     Colon cancer Maternal Aunt     No Known Problems Maternal Grandmother     Diabetes Paternal Grandmother     Dementia Paternal Grandmother     No Known Problems Maternal Aunt     No Known Problems Maternal Aunt     No Known Problems Maternal Aunt     No Known Problems Paternal Aunt     Asthma Daughter     Psychiatric Illness Neg Hx     Stroke Neg Hx     Thyroid disease Neg Hx     Substance Abuse Neg Hx     Suicidality Neg Hx       reports that she has never smoked. She has never used smokeless tobacco. She reports that she does not currently use alcohol. She reports that she does not use drugs.  Current Outpatient Medications   Medication Instructions    albuterol (2.5 mg/3 mL) 0.083 % nebulizer solution Inhale     albuterol (Proventil HFA) 90 mcg/act inhaler 2 puffs, Inhalation, Every 6 hours PRN    aluminum-magnesium hydroxide 200-200 MG/5ML suspension GAVISCON REGULAR STRENGTH AFTER MEALS and BEDTIME WHEN NOT FOLLOWING LPR/GERD DIET.    ARIPiprazole (ABILIFY) 5 mg, Oral, Daily at bedtime    Ascorbic Acid (VITAMIN C PO) Take by mouth    azelastine (ASTELIN) 0.1 % nasal spray 1-2 sprays, Nasal, 2 times daily PRN, Use in each nostril as directed    DULoxetine (CYMBALTA) 120 mg, Oral, Daily    eptinezumab-jjmr (VYEPTI) IVPB Infuse 100mg IV every 90 days.  Note to pharmacy: To be delivered to where patient will be having infusion: [facility] infusion center. [phone], [phone]    famotidine (PEPCID) 40 mg, Oral, Daily  at bedtime    gabapentin (Neurontin) 300 mg capsule 3 tabs BID    ibuprofen (MOTRIN) 800 mg, Oral, Every 8 hours PRN,       ipratropium (ATROVENT) 0.06 % nasal spray 2 sprays, Nasal, 4 times daily    Iron 325 mg, Oral, Every other day    ketorolac (TORADOL) 10 mg, Oral, Every 6 hours PRN, Max 2-3 per week.    lamoTRIgine (LAMICTAL) 150 mg, Oral, Daily at bedtime    levocetirizine (XYZAL) 5 mg, Every evening    levonorgestrel (MIRENA) 20 MCG/24HR IUD by Intrauterine route    levothyroxine 75 mcg, Oral, Daily (early morning)    metFORMIN (GLUCOPHAGE-XR) 500 mg, Oral, Daily with dinner    Multiple Vitamins-Minerals (ZINC PO) Take by mouth    naltrexone (REVIA) 50 mg, Oral, Daily    naproxen (NAPROSYN) 500 mg, Oral, 2 times daily with meals    ondansetron (ZOFRAN-ODT) 4 mg, Oral, Every 6 hours PRN    pantoprazole (PROTONIX) 40 mg, Oral, Daily    prochlorperazine (COMPAZINE) 10 mg tablet 1 tab q6 hours prn migraine (with cocktail)    rosuvastatin (CRESTOR) 5 mg, Oral, Daily    tiotropium (SPIRIVA RESPIMAT) 1.25 MCG/ACT AERS inhaler 2 puffs, Inhalation, Daily    traZODone (DESYREL) 100-300 mg, Oral, Daily at bedtime PRN    Triamcinolone Acetonide (Nasacort Allergy 24HR Children) 55 MCG/ACT nasal spray into each nostril    VITAMIN D PO 5,000 Units    ZOLMitriptan (ZOMIG-ZMT) 5 MG disintegrating tablet DISSOLVE 1 TABLET BY MOUTH AT ONSET OF HEADACHE, MAY REPEAT AFTER TWO HOURS AS NEEDED, MAX 2 TABLETS PER 24 HOURS     Allergies   Allergen Reactions    Nuts - Food Allergy      Other reaction(s): WALNUTS    Cephalexin     Erythromycin Diarrhea, GI Intolerance and Vomiting    Iodine - Food Allergy Hives    Other      adobo    Shellfish Allergy - Food Allergy     Shellfish-Derived Products - Food Allergy     Turmeric - Food Allergy Hives    Wellbutrin [Bupropion] Seizures    Zithromax [Azithromycin] Diarrhea     Can take name brand  Annotation - [id number]: can take brand name  Other reaction(s): Nausea/vomiting/diarrhea       Current Outpatient Medications on File Prior to Visit   Medication Sig Dispense Refill    albuterol (2.5 mg/3 mL) 0.083 % nebulizer solution Inhale       albuterol (Proventil HFA) 90 mcg/act inhaler Inhale 2 puffs every 6 (six) hours as needed for wheezing 18 g 3    aluminum-magnesium hydroxide 200-200 MG/5ML suspension GAVISCON REGULAR STRENGTH AFTER MEALS and BEDTIME WHEN NOT FOLLOWING LPR/GERD DIET. 355 mL 11    Ascorbic Acid (VITAMIN C PO) Take by mouth      azelastine (ASTELIN) 0.1 % nasal spray 1-2 sprays into each nostril 2 (two) times a day as needed for rhinitis Use in each nostril as directed 30 mL 11    eptinezumab-jjmr (VYEPTI) IVPB Infuse 100mg IV every 90 days.  Note to pharmacy: To be delivered to where patient will be having infusion: [facility]. [phone], [phone] 1 mL 3    famotidine (PEPCID) 40 MG tablet TAKE 1 TABLET(40 MG) BY MOUTH DAILY AT BEDTIME 30 tablet 5    ipratropium (ATROVENT) 0.06 % nasal spray 2 sprays into each nostril 4 (four) times a day 15 mL 12    levocetirizine (XYZAL) 5 MG tablet Take 5 mg by mouth every evening      levonorgestrel (MIRENA) 20 MCG/24HR IUD by Intrauterine route      levothyroxine 75 mcg tablet Take 1 tablet (75 mcg total) by mouth daily in the early morning 100 tablet 3    Multiple Vitamins-Minerals (ZINC PO) Take by mouth      naproxen (NAPROSYN) 500 mg tablet TAKE 1 TABLET(500 MG) BY MOUTH TWICE DAILY WITH MEALS 20 tablet 0    pantoprazole (PROTONIX) 40 mg tablet TAKE 1 TABLET(40 MG) BY MOUTH DAILY 30 tablet 5    prochlorperazine (COMPAZINE) 10 mg tablet 1 tab q6 hours prn migraine (with cocktail) 30 tablet 2    rosuvastatin (CRESTOR) 5 mg tablet Take 1 tablet (5 mg total) by mouth daily 90 tablet 3    tiotropium (SPIRIVA RESPIMAT) 1.25 MCG/ACT AERS inhaler Inhale 2 puffs daily 4 g 11    VITAMIN D PO Take 5,000 Units by mouth      ZOLMitriptan (ZOMIG-ZMT) 5 MG disintegrating tablet DISSOLVE 1 TABLET BY MOUTH AT  "ONSET OF HEADACHE, MAY REPEAT AFTER TWO HOURS AS NEEDED, MAX 2 TABLETS PER 24 HOURS 12 tablet 5     No current facility-administered medications on file prior to visit.      Social History     Tobacco Use    Smoking status: Never    Smokeless tobacco: Never   Vaping Use    Vaping status: Never Used   Substance and Sexual Activity    Alcohol use: Not Currently    Drug use: Never    Sexual activity: Yes     Partners: Male     Birth control/protection: I.U.D.     Comment: Mirena        Objective   /75 (BP Location: Left arm, Patient Position: Sitting, Cuff Size: Standard)   Pulse 83   Ht 5' 2\" (1.575 m)   Wt 77.6 kg (171 lb)   BMI 31.28 kg/m²     Physical Exam  Neurological Exam  On neurologic exam, the patient is alert and oriented to time and place. Speech is fluent and articulate, and the patient follows commands appropriately. Judgment and affect appear normal. Pupils are equally round and reactive to light and extraocular muscles are intact without nystagmus. Face is symmetric, and tongue, uvula, and palate are midline. Hearing is intact. Motor examination reveals intact strength throughout. Neck flexors 5/5. Normal gait is steady.  Reflexes non focal all 4 extr's.    Radiology Results Review : No pertinent imaging studies reviewed.    Administrative Statements   I have spent a total time of 30 minutes in caring for this patient on the day of the visit/encounter including Risks and benefits of tx options, Instructions for management, Patient and family education, Importance of tx compliance, Risk factor reductions, Impressions, Counseling / Coordination of care, Documenting in the medical record, Reviewing/placing orders in the medical record (including tests, medications, and/or procedures), and Obtaining or reviewing history  .  "

## 2025-02-13 ENCOUNTER — OFFICE VISIT (OUTPATIENT)
Age: 55
End: 2025-02-13
Payer: COMMERCIAL

## 2025-02-13 VITALS — OXYGEN SATURATION: 98 % | RESPIRATION RATE: 17 BRPM | HEART RATE: 77 BPM | TEMPERATURE: 98.5 F

## 2025-02-13 DIAGNOSIS — F32.2 SEVERE MAJOR DEPRESSIVE DISORDER (HCC): ICD-10-CM

## 2025-02-13 DIAGNOSIS — J45.50 SEVERE PERSISTENT ASTHMA WITHOUT COMPLICATION: ICD-10-CM

## 2025-02-13 DIAGNOSIS — F60.9 PERSONALITY DISORDER (HCC): ICD-10-CM

## 2025-02-13 DIAGNOSIS — J06.9 ACUTE UPPER RESPIRATORY INFECTION: Primary | ICD-10-CM

## 2025-02-13 PROCEDURE — 99214 OFFICE O/P EST MOD 30 MIN: CPT | Performed by: STUDENT IN AN ORGANIZED HEALTH CARE EDUCATION/TRAINING PROGRAM

## 2025-02-13 RX ORDER — AZITHROMYCIN 250 MG/1
TABLET, FILM COATED ORAL
Qty: 6 TABLET | Refills: 0 | Status: SHIPPED | OUTPATIENT
Start: 2025-02-13 | End: 2025-02-18

## 2025-02-13 NOTE — ASSESSMENT & PLAN NOTE
Currently stable.  Not in exacerbation.  Patient saturating adequately on room air.  Lungs clear to auscultation bilaterally without wheezing.  Patient does admit to occasionally need to use albuterol inhaler now that she is ill.  She is compliant with Spiriva.  Continue inhaler regimen.

## 2025-02-13 NOTE — PROGRESS NOTES
[facility] INTERNAL MEDICINE- [place]  OFFICE VISIT     PATIENT INFORMATION     Kaylah Matute   54 y.o. female   MRN: [id number]    ASSESSMENT/PLAN     Assessment & Plan  Acute upper respiratory infection  Patient presents to clinic today for severe cold/flu illness.  Patient states that her symptoms began a few days ago.  Her daughter has been sick for the past week with similar symptoms.  COVID-negative.  Daughter currently on antibiotics with azithromycin and improving.    Patient complaining of head/nasal congestion, chest congestion, runny nose, sore throat, mild difficulty breathing.    Lungs clear to auscultation bilaterally without wheezing.  Nasal turbinates inflamed with rhinorrhea.  Mild erythema in back of throat.    Patient has been using over-the-counter Mucinex and nasal spray with minimal relief.  Given history, daughter symptoms, patient's comorbidities, will treat for an acute upper respiratory infection with azithromycin in effort to try to prevent asthma exacerbation as well.  Continue over-the-counter Mucinex and nasal sprays.  Recommended adequate hydration.  Orders:    azithromycin (Zithromax) 250 mg tablet; Take 2 tablets (500 mg total) by mouth daily for 1 day, THEN 1 tablet (250 mg total) daily for 4 days.    Severe persistent asthma without complication  Currently stable.  Not in exacerbation.  Patient saturating adequately on room air.  Lungs clear to auscultation bilaterally without wheezing.  Patient does admit to occasionally need to use albuterol inhaler now that she is ill.  She is compliant with Spiriva.  Continue inhaler regimen.       Severe major depressive disorder (HCC)  Stable.  Uses Abilify 5 mg at bedtime, duloxetine 120 mg daily, Lamictal 150 mg daily at bedtime.    Continue current regimen continue psychiatry follow-up.         Personality disorder (HCC)  Stable.  Continue current medication regimen continue psychiatry follow-up.            HEALTH MAINTENANCE      Immunization History   Administered Date(s) Administered    COVID-19 PFIZER VACCINE 0.3 ML IM 05/21/2021, 06/16/2021, 01/31/2022, 01/31/2022    INFLUENZA 10/31/2018    Influenza, injectable, quadrivalent, preservative free 0.5 mL 10/31/2018, 10/07/2019    Pneumococcal Conjugate Vaccine 20-valent (Pcv20), Polysace 08/30/2022    Tdap 06/06/2019    Zoster Vaccine Recombinant 01/05/2023, 03/15/2023         CHIEF COMPLAINT     Chief Complaint   Patient presents with    Cold Like Symptoms     X yesterday   OTC yes       HISTORY OF PRESENT ILLNESS     Ms. Kaylah Matute is a 54-year-old female with a past medical history of migraine headaches, asthma, GERD, hypothyroidism, anxiety, depression, personality disorder, seizure history.  Patient presents to clinic today for severe cold/flu illness.  Patient states that her symptoms began a few days ago.  Her daughter has been sick for the past week with similar symptoms.  COVID-negative.  Daughter currently on antibiotics with azithromycin and improving.  Patient complaining of head/nasal congestion, chest congestion, runny nose, sore throat, mild difficulty breathing.  Lungs clear to auscultation bilaterally without wheezing.  Patient has been using over-the-counter Mucinex and nasal spray with minimal relief.    REVIEW OF SYSTEMS     Review of Systems   Constitutional:  Negative for chills and fever.   HENT:  Positive for congestion, postnasal drip, rhinorrhea and sore throat.    Respiratory:  Positive for cough and shortness of breath. Negative for wheezing.    Cardiovascular:  Negative for chest pain and leg swelling.   Gastrointestinal:  Negative for abdominal distention, abdominal pain, constipation, diarrhea, nausea and vomiting.   Genitourinary:  Negative for difficulty urinating and dysuria.   Musculoskeletal:  Negative for arthralgias and back pain.   Skin:  Negative for rash and wound.   Neurological:  Negative for dizziness, syncope, weakness, light-headedness and  headaches.   Psychiatric/Behavioral:  Negative for agitation, behavioral problems and confusion.      OBJECTIVE     Vitals:    02/13/25 0808   Pulse: 77   Resp: 17   Temp: 98.5 °F (36.9 °C)   SpO2: 98%     Physical Exam  Constitutional:       Appearance: Normal appearance.   HENT:      Right Ear: Tympanic membrane, ear canal and external ear normal.      Left Ear: Tympanic membrane, ear canal and external ear normal.      Nose: Rhinorrhea present. No congestion.      Mouth/Throat:      Mouth: Mucous membranes are moist.      Pharynx: Oropharynx is clear. No oropharyngeal exudate or posterior oropharyngeal erythema.   Cardiovascular:      Rate and Rhythm: Normal rate and regular rhythm.      Pulses: Normal pulses.      Heart sounds: Normal heart sounds. No murmur heard.  Pulmonary:      Effort: Pulmonary effort is normal. No respiratory distress.      Breath sounds: Normal breath sounds. No wheezing, rhonchi or rales.   Abdominal:      General: Abdomen is flat. Bowel sounds are normal. There is no distension.      Palpations: Abdomen is soft.      Tenderness: There is no abdominal tenderness.   Musculoskeletal:      Cervical back: Normal range of motion and neck supple. No tenderness.      Right lower leg: No edema.      Left lower leg: No edema.   Lymphadenopathy:      Cervical: No cervical adenopathy.   Skin:     General: Skin is warm and dry.   Neurological:      Mental Status: She is alert and oriented to person, place, and time.   Psychiatric:         Mood and Affect: Mood normal.         Behavior: Behavior normal.         Thought Content: Thought content normal.       CURRENT MEDICATIONS     Current Outpatient Medications:     azithromycin (Zithromax) 250 mg tablet, Take 2 tablets (500 mg total) by mouth daily for 1 day, THEN 1 tablet (250 mg total) daily for 4 days., Disp: 6 tablet, Rfl: 0    albuterol (2.5 mg/3 mL) 0.083 % nebulizer solution, Inhale , Disp: , Rfl:     albuterol (Proventil HFA) 90 mcg/act  inhaler, Inhale 2 puffs every 6 (six) hours as needed for wheezing, Disp: 18 g, Rfl: 3    aluminum-magnesium hydroxide 200-200 MG/5ML suspension, GAVISCON REGULAR STRENGTH AFTER MEALS and BEDTIME WHEN NOT FOLLOWING LPR/GERD DIET., Disp: 355 mL, Rfl: 11    ARIPiprazole (ABILIFY) 5 mg tablet, Take 1 tablet (5 mg total) by mouth daily at bedtime, Disp: 30 tablet, Rfl: 4    Ascorbic Acid (VITAMIN C PO), Take by mouth, Disp: , Rfl:     azelastine (ASTELIN) 0.1 % nasal spray, 1-2 sprays into each nostril 2 (two) times a day as needed for rhinitis Use in each nostril as directed, Disp: 30 mL, Rfl: 11    cyclobenzaprine (FLEXERIL) 5 mg tablet, Take 1 tablet (5 mg total) by mouth 3 (three) times a day, Disp: 30 tablet, Rfl: 0    DULoxetine (CYMBALTA) 60 mg delayed release capsule, Take 2 capsules (120 mg total) by mouth daily, Disp: 60 capsule, Rfl: 4    eptinezumab-jjmr (VYEPTI) IVPB, Infuse 100mg IV every 90 days.  Note to pharmacy: To be delivered to where patient will be having infusion: [facility]. [phone], [phone], Disp: 1 mL, Rfl: 3    famotidine (PEPCID) 40 MG tablet, TAKE 1 TABLET(40 MG) BY MOUTH DAILY AT BEDTIME, Disp: 30 tablet, Rfl: 5    gabapentin (Neurontin) 300 mg capsule, 3 tabs BID, Disp: 540 capsule, Rfl: 2    ibuprofen (MOTRIN) 800 mg tablet, Take 1 tablet (800 mg total) by mouth every 8 (eight) hours as needed for mild pain, Disp: 30 tablet, Rfl: 0    ipratropium (ATROVENT) 0.06 % nasal spray, 2 sprays into each nostril 4 (four) times a day, Disp: 15 mL, Rfl: 12    ketorolac (TORADOL) 10 mg tablet, Take 1 tablet (10 mg total) by mouth every 6 (six) hours as needed (migraine, back pain) Max 2-3 per week., Disp: 30 tablet, Rfl: 1    lamoTRIgine (LaMICtal) 150 MG tablet, Take 1 tablet (150 mg total) by mouth daily at bedtime, Disp: 30 tablet, Rfl: 4    levocetirizine (XYZAL) 5 MG tablet, Take 5 mg by mouth every evening, Disp: , Rfl:     levonorgestrel (MIRENA)  20 MCG/24HR IUD, by Intrauterine route, Disp: , Rfl:     levothyroxine 75 mcg tablet, Take 1 tablet (75 mcg total) by mouth daily in the early morning, Disp: 100 tablet, Rfl: 3    mometasone (NASONEX) 50 mcg/act nasal spray, , Disp: , Rfl:     Multiple Vitamins-Minerals (ZINC PO), Take by mouth, Disp: , Rfl:     naltrexone (REVIA) 50 mg tablet, Take 1 tablet (50 mg total) by mouth daily, Disp: 30 tablet, Rfl: 4    naproxen (NAPROSYN) 500 mg tablet, TAKE 1 TABLET(500 MG) BY MOUTH TWICE DAILY WITH MEALS, Disp: 20 tablet, Rfl: 0    ondansetron (ZOFRAN-ODT) 4 mg disintegrating tablet, Take 1 tablet (4 mg total) by mouth every 6 (six) hours as needed for nausea or vomiting, Disp: 30 tablet, Rfl: 2    pantoprazole (PROTONIX) 40 mg tablet, TAKE 1 TABLET(40 MG) BY MOUTH DAILY, Disp: 30 tablet, Rfl: 5    prochlorperazine (COMPAZINE) 10 mg tablet, 1 tab q6 hours prn migraine (with cocktail), Disp: 30 tablet, Rfl: 2    rosuvastatin (CRESTOR) 5 mg tablet, Take 1 tablet (5 mg total) by mouth daily, Disp: 90 tablet, Rfl: 3    tiotropium (SPIRIVA RESPIMAT) 1.25 MCG/ACT AERS inhaler, Inhale 2 puffs daily, Disp: 4 g, Rfl: 11    traZODone (DESYREL) 100 mg tablet, Take 1-3 tablets (100-300 mg total) by mouth daily at bedtime as needed for sleep, Disp: 90 tablet, Rfl: 4    VITAMIN D PO, Take 5,000 Units by mouth, Disp: , Rfl:     ZOLMitriptan (ZOMIG-ZMT) 5 MG disintegrating tablet, DISSOLVE 1 TABLET BY MOUTH AT ONSET OF HEADACHE, MAY REPEAT AFTER TWO HOURS AS NEEDED, MAX 2 TABLETS PER 24 HOURS, Disp: 12 tablet, Rfl: 5    PAST MEDICAL & SURGICAL HISTORY     Past Medical History:   Diagnosis Date    Allergic 1989    Amenorrhea     Anemia 02/2023    Anxiety     Arthritis     Asthma     Back pain     Chondromalacia of patella     Chronic fatigue     Colon polyp     COPD (chronic obstructive pulmonary disease) (HCC) Yes    Deep vein thrombophlebitis of leg (HCC)     Depression     Disease of thyroid gland     Diverticulitis     GERD  (gastroesophageal reflux disease)     Headache(784.0)     History of transfusion 2008    HPV (human papilloma virus) infection     Hypothyroidism     Lumbar radiculopathy     Migraine     Myofascial pain syndrome     Osteopenia     Piriformis syndrome     Sacroiliitis (HCC)     Sciatica     Seizure (HCC)     Sleep apnea     Spondylosis of lumbar spine     Sprain of temporomandibular joint or ligament 2023    Trochanteric bursitis of right hip     Vertigo     Vitamin D deficiency     Weight gain 2019     Past Surgical History:   Procedure Laterality Date    CERVIX LESION DESTRUCTION       SECTION      COLONOSCOPY      COLPOSCOPY W/ BIOPSY / CURETTAGE      Colposcopy cervix with biopsy    LAPAROSCOPIC ENDOMETRIOSIS FULGURATION      LAPAROSCOPY      TOOTH EXTRACTION       SOCIAL & FAMILY HISTORY     Social History     Socioeconomic History    Marital status: /Civil Union     Spouse name: Ever    Number of children: 2    Years of education: 12    Highest education level: High school graduate   Occupational History    Occupation: works for [employer]   Tobacco Use    Smoking status: Never    Smokeless tobacco: Never   Vaping Use    Vaping status: Never Used   Substance and Sexual Activity    Alcohol use: Not Currently    Drug use: Not Currently    Sexual activity: Yes     Partners: Male     Birth control/protection: I.U.D.     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, also worked as a  and as an  in the past    Functioning Relationships: good support system    Legal History: none     History: None        Unsure of age of house    Heating system gas forced hot air    Central  AC    Bedroom is carpeted    Humidifier in living room    Air Purifier in living room    No basement    No dehumidifier,            Pets - 1 Guinea pig, Bearded Dragon Lizard            Caffeine - none in beverages     Chocolate - 3 times a week       Social Drivers of Health     Financial Resource Strain: Low Risk  (11/21/2024)    Overall Financial Resource Strain (CARDIA)     Difficulty of Paying Living Expenses: Not hard at all   Food Insecurity: No Food Insecurity (11/21/2024)    Hunger Vital Sign     Worried About Running Out of Food in the Last Year: Never true     Ran Out of Food in the Last Year: Never true   Transportation Needs: No Transportation Needs (11/21/2024)    PRAPARE - Transportation     Lack of Transportation (Medical): No     Lack of Transportation (Non-Medical): No   Physical Activity: Sufficiently Active (11/21/2024)    Exercise Vital Sign     Days of Exercise per Week: 7 days     Minutes of Exercise per Session: 30 min   Stress: Stress Concern Present (11/21/2024)    [place] [employer] - Occupational Stress Questionnaire     Feeling of Stress : To some extent   Social Connections: Moderately Isolated (11/21/2024)    Social Connection and Isolation Panel [NHANES]     Frequency of Communication with Friends and Family: Once a week     Frequency of Social Gatherings with Friends and Family: Once a week     Attends Mandaeism Services: More than 4 times per year     Active Member of Clubs or Organizations: No     Attends Club or Organization Meetings: Never     Marital Status:    Intimate Partner Violence: Not At Risk (11/21/2024)    Humiliation, Afraid, Rape, and Kick questionnaire     Fear of Current or Ex-Partner: No     Emotionally Abused: No     Physically Abused: No     Sexually Abused: No   Housing Stability: Low Risk  (11/21/2024)    Housing Stability Vital Sign     Unable to Pay for Housing in the Last Year: No     Number of Times Moved in the Last Year: 0      Homeless in the Last Year: No     Social History     Substance and Sexual Activity   Alcohol Use Not Currently       Social History     Substance and Sexual Activity   Drug Use Not Currently     Social History     Tobacco Use   Smoking Status Never   Smokeless Tobacco Never     Family History   Problem Relation Age of Onset    Heart disease Mother     Asthma Daughter     Lung cancer Paternal Grandfather     Asthma Daughter     Colon cancer Father     Colon cancer Maternal Grandfather     Prostate cancer Maternal Grandfather     Colon cancer Maternal Aunt     No Known Problems Maternal Grandmother     Diabetes Paternal Grandmother     Dementia Paternal Grandmother     No Known Problems Maternal Aunt     No Known Problems Maternal Aunt     No Known Problems Maternal Aunt     No Known Problems Paternal Aunt     Asthma Daughter     Psychiatric Illness Neg Hx     Stroke Neg Hx     Thyroid disease Neg Hx     Substance Abuse Neg Hx     Suicidality Neg Hx      ==  Shawn Watson DO  [facility] Internal Medicine  [address] [place] [id number]  Office: [phone]  Fax: [phone]

## 2025-02-13 NOTE — ASSESSMENT & PLAN NOTE
Stable.  Uses Abilify 5 mg at bedtime, duloxetine 120 mg daily, Lamictal 150 mg daily at bedtime.    Continue current regimen continue psychiatry follow-up.

## 2025-02-16 ENCOUNTER — NURSE TRIAGE (OUTPATIENT)
Dept: OTHER | Facility: OTHER | Age: 55
End: 2025-02-16

## 2025-02-16 DIAGNOSIS — N30.91 CYSTITIS WITH HEMATURIA: Primary | ICD-10-CM

## 2025-02-16 RX ORDER — NITROFURANTOIN 25; 75 MG/1; MG/1
100 CAPSULE ORAL 2 TIMES DAILY
Qty: 14 CAPSULE | Refills: 0 | Status: SHIPPED | OUTPATIENT
Start: 2025-02-16 | End: 2025-02-23

## 2025-02-16 NOTE — TELEPHONE ENCOUNTER
"Patient calling with urinary frequency and burning with urination. Patient requesting antibiotic. On call Provider paged and recommended antibiotic. Pharmacy receipt confirmed at 9:21am.      Answer Assessment - Initial Assessment Questions  1. SYMPTOM: \"What's the main symptom you're concerned about?\" (e.g., frequency, incontinence)        Frequency and urgency with burning during urination    2. ONSET: \"When did the  the burning  start?\"        Yesterday and has gotten worse over last 24 hours    3. PAIN: \"Is there any pain?\" If Yes, ask: \"How bad is it?\" (Scale: 1-10; mild, moderate, severe)        4/10    4. CAUSE: \"What do you think is causing the symptoms?\"        Urinary tract infection    5. OTHER SYMPTOMS: \"Do you have any other symptoms?\" (e.g., blood in urine, fever, flank pain, pain with urination)        No fever, no flank pain, positive for hematuria light tint    Protocols used: Urinary Symptoms-Adult-AH    "

## 2025-02-16 NOTE — TELEPHONE ENCOUNTER
Reason for Disposition  • Urinating more frequently than usual (i.e., frequency) OR new-onset of the feeling of an urgent need to urinate (i.e., urgency)    Protocols used: Urinary Symptoms-Adult-AH

## 2025-02-24 DIAGNOSIS — E03.9 HYPOTHYROIDISM, UNSPECIFIED TYPE: ICD-10-CM

## 2025-02-24 DIAGNOSIS — R53.83 OTHER FATIGUE: Primary | ICD-10-CM

## 2025-02-24 DIAGNOSIS — E53.8 VITAMIN B 12 DEFICIENCY: ICD-10-CM

## 2025-02-24 DIAGNOSIS — E61.1 IRON DEFICIENCY: ICD-10-CM

## 2025-02-25 NOTE — PSYCH
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 10/12/2024 140     Potassium 10/12/2024 4.4     Chloride 10/12/2024 106     CO2 10/12/2024 27     ANION GAP 10/12/2024 7     BUN 10/12/2024 18     Creatinine 10/12/2024 0.81     Glucose, Fasting 10/12/2024 88     Calcium 10/12/2024 9.0     AST 10/12/2024 11 (L)     ALT 10/12/2024 7     Alkaline Phosphatase 10/12/2024 54     Total Protein 10/12/2024 6.2 (L)     Albumin 10/12/2024 4.2     Total Bilirubin 10/12/2024 0.31     eGFR 10/12/2024 83     Hemoglobin A1C 10/12/2024 5.6     EAG 10/12/2024 114     Cholesterol 10/12/2024 238 (H)     Triglycerides 10/12/2024 74     HDL, Direct 10/12/2024 57     LDL Calculated 10/12/2024 166 (H)     Non-HDL-Chol (CHOL-HDL) 10/12/2024 181     Free T4 10/12/2024 0.73     TSH 3RD GENERATON 10/12/2024 5.611 (H)     RBC, UA 08/14/2024 30-50 (A)     WBC, UA 08/14/2024 30-50 (A)     Epithelial Cells 08/14/2024 Occasional     Bacteria, UA 08/14/2024 Field obscured, unable to enumerate (A)     Amorphous  Crystals, UA 08/14/2024 Innumerable     WBC Clumps 08/14/2024 present     Urine Culture 08/14/2024 <10,000 cfu/ml Gram Negative Mike (A)        Suicide/Homicide Risk Assessment:    Risk of Harm to Self:  Demographic risk factors include: , age: over 50 or older  Historical Risk Factors include: chronic depressive symptoms, chronic anxiety symptoms, history of suicide attempts  Recent Specific Risk Factors include: diagnosis of depression  Protective Factors: no current suicidal ideation, being a parent, being , compliant with medications, compliant with mental health treatment, connection to own children, responsibilities and duties to others, stable living environment, stable job, supportive family  Weapons: gun. The following steps have been taken to ensure weapons are properly secured: locked, by   Based on today's assessment, Kaylah presents the following risk of harm to self: low    Risk of Harm to Others:  The following ratings are based on assessment at the time of the interview  Based on today's assessment, Kaylah presents the following risk of harm to others: none    The following interventions are recommended: continue medication management, continue psychotherapy    Psychotherapy Provided:     Individual psychotherapy provided: Yes  Counseling was provided during the session today for 17 minutes.  Medications, treatment progress and treatment plan reviewed with Kaylah.  Goals discussed during in session: maintain improvement in anxiety, maintain improvement in depression, help with sleep, and maintain impulse control.   Discussed with Kaylah coping with marital problems, chronic anxiety, and chronic mental illness.   Coping techniques including taking walks and talking to a therapist reviewed with Kaylah.   Supportive therapy provided.      Treatment Plan:    Completed and signed during the session: Yes - with Kaylah    Depression Follow-up Plan Completed: Yes    Note Share:    This note was  shared with patient.    Visit Time    Visit Start Time: 3:06 PM  Visit Stop Time: 3:36 PM  Total Visit Duration:  30 minutes    Rosa Lawler MD 03/06/25

## 2025-02-27 ENCOUNTER — APPOINTMENT (OUTPATIENT)
Dept: LAB | Age: 55
End: 2025-02-27
Payer: COMMERCIAL

## 2025-02-27 DIAGNOSIS — E03.9 HYPOTHYROIDISM, UNSPECIFIED TYPE: ICD-10-CM

## 2025-02-27 DIAGNOSIS — R53.83 OTHER FATIGUE: ICD-10-CM

## 2025-02-27 DIAGNOSIS — E53.8 VITAMIN B 12 DEFICIENCY: ICD-10-CM

## 2025-02-27 DIAGNOSIS — E61.1 IRON DEFICIENCY: ICD-10-CM

## 2025-02-27 LAB
BASOPHILS # BLD AUTO: 0.03 THOUSANDS/ÂΜL (ref 0–0.1)
BASOPHILS NFR BLD AUTO: 0 % (ref 0–1)
EOSINOPHIL # BLD AUTO: 0.06 THOUSAND/ÂΜL (ref 0–0.61)
EOSINOPHIL NFR BLD AUTO: 1 % (ref 0–6)
ERYTHROCYTE [DISTWIDTH] IN BLOOD BY AUTOMATED COUNT: 14.1 % (ref 11.6–15.1)
FERRITIN SERPL-MCNC: 3 NG/ML (ref 11–307)
HCT VFR BLD AUTO: 30.6 % (ref 34.8–46.1)
HGB BLD-MCNC: 9.5 G/DL (ref 11.5–15.4)
IMM GRANULOCYTES # BLD AUTO: 0.03 THOUSAND/UL (ref 0–0.2)
IMM GRANULOCYTES NFR BLD AUTO: 0 % (ref 0–2)
IRON SATN MFR SERPL: 13 % (ref 15–50)
IRON SERPL-MCNC: 47 UG/DL (ref 50–212)
LYMPHOCYTES # BLD AUTO: 2.53 THOUSANDS/ÂΜL (ref 0.6–4.47)
LYMPHOCYTES NFR BLD AUTO: 33 % (ref 14–44)
MCH RBC QN AUTO: 27.1 PG (ref 26.8–34.3)
MCHC RBC AUTO-ENTMCNC: 31 G/DL (ref 31.4–37.4)
MCV RBC AUTO: 87 FL (ref 82–98)
MONOCYTES # BLD AUTO: 0.64 THOUSAND/ÂΜL (ref 0.17–1.22)
MONOCYTES NFR BLD AUTO: 8 % (ref 4–12)
NEUTROPHILS # BLD AUTO: 4.33 THOUSANDS/ÂΜL (ref 1.85–7.62)
NEUTS SEG NFR BLD AUTO: 58 % (ref 43–75)
NRBC BLD AUTO-RTO: 0 /100 WBCS
PLATELET # BLD AUTO: 280 THOUSANDS/UL (ref 149–390)
PMV BLD AUTO: 9.6 FL (ref 8.9–12.7)
RBC # BLD AUTO: 3.51 MILLION/UL (ref 3.81–5.12)
TIBC SERPL-MCNC: 371 UG/DL (ref 250–450)
TRANSFERRIN SERPL-MCNC: 265 MG/DL (ref 203–362)
TSH SERPL DL<=0.05 MIU/L-ACNC: 3.62 UIU/ML (ref 0.45–4.5)
UIBC SERPL-MCNC: 324 UG/DL (ref 155–355)
VIT B12 SERPL-MCNC: 297 PG/ML (ref 180–914)
WBC # BLD AUTO: 7.62 THOUSAND/UL (ref 4.31–10.16)

## 2025-02-27 PROCEDURE — 82728 ASSAY OF FERRITIN: CPT

## 2025-02-27 PROCEDURE — 83550 IRON BINDING TEST: CPT

## 2025-02-27 PROCEDURE — 84443 ASSAY THYROID STIM HORMONE: CPT

## 2025-02-27 PROCEDURE — 82607 VITAMIN B-12: CPT

## 2025-02-27 PROCEDURE — 85025 COMPLETE CBC W/AUTO DIFF WBC: CPT

## 2025-02-27 PROCEDURE — 83540 ASSAY OF IRON: CPT

## 2025-02-27 PROCEDURE — 36415 COLL VENOUS BLD VENIPUNCTURE: CPT

## 2025-02-28 ENCOUNTER — OFFICE VISIT (OUTPATIENT)
Dept: OBGYN CLINIC | Facility: CLINIC | Age: 55
End: 2025-02-28
Payer: COMMERCIAL

## 2025-02-28 VITALS
BODY MASS INDEX: 31.5 KG/M2 | HEIGHT: 62 IN | WEIGHT: 171.2 LBS | DIASTOLIC BLOOD PRESSURE: 62 MMHG | SYSTOLIC BLOOD PRESSURE: 132 MMHG

## 2025-02-28 DIAGNOSIS — N94.6 DYSMENORRHEA: ICD-10-CM

## 2025-02-28 DIAGNOSIS — Z12.4 SCREENING FOR CERVICAL CANCER: ICD-10-CM

## 2025-02-28 DIAGNOSIS — Z01.419 WELL WOMAN EXAM WITH ROUTINE GYNECOLOGICAL EXAM: Primary | ICD-10-CM

## 2025-02-28 DIAGNOSIS — G43.709 CHRONIC MIGRAINE WITHOUT AURA WITHOUT STATUS MIGRAINOSUS, NOT INTRACTABLE: ICD-10-CM

## 2025-02-28 PROCEDURE — G0476 HPV COMBO ASSAY CA SCREEN: HCPCS | Performed by: PHYSICIAN ASSISTANT

## 2025-02-28 PROCEDURE — S0612 ANNUAL GYNECOLOGICAL EXAMINA: HCPCS | Performed by: PHYSICIAN ASSISTANT

## 2025-02-28 PROCEDURE — G0145 SCR C/V CYTO,THINLAYER,RESCR: HCPCS | Performed by: PHYSICIAN ASSISTANT

## 2025-02-28 RX ORDER — KETOROLAC TROMETHAMINE 10 MG/1
10 TABLET, FILM COATED ORAL EVERY 6 HOURS PRN
Qty: 30 TABLET | Refills: 0 | Status: SHIPPED | OUTPATIENT
Start: 2025-02-28

## 2025-02-28 RX ORDER — IBUPROFEN 800 MG/1
800 TABLET, FILM COATED ORAL EVERY 8 HOURS PRN
Qty: 30 TABLET | Refills: 0 | Status: SHIPPED | OUTPATIENT
Start: 2025-02-28

## 2025-02-28 NOTE — PROGRESS NOTES
Assessment   54 y.o.  presenting for annual exam.     Plan   Diagnoses and all orders for this visit:    Well woman exam with routine gynecological exam    Screening for cervical cancer  -     Liquid-based pap, screening        Pap collected  Mammo slip given - order slips printed and advised to call to schedule. Discussed ABUS given dense breast tissue and elevated lifetime risk. Advised to confirm insurance coverage of ABUS before scheduling   Colonoscopy due 2025; pt aware to call    Perineal hygiene reviewed. Weight bearing exercises minium of 150 mins/weekly advised. Kegel exercises recommended.   SBE encouraged, A yearly mammogram is recommended for breast cancer screening starting at age 40. ASCCP guidelines reviewed. Calcium/ Vit D dietary requirements discussed.   Advised to call with any issues, all concerns & questions addressed.   See provided information in your after visit summary     F/U Annually and PRN    Results will be released to [facility], if abnormal will call or message to review and discuss treatment plan.     __________________________________________________________________    Subjective     Kaylah Matute is a 54 y.o.  presenting for annual exam. She is without complaint    SCREENING  Last Pap: 2021 neg/neg  Last Mammo: 2022 right diagnostic mammo birads 2; overdue for b/l screening mammo; extreme dense breast tissue; Lifetime risk 15%   Last Colonoscopy: 2022 3 year recall      GYN  Menses absent with Mirena in place     Sexually active: Yes - single partner - male  Contraception: Mirena placed 2021    Hx Abnormal pap: hx of colposcopy many years ago; follow ups have been normal   We reviewed ASCCP guidelines for Pap testing today.    Denies vaginal discharge, itching, odor, dyspareunia, pelvic pain and vulvar/vaginal symptoms      OB           Complaints: denies   Denies urgency, frequency, hematuria, leakage / change in stream, difficulty  urinating.       BREAST  Complaints: denies   Denies: breast lump, breast tenderness, nipple discharge, skin color change, and skin lesion(s)  Personal hx: none      Pertinent Family Hx:   Family hx of breast cancer: no  Family hx of ovarian cancer: no  Family hx of colon cancer: dad, maternal aunt, MGF      GENERAL  PMH reviewed/updated and is as below.   Patient does follow with a PCP.    SOCIAL  Smoking: no  Alcohol:no  Drug: no  Occupation: title work       Past Medical History:   Diagnosis Date    Allergic 1989    Amenorrhea     Anemia 2023    Anxiety     Arthritis     Asthma     Back pain     Chondromalacia of patella     Chronic fatigue     Colon polyp     COPD (chronic obstructive pulmonary disease) (HCC) Yes    Deep vein thrombophlebitis of leg (HCC)     Depression     Disease of thyroid gland     Diverticulitis     GERD (gastroesophageal reflux disease)     Headache(784.0)     History of transfusion 2008    HPV (human papilloma virus) infection     Hypothyroidism     Lumbar radiculopathy     Migraine     Myofascial pain syndrome     Osteopenia     Piriformis syndrome     Sacroiliitis (HCC)     Sciatica     Seizure (HCC)     Sleep apnea     Spondylosis of lumbar spine     Sprain of temporomandibular joint or ligament 2023    Trochanteric bursitis of right hip     Vertigo     Vitamin D deficiency     Weight gain 2019       Past Surgical History:   Procedure Laterality Date    CERVIX LESION DESTRUCTION       SECTION      COLONOSCOPY      COLPOSCOPY W/ BIOPSY / CURETTAGE      Colposcopy cervix with biopsy    LAPAROSCOPIC ENDOMETRIOSIS FULGURATION      LAPAROSCOPY      TOOTH EXTRACTION           Current Outpatient Medications:     albuterol (2.5 mg/3 mL) 0.083 % nebulizer solution, Inhale , Disp: , Rfl:     albuterol (Proventil HFA) 90 mcg/act inhaler, Inhale 2 puffs every 6 (six) hours as needed for wheezing, Disp: 18 g, Rfl: 3    aluminum-magnesium hydroxide 200-200 MG/5ML  suspension, GAVISCON REGULAR STRENGTH AFTER MEALS and BEDTIME WHEN NOT FOLLOWING LPR/GERD DIET., Disp: 355 mL, Rfl: 11    ARIPiprazole (ABILIFY) 5 mg tablet, Take 1 tablet (5 mg total) by mouth daily at bedtime, Disp: 30 tablet, Rfl: 4    Ascorbic Acid (VITAMIN C PO), Take by mouth, Disp: , Rfl:     azelastine (ASTELIN) 0.1 % nasal spray, 1-2 sprays into each nostril 2 (two) times a day as needed for rhinitis Use in each nostril as directed, Disp: 30 mL, Rfl: 11    cyclobenzaprine (FLEXERIL) 5 mg tablet, Take 1 tablet (5 mg total) by mouth 3 (three) times a day, Disp: 30 tablet, Rfl: 0    DULoxetine (CYMBALTA) 60 mg delayed release capsule, Take 2 capsules (120 mg total) by mouth daily, Disp: 60 capsule, Rfl: 4    eptinezumab-jjmr (VYEPTI) IVPB, Infuse 100mg IV every 90 days.  Note to pharmacy: To be delivered to where patient will be having infusion: [facility]. [phone], [phone], Disp: 1 mL, Rfl: 3    famotidine (PEPCID) 40 MG tablet, TAKE 1 TABLET(40 MG) BY MOUTH DAILY AT BEDTIME, Disp: 30 tablet, Rfl: 5    gabapentin (Neurontin) 300 mg capsule, 3 tabs BID, Disp: 540 capsule, Rfl: 2    ibuprofen (MOTRIN) 800 mg tablet, Take 1 tablet (800 mg total) by mouth every 8 (eight) hours as needed for mild pain, Disp: 30 tablet, Rfl: 0    ipratropium (ATROVENT) 0.06 % nasal spray, 2 sprays into each nostril 4 (four) times a day, Disp: 15 mL, Rfl: 12    ketorolac (TORADOL) 10 mg tablet, Take 1 tablet (10 mg total) by mouth every 6 (six) hours as needed (migraine, back pain) Max 2-3 per week., Disp: 30 tablet, Rfl: 1    lamoTRIgine (LaMICtal) 150 MG tablet, Take 1 tablet (150 mg total) by mouth daily at bedtime, Disp: 30 tablet, Rfl: 4    levocetirizine (XYZAL) 5 MG tablet, Take 5 mg by mouth every evening, Disp: , Rfl:     levonorgestrel (MIRENA) 20 MCG/24HR IUD, by Intrauterine route, Disp: , Rfl:     levothyroxine 75 mcg tablet, Take 1 tablet (75 mcg total) by mouth daily in  the early morning, Disp: 100 tablet, Rfl: 3    mometasone (NASONEX) 50 mcg/act nasal spray, , Disp: , Rfl:     Multiple Vitamins-Minerals (ZINC PO), Take by mouth, Disp: , Rfl:     naltrexone (REVIA) 50 mg tablet, Take 1 tablet (50 mg total) by mouth daily, Disp: 30 tablet, Rfl: 4    naproxen (NAPROSYN) 500 mg tablet, TAKE 1 TABLET(500 MG) BY MOUTH TWICE DAILY WITH MEALS, Disp: 20 tablet, Rfl: 0    ondansetron (ZOFRAN-ODT) 4 mg disintegrating tablet, Take 1 tablet (4 mg total) by mouth every 6 (six) hours as needed for nausea or vomiting, Disp: 30 tablet, Rfl: 2    pantoprazole (PROTONIX) 40 mg tablet, TAKE 1 TABLET(40 MG) BY MOUTH DAILY, Disp: 30 tablet, Rfl: 5    prochlorperazine (COMPAZINE) 10 mg tablet, 1 tab q6 hours prn migraine (with cocktail), Disp: 30 tablet, Rfl: 2    rosuvastatin (CRESTOR) 5 mg tablet, Take 1 tablet (5 mg total) by mouth daily, Disp: 90 tablet, Rfl: 3    traZODone (DESYREL) 100 mg tablet, Take 1-3 tablets (100-300 mg total) by mouth daily at bedtime as needed for sleep, Disp: 90 tablet, Rfl: 4    VITAMIN D PO, Take 5,000 Units by mouth, Disp: , Rfl:     ZOLMitriptan (ZOMIG-ZMT) 5 MG disintegrating tablet, DISSOLVE 1 TABLET BY MOUTH AT ONSET OF HEADACHE, MAY REPEAT AFTER TWO HOURS AS NEEDED, MAX 2 TABLETS PER 24 HOURS, Disp: 12 tablet, Rfl: 5    tiotropium (SPIRIVA RESPIMAT) 1.25 MCG/ACT AERS inhaler, Inhale 2 puffs daily, Disp: 4 g, Rfl: 11    Allergies   Allergen Reactions    Nuts - Food Allergy      Other reaction(s): WALNUTS    Cephalexin     Erythromycin Diarrhea, GI Intolerance and Vomiting    Iodine - Food Allergy Hives    Other      adobo    Shellfish Allergy - Food Allergy     Shellfish-Derived Products - Food Allergy     Turmeric - Food Allergy Hives    Wellbutrin [Bupropion] Seizures    Zithromax [Azithromycin] Diarrhea     Can take name brand  Annotation - [id number]: can take brand name  Other reaction(s): Nausea/vomiting/diarrhea       Social History     Socioeconomic History     Marital status: /Civil Union     Spouse name: Ever    Number of children: 2    Years of education: 12    Highest education level: High school graduate   Occupational History    Occupation: works for [employer]   Tobacco Use    Smoking status: Never    Smokeless tobacco: Never   Vaping Use    Vaping status: Never Used   Substance and Sexual Activity    Alcohol use: Not Currently    Drug use: Never    Sexual activity: Yes     Partners: Male     Birth control/protection: I.U.D.     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, also worked as a  and as an  in the past    Functioning Relationships: good support system    Legal History: none     History: None        Unsure of age of house    Heating system gas forced hot air    Central AC    Bedroom is carpeted    Humidifier in living room    Air Purifier in living room    No basement    No dehumidifier,            Pets - 1 Guinea pig, Bearded Dragon Lizard            Caffeine - none in beverages     Chocolate - 3 times a week       Social Drivers of Health     Financial Resource Strain: Low Risk  (11/21/2024)    Overall Financial Resource Strain (CARDIA)     Difficulty of Paying Living Expenses: Not hard at all   Food Insecurity: No Food Insecurity (11/21/2024)    Hunger Vital Sign     Worried About Running Out of Food in the Last Year: Never true     Ran Out of Food in the Last Year: Never true   Transportation Needs: No Transportation Needs (11/21/2024)    PRAPARE - Transportation     Lack of Transportation (Medical): No     Lack of Transportation (Non-Medical): No   Physical Activity: Sufficiently Active (11/21/2024)    Exercise Vital Sign      "Days of Exercise per Week: 7 days     Minutes of Exercise per Session: 30 min   Stress: Stress Concern Present (11/21/2024)    [place] [employer] - Occupational Stress Questionnaire     Feeling of Stress : To some extent   Social Connections: Moderately Isolated (11/21/2024)    Social Connection and Isolation Panel [NHANES]     Frequency of Communication with Friends and Family: Once a week     Frequency of Social Gatherings with Friends and Family: Once a week     Attends [employer]ology Services: More than 4 times per year     Active Member of Clubs or Organizations: No     Attends Club or Organization Meetings: Never     Marital Status:    Intimate Partner Violence: Not At Risk (11/21/2024)    Humiliation, Afraid, Rape, and Kick questionnaire     Fear of Current or Ex-Partner: No     Emotionally Abused: No     Physically Abused: No     Sexually Abused: No   Housing Stability: Low Risk  (11/21/2024)    Housing Stability Vital Sign     Unable to Pay for Housing in the Last Year: No     Number of Times Moved in the Last Year: 0     Homeless in the Last Year: No       Review of Systems     ROS:  Constitutional: Negative for fatigue and unexpected weight change.   Respiratory: Negative for cough and shortness of breath.    Cardiovascular: Negative for chest pain and palpitations.   Gastrointestinal: Negative for abdominal pain and change in bowel habits  Breasts:  Negative, other than as noted above.   Genitourinary: Negative, other than as noted above.   Psychiatric: Negative for mood difficulties.      Objective      /62 (BP Location: Left arm, Patient Position: Sitting, Cuff Size: Standard)   Ht 5' 2\" (1.575 m)   Wt 77.7 kg (171 lb 3.2 oz)   LMP  (LMP Unknown)   BMI 31.31 kg/m²     Physical Examination:    Patient appears well and is not in distress  Neck is supple without masses, no cervical or supraclavicular lymphadenopathy  Cardiovascular: regular rate and rhythm; no " murmurs  Lungs: clear to auscultation bilaterally; no wheezes  Breasts are symmetrical without mass, tenderness, nipple discharge, skin changes or adenopathy.   Abdomen is soft and nontender without masses.   External genitals are normal without lesions or rashes.  Urethral meatus and urethra are normal  Bladder is normal to palpation  Vagina is normal without discharge or bleeding.   Cervix is normal without discharge or lesion. + IUD strings   Uterus is normal, mobile, nontender without palpable mass.  Adnexa are normal, nontender, without palpable mass.

## 2025-03-04 ENCOUNTER — OFFICE VISIT (OUTPATIENT)
Age: 55
End: 2025-03-04
Payer: COMMERCIAL

## 2025-03-04 VITALS
HEIGHT: 62 IN | RESPIRATION RATE: 16 BRPM | WEIGHT: 171.4 LBS | HEART RATE: 77 BPM | OXYGEN SATURATION: 99 % | BODY MASS INDEX: 31.54 KG/M2 | SYSTOLIC BLOOD PRESSURE: 122 MMHG | DIASTOLIC BLOOD PRESSURE: 74 MMHG

## 2025-03-04 DIAGNOSIS — D64.9 ANEMIA, UNSPECIFIED TYPE: ICD-10-CM

## 2025-03-04 DIAGNOSIS — R63.8 WEIGHT DISORDER: ICD-10-CM

## 2025-03-04 DIAGNOSIS — Z12.31 ENCOUNTER FOR SCREENING MAMMOGRAM FOR BREAST CANCER: ICD-10-CM

## 2025-03-04 DIAGNOSIS — E03.9 HYPOTHYROIDISM, UNSPECIFIED TYPE: ICD-10-CM

## 2025-03-04 DIAGNOSIS — E61.1 IRON DEFICIENCY: Primary | ICD-10-CM

## 2025-03-04 PROCEDURE — 99214 OFFICE O/P EST MOD 30 MIN: CPT | Performed by: INTERNAL MEDICINE

## 2025-03-04 RX ORDER — TRIAMCINOLONE ACETONIDE 55 UG/1
SPRAY, METERED NASAL
COMMUNITY

## 2025-03-04 RX ORDER — PNV NO.95/FERROUS FUM/FOLIC AC 28MG-0.8MG
325 TABLET ORAL EVERY OTHER DAY
Qty: 30 TABLET | Refills: 1 | Status: SHIPPED | OUTPATIENT
Start: 2025-03-04

## 2025-03-04 RX ORDER — METFORMIN HYDROCHLORIDE 500 MG/1
500 TABLET, EXTENDED RELEASE ORAL
Qty: 30 TABLET | Refills: 6 | Status: SHIPPED | OUTPATIENT
Start: 2025-03-04

## 2025-03-04 RX ORDER — OMEPRAZOLE 10 MG/1
CAPSULE, DELAYED RELEASE ORAL
COMMUNITY
End: 2025-03-06 | Stop reason: ALTCHOICE

## 2025-03-04 NOTE — ASSESSMENT & PLAN NOTE
Patient would like to resume metformin recommended by weight management last year prescription for 500 mg extended release was provided to the patient today recommend taking medication at suppertime.  Recommend decrease carbohydrate consumption as well as daily exercise walking is fine    Orders:    metFORMIN (GLUCOPHAGE-XR) 500 mg 24 hr tablet; Take 1 tablet (500 mg total) by mouth daily with dinner

## 2025-03-04 NOTE — ASSESSMENT & PLAN NOTE
Mild iron deficiency identified on recent blood work along with mild anemia.  Recommend initiation of iron supplementation 325 mg every other day along with colonoscopy.  Patient will contact her gastroenterologist last colon rectal physician to schedule the colonoscopy.  Follow-up blood work in 1 month requested along with return visit to the office    Orders:    Ferrous Sulfate (Iron) 325 (65 Fe) MG TABS; Take 1 tablet (325 mg total) by mouth every other day    Iron Panel (Includes Ferritin, Iron Sat%, Iron, and TIBC); Future

## 2025-03-04 NOTE — ASSESSMENT & PLAN NOTE
TSH reading reviewed confirms adequate replacement continue current levothyroxine at 75 mcg daily

## 2025-03-04 NOTE — ASSESSMENT & PLAN NOTE
Mild anemia reviewed with the patient appears to be secondary to iron deficiency will start iron replacement follow-up CBC in 1 month    Orders:    CBC and differential; Future

## 2025-03-04 NOTE — PROGRESS NOTES
Name: Kaylah Matute      : 1970      MRN: [id number]  Encounter Provider: Elvis Montilla MD  Encounter Date: 3/4/2025   Encounter department: [facility] INTERNAL MEDICINE    Assessment & Plan  Encounter for screening mammogram for breast cancer         Iron deficiency  Mild iron deficiency identified on recent blood work along with mild anemia.  Recommend initiation of iron supplementation 325 mg every other day along with colonoscopy.  Patient will contact her gastroenterologist last colon rectal physician to schedule the colonoscopy.  Follow-up blood work in 1 month requested along with return visit to the office    Orders:    Ferrous Sulfate (Iron) 325 (65 Fe) MG TABS; Take 1 tablet (325 mg total) by mouth every other day    Iron Panel (Includes Ferritin, Iron Sat%, Iron, and TIBC); Future    Anemia, unspecified type  Mild anemia reviewed with the patient appears to be secondary to iron deficiency will start iron replacement follow-up CBC in 1 month    Orders:    CBC and differential; Future    Weight disorder  Patient would like to resume metformin recommended by weight management last year prescription for 500 mg extended release was provided to the patient today recommend taking medication at suppertime.  Recommend decrease carbohydrate consumption as well as daily exercise walking is fine    Orders:    metFORMIN (GLUCOPHAGE-XR) 500 mg 24 hr tablet; Take 1 tablet (500 mg total) by mouth daily with dinner    Hypothyroidism, unspecified type  TSH reading reviewed confirms adequate replacement continue current levothyroxine at 75 mcg daily              History of Present Illness     This 54-year-old female patient presents to our office today for follow-up visit.  She is here to review recent blood work.  She continues to struggle with weight loss and would like to return onto the metformin 500 mg at dinnertime prescribed about a year ago by weight management.  A prescription for this  medication was provided to the patient today    Recommend annual mammogram order is in system.    Review of blood work indicates thyroid replacement therapy is currently normal value recommend continuation of 75 mcg levothyroxine daily.    Iron deficiency is identified it is mild and I have recommended that the patient initiate iron supplementation 325 mg every other day.  She does have a mild anemia we have recommended follow-up blood work in 4 weeks for iron level and CBC with follow-up office visit.    I have recommended the patient also contact Dr. Mcgill her colorectal surgeon to schedule a colonoscopy.      Review of Systems   All other systems reviewed and are negative.    Past Medical History:   Diagnosis Date    Allergic     Amenorrhea     Anemia 2023    Anxiety     Arthritis     Asthma     Back pain     Chondromalacia of patella     Chronic fatigue     Colon polyp     COPD (chronic obstructive pulmonary disease) (HCC) Yes    Deep vein thrombophlebitis of leg (HCC)     Depression     Disease of thyroid gland     Diverticulitis     GERD (gastroesophageal reflux disease)     Headache(784.0)     History of transfusion 2008    HPV (human papilloma virus) infection     Hypothyroidism     Lumbar radiculopathy     Migraine     Myofascial pain syndrome     Osteopenia     Piriformis syndrome     Sacroiliitis (HCC)     Sciatica     Seizure (HCC)     Sleep apnea     Spondylosis of lumbar spine     Sprain of temporomandibular joint or ligament 2023    Trochanteric bursitis of right hip     Vertigo     Vitamin D deficiency     Weight gain 2019     Past Surgical History:   Procedure Laterality Date    CERVIX LESION DESTRUCTION       SECTION      COLONOSCOPY      COLPOSCOPY W/ BIOPSY / CURETTAGE      Colposcopy cervix with biopsy    LAPAROSCOPIC ENDOMETRIOSIS FULGURATION      LAPAROSCOPY      TOOTH EXTRACTION       Family History   Problem Relation Age of Onset    Heart disease Mother      Asthma Daughter     Lung cancer Paternal Grandfather     Asthma Daughter     Colon cancer Father     Colon cancer Maternal Grandfather     Prostate cancer Maternal Grandfather     Colon cancer Maternal Aunt     No Known Problems Maternal Grandmother     Diabetes Paternal Grandmother     Dementia Paternal Grandmother     No Known Problems Maternal Aunt     No Known Problems Maternal Aunt     No Known Problems Maternal Aunt     No Known Problems Paternal Aunt     Asthma Daughter     Psychiatric Illness Neg Hx     Stroke Neg Hx     Thyroid disease Neg Hx     Substance Abuse Neg Hx     Suicidality Neg Hx      Social History     Tobacco Use    Smoking status: Never    Smokeless tobacco: Never   Vaping Use    Vaping status: Never Used   Substance and Sexual Activity    Alcohol use: Not Currently    Drug use: Never    Sexual activity: Yes     Partners: Male     Birth control/protection: I.U.D.     Comment: Mirena     Current Outpatient Medications on File Prior to Visit   Medication Sig    albuterol (2.5 mg/3 mL) 0.083 % nebulizer solution Inhale     albuterol (Proventil HFA) 90 mcg/act inhaler Inhale 2 puffs every 6 (six) hours as needed for wheezing    aluminum-magnesium hydroxide 200-200 MG/5ML suspension GAVISCON REGULAR STRENGTH AFTER MEALS and BEDTIME WHEN NOT FOLLOWING LPR/GERD DIET.    ARIPiprazole (ABILIFY) 5 mg tablet Take 1 tablet (5 mg total) by mouth daily at bedtime    Ascorbic Acid (VITAMIN C PO) Take by mouth    azelastine (ASTELIN) 0.1 % nasal spray 1-2 sprays into each nostril 2 (two) times a day as needed for rhinitis Use in each nostril as directed    cyclobenzaprine (FLEXERIL) 5 mg tablet Take 1 tablet (5 mg total) by mouth 3 (three) times a day    DULoxetine (CYMBALTA) 60 mg delayed release capsule Take 2 capsules (120 mg total) by mouth daily    eptinezumab-jjmr (VYEPTI) IVPB Infuse 100mg IV every 90 days.  Note to pharmacy: To be delivered to where patient will be having infusion: [facility]  [facility]. [phone], [phone]    famotidine (PEPCID) 40 MG tablet TAKE 1 TABLET(40 MG) BY MOUTH DAILY AT BEDTIME    gabapentin (Neurontin) 300 mg capsule 3 tabs BID    ibuprofen (MOTRIN) 800 mg tablet Take 1 tablet (800 mg total) by mouth every 8 (eight) hours as needed for mild pain    ipratropium (ATROVENT) 0.06 % nasal spray 2 sprays into each nostril 4 (four) times a day    ketorolac (TORADOL) 10 mg tablet Take 1 tablet (10 mg total) by mouth every 6 (six) hours as needed (migraine, back pain) Max 2-3 per week.    lamoTRIgine (LaMICtal) 150 MG tablet Take 1 tablet (150 mg total) by mouth daily at bedtime    levocetirizine (XYZAL) 5 MG tablet Take 5 mg by mouth every evening    levonorgestrel (MIRENA) 20 MCG/24HR IUD by Intrauterine route    levothyroxine 75 mcg tablet Take 1 tablet (75 mcg total) by mouth daily in the early morning    mometasone (NASONEX) 50 mcg/act nasal spray     Multiple Vitamins-Minerals (ZINC PO) Take by mouth    naltrexone (REVIA) 50 mg tablet Take 1 tablet (50 mg total) by mouth daily    naproxen (NAPROSYN) 500 mg tablet TAKE 1 TABLET(500 MG) BY MOUTH TWICE DAILY WITH MEALS    omeprazole (PriLOSEC) 10 mg delayed release capsule Take by mouth    ondansetron (ZOFRAN-ODT) 4 mg disintegrating tablet Take 1 tablet (4 mg total) by mouth every 6 (six) hours as needed for nausea or vomiting    pantoprazole (PROTONIX) 40 mg tablet TAKE 1 TABLET(40 MG) BY MOUTH DAILY    prochlorperazine (COMPAZINE) 10 mg tablet 1 tab q6 hours prn migraine (with cocktail)    rosuvastatin (CRESTOR) 5 mg tablet Take 1 tablet (5 mg total) by mouth daily    tiotropium (SPIRIVA RESPIMAT) 1.25 MCG/ACT AERS inhaler Inhale 2 puffs daily    traZODone (DESYREL) 100 mg tablet Take 1-3 tablets (100-300 mg total) by mouth daily at bedtime as needed for sleep    Triamcinolone Acetonide (Nasacort Allergy 24HR Children) 55 MCG/ACT nasal spray into each nostril    VITAMIN D PO Take 5,000 Units by  "mouth    ZOLMitriptan (ZOMIG-ZMT) 5 MG disintegrating tablet DISSOLVE 1 TABLET BY MOUTH AT ONSET OF HEADACHE, MAY REPEAT AFTER TWO HOURS AS NEEDED, MAX 2 TABLETS PER 24 HOURS     Allergies   Allergen Reactions    Nuts - Food Allergy      Other reaction(s): WALNUTS    Cephalexin     Erythromycin Diarrhea, GI Intolerance and Vomiting    Iodine - Food Allergy Hives    Other      adobo    Shellfish Allergy - Food Allergy     Shellfish-Derived Products - Food Allergy     Turmeric - Food Allergy Hives    Wellbutrin [Bupropion] Seizures    Zithromax [Azithromycin] Diarrhea     Can take name brand  Annotation - [id number]: can take brand name  Other reaction(s): Nausea/vomiting/diarrhea     Immunization History   Administered Date(s) Administered    COVID-19 PFIZER VACCINE 0.3 ML IM 05/21/2021, 06/16/2021, 01/31/2022, 01/31/2022    INFLUENZA 10/31/2018    Influenza, injectable, quadrivalent, preservative free 0.5 mL 10/31/2018, 10/07/2019    Pneumococcal Conjugate Vaccine 20-valent (Pcv20), Polysace 08/30/2022    Tdap 06/06/2019    Zoster Vaccine Recombinant 01/05/2023, 03/15/2023     Objective   /74   Pulse 77   Resp 16   Ht 5' 2\" (1.575 m)   Wt 77.7 kg (171 lb 6.4 oz)   LMP  (LMP Unknown)   SpO2 99%   BMI 31.35 kg/m²     Physical Exam  Vitals and nursing note reviewed.   Constitutional:       General: She is not in acute distress.     Appearance: She is well-developed.   HENT:      Head: Normocephalic and atraumatic.   Eyes:      Conjunctiva/sclera: Conjunctivae normal.   Cardiovascular:      Rate and Rhythm: Normal rate and regular rhythm.      Heart sounds: Normal heart sounds. No murmur heard.  Pulmonary:      Effort: Pulmonary effort is normal. No respiratory distress.      Breath sounds: Normal breath sounds. No wheezing, rhonchi or rales.   Abdominal:      Palpations: Abdomen is soft.   Musculoskeletal:      Cervical back: Neck supple.   Skin:     General: Skin is warm and dry.      Capillary Refill: " Capillary refill takes less than 2 seconds.   Neurological:      Mental Status: She is alert.   Psychiatric:         Mood and Affect: Mood normal.

## 2025-03-05 ENCOUNTER — TELEPHONE (OUTPATIENT)
Age: 55
End: 2025-03-05

## 2025-03-05 NOTE — TELEPHONE ENCOUNTER
Called pt and made her aware of below.  She states that she is not getting iron infusions.  She was prescribed oral medication.  Prescribed Ferrous sulfate 325mg 1 tab every other day    Please advise  [phone] to leave detailed message

## 2025-03-05 NOTE — TELEPHONE ENCOUNTER
I'm sorry I assumed infusion.  What does pt prefer? Iron might help, but I understand if she wants to increase now for end of march infusion. Let me know thanks.

## 2025-03-05 NOTE — TELEPHONE ENCOUNTER
Try iron infusion, check back in with us after that. Iron alone may help. But yes can increase vyepti if iron does not help thanks,

## 2025-03-05 NOTE — TELEPHONE ENCOUNTER
FOLLOW UP: Please review and advise. Patient requests a call back.   Infusion scheduled 3/21/25    REASON FOR CONVERSATION: Headache    SYMPTOMS: daily HA, anemic     OTHER: Pt reported having daily headaches around 8916-8191. Stated recently found out pt is anemic and will be starting on Iron medication tomorrow.     Medications pt currently taking:  --Migraine Cocktail: relieves HA after taking second migraine (Toradol, Zofran, Zolmatriptan)   --Vyepti Infusion    Pt reported once she takes the second round of the migraine cocktail, pt lay down and rests and wakes up HA free.     Pt would like to know if she should increase her Vyepti. Stated it was discussed at [facility] (RN unable to review note. Note yet completed).     DISPOSITION: Discuss with Provider and Call Back Patient         done

## 2025-03-06 ENCOUNTER — RESULTS FOLLOW-UP (OUTPATIENT)
Dept: OBGYN CLINIC | Facility: MEDICAL CENTER | Age: 55
End: 2025-03-06

## 2025-03-06 ENCOUNTER — OFFICE VISIT (OUTPATIENT)
Dept: PSYCHIATRY | Facility: CLINIC | Age: 55
End: 2025-03-06
Payer: COMMERCIAL

## 2025-03-06 ENCOUNTER — PATIENT MESSAGE (OUTPATIENT)
Dept: NEUROLOGY | Facility: CLINIC | Age: 55
End: 2025-03-06

## 2025-03-06 VITALS
HEART RATE: 66 BPM | WEIGHT: 170 LBS | DIASTOLIC BLOOD PRESSURE: 71 MMHG | SYSTOLIC BLOOD PRESSURE: 111 MMHG | BODY MASS INDEX: 31.28 KG/M2 | HEIGHT: 62 IN

## 2025-03-06 DIAGNOSIS — F41.1 GAD (GENERALIZED ANXIETY DISORDER): Chronic | ICD-10-CM

## 2025-03-06 DIAGNOSIS — F42.9 OBSESSIVE-COMPULSIVE DISORDER, UNSPECIFIED TYPE: Chronic | ICD-10-CM

## 2025-03-06 DIAGNOSIS — F60.9 PERSONALITY DISORDER (HCC): Chronic | ICD-10-CM

## 2025-03-06 DIAGNOSIS — F33.0 MAJOR DEPRESSIVE DISORDER, RECURRENT EPISODE, MILD (HCC): Primary | Chronic | ICD-10-CM

## 2025-03-06 DIAGNOSIS — F41.0 PANIC DISORDER WITHOUT AGORAPHOBIA: Chronic | ICD-10-CM

## 2025-03-06 DIAGNOSIS — F63.9 IMPULSE CONTROL DISORDER: Chronic | ICD-10-CM

## 2025-03-06 DIAGNOSIS — Z79.899 LONG-TERM USE OF HIGH-RISK MEDICATION: Chronic | ICD-10-CM

## 2025-03-06 DIAGNOSIS — G47.09 OTHER INSOMNIA: Chronic | ICD-10-CM

## 2025-03-06 PROBLEM — L57.8 SOLAR DEGENERATION: Status: ACTIVE | Noted: 2024-06-07

## 2025-03-06 LAB
LAB AP GYN PRIMARY INTERPRETATION: NORMAL
Lab: NORMAL

## 2025-03-06 PROCEDURE — 99214 OFFICE O/P EST MOD 30 MIN: CPT | Performed by: PSYCHIATRY & NEUROLOGY

## 2025-03-06 PROCEDURE — 90833 PSYTX W PT W E/M 30 MIN: CPT | Performed by: PSYCHIATRY & NEUROLOGY

## 2025-03-06 RX ORDER — DULOXETIN HYDROCHLORIDE 60 MG/1
120 CAPSULE, DELAYED RELEASE ORAL DAILY
Qty: 60 CAPSULE | Refills: 4 | Status: SHIPPED | OUTPATIENT
Start: 2025-03-06 | End: 2025-08-03

## 2025-03-06 RX ORDER — TRAZODONE HYDROCHLORIDE 100 MG/1
100-300 TABLET ORAL
Qty: 90 TABLET | Refills: 4 | Status: SHIPPED | OUTPATIENT
Start: 2025-03-06 | End: 2025-08-03

## 2025-03-06 RX ORDER — ARIPIPRAZOLE 5 MG/1
5 TABLET ORAL
Qty: 30 TABLET | Refills: 4 | Status: SHIPPED | OUTPATIENT
Start: 2025-03-06 | End: 2025-08-03

## 2025-03-06 RX ORDER — LAMOTRIGINE 150 MG/1
150 TABLET ORAL
Qty: 30 TABLET | Refills: 4 | Status: SHIPPED | OUTPATIENT
Start: 2025-03-06 | End: 2025-08-03

## 2025-03-06 RX ORDER — NALTREXONE HYDROCHLORIDE 50 MG/1
50 TABLET, FILM COATED ORAL DAILY
Qty: 30 TABLET | Refills: 4 | Status: SHIPPED | OUTPATIENT
Start: 2025-03-06 | End: 2025-08-03

## 2025-03-06 NOTE — ASSESSMENT & PLAN NOTE
Fluctuating sleep    Continue Trazodone 100 mg to 300 mg at bedtime as needed to help with insomnia  Orders:    traZODone (DESYREL) 100 mg tablet; Take 1-3 tablets (100-300 mg total) by mouth daily at bedtime as needed for sleep

## 2025-03-06 NOTE — TELEPHONE ENCOUNTER
Kaylha Matute to [facility] (supporting Melissa Montilla PA-C)         3/6/25 10:21 AM  Good morning Melissa Alberto,     I was wondering if there has been any decision of the headaches that I've been having everyday latel? I'll be starting today of the Iron supplements.     Are we going increase the dosage for the Vyepti for 03/21/2025?

## 2025-03-06 NOTE — ASSESSMENT & PLAN NOTE
On and off anxiety - more controlled    Continue Cymbalta 120 mg daily to control depressive symptoms and anxiety symptoms  On Neurontin 100 mg three times a day to help with anxiety and headaches - prescribed by neurologist  Orders:    DULoxetine (CYMBALTA) 60 mg delayed release capsule; Take 2 capsules (120 mg total) by mouth daily

## 2025-03-06 NOTE — TELEPHONE ENCOUNTER
Called pt and made her aware of MK message.  Advised that I would have nurse navigator check to make sure nothing is needed in regards to increased dose of vypepti.    It looks like [facility] w[facility] updated orders to 300mg    Pt is scheduled for 3/21

## 2025-03-06 NOTE — ASSESSMENT & PLAN NOTE
Stable    Continue Abilify 5 mg at bedtime to help with mood and OCD symptoms  Orders:    ARIPiprazole (ABILIFY) 5 mg tablet; Take 1 tablet (5 mg total) by mouth daily at bedtime

## 2025-03-06 NOTE — ASSESSMENT & PLAN NOTE
Depressive symptoms are improved    Continue Cymbalta 120 mg daily to control depressive symptoms and anxiety symptoms  Continue Abilify 5 mg at bedtime to help with mood and OCD symptoms  Continue Lamictal 150 mg at bedtime to help with depression and control mood  Orders:    ARIPiprazole (ABILIFY) 5 mg tablet; Take 1 tablet (5 mg total) by mouth daily at bedtime    lamoTRIgine (LaMICtal) 150 MG tablet; Take 1 tablet (150 mg total) by mouth daily at bedtime    DULoxetine (CYMBALTA) 60 mg delayed release capsule; Take 2 capsules (120 mg total) by mouth daily

## 2025-03-06 NOTE — BH TREATMENT PLAN
"TREATMENT PLAN (Medication Management Only)        [facility]    Name/Date of Birth/MRN:  Kaylah Matute 54 y.o. 1970 MRN: [id number]  Date of Treatment Plan: March 6, 2025  Diagnosis/Diagnoses:   1. Major depressive disorder, recurrent episode, mild (HCC)    2. KYLE (generalized anxiety disorder)    3. Impulse control disorder    4. Obsessive-compulsive disorder, unspecified type    5. Panic disorder without agoraphobia    6. Personality disorder (HCC)    7. Other insomnia    8. Long-term use of high-risk medication      Strengths/Personal Resources for Self-Care: \"taking my medications everyday\".  Area/Areas of need (in own words): \"my anxiety\".  1. Long Term Goal:   improve control of anxiety, maintain improvement in depression, help with sleep  Target Date: 3 months - 6/6/2025  Person/Persons responsible for completion of goal: Kaylah  2.  Short Term Objective (s) - How will we reach this goal?:   A.  Provider new recommended medication/dosage changes and/or continue medication(s): continue current medications as prescribed (Cymbalta, Trazodone, Lamictal, Abilify, and Naltrexone).  B.  N/A.  C.  N/A.  Target Date: 3 months - 6/6/2025  Person/Persons Responsible for Completion of Goal: Kaylah   Progress Towards Goals: progressing  Treatment Modality: medication management every 3 months, continue psychotherapy with own therapist  Review due 180 days from date of this plan: 6 months - 9/6/2025  Expected length of service: ongoing treatment unless revised  My Physician/PA/NP and I have developed this plan together and I agree to work on the goals and objectives. I understand the treatment goals that were developed for my treatment.  Electronic Signatures: on file (unless signed below)    Rosa Lawler MD 03/06/25  "

## 2025-03-07 ENCOUNTER — APPOINTMENT (EMERGENCY)
Dept: RADIOLOGY | Facility: HOSPITAL | Age: 55
End: 2025-03-07
Payer: COMMERCIAL

## 2025-03-07 ENCOUNTER — HOSPITAL ENCOUNTER (EMERGENCY)
Facility: HOSPITAL | Age: 55
Discharge: HOME | End: 2025-03-07
Attending: EMERGENCY MEDICINE
Payer: COMMERCIAL

## 2025-03-07 VITALS
RESPIRATION RATE: 18 BRPM | DIASTOLIC BLOOD PRESSURE: 69 MMHG | TEMPERATURE: 98.3 F | SYSTOLIC BLOOD PRESSURE: 119 MMHG | OXYGEN SATURATION: 97 % | HEART RATE: 89 BPM

## 2025-03-07 DIAGNOSIS — R07.9 CHEST PAIN: Primary | ICD-10-CM

## 2025-03-07 LAB
ANION GAP SERPL CALCULATED.3IONS-SCNC: 10 MMOL/L (ref 4–13)
BASOPHILS # BLD AUTO: 0.03 THOUSANDS/ÂΜL (ref 0–0.1)
BASOPHILS NFR BLD AUTO: 1 % (ref 0–1)
BUN SERPL-MCNC: 20 MG/DL (ref 5–25)
CALCIUM SERPL-MCNC: 9.5 MG/DL (ref 8.4–10.2)
CARDIAC TROPONIN I PNL SERPL HS: <2 NG/L (ref ?–50)
CHLORIDE SERPL-SCNC: 104 MMOL/L (ref 96–108)
CO2 SERPL-SCNC: 25 MMOL/L (ref 21–32)
CREAT SERPL-MCNC: 1.03 MG/DL (ref 0.6–1.3)
EOSINOPHIL # BLD AUTO: 0.03 THOUSAND/ÂΜL (ref 0–0.61)
EOSINOPHIL NFR BLD AUTO: 1 % (ref 0–6)
ERYTHROCYTE [DISTWIDTH] IN BLOOD BY AUTOMATED COUNT: 14 % (ref 11.6–15.1)
GFR SERPL CREATININE-BSD FRML MDRD: 62 ML/MIN/1.73SQ M
GLUCOSE SERPL-MCNC: 130 MG/DL (ref 65–140)
HCT VFR BLD AUTO: 33.2 % (ref 34.8–46.1)
HGB BLD-MCNC: 10.6 G/DL (ref 11.5–15.4)
IMM GRANULOCYTES # BLD AUTO: 0.01 THOUSAND/UL (ref 0–0.2)
IMM GRANULOCYTES NFR BLD AUTO: 0 % (ref 0–2)
LYMPHOCYTES # BLD AUTO: 2.03 THOUSANDS/ÂΜL (ref 0.6–4.47)
LYMPHOCYTES NFR BLD AUTO: 34 % (ref 14–44)
MCH RBC QN AUTO: 26.8 PG (ref 26.8–34.3)
MCHC RBC AUTO-ENTMCNC: 31.9 G/DL (ref 31.4–37.4)
MCV RBC AUTO: 84 FL (ref 82–98)
MONOCYTES # BLD AUTO: 0.56 THOUSAND/ÂΜL (ref 0.17–1.22)
MONOCYTES NFR BLD AUTO: 9 % (ref 4–12)
NEUTROPHILS # BLD AUTO: 3.36 THOUSANDS/ÂΜL (ref 1.85–7.62)
NEUTS SEG NFR BLD AUTO: 55 % (ref 43–75)
NRBC BLD AUTO-RTO: 0 /100 WBCS
PLATELET # BLD AUTO: 277 THOUSANDS/UL (ref 149–390)
PMV BLD AUTO: 9.5 FL (ref 8.9–12.7)
POTASSIUM SERPL-SCNC: 3.8 MMOL/L (ref 3.5–5.3)
RBC # BLD AUTO: 3.95 MILLION/UL (ref 3.81–5.12)
SODIUM SERPL-SCNC: 139 MMOL/L (ref 135–147)
WBC # BLD AUTO: 6.02 THOUSAND/UL (ref 4.31–10.16)

## 2025-03-07 PROCEDURE — 85025 COMPLETE CBC W/AUTO DIFF WBC: CPT

## 2025-03-07 PROCEDURE — 84484 ASSAY OF TROPONIN QUANT: CPT

## 2025-03-07 PROCEDURE — 93005 ELECTROCARDIOGRAM TRACING: CPT

## 2025-03-07 PROCEDURE — 99285 EMERGENCY DEPT VISIT HI MDM: CPT | Performed by: EMERGENCY MEDICINE

## 2025-03-07 PROCEDURE — 99285 EMERGENCY DEPT VISIT HI MDM: CPT

## 2025-03-07 PROCEDURE — 96374 THER/PROPH/DIAG INJ IV PUSH: CPT

## 2025-03-07 PROCEDURE — 71045 X-RAY EXAM CHEST 1 VIEW: CPT

## 2025-03-07 PROCEDURE — 80048 BASIC METABOLIC PNL TOTAL CA: CPT

## 2025-03-07 PROCEDURE — 36415 COLL VENOUS BLD VENIPUNCTURE: CPT

## 2025-03-07 RX ORDER — SODIUM CHLORIDE 9 MG/ML
3 INJECTION INTRAVENOUS
Status: DISCONTINUED | OUTPATIENT
Start: 2025-03-07 | End: 2025-03-07 | Stop reason: HOSPADM

## 2025-03-07 RX ORDER — ACETAMINOPHEN 325 MG/1
650 TABLET ORAL ONCE
Status: COMPLETED | OUTPATIENT
Start: 2025-03-07 | End: 2025-03-07

## 2025-03-07 RX ORDER — ONDANSETRON 2 MG/ML
4 INJECTION INTRAMUSCULAR; INTRAVENOUS ONCE
Status: COMPLETED | OUTPATIENT
Start: 2025-03-07 | End: 2025-03-07

## 2025-03-07 RX ADMIN — DICLOFENAC SODIUM 2 G: 10 GEL TOPICAL at 16:25

## 2025-03-07 RX ADMIN — ACETAMINOPHEN 650 MG: 325 TABLET, FILM COATED ORAL at 16:09

## 2025-03-07 RX ADMIN — ONDANSETRON 4 MG: 2 INJECTION, SOLUTION INTRAMUSCULAR; INTRAVENOUS at 16:05

## 2025-03-07 NOTE — DISCHARGE INSTRUCTIONS
Take tylenol and/or motrin as needed for pain. Recommend every 4-6 hrs as needed    Follow up with your PCP for further evaluation of chest pain, ideally within 1 week.     Return to ED if any worsening symptoms develop.

## 2025-03-07 NOTE — ED PROVIDER NOTES
Time reflects when diagnosis was documented in both MDM as applicable and the Disposition within this note       Time User Action Codes Description Comment    3/7/2025  4:16 PM MinLuca Add [R07.9] Chest pain           ED Disposition       ED Disposition   Discharge    Condition   Stable    Date/Time   Fri Mar 7, 2025  4:17 PM    Comment   Kaylah Matute discharge to home/self care.                   Assessment & Plan       Medical Decision Making  See ED course    Amount and/or Complexity of Data Reviewed  Labs: ordered. Decision-making details documented in ED Course.  Radiology: ordered and independent interpretation performed.    Risk  OTC drugs.  Prescription drug management.        ED Course as of 03/07/25 1740   Fri Mar 07, 2025   1518 54-year-old history of COPD presenting for chest pain.  Reports sharp left-sided chest pain for the past couple days.  States it was worse earlier today.  And radiates to her left side of her neck and to the left arm proximal to the elbow.  No exertional or pleuritic pain.  Nonpositional.  Has not take anything for pain.  No fevers, headache, shortness of breath, abdominal pain, change in bladder/bowel function.   1548 Hemoglobin(!): 10.6  baseline   1616 hs TnI 0hr: <2  Doubt ACS.   1618 Patient had reproducible pain to palpation.  Suspect this is MSK related.  Low suspicion for ACS, cardiac etiology, or lung etiology.   1619 Provided Tylenol, Zofran in the ED.  Will send home Voltaren gel.  Discharged stable condition with outpatient follow-up.       Medications   sodium chloride (PF) 0.9 % injection 3 mL (has no administration in time range)   ondansetron (ZOFRAN) injection 4 mg (4 mg Intravenous Given 3/7/25 1605)   Diclofenac Sodium (VOLTAREN) 1 % topical gel 2 g (2 g Topical Given 3/7/25 1625)   acetaminophen (TYLENOL) tablet 650 mg (650 mg Oral Given 3/7/25 1609)       ED Risk Strat Scores   HEART Risk Score      Flowsheet Row Most Recent Value   Heart Score Risk  Calculator    History 0 Filed at: 2025 1740   ECG 0 Filed at: 2025 1740   Age 1 Filed at: 2025 1740   Risk Factors 1 Filed at: 2025 1740   Troponin 0 Filed at: 2025 1740   HEART Score 2 Filed at: 2025 1740          HEART Risk Score      Flowsheet Row Most Recent Value   Heart Score Risk Calculator    History 0 Filed at: 2025 1740   ECG 0 Filed at: 2025 1740   Age 1 Filed at: 2025 1740   Risk Factors 1 Filed at: 2025 1740   Troponin 0 Filed at: 2025 1740   HEART Score 2 Filed at: 2025 1740                                                  History of Present Illness       Chief Complaint   Patient presents with    Chest Pain     Pt reports left chest pain radiating into neck and down left arm x1 week, getting worse today. Now having nausea. Denies SOB       Past Medical History:   Diagnosis Date    Allergic     Amenorrhea     Anemia 2023    Anxiety     Arthritis     Asthma     Back pain     Chondromalacia of patella     Chronic fatigue     Colon polyp     COPD (chronic obstructive pulmonary disease) (HCC) Yes    Deep vein thrombophlebitis of leg (HCC)     Depression     Disease of thyroid gland     Diverticulitis     GERD (gastroesophageal reflux disease)     Headache(784.0)     History of transfusion 2008    HPV (human papilloma virus) infection     Hypothyroidism     Lumbar radiculopathy     Migraine     Myofascial pain syndrome     Osteopenia     Piriformis syndrome     Sacroiliitis (HCC)     Sciatica     Seizure (HCC)     Sleep apnea     Spondylosis of lumbar spine     Sprain of temporomandibular joint or ligament 2023    Trochanteric bursitis of right hip     Vertigo     Vitamin D deficiency     Weight gain 2019      Past Surgical History:   Procedure Laterality Date    CERVIX LESION DESTRUCTION       SECTION      COLONOSCOPY      COLPOSCOPY W/ BIOPSY / CURETTAGE      Colposcopy cervix with biopsy     LAPAROSCOPIC ENDOMETRIOSIS FULGURATION      LAPAROSCOPY      TOOTH EXTRACTION        Family History   Problem Relation Age of Onset    Heart disease Mother     Asthma Daughter     Lung cancer Paternal Grandfather     Asthma Daughter     Colon cancer Father     Colon cancer Maternal Grandfather     Prostate cancer Maternal Grandfather     Colon cancer Maternal Aunt     No Known Problems Maternal Grandmother     Diabetes Paternal Grandmother     Dementia Paternal Grandmother     No Known Problems Maternal Aunt     No Known Problems Maternal Aunt     No Known Problems Maternal Aunt     No Known Problems Paternal Aunt     Asthma Daughter     Psychiatric Illness Neg Hx     Stroke Neg Hx     Thyroid disease Neg Hx     Substance Abuse Neg Hx     Suicidality Neg Hx       Social History     Tobacco Use    Smoking status: Never    Smokeless tobacco: Never   Vaping Use    Vaping status: Never Used   Substance Use Topics    Alcohol use: Not Currently    Drug use: Never      E-Cigarette/Vaping    E-Cigarette Use Never User       E-Cigarette/Vaping Substances    Nicotine No     THC No     CBD No     Flavoring No     Other No     Unknown No       I have reviewed and agree with the history as documented.     54-year-old history of COPD presenting for chest pain.  Reports sharp left-sided chest pain for the past couple days.  States it was worse earlier today.  And radiates to her left side of her neck and to the left arm proximal to the elbow.  No exertional or pleuritic pain.  Nonpositional.  Has not take anything for pain.  No fevers, headache, shortness of breath, abdominal pain, change in bladder/bowel function.      Chest Pain  Associated symptoms: no abdominal pain, no back pain, no cough, no fever, no headache, no nausea, no numbness, no shortness of breath, not vomiting and no weakness        Review of Systems   Constitutional:  Negative for chills and fever.   HENT:  Negative for congestion and rhinorrhea.    Respiratory:   Negative for cough and shortness of breath.    Cardiovascular:  Positive for chest pain.   Gastrointestinal:  Negative for abdominal pain, diarrhea, nausea and vomiting.   Genitourinary:  Negative for dysuria, frequency, hematuria and urgency.   Musculoskeletal:  Negative for back pain and neck pain.   Skin:  Negative for color change and rash.   Neurological:  Negative for syncope, weakness, numbness and headaches.           Objective       ED Triage Vitals [03/07/25 1517]   Temperature Pulse Blood Pressure Respirations SpO2 Patient Position - Orthostatic VS   98.3 °F (36.8 °C) 89 119/69 18 97 % --      Temp Source Heart Rate Source BP Location FiO2 (%) Pain Score    Oral Monitor Right arm -- --      Vitals      Date and Time Temp Pulse SpO2 Resp BP Pain Score FACES Pain Rating User   03/07/25 1517 98.3 °F (36.8 °C) 89 97 % 18 119/69 -- -- RUDDY            Physical Exam  Constitutional:       General: She is not in acute distress.     Appearance: Normal appearance. She is not toxic-appearing.   HENT:      Head: Normocephalic and atraumatic.      Nose: Nose normal.      Mouth/Throat:      Mouth: Mucous membranes are moist.   Eyes:      Extraocular Movements: Extraocular movements intact.      Pupils: Pupils are equal, round, and reactive to light.   Cardiovascular:      Rate and Rhythm: Normal rate and regular rhythm.      Heart sounds: Normal heart sounds.   Pulmonary:      Effort: Pulmonary effort is normal. No respiratory distress.      Breath sounds: Normal breath sounds.   Chest:      Chest wall: Tenderness (left chest wall) present. No crepitus.   Abdominal:      General: Abdomen is flat. There is no distension.      Palpations: There is no mass.      Tenderness: There is no abdominal tenderness. There is no guarding or rebound.   Musculoskeletal:         General: No swelling or tenderness. Normal range of motion.      Cervical back: Normal range of motion. No tenderness.   Skin:     General: Skin is warm.       Coloration: Skin is not jaundiced or pale.   Neurological:      General: No focal deficit present.      Mental Status: She is alert and oriented to person, place, and time.   Psychiatric:         Mood and Affect: Mood normal.         Behavior: Behavior normal.         Results Reviewed       Procedure Component Value Units Date/Time    HS Troponin 0hr (reflex protocol) [[id number]]  (Normal) Collected: 03/07/25 1531    Lab Status: Final result Specimen: Blood from Arm, Left Updated: 03/07/25 1614     hs TnI 0hr <2 ng/L     Basic metabolic panel [[id number]] Collected: 03/07/25 1531    Lab Status: Final result Specimen: Blood from Arm, Left Updated: 03/07/25 1606     Sodium 139 mmol/L      Potassium 3.8 mmol/L      Chloride 104 mmol/L      CO2 25 mmol/L      ANION GAP 10 mmol/L      BUN 20 mg/dL      Creatinine 1.03 mg/dL      Glucose 130 mg/dL      Calcium 9.5 mg/dL      eGFR 62 ml/min/1.73sq m     Narrative:      National Kidney Disease Foundation guidelines for Chronic Kidney Disease (CKD):     Stage 1 with normal or high GFR (GFR > 90 mL/min/1.73 square meters)    Stage 2 Mild CKD (GFR = 60-89 mL/min/1.73 square meters)    Stage 3A Moderate CKD (GFR = 45-59 mL/min/1.73 square meters)    Stage 3B Moderate CKD (GFR = 30-44 mL/min/1.73 square meters)    Stage 4 Severe CKD (GFR = 15-29 mL/min/1.73 square meters)    Stage 5 End Stage CKD (GFR <15 mL/min/1.73 square meters)  Note: GFR calculation is accurate only with a steady state creatinine    CBC and differential [[id number]]  (Abnormal) Collected: 03/07/25 1531    Lab Status: Final result Specimen: Blood from Arm, Left Updated: 03/07/25 1541     WBC 6.02 Thousand/uL      RBC 3.95 Million/uL      Hemoglobin 10.6 g/dL      Hematocrit 33.2 %      MCV 84 fL      MCH 26.8 pg      MCHC 31.9 g/dL      RDW 14.0 %      MPV 9.5 fL      Platelets 277 Thousands/uL      nRBC 0 /100 WBCs      Segmented % 55 %      Immature Grans % 0 %      Lymphocytes % 34 %      Monocytes % 9 %       Eosinophils Relative 1 %      Basophils Relative 1 %      Absolute Neutrophils 3.36 Thousands/µL      Absolute Immature Grans 0.01 Thousand/uL      Absolute Lymphocytes 2.03 Thousands/µL      Absolute Monocytes 0.56 Thousand/µL      Eosinophils Absolute 0.03 Thousand/µL      Basophils Absolute 0.03 Thousands/µL             X-ray chest 1 view portable   ED Interpretation by Luca Copeland MD (03/07 8892)   No acute cardiopulmonary findings.      Final Interpretation by Hussain Ruiz MD (03/07 2634)      No acute cardiopulmonary disease.            Workstation performed: [id number]             ECG 12 Lead Documentation Only    Date/Time: 3/7/2025 5:40 PM    Performed by: Luca Copeland MD  Authorized by: Luca Copeland MD    ECG reviewed by me, the ED Provider: yes    Previous ECG:     Previous ECG:  Compared to current    Similarity:  No change  Interpretation:     Interpretation: normal    Rate:     ECG rate assessment: normal    Rhythm:     Rhythm: sinus rhythm    Ectopy:     Ectopy: none    QRS:     QRS axis:  Normal    QRS intervals:  Normal  Conduction:     Conduction: normal    ST segments:     ST segments:  Normal  T waves:     T waves: normal        ED Medication and Procedure Management   Prior to Admission Medications   Prescriptions Last Dose Informant Patient Reported? Taking?   ARIPiprazole (ABILIFY) 5 mg tablet   No No   Sig: Take 1 tablet (5 mg total) by mouth daily at bedtime   Ascorbic Acid (VITAMIN C PO)  Self Yes No   Sig: Take by mouth   DULoxetine (CYMBALTA) 60 mg delayed release capsule   No No   Sig: Take 2 capsules (120 mg total) by mouth daily   Ferrous Sulfate (Iron) 325 (65 Fe) MG TABS   No No   Sig: Take 1 tablet (325 mg total) by mouth every other day   Multiple Vitamins-Minerals (ZINC PO)  Self Yes No   Sig: Take by mouth   Triamcinolone Acetonide (Nasacort Allergy 24HR Children) 55 MCG/ACT nasal spray   Yes No   Sig: into each nostril   VITAMIN D PO  Self Yes No   Sig: Take 5,000 Units by  mouth   ZOLMitriptan (ZOMIG-ZMT) 5 MG disintegrating tablet   No No   Sig: DISSOLVE 1 TABLET BY MOUTH AT ONSET OF HEADACHE, MAY REPEAT AFTER TWO HOURS AS NEEDED, MAX 2 TABLETS PER 24 HOURS   albuterol (2.5 mg/3 mL) 0.083 % nebulizer solution  Self Yes No   Sig: Inhale    albuterol (Proventil HFA) 90 mcg/act inhaler  Self No No   Sig: Inhale 2 puffs every 6 (six) hours as needed for wheezing   aluminum-magnesium hydroxide 200-200 MG/5ML suspension  Self No No   Sig: GAVISCON REGULAR STRENGTH AFTER MEALS and BEDTIME WHEN NOT FOLLOWING LPR/GERD DIET.   azelastine (ASTELIN) 0.1 % nasal spray  Self No No   Si-2 sprays into each nostril 2 (two) times a day as needed for rhinitis Use in each nostril as directed   eptinezumab-jjmr (VYEPTI) IVPB  Self No No   Sig: Infuse 100mg IV every 90 days.  Note to pharmacy: To be delivered to where patient will be having infusion: [facility] infusion center. [phone], [phone]   famotidine (PEPCID) 40 MG tablet  Self No No   Sig: TAKE 1 TABLET(40 MG) BY MOUTH DAILY AT BEDTIME   gabapentin (Neurontin) 300 mg capsule   No No   Sig: 3 tabs BID   ibuprofen (MOTRIN) 800 mg tablet   No No   Sig: Take 1 tablet (800 mg total) by mouth every 8 (eight) hours as needed for mild pain   ipratropium (ATROVENT) 0.06 % nasal spray  Self No No   Si sprays into each nostril 4 (four) times a day   ketorolac (TORADOL) 10 mg tablet   No No   Sig: Take 1 tablet (10 mg total) by mouth every 6 (six) hours as needed (migraine, back pain) Max 2-3 per week.   lamoTRIgine (LaMICtal) 150 MG tablet   No No   Sig: Take 1 tablet (150 mg total) by mouth daily at bedtime   levocetirizine (XYZAL) 5 MG tablet  Self Yes No   Sig: Take 5 mg by mouth every evening   levonorgestrel (MIRENA) 20 MCG/24HR IUD  Self Yes No   Sig: by Intrauterine route   levothyroxine 75 mcg tablet   No No   Sig: Take 1 tablet (75 mcg total) by mouth daily in the early morning   metFORMIN (GLUCOPHAGE-XR) 500 mg  24 hr tablet   No No   Sig: Take 1 tablet (500 mg total) by mouth daily with dinner   naltrexone (REVIA) 50 mg tablet   No No   Sig: Take 1 tablet (50 mg total) by mouth daily   naproxen (NAPROSYN) 500 mg tablet  Self No No   Sig: TAKE 1 TABLET(500 MG) BY MOUTH TWICE DAILY WITH MEALS   ondansetron (ZOFRAN-ODT) 4 mg disintegrating tablet   No No   Sig: Take 1 tablet (4 mg total) by mouth every 6 (six) hours as needed for nausea or vomiting   pantoprazole (PROTONIX) 40 mg tablet   No No   Sig: TAKE 1 TABLET(40 MG) BY MOUTH DAILY   prochlorperazine (COMPAZINE) 10 mg tablet  Self No No   Si tab q6 hours prn migraine (with cocktail)   rosuvastatin (CRESTOR) 5 mg tablet   No No   Sig: Take 1 tablet (5 mg total) by mouth daily   tiotropium (SPIRIVA RESPIMAT) 1.25 MCG/ACT AERS inhaler  Self No No   Sig: Inhale 2 puffs daily   traZODone (DESYREL) 100 mg tablet   No No   Sig: Take 1-3 tablets (100-300 mg total) by mouth daily at bedtime as needed for sleep      Facility-Administered Medications: None     Discharge Medication List as of 3/7/2025  4:17 PM        CONTINUE these medications which have NOT CHANGED    Details   albuterol (2.5 mg/3 mL) 0.083 % nebulizer solution Inhale , Starting 2013, Historical Med      albuterol (Proventil HFA) 90 mcg/act inhaler Inhale 2 puffs every 6 (six) hours as needed for wheezing, Starting 2022, Normal      aluminum-magnesium hydroxide 200-200 MG/5ML suspension GAVISCON REGULAR STRENGTH AFTER MEALS and BEDTIME WHEN NOT FOLLOWING LPR/GERD DIET., No Print      ARIPiprazole (ABILIFY) 5 mg tablet Take 1 tablet (5 mg total) by mouth daily at bedtime, Starting Thu 3/6/2025, Until Sun 8/3/2025, Normal      Ascorbic Acid (VITAMIN C PO) Take by mouth, Historical Med      azelastine (ASTELIN) 0.1 % nasal spray 1-2 sprays into each nostril 2 (two) times a day as needed for rhinitis Use in each nostril as directed, Starting 2023, Normal      DULoxetine (CYMBALTA) 60 mg  delayed release capsule Take 2 capsules (120 mg total) by mouth daily, Starting Thu 3/6/2025, Until Sun 8/3/2025, Normal      eptinezumab-jjmr (VYEPTI) IVPB Infuse 100mg IV every 90 days.  Note to pharmacy: To be delivered to where patient will be having infusion: [facility]. [phone], [phone], Print      famotidine (PEPCID) 40 MG tablet TAKE 1 TABLET(40 MG) BY MOUTH DAILY AT BEDTIME, Starting Tue 5/7/2024, Normal      Ferrous Sulfate (Iron) 325 (65 Fe) MG TABS Take 1 tablet (325 mg total) by mouth every other day, Starting Tue 3/4/2025, Normal      gabapentin (Neurontin) 300 mg capsule 3 tabs BID, Normal      ibuprofen (MOTRIN) 800 mg tablet Take 1 tablet (800 mg total) by mouth every 8 (eight) hours as needed for mild pain, Starting Fri 2/28/2025, Normal      ipratropium (ATROVENT) 0.06 % nasal spray 2 sprays into each nostril 4 (four) times a day, Starting Tue 9/12/2023, Normal      ketorolac (TORADOL) 10 mg tablet Take 1 tablet (10 mg total) by mouth every 6 (six) hours as needed (migraine, back pain) Max 2-3 per week., Starting Fri 2/28/2025, Normal      lamoTRIgine (LaMICtal) 150 MG tablet Take 1 tablet (150 mg total) by mouth daily at bedtime, Starting Thu 3/6/2025, Until Sun 8/3/2025, Normal      levocetirizine (XYZAL) 5 MG tablet Take 5 mg by mouth every evening, Historical Med      levonorgestrel (MIRENA) 20 MCG/24HR IUD by Intrauterine route, Historical Med      levothyroxine 75 mcg tablet Take 1 tablet (75 mcg total) by mouth daily in the early morning, Starting Wed 11/6/2024, Normal      metFORMIN (GLUCOPHAGE-XR) 500 mg 24 hr tablet Take 1 tablet (500 mg total) by mouth daily with dinner, Starting Tue 3/4/2025, Normal      Multiple Vitamins-Minerals (ZINC PO) Take by mouth, Historical Med      naltrexone (REVIA) 50 mg tablet Take 1 tablet (50 mg total) by mouth daily, Starting u 3/6/2025, Until Sun 8/3/2025, Normal      naproxen (NAPROSYN) 500 mg tablet  TAKE 1 TABLET(500 MG) BY MOUTH TWICE DAILY WITH MEALS, Starting Sun 9/15/2024, Normal      ondansetron (ZOFRAN-ODT) 4 mg disintegrating tablet Take 1 tablet (4 mg total) by mouth every 6 (six) hours as needed for nausea or vomiting, Starting Tue 1/28/2025, Normal      pantoprazole (PROTONIX) 40 mg tablet TAKE 1 TABLET(40 MG) BY MOUTH DAILY, Starting Mon 12/9/2024, Normal      prochlorperazine (COMPAZINE) 10 mg tablet 1 tab q6 hours prn migraine (with cocktail), Normal      rosuvastatin (CRESTOR) 5 mg tablet Take 1 tablet (5 mg total) by mouth daily, Starting Wed 11/6/2024, Until Sat 11/1/2025, Normal      tiotropium (SPIRIVA RESPIMAT) 1.25 MCG/ACT AERS inhaler Inhale 2 puffs daily, Starting Mon 9/25/2023, Until Thu 3/6/2025, Normal      traZODone (DESYREL) 100 mg tablet Take 1-3 tablets (100-300 mg total) by mouth daily at bedtime as needed for sleep, Starting Thu 3/6/2025, Until Sun 8/3/2025 at 2359, Normal      Triamcinolone Acetonide (Nasacort Allergy 24HR Children) 55 MCG/ACT nasal spray into each nostril, Historical Med      VITAMIN D PO Take 5,000 Units by mouth, Historical Med      ZOLMitriptan (ZOMIG-ZMT) 5 MG disintegrating tablet DISSOLVE 1 TABLET BY MOUTH AT ONSET OF HEADACHE, MAY REPEAT AFTER TWO HOURS AS NEEDED, MAX 2 TABLETS PER 24 HOURS, Normal           No discharge procedures on file.  ED SEPSIS DOCUMENTATION   Time reflects when diagnosis was documented in both MDM as applicable and the Disposition within this note       Time User Action Codes Description Comment    3/7/2025  4:16 PM Luca Copeland Add [R07.9] Chest pain                  Luca Copeland MD  03/07/25 8405

## 2025-03-07 NOTE — ED ATTENDING ATTESTATION
3/7/2025  I, Bridger Carpio MD, saw and evaluated the patient. I have discussed the patient with the resident/non-physician practitioner and agree with the resident's/non-physician practitioner's findings, Plan of Care, and MDM as documented in the resident's/non-physician practitioner's note, except where noted. All available labs and Radiology studies were reviewed.  I was present for key portions of any procedure(s) performed by the resident/non-physician practitioner and I was immediately available to provide assistance.       At this point I agree with the current assessment done in the Emergency Department.  I have conducted an independent evaluation of this patient a history and physical is as follows: Chest wall discomfort, reproducible on movement and with direct palpation.  EKG, chest x-ray, labs unremarkable, low risk by heart score, suspect musculoskeletal source of pain given reproducibility, worsens with movement, negative ED workup.  She is stable for discharge home and outpatient follow-up.    Results Reviewed       Procedure Component Value Units Date/Time    HS Troponin 0hr (reflex protocol) [[id number]]  (Normal) Collected: 03/07/25 1531    Lab Status: Final result Specimen: Blood from Arm, Left Updated: 03/07/25 1614     hs TnI 0hr <2 ng/L     Basic metabolic panel [[id number]] Collected: 03/07/25 1531    Lab Status: Final result Specimen: Blood from Arm, Left Updated: 03/07/25 1606     Sodium 139 mmol/L      Potassium 3.8 mmol/L      Chloride 104 mmol/L      CO2 25 mmol/L      ANION GAP 10 mmol/L      BUN 20 mg/dL      Creatinine 1.03 mg/dL      Glucose 130 mg/dL      Calcium 9.5 mg/dL      eGFR 62 ml/min/1.73sq m     Narrative:      National Kidney Disease Foundation guidelines for Chronic Kidney Disease (CKD):     Stage 1 with normal or high GFR (GFR > 90 mL/min/1.73 square meters)    Stage 2 Mild CKD (GFR = 60-89 mL/min/1.73 square meters)    Stage 3A Moderate CKD (GFR = 45-59  mL/min/1.73 square meters)    Stage 3B Moderate CKD (GFR = 30-44 mL/min/1.73 square meters)    Stage 4 Severe CKD (GFR = 15-29 mL/min/1.73 square meters)    Stage 5 End Stage CKD (GFR <15 mL/min/1.73 square meters)  Note: GFR calculation is accurate only with a steady state creatinine    CBC and differential [[id number]]  (Abnormal) Collected: 03/07/25 1531    Lab Status: Final result Specimen: Blood from Arm, Left Updated: 03/07/25 1541     WBC 6.02 Thousand/uL      RBC 3.95 Million/uL      Hemoglobin 10.6 g/dL      Hematocrit 33.2 %      MCV 84 fL      MCH 26.8 pg      MCHC 31.9 g/dL      RDW 14.0 %      MPV 9.5 fL      Platelets 277 Thousands/uL      nRBC 0 /100 WBCs      Segmented % 55 %      Immature Grans % 0 %      Lymphocytes % 34 %      Monocytes % 9 %      Eosinophils Relative 1 %      Basophils Relative 1 %      Absolute Neutrophils 3.36 Thousands/µL      Absolute Immature Grans 0.01 Thousand/uL      Absolute Lymphocytes 2.03 Thousands/µL      Absolute Monocytes 0.56 Thousand/µL      Eosinophils Absolute 0.03 Thousand/µL      Basophils Absolute 0.03 Thousands/µL           X-ray chest 1 view portable   ED Interpretation by Luca Copeland MD (03/07 1429)   No acute cardiopulmonary findings.            ED Course         Critical Care Time  Procedures

## 2025-03-08 NOTE — ASSESSMENT & PLAN NOTE
Recent increase stressors have exacerbated her migraine headaches.  Vyepti does help and she denies side effects.  If needed in the future we can consider increasing from 100 to 300 mg Vyepti.  Side effects reviewed.  She will let me know moving forward if this is what she needs/if stressors are not alleviated.    Continue gabapentin--900 mg twice daily.     Migraine onset-  PRN compazine, and if that fails after about 4 hours later she takes the cocktail ketorolac, zomig, zofran.  She uses excedrin for lower grade headache, max 3 per week to prevent MOH.    Orders:    gabapentin (Neurontin) 300 mg capsule; 3 tabs BID    ondansetron (ZOFRAN-ODT) 4 mg disintegrating tablet; Take 1 tablet (4 mg total) by mouth every 6 (six) hours as needed for nausea or vomiting

## 2025-03-10 LAB
ATRIAL RATE: 79 BPM
P AXIS: 69 DEGREES
PR INTERVAL: 144 MS
QRS AXIS: 267 DEGREES
QRSD INTERVAL: 96 MS
QT INTERVAL: 414 MS
QTC INTERVAL: 474 MS
T WAVE AXIS: 67 DEGREES
VENTRICULAR RATE: 79 BPM

## 2025-03-10 PROCEDURE — 93010 ELECTROCARDIOGRAM REPORT: CPT | Performed by: INTERNAL MEDICINE

## 2025-03-11 DIAGNOSIS — R53.83 OTHER FATIGUE: Primary | ICD-10-CM

## 2025-03-15 DIAGNOSIS — K21.9 GASTROESOPHAGEAL REFLUX DISEASE WITHOUT ESOPHAGITIS: ICD-10-CM

## 2025-03-17 RX ORDER — FAMOTIDINE 40 MG/1
40 TABLET, FILM COATED ORAL
Qty: 30 TABLET | Refills: 5 | Status: SHIPPED | OUTPATIENT
Start: 2025-03-17

## 2025-03-21 ENCOUNTER — HOSPITAL ENCOUNTER (OUTPATIENT)
Dept: INFUSION CENTER | Facility: CLINIC | Age: 55
Discharge: HOME | End: 2025-03-21
Payer: COMMERCIAL

## 2025-03-21 VITALS
DIASTOLIC BLOOD PRESSURE: 70 MMHG | RESPIRATION RATE: 18 BRPM | HEART RATE: 70 BPM | TEMPERATURE: 97.5 F | SYSTOLIC BLOOD PRESSURE: 115 MMHG

## 2025-03-21 DIAGNOSIS — G43.709 CHRONIC MIGRAINE WITHOUT AURA WITHOUT STATUS MIGRAINOSUS, NOT INTRACTABLE: Primary | ICD-10-CM

## 2025-03-21 PROCEDURE — 96365 THER/PROPH/DIAG IV INF INIT: CPT

## 2025-03-21 RX ORDER — SODIUM CHLORIDE 9 MG/ML
20 INJECTION, SOLUTION INTRAVENOUS ONCE
Status: COMPLETED | OUTPATIENT
Start: 2025-03-21 | End: 2025-03-21

## 2025-03-21 RX ORDER — SODIUM CHLORIDE 9 MG/ML
20 INJECTION, SOLUTION INTRAVENOUS ONCE
OUTPATIENT
Start: 2025-06-13

## 2025-03-21 RX ADMIN — SODIUM CHLORIDE 20 ML/HR: 0.9 INJECTION, SOLUTION INTRAVENOUS at 08:28

## 2025-03-21 RX ADMIN — EPTINEZUMAB-JJMR 300 MG: 100 INJECTION INTRAVENOUS at 09:13

## 2025-03-21 NOTE — PLAN OF CARE
Problem: Knowledge Deficit  Goal: Patient/family/caregiver demonstrates understanding of disease process, treatment plan, medications, and discharge instructions  Description: Complete learning assessment and assess knowledge base.Interventions:- Provide teaching at level of understanding- Provide teaching via preferred learning methods  Outcome: Completed

## 2025-03-21 NOTE — PROGRESS NOTES
Pt offers no new complaints today, states her migraines have been worse every day, has increased dose today at 300mg, pt currently this morning doesn't have a headache, tolerated vyepti without complications, confirmed appt back on 6-13-25 0800, AVS declined, pt states she uses mychart.

## 2025-04-08 ENCOUNTER — NURSE TRIAGE (OUTPATIENT)
Age: 55
End: 2025-04-08

## 2025-04-08 ENCOUNTER — HOSPITAL ENCOUNTER (EMERGENCY)
Facility: HOSPITAL | Age: 55
Discharge: HOME | End: 2025-04-08
Attending: EMERGENCY MEDICINE
Payer: COMMERCIAL

## 2025-04-08 ENCOUNTER — APPOINTMENT (EMERGENCY)
Dept: RADIOLOGY | Facility: HOSPITAL | Age: 55
End: 2025-04-08
Payer: COMMERCIAL

## 2025-04-08 VITALS
SYSTOLIC BLOOD PRESSURE: 129 MMHG | HEART RATE: 78 BPM | OXYGEN SATURATION: 96 % | TEMPERATURE: 98.6 F | RESPIRATION RATE: 18 BRPM | DIASTOLIC BLOOD PRESSURE: 75 MMHG

## 2025-04-08 DIAGNOSIS — R07.9 CHEST PAIN: Primary | ICD-10-CM

## 2025-04-08 LAB
ALBUMIN SERPL BCG-MCNC: 4.4 G/DL (ref 3.5–5)
ALP SERPL-CCNC: 67 U/L (ref 34–104)
ALT SERPL W P-5'-P-CCNC: 7 U/L (ref 7–52)
ANION GAP SERPL CALCULATED.3IONS-SCNC: 8 MMOL/L (ref 4–13)
AST SERPL W P-5'-P-CCNC: 11 U/L (ref 13–39)
BASOPHILS # BLD AUTO: 0.03 THOUSANDS/ÂΜL (ref 0–0.1)
BASOPHILS NFR BLD AUTO: 1 % (ref 0–1)
BILIRUB SERPL-MCNC: 0.29 MG/DL (ref 0.2–1)
BUN SERPL-MCNC: 17 MG/DL (ref 5–25)
CALCIUM SERPL-MCNC: 9.1 MG/DL (ref 8.4–10.2)
CARDIAC TROPONIN I PNL SERPL HS: <2 NG/L (ref ?–50)
CHLORIDE SERPL-SCNC: 107 MMOL/L (ref 96–108)
CO2 SERPL-SCNC: 27 MMOL/L (ref 21–32)
CREAT SERPL-MCNC: 0.94 MG/DL (ref 0.6–1.3)
EOSINOPHIL # BLD AUTO: 0.04 THOUSAND/ÂΜL (ref 0–0.61)
EOSINOPHIL NFR BLD AUTO: 1 % (ref 0–6)
ERYTHROCYTE [DISTWIDTH] IN BLOOD BY AUTOMATED COUNT: 15.9 % (ref 11.6–15.1)
GFR SERPL CREATININE-BSD FRML MDRD: 69 ML/MIN/1.73SQ M
GLUCOSE SERPL-MCNC: 93 MG/DL (ref 65–140)
HCT VFR BLD AUTO: 34.8 % (ref 34.8–46.1)
HGB BLD-MCNC: 11 G/DL (ref 11.5–15.4)
IMM GRANULOCYTES # BLD AUTO: 0.02 THOUSAND/UL (ref 0–0.2)
IMM GRANULOCYTES NFR BLD AUTO: 0 % (ref 0–2)
LYMPHOCYTES # BLD AUTO: 2.26 THOUSANDS/ÂΜL (ref 0.6–4.47)
LYMPHOCYTES NFR BLD AUTO: 35 % (ref 14–44)
MCH RBC QN AUTO: 27.9 PG (ref 26.8–34.3)
MCHC RBC AUTO-ENTMCNC: 31.6 G/DL (ref 31.4–37.4)
MCV RBC AUTO: 88 FL (ref 82–98)
MONOCYTES # BLD AUTO: 0.67 THOUSAND/ÂΜL (ref 0.17–1.22)
MONOCYTES NFR BLD AUTO: 11 % (ref 4–12)
NEUTROPHILS # BLD AUTO: 3.36 THOUSANDS/ÂΜL (ref 1.85–7.62)
NEUTS SEG NFR BLD AUTO: 52 % (ref 43–75)
NRBC BLD AUTO-RTO: 0 /100 WBCS
PLATELET # BLD AUTO: 265 THOUSANDS/UL (ref 149–390)
PMV BLD AUTO: 9.3 FL (ref 8.9–12.7)
POTASSIUM SERPL-SCNC: 4.5 MMOL/L (ref 3.5–5.3)
PROT SERPL-MCNC: 6.7 G/DL (ref 6.4–8.4)
RBC # BLD AUTO: 3.94 MILLION/UL (ref 3.81–5.12)
SODIUM SERPL-SCNC: 142 MMOL/L (ref 135–147)
WBC # BLD AUTO: 6.38 THOUSAND/UL (ref 4.31–10.16)

## 2025-04-08 PROCEDURE — 99285 EMERGENCY DEPT VISIT HI MDM: CPT

## 2025-04-08 PROCEDURE — 93005 ELECTROCARDIOGRAM TRACING: CPT

## 2025-04-08 PROCEDURE — 84484 ASSAY OF TROPONIN QUANT: CPT

## 2025-04-08 PROCEDURE — 85025 COMPLETE CBC W/AUTO DIFF WBC: CPT

## 2025-04-08 PROCEDURE — 80053 COMPREHEN METABOLIC PANEL: CPT

## 2025-04-08 PROCEDURE — 36415 COLL VENOUS BLD VENIPUNCTURE: CPT

## 2025-04-08 PROCEDURE — 99285 EMERGENCY DEPT VISIT HI MDM: CPT | Performed by: EMERGENCY MEDICINE

## 2025-04-08 PROCEDURE — 71045 X-RAY EXAM CHEST 1 VIEW: CPT

## 2025-04-08 RX ORDER — ACETAMINOPHEN 325 MG/1
650 TABLET ORAL ONCE
Status: COMPLETED | OUTPATIENT
Start: 2025-04-08 | End: 2025-04-08

## 2025-04-08 RX ADMIN — ACETAMINOPHEN 650 MG: 325 TABLET, FILM COATED ORAL at 18:03

## 2025-04-08 NOTE — TELEPHONE ENCOUNTER
"Pt called in stating that she went to ED on 3/7/25 for chest pain. She has continued to have chest pain. Currently has chest pain. She states that it radiates down her left arm. She has SOB. She states that when she has increased anxiety makes it worse.  She also has SOB.     Pt has a new pt appointment on 4/30. Advised if she is having active chest pain for 1 month she needs to go back to the ER. Pt verbalized understanding.   Answer Assessment - Initial Assessment Questions  1. LOCATION: \"Where does it hurt?\"        Left side of chest upper can travel down to rib cage under left breast   2. RADIATION: \"Does the pain go anywhere else?\" (e.g., into neck, jaw, arms, back)      Down left arm   3. ONSET: \"When did the chest pain begin?\" (Minutes, hours or days)       March 4. PATTERN: \"Does the pain come and go, or has it been constant since it started?\"  \"Does it get worse with exertion?\"       Constant   5. DURATION: \"How long does it last\" (e.g., seconds, minutes, hours)      Constant   6. SEVERITY: \"How bad is the pain?\"  (e.g., Scale 1-10; mild, moderate, or severe)      4-5 pain level   7. CARDIAC RISK FACTORS: \"Do you have any history of heart problems or risk factors for heart disease?\" (e.g., angina, prior heart attack; diabetes, high blood pressure, high cholesterol, smoker, or strong family history of heart disease)      High cholesterol , mother has irregular heart beat and has pacemaker   8. PULMONARY RISK FACTORS: \"Do you have any history of lung disease?\"  (e.g., blood clots in lung, asthma, emphysema, birth control pills)      Blood clot in lung after a pregnancy, asthma   9. CAUSE: \"What do you think is causing the chest pain?\"      Unsure   10. OTHER SYMPTOMS: \"Do you have any other symptoms?\" (e.g., dizziness, nausea, vomiting, sweating, fever, difficulty breathing, cough)        SOB- with exertion , anxiety    Protocols used: Chest Pain-Adult-[place]    "

## 2025-04-08 NOTE — ED ATTENDING ATTESTATION
4/8/2025  I, Herb Ram MD, saw and evaluated the patient. I have discussed the patient with the resident/non-physician practitioner and agree with the resident's/non-physician practitioner's findings, Plan of Care, and MDM as documented in the resident's/non-physician practitioner's note, except where noted. All available labs and Radiology studies were reviewed.  I was present for key portions of any procedure(s) performed by the resident/non-physician practitioner and I was immediately available to provide assistance.       At this point I agree with the current assessment done in the Emergency Department.  I have conducted an independent evaluation of this patient a history and physical is as follows:  Briefly, 54-year-old female presenting with 1 month of intermittent chest pain.  Pain is in the left upper chest, worse with pressing the area or moving the arm, unchanged with meals or exertion, better at rest.  Patient has been seen by her primary care doctor, had an EKG, was referred to cardiology, has appointment with same scheduled but wanted to be checked out earlier.   she states that she becomes short of breath with significant exertion more so than she previously did, but is still able to perform daily activities without difficulty.  On examination, heart sounds normal, lungs clear to auscultation, no swelling or tenderness to palpation of either lower extremity.  Abdomen nontender, overall benign.  EKG not acutely ischemic, chest x-ray clear, abdomen labs overall reassuring.  Low suspicion for ACS, aortic dissection, bacterial pneumonia, pneumothorax, PE, other acute life threat.  [place] stable for close outpatient follow-up with cardiology as previously scheduled, discharged with strict return precautions, follow with same.         Critical Care Time  Procedures

## 2025-04-08 NOTE — DISCHARGE INSTRUCTIONS
You were seen in the Emergency Department for: chest pain     Your next steps should include: take Tylenol for pain control, go to your cardiology appointment at the end of the month    Reasons to RETURN IMMEDIATELY to the Emergency Department: you develop worsening pain, you have nausea or vomiting or you develop any new or worsening symptoms that concern you

## 2025-04-09 DIAGNOSIS — F41.9 ANXIETY: Primary | ICD-10-CM

## 2025-04-09 RX ORDER — ALPRAZOLAM 0.25 MG
0.25 TABLET ORAL 3 TIMES DAILY PRN
Qty: 30 TABLET | Refills: 0 | Status: SHIPPED | OUTPATIENT
Start: 2025-04-09 | End: 2025-04-16 | Stop reason: SDUPTHER

## 2025-04-09 NOTE — ED PROVIDER NOTES
Time reflects when diagnosis was documented in both MDM as applicable and the Disposition within this note       Time User Action Codes Description Comment    4/8/2025  6:45 PM Amy Lo Add [R07.9] Chest pain           ED Disposition       ED Disposition   Discharge    Condition   Stable    Date/Time   Tue Apr 8, 2025  6:49 PM    Comment   Kaylah Matute discharge to home/self care.                   Assessment & Plan       Medical Decision Making  Amount and/or Complexity of Data Reviewed  Labs: ordered.  Radiology: ordered.    Risk  OTC drugs.    Kaylah Matute is a 54 year old female PMH hyperlipidemia and anxiety presenting to the ED for a 1 month history of intermittent sharp left-sided chest pain.  Most likely musculoskeletal related considering pain is reproducible with palpation to anterior chest.  Patient also with reassuring EKG, CBC, CMP and troponin.  Chest x-ray showed no signs of enlarged cardiac silhouette or pneumonia.  Patient with stable vitals throughout ED visit.  Considering patient has unremarkable workup and vitally stable, patient discharged and told to go to cardiology appointment that is scheduled later this month.         Medications   acetaminophen (TYLENOL) tablet 650 mg (650 mg Oral Given 4/8/25 1803)       ED Risk Strat Scores        No data recorded        SBIRT 20yo+      Flowsheet Row Most Recent Value   Initial Alcohol Screen: US AUDIT-C     1. How often do you have a drink containing alcohol? 0 Filed at: 04/08/2025 1721   2. How many drinks containing alcohol do you have on a typical day you are drinking?  0 Filed at: 04/08/2025 1721   3a. Male UNDER 65: How often do you have five or more drinks on one occasion? 0 Filed at: 04/08/2025 1721   3b. FEMALE Any Age, or MALE 65+: How often do you have 4 or more drinks on one occassion? 0 Filed at: 04/08/2025 1721   Audit-C Score 0 Filed at: 04/08/2025 1721   IDALIA: How many times in the past year have you...    Used an illegal drug or  used a prescription medication for non-medical reasons? Never Filed at: 2025 1721            History of Present Illness       Chief Complaint   Patient presents with    Chest Pain     Pt c/o sharp CP for over a month. Pt reports was seen for it already but was sent home after an EKG. Pt reports was recommended to come back since it's constant and nonstop. Dyspnea with exertion.        Past Medical History:   Diagnosis Date    Allergic 1989    Amenorrhea     Anemia 2023    Anxiety     Arthritis     Asthma     Back pain     Chondromalacia of patella     Chronic fatigue     Colon polyp     COPD (chronic obstructive pulmonary disease) ([facility]) Yes    Deep vein thrombophlebitis of leg (HCC)     Depression     Disease of thyroid gland     Diverticulitis     GERD (gastroesophageal reflux disease)     Headache(784.0)     History of transfusion 2008    HPV (human papilloma virus) infection     Hypothyroidism     Lumbar radiculopathy     Migraine     Myofascial pain syndrome     Osteopenia     Piriformis syndrome     Sacroiliitis (HCC)     Sciatica     Seizure ([facility])     Sleep apnea     Spondylosis of lumbar spine     Sprain of temporomandibular joint or ligament 2023    Trochanteric bursitis of right hip     Vertigo     Vitamin D deficiency     Weight gain 2019      Past Surgical History:   Procedure Laterality Date    CERVIX LESION DESTRUCTION       SECTION      COLONOSCOPY      COLPOSCOPY W/ BIOPSY / CURETTAGE      Colposcopy cervix with biopsy    LAPAROSCOPIC ENDOMETRIOSIS FULGURATION      LAPAROSCOPY      TOOTH EXTRACTION        Family History   Problem Relation Age of Onset    Heart disease Mother     Asthma Daughter     Lung cancer Paternal Grandfather     Asthma Daughter     Colon cancer Father     Colon cancer Maternal Grandfather     Prostate cancer Maternal Grandfather     Colon cancer Maternal Aunt     No Known Problems Maternal Grandmother     Diabetes Paternal Grandmother      Dementia Paternal Grandmother     No Known Problems Maternal Aunt     No Known Problems Maternal Aunt     No Known Problems Maternal Aunt     No Known Problems Paternal Aunt     Asthma Daughter     Psychiatric Illness Neg Hx     Stroke Neg Hx     Thyroid disease Neg Hx     Substance Abuse Neg Hx     Suicidality Neg Hx       Social History     Tobacco Use    Smoking status: Never    Smokeless tobacco: Never   Vaping Use    Vaping status: Never Used   Substance Use Topics    Alcohol use: Not Currently    Drug use: Never      E-Cigarette/Vaping    E-Cigarette Use Never User       E-Cigarette/Vaping Substances    Nicotine No     THC No     CBD No     Flavoring No     Other No     Unknown No       I have reviewed and agree with the history as documented.       Chest Pain  Associated symptoms: shortness of breath    Associated symptoms: no abdominal pain, no back pain, no cough, no dizziness, no fever, no headache, no nausea and not vomiting        Kaylah Matute is a 54 year old female PMH hyperlipidemia and anxiety presenting to the ED for a 1 month history of intermittent sharp left-sided chest pain.  Pain over the left side of her chest from her clavicle to just under her left breast.  She has not noticed any pattern with exertion that contributes to worsening pain, but it does worsen when moving her left arm.  She was initially seen in [place] ED in the beginning of March for same complaint.  Workup was largely unremarkable and she was given follow-up with cardiology.  She has an appointment later this month with cardiology, but want to be evaluated in the ED for wound on the symptoms have continued to occur.  Patient states she has also had some shortness of breath during this time.  She begins to feel short of breath after 5 to 10 minutes of walking around.  The shortness of breath improves when she is able to sit and rest.  She denies any changes in vision, nausea, vomiting, abdominal pain, weakness,  paresthesias.  States she has not taken anything for the pain.      Review of Systems   Constitutional:  Negative for chills and fever.   HENT:  Negative for congestion and rhinorrhea.    Eyes:  Negative for visual disturbance.   Respiratory:  Positive for shortness of breath. Negative for cough.    Cardiovascular:  Positive for chest pain.   Gastrointestinal:  Negative for abdominal pain, nausea and vomiting.   Genitourinary:  Negative for dysuria and hematuria.   Musculoskeletal:  Negative for back pain and neck pain.   Skin:  Negative for wound.   Neurological:  Negative for dizziness, light-headedness and headaches.           Objective       ED Triage Vitals [04/08/25 1721]   Temperature Pulse Blood Pressure Respirations SpO2 Patient Position - Orthostatic VS   98.6 °F (37 °C) 78 129/75 18 96 % Sitting      Temp Source Heart Rate Source BP Location FiO2 (%) Pain Score    Oral Monitor Right arm -- --      Vitals      Date and Time Temp Pulse SpO2 Resp BP Pain Score FACES Pain Rating User   04/08/25 1721 98.6 °F (37 °C) 78 96 % 18 129/75 -- -- AG            Physical Exam  Constitutional:       General: She is not in acute distress.  HENT:      Head: Normocephalic and atraumatic.      Mouth/Throat:      Mouth: Mucous membranes are moist.      Pharynx: Oropharynx is clear.   Eyes:      Extraocular Movements: Extraocular movements intact.      Conjunctiva/sclera: Conjunctivae normal.   Cardiovascular:      Rate and Rhythm: Normal rate and regular rhythm.      Pulses: Normal pulses.      Heart sounds: Normal heart sounds.   Pulmonary:      Effort: Pulmonary effort is normal. No respiratory distress.   Abdominal:      Palpations: Abdomen is soft.      Tenderness: There is no abdominal tenderness.   Musculoskeletal:         General: No deformity. Normal range of motion.      Cervical back: Normal range of motion. No rigidity.      Right lower leg: No edema.      Left lower leg: No edema.      Comments: Chest pain  reproducible with palpation over left side of chest.   Skin:     General: Skin is warm and dry.      Capillary Refill: Capillary refill takes less than 2 seconds.      Findings: No erythema or rash.   Neurological:      General: No focal deficit present.      Mental Status: She is alert and oriented to person, place, and time. Mental status is at baseline.         Results Reviewed       Procedure Component Value Units Date/Time    HS Troponin 0hr (reflex protocol) [[id number]]  (Normal) Collected: 04/08/25 1750    Lab Status: Final result Specimen: Blood from Arm, Left Updated: 04/08/25 1821     hs TnI 0hr <2 ng/L     Comprehensive metabolic panel [[id number]]  (Abnormal) Collected: 04/08/25 1750    Lab Status: Final result Specimen: Blood from Arm, Left Updated: 04/08/25 1816     Sodium 142 mmol/L      Potassium 4.5 mmol/L      Chloride 107 mmol/L      CO2 27 mmol/L      ANION GAP 8 mmol/L      BUN 17 mg/dL      Creatinine 0.94 mg/dL      Glucose 93 mg/dL      Calcium 9.1 mg/dL      AST 11 U/L      ALT 7 U/L      Alkaline Phosphatase 67 U/L      Total Protein 6.7 g/dL      Albumin 4.4 g/dL      Total Bilirubin 0.29 mg/dL      eGFR 69 ml/min/1.73sq m     Narrative:      National Kidney Disease Foundation guidelines for Chronic Kidney Disease (CKD):     Stage 1 with normal or high GFR (GFR > 90 mL/min/1.73 square meters)    Stage 2 Mild CKD (GFR = 60-89 mL/min/1.73 square meters)    Stage 3A Moderate CKD (GFR = 45-59 mL/min/1.73 square meters)    Stage 3B Moderate CKD (GFR = 30-44 mL/min/1.73 square meters)    Stage 4 Severe CKD (GFR = 15-29 mL/min/1.73 square meters)    Stage 5 End Stage CKD (GFR <15 mL/min/1.73 square meters)  Note: GFR calculation is accurate only with a steady state creatinine    CBC and differential [[id number]]  (Abnormal) Collected: 04/08/25 1750    Lab Status: Final result Specimen: Blood from Arm, Left Updated: 04/08/25 1802     WBC 6.38 Thousand/uL      RBC 3.94 Million/uL      Hemoglobin 11.0  g/dL      Hematocrit 34.8 %      MCV 88 fL      MCH 27.9 pg      MCHC 31.6 g/dL      RDW 15.9 %      MPV 9.3 fL      Platelets 265 Thousands/uL      nRBC 0 /100 WBCs      Segmented % 52 %      Immature Grans % 0 %      Lymphocytes % 35 %      Monocytes % 11 %      Eosinophils Relative 1 %      Basophils Relative 1 %      Absolute Neutrophils 3.36 Thousands/µL      Absolute Immature Grans 0.02 Thousand/uL      Absolute Lymphocytes 2.26 Thousands/µL      Absolute Monocytes 0.67 Thousand/µL      Eosinophils Absolute 0.04 Thousand/µL      Basophils Absolute 0.03 Thousands/µL             XR chest 1 view portable   ED Interpretation by Amy Lo MD (04/09 0031)   No cardiopulmonary abnormalities      Final Interpretation by Anshu Allred MD (04/08 2051)      No acute cardiopulmonary disease.            Workstation performed: [id number]             ECG 12 Lead Documentation Only    Date/Time: 4/8/2025 5:45 PM    Performed by: Amy Lo MD  Authorized by: Amy Lo MD    Indications / Diagnosis:  CP  ECG reviewed by me, the ED Provider: yes    Patient location:  ED  Previous ECG:     Previous ECG:  Compared to current    Similarity:  No change    Comparison to cardiac monitor: Yes    Interpretation:     Interpretation: normal    Rate:     ECG rate:  69    ECG rate assessment: normal    Rhythm:     Rhythm: sinus rhythm    Ectopy:     Ectopy: none    QRS:     QRS axis:  Left  Conduction:     Conduction: normal    ST segments:     ST segments:  Normal  T waves:     T waves: normal        ED Medication and Procedure Management   Prior to Admission Medications   Prescriptions Last Dose Informant Patient Reported? Taking?   ARIPiprazole (ABILIFY) 5 mg tablet   No No   Sig: Take 1 tablet (5 mg total) by mouth daily at bedtime   Ascorbic Acid (VITAMIN C PO)  Self Yes No   Sig: Take by mouth   DULoxetine (CYMBALTA) 60 mg delayed release capsule   No No   Sig: Take 2 capsules (120 mg total) by mouth daily   Ferrous  Sulfate (Iron) 325 (65 Fe) MG TABS   No No   Sig: Take 1 tablet (325 mg total) by mouth every other day   Multiple Vitamins-Minerals (ZINC PO)  Self Yes No   Sig: Take by mouth   Triamcinolone Acetonide (Nasacort Allergy 24HR Children) 55 MCG/ACT nasal spray   Yes No   Sig: into each nostril   VITAMIN D PO  Self Yes No   Sig: Take 5,000 Units by mouth   ZOLMitriptan (ZOMIG-ZMT) 5 MG disintegrating tablet   No No   Sig: DISSOLVE 1 TABLET BY MOUTH AT ONSET OF HEADACHE, MAY REPEAT AFTER TWO HOURS AS NEEDED, MAX 2 TABLETS PER 24 HOURS   albuterol (2.5 mg/3 mL) 0.083 % nebulizer solution  Self Yes No   Sig: Inhale    albuterol (Proventil HFA) 90 mcg/act inhaler  Self No No   Sig: Inhale 2 puffs every 6 (six) hours as needed for wheezing   aluminum-magnesium hydroxide 200-200 MG/5ML suspension  Self No No   Sig: GAVISCON REGULAR STRENGTH AFTER MEALS and BEDTIME WHEN NOT FOLLOWING LPR/GERD DIET.   azelastine (ASTELIN) 0.1 % nasal spray  Self No No   Si-2 sprays into each nostril 2 (two) times a day as needed for rhinitis Use in each nostril as directed   eptinezumab-jjmr (VYEPTI) IVPB  Self No No   Sig: Infuse 100mg IV every 90 days.  Note to pharmacy: To be delivered to where patient will be having infusion: [facility] infusion center. [phone], [phone]   famotidine (PEPCID) 40 MG tablet   No No   Sig: TAKE 1 TABLET(40 MG) BY MOUTH DAILY AT BEDTIME   gabapentin (Neurontin) 300 mg capsule   No No   Sig: 3 tabs BID   ibuprofen (MOTRIN) 800 mg tablet   No No   Sig: Take 1 tablet (800 mg total) by mouth every 8 (eight) hours as needed for mild pain   ipratropium (ATROVENT) 0.06 % nasal spray  Self No No   Si sprays into each nostril 4 (four) times a day   ketorolac (TORADOL) 10 mg tablet   No No   Sig: Take 1 tablet (10 mg total) by mouth every 6 (six) hours as needed (migraine, back pain) Max 2-3 per week.   lamoTRIgine (LaMICtal) 150 MG tablet   No No   Sig: Take 1 tablet (150 mg total)  by mouth daily at bedtime   levocetirizine (XYZAL) 5 MG tablet  Self Yes No   Sig: Take 5 mg by mouth every evening   levonorgestrel (MIRENA) 20 MCG/24HR IUD  Self Yes No   Sig: by Intrauterine route   levothyroxine 75 mcg tablet   No No   Sig: Take 1 tablet (75 mcg total) by mouth daily in the early morning   metFORMIN (GLUCOPHAGE-XR) 500 mg 24 hr tablet   No No   Sig: Take 1 tablet (500 mg total) by mouth daily with dinner   naltrexone (REVIA) 50 mg tablet   No No   Sig: Take 1 tablet (50 mg total) by mouth daily   naproxen (NAPROSYN) 500 mg tablet  Self No No   Sig: TAKE 1 TABLET(500 MG) BY MOUTH TWICE DAILY WITH MEALS   ondansetron (ZOFRAN-ODT) 4 mg disintegrating tablet   No No   Sig: Take 1 tablet (4 mg total) by mouth every 6 (six) hours as needed for nausea or vomiting   pantoprazole (PROTONIX) 40 mg tablet   No No   Sig: TAKE 1 TABLET(40 MG) BY MOUTH DAILY   prochlorperazine (COMPAZINE) 10 mg tablet  Self No No   Si tab q6 hours prn migraine (with cocktail)   rosuvastatin (CRESTOR) 5 mg tablet   No No   Sig: Take 1 tablet (5 mg total) by mouth daily   tiotropium (SPIRIVA RESPIMAT) 1.25 MCG/ACT AERS inhaler  Self No No   Sig: Inhale 2 puffs daily   traZODone (DESYREL) 100 mg tablet   No No   Sig: Take 1-3 tablets (100-300 mg total) by mouth daily at bedtime as needed for sleep      Facility-Administered Medications: None     Discharge Medication List as of 2025  6:51 PM        CONTINUE these medications which have NOT CHANGED    Details   albuterol (2.5 mg/3 mL) 0.083 % nebulizer solution Inhale , Starting 2013, Historical Med      albuterol (Proventil HFA) 90 mcg/act inhaler Inhale 2 puffs every 6 (six) hours as needed for wheezing, Starting 2022, Normal      aluminum-magnesium hydroxide 200-200 MG/5ML suspension GAVISCON REGULAR STRENGTH AFTER MEALS and BEDTIME WHEN NOT FOLLOWING LPR/GERD DIET., No Print      ARIPiprazole (ABILIFY) 5 mg tablet Take 1 tablet (5 mg total) by mouth  daily at bedtime, Starting Thu 3/6/2025, Until Sun 8/3/2025, Normal      Ascorbic Acid (VITAMIN C PO) Take by mouth, Historical Med      azelastine (ASTELIN) 0.1 % nasal spray 1-2 sprays into each nostril 2 (two) times a day as needed for rhinitis Use in each nostril as directed, Starting Tue 12/5/2023, Normal      DULoxetine (CYMBALTA) 60 mg delayed release capsule Take 2 capsules (120 mg total) by mouth daily, Starting Thu 3/6/2025, Until Sun 8/3/2025, Normal      eptinezumab-jjmr (VYEPTI) IVPB Infuse 100mg IV every 90 days.  Note to pharmacy: To be delivered to where patient will be having infusion: [facility]. [phone], [phone], Print      famotidine (PEPCID) 40 MG tablet TAKE 1 TABLET(40 MG) BY MOUTH DAILY AT BEDTIME, Starting Mon 3/17/2025, Normal      Ferrous Sulfate (Iron) 325 (65 Fe) MG TABS Take 1 tablet (325 mg total) by mouth every other day, Starting Tue 3/4/2025, Normal      gabapentin (Neurontin) 300 mg capsule 3 tabs BID, Normal      ibuprofen (MOTRIN) 800 mg tablet Take 1 tablet (800 mg total) by mouth every 8 (eight) hours as needed for mild pain, Starting Fri 2/28/2025, Normal      ipratropium (ATROVENT) 0.06 % nasal spray 2 sprays into each nostril 4 (four) times a day, Starting Tue 9/12/2023, Normal      ketorolac (TORADOL) 10 mg tablet Take 1 tablet (10 mg total) by mouth every 6 (six) hours as needed (migraine, back pain) Max 2-3 per week., Starting Fri 2/28/2025, Normal      lamoTRIgine (LaMICtal) 150 MG tablet Take 1 tablet (150 mg total) by mouth daily at bedtime, Starting Thu 3/6/2025, Until Sun 8/3/2025, Normal      levocetirizine (XYZAL) 5 MG tablet Take 5 mg by mouth every evening, Historical Med      levonorgestrel (MIRENA) 20 MCG/24HR IUD by Intrauterine route, Historical Med      levothyroxine 75 mcg tablet Take 1 tablet (75 mcg total) by mouth daily in the early morning, Starting Wed 11/6/2024, Normal      metFORMIN (GLUCOPHAGE-XR) 500 mg  24 hr tablet Take 1 tablet (500 mg total) by mouth daily with dinner, Starting Tue 3/4/2025, Normal      Multiple Vitamins-Minerals (ZINC PO) Take by mouth, Historical Med      naltrexone (REVIA) 50 mg tablet Take 1 tablet (50 mg total) by mouth daily, Starting Thu 3/6/2025, Until Sun 8/3/2025, Normal      naproxen (NAPROSYN) 500 mg tablet TAKE 1 TABLET(500 MG) BY MOUTH TWICE DAILY WITH MEALS, Starting Sun 9/15/2024, Normal      ondansetron (ZOFRAN-ODT) 4 mg disintegrating tablet Take 1 tablet (4 mg total) by mouth every 6 (six) hours as needed for nausea or vomiting, Starting Tue 1/28/2025, Normal      pantoprazole (PROTONIX) 40 mg tablet TAKE 1 TABLET(40 MG) BY MOUTH DAILY, Starting Mon 12/9/2024, Normal      prochlorperazine (COMPAZINE) 10 mg tablet 1 tab q6 hours prn migraine (with cocktail), Normal      rosuvastatin (CRESTOR) 5 mg tablet Take 1 tablet (5 mg total) by mouth daily, Starting Wed 11/6/2024, Until Sat 11/1/2025, Normal      tiotropium (SPIRIVA RESPIMAT) 1.25 MCG/ACT AERS inhaler Inhale 2 puffs daily, Starting Mon 9/25/2023, Until Thu 3/6/2025, Normal      traZODone (DESYREL) 100 mg tablet Take 1-3 tablets (100-300 mg total) by mouth daily at bedtime as needed for sleep, Starting Thu 3/6/2025, Until Sun 8/3/2025 at 2359, Normal      Triamcinolone Acetonide (Nasacort Allergy 24HR Children) 55 MCG/ACT nasal spray into each nostril, Historical Med      VITAMIN D PO Take 5,000 Units by mouth, Historical Med      ZOLMitriptan (ZOMIG-ZMT) 5 MG disintegrating tablet DISSOLVE 1 TABLET BY MOUTH AT ONSET OF HEADACHE, MAY REPEAT AFTER TWO HOURS AS NEEDED, MAX 2 TABLETS PER 24 HOURS, Normal           No discharge procedures on file.  ED SEPSIS DOCUMENTATION   Time reflects when diagnosis was documented in both MDM as applicable and the Disposition within this note       Time User Action Codes Description Comment    4/8/2025  6:45 PM Amy Lo Add [R07.9] Chest pain                  Amy Lo MD  04/09/25  0038

## 2025-04-12 ENCOUNTER — APPOINTMENT (OUTPATIENT)
Dept: LAB | Age: 55
End: 2025-04-12
Payer: COMMERCIAL

## 2025-04-12 DIAGNOSIS — E61.1 IRON DEFICIENCY: ICD-10-CM

## 2025-04-12 DIAGNOSIS — D64.9 ANEMIA, UNSPECIFIED TYPE: ICD-10-CM

## 2025-04-12 LAB
ATRIAL RATE: 69 BPM
BASOPHILS # BLD AUTO: 0.03 THOUSANDS/ÂΜL (ref 0–0.1)
BASOPHILS NFR BLD AUTO: 1 % (ref 0–1)
EOSINOPHIL # BLD AUTO: 0.04 THOUSAND/ÂΜL (ref 0–0.61)
EOSINOPHIL NFR BLD AUTO: 1 % (ref 0–6)
ERYTHROCYTE [DISTWIDTH] IN BLOOD BY AUTOMATED COUNT: 15.5 % (ref 11.6–15.1)
FERRITIN SERPL-MCNC: 10 NG/ML (ref 30–307)
HCT VFR BLD AUTO: 34.3 % (ref 34.8–46.1)
HGB BLD-MCNC: 10.5 G/DL (ref 11.5–15.4)
IMM GRANULOCYTES # BLD AUTO: 0.02 THOUSAND/UL (ref 0–0.2)
IMM GRANULOCYTES NFR BLD AUTO: 0 % (ref 0–2)
IRON SATN MFR SERPL: 76 % (ref 15–50)
IRON SERPL-MCNC: 233 UG/DL (ref 50–212)
LYMPHOCYTES # BLD AUTO: 1.57 THOUSANDS/ÂΜL (ref 0.6–4.47)
LYMPHOCYTES NFR BLD AUTO: 30 % (ref 14–44)
MCH RBC QN AUTO: 27.9 PG (ref 26.8–34.3)
MCHC RBC AUTO-ENTMCNC: 30.6 G/DL (ref 31.4–37.4)
MCV RBC AUTO: 91 FL (ref 82–98)
MONOCYTES # BLD AUTO: 0.59 THOUSAND/ÂΜL (ref 0.17–1.22)
MONOCYTES NFR BLD AUTO: 11 % (ref 4–12)
NEUTROPHILS # BLD AUTO: 2.92 THOUSANDS/ÂΜL (ref 1.85–7.62)
NEUTS SEG NFR BLD AUTO: 57 % (ref 43–75)
NRBC BLD AUTO-RTO: 0 /100 WBCS
P AXIS: 74 DEGREES
PLATELET # BLD AUTO: 226 THOUSANDS/UL (ref 149–390)
PMV BLD AUTO: 9.8 FL (ref 8.9–12.7)
PR INTERVAL: 148 MS
QRS AXIS: -79 DEGREES
QRSD INTERVAL: 90 MS
QT INTERVAL: 432 MS
QTC INTERVAL: 462 MS
RBC # BLD AUTO: 3.76 MILLION/UL (ref 3.81–5.12)
T WAVE AXIS: 68 DEGREES
TIBC SERPL-MCNC: 306.6 UG/DL (ref 250–450)
TRANSFERRIN SERPL-MCNC: 219 MG/DL (ref 203–362)
UIBC SERPL-MCNC: 74 UG/DL (ref 155–355)
VENTRICULAR RATE: 69 BPM
WBC # BLD AUTO: 5.17 THOUSAND/UL (ref 4.31–10.16)

## 2025-04-12 PROCEDURE — 93010 ELECTROCARDIOGRAM REPORT: CPT | Performed by: INTERNAL MEDICINE

## 2025-04-12 PROCEDURE — 83540 ASSAY OF IRON: CPT

## 2025-04-12 PROCEDURE — 83550 IRON BINDING TEST: CPT

## 2025-04-12 PROCEDURE — 85025 COMPLETE CBC W/AUTO DIFF WBC: CPT

## 2025-04-12 PROCEDURE — 36415 COLL VENOUS BLD VENIPUNCTURE: CPT

## 2025-04-12 PROCEDURE — 82728 ASSAY OF FERRITIN: CPT

## 2025-04-16 ENCOUNTER — TELEPHONE (OUTPATIENT)
Age: 55
End: 2025-04-16

## 2025-04-16 ENCOUNTER — OFFICE VISIT (OUTPATIENT)
Age: 55
End: 2025-04-16
Payer: COMMERCIAL

## 2025-04-16 ENCOUNTER — PATIENT MESSAGE (OUTPATIENT)
Age: 55
End: 2025-04-16

## 2025-04-16 VITALS
HEIGHT: 62 IN | BODY MASS INDEX: 31.83 KG/M2 | WEIGHT: 173 LBS | OXYGEN SATURATION: 97 % | TEMPERATURE: 97.7 F | DIASTOLIC BLOOD PRESSURE: 70 MMHG | HEART RATE: 82 BPM | SYSTOLIC BLOOD PRESSURE: 118 MMHG

## 2025-04-16 DIAGNOSIS — F41.9 ANXIETY: ICD-10-CM

## 2025-04-16 DIAGNOSIS — Z12.31 ENCOUNTER FOR SCREENING MAMMOGRAM FOR BREAST CANCER: ICD-10-CM

## 2025-04-16 DIAGNOSIS — D64.9 ANEMIA, UNSPECIFIED TYPE: Primary | ICD-10-CM

## 2025-04-16 DIAGNOSIS — R07.89 OTHER CHEST PAIN: ICD-10-CM

## 2025-04-16 DIAGNOSIS — F41.1 GAD (GENERALIZED ANXIETY DISORDER): Chronic | ICD-10-CM

## 2025-04-16 DIAGNOSIS — G43.709 CHRONIC MIGRAINE WITHOUT AURA WITHOUT STATUS MIGRAINOSUS, NOT INTRACTABLE: ICD-10-CM

## 2025-04-16 DIAGNOSIS — E53.8 FOLATE DEFICIENCY: ICD-10-CM

## 2025-04-16 DIAGNOSIS — Z12.11 SCREENING FOR COLON CANCER: ICD-10-CM

## 2025-04-16 PROCEDURE — 99214 OFFICE O/P EST MOD 30 MIN: CPT | Performed by: INTERNAL MEDICINE

## 2025-04-16 RX ORDER — ALPRAZOLAM 0.25 MG
0.25 TABLET ORAL 3 TIMES DAILY PRN
Qty: 30 TABLET | Refills: 0 | Status: SHIPPED | OUTPATIENT
Start: 2025-04-16

## 2025-04-16 NOTE — ASSESSMENT & PLAN NOTE
Anxiety symptoms continue but seem improved with alprazolam 0.25 mg taken every 8 hours as needed for relief.

## 2025-04-16 NOTE — ASSESSMENT & PLAN NOTE
Previously detected anemia follows up today with normal iron level however still has a low normal hemoglobin.  Previous B12 level checked was normal.  Will check stools for possible blood loss and also check folic acid level.  Instructed the patient that she may discontinue iron supplementation as iron level is now in the normal range.  Pending outcome of stool testing and folic acid may consider hematology evaluation for further workup of anemia

## 2025-04-16 NOTE — LETTER
Kaylah Matute  [address]  [place] [address]      Colonoscopy Preparation: DULCOLAX & MIRALAX    [facility] - [address], [place] [address]    Performing Physician: Dr. Julito Mcgill    DATE OF PROCEDURE: 9/26/25    The OR/GI Lab will contact you the evening prior to your procedure with your exact arrival time.    We kindly ask that you immediately notify us of any need to cancel or reschedule your procedure.  _________________________________________________________________    WEEK BEFORE YOUR PROCEDURE:  Do not take Iron tablets for one week.  5 days prior to the appointment AVOID vegetables and fruits with skins or seeds, nuts, corn, popcorn, and whole grain breads.  Purchase: One (1) 238-gram container of Miralax (polyethylene glycol 3350), four (4) 5 mg Dulcolax (bisacodyl) tablets, and 64-ounces of Gatorade (sports drink) - no red, orange, or purple. These may be purchased at any pharmacy without a prescription. Generic products are permissible.  Arrange responsible transportation for day of the procedure.  DAY BEFORE THE PROCEDURE:  Clear liquids only for entire day prior. Nothing red, orange or purple.  You MAY have:  Soda  Water  Broth Gatorade  Jell-O  Popsicles Coffee/Tea without milk/creamer   YOU MAY NOT HAVE:  Solid Foods  Milk and milk products  Juice with pulp  BOWEL PREPARATION: Includes: One (1) 238-gram container of Miralax (polyethylene glycol 3350), four (4) 5 mg Dulcolax (bisacodyl) tablets, and 64-ounces of Gatorade (sports drink). Preparation may be refrigerated. Entire bowel prep should be completed.              Colonoscopy Preparation: DULCOLAX & MIRALAX    Afternoon before the procedure (2:00 pm - 5:00 pm):  Take two (2) 5 mg Dulcolax laxative tablets.    Evening before procedure (6:00 pm):  Mix entire container of Miralax with 64-ounces of Gatorade and shake until all medication is dissolved.  Begin drinking solution. Drink an eight  (8) ounce cup every 10-15 minutes until you have consumed half (32 ounces) of the solution. Refrigerate remaining solution.    Night before the procedure (8:00 pm):  Take two (2) 5 mg Dulcolax laxative tablets.    Beginning 5 hours before your procedure:  Drink the remaining amount of prepared solution (32 ounces). Drink an eight (8) ounce cup every 10-15 minutes until you have consumed the remaining solution.    Bowel prep should be completed 4 hours prior to your procedure time.    NOTHING TO EAT OR DRINK AFTER MIDNIGHT - EXCEPT YOUR BOWEL PREP    DAY OF PROCEDURE:  You may brush your teeth.  Leave all jewelry at home. Take out any facial piercings, if able.  Please arrive for your procedure as indicated by the OR / GI Lab / Endoscopy Unit. The hospital will contact you the day before with your exact arrival time.  Make sure you have arranged ahead of time for a responsible adult (18 or older) to accompany and drive you home after the procedure. Please discuss any transportation concerns with our staff prior to your procedure.  The effects of the anesthesia can persist for 24 hours. After receiving the sedation, you must exercise caution before engaging in any activity that could harm yourself and others (such as driving a car). Do not make any important decisions or do not drink any alcoholic beverages during this time period.        Colonoscopy Preparation: DULCOLAX & MIRALAX    After your procedure, you may have anything you'd like to eat or drink. You will probably want to start with something light. Please include plenty of fluids. Avoid items that cause gas such as sodas and salads.  SPECIAL INSTRUCTIONS:    For patients currently taking blood thinners and/or antiplatelet therapy our office will contact the prescribing provider. Our office will contact you with any required changes to your medication regimen.    Blood thinner (i.e. - Coumadin, Pradaxa, Lovenox, Xarelto, Eliquis)  Continue (Do Not  Stop)  Stop_______________for_______________days prior to the procedure.  Antiplatelet (i.e. - Plavix, Aggrenox, Effient, Brilinta)  Continue (Do Not Stop)  Stop_______________for_______________days prior to the procedure.  Diabetic/Weight loss medication (i.e.- Insulin, GLP1 agonist)  Medication:_______________Hold:_______________days prior to the procedure      NOTHING TO EAT OR DRINK (INCLUDING CHEWING GUM) AFTER 12 MIDNIGHT PRIOR TO YOUR PROCEDURE EXCEPT YOUR BOWEL PREP.    For any questions or concerns related to your bowel preparation or pre-procedure instructions, please contact our office.  Thank you for choosing [facility] or [facility]!            [phone] Gastroenterology  [phone] Colon & Rectal Surgery

## 2025-04-16 NOTE — ASSESSMENT & PLAN NOTE
2 recent visits to the emergency room for evaluation of left-sided chest pain sharp in nature appears to be noncardiac based upon normal cardiac testing.  Pain is somewhat reproducible by pressing on chest wall.  No clear-cut association with physical exertion.  Chest pain symptoms seem to have decreased since treatment of anxiety and stress with alprazolam.  Still would like her to continue with her planned visit to cardiology for further evaluation of her symptoms

## 2025-04-16 NOTE — PROGRESS NOTES
Name: Kaylah Matute      : 1970      MRN: [id number]  Encounter Provider: Elvis Montilla MD  Encounter Date: 2025   Encounter department: [facility] INTERNAL MEDICINE    Assessment & Plan  Encounter for screening mammogram for breast cancer    Orders:    Mammo screening bilateral w 3d and cad; Future    Folate deficiency    Orders:    Folate; Future    Screening for colon cancer    Orders:    iFOBT/FIT; Future    KYLE (generalized anxiety disorder)  Anxiety symptoms continue but seem improved with alprazolam 0.25 mg taken every 8 hours as needed for relief.         Anemia, unspecified type  Previously detected anemia follows up today with normal iron level however still has a low normal hemoglobin.  Previous B12 level checked was normal.  Will check stools for possible blood loss and also check folic acid level.  Instructed the patient that she may discontinue iron supplementation as iron level is now in the normal range.  Pending outcome of stool testing and folic acid may consider hematology evaluation for further workup of anemia            Other chest pain  2 recent visits to the emergency room for evaluation of left-sided chest pain sharp in nature appears to be noncardiac based upon normal cardiac testing.  Pain is somewhat reproducible by pressing on chest wall.  No clear-cut association with physical exertion.  Chest pain symptoms seem to have decreased since treatment of anxiety and stress with alprazolam.  Still would like her to continue with her planned visit to cardiology for further evaluation of her symptoms              History of Present Illness     This 54-year-old female patient returns to our office today for a follow-up visit.  She was noted to have anemia and iron deficiency on previous visit and we initiated iron supplementation.  She returns today for follow-up evaluation of her blood work.    In addition the patient has had visit to the emergency room for  evaluation of chest pain.  Diagnosis of musculoskeletal chest wall pain was made on that evaluation.  Cardiac testing including cardiac enzymes and electrocardiogram were normal.  The patient is scheduled to have a cardiology consultation for completeness.      Review of Systems   Constitutional:  Positive for fatigue.   Cardiovascular:  Positive for chest pain.   Psychiatric/Behavioral:  The patient is nervous/anxious.    All other systems reviewed and are negative.    Past Medical History:   Diagnosis Date    Allergic 1989    Amenorrhea     Anemia 2023    Anxiety     Arthritis     Asthma     Back pain     Chondromalacia of patella     Chronic fatigue     Chronic pain disorder     Colon polyp     COPD (chronic obstructive pulmonary disease) ([facility]) Yes    Deep vein thrombophlebitis of leg ([facility])     Depression     Disease of thyroid gland     Diverticulitis     GERD (gastroesophageal reflux disease)     Headache(784.0)     History of transfusion 2008    HPV (human papilloma virus) infection     Hypothyroidism     Lumbar radiculopathy     Migraine     Morning headache     Myofascial pain syndrome     Osteopenia     Piriformis syndrome     Sacroiliitis ([facility])     Sciatica     Seizure ([facility])     Sleep apnea     Spondylosis of lumbar spine     Sprain of temporomandibular joint or ligament 2023    Trochanteric bursitis of right hip     Vertigo     Vitamin D deficiency     Weight gain 2019     Past Surgical History:   Procedure Laterality Date    CERVIX LESION DESTRUCTION       SECTION      COLONOSCOPY      COLPOSCOPY W/ BIOPSY / CURETTAGE      Colposcopy cervix with biopsy    LAPAROSCOPIC ENDOMETRIOSIS FULGURATION      LAPAROSCOPY      TOOTH EXTRACTION       Family History   Problem Relation Age of Onset    Heart disease Mother     Asthma Daughter     Lung cancer Paternal Grandfather     Asthma Daughter     Colon cancer Father     Sleep apnea Father     Snoring Father     Colon cancer Maternal  Grandfather     Prostate cancer Maternal Grandfather     Colon cancer Maternal Aunt     No Known Problems Maternal Grandmother     Diabetes Paternal Grandmother     Dementia Paternal Grandmother     No Known Problems Maternal Aunt     No Known Problems Maternal Aunt     No Known Problems Maternal Aunt     No Known Problems Paternal Aunt     Asthma Daughter     Psychiatric Illness Neg Hx     Stroke Neg Hx     Thyroid disease Neg Hx     Substance Abuse Neg Hx     Suicidality Neg Hx      Social History     Tobacco Use    Smoking status: Never    Smokeless tobacco: Never   Vaping Use    Vaping status: Never Used   Substance and Sexual Activity    Alcohol use: Not Currently    Drug use: Never    Sexual activity: Yes     Partners: Male     Birth control/protection: I.U.D.     Comment: Mirena     Current Outpatient Medications on File Prior to Visit   Medication Sig    albuterol (2.5 mg/3 mL) 0.083 % nebulizer solution Inhale     albuterol (Proventil HFA) 90 mcg/act inhaler Inhale 2 puffs every 6 (six) hours as needed for wheezing    aluminum-magnesium hydroxide 200-200 MG/5ML suspension GAVISCON REGULAR STRENGTH AFTER MEALS and BEDTIME WHEN NOT FOLLOWING LPR/GERD DIET.    ARIPiprazole (ABILIFY) 5 mg tablet Take 1 tablet (5 mg total) by mouth daily at bedtime    Ascorbic Acid (VITAMIN C PO) Take by mouth    azelastine (ASTELIN) 0.1 % nasal spray 1-2 sprays into each nostril 2 (two) times a day as needed for rhinitis Use in each nostril as directed    DULoxetine (CYMBALTA) 60 mg delayed release capsule Take 2 capsules (120 mg total) by mouth daily    eptinezumab-jjmr (VYEPTI) IVPB Infuse 100mg IV every 90 days.  Note to pharmacy: To be delivered to where patient will be having infusion: [facility]. [phone], [phone]    famotidine (PEPCID) 40 MG tablet TAKE 1 TABLET(40 MG) BY MOUTH DAILY AT BEDTIME    gabapentin (Neurontin) 300 mg capsule 3 tabs BID    ibuprofen (MOTRIN) 800 mg  tablet Take 1 tablet (800 mg total) by mouth every 8 (eight) hours as needed for mild pain    ipratropium (ATROVENT) 0.06 % nasal spray 2 sprays into each nostril 4 (four) times a day    ketorolac (TORADOL) 10 mg tablet Take 1 tablet (10 mg total) by mouth every 6 (six) hours as needed (migraine, back pain) Max 2-3 per week.    lamoTRIgine (LaMICtal) 150 MG tablet Take 1 tablet (150 mg total) by mouth daily at bedtime    levocetirizine (XYZAL) 5 MG tablet Take 5 mg by mouth every evening    levonorgestrel (MIRENA) 20 MCG/24HR IUD by Intrauterine route    levothyroxine 75 mcg tablet Take 1 tablet (75 mcg total) by mouth daily in the early morning    metFORMIN (GLUCOPHAGE-XR) 500 mg 24 hr tablet Take 1 tablet (500 mg total) by mouth daily with dinner    Multiple Vitamins-Minerals (ZINC PO) Take by mouth    naltrexone (REVIA) 50 mg tablet Take 1 tablet (50 mg total) by mouth daily    naproxen (NAPROSYN) 500 mg tablet TAKE 1 TABLET(500 MG) BY MOUTH TWICE DAILY WITH MEALS    ondansetron (ZOFRAN-ODT) 4 mg disintegrating tablet Take 1 tablet (4 mg total) by mouth every 6 (six) hours as needed for nausea or vomiting    pantoprazole (PROTONIX) 40 mg tablet TAKE 1 TABLET(40 MG) BY MOUTH DAILY    prochlorperazine (COMPAZINE) 10 mg tablet 1 tab q6 hours prn migraine (with cocktail)    rosuvastatin (CRESTOR) 5 mg tablet Take 1 tablet (5 mg total) by mouth daily    traZODone (DESYREL) 100 mg tablet Take 1-3 tablets (100-300 mg total) by mouth daily at bedtime as needed for sleep    Triamcinolone Acetonide (Nasacort Allergy 24HR Children) 55 MCG/ACT nasal spray into each nostril    VITAMIN D PO Take 5,000 Units by mouth    ZOLMitriptan (ZOMIG-ZMT) 5 MG disintegrating tablet DISSOLVE 1 TABLET BY MOUTH AT ONSET OF HEADACHE, MAY REPEAT AFTER TWO HOURS AS NEEDED, MAX 2 TABLETS PER 24 HOURS    [DISCONTINUED] ALPRAZolam (XANAX) 0.25 mg tablet Take 1 tablet (0.25 mg total) by mouth 3 (three) times a day as needed for anxiety     "[DISCONTINUED] Ferrous Sulfate (Iron) 325 (65 Fe) MG TABS Take 1 tablet (325 mg total) by mouth every other day    tiotropium (SPIRIVA RESPIMAT) 1.25 MCG/ACT AERS inhaler Inhale 2 puffs daily     Allergies   Allergen Reactions    Nuts - Food Allergy      Other reaction(s): WALNUTS    Cephalexin     Erythromycin Diarrhea, GI Intolerance and Vomiting    Iodine - Food Allergy Hives    Other      adobo    Shellfish Allergy - Food Allergy     Shellfish-Derived Products - Food Allergy     Turmeric - Food Allergy Hives    Wellbutrin [Bupropion] Seizures    Zithromax [Azithromycin] Diarrhea     Can take name brand  Annotation - [id number]: can take brand name  Other reaction(s): Nausea/vomiting/diarrhea     Immunization History   Administered Date(s) Administered    COVID-19 PFIZER VACCINE 0.3 ML IM 05/21/2021, 06/16/2021, 01/31/2022, 01/31/2022    INFLUENZA 10/31/2018    Influenza, injectable, quadrivalent, preservative free 0.5 mL 10/31/2018, 10/07/2019    Pneumococcal Conjugate Vaccine 20-valent (Pcv20), Polysace 08/30/2022    Tdap 06/06/2019    Zoster Vaccine Recombinant 01/05/2023, 03/15/2023     Objective   /70   Pulse 82   Temp 97.7 °F (36.5 °C)   Ht 5' 2\" (1.575 m)   Wt 78.5 kg (173 lb)   SpO2 97%   BMI 31.64 kg/m²     Physical Exam  Vitals and nursing note reviewed.   Constitutional:       General: She is not in acute distress.     Appearance: She is well-developed.   HENT:      Head: Normocephalic and atraumatic.   Eyes:      Conjunctiva/sclera: Conjunctivae normal.   Cardiovascular:      Rate and Rhythm: Normal rate and regular rhythm.      Heart sounds: Normal heart sounds. No murmur heard.  Pulmonary:      Effort: Pulmonary effort is normal. No respiratory distress.      Breath sounds: Normal breath sounds. No wheezing, rhonchi or rales.   Abdominal:      Palpations: Abdomen is soft.   Musculoskeletal:         General: No swelling.      Cervical back: Neck supple.   Skin:     General: Skin is warm " and dry.      Capillary Refill: Capillary refill takes less than 2 seconds.   Neurological:      Mental Status: She is alert.   Psychiatric:         Mood and Affect: Mood normal.

## 2025-04-19 ENCOUNTER — APPOINTMENT (OUTPATIENT)
Dept: LAB | Age: 55
End: 2025-04-19
Payer: COMMERCIAL

## 2025-04-19 DIAGNOSIS — J45.50 SEVERE PERSISTENT ASTHMA WITHOUT COMPLICATION: ICD-10-CM

## 2025-04-19 DIAGNOSIS — Z12.11 SCREENING FOR COLON CANCER: ICD-10-CM

## 2025-04-19 DIAGNOSIS — E53.8 FOLATE DEFICIENCY: ICD-10-CM

## 2025-04-19 DIAGNOSIS — H10.13 ALLERGIC CONJUNCTIVITIS, BILATERAL: ICD-10-CM

## 2025-04-19 DIAGNOSIS — J30.81 ALLERGIC RHINITIS DUE TO ANIMAL (CAT) (DOG) HAIR AND DANDER: ICD-10-CM

## 2025-04-19 DIAGNOSIS — J30.1 SEASONAL ALLERGIC RHINITIS DUE TO POLLEN: ICD-10-CM

## 2025-04-19 LAB
FOLATE SERPL-MCNC: 19.4 NG/ML
HEMOCCULT STL QL IA: POSITIVE

## 2025-04-19 PROCEDURE — 86003 ALLG SPEC IGE CRUDE XTRC EA: CPT

## 2025-04-19 PROCEDURE — G0328 FECAL BLOOD SCRN IMMUNOASSAY: HCPCS

## 2025-04-19 PROCEDURE — 82785 ASSAY OF IGE: CPT

## 2025-04-19 PROCEDURE — 82746 ASSAY OF FOLIC ACID SERUM: CPT

## 2025-04-19 PROCEDURE — 36415 COLL VENOUS BLD VENIPUNCTURE: CPT

## 2025-04-20 NOTE — RESULT NOTES
Message   Recorded as Task   Date: 07/20/2016 03:07 PM, Created By: Brionna Gonzalez   Task Name: Medical Complaint Callback   Assigned To: [facility] clinical,Team   Regarding Patient: Jose Gleason, Status: Active   Comment:    Brionna Lisa - 20 Jul 2016 3:07 PM     TASK CREATED  Caller: Self; Medical Complaint; [phone] (Home)  Pt left vm today at 2:52 hoping that she could get a muscle relaxer called in b/c she has severe knots in her neck and shoulder;    S/W pt she is a previous pt of Dr Sarah Bangura but hasn't been seen since 2014 and has a consult on 8/3/16 for new problem of left neck& shoulder pain; her PCP told her to call us; can we order a muscle relaxer to take until she is seen on 8/3 by Dr Sarah Bangura? Told pt Dr Sarah Bangura to advise  Pt uses [facility] on file, said she had used a muscle relaxer in the past but doesn't recall name of it  Reji Dry - 20 Jul 2016 4:07 PM     TASK REASSIGNED: Previously Assigned To Reji Dry  e-rx sent for cyclobenzaprine   Shruthi Campos - 21 Jul 2016 8:23 AM     TASK EDITED  S/w the pt  and she is aware          Signatures   Electronically signed by : Luciana Goldberg, ; Jul 21 2016  8:23AM EST                       (Author) 58 years old male with PPHx of severe EtOH use d/o with numerous ICU admissions with Phenobarbital use and DT's with PMHx of alcoholic gastritis/esophagitis, PUD, HLD, hx of hypoxic respiratory failure requiring intubation due to aspiration PNA (most recent intubation Aril 2020),  staph PNA, COVID-19 infection Dec 2020 presented with abd pain at the OSH with subsequent transfer to [facility]; for whom psychiatry was consulted for psychiatric clearance for liver transplant. Patient noted to be AAOx3 on exam.      Plan:  - Continue 1:1 for safety, collateral pending  -Thiamine supplementation, B12/Folate, Multivit  -Can consider Seroquel 25 mg PO HS if Qtc < 500 for sleep architecture   Patient is a 58 years old domiciled, , employed male with pphx of severe EtOH use d/o with numerous ICU admissions with Phenobarbital use and DT's with PMHx of alcoholic gastritis/esophagitis, PUD, HLD, presented with abd pain in ED; admitted for etoh WD tx, psychiatry was consulted for suicidal ideation. Patient noted to be AAOx3 on exam.        Plan:  - Continue 1:1 for safety, collateral pending  -Thiamine supplementation, B12/Folate, Multivit  -Can consider Seroquel 25 mg PO HS if Qtc < 500 for sleep architecture   Patient is a 58 years old domiciled, , employed male with pphx of severe EtOH use d/o with numerous ICU admissions with Phenobarbital use and DT's with PMHx of alcoholic gastritis/esophagitis, PUD, HLD, presented with abd pain in ED; admitted for etoh WD tx, psychiatry was consulted for suicidal ideation. Patient noted to be AAOx3 on exam.    Upon assessment patient is minimizing history of alcohol use, as well as recent events leading up to admission. Patient is fixated on stomach pain and needing medications. Patient does report tremors. He appears anxious and irritable, poorly engages in assessment for suicidality. Collateral would be helpful is assessing any safety concern although he denies any SI/HI/AVH. Patient would benefit from generous prns of benzodiazepine given hx of severe withdrawal/DTs.    Plan:  - Continue 1:1 for safety, collateral pending  -Thiamine supplementation, B12/Folate, Multivit  - Cont CIWA and valium prn  - Can consider Seroquel 25 mg PO HS if Qtc < 500

## 2025-04-22 LAB
A ALTERNATA IGE QN: <0.1 KUA/I (ref 0–0.1)
A FUMIGATUS IGE QN: <0.1 KUA/I (ref 0–0.1)
BERMUDA GRASS IGE QN: <0.1 KUA/I (ref 0–0.1)
BOXELDER IGE QN: <0.1 KUA/I (ref 0–0.1)
C HERBARUM IGE QN: <0.1 KUA/I (ref 0–0.1)
CAT DANDER IGE QN: <0.1 KUA/I (ref 0–0.1)
CMN PIGWEED IGE QN: <0.1 KUA/I (ref 0–0.1)
COMMON RAGWEED IGE QN: <0.1 KUA/I (ref 0–0.1)
COTTONWOOD IGE QN: <0.1 KUA/I (ref 0–0.1)
D FARINAE IGE QN: <0.1 KUA/I (ref 0–0.1)
D PTERONYSS IGE QN: <0.1 KUA/I (ref 0–0.1)
DOG DANDER IGE QN: 1.4 KUA/I (ref 0–0.1)
LONDON PLANE IGE QN: <0.1 KUA/I (ref 0–0.1)
MOUSE URINE PROT IGE QN: <0.1 KUA/I (ref 0–0.1)
MT JUNIPER IGE QN: <0.1 KUA/I (ref 0–0.1)
MUGWORT IGE QN: <0.1 KUA/I (ref 0–0.1)
P NOTATUM IGE QN: <0.1 KUA/I (ref 0–0.1)
ROACH IGE QN: <0.1 KUA/I (ref 0–0.1)
SILVER BIRCH IGE QN: <0.1 KUA/I (ref 0–0.1)
TIMOTHY IGE QN: <0.1 KUA/I (ref 0–0.1)
TOTAL IGE SMQN RAST: 84.4 KU/L (ref 0–113)
WALNUT IGE QN: <0.1 KUA/I (ref 0–0.1)
WHITE ASH IGE QN: <0.1 KUA/I (ref 0–0.1)
WHITE ELM IGE QN: <0.1 KUA/I (ref 0–0.1)
WHITE MULBERRY IGE QN: <0.1 KUA/I (ref 0–0.1)
WHITE OAK IGE QN: <0.1 KUA/I (ref 0–0.1)

## 2025-04-29 DIAGNOSIS — F41.9 ANXIETY: ICD-10-CM

## 2025-04-29 RX ORDER — ALPRAZOLAM 0.25 MG
0.25 TABLET ORAL 3 TIMES DAILY PRN
Qty: 30 TABLET | Refills: 0 | Status: SHIPPED | OUTPATIENT
Start: 2025-04-29

## 2025-04-29 NOTE — TELEPHONE ENCOUNTER
Medication: ALPRAZolam (XANAX) 0.25 mg tablet     Dose/Frequency: Take 1 tablet (0.25 mg total) by mouth 3 (three) times a day as needed for anxiety     Quantity: 30    Pharmacy: [id number] - [place], [place] - [address]      Office:   [x] PCP/Provider - Elvis Montilla MD    [] Speciality/Provider -     Does the patient have enough for 3 days?   [] Yes   [x] No - Send as HP to POD

## 2025-04-30 ENCOUNTER — OFFICE VISIT (OUTPATIENT)
Dept: CARDIOLOGY CLINIC | Facility: CLINIC | Age: 55
End: 2025-04-30
Payer: COMMERCIAL

## 2025-04-30 VITALS
WEIGHT: 178 LBS | HEART RATE: 81 BPM | OXYGEN SATURATION: 98 % | DIASTOLIC BLOOD PRESSURE: 72 MMHG | SYSTOLIC BLOOD PRESSURE: 112 MMHG | BODY MASS INDEX: 32.56 KG/M2

## 2025-04-30 DIAGNOSIS — R07.89 OTHER CHEST PAIN: Primary | ICD-10-CM

## 2025-04-30 DIAGNOSIS — R53.83 OTHER FATIGUE: ICD-10-CM

## 2025-04-30 PROCEDURE — 99204 OFFICE O/P NEW MOD 45 MIN: CPT | Performed by: INTERNAL MEDICINE

## 2025-04-30 NOTE — ASSESSMENT & PLAN NOTE
Atypical/noncardiac chest pain. However does have ongoing symptoms. Reasonable to check exercise treadmill test. There is poor R wave progression ECGs dating back many years comment on cannot rule out anterior infarct. Symptoms of chest pain are just relatively recent over the last few months.    Soft murmur heard on auscultation. Check echocardiogram.

## 2025-04-30 NOTE — PROGRESS NOTES
Cardiology Consultation     Kaylah Matute  [id number]  1970  HEART & VASCULAR [facility] [place]  [address] [facility]  [place] [address]    Orders Placed This Encounter   Procedures    Stress test only, exercise    Echo complete w/ contrast if indicated         Assessment & Plan  Other chest pain  Atypical/noncardiac chest pain. However does have ongoing symptoms. Reasonable to check exercise treadmill test. There is poor R wave progression ECGs dating back many years comment on cannot rule out anterior infarct. Symptoms of chest pain are just relatively recent over the last few months.    Soft murmur heard on auscultation. Check echocardiogram.      History of Present Illness:    Patient with a recent history of chest pain over the last few months. Seems persistent lasting for days at a time but then can go away. She was ultimately seen in the emergency room at the beginning of the month. Troponin was negative ECG without acute changes. Referred to see cardiology.    Since starting on treatment for anxiety with benzodiazepine, her symptoms have improved a little bit but still somewhat present.  Mother with A-fib, pacemaker    She also complains of generalized fatigue feels very tired throughout the day    Patient Active Problem List   Diagnosis    Abnormal involuntary movements    Allergic rhinitis    Amenorrhea    Asthma    Chronic fatigue    Chronic GERD    Esophageal reflux    KYLE (generalized anxiety disorder)    Impulse control disorder    Major depressive disorder, recurrent episode, mild (HCC)    Panic disorder without agoraphobia    Sciatica    IUD (intrauterine device) in place    Intervertebral disc disorder with radiculopathy of lumbar region    Other insomnia    Obsessive-compulsive disorder, unspecified    Headache, chronic migraine without aura, intractable    Status migrainosus    Chronic left shoulder pain    Obstructive  sleep apnea    Mixed hyperlipidemia    Cervical spine arthritis    History of pulmonary embolism    Chronic idiopathic urticaria    Vitamin D deficiency    Shellfish allergy    Migraine headache    Low back pain    Personality disorder (HCC)    Long-term use of high-risk medication    Dysphasia    Seizure (HCC)    Severe persistent asthma without complication    Dysuria    Chronic migraine without aura without status migrainosus, not intractable    Myofascial muscle pain    Hashimoto's thyroiditis    Other specified anemias    Iron deficiency    Hypothyroidism    Anxiety    Tension headache, chronic    Overweight    MYRA (obstructive sleep apnea)    Dysphagia    Arthralgia of left temporomandibular joint    Dislocation of jaw    Abnormal glucose    Narcolepsy without cataplexy    Anemia    Weight disorder    Solar degeneration    Other chest pain     Past Medical History:   Diagnosis Date    Allergic 1989    Amenorrhea     Anemia 02/2023    Anxiety     Arthritis     Asthma     Back pain     Chondromalacia of patella     Chronic fatigue     Chronic pain disorder     Colon polyp     COPD (chronic obstructive pulmonary disease) (HCC) Yes    Deep vein thrombophlebitis of leg (HCC)     Depression     Disease of thyroid gland     Diverticulitis     GERD (gastroesophageal reflux disease)     Headache(784.0)     History of transfusion 01/31/2008    HPV (human papilloma virus) infection     Hypothyroidism     Lumbar radiculopathy     Migraine     Morning headache     Myofascial pain syndrome     Osteopenia     Piriformis syndrome     Sacroiliitis (HCC)     Sciatica     Seizure (HCC)     Sleep apnea     Spondylosis of lumbar spine     Sprain of temporomandibular joint or ligament 08/25/2023    Trochanteric bursitis of right hip     Vertigo     Vitamin D deficiency     Weight gain 12/19/2019     Social History     Tobacco Use    Smoking status: Never    Smokeless tobacco: Never   Vaping Use    Vaping status: Never Used    Substance Use Topics    Alcohol use: Not Currently    Drug use: Never      Family History   Problem Relation Age of Onset    Heart disease Mother     Asthma Daughter     Lung cancer Paternal Grandfather     Asthma Daughter     Colon cancer Father     Sleep apnea Father     Snoring Father     Colon cancer Maternal Grandfather     Prostate cancer Maternal Grandfather     Colon cancer Maternal Aunt     No Known Problems Maternal Grandmother     Diabetes Paternal Grandmother     Dementia Paternal Grandmother     No Known Problems Maternal Aunt     No Known Problems Maternal Aunt     No Known Problems Maternal Aunt     No Known Problems Paternal Aunt     Asthma Daughter     Psychiatric Illness Neg Hx     Stroke Neg Hx     Thyroid disease Neg Hx     Substance Abuse Neg Hx     Suicidality Neg Hx      Past Surgical History:   Procedure Laterality Date    CERVIX LESION DESTRUCTION       SECTION      COLONOSCOPY      COLPOSCOPY W/ BIOPSY / CURETTAGE      Colposcopy cervix with biopsy    LAPAROSCOPIC ENDOMETRIOSIS FULGURATION      LAPAROSCOPY      TOOTH EXTRACTION         Current Outpatient Medications:     albuterol (2.5 mg/3 mL) 0.083 % nebulizer solution, Inhale , Disp: , Rfl:     ALPRAZolam (XANAX) 0.25 mg tablet, Take 1 tablet (0.25 mg total) by mouth 3 (three) times a day as needed for anxiety, Disp: 30 tablet, Rfl: 0    aluminum-magnesium hydroxide 200-200 MG/5ML suspension, GAVISCON REGULAR STRENGTH AFTER MEALS and BEDTIME WHEN NOT FOLLOWING LPR/GERD DIET., Disp: 355 mL, Rfl: 11    ARIPiprazole (ABILIFY) 5 mg tablet, Take 1 tablet (5 mg total) by mouth daily at bedtime, Disp: 30 tablet, Rfl: 4    Ascorbic Acid (VITAMIN C PO), Take by mouth, Disp: , Rfl:     azelastine (ASTELIN) 0.1 % nasal spray, 1-2 sprays into each nostril 2 (two) times a day as needed for rhinitis Use in each nostril as directed, Disp: 30 mL, Rfl: 11    azithromycin (ZITHROMAX) 250 mg tablet, Take 2 tablets today then 1 tablet daily x 4  days, Disp: 6 tablet, Rfl: 0    DULoxetine (CYMBALTA) 60 mg delayed release capsule, Take 2 capsules (120 mg total) by mouth daily, Disp: 60 capsule, Rfl: 4    EPINEPHrine (Auvi-Q) 0.3 mg/0.3 mL SOAJ, Inject 0.3 mL (0.3 mg total) into a muscle if needed for anaphylaxis, Disp: 0.6 mL, Rfl: 11    eptinezumab-jjmr (VYEPTI) IVPB, Infuse 100mg IV every 90 days.  Note to pharmacy: To be delivered to where patient will be having infusion: [facility]. [phone], [phone], Disp: 1 mL, Rfl: 3    famotidine (PEPCID) 40 MG tablet, TAKE 1 TABLET(40 MG) BY MOUTH DAILY AT BEDTIME, Disp: 30 tablet, Rfl: 5    gabapentin (Neurontin) 300 mg capsule, 3 tabs BID, Disp: 540 capsule, Rfl: 2    ibuprofen (MOTRIN) 800 mg tablet, Take 1 tablet (800 mg total) by mouth every 8 (eight) hours as needed for mild pain, Disp: 30 tablet, Rfl: 0    ipratropium (ATROVENT) 0.06 % nasal spray, 2 sprays into each nostril 4 (four) times a day, Disp: 15 mL, Rfl: 12    ketorolac (TORADOL) 10 mg tablet, Take 1 tablet (10 mg total) by mouth every 6 (six) hours as needed (migraine, back pain) Max 2-3 per week., Disp: 30 tablet, Rfl: 0    lamoTRIgine (LaMICtal) 150 MG tablet, Take 1 tablet (150 mg total) by mouth daily at bedtime, Disp: 30 tablet, Rfl: 4    levalbuterol (XOPENEX HFA) 45 mcg/act inhaler, Inhale 2 puffs every 6 (six) hours as needed for wheezing, Disp: 45 g, Rfl: 3    levocetirizine (XYZAL) 5 MG tablet, Take 5 mg by mouth every evening, Disp: , Rfl:     levonorgestrel (MIRENA) 20 MCG/24HR IUD, by Intrauterine route, Disp: , Rfl:     levothyroxine 75 mcg tablet, Take 1 tablet (75 mcg total) by mouth daily in the early morning, Disp: 100 tablet, Rfl: 3    metFORMIN (GLUCOPHAGE-XR) 500 mg 24 hr tablet, Take 1 tablet (500 mg total) by mouth daily with dinner, Disp: 30 tablet, Rfl: 6    Multiple Vitamins-Minerals (ZINC PO), Take by mouth, Disp: , Rfl:     naltrexone (REVIA) 50 mg tablet, Take 1 tablet (50 mg  total) by mouth daily, Disp: 30 tablet, Rfl: 4    ondansetron (ZOFRAN-ODT) 4 mg disintegrating tablet, Take 1 tablet (4 mg total) by mouth every 6 (six) hours as needed for nausea or vomiting, Disp: 30 tablet, Rfl: 2    pantoprazole (PROTONIX) 40 mg tablet, TAKE 1 TABLET(40 MG) BY MOUTH DAILY, Disp: 30 tablet, Rfl: 5    prochlorperazine (COMPAZINE) 10 mg tablet, 1 tab q6 hours prn migraine (with cocktail), Disp: 30 tablet, Rfl: 2    rosuvastatin (CRESTOR) 5 mg tablet, Take 1 tablet (5 mg total) by mouth daily, Disp: 90 tablet, Rfl: 3    tiotropium (SPIRIVA RESPIMAT) 1.25 MCG/ACT AERS inhaler, Inhale 2 puffs daily, Disp: 4 g, Rfl: 11    traZODone (DESYREL) 100 mg tablet, Take 1-3 tablets (100-300 mg total) by mouth daily at bedtime as needed for sleep, Disp: 90 tablet, Rfl: 4    Triamcinolone Acetonide (Nasacort Allergy 24HR Children) 55 MCG/ACT nasal spray, into each nostril, Disp: , Rfl:     VITAMIN D PO, Take 5,000 Units by mouth, Disp: , Rfl:     ZOLMitriptan (ZOMIG-ZMT) 5 MG disintegrating tablet, DISSOLVE 1 TABLET BY MOUTH AT ONSET OF HEADACHE, MAY REPEAT AFTER TWO HOURS AS NEEDED, MAX 2 TABLETS PER 24 HOURS, Disp: 12 tablet, Rfl: 5  Allergies   Allergen Reactions    Nuts - Food Allergy      Other reaction(s): WALNUTS    Cephalexin     Erythromycin Diarrhea, GI Intolerance and Vomiting    Iodine - Food Allergy Hives    Other      adobo    Shellfish Allergy - Food Allergy     Shellfish-Derived Products - Food Allergy     Turmeric - Food Allergy Hives    Wellbutrin [Bupropion] Seizures    Zithromax [Azithromycin] Diarrhea     Can take name brand  Annotation - [id number]: can take brand name  Other reaction(s): Nausea/vomiting/diarrhea       Vitals:    04/30/25 1451   BP: 112/72   BP Location: Right arm   Patient Position: Sitting   Cuff Size: Standard   Pulse: 81   SpO2: 98%   Weight: 80.7 kg (178 lb)     Vitals:    04/30/25 1451   Weight: 80.7 kg (178 lb)          Body mass index is 32.56 kg/m².    Physical  "Exam:  GENERAL: Alert, well appearing, and in no distress  HEENT:  PERRL, EOMI, no scleral icterus, no conjunctival pallor  NECK:  Supple, No elevated JVP, no thyromegaly, no carotid bruits  HEART:  Regular rate and rhythm, normal S1/S2, no S3/S4, 2/6 HSM  LUNGS:  Clear to auscultation bilaterally  ABDOMEN:  Soft, non-tender, positive bowel sounds, no rebound or guarding  EXTREMITIES:  No edema  VASCULAR:  Normal pedal pulses   NEURO: No focal deficits,  SKIN: Normal without suspicious lesions on exposed skin      ROS:  Positive for anxiety, anemia, chest pain,   Except as noted in HPI, is otherwise reviewed in detail and a 12 point review of systems is negative.    Labs:  Lab Results   Component Value Date    SODIUM 142 04/08/2025    K 4.5 04/08/2025     04/08/2025    CREATININE 0.94 04/08/2025    BUN 17 04/08/2025    CO2 27 04/08/2025    ALT 7 04/08/2025    AST 11 (L) 04/08/2025    TSH 6.250 (H) 06/01/2019    INR 0.97 02/14/2018    GLUF 88 10/12/2024    HGBA1C 5.6 10/12/2024    WBC 5.17 04/12/2025    HGB 10.5 (L) 04/12/2025    HCT 34.3 (L) 04/12/2025     04/12/2025       No results found for: \"CHOL\"  Lab Results   Component Value Date    HDL 57 10/12/2024    HDL 59 04/08/2023    HDL 50 12/31/2022     Lab Results   Component Value Date    LDLCALC 166 (H) 10/12/2024    LDLCALC 135 (H) 04/08/2023    LDLCALC 185 (H) 12/31/2022     Lab Results   Component Value Date    TRIG 74 10/12/2024    TRIG 77 04/08/2023    TRIG 130 12/31/2022         EKG:  Abnormal R wave progression dates back many years.  IRBBB    "

## 2025-05-01 ENCOUNTER — OFFICE VISIT (OUTPATIENT)
Age: 55
End: 2025-05-01
Payer: COMMERCIAL

## 2025-05-01 VITALS
DIASTOLIC BLOOD PRESSURE: 68 MMHG | HEART RATE: 87 BPM | HEIGHT: 62 IN | SYSTOLIC BLOOD PRESSURE: 114 MMHG | BODY MASS INDEX: 32.76 KG/M2 | TEMPERATURE: 98.9 F | WEIGHT: 178 LBS | OXYGEN SATURATION: 97 %

## 2025-05-01 DIAGNOSIS — J45.51 SEVERE PERSISTENT ASTHMA WITH ACUTE EXACERBATION: ICD-10-CM

## 2025-05-01 DIAGNOSIS — K21.9 CHRONIC GERD: ICD-10-CM

## 2025-05-01 DIAGNOSIS — E61.1 IRON DEFICIENCY: ICD-10-CM

## 2025-05-01 DIAGNOSIS — D50.9 IRON DEFICIENCY ANEMIA, UNSPECIFIED IRON DEFICIENCY ANEMIA TYPE: ICD-10-CM

## 2025-05-01 DIAGNOSIS — R19.5 HEME POSITIVE STOOL: Primary | ICD-10-CM

## 2025-05-01 PROCEDURE — 99214 OFFICE O/P EST MOD 30 MIN: CPT | Performed by: INTERNAL MEDICINE

## 2025-05-01 RX ORDER — PREDNISONE 5 MG/1
TABLET ORAL
Qty: 42 TABLET | Refills: 0 | Status: SHIPPED | OUTPATIENT
Start: 2025-05-01

## 2025-05-01 NOTE — ASSESSMENT & PLAN NOTE
Blood count remains below normal value iron level has been replaced B12 and folic acid are both normal GI referral for EGD and colonoscopy has been reviewed and placed.  Follow-up iron and CBC requested for next visit in 1 month       Orders:    CBC and differential; Future

## 2025-05-01 NOTE — ASSESSMENT & PLAN NOTE
Currently on pantoprazole 40 mg in the morning and famotidine 40 mg at bedtime for gastric acid control.  She does have iron deficiency anemia and recent heme positive stool referral back to GI services for consideration of updated EGD and colonoscopy

## 2025-05-01 NOTE — ASSESSMENT & PLAN NOTE
Some expiratory wheeze noted in both lungs today she does have a history of allergies as well as asthma.  She indicates that she typically does pretty well with steroids with the symptoms.  Should continue her inhaler medications and we have placed her on a tapering dose of prednisone starting at 30 mg daily decreasing by 5 mg every 2 days    Orders:    predniSONE 5 mg tablet; Take 6 pills daily with food decrease by 1 pill every 2 days

## 2025-05-01 NOTE — PROGRESS NOTES
Name: Kaylah aMtute      : 1970      MRN: [id number]  Encounter Provider: Elvis Montilla MD  Encounter Date: 2025   Encounter department: [facility]    Assessment & Plan  Heme positive stool    Orders:    Ambulatory Referral to Gastroenterology; Future    Iron deficiency    Orders:    Iron Panel (Includes Ferritin, Iron Sat%, Iron, and TIBC); Future    Severe persistent asthma with acute exacerbation  Some expiratory wheeze noted in both lungs today she does have a history of allergies as well as asthma.  She indicates that she typically does pretty well with steroids with the symptoms.  Should continue her inhaler medications and we have placed her on a tapering dose of prednisone starting at 30 mg daily decreasing by 5 mg every 2 days    Orders:    predniSONE 5 mg tablet; Take 6 pills daily with food decrease by 1 pill every 2 days    Iron deficiency anemia, unspecified iron deficiency anemia type  Blood count remains below normal value iron level has been replaced B12 and folic acid are both normal GI referral for EGD and colonoscopy has been reviewed and placed.  Follow-up iron and CBC requested for next visit in 1 month       Orders:    CBC and differential; Future    Chronic GERD  Currently on pantoprazole 40 mg in the morning and famotidine 40 mg at bedtime for gastric acid control.  She does have iron deficiency anemia and recent heme positive stool referral back to GI services for consideration of updated EGD and colonoscopy              History of Present Illness     This pleasant 54-year-old female patient returns today for follow-up assessment.  She has tested positive for FIT testing.  She does have an iron deficiency based anemia while iron level has been good on most recent testing she remains mildly anemic.  B12 folic acid do not appear to be deficient.  I referred her onto gastroenterology services for an EGD and a colonoscopy to evaluate cause of her  positive fit test.  She is on gastric acid modifying medication.    Patient has a history of allergy type symptoms currently having some asthmatic flareups.  Prednisone and a tapering dose has been ordered.      Review of Systems   Constitutional:  Positive for fatigue.   Respiratory:  Positive for wheezing.    All other systems reviewed and are negative.    Past Medical History:   Diagnosis Date    Allergic 1989    Amenorrhea     Anemia 2023    Anxiety     Arthritis     Asthma     Back pain     Chondromalacia of patella     Chronic fatigue     Chronic pain disorder     Colon polyp     COPD (chronic obstructive pulmonary disease) (HCC) Yes    Deep vein thrombophlebitis of leg (HCC)     Depression     Disease of thyroid gland     Diverticulitis     GERD (gastroesophageal reflux disease)     Headache(784.0)     History of transfusion 2008    HPV (human papilloma virus) infection     Hypothyroidism     Lumbar radiculopathy     Migraine     Morning headache     Myofascial pain syndrome     Osteopenia     Piriformis syndrome     Sacroiliitis ([facility])     Sciatica     Seizure ([facility])     Sleep apnea     Spondylosis of lumbar spine     Sprain of temporomandibular joint or ligament 2023    Trochanteric bursitis of right hip     Vertigo     Vitamin D deficiency     Weight gain 2019     Past Surgical History:   Procedure Laterality Date    CERVIX LESION DESTRUCTION       SECTION      COLONOSCOPY      COLPOSCOPY W/ BIOPSY / CURETTAGE      Colposcopy cervix with biopsy    LAPAROSCOPIC ENDOMETRIOSIS FULGURATION      LAPAROSCOPY      TOOTH EXTRACTION       Family History   Problem Relation Age of Onset    Heart disease Mother     Asthma Daughter     Lung cancer Paternal Grandfather     Asthma Daughter     Colon cancer Father     Sleep apnea Father     Snoring Father     Colon cancer Maternal Grandfather     Prostate cancer Maternal Grandfather     Colon cancer Maternal Aunt     No Known Problems Maternal  Grandmother     Diabetes Paternal Grandmother     Dementia Paternal Grandmother     No Known Problems Maternal Aunt     No Known Problems Maternal Aunt     No Known Problems Maternal Aunt     No Known Problems Paternal Aunt     Asthma Daughter     Psychiatric Illness Neg Hx     Stroke Neg Hx     Thyroid disease Neg Hx     Substance Abuse Neg Hx     Suicidality Neg Hx      Social History     Tobacco Use    Smoking status: Never    Smokeless tobacco: Never   Vaping Use    Vaping status: Never Used   Substance and Sexual Activity    Alcohol use: Not Currently    Drug use: Not Currently    Sexual activity: Yes     Partners: Male     Birth control/protection: I.U.D.     Comment: Mirena     Current Outpatient Medications on File Prior to Visit   Medication Sig    albuterol (2.5 mg/3 mL) 0.083 % nebulizer solution Inhale     ALPRAZolam (XANAX) 0.25 mg tablet Take 1 tablet (0.25 mg total) by mouth 3 (three) times a day as needed for anxiety    aluminum-magnesium hydroxide 200-200 MG/5ML suspension GAVISCON REGULAR STRENGTH AFTER MEALS and BEDTIME WHEN NOT FOLLOWING LPR/GERD DIET.    ARIPiprazole (ABILIFY) 5 mg tablet Take 1 tablet (5 mg total) by mouth daily at bedtime    Ascorbic Acid (VITAMIN C PO) Take by mouth    azelastine (ASTELIN) 0.1 % nasal spray 1-2 sprays into each nostril 2 (two) times a day as needed for rhinitis Use in each nostril as directed    azithromycin (ZITHROMAX) 250 mg tablet Take 2 tablets today then 1 tablet daily x 4 days    DULoxetine (CYMBALTA) 60 mg delayed release capsule Take 2 capsules (120 mg total) by mouth daily    EPINEPHrine (Auvi-Q) 0.3 mg/0.3 mL SOAJ Inject 0.3 mL (0.3 mg total) into a muscle if needed for anaphylaxis    eptinezumab-jjmr (VYEPTI) IVPB Infuse 100mg IV every 90 days.  Note to pharmacy: To be delivered to where patient will be having infusion: [facility]. [phone], [phone]    famotidine (PEPCID) 40 MG tablet TAKE 1 TABLET(40  MG) BY MOUTH DAILY AT BEDTIME    gabapentin (Neurontin) 300 mg capsule 3 tabs BID    ibuprofen (MOTRIN) 800 mg tablet Take 1 tablet (800 mg total) by mouth every 8 (eight) hours as needed for mild pain    ipratropium (ATROVENT) 0.06 % nasal spray 2 sprays into each nostril 4 (four) times a day    ketorolac (TORADOL) 10 mg tablet Take 1 tablet (10 mg total) by mouth every 6 (six) hours as needed (migraine, back pain) Max 2-3 per week.    lamoTRIgine (LaMICtal) 150 MG tablet Take 1 tablet (150 mg total) by mouth daily at bedtime    levalbuterol (XOPENEX HFA) 45 mcg/act inhaler Inhale 2 puffs every 6 (six) hours as needed for wheezing    levocetirizine (XYZAL) 5 MG tablet Take 5 mg by mouth every evening    levonorgestrel (MIRENA) 20 MCG/24HR IUD by Intrauterine route    levothyroxine 75 mcg tablet Take 1 tablet (75 mcg total) by mouth daily in the early morning    metFORMIN (GLUCOPHAGE-XR) 500 mg 24 hr tablet Take 1 tablet (500 mg total) by mouth daily with dinner    Multiple Vitamins-Minerals (ZINC PO) Take by mouth    naltrexone (REVIA) 50 mg tablet Take 1 tablet (50 mg total) by mouth daily    ondansetron (ZOFRAN-ODT) 4 mg disintegrating tablet Take 1 tablet (4 mg total) by mouth every 6 (six) hours as needed for nausea or vomiting    pantoprazole (PROTONIX) 40 mg tablet TAKE 1 TABLET(40 MG) BY MOUTH DAILY    prochlorperazine (COMPAZINE) 10 mg tablet 1 tab q6 hours prn migraine (with cocktail)    rosuvastatin (CRESTOR) 5 mg tablet Take 1 tablet (5 mg total) by mouth daily    tiotropium (SPIRIVA RESPIMAT) 1.25 MCG/ACT AERS inhaler Inhale 2 puffs daily    traZODone (DESYREL) 100 mg tablet Take 1-3 tablets (100-300 mg total) by mouth daily at bedtime as needed for sleep    Triamcinolone Acetonide (Nasacort Allergy 24HR Children) 55 MCG/ACT nasal spray into each nostril    VITAMIN D PO Take 5,000 Units by mouth    ZOLMitriptan (ZOMIG-ZMT) 5 MG disintegrating tablet DISSOLVE 1 TABLET BY MOUTH AT ONSET OF HEADACHE, MAY  "REPEAT AFTER TWO HOURS AS NEEDED, MAX 2 TABLETS PER 24 HOURS     Allergies   Allergen Reactions    Nuts - Food Allergy      Other reaction(s): WALNUTS    Cephalexin     Erythromycin Diarrhea, GI Intolerance and Vomiting    Iodine - Food Allergy Hives    Other      adobo    Shellfish Allergy - Food Allergy     Shellfish-Derived Products - Food Allergy     Turmeric - Food Allergy Hives    Wellbutrin [Bupropion] Seizures    Zithromax [Azithromycin] Diarrhea     Can take name brand  Annotation - [id number]: can take brand name  Other reaction(s): Nausea/vomiting/diarrhea     Immunization History   Administered Date(s) Administered    COVID-19 PFIZER VACCINE 0.3 ML IM 05/21/2021, 06/16/2021, 01/31/2022, 01/31/2022    INFLUENZA 10/31/2018    Influenza, injectable, quadrivalent, preservative free 0.5 mL 10/31/2018, 10/07/2019    Pneumococcal Conjugate Vaccine 20-valent (Pcv20), Polysace 08/30/2022    Tdap 06/06/2019    Zoster Vaccine Recombinant 01/05/2023, 03/15/2023     Objective   /68   Pulse 87   Temp 98.9 °F (37.2 °C) (Tympanic)   Ht 5' 2\" (1.575 m)   Wt 80.7 kg (178 lb)   SpO2 97%   BMI 32.56 kg/m²     Physical Exam  Vitals and nursing note reviewed.   Constitutional:       General: She is not in acute distress.     Appearance: She is well-developed.   HENT:      Head: Normocephalic and atraumatic.   Eyes:      Conjunctiva/sclera: Conjunctivae normal.   Cardiovascular:      Rate and Rhythm: Normal rate and regular rhythm.      Heart sounds: Normal heart sounds. No murmur heard.  Pulmonary:      Effort: Pulmonary effort is normal. No respiratory distress.      Breath sounds: Wheezing present. No rhonchi or rales.   Abdominal:      Palpations: Abdomen is soft.      Tenderness: There is no abdominal tenderness.   Musculoskeletal:         General: No swelling.      Cervical back: Neck supple.   Skin:     General: Skin is warm and dry.      Capillary Refill: Capillary refill takes less than 2 seconds. "   Neurological:      Mental Status: She is alert.   Psychiatric:         Mood and Affect: Mood normal.

## 2025-05-02 ENCOUNTER — TELEPHONE (OUTPATIENT)
Age: 55
End: 2025-05-02

## 2025-05-02 NOTE — TELEPHONE ENCOUNTER
Patients GI provider:  Dr. Nicole    Number to return call: [phone]    Reason for call: Pt calling stating her PCP has put a referral in for Heme positive stool and wants her to have Colon/EGD asap. She is scheduled for 3 yr colon recall with CRS in September. PCP says she can not wait. No Available OV with provider or Aps.  Please return call, Can we schedule procedures?    Scheduled procedure/appointment date if applicable: 9/6/23

## 2025-05-05 DIAGNOSIS — J45.50 SEVERE PERSISTENT ASTHMA WITHOUT COMPLICATION: ICD-10-CM

## 2025-05-05 RX ORDER — PREDNISONE 10 MG/1
TABLET ORAL
Qty: 20 TABLET | Refills: 1 | Status: SHIPPED | OUTPATIENT
Start: 2025-05-05

## 2025-05-31 ENCOUNTER — APPOINTMENT (OUTPATIENT)
Dept: LAB | Age: 55
End: 2025-05-31
Payer: COMMERCIAL

## 2025-05-31 DIAGNOSIS — D50.9 IRON DEFICIENCY ANEMIA, UNSPECIFIED IRON DEFICIENCY ANEMIA TYPE: ICD-10-CM

## 2025-05-31 DIAGNOSIS — E61.1 IRON DEFICIENCY: ICD-10-CM

## 2025-05-31 LAB
BASOPHILS # BLD AUTO: 0.02 THOUSANDS/ÂΜL (ref 0–0.1)
BASOPHILS NFR BLD AUTO: 0 % (ref 0–1)
EOSINOPHIL # BLD AUTO: 0.04 THOUSAND/ÂΜL (ref 0–0.61)
EOSINOPHIL NFR BLD AUTO: 1 % (ref 0–6)
ERYTHROCYTE [DISTWIDTH] IN BLOOD BY AUTOMATED COUNT: 14.3 % (ref 11.6–15.1)
FERRITIN SERPL-MCNC: 3 NG/ML (ref 30–307)
HCT VFR BLD AUTO: 36.3 % (ref 34.8–46.1)
HGB BLD-MCNC: 11.2 G/DL (ref 11.5–15.4)
IMM GRANULOCYTES # BLD AUTO: 0.02 THOUSAND/UL (ref 0–0.2)
IMM GRANULOCYTES NFR BLD AUTO: 0 % (ref 0–2)
IRON SATN MFR SERPL: 10 % (ref 15–50)
IRON SERPL-MCNC: 39 UG/DL (ref 50–212)
LYMPHOCYTES # BLD AUTO: 1.66 THOUSANDS/ÂΜL (ref 0.6–4.47)
LYMPHOCYTES NFR BLD AUTO: 32 % (ref 14–44)
MCH RBC QN AUTO: 27.7 PG (ref 26.8–34.3)
MCHC RBC AUTO-ENTMCNC: 30.9 G/DL (ref 31.4–37.4)
MCV RBC AUTO: 90 FL (ref 82–98)
MONOCYTES # BLD AUTO: 0.58 THOUSAND/ÂΜL (ref 0.17–1.22)
MONOCYTES NFR BLD AUTO: 11 % (ref 4–12)
NEUTROPHILS # BLD AUTO: 2.84 THOUSANDS/ÂΜL (ref 1.85–7.62)
NEUTS SEG NFR BLD AUTO: 56 % (ref 43–75)
NRBC BLD AUTO-RTO: 0 /100 WBCS
PLATELET # BLD AUTO: 293 THOUSANDS/UL (ref 149–390)
PMV BLD AUTO: 9.5 FL (ref 8.9–12.7)
RBC # BLD AUTO: 4.05 MILLION/UL (ref 3.81–5.12)
TIBC SERPL-MCNC: 373.8 UG/DL (ref 250–450)
TRANSFERRIN SERPL-MCNC: 267 MG/DL (ref 203–362)
UIBC SERPL-MCNC: 335 UG/DL (ref 155–355)
WBC # BLD AUTO: 5.16 THOUSAND/UL (ref 4.31–10.16)

## 2025-05-31 PROCEDURE — 83550 IRON BINDING TEST: CPT

## 2025-05-31 PROCEDURE — 85025 COMPLETE CBC W/AUTO DIFF WBC: CPT

## 2025-05-31 PROCEDURE — 83540 ASSAY OF IRON: CPT

## 2025-05-31 PROCEDURE — 82728 ASSAY OF FERRITIN: CPT

## 2025-05-31 PROCEDURE — 36415 COLL VENOUS BLD VENIPUNCTURE: CPT

## 2025-06-02 ENCOUNTER — OFFICE VISIT (OUTPATIENT)
Age: 55
End: 2025-06-02
Payer: COMMERCIAL

## 2025-06-02 VITALS
OXYGEN SATURATION: 98 % | SYSTOLIC BLOOD PRESSURE: 118 MMHG | HEART RATE: 100 BPM | WEIGHT: 172.4 LBS | TEMPERATURE: 98.2 F | BODY MASS INDEX: 31.73 KG/M2 | HEIGHT: 62 IN | DIASTOLIC BLOOD PRESSURE: 70 MMHG

## 2025-06-02 DIAGNOSIS — F41.9 ANXIETY: ICD-10-CM

## 2025-06-02 DIAGNOSIS — D64.89 ANEMIA DUE TO OTHER CAUSE, NOT CLASSIFIED: ICD-10-CM

## 2025-06-02 DIAGNOSIS — D64.9 ANEMIA, UNSPECIFIED TYPE: Primary | ICD-10-CM

## 2025-06-02 DIAGNOSIS — R73.09 ABNORMAL GLUCOSE: ICD-10-CM

## 2025-06-02 DIAGNOSIS — E61.1 IRON DEFICIENCY: ICD-10-CM

## 2025-06-02 DIAGNOSIS — E03.9 HYPOTHYROIDISM, UNSPECIFIED TYPE: ICD-10-CM

## 2025-06-02 PROCEDURE — 99214 OFFICE O/P EST MOD 30 MIN: CPT | Performed by: INTERNAL MEDICINE

## 2025-06-02 RX ORDER — FERROUS SULFATE 324(65)MG
324 TABLET, DELAYED RELEASE (ENTERIC COATED) ORAL
Qty: 30 TABLET | Refills: 2 | Status: SHIPPED | OUTPATIENT
Start: 2025-06-02

## 2025-06-02 NOTE — ASSESSMENT & PLAN NOTE
Hemoglobin value is gradually increasing continue iron supplementation 324 mg daily along with 1000 units of vitamin C.  Follow-up CBC and iron level in 1 month.  Patient scheduled for colonoscopy and EGD to evaluate for any possible GI loss of blood.

## 2025-06-02 NOTE — PROGRESS NOTES
Name: Kaylah Matute      : 1970      MRN: [id number]  Encounter Provider: Elvis Montilla MD  Encounter Date: 2025   Encounter department: [facility]    Assessment & Plan  Iron deficiency  Patient placed back on oral iron supplementation 324 mg daily follow-up iron level requested for 1 month    Orders:    ferrous sulfate 324 (65 Fe) mg; Take 1 tablet (324 mg total) by mouth daily before breakfast    Iron Panel (Includes Ferritin, Iron Sat%, Iron, and TIBC); Future    Anemia due to other cause, not classified        Orders:    ferrous sulfate 324 (65 Fe) mg; Take 1 tablet (324 mg total) by mouth daily before breakfast    CBC and differential; Future    Hypothyroidism, unspecified type  Current thyroid level he is clinically stable continue 75 mcg levothyroxine daily    Orders:    TSH, 3rd generation; Future    T4, free; Future    Abnormal glucose  Follow-up blood sugar requested patient currently on metformin 500 mg extended release at dinnertime    Orders:    Basic metabolic panel; Future    Anxiety  Anxiety symptoms seem to be stable continue alprazolam 0.25 mg every 8 hours as needed for anxiety symptoms.         Anemia, unspecified type  Hemoglobin value is gradually increasing continue iron supplementation 324 mg daily along with 1000 units of vitamin C.  Follow-up CBC and iron level in 1 month.  Patient scheduled for colonoscopy and EGD to evaluate for any possible GI loss of blood.                  History of Present Illness     This 54-year-old female patient returns to our office today for follow-up assessment of her iron deficiency anemia.  She is scheduled for an EGD and colonoscopy to complete evaluation for her iron deficiency anemia.  She has continued to show an improvement in her hemoglobin values which were reviewed with the patient today she was placed back on oral iron supplementation after a decrease in her serum iron value on most recent laboratory  "testing.  I have instructed her to take 1000 units of vitamin C with her iron tablet to enhance iron supplementation.  A follow-up CBC and iron panel will be obtained in 1 month.    Patient is scheduled for an echocardiogram and stress test which will be performed tomorrow.  She has had no further chest pain since her last visit and initiation of alprazolam.    Patient continues on metformin for glucose elevation we will check follow-up glucose with her next set of blood work in 1 month.    Patient has been able to lose 6 pounds over the past month.  She is encouraged to continue with her lifestyle management approach to weight loss.      Review of Systems   All other systems reviewed and are negative.    Past Medical History[1]  Past Surgical History[2]  Family History[3]  Social History[4]  Medications[5]  Allergies   Allergen Reactions    Nuts - Food Allergy      Other reaction(s): WALNUTS    Cephalexin     Erythromycin Diarrhea, GI Intolerance and Vomiting    Iodine - Food Allergy Hives    Other      adobo    Shellfish Allergy - Food Allergy     Shellfish-Derived Products - Food Allergy     Turmeric - Food Allergy Hives    Wellbutrin [Bupropion] Seizures    Zithromax [Azithromycin] Diarrhea     Can take name brand  Annotation - [id number]: can take brand name  Other reaction(s): Nausea/vomiting/diarrhea     Immunization History   Administered Date(s) Administered    COVID-19 PFIZER VACCINE 0.3 ML IM 05/21/2021, 06/16/2021, 01/31/2022, 01/31/2022    INFLUENZA 10/31/2018    Influenza, injectable, quadrivalent, preservative free 0.5 mL 10/31/2018, 10/07/2019    Pneumococcal Conjugate Vaccine 20-valent (Pcv20), Polysace 08/30/2022    Tdap 06/06/2019    Zoster Vaccine Recombinant 01/05/2023, 03/15/2023     Objective   /70   Pulse 100   Temp 98.2 °F (36.8 °C) (Tympanic)   Ht 5' 2\" (1.575 m)   Wt 78.2 kg (172 lb 6.4 oz)   SpO2 98%   BMI 31.53 kg/m²     Physical Exam  Vitals and nursing note reviewed. "   Constitutional:       General: She is not in acute distress.     Appearance: She is well-developed.   HENT:      Head: Normocephalic and atraumatic.     Eyes:      Conjunctiva/sclera: Conjunctivae normal.       Cardiovascular:      Rate and Rhythm: Normal rate and regular rhythm.      Heart sounds: Normal heart sounds. No murmur heard.  Pulmonary:      Effort: Pulmonary effort is normal. No respiratory distress.      Breath sounds: Normal breath sounds. No wheezing, rhonchi or rales.   Abdominal:      Palpations: Abdomen is soft.     Musculoskeletal:      Cervical back: Neck supple.     Skin:     General: Skin is warm and dry.      Capillary Refill: Capillary refill takes less than 2 seconds.     Neurological:      General: No focal deficit present.      Mental Status: She is alert. Mental status is at baseline.     Psychiatric:         Mood and Affect: Mood normal.         Behavior: Behavior normal.         Thought Content: Thought content normal.         Judgment: Judgment normal.              [1]   Past Medical History:  Diagnosis Date    Allergic 1989    Amenorrhea     Anemia 02/2023    Anxiety     Arthritis     Asthma     Back pain     Chondromalacia of patella     Chronic fatigue     Chronic pain disorder     Colon polyp     COPD (chronic obstructive pulmonary disease) ([facility]) Yes    Deep vein thrombophlebitis of leg ([facility])     Depression     Disease of thyroid gland     Diverticulitis     GERD (gastroesophageal reflux disease)     Headache(784.0)     History of transfusion 01/31/2008    HPV (human papilloma virus) infection     Hypothyroidism     Lumbar radiculopathy     Migraine     Morning headache     Myofascial pain syndrome     Osteopenia     Piriformis syndrome     Sacroiliitis ([facility])     Sciatica     Seizure ([facility])     Sleep apnea     Spondylosis of lumbar spine     Sprain of temporomandibular joint or ligament 08/25/2023    Trochanteric bursitis of right hip     Vertigo     Vitamin D deficiency      Weight gain 2019   [2]   Past Surgical History:  Procedure Laterality Date    CERVIX LESION DESTRUCTION       SECTION      COLONOSCOPY      COLPOSCOPY W/ BIOPSY / CURETTAGE      Colposcopy cervix with biopsy    LAPAROSCOPIC ENDOMETRIOSIS FULGURATION      LAPAROSCOPY      TOOTH EXTRACTION     [3]   Family History  Problem Relation Name Age of Onset    Heart disease Mother Hailey Patel     Asthma Daughter Brittany Matute     Lung cancer Paternal Grandfather      Asthma Daughter      Colon cancer Father Oscar Patel     Sleep apnea Father Oscar Patel     Snoring Father Oscar Patel     Colon cancer Maternal Grandfather Coy Ndiaye     Prostate cancer Maternal Grandfather Coy Ndiaye     Colon cancer Maternal Aunt Alyssa Yarbrough     No Known Problems Maternal Grandmother      Diabetes Paternal Grandmother Mirella Patel     Dementia Paternal Grandmother Mirella Patel     No Known Problems Maternal Aunt      No Known Problems Maternal Aunt      No Known Problems Maternal Aunt graham     No Known Problems Paternal Aunt neeta     Asthma Daughter Aline Marcelino     Psychiatric Illness Neg Hx      Stroke Neg Hx      Thyroid disease Neg Hx      Substance Abuse Neg Hx      Suicidality Neg Hx     [4]   Social History  Tobacco Use    Smoking status: Never    Smokeless tobacco: Never   Vaping Use    Vaping status: Never Used   Substance and Sexual Activity    Alcohol use: Not Currently    Drug use: Not Currently    Sexual activity: Yes     Partners: Male     Birth control/protection: I.U.D.     Comment: Mirena   [5]   Current Outpatient Medications on File Prior to Visit   Medication Sig    albuterol (2.5 mg/3 mL) 0.083 % nebulizer solution Inhale    ALPRAZolam (XANAX) 0.25 mg tablet Take 1 tablet (0.25 mg total) by mouth 3 (three) times a day as needed for anxiety    aluminum-magnesium hydroxide 200-200 MG/5ML suspension GAVISCON REGULAR STRENGTH AFTER MEALS and BEDTIME WHEN NOT FOLLOWING LPR/GERD DIET.     ARIPiprazole (ABILIFY) 5 mg tablet Take 1 tablet (5 mg total) by mouth daily at bedtime    Ascorbic Acid (VITAMIN C PO) Take by mouth    azelastine (ASTELIN) 0.1 % nasal spray 1-2 sprays into each nostril 2 (two) times a day as needed for rhinitis Use in each nostril as directed    DULoxetine (CYMBALTA) 60 mg delayed release capsule Take 2 capsules (120 mg total) by mouth daily    EPINEPHrine (Auvi-Q) 0.3 mg/0.3 mL SOAJ Inject 0.3 mL (0.3 mg total) into a muscle if needed for anaphylaxis    eptinezumab-jjmr (VYEPTI) IVPB Infuse 100mg IV every 90 days.  Note to pharmacy: To be delivered to where patient will be having infusion: [facility]. [phone], [phone]    famotidine (PEPCID) 40 MG tablet TAKE 1 TABLET(40 MG) BY MOUTH DAILY AT BEDTIME    gabapentin (Neurontin) 300 mg capsule 3 tabs BID    ibuprofen (MOTRIN) 800 mg tablet Take 1 tablet (800 mg total) by mouth every 8 (eight) hours as needed for mild pain    ipratropium (ATROVENT) 0.06 % nasal spray 2 sprays into each nostril 4 (four) times a day    ketorolac (TORADOL) 10 mg tablet Take 1 tablet (10 mg total) by mouth every 6 (six) hours as needed (migraine, back pain) Max 2-3 per week.    lamoTRIgine (LaMICtal) 150 MG tablet Take 1 tablet (150 mg total) by mouth daily at bedtime    levalbuterol (XOPENEX HFA) 45 mcg/act inhaler Inhale 2 puffs every 6 (six) hours as needed for wheezing    levocetirizine (XYZAL) 5 MG tablet Take 5 mg by mouth every evening    levonorgestrel (MIRENA) 20 MCG/24HR IUD by Intrauterine route    levothyroxine 75 mcg tablet Take 1 tablet (75 mcg total) by mouth daily in the early morning    metFORMIN (GLUCOPHAGE-XR) 500 mg 24 hr tablet Take 1 tablet (500 mg total) by mouth daily with dinner    Multiple Vitamins-Minerals (ZINC PO) Take by mouth    naltrexone (REVIA) 50 mg tablet Take 1 tablet (50 mg total) by mouth daily    ondansetron (ZOFRAN-ODT) 4 mg disintegrating tablet Take 1 tablet (4 mg  total) by mouth every 6 (six) hours as needed for nausea or vomiting    pantoprazole (PROTONIX) 40 mg tablet TAKE 1 TABLET(40 MG) BY MOUTH DAILY    predniSONE 10 mg tablet Take with food. Take 4 tabs x 2 days, then 3 x 2 days, then 2 x 2 days, then 1 x 2 days.    prochlorperazine (COMPAZINE) 10 mg tablet 1 tab q6 hours prn migraine (with cocktail)    rosuvastatin (CRESTOR) 5 mg tablet Take 1 tablet (5 mg total) by mouth daily    tiotropium (SPIRIVA RESPIMAT) 1.25 MCG/ACT AERS inhaler Inhale 2 puffs daily    traZODone (DESYREL) 100 mg tablet Take 1-3 tablets (100-300 mg total) by mouth daily at bedtime as needed for sleep    Triamcinolone Acetonide (Nasacort Allergy 24HR Children) 55 MCG/ACT nasal spray into each nostril    VITAMIN D PO Take 5,000 Units by mouth    ZOLMitriptan (ZOMIG-ZMT) 5 MG disintegrating tablet DISSOLVE 1 TABLET BY MOUTH AT ONSET OF HEADACHE, MAY REPEAT AFTER TWO HOURS AS NEEDED, MAX 2 TABLETS PER 24 HOURS

## 2025-06-02 NOTE — ASSESSMENT & PLAN NOTE
Current thyroid level he is clinically stable continue 75 mcg levothyroxine daily    Orders:    TSH, 3rd generation; Future    T4, free; Future

## 2025-06-02 NOTE — ASSESSMENT & PLAN NOTE
Patient placed back on oral iron supplementation 324 mg daily follow-up iron level requested for 1 month    Orders:    ferrous sulfate 324 (65 Fe) mg; Take 1 tablet (324 mg total) by mouth daily before breakfast    Iron Panel (Includes Ferritin, Iron Sat%, Iron, and TIBC); Future

## 2025-06-02 NOTE — ASSESSMENT & PLAN NOTE
Orders:    ferrous sulfate 324 (65 Fe) mg; Take 1 tablet (324 mg total) by mouth daily before breakfast    CBC and differential; Future

## 2025-06-02 NOTE — ASSESSMENT & PLAN NOTE
Follow-up blood sugar requested patient currently on metformin 500 mg extended release at dinnertime    Orders:    Basic metabolic panel; Future

## 2025-06-02 NOTE — ASSESSMENT & PLAN NOTE
Anxiety symptoms seem to be stable continue alprazolam 0.25 mg every 8 hours as needed for anxiety symptoms.

## 2025-06-03 ENCOUNTER — HOSPITAL ENCOUNTER (OUTPATIENT)
Dept: NON INVASIVE DIAGNOSTICS | Facility: CLINIC | Age: 55
Discharge: HOME | End: 2025-06-03
Attending: INTERNAL MEDICINE
Payer: COMMERCIAL

## 2025-06-03 ENCOUNTER — RESULTS FOLLOW-UP (OUTPATIENT)
Dept: CARDIOLOGY CLINIC | Facility: CLINIC | Age: 55
End: 2025-06-03

## 2025-06-03 VITALS
BODY MASS INDEX: 31.65 KG/M2 | HEART RATE: 75 BPM | DIASTOLIC BLOOD PRESSURE: 70 MMHG | WEIGHT: 172 LBS | HEIGHT: 62 IN | SYSTOLIC BLOOD PRESSURE: 118 MMHG

## 2025-06-03 VITALS
HEIGHT: 62 IN | SYSTOLIC BLOOD PRESSURE: 112 MMHG | WEIGHT: 178 LBS | DIASTOLIC BLOOD PRESSURE: 64 MMHG | BODY MASS INDEX: 32.76 KG/M2

## 2025-06-03 DIAGNOSIS — R07.89 OTHER CHEST PAIN: ICD-10-CM

## 2025-06-03 LAB
AORTIC ROOT: 3.4 CM
ASCENDING AORTA: 3 CM
BSA FOR ECHO PROCEDURE: 1.79 M2
E WAVE DECELERATION TIME: 282 MS
E/A RATIO: 0.8
FRACTIONAL SHORTENING: 28 (ref 28–44)
INTERVENTRICULAR SEPTUM IN DIASTOLE (PARASTERNAL SHORT AXIS VIEW): 1.5 CM
INTERVENTRICULAR SEPTUM: 1.5 CM (ref 0.6–1.1)
LAAS-AP2: 19 CM2
LAAS-AP4: 17.2 CM2
LEFT ATRIUM SIZE: 3.6 CM
LEFT ATRIUM VOLUME (MOD BIPLANE): 51 ML
LEFT ATRIUM VOLUME INDEX (MOD BIPLANE): 28.5 ML/M2
LEFT INTERNAL DIMENSION IN SYSTOLE: 3.3 CM (ref 2.1–4)
LEFT VENTRICULAR INTERNAL DIMENSION IN DIASTOLE: 4.6 CM (ref 3.5–6)
LEFT VENTRICULAR POSTERIOR WALL IN END DIASTOLE: 0.9 CM
LEFT VENTRICULAR STROKE VOLUME: 56 ML
LV EF US.2D.A4C+ESTIMATED: 64 %
LVSV (TEICH): 56 ML
MAX DIASTOLIC BP: 68 MMHG
MAX HR PERCENT: 77 %
MAX HR: 129 BPM
MAX PREDICTED HEART RATE: 167 BPM
MV E'TISSUE VEL-LAT: 9 CM/S
MV E'TISSUE VEL-SEP: 10 CM/S
MV PEAK A VEL: 0.99 M/S
MV PEAK E VEL: 79 CM/S
MV STENOSIS PRESSURE HALF TIME: 82 MS
MV VALVE AREA P 1/2 METHOD: 2.68
PROTOCOL NAME: NORMAL
RA PRESSURE ESTIMATED: 3 MMHG
RATE PRESSURE PRODUCT: NORMAL
REASON FOR TERMINATION: NORMAL
RIGHT ATRIUM AREA SYSTOLE A4C: 15.1 CM2
RIGHT VENTRICLE ID DIMENSION: 3 CM
SL CV LEFT ATRIUM LENGTH A2C: 5.7 CM
SL CV LV EF: 65
SL CV PED ECHO LEFT VENTRICLE DIASTOLIC VOLUME (MOD BIPLANE) 2D: 100 ML
SL CV PED ECHO LEFT VENTRICLE SYSTOLIC VOLUME (MOD BIPLANE) 2D: 44 ML
SL CV STRESS RECOVERY BP: NORMAL MMHG
SL CV STRESS RECOVERY HR: 82 BPM
SL CV STRESS RECOVERY O2 SAT: 99 %
SL CV STRESS STAGE REACHED: 3
STRESS ANGINA INDEX: 1
STRESS BASELINE BP: NORMAL MMHG
STRESS BASELINE HR: 74 BPM
STRESS O2 SAT REST: 97 %
STRESS PEAK HR: 129 BPM
STRESS POST ESTIMATED WORKLOAD: 10.1 METS
STRESS POST EXERCISE DUR MIN: 7 MIN
STRESS POST EXERCISE DUR MIN: 7 MIN
STRESS POST EXERCISE DUR SEC: 18 SEC
STRESS POST EXERCISE DUR SEC: 18 SEC
STRESS POST O2 SAT PEAK: 98 %
STRESS POST PEAK BP: 166 MMHG
STRESS POST PEAK HR: 129 BPM
STRESS POST PEAK SYSTOLIC BP: 168 MMHG
TARGET HR FORMULA: NORMAL
TEST INDICATION: NORMAL
TRICUSPID ANNULAR PLANE SYSTOLIC EXCURSION: 2.1 CM

## 2025-06-03 PROCEDURE — 93016 CV STRESS TEST SUPVJ ONLY: CPT | Performed by: INTERNAL MEDICINE

## 2025-06-03 PROCEDURE — 93306 TTE W/DOPPLER COMPLETE: CPT

## 2025-06-03 PROCEDURE — 93017 CV STRESS TEST TRACING ONLY: CPT

## 2025-06-03 PROCEDURE — 93306 TTE W/DOPPLER COMPLETE: CPT | Performed by: INTERNAL MEDICINE

## 2025-06-03 PROCEDURE — 93018 CV STRESS TEST I&R ONLY: CPT | Performed by: INTERNAL MEDICINE

## 2025-06-08 DIAGNOSIS — K21.9 GASTROESOPHAGEAL REFLUX DISEASE WITHOUT ESOPHAGITIS: ICD-10-CM

## 2025-06-09 DIAGNOSIS — N94.6 DYSMENORRHEA: ICD-10-CM

## 2025-06-09 RX ORDER — PANTOPRAZOLE SODIUM 40 MG/1
40 TABLET, DELAYED RELEASE ORAL DAILY
Qty: 30 TABLET | Refills: 5 | Status: SHIPPED | OUTPATIENT
Start: 2025-06-09 | End: 2025-06-10 | Stop reason: SDUPTHER

## 2025-06-10 ENCOUNTER — OFFICE VISIT (OUTPATIENT)
Dept: GASTROENTEROLOGY | Facility: AMBULARY SURGERY CENTER | Age: 55
End: 2025-06-10
Payer: COMMERCIAL

## 2025-06-10 VITALS
WEIGHT: 176.4 LBS | SYSTOLIC BLOOD PRESSURE: 122 MMHG | BODY MASS INDEX: 32.46 KG/M2 | HEIGHT: 62 IN | DIASTOLIC BLOOD PRESSURE: 74 MMHG | OXYGEN SATURATION: 98 % | HEART RATE: 80 BPM

## 2025-06-10 DIAGNOSIS — K21.9 GASTROESOPHAGEAL REFLUX DISEASE WITHOUT ESOPHAGITIS: ICD-10-CM

## 2025-06-10 DIAGNOSIS — K21.9 CHRONIC GERD: Primary | ICD-10-CM

## 2025-06-10 DIAGNOSIS — D50.0 IRON DEFICIENCY ANEMIA DUE TO CHRONIC BLOOD LOSS: ICD-10-CM

## 2025-06-10 PROBLEM — R13.10 DYSPHAGIA: Status: RESOLVED | Noted: 2023-07-12 | Resolved: 2025-06-10

## 2025-06-10 PROCEDURE — 99214 OFFICE O/P EST MOD 30 MIN: CPT | Performed by: INTERNAL MEDICINE

## 2025-06-10 RX ORDER — FAMOTIDINE 40 MG/1
40 TABLET, FILM COATED ORAL
Qty: 30 TABLET | Refills: 5 | Status: SHIPPED | OUTPATIENT
Start: 2025-06-10

## 2025-06-10 RX ORDER — PANTOPRAZOLE SODIUM 40 MG/1
40 TABLET, DELAYED RELEASE ORAL DAILY
Qty: 30 TABLET | Refills: 5 | Status: SHIPPED | OUTPATIENT
Start: 2025-06-10

## 2025-06-10 RX ORDER — IBUPROFEN 800 MG/1
800 TABLET, FILM COATED ORAL EVERY 8 HOURS PRN
Qty: 30 TABLET | Refills: 0 | Status: SHIPPED | OUTPATIENT
Start: 2025-06-10

## 2025-06-10 RX ORDER — SODIUM CHLORIDE, SODIUM LACTATE, POTASSIUM CHLORIDE, CALCIUM CHLORIDE 600; 310; 30; 20 MG/100ML; MG/100ML; MG/100ML; MG/100ML
125 INJECTION, SOLUTION INTRAVENOUS CONTINUOUS
OUTPATIENT
Start: 2025-06-10

## 2025-06-10 NOTE — PATIENT INSTRUCTIONS
Scheduled date of EGD(as of today): Aug 22, 2025  Physician performing EGD: Dr. Nicole   Location of EGD: an [facility]   Instructions reviewed with patient by: MC   Clearances: n/a     Colonoscopy being performed by Dr. Mcigll

## 2025-06-11 ENCOUNTER — TELEPHONE (OUTPATIENT)
Dept: NEUROLOGY | Facility: CLINIC | Age: 55
End: 2025-06-11

## 2025-06-11 NOTE — TELEPHONE ENCOUNTER
Multiple calls.    Called [facility] [phone], spoke with Dayami, regarding expedited Vyepti prior authorization. Provided medical information for prior authorization, however, auth needs further investigation and will be processed in 71 hours. Patient will need to reschedule appointment if no decision by noon on 6/12/25.    Called patient and provided update. Patient verbalized understanding and asked if we could send portal message with an update and a phone call tomorrow so that she may know if her appointment on 6/13/25 is cancelled or not.    PA request for Vyepti () 300 mg IV every 84 days and admin codes: 77848, 60609  Diagnosis Code: G43.709 - Chronic migraine without aura without status migrainosus, not intractable     Insurance Company: [employer]  Insurance ID: [id number]   Reference # for Pending auth - [id number]    Ordering Provider: Melissa Montilla  NPI: [id number]  [phone]  [phone]    Servicing Provider/To be administered at:  [facility]  [address]  [place] [address]  (P) [phone]  (F) [phone]  Tax ID: [id number]  NPI: [id number]    Will follow up.

## 2025-06-12 ENCOUNTER — TELEPHONE (OUTPATIENT)
Age: 55
End: 2025-06-12

## 2025-06-12 NOTE — ASSESSMENT & PLAN NOTE
-Continue pantoprazole famotidine, will evaluate at the time of upper endoscopy  Orders:    pantoprazole (PROTONIX) 40 mg tablet; Take 1 tablet (40 mg total) by mouth daily    famotidine (PEPCID) 40 MG tablet; Take 1 tablet (40 mg total) by mouth daily at bedtime

## 2025-06-12 NOTE — TELEPHONE ENCOUNTER
Per auth approval letter in media dated 6/11/2025:  Vyepti 300mg approved  6/13/2025-6/13/2026  Approved for 5 visits  [facility]  Reference Number: [id number]

## 2025-06-12 NOTE — TELEPHONE ENCOUNTER
Patient called in to get the status of the Vyepti PA , as she is scheduled tomorrow.    I read note below to patient . Pt was very appreciative . She had no further questions at this time    Meghna Tran RN        Per auth approval letter in media dated 6/11/2025:  Vyepti 300mg approved  6/13/2025-6/13/2026  Approved for 5 visits  [facility]  Reference Number: [id number]

## 2025-06-12 NOTE — TELEPHONE ENCOUNTER
Patient called to request another emotional support animal letter be sent to her apartment complex on behalf of Dr. Lawler. Patient stated that the provider preciously sent a letter last year, and that the apartment complex is requiring an updated letter.    Upon chart review, writer confirmed that the release of information form that was uploaded on 7/30/2024 is still active. Writer confirmed apartment name and address with patient.     Patient requested that the letter be sent over to the apartment complex directly, ideally by the end of next week (6/20/2025).

## 2025-06-12 NOTE — ASSESSMENT & PLAN NOTE
-Given new iron deficiency anemia, recommend EGD in addition to upcoming colonoscopy, will check for celiac, peptic ulcer disease in setting of NSAID use  - Discussed with the risks of procedure including bleeding, surgery, perforation  Orders:    EGD; Future

## 2025-06-12 NOTE — PROGRESS NOTES
Name: Kaylah Matute      : 1970      MRN: [id number]  Encounter Provider: Maulik Nicole MD  Encounter Date: 6/10/2025   Encounter department: [facility]  :  Den 54-year-old female, otherwise healthy with a history of GERD, noted to have recent iron deficiency anemia.  She is scheduled for colonoscopy.  Assessment & Plan  Chronic GERD  -Given new iron deficiency anemia, recommend EGD in addition to upcoming colonoscopy, will check for celiac, peptic ulcer disease in setting of NSAID use  - Discussed with the risks of procedure including bleeding, surgery, perforation  Orders:    EGD; Future    Iron deficiency anemia due to chronic blood loss  -Continue iron supplementation  - Will evaluate repeat EGD as noted above, will try to coordinate to schedule with a colonoscopy for which he is already scheduled       Gastroesophageal reflux disease without esophagitis  -Continue pantoprazole famotidine, will evaluate at the time of upper endoscopy  Orders:    pantoprazole (PROTONIX) 40 mg tablet; Take 1 tablet (40 mg total) by mouth daily    famotidine (PEPCID) 40 MG tablet; Take 1 tablet (40 mg total) by mouth daily at bedtime    Patient laboratory studies were personally reviewed.  History was independent obtained.  Procedure was ordered and we will schedule with surgical department.    History of Present Illness   HPI  Kaylah Matute is a 54 y.o. female who presents for heme positive stools.  This is a pleasant 54-year-old female, we had seen her in the past for reflux symptoms.  She notes that generally speaking with combination of pantoprazole the morning, famotidine in the evening her reflux is well-controlled, if she misses an evening dose of famotidine she does have breakthrough symptoms.  Denies any nausea, vomiting, dysphagia.    Denies any melena or rectal bleeding.  Recently had blood work which showed iron deficiency, heme positive stools.  She is scheduled for  "repeat colonoscopy in the next few months.    She does take occasional NSAIDs.      Review of Systems  Review of systems was negative except for pertinent positive mentioned in HPI         Objective   /74 (BP Location: Left arm, Patient Position: Sitting, Cuff Size: Standard)   Pulse 80   Ht 5' 2\" (1.575 m)   Wt 80 kg (176 lb 6.4 oz)   SpO2 98%   BMI 32.26 kg/m²      Physical Exam  GEN: WN/WD, no acute distress  HEENT: anicteric, MMM, no cervical lymphadenopathy  CV: RRR, no m/r/g  CHEST: CTA b/l, no WRR  ABD: +BS, soft NT/ND, no hepatosplenomegaly  EXT: no C/C/E  NEURO: AAOx3  SKIN: no rashes        "

## 2025-06-12 NOTE — ASSESSMENT & PLAN NOTE
-Continue iron supplementation  - Will evaluate repeat EGD as noted above, will try to coordinate to schedule with a colonoscopy for which he is already scheduled

## 2025-06-12 NOTE — TELEPHONE ENCOUNTER
Spoke to PT to inform provider is on vacation until 6/23. Once provider is back in office I am sure she will get this done for the PT. Advised PT to ask for extension until she returns to office

## 2025-06-13 ENCOUNTER — HOSPITAL ENCOUNTER (OUTPATIENT)
Dept: INFUSION CENTER | Facility: CLINIC | Age: 55
End: 2025-06-13
Payer: COMMERCIAL

## 2025-06-13 DIAGNOSIS — G43.709 CHRONIC MIGRAINE WITHOUT AURA WITHOUT STATUS MIGRAINOSUS, NOT INTRACTABLE: Primary | ICD-10-CM

## 2025-06-13 PROCEDURE — 96365 THER/PROPH/DIAG IV INF INIT: CPT

## 2025-06-13 RX ORDER — SODIUM CHLORIDE 9 MG/ML
20 INJECTION, SOLUTION INTRAVENOUS ONCE
Status: COMPLETED | OUTPATIENT
Start: 2025-06-13 | End: 2025-06-13

## 2025-06-13 RX ORDER — SODIUM CHLORIDE 9 MG/ML
20 INJECTION, SOLUTION INTRAVENOUS ONCE
OUTPATIENT
Start: 2025-09-05

## 2025-06-13 RX ADMIN — EPTINEZUMAB-JJMR 300 MG: 100 INJECTION INTRAVENOUS at 09:00

## 2025-06-13 RX ADMIN — SODIUM CHLORIDE 20 ML/HR: 9 INJECTION, SOLUTION INTRAVENOUS at 08:32

## 2025-06-13 NOTE — PROGRESS NOTES
Pt presents for entyvio. No new meds or concerns. Pt tolerated treatment without adverse reaction. Future appointments discussed, confirmed with patient for 9/25/25 0800. AVS declined.

## 2025-06-17 DIAGNOSIS — S03.00XA DISLOCATION OF TEMPOROMANDIBULAR JOINT, INITIAL ENCOUNTER: ICD-10-CM

## 2025-06-17 RX ORDER — CYCLOBENZAPRINE HCL 5 MG
5 TABLET ORAL 3 TIMES DAILY PRN
Qty: 30 TABLET | Refills: 0 | Status: SHIPPED | OUTPATIENT
Start: 2025-06-17

## 2025-06-23 ENCOUNTER — TELEPHONE (OUTPATIENT)
Dept: PSYCHIATRY | Facility: CLINIC | Age: 55
End: 2025-06-23

## 2025-06-27 DIAGNOSIS — F41.9 ANXIETY: ICD-10-CM

## 2025-06-27 RX ORDER — ALPRAZOLAM 0.25 MG
0.25 TABLET ORAL 3 TIMES DAILY PRN
Qty: 30 TABLET | Refills: 0 | Status: SHIPPED | OUTPATIENT
Start: 2025-06-27

## 2025-07-05 ENCOUNTER — APPOINTMENT (OUTPATIENT)
Dept: LAB | Age: 55
End: 2025-07-05
Attending: INTERNAL MEDICINE
Payer: COMMERCIAL

## 2025-07-05 DIAGNOSIS — E61.1 IRON DEFICIENCY: ICD-10-CM

## 2025-07-05 DIAGNOSIS — R73.09 ABNORMAL GLUCOSE: ICD-10-CM

## 2025-07-05 DIAGNOSIS — E03.9 HYPOTHYROIDISM, UNSPECIFIED TYPE: ICD-10-CM

## 2025-07-05 LAB
ANION GAP SERPL CALCULATED.3IONS-SCNC: 8 MMOL/L (ref 4–13)
BUN SERPL-MCNC: 23 MG/DL (ref 5–25)
CALCIUM SERPL-MCNC: 9.1 MG/DL (ref 8.4–10.2)
CHLORIDE SERPL-SCNC: 106 MMOL/L (ref 96–108)
CO2 SERPL-SCNC: 27 MMOL/L (ref 21–32)
CREAT SERPL-MCNC: 1.04 MG/DL (ref 0.6–1.3)
FERRITIN SERPL-MCNC: 9 NG/ML (ref 30–307)
GFR SERPL CREATININE-BSD FRML MDRD: 61 ML/MIN/1.73SQ M
GLUCOSE P FAST SERPL-MCNC: 82 MG/DL (ref 65–99)
IRON SATN MFR SERPL: 33 % (ref 15–50)
IRON SERPL-MCNC: 103 UG/DL (ref 50–212)
POTASSIUM SERPL-SCNC: 4.3 MMOL/L (ref 3.5–5.3)
SODIUM SERPL-SCNC: 141 MMOL/L (ref 135–147)
T4 FREE SERPL-MCNC: 0.87 NG/DL (ref 0.61–1.12)
TIBC SERPL-MCNC: 315 UG/DL (ref 250–450)
TRANSFERRIN SERPL-MCNC: 225 MG/DL (ref 203–362)
TSH SERPL DL<=0.05 MIU/L-ACNC: 2 UIU/ML (ref 0.45–4.5)
UIBC SERPL-MCNC: 212 UG/DL (ref 155–355)

## 2025-07-05 PROCEDURE — 84443 ASSAY THYROID STIM HORMONE: CPT

## 2025-07-05 PROCEDURE — 82728 ASSAY OF FERRITIN: CPT

## 2025-07-05 PROCEDURE — 83550 IRON BINDING TEST: CPT

## 2025-07-05 PROCEDURE — 83540 ASSAY OF IRON: CPT

## 2025-07-05 PROCEDURE — 36415 COLL VENOUS BLD VENIPUNCTURE: CPT

## 2025-07-05 PROCEDURE — 84439 ASSAY OF FREE THYROXINE: CPT

## 2025-07-05 PROCEDURE — 80048 BASIC METABOLIC PNL TOTAL CA: CPT

## 2025-07-08 ENCOUNTER — OFFICE VISIT (OUTPATIENT)
Age: 55
End: 2025-07-08
Payer: COMMERCIAL

## 2025-07-08 VITALS
DIASTOLIC BLOOD PRESSURE: 74 MMHG | HEIGHT: 62 IN | OXYGEN SATURATION: 98 % | TEMPERATURE: 98.4 F | SYSTOLIC BLOOD PRESSURE: 126 MMHG | WEIGHT: 176.6 LBS | BODY MASS INDEX: 32.5 KG/M2 | HEART RATE: 96 BPM

## 2025-07-08 DIAGNOSIS — D64.9 ANEMIA, UNSPECIFIED TYPE: Primary | ICD-10-CM

## 2025-07-08 DIAGNOSIS — K21.9 CHRONIC GERD: ICD-10-CM

## 2025-07-08 DIAGNOSIS — E61.1 IRON DEFICIENCY: ICD-10-CM

## 2025-07-08 DIAGNOSIS — G43.709 CHRONIC MIGRAINE WITHOUT AURA WITHOUT STATUS MIGRAINOSUS, NOT INTRACTABLE: ICD-10-CM

## 2025-07-08 DIAGNOSIS — D64.89 ANEMIA DUE TO OTHER CAUSE, NOT CLASSIFIED: ICD-10-CM

## 2025-07-08 DIAGNOSIS — J45.20 INTERMITTENT ASTHMA WITHOUT COMPLICATION, UNSPECIFIED ASTHMA SEVERITY: ICD-10-CM

## 2025-07-08 DIAGNOSIS — E03.9 HYPOTHYROIDISM, UNSPECIFIED TYPE: ICD-10-CM

## 2025-07-08 PROCEDURE — 99214 OFFICE O/P EST MOD 30 MIN: CPT | Performed by: INTERNAL MEDICINE

## 2025-07-08 NOTE — ASSESSMENT & PLAN NOTE
Iron profile reviewed shows improvement now into normal range continue iron for now GI workup EGD and colonoscopy pending

## 2025-07-08 NOTE — ASSESSMENT & PLAN NOTE
Anemia secondary to iron deficiency follow-up CBC requested iron value has improved expect hemoglobin should be up.

## 2025-07-08 NOTE — ASSESSMENT & PLAN NOTE
Chronic acid reflux with recently detected iron deficiency GI recommending colonoscopy and EGD agree with both studies

## 2025-07-08 NOTE — ASSESSMENT & PLAN NOTE
{For anemia, please use [facility] Work-Up SmartSet to perform initial work-up. If there are still concerns regarding management, an E-Consult to Hematology should be ordered. Click here to go the therapy plan activity if you need to order iron infusions. Search for 'PCP Venofer' therapy plan.  :[id number]}      Orders:    CBC and differential

## 2025-07-08 NOTE — ASSESSMENT & PLAN NOTE
Lungs remain clear no evidence of wheeze or shortness of breath.  Oxygen saturation is at 98%.  Continue as needed albuterol inhaler or Xopenex as needed

## 2025-07-08 NOTE — PROGRESS NOTES
Name: Kaylah Matute      : 1970      MRN: [id number]  Encounter Provider: Elvis Montilla MD  Encounter Date: 2025   Encounter department: [facility] INTERNAL MEDICINE    Assessment & Plan  Anemia due to other cause, not classified        Orders:    CBC and differential    Chronic migraine without aura without status migrainosus, not intractable           Intermittent asthma without complication, unspecified asthma severity  Lungs remain clear no evidence of wheeze or shortness of breath.  Oxygen saturation is at 98%.  Continue as needed albuterol inhaler or Xopenex as needed         Chronic GERD  Chronic acid reflux with recently detected iron deficiency GI recommending colonoscopy and EGD agree with both studies         Hypothyroidism, unspecified type  Recent free T4 TSH reviewed with the patient both are in normal range recommend continuation of current thyroid supplementation 75 mcg daily         Anemia, unspecified type  Anemia secondary to iron deficiency follow-up CBC requested iron value has improved expect hemoglobin should be up.             Iron deficiency  Iron profile reviewed shows improvement now into normal range continue iron for now GI workup EGD and colonoscopy pending              History of Present Illness     This 54-year-old female patient returns to the office today for follow-up evaluation.  She is currently on iron replacement for an iron deficiency.  Scheduled for colonoscopy and EGD to evaluate for possible GI loss of iron.  She is responding well to iron replacement and iron level is now back to normal.  Lab did not draw CBC I have asked her to go in in the next couple days to follow-up on her blood count.  Overall feels good no complaints.      Review of Systems   All other systems reviewed and are negative.    Past Medical History[1]  Past Surgical History[2]  Family History[3]  Social History[4]  Medications[5]  Allergies   Allergen Reactions    Nuts -  "Food Allergy      Other reaction(s): WALNUTS    Cephalexin     Erythromycin Diarrhea, GI Intolerance and Vomiting    Iodine - Food Allergy Hives    Other      adobo    Shellfish Allergy - Food Allergy     Shellfish-Derived Products - Food Allergy     Turmeric - Food Allergy Hives    Wellbutrin [Bupropion] Seizures    Zithromax [Azithromycin] Diarrhea     Can take name brand  Annotation - [id number]: can take brand name  Other reaction(s): Nausea/vomiting/diarrhea     Immunization History   Administered Date(s) Administered    COVID-19 PFIZER VACCINE 0.3 ML IM 05/21/2021, 06/16/2021, 01/31/2022, 01/31/2022    INFLUENZA 10/31/2018    Influenza, injectable, quadrivalent, preservative free 0.5 mL 10/31/2018, 10/07/2019    Pneumococcal Conjugate Vaccine 20-valent (Pcv20), Polysace 08/30/2022    Tdap 06/06/2019    Zoster Vaccine Recombinant 01/05/2023, 03/15/2023     Objective   /74   Pulse 96   Temp 98.4 °F (36.9 °C) (Tympanic)   Ht 5' 2\" (1.575 m)   Wt 80.1 kg (176 lb 9.6 oz)   SpO2 98%   BMI 32.30 kg/m²     Physical Exam  Vitals and nursing note reviewed.   Constitutional:       General: She is not in acute distress.     Appearance: She is well-developed.   HENT:      Head: Normocephalic and atraumatic.     Eyes:      Conjunctiva/sclera: Conjunctivae normal.       Cardiovascular:      Rate and Rhythm: Normal rate and regular rhythm.      Heart sounds: No murmur heard.  Pulmonary:      Effort: Pulmonary effort is normal. No respiratory distress.      Breath sounds: Normal breath sounds. No wheezing, rhonchi or rales.   Abdominal:      Palpations: Abdomen is soft.     Musculoskeletal:      Cervical back: Neck supple.     Skin:     General: Skin is warm and dry.      Capillary Refill: Capillary refill takes less than 2 seconds.     Neurological:      Mental Status: She is alert.     Psychiatric:         Mood and Affect: Mood normal.                [1]   Past Medical History:  Diagnosis Date    Allergic 1989    " Amenorrhea     Anemia 2023    Anxiety     Arthritis     Asthma     Back pain     Chondromalacia of patella     Chronic fatigue     Chronic pain disorder     Colon polyp     COPD (chronic obstructive pulmonary disease) (HCC) Yes    Deep vein thrombophlebitis of leg (HCC)     Depression     Disease of thyroid gland     Diverticulitis     GERD (gastroesophageal reflux disease)     Headache(784.0)     History of transfusion 2008    HPV (human papilloma virus) infection     Hypothyroidism     Lumbar radiculopathy     Migraine     Morning headache     Myofascial pain syndrome     Osteopenia     Piriformis syndrome     Sacroiliitis (HCC)     Sciatica     Seizure (HCC)     Sleep apnea     Spondylosis of lumbar spine     Sprain of temporomandibular joint or ligament 2023    Trochanteric bursitis of right hip     Vertigo     Vitamin D deficiency     Weight gain 2019   [2]   Past Surgical History:  Procedure Laterality Date    CERVIX LESION DESTRUCTION       SECTION      COLONOSCOPY      COLPOSCOPY W/ BIOPSY / CURETTAGE      Colposcopy cervix with biopsy    LAPAROSCOPIC ENDOMETRIOSIS FULGURATION      LAPAROSCOPY      TOOTH EXTRACTION     [3]   Family History  Problem Relation Name Age of Onset    Heart disease Mother Hailey Patel     Asthma Daughter Brittany Matute     Lung cancer Paternal Grandfather      Asthma Daughter      Colon cancer Father Oscar Patel     Sleep apnea Father Oscar Patel     Snoring Father Oscar Patel     Colon cancer Maternal Grandfather Coy Ndiaye     Prostate cancer Maternal Grandfather Coy Ndiaye     Colon cancer Maternal Aunt Alyssa Yarbrough     No Known Problems Maternal Grandmother      Diabetes Paternal Grandmother Mirella Banko     Dementia Paternal Grandmother Mirella Banko     No Known Problems Maternal Aunt      No Known Problems Maternal Aunt      No Known Problems Maternal Aunt graham     No Known Problems Paternal Aunt neeta     Asthma Daughter Aline  Chari     Psychiatric Illness Neg Hx      Stroke Neg Hx      Thyroid disease Neg Hx      Substance Abuse Neg Hx      Suicidality Neg Hx     [4]   Social History  Tobacco Use    Smoking status: Never    Smokeless tobacco: Never   Vaping Use    Vaping status: Never Used   Substance and Sexual Activity    Alcohol use: Not Currently    Drug use: Not Currently    Sexual activity: Yes     Partners: Male     Birth control/protection: I.U.D.     Comment: Mirena   [5]   Current Outpatient Medications on File Prior to Visit   Medication Sig    albuterol (2.5 mg/3 mL) 0.083 % nebulizer solution Inhale    ALPRAZolam (XANAX) 0.25 mg tablet Take 1 tablet (0.25 mg total) by mouth 3 (three) times a day as needed for anxiety    aluminum-magnesium hydroxide 200-200 MG/5ML suspension GAVISCON REGULAR STRENGTH AFTER MEALS and BEDTIME WHEN NOT FOLLOWING LPR/GERD DIET.    ARIPiprazole (ABILIFY) 5 mg tablet Take 1 tablet (5 mg total) by mouth daily at bedtime    Ascorbic Acid (VITAMIN C PO) Take by mouth    azelastine (ASTELIN) 0.1 % nasal spray 1-2 sprays into each nostril 2 (two) times a day as needed for rhinitis Use in each nostril as directed    cyclobenzaprine (FLEXERIL) 5 mg tablet Take 1 tablet (5 mg total) by mouth 3 (three) times a day as needed for muscle spasms    DULoxetine (CYMBALTA) 60 mg delayed release capsule Take 2 capsules (120 mg total) by mouth daily    EPINEPHrine (Auvi-Q) 0.3 mg/0.3 mL SOAJ Inject 0.3 mL (0.3 mg total) into a muscle if needed for anaphylaxis    eptinezumab-jjmr (VYEPTI) IVPB Infuse 100mg IV every 90 days.  Note to pharmacy: To be delivered to where patient will be having infusion: [facility] infusion [place]. [phone], [phone]    famotidine (PEPCID) 40 MG tablet Take 1 tablet (40 mg total) by mouth daily at bedtime    ferrous sulfate 324 (65 Fe) mg Take 1 tablet (324 mg total) by mouth daily before breakfast    gabapentin (Neurontin) 300 mg capsule 3 tabs BID     ibuprofen (MOTRIN) 800 mg tablet Take 1 tablet (800 mg total) by mouth every 8 (eight) hours as needed for mild pain    ipratropium (ATROVENT) 0.06 % nasal spray 2 sprays into each nostril 4 (four) times a day    ketorolac (TORADOL) 10 mg tablet Take 1 tablet (10 mg total) by mouth every 6 (six) hours as needed (migraine, back pain) Max 2-3 per week.    lamoTRIgine (LaMICtal) 150 MG tablet Take 1 tablet (150 mg total) by mouth daily at bedtime    levalbuterol (XOPENEX HFA) 45 mcg/act inhaler Inhale 2 puffs every 6 (six) hours as needed for wheezing    levocetirizine (XYZAL) 5 MG tablet Take 5 mg by mouth every evening    levonorgestrel (MIRENA) 20 MCG/24HR IUD by Intrauterine route    levothyroxine 75 mcg tablet Take 1 tablet (75 mcg total) by mouth daily in the early morning    metFORMIN (GLUCOPHAGE-XR) 500 mg 24 hr tablet Take 1 tablet (500 mg total) by mouth daily with dinner    Multiple Vitamins-Minerals (ZINC PO) Take by mouth    naltrexone (REVIA) 50 mg tablet Take 1 tablet (50 mg total) by mouth daily    ondansetron (ZOFRAN-ODT) 4 mg disintegrating tablet Take 1 tablet (4 mg total) by mouth every 6 (six) hours as needed for nausea or vomiting    pantoprazole (PROTONIX) 40 mg tablet Take 1 tablet (40 mg total) by mouth daily    prochlorperazine (COMPAZINE) 10 mg tablet 1 tab q6 hours prn migraine (with cocktail)    rosuvastatin (CRESTOR) 5 mg tablet Take 1 tablet (5 mg total) by mouth daily    tiotropium (SPIRIVA RESPIMAT) 1.25 MCG/ACT AERS inhaler Inhale 2 puffs daily    traZODone (DESYREL) 100 mg tablet Take 1-3 tablets (100-300 mg total) by mouth daily at bedtime as needed for sleep    Triamcinolone Acetonide (Nasacort Allergy 24HR Children) 55 MCG/ACT nasal spray into each nostril    VITAMIN D PO Take 5,000 Units by mouth    ZOLMitriptan (ZOMIG-ZMT) 5 MG disintegrating tablet DISSOLVE 1 TABLET BY MOUTH AT ONSET OF HEADACHE, MAY REPEAT AFTER TWO HOURS AS NEEDED, MAX 2 TABLETS PER 24 HOURS    predniSONE 10  mg tablet Take with food. Take 4 tabs x 2 days, then 3 x 2 days, then 2 x 2 days, then 1 x 2 days. (Patient not taking: Reported on 7/8/2025)

## 2025-07-08 NOTE — ASSESSMENT & PLAN NOTE
Recent free T4 TSH reviewed with the patient both are in normal range recommend continuation of current thyroid supplementation 75 mcg daily

## 2025-07-14 ENCOUNTER — PATIENT MESSAGE (OUTPATIENT)
Age: 55
End: 2025-07-14

## 2025-07-14 DIAGNOSIS — G43.709 CHRONIC MIGRAINE WITHOUT AURA WITHOUT STATUS MIGRAINOSUS, NOT INTRACTABLE: ICD-10-CM

## 2025-07-17 RX ORDER — GABAPENTIN 300 MG/1
CAPSULE ORAL
Qty: 120 CAPSULE | Refills: 6 | Status: SHIPPED | OUTPATIENT
Start: 2025-07-17 | End: 2025-07-24 | Stop reason: SDUPTHER

## 2025-07-24 ENCOUNTER — TELEPHONE (OUTPATIENT)
Dept: NEUROLOGY | Facility: CLINIC | Age: 55
End: 2025-07-24

## 2025-07-24 DIAGNOSIS — G43.709 CHRONIC MIGRAINE WITHOUT AURA WITHOUT STATUS MIGRAINOSUS, NOT INTRACTABLE: ICD-10-CM

## 2025-07-24 RX ORDER — GABAPENTIN 300 MG/1
CAPSULE ORAL
Qty: 540 CAPSULE | Refills: 1 | Status: SHIPPED | OUTPATIENT
Start: 2025-07-24

## 2025-07-29 ENCOUNTER — TELEPHONE (OUTPATIENT)
Dept: NEUROLOGY | Facility: CLINIC | Age: 55
End: 2025-07-29

## 2025-07-29 ENCOUNTER — OFFICE VISIT (OUTPATIENT)
Dept: NEUROLOGY | Facility: CLINIC | Age: 55
End: 2025-07-29
Payer: COMMERCIAL

## 2025-07-29 VITALS
BODY MASS INDEX: 32.2 KG/M2 | HEART RATE: 72 BPM | HEIGHT: 62 IN | DIASTOLIC BLOOD PRESSURE: 65 MMHG | SYSTOLIC BLOOD PRESSURE: 110 MMHG | WEIGHT: 175 LBS

## 2025-07-29 DIAGNOSIS — G43.009 MIGRAINE WITHOUT AURA AND WITHOUT STATUS MIGRAINOSUS, NOT INTRACTABLE: Primary | ICD-10-CM

## 2025-07-29 PROCEDURE — 99213 OFFICE O/P EST LOW 20 MIN: CPT | Performed by: PHYSICIAN ASSISTANT

## 2025-07-29 RX ORDER — ONDANSETRON 4 MG/1
4 TABLET, ORALLY DISINTEGRATING ORAL EVERY 6 HOURS PRN
Qty: 30 TABLET | Refills: 2 | Status: SHIPPED | OUTPATIENT
Start: 2025-07-29

## 2025-07-29 RX ORDER — KETOROLAC TROMETHAMINE 10 MG/1
10 TABLET, FILM COATED ORAL EVERY 6 HOURS PRN
Qty: 30 TABLET | Refills: 2 | Status: SHIPPED | OUTPATIENT
Start: 2025-07-29

## 2025-07-29 RX ORDER — ZOLMITRIPTAN 5 MG/1
TABLET, ORALLY DISINTEGRATING ORAL
Qty: 12 TABLET | Refills: 5 | Status: SHIPPED | OUTPATIENT
Start: 2025-07-29

## 2025-08-15 ENCOUNTER — ANESTHESIA EVENT (OUTPATIENT)
Dept: ANESTHESIOLOGY | Facility: HOSPITAL | Age: 55
End: 2025-08-15

## 2025-08-15 ENCOUNTER — ANESTHESIA (OUTPATIENT)
Dept: ANESTHESIOLOGY | Facility: HOSPITAL | Age: 55
End: 2025-08-15

## 2025-08-17 DIAGNOSIS — F41.0 PANIC DISORDER WITHOUT AGORAPHOBIA: Chronic | ICD-10-CM

## 2025-08-17 DIAGNOSIS — F41.1 GAD (GENERALIZED ANXIETY DISORDER): Chronic | ICD-10-CM

## 2025-08-17 DIAGNOSIS — F33.0 MAJOR DEPRESSIVE DISORDER, RECURRENT EPISODE, MILD (HCC): Chronic | ICD-10-CM

## 2025-08-18 RX ORDER — DULOXETIN HYDROCHLORIDE 60 MG/1
CAPSULE, DELAYED RELEASE ORAL
Qty: 60 CAPSULE | Refills: 2 | Status: SHIPPED | OUTPATIENT
Start: 2025-08-18 | End: 2025-08-27 | Stop reason: SDUPTHER

## 2025-08-18 RX ORDER — LAMOTRIGINE 150 MG/1
TABLET ORAL
Qty: 30 TABLET | Refills: 2 | Status: SHIPPED | OUTPATIENT
Start: 2025-08-18 | End: 2025-08-27 | Stop reason: SDUPTHER

## 2025-08-22 ENCOUNTER — ANESTHESIA EVENT (OUTPATIENT)
Dept: GASTROENTEROLOGY | Facility: AMBULARY SURGERY CENTER | Age: 55
End: 2025-08-22
Payer: COMMERCIAL

## 2025-08-22 PROBLEM — G47.33 OSA (OBSTRUCTIVE SLEEP APNEA): Status: RESOLVED | Noted: 2023-06-06 | Resolved: 2025-08-22

## 2025-08-22 RX ORDER — LIDOCAINE HYDROCHLORIDE 20 MG/ML
INJECTION, SOLUTION EPIDURAL; INFILTRATION; INTRACAUDAL; PERINEURAL AS NEEDED
Status: DISCONTINUED | OUTPATIENT
Start: 2025-08-22 | End: 2025-08-22

## 2025-08-22 RX ORDER — PROPOFOL 10 MG/ML
INJECTION, EMULSION INTRAVENOUS AS NEEDED
Status: DISCONTINUED | OUTPATIENT
Start: 2025-08-22 | End: 2025-08-22

## 2025-08-22 RX ORDER — PROPOFOL 10 MG/ML
INJECTION, EMULSION INTRAVENOUS CONTINUOUS PRN
Status: DISCONTINUED | OUTPATIENT
Start: 2025-08-22 | End: 2025-08-22

## 2025-08-22 RX ORDER — SODIUM CHLORIDE, SODIUM LACTATE, POTASSIUM CHLORIDE, CALCIUM CHLORIDE 600; 310; 30; 20 MG/100ML; MG/100ML; MG/100ML; MG/100ML
INJECTION, SOLUTION INTRAVENOUS CONTINUOUS PRN
Status: DISCONTINUED | OUTPATIENT
Start: 2025-08-22 | End: 2025-08-22

## 2025-08-22 RX ADMIN — PROPOFOL 120 MCG/KG/MIN: 10 INJECTION, EMULSION INTRAVENOUS at 08:32

## 2025-08-22 RX ADMIN — PROPOFOL 50 MG: 10 INJECTION, EMULSION INTRAVENOUS at 08:32

## 2025-08-22 RX ADMIN — LIDOCAINE HYDROCHLORIDE 100 MG: 20 INJECTION, SOLUTION EPIDURAL; INFILTRATION; INTRACAUDAL at 08:31

## 2025-08-22 RX ADMIN — PROPOFOL 50 MG: 10 INJECTION, EMULSION INTRAVENOUS at 08:33

## 2025-08-22 RX ADMIN — SODIUM CHLORIDE, SODIUM LACTATE, POTASSIUM CHLORIDE, AND CALCIUM CHLORIDE: .6; .31; .03; .02 INJECTION, SOLUTION INTRAVENOUS at 07:10

## 2025-08-22 RX ADMIN — PROPOFOL 100 MG: 10 INJECTION, EMULSION INTRAVENOUS at 08:31

## (undated) RX ORDER — NEOMYCIN SULFATE, POLYMYXIN B SULFATE AND DEXAMETHASONE 3.5; 10000; 1 MG/G; [USP'U]/G; MG/G: 1/4 OINTMENT OPHTHALMIC TWICE A DAY